# Patient Record
Sex: FEMALE | Race: WHITE | NOT HISPANIC OR LATINO | Employment: PART TIME | URBAN - METROPOLITAN AREA
[De-identification: names, ages, dates, MRNs, and addresses within clinical notes are randomized per-mention and may not be internally consistent; named-entity substitution may affect disease eponyms.]

---

## 2017-02-21 ENCOUNTER — OFFICE VISIT (OUTPATIENT)
Dept: FAMILY MEDICINE | Facility: CLINIC | Age: 57
End: 2017-02-21
Payer: COMMERCIAL

## 2017-02-21 VITALS
HEIGHT: 62 IN | SYSTOLIC BLOOD PRESSURE: 117 MMHG | TEMPERATURE: 98 F | WEIGHT: 159.75 LBS | DIASTOLIC BLOOD PRESSURE: 73 MMHG | BODY MASS INDEX: 29.4 KG/M2 | HEART RATE: 79 BPM

## 2017-02-21 DIAGNOSIS — R30.0 DYSURIA: Primary | ICD-10-CM

## 2017-02-21 DIAGNOSIS — R31.9 HEMATURIA: ICD-10-CM

## 2017-02-21 LAB
BILIRUB UR QL STRIP: NEGATIVE
CLARITY UR: CLEAR
COLOR UR: YELLOW
GLUCOSE UR QL STRIP: NEGATIVE
HGB UR QL STRIP: ABNORMAL
KETONES UR QL STRIP: NEGATIVE
LEUKOCYTE ESTERASE UR QL STRIP: NEGATIVE
NITRITE UR QL STRIP: NEGATIVE
PH UR STRIP: 6 [PH] (ref 5–8)
PROT UR QL STRIP: NEGATIVE
SP GR UR STRIP: 1.01 (ref 1–1.03)
URN SPEC COLLECT METH UR: ABNORMAL

## 2017-02-21 PROCEDURE — 99999 PR PBB SHADOW E&M-EST. PATIENT-LVL II: CPT | Mod: PBBFAC,,, | Performed by: FAMILY MEDICINE

## 2017-02-21 PROCEDURE — 81002 URINALYSIS NONAUTO W/O SCOPE: CPT | Mod: PO

## 2017-02-21 PROCEDURE — 99213 OFFICE O/P EST LOW 20 MIN: CPT | Mod: S$GLB,,, | Performed by: FAMILY MEDICINE

## 2017-02-21 NOTE — PROGRESS NOTES
The patient presents with a history of dysuria and frequency,denies flank pain,vomiting or significant fever. Had some hematuria x 1 d cleared grossly    Past Medical History:  Past Medical History   Diagnosis Date    Arthritis, lumbar spine     Blood dyscrasia      left breast lumpectomy and bilateral reduction    GERD (gastroesophageal reflux disease)     Hypothyroidism 10/8/2014    Obesity (BMI 30-39.9) 10/8/2014    Thyroid disease     Thyroid nodule 10/8/2014     Past Surgical History   Procedure Laterality Date    Breast reduction      C-sections x2      Colonoscopy  9/25/2012           Review of patient's allergies indicates:   Allergen Reactions    Duricef [cefadroxil]      Current Outpatient Prescriptions on File Prior to Visit   Medication Sig Dispense Refill    CALCIUM CARBONATE (CALTRATE 600 ORAL) Take by mouth.      ergocalciferol (VITAMIN D2) 50,000 unit Cap Take 1 capsule (50,000 Units total) by mouth every 7 days. 12 capsule 3    levothyroxine (SYNTHROID) 50 MCG tablet TAKE 1 TABLET (50 MCG TOTAL) BY MOUTH ONCE DAILY. 90 tablet 3    [DISCONTINUED] doxycycline (MONODOX) 100 MG capsule Take 1 capsule (100 mg total) by mouth 2 (two) times daily. 20 capsule 0    [DISCONTINUED] meloxicam (MOBIC) 7.5 MG tablet TAKE 1 TABLET (7.5 MG TOTAL) BY MOUTH ONCE DAILY. 30 tablet 1     No current facility-administered medications on file prior to visit.      Social History     Social History    Marital status:      Spouse name: N/A    Number of children: N/A    Years of education: N/A     Occupational History    Not on file.     Social History Main Topics    Smoking status: Never Smoker    Smokeless tobacco: Never Used    Alcohol use No    Drug use: No    Sexual activity: No     Other Topics Concern    Not on file     Social History Narrative     Family History   Problem Relation Age of Onset    Diabetes Father     Colon cancer Father     Breast cancer Maternal Aunt     Breast  cancer Paternal Aunt     Ovarian cancer Neg Hx        ROS: Denies weight loss  Respiratory/CV: No cough dyspnea or chest pain  GI:No nausea vomiting diarrhea  :Frequency dysuria  SKIN:No rashes or change in texture    PE Vital signs noted  Heent: Normocephalic,with no recent trauma,PERRLA,EOMI,conjunctiva clear with no exudate.Nasopharynx is not injected .Otic canal.TMs are not affected.Pharynx is normal.  Neck:Supple without adenopathy  Chest:Clear bilateral breath sounds normal  Heart:Regular rhthym without murmer  Abdomen:Soft, mild superpubic tenderness,no rebound,no masses, no hepatosplenomegaly,no flank tenderness  Extremeties and Neurologic:Grossly within normal limits     U/A is abnormal     Impression: Hematuria     Plan:   Flori [facility]

## 2017-03-12 RX ORDER — LEVOTHYROXINE SODIUM 50 UG/1
TABLET ORAL
Qty: 90 TABLET | Refills: 3 | Status: SHIPPED | OUTPATIENT
Start: 2017-03-12 | End: 2017-11-13 | Stop reason: SDUPTHER

## 2017-05-01 ENCOUNTER — OFFICE VISIT (OUTPATIENT)
Dept: FAMILY MEDICINE | Facility: CLINIC | Age: 57
End: 2017-05-01
Payer: COMMERCIAL

## 2017-05-01 VITALS
HEART RATE: 67 BPM | BODY MASS INDEX: 29.81 KG/M2 | WEIGHT: 162 LBS | DIASTOLIC BLOOD PRESSURE: 74 MMHG | TEMPERATURE: 99 F | HEIGHT: 62 IN | SYSTOLIC BLOOD PRESSURE: 115 MMHG

## 2017-05-01 DIAGNOSIS — R21 RASH: Primary | ICD-10-CM

## 2017-05-01 PROCEDURE — 99213 OFFICE O/P EST LOW 20 MIN: CPT | Mod: S$GLB,,, | Performed by: NURSE PRACTITIONER

## 2017-05-01 PROCEDURE — 1160F RVW MEDS BY RX/DR IN RCRD: CPT | Mod: S$GLB,,, | Performed by: NURSE PRACTITIONER

## 2017-05-01 PROCEDURE — 99999 PR PBB SHADOW E&M-EST. PATIENT-LVL III: CPT | Mod: PBBFAC,,, | Performed by: NURSE PRACTITIONER

## 2017-05-01 RX ORDER — TRIAMCINOLONE ACETONIDE 5 MG/G
CREAM TOPICAL 2 TIMES DAILY
Qty: 30 G | Refills: 0 | Status: SHIPPED | OUTPATIENT
Start: 2017-05-01 | End: 2017-06-30

## 2017-05-01 RX ORDER — KETOCONAZOLE 20 MG/G
CREAM TOPICAL DAILY
COMMUNITY
End: 2017-06-14

## 2017-05-01 RX ORDER — HYDROXYZINE HYDROCHLORIDE 25 MG/1
25 TABLET, FILM COATED ORAL 2 TIMES DAILY PRN
Qty: 14 TABLET | Refills: 0 | Status: SHIPPED | OUTPATIENT
Start: 2017-05-01 | End: 2017-05-26 | Stop reason: SDUPTHER

## 2017-05-01 NOTE — PROGRESS NOTES
Subjective:       Patient ID: Bessy Lamb is a 56 y.o. female.    Chief Complaint: Rash    Rash   This is a new problem. The current episode started 1 to 4 weeks ago. The problem is unchanged. The affected locations include the right hip. The rash is characterized by redness and itchiness. It is unknown if there was an exposure to a precipitant. Pertinent negatives include no anorexia, congestion, cough, diarrhea, eye pain, facial edema, fatigue, fever, joint pain, nail changes, rhinorrhea, shortness of breath, sore throat or vomiting. Treatments tried: nizoral. The treatment provided no relief. There is no history of allergies, asthma, eczema or varicella.     Past Medical History:   Diagnosis Date    Arthritis, lumbar spine     Blood dyscrasia     left breast lumpectomy and bilateral reduction    GERD (gastroesophageal reflux disease)     Hypothyroidism 10/8/2014    Obesity (BMI 30-39.9) 10/8/2014    Thyroid disease     Thyroid nodule 10/8/2014     Social History     Social History    Marital status:      Spouse name: N/A    Number of children: N/A    Years of education: N/A     Occupational History         Social History Main Topics    Smoking status: Never Smoker    Smokeless tobacco: Never Used    Alcohol use No    Drug use: No    Sexual activity: No     Social History Narrative     Past Surgical History:   Procedure Laterality Date    breast reduction      c-sections x2      COLONOSCOPY  9/25/2012            Review of Systems   Constitutional: Negative.  Negative for fatigue and fever.   HENT: Negative.  Negative for congestion, rhinorrhea and sore throat.    Eyes: Negative.  Negative for pain.   Respiratory: Negative.  Negative for cough and shortness of breath.    Cardiovascular: Negative.    Gastrointestinal: Negative.  Negative for anorexia, diarrhea and vomiting.   Endocrine: Negative.    Genitourinary: Negative.    Musculoskeletal: Negative.  Negative for joint pain.    Skin: Positive for rash. Negative for nail changes.   Allergic/Immunologic: Negative.    Neurological: Negative.    Psychiatric/Behavioral: Negative.        Objective:      Physical Exam   Constitutional: She appears well-developed and well-nourished.   HENT:   Head: Normocephalic.   Right Ear: External ear normal.   Left Ear: External ear normal.   Nose: Nose normal.   Mouth/Throat: Oropharynx is clear and moist.   Eyes: Conjunctivae are normal. Pupils are equal, round, and reactive to light.   Neck: Normal range of motion. Neck supple.   Cardiovascular: Normal rate, regular rhythm and normal heart sounds.    Pulmonary/Chest: Effort normal and breath sounds normal.   Abdominal: Soft. Bowel sounds are normal.   Musculoskeletal: Normal range of motion.   Skin: Skin is warm and dry. Rash noted. Rash is urticarial (Right hip).   Psychiatric: She has a normal mood and affect. Her behavior is normal. Judgment and thought content normal.   Nursing note and vitals reviewed.      Assessment:       1. Rash        Plan:           Bessy was seen today for rash.    Diagnoses and all orders for this visit:    Rash  -     triamcinolone acetonide 0.5% (KENALOG) 0.5 % Crea; Apply topically 2 (two) times daily.  -     hydrOXYzine HCl (ATARAX) 25 MG tablet; Take 1 tablet (25 mg total) by mouth 2 (two) times daily as needed.

## 2017-05-01 NOTE — MR AVS SNAPSHOT
[facility]  [address] [address]  Phone: [phone]  Fax: [phone]                  Bessy Lamb   2017 7:40 AM   Office Visit    Description:  Female : 1960   Provider:  Mara Calloway NP   Department:  [facility]           Reason for Visit     Rash           Diagnoses this Visit        Comments    Rash    -  Primary            To Do List           Goals (5 Years of Data)     None       These Medications        Disp Refills Start End    triamcinolone acetonide 0.5% (KENALOG) 0.5 % Crea 30 g 0 2017    Apply topically 2 (two) times daily. - Topical (Top)    Pharmacy: [facility] [id number] Ph #: [phone]       hydrOXYzine HCl (ATARAX) 25 MG tablet 14 tablet 0 2017    Take 1 tablet (25 mg total) by mouth 2 (two) times daily as needed. - Oral    Pharmacy: [facility] [address] Ph #: [phone]         [facility]     [facility] Care Line -  Assistance  Unless otherwise directed by your provider, please contact [facility], our nurse care line that is available for  assistance.     Registered nurses in the [facility] provide: appointment scheduling, clinical advisement, health education, and other advisory services.  Call: [phone] (toll free)               Medications           Message regarding Medications     Verify the changes and/or additions to your medication regime listed below are the same as discussed with your clinician today.  If any of these changes or additions are incorrect, please notify your healthcare provider.        START taking these NEW medications        Refills    triamcinolone acetonide 0.5% (KENALOG) 0.5 % Crea 0    Sig: Apply topically 2 (two) times daily.    Class: Normal    Route: Topical (Top)    hydrOXYzine HCl (ATARAX) 25 MG tablet 0    Sig: Take 1  "tablet (25 mg total) by mouth 2 (two) times daily as needed.    Class: Normal    Route: Oral           Verify that the below list of medications is an accurate representation of the medications you are currently taking.  If none reported, the list may be blank. If incorrect, please contact your healthcare provider. Carry this list with you in case of emergency.           Current Medications     CALCIUM CARBONATE (CALTRATE 600 ORAL) Take by mouth.    ergocalciferol (VITAMIN D2) 50,000 unit Cap Take 1 capsule (50,000 Units total) by mouth every 7 days.    ketoconazole (NIZORAL) 2 % cream Apply topically once daily.    levothyroxine (SYNTHROID) 50 MCG tablet TAKE ONE TABLET BY MOUTH ONE TIME DAILY    hydrOXYzine HCl (ATARAX) 25 MG tablet Take 1 tablet (25 mg total) by mouth 2 (two) times daily as needed.    triamcinolone acetonide 0.5% (KENALOG) 0.5 % Crea Apply topically 2 (two) times daily.           Clinical Reference Information           Your Vitals Were     BP Pulse Temp Height Weight BMI    115/74 67 98.5 °F (36.9 °C) 5' 2" (1.575 m) 73.5 kg (162 lb) 29.63 kg/m2      Blood Pressure          Most Recent Value    BP  115/74      Allergies as of 5/1/2017     Duricef [Cefadroxil]      Immunizations Administered on Date of Encounter - 5/1/2017     None      Language Assistance Services     ATTENTION: Language assistance services are available, free of charge. Please call [phone].      ATENCIÓN: Si habla español, tiene a johnston disposición servicios gratuitos de asistencia lingüística. Llame al [phone].     [facility] Ý: N?u b?n nói Ti?ng Vi?t, có các d?ch v? h? tr? ngôn ng? mi?n phí dành cho b?n. G?i s? [phone].         [facility] complies with applicable Federal civil rights laws and does not discriminate on the basis of race, color, national origin, age, disability, or sex.        "

## 2017-05-18 ENCOUNTER — OFFICE VISIT (OUTPATIENT)
Dept: FAMILY MEDICINE | Facility: CLINIC | Age: 57
End: 2017-05-18
Payer: COMMERCIAL

## 2017-05-18 ENCOUNTER — LAB VISIT (OUTPATIENT)
Dept: LAB | Facility: HOSPITAL | Age: 57
End: 2017-05-18
Payer: COMMERCIAL

## 2017-05-18 VITALS
BODY MASS INDEX: 30.09 KG/M2 | SYSTOLIC BLOOD PRESSURE: 122 MMHG | HEART RATE: 79 BPM | HEIGHT: 62 IN | WEIGHT: 163.5 LBS | DIASTOLIC BLOOD PRESSURE: 69 MMHG

## 2017-05-18 DIAGNOSIS — L20.9 ATOPIC DERMATITIS, UNSPECIFIED TYPE: ICD-10-CM

## 2017-05-18 DIAGNOSIS — T14.8XXA BRUISING: ICD-10-CM

## 2017-05-18 DIAGNOSIS — L20.9 ATOPIC DERMATITIS, UNSPECIFIED TYPE: Primary | ICD-10-CM

## 2017-05-18 LAB
ALBUMIN SERPL BCP-MCNC: 4.2 G/DL
ALP SERPL-CCNC: 67 U/L
ALT SERPL W/O P-5'-P-CCNC: 29 U/L
ANION GAP SERPL CALC-SCNC: 6 MMOL/L
AST SERPL-CCNC: 32 U/L
BASOPHILS # BLD AUTO: 0.02 K/UL
BASOPHILS NFR BLD: 0.3 %
BILIRUB SERPL-MCNC: 0.9 MG/DL
BUN SERPL-MCNC: 8 MG/DL
CALCIUM SERPL-MCNC: 9.6 MG/DL
CHLORIDE SERPL-SCNC: 102 MMOL/L
CO2 SERPL-SCNC: 31 MMOL/L
CREAT SERPL-MCNC: 0.9 MG/DL
DIFFERENTIAL METHOD: NORMAL
EOSINOPHIL # BLD AUTO: 0.1 K/UL
EOSINOPHIL NFR BLD: 1.8 %
ERYTHROCYTE [DISTWIDTH] IN BLOOD BY AUTOMATED COUNT: 12.6 %
EST. GFR  (AFRICAN AMERICAN): >60 ML/MIN/1.73 M^2
EST. GFR  (NON AFRICAN AMERICAN): >60 ML/MIN/1.73 M^2
GLUCOSE SERPL-MCNC: 91 MG/DL
HCT VFR BLD AUTO: 39.2 %
HGB BLD-MCNC: 13.4 G/DL
LYMPHOCYTES # BLD AUTO: 3.5 K/UL
LYMPHOCYTES NFR BLD: 47.2 %
MCH RBC QN AUTO: 29.1 PG
MCHC RBC AUTO-ENTMCNC: 34.2 %
MCV RBC AUTO: 85 FL
MONOCYTES # BLD AUTO: 0.4 K/UL
MONOCYTES NFR BLD: 5.1 %
NEUTROPHILS # BLD AUTO: 3.4 K/UL
NEUTROPHILS NFR BLD: 45.5 %
PLATELET # BLD AUTO: 242 K/UL
PMV BLD AUTO: 9.4 FL
POTASSIUM SERPL-SCNC: 3.7 MMOL/L
PROT SERPL-MCNC: 7.7 G/DL
RBC # BLD AUTO: 4.6 M/UL
SODIUM SERPL-SCNC: 139 MMOL/L
WBC # BLD AUTO: 7.39 K/UL

## 2017-05-18 PROCEDURE — 99999 PR PBB SHADOW E&M-EST. PATIENT-LVL II: CPT | Mod: PBBFAC,,, | Performed by: FAMILY MEDICINE

## 2017-05-18 PROCEDURE — 96372 THER/PROPH/DIAG INJ SC/IM: CPT | Mod: S$GLB,,, | Performed by: FAMILY MEDICINE

## 2017-05-18 PROCEDURE — 80053 COMPREHEN METABOLIC PANEL: CPT

## 2017-05-18 PROCEDURE — 36415 COLL VENOUS BLD VENIPUNCTURE: CPT | Mod: PO

## 2017-05-18 PROCEDURE — 85025 COMPLETE CBC W/AUTO DIFF WBC: CPT

## 2017-05-18 PROCEDURE — 99213 OFFICE O/P EST LOW 20 MIN: CPT | Mod: 25,S$GLB,, | Performed by: FAMILY MEDICINE

## 2017-05-18 PROCEDURE — 1160F RVW MEDS BY RX/DR IN RCRD: CPT | Mod: S$GLB,,, | Performed by: FAMILY MEDICINE

## 2017-05-18 RX ORDER — DEXAMETHASONE SODIUM PHOSPHATE 4 MG/ML
8 INJECTION, SOLUTION INTRA-ARTICULAR; INTRALESIONAL; INTRAMUSCULAR; INTRAVENOUS; SOFT TISSUE
Status: COMPLETED | OUTPATIENT
Start: 2017-05-18 | End: 2017-05-18

## 2017-05-18 RX ADMIN — DEXAMETHASONE SODIUM PHOSPHATE 8 MG: 4 INJECTION, SOLUTION INTRA-ARTICULAR; INTRALESIONAL; INTRAMUSCULAR; INTRAVENOUS; SOFT TISSUE at 04:05

## 2017-05-18 NOTE — PROGRESS NOTES
Bessy Lamb presents with moderate gen itching and papular rash. Now reports atraumatic bruising lt thigh    Denies nausea,vomiting,diarrhea or significant fever.    Past Medical History:   Diagnosis Date    Arthritis, lumbar spine     Blood dyscrasia     left breast lumpectomy and bilateral reduction    GERD (gastroesophageal reflux disease)     Hypothyroidism 10/8/2014    Obesity (BMI 30-39.9) 10/8/2014    Thyroid disease     Thyroid nodule 10/8/2014     Past Surgical History:   Procedure Laterality Date    breast reduction      c-sections x2      COLONOSCOPY  9/25/2012          Review of patient's allergies indicates:   Allergen Reactions    Duricef [cefadroxil]      Current Outpatient Prescriptions on File Prior to Visit   Medication Sig Dispense Refill    CALCIUM CARBONATE (CALTRATE 600 ORAL) Take by mouth.      ergocalciferol (VITAMIN D2) 50,000 unit Cap Take 1 capsule (50,000 Units total) by mouth every 7 days. 12 capsule 3    ketoconazole (NIZORAL) 2 % cream Apply topically once daily.      levothyroxine (SYNTHROID) 50 MCG tablet TAKE ONE TABLET BY MOUTH ONE TIME DAILY 90 tablet 3    triamcinolone acetonide 0.5% (KENALOG) 0.5 % Crea Apply topically 2 (two) times daily. 30 g 0     No current facility-administered medications on file prior to visit.      Social History     Social History    Marital status:      Spouse name: N/A    Number of children: N/A    Years of education: N/A     Occupational History    Not on file.     Social History Main Topics    Smoking status: Never Smoker    Smokeless tobacco: Never Used    Alcohol use No    Drug use: No    Sexual activity: No     Other Topics Concern    Not on file     Social History Narrative     Family History   Problem Relation Age of Onset    Diabetes Father     Colon cancer Father     Breast cancer Maternal Aunt     Breast cancer Paternal Aunt     Ovarian cancer Neg Hx          ROS:  SKIN: No rashes, itching or  changes in color or texture of skin.  EYES: Visual acuity fine. No photophobia, ocular pain or diplopia.EARS: Denies ear pain, discharge or vertigo.NOSE: No loss of smell, no epistaxis some postnasal drip.MOUTH & THROAT: No hoarseness or change in voice. No excessive gum bleeding.CHEST: Denies SMILEY, cyanosis, wheezing  CARDIOVASCULAR: Denies chest pain, PND, orthopnea or reduced exercise tolerance.  ABDOMEN:  No weight loss.No abdominal pain, no hematemesis or blood in stool.  URINARY: No flank pain, dysuria or hematuria.  PERIPHERAL VASCULAR: No claudication or cyanosis.  MUSCULOSKELETAL: Negative   NEUROLOGIC: No history of seizures, paralysis, alteration of gait or coordination.    PE: Vital signs as noted  Heent:Normocephalic with no recent cranial trauma,PERRLA,EOMI,conjunctiva clear,fundi reveal no hemmorhage exudate or papilledema.Otic canals clear, tympanic membranes slightly dull bilaterally.Nasal mucosa slightly red and edematous.Posterior pharynx slightly red but without exudate.  Neck:Supple with minimal anterior cervical adenopathy.  Chest:Clear bilateral breath sounds    Heart:Regular rhthym without murmer  Abdomen:Soft, non tender,no masses, no hepatosplenomegalyExtremeties and Neurologic:Grossly within normal limits  SKIN:Gen papular rash  Impression: Atopy  Bruising   Plan: H1 H2   Lab

## 2017-05-25 ENCOUNTER — TELEPHONE (OUTPATIENT)
Dept: FAMILY MEDICINE | Facility: CLINIC | Age: 57
End: 2017-05-25

## 2017-05-25 DIAGNOSIS — L50.9 URTICARIA: Primary | ICD-10-CM

## 2017-05-25 NOTE — TELEPHONE ENCOUNTER
----- Message from Radha Wheeler sent at 5/25/2017  3:55 PM CDT -----  Contact: Peter  Patient's  requesting refill on Rx Hyzine States received letter with results, but condition is still present, can not sleep, itching, and red spots appearing on legs. Asking for next step to help resolve this issue. Also asking to be called today as wants answers. Please call [phone]. Thanks!

## 2017-05-25 NOTE — TELEPHONE ENCOUNTER
Rec allergy immunology consult -ok to refill what he requests but I dont know what he means by khari

## 2017-05-26 RX ORDER — HYDROXYZINE HYDROCHLORIDE 25 MG/1
TABLET, FILM COATED ORAL
Qty: 14 TABLET | Refills: 0 | Status: SHIPPED | OUTPATIENT
Start: 2017-05-26 | End: 2017-06-30

## 2017-05-26 RX ORDER — METHYLPREDNISOLONE 4 MG/1
TABLET ORAL
Qty: 1 PACKAGE | Refills: 0 | Status: SHIPPED | OUTPATIENT
Start: 2017-05-26 | End: 2017-06-14

## 2017-05-26 NOTE — TELEPHONE ENCOUNTER
Will send dosepak and ok to take diflucan-ok to stop hydroxyzine but if not taking a regular antihistamine with the zantac-like claritin or allegra-Id rec getting that as well and Allergy con if not better next few days

## 2017-05-26 NOTE — TELEPHONE ENCOUNTER
Patient states the steroid helped for a little while, she took the zantac, hydroxyzine 25 mg BID, and still having rash around mid section. Nothing has changed.    She has one pill of diflucan that she would like to know if she can take instead of hydroxyzine. She states she doesn't take medication at work because it makes her too sleepy. Would a steroid pack be beneficial?

## 2017-06-02 ENCOUNTER — TELEPHONE (OUTPATIENT)
Dept: FAMILY MEDICINE | Facility: CLINIC | Age: 57
End: 2017-06-02

## 2017-06-02 ENCOUNTER — NURSE TRIAGE (OUTPATIENT)
Dept: ADMINISTRATIVE | Facility: CLINIC | Age: 57
End: 2017-06-02

## 2017-06-02 ENCOUNTER — PATIENT MESSAGE (OUTPATIENT)
Dept: FAMILY MEDICINE | Facility: CLINIC | Age: 57
End: 2017-06-02

## 2017-06-02 NOTE — TELEPHONE ENCOUNTER
Reason for Disposition   Caller has medication question, adult has minor symptoms, caller declines triage, and triager answers question    Protocols used: ST MEDICATION QUESTION CALL-A-AH    Bessy has been followed by Dr. Snyder for about a month regarding itching, rash. She initially thought it may have been mites but she is noticing the rash is located in skin folds, groin area. She is wondering if Dr. Snyder will prescribe a antifungal if he thinks that is appropriate. She states the rash is getting worse. She states she called earlier today but hasn't heard back. Advised if she doesn't hear back then I would recommend to go to [facility] tomorrow which is open. Discussed with her if it is yeast then trying otc lotrimin to under her breasts, armpits, under her abdominal folds, and thigh area would improve this. She is thinking she would need diflucan po. She is asking for a call back from Dr. Snyder's office if still available today. Please contact caller with any further care advice.

## 2017-06-02 NOTE — TELEPHONE ENCOUNTER
----- Message from Amelia Badillo sent at 6/2/2017 11:10 AM CDT -----  Contact: pt  Please call pt @ [phone], pt states she have more questions, pt states she was already talking to nurse.

## 2017-06-02 NOTE — TELEPHONE ENCOUNTER
DANYA   Spoke with patient and she states that she finished the hydroxyzine. She forgot about taking the claritin or allegra in addition to zantac and also the diflucan. Adv of last note with with medication approval. She verbalized understanding and going to  OTC meds

## 2017-06-03 RX ORDER — CLOTRIMAZOLE 1 %
CREAM (GRAM) TOPICAL 2 TIMES DAILY
Qty: 60 G | Refills: 1 | Status: SHIPPED | OUTPATIENT
Start: 2017-06-03 | End: 2017-06-30

## 2017-06-03 RX ORDER — FLUCONAZOLE 150 MG/1
150 TABLET ORAL DAILY
Qty: 3 TABLET | Refills: 0 | Status: SHIPPED | OUTPATIENT
Start: 2017-06-03 | End: 2017-06-04

## 2017-06-07 ENCOUNTER — PATIENT MESSAGE (OUTPATIENT)
Dept: FAMILY MEDICINE | Facility: CLINIC | Age: 57
End: 2017-06-07

## 2017-06-08 ENCOUNTER — OFFICE VISIT (OUTPATIENT)
Dept: OBSTETRICS AND GYNECOLOGY | Facility: CLINIC | Age: 57
End: 2017-06-08
Payer: COMMERCIAL

## 2017-06-08 VITALS
WEIGHT: 162.06 LBS | HEIGHT: 62 IN | BODY MASS INDEX: 29.82 KG/M2 | SYSTOLIC BLOOD PRESSURE: 128 MMHG | DIASTOLIC BLOOD PRESSURE: 78 MMHG

## 2017-06-08 DIAGNOSIS — Z12.31 VISIT FOR SCREENING MAMMOGRAM: ICD-10-CM

## 2017-06-08 DIAGNOSIS — N84.1 CERVICAL POLYP: ICD-10-CM

## 2017-06-08 DIAGNOSIS — Z01.419 ENCOUNTER FOR GYNECOLOGICAL EXAMINATION WITHOUT ABNORMAL FINDING: Primary | ICD-10-CM

## 2017-06-08 PROCEDURE — 99396 PREV VISIT EST AGE 40-64: CPT | Mod: S$GLB,,, | Performed by: SPECIALIST

## 2017-06-08 PROCEDURE — 88175 CYTOPATH C/V AUTO FLUID REDO: CPT | Performed by: PATHOLOGY

## 2017-06-08 PROCEDURE — 99999 PR PBB SHADOW E&M-EST. PATIENT-LVL IV: CPT | Mod: PBBFAC,,, | Performed by: SPECIALIST

## 2017-06-08 PROCEDURE — 88141 CYTOPATH C/V INTERPRET: CPT | Mod: ,,, | Performed by: PATHOLOGY

## 2017-06-08 NOTE — PROGRESS NOTES
57 yo WF presents for annual gyn evaluation. Under dermatology care fro generalized rash over trunk.  Past Medical History:   Diagnosis Date    Arthritis, lumbar spine     Blood dyscrasia     left breast lumpectomy and bilateral reduction    GERD (gastroesophageal reflux disease)     Hypothyroidism 10/8/2014    Obesity (BMI 30-39.9) 10/8/2014    Thyroid disease     Thyroid nodule 10/8/2014       Past Surgical History:   Procedure Laterality Date    breast reduction      c-sections x2       SECTION      COLONOSCOPY  2012            Family History   Problem Relation Age of Onset    Diabetes Father     Colon cancer Father     Breast cancer Maternal Aunt     Breast cancer Paternal Aunt     Ovarian cancer Neg Hx        Social History     Social History    Marital status:      Spouse name: N/A    Number of children: N/A    Years of education: N/A     Social History Main Topics    Smoking status: Never Smoker    Smokeless tobacco: Never Used    Alcohol use No    Drug use: No    Sexual activity: No     Other Topics Concern    None     Social History Narrative    None       Current Outpatient Prescriptions   Medication Sig Dispense Refill    CALCIUM CARBONATE (CALTRATE 600 ORAL) Take by mouth.      clotrimazole (LOTRIMIN) 1 % cream Apply topically 2 (two) times daily. 60 g 1    ergocalciferol (VITAMIN D2) 50,000 unit Cap Take 1 capsule (50,000 Units total) by mouth every 7 days. 12 capsule 3    hydrOXYzine HCl (ATARAX) 25 MG tablet TAKE 1 TABLET BY MOUTH TWO TIMES DAILY AS NEEDED. 14 tablet 0    ketoconazole (NIZORAL) 2 % cream Apply topically once daily.      levothyroxine (SYNTHROID) 50 MCG tablet TAKE ONE TABLET BY MOUTH ONE TIME DAILY 90 tablet 3    methylPREDNISolone (MEDROL DOSEPACK) 4 mg tablet As directed 1 Package 0    triamcinolone acetonide 0.5% (KENALOG) 0.5 % Crea Apply topically 2 (two) times daily. 30 g 0     No current facility-administered medications for  this visit.        Review of patient's allergies indicates:   Allergen Reactions    Duricef [cefadroxil]        Review of System:   General: no chills, fever, night sweats, weight gain or weight loss  POSITIVE TRUNCAL RASH   Psychological: no depression or suicidal ideation  Breasts: no new or changing breast lumps, nipple discharge or masses.  Respiratory: no cough, shortness of breath, or wheezing  Cardiovascular: no chest pain or dyspnea on exertion  Gastrointestinal: no abdominal pain, change in bowel habits, or black or bloody stools  Genito-Urinary: no incontinence, urinary frequency/urgency or vulvar/vaginal symptoms, pelvic pain or abnormal vaginal bleeding.  Musculoskeletal: no gait disturbance or muscular weakness    General Appearance:  Alert, cooperative, no distress, appears stated age   Head:  Normocephalic, without obvious abnormality, atraumatic   Eyes:  PERRL, conjunctiva/corneas clear, EOM's intact, fundi benign, both eyes   Ears:  Normal TM's and external ear canals, both ears   Nose: Nares normal, septum midline,mucosa normal, no drainage or sinus tenderness   Throat: Lips, mucosa, and tongue normal; teeth and gums normal   Neck: Supple, symmetrical, trachea midline, no adenopathy;  thyroid: not enlarged, symmetric, no tenderness/mass/nodules; no carotid bruit or JVD   Back:   Symmetric, no curvature, ROM normal, no CVA tenderness   Lungs:   Clear to auscultation bilaterally, respirations unlabored   Breasts:  No masses or tenderness   Heart:  Regular rate and rhythm, S1 and S2 normal, no murmur, rub, or gallop   Abdomen:   Soft, non-tender, bowel sounds active all four quadrants,  no masses, no organomegaly    Genitourinary:   External rectal exam shows no thrombosed external hemorrhoids.   Pelvic exam was performed with patient supine.  No labial fusion.  There is no rash, lesion or injury on the right labia.  There is no rash, lesion or injury on the left labia.  No bleeding and no signs of  injury around the vaginal introitus, urethra is without lesions and well supported. The cervix is visualized with no discharge. Large prolapsed polpoid growth projecting from cervical os.  No vaginal discharge found.  No significant Cystocele, Enterocele or rectocele, and uterus well supported.  Bimanual exam:  The urethra is normal to palpation and there are no palpable vaginal wall masses.  Uterus is not deviated, not enlarged, not fixed, normal shape and not tender.  Cervix exhibits no motion tenderness.   Right adnexum displays no mass and no tenderness.  Left adnexum displays no mass and no tenderness.   Extremities: Extremities normal, atraumatic, no cyanosis or edema   Pulses: 2+ and symmetric   Skin: Skin color, texture, turgor normal, no rashes or lesions   Lymph nodes: Cervical, supraclavicular, and axillary nodes normal   Neurologic: Normal       PAP submitted    I spent 20 min an ddiscussed probable benign cervical polyp howver possible neoplasia and need for hysteroscopy and excision of polyp.  Pt is going on trip 2 weeks and I will schedule hysteoscope and polyp removal with D and C immediately following her return

## 2017-06-13 ENCOUNTER — TELEPHONE (OUTPATIENT)
Dept: FAMILY MEDICINE | Facility: CLINIC | Age: 57
End: 2017-06-13

## 2017-06-13 ENCOUNTER — PATIENT MESSAGE (OUTPATIENT)
Dept: FAMILY MEDICINE | Facility: CLINIC | Age: 57
End: 2017-06-13

## 2017-06-13 NOTE — TELEPHONE ENCOUNTER
Pt is asking if you will agree to give her time off from work due to the stress there and now her GYN has found a polyp and will need to do a procedure. Pt feels like she is about to have a nervous breakdown and would also like something to help with her this.

## 2017-06-13 NOTE — TELEPHONE ENCOUNTER
----- Message from Padma Arambula sent at 6/13/2017  8:44 AM CDT -----  Contact: pt  The pt states she isn't feeling well and wants to be worked in tomorrow, the pt request a return call to [phone]///[id number]

## 2017-06-14 ENCOUNTER — PATIENT MESSAGE (OUTPATIENT)
Dept: OBSTETRICS AND GYNECOLOGY | Facility: CLINIC | Age: 57
End: 2017-06-14

## 2017-06-14 ENCOUNTER — OFFICE VISIT (OUTPATIENT)
Dept: FAMILY MEDICINE | Facility: CLINIC | Age: 57
End: 2017-06-14
Payer: COMMERCIAL

## 2017-06-14 VITALS
WEIGHT: 163.13 LBS | SYSTOLIC BLOOD PRESSURE: 139 MMHG | DIASTOLIC BLOOD PRESSURE: 84 MMHG | HEART RATE: 76 BPM | HEIGHT: 62 IN | BODY MASS INDEX: 30.02 KG/M2

## 2017-06-14 DIAGNOSIS — E03.9 HYPOTHYROIDISM, UNSPECIFIED TYPE: Primary | ICD-10-CM

## 2017-06-14 DIAGNOSIS — F41.9 ANXIETY: ICD-10-CM

## 2017-06-14 DIAGNOSIS — N84.0 UTERINE POLYP: ICD-10-CM

## 2017-06-14 PROCEDURE — 99999 PR PBB SHADOW E&M-EST. PATIENT-LVL II: CPT | Mod: PBBFAC,,, | Performed by: FAMILY MEDICINE

## 2017-06-14 PROCEDURE — 99214 OFFICE O/P EST MOD 30 MIN: CPT | Mod: S$GLB,,, | Performed by: FAMILY MEDICINE

## 2017-06-14 NOTE — PROGRESS NOTES
Bessy Lamb presents with recurrent polyps uterine Dr Rea planning removal and hysterascope and poss Hyst Afre Dern con dx Scabiesw moderate gen itching and papular rash Continues to report atraumatic bruising lt thigh. Co sog stress     Past Medical History:   Diagnosis Date    Arthritis, lumbar spine     Blood dyscrasia     left breast lumpectomy and bilateral reduction    GERD (gastroesophageal reflux disease)     Hypothyroidism 10/8/2014    Obesity (BMI 30-39.9) 10/8/2014    Thyroid disease     Thyroid nodule 10/8/2014     Past Surgical History:   Procedure Laterality Date    breast reduction      c-sections x2       SECTION      COLONOSCOPY  2012          Review of patient's allergies indicates:   Allergen Reactions    Duricef [cefadroxil]      Current Outpatient Prescriptions on File Prior to Visit   Medication Sig Dispense Refill    CALCIUM CARBONATE (CALTRATE 600 ORAL) Take by mouth.      clotrimazole (LOTRIMIN) 1 % cream Apply topically 2 (two) times daily. 60 g 1    ergocalciferol (VITAMIN D2) 50,000 unit Cap Take 1 capsule (50,000 Units total) by mouth every 7 days. 12 capsule 3    hydrOXYzine HCl (ATARAX) 25 MG tablet TAKE 1 TABLET BY MOUTH TWO TIMES DAILY AS NEEDED. 14 tablet 0    levothyroxine (SYNTHROID) 50 MCG tablet TAKE ONE TABLET BY MOUTH ONE TIME DAILY 90 tablet 3    triamcinolone acetonide 0.5% (KENALOG) 0.5 % Crea Apply topically 2 (two) times daily. 30 g 0    [DISCONTINUED] ketoconazole (NIZORAL) 2 % cream Apply topically once daily.      [DISCONTINUED] methylPREDNISolone (MEDROL DOSEPACK) 4 mg tablet As directed 1 Package 0     No current facility-administered medications on file prior to visit.      Social History     Social History    Marital status:      Spouse name: N/A    Number of children: N/A    Years of education: N/A     Occupational History    Not on file.     Social History Main Topics    Smoking status: Never Smoker     Smokeless tobacco: Never Used    Alcohol use No    Drug use: No    Sexual activity: No     Other Topics Concern    Not on file     Social History Narrative    No narrative on file     Family History   Problem Relation Age of Onset    Diabetes Father     Colon cancer Father     Breast cancer Maternal Aunt     Breast cancer Paternal Aunt     Ovarian cancer Neg Hx          ROS:  SKIN: No rashes, itching or changes in color or texture of skin.  EYES: Visual acuity fine. No photophobia, ocular pain or diplopia.EARS: Denies ear pain, discharge or vertigo.NOSE: No loss of smell, no epistaxis some postnasal drip.MOUTH & THROAT: No hoarseness or change in voice. No excessive gum bleeding.CHEST: Denies SMILEY, cyanosis, wheezing  CARDIOVASCULAR: Denies chest pain, PND, orthopnea or reduced exercise tolerance.  ABDOMEN:  No weight loss.No abdominal pain, no hematemesis or blood in stool.  URINARY: No flank pain, dysuria or hematuria.  PERIPHERAL VASCULAR: No claudication or cyanosis.  MUSCULOSKELETAL: Negative   NEUROLOGIC: No history of seizures, paralysis, alteration of gait or coordination.    PE: Vital signs as noted  Heent:Normocephalic with no recent cranial trauma,PERRLA,EOMI,conjunctiva clear,fundi reveal no hemmorhage exudate or papilledema.Otic canals clear, tympanic membranes slightly dull bilaterally.Nasal mucosa slightly red and edematous.Posterior pharynx slightly red but without exudate.  Neck:Supple with minimal anterior cervical adenopathy.  Chest:Clear bilateral breath sounds    Heart:Regular rhthym without murmer  Abdomen:Soft, non tender,no masses, no hepatosplenomegalyExtremeties and Neurologic:Grossly within normal limits     Impression: Vaginal polyp  Scabies  Atopy  Bruising   Plan: Rev consults  Rec medical leave

## 2017-06-16 ENCOUNTER — PATIENT MESSAGE (OUTPATIENT)
Dept: OBSTETRICS AND GYNECOLOGY | Facility: CLINIC | Age: 57
End: 2017-06-16

## 2017-06-16 ENCOUNTER — DOCUMENTATION ONLY (OUTPATIENT)
Dept: FAMILY MEDICINE | Facility: CLINIC | Age: 57
End: 2017-06-16

## 2017-06-16 RX ORDER — FLUOCINONIDE 0.5 MG/G
CREAM TOPICAL 2 TIMES DAILY
COMMUNITY
End: 2017-06-30

## 2017-06-16 RX ORDER — PERMETHRIN 50 MG/G
CREAM TOPICAL ONCE
COMMUNITY
End: 2017-06-30

## 2017-06-30 ENCOUNTER — PATIENT MESSAGE (OUTPATIENT)
Dept: FAMILY MEDICINE | Facility: CLINIC | Age: 57
End: 2017-06-30

## 2017-06-30 ENCOUNTER — OFFICE VISIT (OUTPATIENT)
Dept: OBSTETRICS AND GYNECOLOGY | Facility: CLINIC | Age: 57
End: 2017-06-30
Payer: COMMERCIAL

## 2017-06-30 ENCOUNTER — HOSPITAL ENCOUNTER (OUTPATIENT)
Dept: RADIOLOGY | Facility: HOSPITAL | Age: 57
Discharge: HOME | End: 2017-06-30
Attending: SPECIALIST
Payer: COMMERCIAL

## 2017-06-30 VITALS
HEIGHT: 61 IN | HEIGHT: 61 IN | BODY MASS INDEX: 30.96 KG/M2 | WEIGHT: 164 LBS | WEIGHT: 164.25 LBS | BODY MASS INDEX: 31.01 KG/M2

## 2017-06-30 DIAGNOSIS — N84.1 CERVICAL POLYP: ICD-10-CM

## 2017-06-30 DIAGNOSIS — Z12.31 VISIT FOR SCREENING MAMMOGRAM: ICD-10-CM

## 2017-06-30 DIAGNOSIS — Z12.31 OTHER SCREENING MAMMOGRAM: Primary | ICD-10-CM

## 2017-06-30 PROCEDURE — 77063 BREAST TOMOSYNTHESIS BI: CPT | Mod: 26,,, | Performed by: RADIOLOGY

## 2017-06-30 PROCEDURE — 99213 OFFICE O/P EST LOW 20 MIN: CPT | Mod: S$GLB,,, | Performed by: SPECIALIST

## 2017-06-30 PROCEDURE — 77067 SCR MAMMO BI INCL CAD: CPT | Mod: 26,,, | Performed by: RADIOLOGY

## 2017-06-30 PROCEDURE — 77067 SCR MAMMO BI INCL CAD: CPT | Mod: TC

## 2017-06-30 PROCEDURE — 99999 PR PBB SHADOW E&M-EST. PATIENT-LVL II: CPT | Mod: PBBFAC,,, | Performed by: SPECIALIST

## 2017-07-03 ENCOUNTER — PATIENT MESSAGE (OUTPATIENT)
Dept: FAMILY MEDICINE | Facility: CLINIC | Age: 57
End: 2017-07-03

## 2017-07-03 RX ORDER — CLONAZEPAM 0.25 MG/1
0.25 TABLET, ORALLY DISINTEGRATING ORAL 2 TIMES DAILY
Qty: 60 TABLET | Refills: 0 | Status: SHIPPED | OUTPATIENT
Start: 2017-07-03 | End: 2017-08-24 | Stop reason: SDUPTHER

## 2017-07-05 PROBLEM — N84.1 POLYP OF CERVIX: Status: ACTIVE | Noted: 2017-07-05

## 2017-07-13 ENCOUNTER — PATIENT MESSAGE (OUTPATIENT)
Dept: FAMILY MEDICINE | Facility: CLINIC | Age: 57
End: 2017-07-13

## 2017-07-15 ENCOUNTER — PATIENT MESSAGE (OUTPATIENT)
Dept: FAMILY MEDICINE | Facility: CLINIC | Age: 57
End: 2017-07-15

## 2017-07-17 ENCOUNTER — PATIENT MESSAGE (OUTPATIENT)
Dept: FAMILY MEDICINE | Facility: CLINIC | Age: 57
End: 2017-07-17

## 2017-07-17 ENCOUNTER — OFFICE VISIT (OUTPATIENT)
Dept: OBSTETRICS AND GYNECOLOGY | Facility: CLINIC | Age: 57
End: 2017-07-17
Payer: COMMERCIAL

## 2017-07-17 VITALS — HEIGHT: 62 IN | BODY MASS INDEX: 30.67 KG/M2 | WEIGHT: 166.69 LBS

## 2017-07-17 DIAGNOSIS — Z09 POSTOPERATIVE EXAMINATION: Primary | ICD-10-CM

## 2017-07-17 PROCEDURE — 99024 POSTOP FOLLOW-UP VISIT: CPT | Mod: S$GLB,,, | Performed by: SPECIALIST

## 2017-07-17 PROCEDURE — 99999 PR PBB SHADOW E&M-EST. PATIENT-LVL II: CPT | Mod: PBBFAC,,, | Performed by: SPECIALIST

## 2017-07-17 NOTE — PROGRESS NOTES
57 yo WF 2 weeks s/p hysteoscopic polypectomy.  Review of pathology reveals benign pylp.  Pt denies pain, f/c, vaginal bleeding  Past Medical History:   Diagnosis Date    Arthritis, lumbar spine     hands    General anesthetics causing adverse effect in therapeutic use     following breast rediuct, hard time awakening  also had anxiety rxn to lidocain in dental ofc    GERD (gastroesophageal reflux disease)     Hypothyroidism 10/8/2014    Maternal anesthesia complication     epidural for 1st child went up instead of down; required intubation    Obesity (BMI 30-39.9) 10/8/2014    Thyroid disease     Thyroid nodule 10/8/2014       Past Surgical History:   Procedure Laterality Date    c-sections x2      COLONOSCOPY  9/25/2012         hysteroscopy with polypectomy      INCISIONAL BREAST BIOPSY Left 1996    benign; done at same time as reduction    TOTAL REDUCTION MAMMOPLASTY         Family History   Problem Relation Age of Onset    Diabetes Father     Cirrhosis Father     Breast cancer Maternal Aunt     Breast cancer Paternal Aunt     Breast cancer Mother     Brain cancer Mother     Early death Mother 51     brain tumor    Cancer Mother      breast; brain    Heart disease Sister      chf; cad;    Hypertension Sister     Heart disease Brother      arrhyth    Hypertension Sister     Heart disease Brother      mi    Heart disease Brother      mi    Diabetes Brother     Macular degeneration Brother     Ovarian cancer Neg Hx        Social History     Social History    Marital status:      Spouse name: N/A    Number of children: N/A    Years of education: N/A     Social History Main Topics    Smoking status: Never Smoker    Smokeless tobacco: Never Used    Alcohol use Yes      Comment: 2/ month    Drug use: No    Sexual activity: No     Other Topics Concern    None     Social History Narrative    None       Current Outpatient Prescriptions   Medication Sig Dispense Refill     CALCIUM CARBONATE (CALTRATE 600 ORAL) Take 1 tablet by mouth once daily.       clonazePAM (KLONOPIN) 0.25 MG TbDL Take 1 tablet (0.25 mg total) by mouth 2 (two) times daily. 60 tablet 0    ergocalciferol (VITAMIN D2) 50,000 unit Cap Take 1 capsule (50,000 Units total) by mouth every 7 days. 12 capsule 3    levothyroxine (SYNTHROID) 50 MCG tablet TAKE ONE TABLET BY MOUTH ONE TIME DAILY 90 tablet 3     No current facility-administered medications for this visit.        Review of patient's allergies indicates:   Allergen Reactions    Duricef [cefadroxil] Hives       Review of System:   General: no chills, fever, night sweats, weight gain or weight loss  Psychological: no depression or suicidal ideation  Breasts: no new or changing breast lumps, nipple discharge or masses.  Respiratory: no cough, shortness of breath, or wheezing  Cardiovascular: no chest pain or dyspnea on exertion  Gastrointestinal: no abdominal pain, change in bowel habits, or black or bloody stools  Genito-Urinary: no incontinence, urinary frequency/urgency or vulvar/vaginal symptoms, pelvic pain or abnormal vaginal bleeding.  Musculoskeletal: no gait disturbance or muscular weakness    Plan Release restrictions          RTO 1 year/prn

## 2017-07-26 ENCOUNTER — PATIENT MESSAGE (OUTPATIENT)
Dept: FAMILY MEDICINE | Facility: CLINIC | Age: 57
End: 2017-07-26

## 2017-07-26 ENCOUNTER — LAB VISIT (OUTPATIENT)
Dept: LAB | Facility: HOSPITAL | Age: 57
End: 2017-07-26
Attending: FAMILY MEDICINE
Payer: COMMERCIAL

## 2017-07-26 ENCOUNTER — OFFICE VISIT (OUTPATIENT)
Dept: FAMILY MEDICINE | Facility: CLINIC | Age: 57
End: 2017-07-26
Payer: COMMERCIAL

## 2017-07-26 VITALS
HEIGHT: 61 IN | HEART RATE: 94 BPM | BODY MASS INDEX: 31.34 KG/M2 | WEIGHT: 166 LBS | DIASTOLIC BLOOD PRESSURE: 86 MMHG | SYSTOLIC BLOOD PRESSURE: 135 MMHG

## 2017-07-26 DIAGNOSIS — E55.9 VITAMIN D DEFICIENCY: ICD-10-CM

## 2017-07-26 DIAGNOSIS — M79.10 MYALGIA: ICD-10-CM

## 2017-07-26 DIAGNOSIS — E03.9 HYPOTHYROIDISM, UNSPECIFIED TYPE: ICD-10-CM

## 2017-07-26 DIAGNOSIS — J06.9 UPPER RESPIRATORY TRACT INFECTION, UNSPECIFIED TYPE: Primary | ICD-10-CM

## 2017-07-26 DIAGNOSIS — E04.1 THYROID NODULE: ICD-10-CM

## 2017-07-26 DIAGNOSIS — F41.9 ANXIETY: ICD-10-CM

## 2017-07-26 DIAGNOSIS — R53.83 FATIGUE, UNSPECIFIED TYPE: ICD-10-CM

## 2017-07-26 LAB
25(OH)D3+25(OH)D2 SERPL-MCNC: 44 NG/ML
CHOLEST/HDLC SERPL: 2.4 {RATIO}
HDL/CHOLESTEROL RATIO: 41.1 %
HDLC SERPL-MCNC: 202 MG/DL
HDLC SERPL-MCNC: 83 MG/DL
LDLC SERPL CALC-MCNC: 98.6 MG/DL
NONHDLC SERPL-MCNC: 119 MG/DL
TRIGL SERPL-MCNC: 102 MG/DL
TSH SERPL DL<=0.005 MIU/L-ACNC: 1.24 UIU/ML

## 2017-07-26 PROCEDURE — 36415 COLL VENOUS BLD VENIPUNCTURE: CPT | Mod: PO

## 2017-07-26 PROCEDURE — 84443 ASSAY THYROID STIM HORMONE: CPT

## 2017-07-26 PROCEDURE — 82306 VITAMIN D 25 HYDROXY: CPT

## 2017-07-26 PROCEDURE — 99214 OFFICE O/P EST MOD 30 MIN: CPT | Mod: S$GLB,,, | Performed by: FAMILY MEDICINE

## 2017-07-26 PROCEDURE — 99999 PR PBB SHADOW E&M-EST. PATIENT-LVL II: CPT | Mod: PBBFAC,,, | Performed by: FAMILY MEDICINE

## 2017-07-26 PROCEDURE — 80061 LIPID PANEL: CPT

## 2017-07-26 NOTE — PROGRESS NOTES
Bessy Jurgen Lamb presents with myalgia and fatigue Also co HA ur congestion Recent polypectomy and hysteriscope Dr Rea Continues to report mod severe anxiety and stress     Past Medical History:   Diagnosis Date    Arthritis, lumbar spine     hands    General anesthetics causing adverse effect in therapeutic use     following breast rediuct, hard time awakening  also had anxiety rxn to lidocain in dental ofc    GERD (gastroesophageal reflux disease)     Hypothyroidism 10/8/2014    Maternal anesthesia complication     epidural for 1st child went up instead of down; required intubation    Obesity (BMI 30-39.9) 10/8/2014    Thyroid disease     Thyroid nodule 10/8/2014     Past Surgical History:   Procedure Laterality Date    c-sections x2      COLONOSCOPY  9/25/2012         hysteroscopy with polypectomy      INCISIONAL BREAST BIOPSY Left 1996    benign; done at same time as reduction    TOTAL REDUCTION MAMMOPLASTY       Review of patient's allergies indicates:   Allergen Reactions    Duricef [cefadroxil]      Current Outpatient Prescriptions on File Prior to Visit   Medication Sig Dispense Refill    CALCIUM CARBONATE (CALTRATE 600 ORAL) Take 1 tablet by mouth once daily.       clonazePAM (KLONOPIN) 0.25 MG TbDL Take 1 tablet (0.25 mg total) by mouth 2 (two) times daily. 60 tablet 0    ergocalciferol (VITAMIN D2) 50,000 unit Cap Take 1 capsule (50,000 Units total) by mouth every 7 days. 12 capsule 3    levothyroxine (SYNTHROID) 50 MCG tablet TAKE ONE TABLET BY MOUTH ONE TIME DAILY 90 tablet 3     No current facility-administered medications on file prior to visit.      Social History     Social History    Marital status:      Spouse name: N/A    Number of children: N/A    Years of education: N/A     Occupational History    Not on file.     Social History Main Topics    Smoking status: Never Smoker    Smokeless tobacco: Never Used    Alcohol use Yes      Comment: 2/ month    Drug  use: No    Sexual activity: No     Other Topics Concern    Not on file     Social History Narrative    No narrative on file     Family History   Problem Relation Age of Onset    Diabetes Father     Cirrhosis Father     Breast cancer Maternal Aunt     Breast cancer Paternal Aunt     Breast cancer Mother     Brain cancer Mother     Early death Mother 51     brain tumor    Cancer Mother      breast; brain    Heart disease Sister      chf; cad;    Hypertension Sister     Heart disease Brother      arrhyth    Hypertension Sister     Heart disease Brother      mi    Heart disease Brother      mi    Diabetes Brother     Macular degeneration Brother     Ovarian cancer Neg Hx          ROS:  SKIN: No rashes, itching or changes in color or texture of skin.  EYES: Visual acuity fine. No photophobia, ocular pain or diplopia.EARS: Denies ear pain, discharge or vertigo.NOSE: No loss of smell, no epistaxis some postnasal drip.MOUTH & THROAT: No hoarseness or change in voice. No excessive gum bleeding.CHEST: Denies SMILEY, cyanosis, wheezing  CARDIOVASCULAR: Denies chest pain, PND, orthopnea or reduced exercise tolerance.  ABDOMEN:  No weight loss.No abdominal pain, no hematemesis or blood in stool.  URINARY: No flank pain, dysuria or hematuria.  PERIPHERAL VASCULAR: No claudication or cyanosis.  MUSCULOSKELETAL: Negative   NEUROLOGIC: No history of seizures, paralysis, alteration of gait or coordination.    PE: Vital signs as noted  Heent:Normocephalic with no recent cranial trauma,PERRLA,EOMI,conjunctiva clear,fundi reveal no hemmorhage exudate or papilledema.Otic canals clear, tympanic membranes slightly dull bilaterally.Nasal mucosa slightly red and edematous.Posterior pharynx slightly red but without exudate.  Neck:Supple with minimal anterior cervical adenopathy.  Chest:Clear bilateral breath sounds    Heart:Regular rhthym without murmer  Abdomen:Soft, non tender,no masses, no hepatosplenomegalyExtremeties and  Neurologic:Grossly within normal limits     Impression:URI  SP polypectomy/ Vaginal polyp  Anxiety  Atopy  Bruising   Plan: Rev consults  Rec cont 1 additional month medical leave

## 2017-07-31 ENCOUNTER — PATIENT MESSAGE (OUTPATIENT)
Dept: FAMILY MEDICINE | Facility: CLINIC | Age: 57
End: 2017-07-31

## 2017-08-01 ENCOUNTER — PATIENT MESSAGE (OUTPATIENT)
Dept: FAMILY MEDICINE | Facility: CLINIC | Age: 57
End: 2017-08-01

## 2017-08-24 ENCOUNTER — OFFICE VISIT (OUTPATIENT)
Dept: FAMILY MEDICINE | Facility: CLINIC | Age: 57
End: 2017-08-24
Payer: COMMERCIAL

## 2017-08-24 VITALS
SYSTOLIC BLOOD PRESSURE: 129 MMHG | DIASTOLIC BLOOD PRESSURE: 77 MMHG | BODY MASS INDEX: 31.91 KG/M2 | WEIGHT: 169 LBS | HEIGHT: 61 IN | HEART RATE: 72 BPM

## 2017-08-24 DIAGNOSIS — F41.9 ANXIETY: Primary | ICD-10-CM

## 2017-08-24 PROCEDURE — 99214 OFFICE O/P EST MOD 30 MIN: CPT | Mod: S$GLB,,, | Performed by: FAMILY MEDICINE

## 2017-08-24 PROCEDURE — 99999 PR PBB SHADOW E&M-EST. PATIENT-LVL II: CPT | Mod: PBBFAC,,, | Performed by: FAMILY MEDICINE

## 2017-08-24 PROCEDURE — 3008F BODY MASS INDEX DOCD: CPT | Mod: S$GLB,,, | Performed by: FAMILY MEDICINE

## 2017-08-24 RX ORDER — CLONAZEPAM 0.25 MG/1
0.25 TABLET, ORALLY DISINTEGRATING ORAL 2 TIMES DAILY
Qty: 60 TABLET | Refills: 2 | Status: SHIPPED | OUTPATIENT
Start: 2017-08-24 | End: 2017-11-13 | Stop reason: SDUPTHER

## 2017-08-24 NOTE — PROGRESS NOTES
Bessy Jurgen Lamb presentsto fu mod severe anxiety and stress-improving w clonazepam and FMLA     Past Medical History:   Diagnosis Date    Arthritis, lumbar spine     hands    General anesthetics causing adverse effect in therapeutic use     following breast rediuct, hard time awakening  also had anxiety rxn to lidocain in dental ofc    GERD (gastroesophageal reflux disease)     Hypothyroidism 10/8/2014    Maternal anesthesia complication     epidural for 1st child went up instead of down; required intubation    Obesity (BMI 30-39.9) 10/8/2014    Thyroid disease     Thyroid nodule 10/8/2014     Past Surgical History:   Procedure Laterality Date    c-sections x2      COLONOSCOPY  9/25/2012         hysteroscopy with polypectomy      INCISIONAL BREAST BIOPSY Left 1996    benign; done at same time as reduction    TOTAL REDUCTION MAMMOPLASTY       Review of patient's allergies indicates:   Allergen Reactions    Duricef [cefadroxil]      Current Outpatient Prescriptions on File Prior to Visit   Medication Sig Dispense Refill    CALCIUM CARBONATE (CALTRATE 600 ORAL) Take 1 tablet by mouth once daily.       clonazePAM (KLONOPIN) 0.25 MG TbDL Take 1 tablet (0.25 mg total) by mouth 2 (two) times daily. 60 tablet 0    ergocalciferol (VITAMIN D2) 50,000 unit Cap Take 1 capsule (50,000 Units total) by mouth every 7 days. 12 capsule 3    levothyroxine (SYNTHROID) 50 MCG tablet TAKE ONE TABLET BY MOUTH ONE TIME DAILY 90 tablet 3     No current facility-administered medications on file prior to visit.      Social History     Social History    Marital status:      Spouse name: N/A    Number of children: N/A    Years of education: N/A     Occupational History    Not on file.     Social History Main Topics    Smoking status: Never Smoker    Smokeless tobacco: Never Used    Alcohol use Yes      Comment: 2/ month    Drug use: No    Sexual activity: No     Other Topics Concern    Not on file      Social History Narrative    No narrative on file     Family History   Problem Relation Age of Onset    Diabetes Father     Cirrhosis Father     Breast cancer Maternal Aunt     Breast cancer Paternal Aunt     Breast cancer Mother     Brain cancer Mother     Early death Mother 51     brain tumor    Cancer Mother      breast; brain    Heart disease Sister      chf; cad;    Hypertension Sister     Heart disease Brother      arrhyth    Hypertension Sister     Heart disease Brother      mi    Heart disease Brother      mi    Diabetes Brother     Macular degeneration Brother     Ovarian cancer Neg Hx          ROS:  SKIN: No rashes, itching or changes in color or texture of skin.  EYES: Visual acuity fine. No photophobia, ocular pain or diplopia.EARS: Denies ear pain, discharge or vertigo.NOSE: No loss of smell, no epistaxis some postnasal drip.MOUTH & THROAT: No hoarseness or change in voice. No excessive gum bleeding.CHEST: Denies SMILEY, cyanosis, wheezing  CARDIOVASCULAR: Denies chest pain, PND, orthopnea or reduced exercise tolerance.  ABDOMEN:  No weight loss.No abdominal pain, no hematemesis or blood in stool.  URINARY: No flank pain, dysuria or hematuria.  PERIPHERAL VASCULAR: No claudication or cyanosis.  MUSCULOSKELETAL: Negative   NEUROLOGIC: No history of seizures, paralysis, alteration of gait or coordination.    PE: Vital signs as noted  Heent:Normocephalic with no recent cranial trauma,PERRLA,EOMI,conjunctiva clear,fundi reveal no hemmorhage exudate or papilledema.Otic canals clear, tympanic membranes slightly dull bilaterally.Nasal mucosa slightly red and edematous.Posterior pharynx slightly red but without exudate.  Neck:Supple with minimal anterior cervical adenopathy.  Chest:Clear bilateral breath sounds    Heart:Regular rhthym without murmer  Abdomen:Soft, non tender,no masses, no hepatosplenomegalyExtremeties and Neurologic:Grossly within normal limits     Impression:Anxiety  SP  polypectomy/ Vaginal polyp  Atopy  Bruising   Plan: Finish additional month medical leave and may try to return to work.

## 2017-08-30 ENCOUNTER — PATIENT MESSAGE (OUTPATIENT)
Dept: FAMILY MEDICINE | Facility: CLINIC | Age: 57
End: 2017-08-30

## 2017-09-07 ENCOUNTER — PATIENT MESSAGE (OUTPATIENT)
Dept: FAMILY MEDICINE | Facility: CLINIC | Age: 57
End: 2017-09-07

## 2017-09-11 ENCOUNTER — PATIENT MESSAGE (OUTPATIENT)
Dept: FAMILY MEDICINE | Facility: CLINIC | Age: 57
End: 2017-09-11

## 2017-09-15 ENCOUNTER — PATIENT MESSAGE (OUTPATIENT)
Dept: FAMILY MEDICINE | Facility: CLINIC | Age: 57
End: 2017-09-15

## 2017-09-18 ENCOUNTER — PATIENT MESSAGE (OUTPATIENT)
Dept: FAMILY MEDICINE | Facility: CLINIC | Age: 57
End: 2017-09-18

## 2017-09-19 ENCOUNTER — PATIENT MESSAGE (OUTPATIENT)
Dept: FAMILY MEDICINE | Facility: CLINIC | Age: 57
End: 2017-09-19

## 2017-09-22 ENCOUNTER — PATIENT MESSAGE (OUTPATIENT)
Dept: FAMILY MEDICINE | Facility: CLINIC | Age: 57
End: 2017-09-22

## 2017-09-25 ENCOUNTER — PATIENT MESSAGE (OUTPATIENT)
Dept: FAMILY MEDICINE | Facility: CLINIC | Age: 57
End: 2017-09-25

## 2017-10-05 RX ORDER — ERGOCALCIFEROL 1.25 MG/1
50000 CAPSULE ORAL
Qty: 12 CAPSULE | Refills: 3 | Status: SHIPPED | OUTPATIENT
Start: 2017-10-05 | End: 2017-11-14 | Stop reason: SDUPTHER

## 2017-10-05 RX ORDER — ERGOCALCIFEROL 1.25 MG/1
50000 CAPSULE ORAL
Qty: 12 CAPSULE | Refills: 3 | Status: SHIPPED | OUTPATIENT
Start: 2017-10-05 | End: 2017-11-13 | Stop reason: SDUPTHER

## 2017-10-09 ENCOUNTER — PATIENT MESSAGE (OUTPATIENT)
Dept: FAMILY MEDICINE | Facility: CLINIC | Age: 57
End: 2017-10-09

## 2017-10-10 ENCOUNTER — PATIENT MESSAGE (OUTPATIENT)
Dept: FAMILY MEDICINE | Facility: CLINIC | Age: 57
End: 2017-10-10

## 2017-10-19 ENCOUNTER — PATIENT MESSAGE (OUTPATIENT)
Dept: FAMILY MEDICINE | Facility: CLINIC | Age: 57
End: 2017-10-19

## 2017-10-19 ENCOUNTER — HOSPITAL ENCOUNTER (OUTPATIENT)
Dept: RADIOLOGY | Facility: HOSPITAL | Age: 57
Discharge: HOME | End: 2017-10-19
Attending: FAMILY MEDICINE
Payer: COMMERCIAL

## 2017-10-19 ENCOUNTER — OFFICE VISIT (OUTPATIENT)
Dept: FAMILY MEDICINE | Facility: CLINIC | Age: 57
End: 2017-10-19
Payer: COMMERCIAL

## 2017-10-19 VITALS
DIASTOLIC BLOOD PRESSURE: 71 MMHG | BODY MASS INDEX: 31.35 KG/M2 | TEMPERATURE: 98 F | SYSTOLIC BLOOD PRESSURE: 126 MMHG | HEIGHT: 62 IN | WEIGHT: 170.38 LBS | HEART RATE: 87 BPM

## 2017-10-19 DIAGNOSIS — S80.01XA CONTUSION OF RIGHT KNEE, INITIAL ENCOUNTER: ICD-10-CM

## 2017-10-19 DIAGNOSIS — S80.02XA CONTUSION OF LEFT KNEE, INITIAL ENCOUNTER: ICD-10-CM

## 2017-10-19 DIAGNOSIS — S73.102A SPRAIN OF LEFT HIP, INITIAL ENCOUNTER: ICD-10-CM

## 2017-10-19 DIAGNOSIS — S73.102A SPRAIN OF LEFT HIP, INITIAL ENCOUNTER: Primary | ICD-10-CM

## 2017-10-19 PROBLEM — M24.561 CONTRACTURE, RIGHT KNEE: Status: ACTIVE | Noted: 2017-10-19

## 2017-10-19 PROCEDURE — 73562 X-RAY EXAM OF KNEE 3: CPT | Mod: TC,50,PO

## 2017-10-19 PROCEDURE — 99999 PR PBB SHADOW E&M-EST. PATIENT-LVL III: CPT | Mod: PBBFAC,,, | Performed by: FAMILY MEDICINE

## 2017-10-19 PROCEDURE — 73562 X-RAY EXAM OF KNEE 3: CPT | Mod: 26,50,, | Performed by: RADIOLOGY

## 2017-10-19 PROCEDURE — 73502 X-RAY EXAM HIP UNI 2-3 VIEWS: CPT | Mod: 26,LT,, | Performed by: RADIOLOGY

## 2017-10-19 PROCEDURE — 73502 X-RAY EXAM HIP UNI 2-3 VIEWS: CPT | Mod: TC,PO,LT

## 2017-10-19 PROCEDURE — 99214 OFFICE O/P EST MOD 30 MIN: CPT | Mod: S$GLB,,, | Performed by: FAMILY MEDICINE

## 2017-10-19 RX ORDER — DICLOFENAC SODIUM 75 MG/1
75 TABLET, DELAYED RELEASE ORAL 2 TIMES DAILY
Qty: 40 TABLET | Refills: 1 | Status: SHIPPED | OUTPATIENT
Start: 2017-10-19 | End: 2018-04-10

## 2017-10-19 RX ORDER — CYCLOBENZAPRINE HCL 10 MG
10 TABLET ORAL 3 TIMES DAILY PRN
Qty: 30 TABLET | Refills: 1 | Status: SHIPPED | OUTPATIENT
Start: 2017-10-19 | End: 2018-04-10

## 2017-10-19 NOTE — PROGRESS NOTES
The patient presents with hx tripping and falling on ramp at work this am 10/19 onto bilateral hands and knees. Co pain stiffness knees and lt hip. Able to bear weight. Min hand pain.No head trauma or loc       Past Medical History:  Past Medical History:   Diagnosis Date    Arthritis, lumbar spine     hands    General anesthetics causing adverse effect in therapeutic use     following breast rediuct, hard time awakening  also had anxiety rxn to lidocain in dental ofc    GERD (gastroesophageal reflux disease)     Hypothyroidism 10/8/2014    Maternal anesthesia complication     epidural for 1st child went up instead of down; required intubation    Obesity (BMI 30-39.9) 10/8/2014    Thyroid disease     Thyroid nodule 10/8/2014     Past Surgical History:   Procedure Laterality Date    c-sections x2      COLONOSCOPY  9/25/2012         hysteroscopy with polypectomy      INCISIONAL BREAST BIOPSY Left 1996    benign; done at same time as reduction    TOTAL REDUCTION MAMMOPLASTY       Review of patient's allergies indicates:   Allergen Reactions    Duricef [cefadroxil] Hives     Current Outpatient Prescriptions on File Prior to Visit   Medication Sig Dispense Refill    CALCIUM CARBONATE (CALTRATE 600 ORAL) Take 1 tablet by mouth once daily.       clonazePAM (KLONOPIN) 0.25 MG TbDL Take 1 tablet (0.25 mg total) by mouth 2 (two) times daily. 60 tablet 2    ergocalciferol (VITAMIN D2) 50,000 unit Cap Take 1 capsule (50,000 Units total) by mouth every 7 days. 12 capsule 3    levothyroxine (SYNTHROID) 50 MCG tablet TAKE ONE TABLET BY MOUTH ONE TIME DAILY 90 tablet 3    VITAMIN D2 50,000 unit capsule TAKE 1 CAPSULE (50,000 UNITS TOTAL) BY MOUTH EVERY 7 DAYS. 12 capsule 3     No current facility-administered medications on file prior to visit.      Social History     Social History    Marital status:      Spouse name: N/A    Number of children: N/A    Years of education: N/A     Occupational History     Not on file.     Social History Main Topics    Smoking status: Never Smoker    Smokeless tobacco: Never Used    Alcohol use Yes      Comment: 2/ month    Drug use: No    Sexual activity: No     Other Topics Concern    Not on file     Social History Narrative    No narrative on file     Family History   Problem Relation Age of Onset    Diabetes Father     Cirrhosis Father     Breast cancer Maternal Aunt     Breast cancer Paternal Aunt     Breast cancer Mother     Brain cancer Mother     Early death Mother 51     brain tumor    Cancer Mother      breast; brain    Heart disease Sister      chf; cad;    Hypertension Sister     Heart disease Brother      arrhyth    Hypertension Sister     Heart disease Brother      mi    Heart disease Brother      mi    Diabetes Brother     Macular degeneration Brother     Ovarian cancer Neg Hx      ROS:   GENERAL: No fever, chills, fatigability or weight loss.  HEAD: No headaches or recent head trauma. EYES: Visual acuity fine. No photophobia, ocular pain or diplopia.   EARS: Denies ear pain, discharge or vertigo. NOSE: No loss of smell, no epistaxis or postnasal drip. MOUTH & THROAT: No hoarseness, sore throat, or change in voice. No   excessive gum bleeding. NODES: Denies swollen glands.   CHEST: Denies SMILEY, cyanosis, wheezing, cough and sputum production.   CARDIOVASCULAR: Denies chest pain, high blood pressure, PND,   orthopnea or reduced exercise tolerance. ABDOMEN: Appetite fine. No weight loss. Denies diarrhea, abdominal pain, hematemesis or blood in stool.   URINARY: No flank pain, dysuria or hematuria.   ENDOCRINE: Denies polydipsia, polyphagia and polyuria. No thyroid   enlargement. Denies heat or cold intolerance.   HEMATOLOGIC: Denies anemia, easy bruising or petechiae.   PERIPHERAL VASCULAR: No claudication or cyanosis.   MUSCULOSKELETAL:See above  NEUROLOGIC: No history of seizures, paralysis, alteration of gait or   coordination.   PSYCHIATRIC:  Denies mood swings, depression or suicidal thoughts.   OBJECTIVE:   APPEARANCE: Alert. In no acute distress   HEAD: Atraumatic. Normocephalic. Now no left maxillary sinus   tenderness. No orbital depression. No soft tissue swelling.   EYES: Conjunctivae clear. PERRL. EOMI. Fundi benign.   EARS: Otic canals and TM:clear NOSE: Min nasal congestion.   MOUTH & THROAT: MM moist,clear s exudate or erythema   NECK:No cervical adenopathy. No meningismus.   CHEST: Chest symmetrical. Lungs clear to auscultation and percussion   CARDIOVASCULAR: RRR. Normal S1, S2. No murmurs or gallops.   ABDOMEN: Bowel sounds normal. Not distended. Soft. No tenderness or   masses. PERIPHERAL VASCULAR: Femoral pulses present and symmetrical. No   edema.   MUSCULOSKELETAL: Min tender webster hands, from wrsits wo deformity. Bilat abrasions over knees , from rt w sub patellar tenderness,no laxity or deformity. Patellar tenderness and soft tissue swelling on left. Neg laxity,positive patellar apprehension. Sl tender lateral lt hip and from sl exac pain w int rotation    MENTAL STATUS: Patient alert, oriented x 3 & conversant.   NEUROLOGIC: Neurologically intact.   ASSESSMENT: Knee contusion R/L  Hip sprain  Hand contusion  PLAN: Ice today then Heat , range of motion exercises  XR Knee R/L and L hip  Diclo 75 bid w precautions  Cyclobenzaprine prn spasm   RTO 1 week prn

## 2017-11-10 ENCOUNTER — PATIENT MESSAGE (OUTPATIENT)
Dept: FAMILY MEDICINE | Facility: CLINIC | Age: 57
End: 2017-11-10

## 2017-11-13 ENCOUNTER — PATIENT MESSAGE (OUTPATIENT)
Dept: FAMILY MEDICINE | Facility: CLINIC | Age: 57
End: 2017-11-13

## 2017-11-13 RX ORDER — ERGOCALCIFEROL 1.25 MG/1
50000 CAPSULE ORAL
Qty: 12 CAPSULE | Refills: 4 | Status: SHIPPED | OUTPATIENT
Start: 2017-11-13 | End: 2018-11-15 | Stop reason: SDUPTHER

## 2017-11-13 RX ORDER — CLONAZEPAM 0.25 MG/1
0.25 TABLET, ORALLY DISINTEGRATING ORAL 2 TIMES DAILY
Qty: 60 TABLET | Refills: 4 | Status: SHIPPED | OUTPATIENT
Start: 2017-11-13 | End: 2017-11-14 | Stop reason: SDUPTHER

## 2017-11-13 RX ORDER — LEVOTHYROXINE SODIUM 50 UG/1
50 TABLET ORAL DAILY
Qty: 90 TABLET | Refills: 4 | Status: SHIPPED | OUTPATIENT
Start: 2017-11-13 | End: 2017-11-14 | Stop reason: SDUPTHER

## 2017-11-14 RX ORDER — CLONAZEPAM 0.25 MG/1
0.25 TABLET, ORALLY DISINTEGRATING ORAL 2 TIMES DAILY
Qty: 60 TABLET | Refills: 4 | Status: SHIPPED | OUTPATIENT
Start: 2017-11-14 | End: 2017-12-05 | Stop reason: SDUPTHER

## 2017-11-14 RX ORDER — ERGOCALCIFEROL 1.25 MG/1
50000 CAPSULE ORAL
Qty: 12 CAPSULE | Refills: 3 | Status: SHIPPED | OUTPATIENT
Start: 2017-11-14 | End: 2018-04-10

## 2017-11-14 RX ORDER — LEVOTHYROXINE SODIUM 50 UG/1
50 TABLET ORAL DAILY
Qty: 90 TABLET | Refills: 4 | Status: SHIPPED | OUTPATIENT
Start: 2017-11-14 | End: 2019-03-25 | Stop reason: SDUPTHER

## 2017-12-04 ENCOUNTER — TELEPHONE (OUTPATIENT)
Dept: FAMILY MEDICINE | Facility: CLINIC | Age: 57
End: 2017-12-04

## 2017-12-04 NOTE — TELEPHONE ENCOUNTER
----- Message from Emilia Arambula sent at 12/4/2017 11:43 AM CST -----  Contact: [facility]  States calling to change Clonazepam .25 is too expensive for patient. States the Clonazepam 0.5 is less expensive.   Pt use..    [facility]  [place] - [address]  [address]  [place] [address]  Phone: [phone] Fax: [phone]

## 2017-12-05 RX ORDER — CLONAZEPAM 0.5 MG/1
0.5 TABLET, ORALLY DISINTEGRATING ORAL 2 TIMES DAILY
Qty: 60 TABLET | Refills: 4 | Status: SHIPPED | OUTPATIENT
Start: 2017-12-05 | End: 2018-04-10

## 2017-12-12 ENCOUNTER — PATIENT MESSAGE (OUTPATIENT)
Dept: FAMILY MEDICINE | Facility: CLINIC | Age: 57
End: 2017-12-12

## 2017-12-12 ENCOUNTER — TELEPHONE (OUTPATIENT)
Dept: FAMILY MEDICINE | Facility: CLINIC | Age: 57
End: 2017-12-12

## 2017-12-12 RX ORDER — CLONAZEPAM 0.5 MG/1
0.5 TABLET ORAL 2 TIMES DAILY
Qty: 60 TABLET | Refills: 5 | Status: SHIPPED | OUTPATIENT
Start: 2017-12-12 | End: 2018-04-10

## 2017-12-12 NOTE — TELEPHONE ENCOUNTER
Pt would like to know if you could write a letter that she was unable to work between 8/26/17 - 9/11/17 due to Myalgia, fatigue, stress, anxiety Dermatitis and upcoming surgery. She is still having issues with her insurance covering this time she was out of work.

## 2017-12-12 NOTE — TELEPHONE ENCOUNTER
----- Message from Maddison Prestonite sent at 12/12/2017 11:49 AM CST -----  Contact: Pt   Pt called and stated she needed to speak to the nurse. She stated that she needs to discuss doctor's notes she needs for her insurance claim. She can be reached at [phone] ([phone]).    Thanks,  TF

## 2017-12-14 ENCOUNTER — PATIENT MESSAGE (OUTPATIENT)
Dept: FAMILY MEDICINE | Facility: CLINIC | Age: 57
End: 2017-12-14

## 2017-12-18 ENCOUNTER — PATIENT MESSAGE (OUTPATIENT)
Dept: FAMILY MEDICINE | Facility: CLINIC | Age: 57
End: 2017-12-18

## 2018-01-29 NOTE — TELEPHONE ENCOUNTER
----- Message from Rdaha Calderón sent at 1/29/2018  3:55 PM CST -----  Contact: [phone]  Pt is asking for a rx of Tamiflu to be sent in to ..    [facility] - [place], [place] - [address] [address] [address] [facility] [address] [address] [address] [address] [address]  [facility] [address]  Phone: [phone] Fax: [phone]    Pt daughter and granddaughter had the flu and they want to prevent it/please advise/thanks

## 2018-01-30 ENCOUNTER — PATIENT MESSAGE (OUTPATIENT)
Dept: FAMILY MEDICINE | Facility: CLINIC | Age: 58
End: 2018-01-30

## 2018-01-30 RX ORDER — OSELTAMIVIR PHOSPHATE 75 MG/1
75 CAPSULE ORAL DAILY
Qty: 10 CAPSULE | Refills: 0 | Status: SHIPPED | OUTPATIENT
Start: 2018-01-30 | End: 2018-02-04

## 2018-02-27 ENCOUNTER — PATIENT MESSAGE (OUTPATIENT)
Dept: FAMILY MEDICINE | Facility: CLINIC | Age: 58
End: 2018-02-27

## 2018-03-07 RX ORDER — LEVOTHYROXINE SODIUM 50 UG/1
TABLET ORAL
Qty: 90 TABLET | Refills: 3 | Status: SHIPPED | OUTPATIENT
Start: 2018-03-07 | End: 2018-04-10

## 2018-04-10 ENCOUNTER — OFFICE VISIT (OUTPATIENT)
Dept: FAMILY MEDICINE | Facility: CLINIC | Age: 58
End: 2018-04-10
Payer: MEDICAID

## 2018-04-10 VITALS
HEART RATE: 77 BPM | WEIGHT: 175.94 LBS | DIASTOLIC BLOOD PRESSURE: 79 MMHG | SYSTOLIC BLOOD PRESSURE: 140 MMHG | HEIGHT: 61 IN | TEMPERATURE: 98 F | BODY MASS INDEX: 33.22 KG/M2

## 2018-04-10 DIAGNOSIS — N39.0 URINARY TRACT INFECTION WITHOUT HEMATURIA, SITE UNSPECIFIED: ICD-10-CM

## 2018-04-10 DIAGNOSIS — R30.0 DYSURIA: ICD-10-CM

## 2018-04-10 LAB
BACTERIA #/AREA URNS HPF: ABNORMAL /HPF
BILIRUB UR QL STRIP: NEGATIVE
CLARITY UR: CLEAR
COLOR UR: YELLOW
GLUCOSE UR QL STRIP: NEGATIVE
HGB UR QL STRIP: ABNORMAL
KETONES UR QL STRIP: NEGATIVE
LEUKOCYTE ESTERASE UR QL STRIP: ABNORMAL
MICROSCOPIC COMMENT: ABNORMAL
NITRITE UR QL STRIP: NEGATIVE
PH UR STRIP: 6 [PH] (ref 5–8)
PROT UR QL STRIP: NEGATIVE
RBC #/AREA URNS HPF: 6 /HPF (ref 0–4)
SP GR UR STRIP: 1.02 (ref 1–1.03)
SQUAMOUS #/AREA URNS HPF: 10 /HPF
URN SPEC COLLECT METH UR: ABNORMAL
WBC #/AREA URNS HPF: 50 /HPF (ref 0–5)
WBC CLUMPS URNS QL MICRO: ABNORMAL

## 2018-04-10 PROCEDURE — 99213 OFFICE O/P EST LOW 20 MIN: CPT | Mod: PBBFAC,PO | Performed by: NURSE PRACTITIONER

## 2018-04-10 PROCEDURE — 99213 OFFICE O/P EST LOW 20 MIN: CPT | Mod: S$PBB,,, | Performed by: NURSE PRACTITIONER

## 2018-04-10 PROCEDURE — 99999 PR PBB SHADOW E&M-EST. PATIENT-LVL III: CPT | Mod: PBBFAC,,, | Performed by: NURSE PRACTITIONER

## 2018-04-10 PROCEDURE — 81000 URINALYSIS NONAUTO W/SCOPE: CPT | Mod: PO

## 2018-04-10 RX ORDER — CIPROFLOXACIN 500 MG/1
500 TABLET ORAL EVERY 12 HOURS
Qty: 10 TABLET | Refills: 0 | Status: SHIPPED | OUTPATIENT
Start: 2018-04-10 | End: 2018-04-18 | Stop reason: ALTCHOICE

## 2018-04-10 NOTE — PROGRESS NOTES
Subjective:       Patient ID: Bessy Lamb is a 57 y.o. female.    Chief Complaint: Dysuria    Urinary Tract Infection    This is a new problem. The current episode started in the past 7 days. The problem occurs every urination. The problem has been unchanged. The quality of the pain is described as aching. The pain is moderate. There has been no fever. Associated symptoms include hesitancy. Pertinent negatives include no vomiting or rash. Associated symptoms comments: Abdominal pain. She has tried nothing for the symptoms.       Review of Systems   Constitutional: Negative for fatigue, fever and unexpected weight change.   HENT: Negative for ear pain and sore throat.    Eyes: Negative for pain and visual disturbance.   Respiratory: Negative for cough and shortness of breath.    Cardiovascular: Negative for chest pain and palpitations.   Gastrointestinal: Positive for abdominal pain. Negative for diarrhea and vomiting.   Genitourinary: Positive for dysuria and hesitancy.   Musculoskeletal: Negative for arthralgias and myalgias.   Skin: Negative for color change and rash.   Neurological: Negative for dizziness and headaches.   Psychiatric/Behavioral: Negative for dysphoric mood and sleep disturbance. The patient is not nervous/anxious.        Vitals:    04/10/18 1404   BP: (!) 140/79   Pulse: 77   Temp: 98.2 °F (36.8 °C)       Objective:     Current Outpatient Prescriptions   Medication Sig Dispense Refill    ergocalciferol (VITAMIN D2) 50,000 unit Cap Take 1 capsule (50,000 Units total) by mouth every 7 days. 12 capsule 4    levothyroxine (SYNTHROID) 50 MCG tablet Take 1 tablet (50 mcg total) by mouth once daily. 90 tablet 4    ciprofloxacin HCl (CIPRO) 500 MG tablet Take 1 tablet (500 mg total) by mouth every 12 (twelve) hours. 10 tablet 0     No current facility-administered medications for this visit.        Physical Exam   Constitutional: She is oriented to person, place, and time. She appears  well-developed and well-nourished. No distress.   HENT:   Head: Normocephalic and atraumatic.   Eyes: EOM are normal. Pupils are equal, round, and reactive to light.   Neck: Normal range of motion. Neck supple.   Cardiovascular: Normal rate and regular rhythm.    Pulmonary/Chest: Effort normal and breath sounds normal.   Abdominal: Soft. Normal appearance. There is tenderness in the suprapubic area. There is no CVA tenderness.   Musculoskeletal: Normal range of motion.   Neurological: She is alert and oriented to person, place, and time.   Skin: Skin is warm and dry. No rash noted.   Psychiatric: She has a normal mood and affect. Judgment normal.   Nursing note and vitals reviewed.      Assessment:       1. Urinary tract infection without hematuria, site unspecified    2. Dysuria        Plan:   Urinary tract infection without hematuria, site unspecified    Dysuria  -     Urinalysis    Other orders  -     Urinalysis Microscopic  -     ciprofloxacin HCl (CIPRO) 500 MG tablet; Take 1 tablet (500 mg total) by mouth every 12 (twelve) hours.  Dispense: 10 tablet; Refill: 0        No Follow-up on file.

## 2018-04-18 ENCOUNTER — TELEPHONE (OUTPATIENT)
Dept: OBSTETRICS AND GYNECOLOGY | Facility: CLINIC | Age: 58
End: 2018-04-18

## 2018-04-18 ENCOUNTER — OFFICE VISIT (OUTPATIENT)
Dept: OBSTETRICS AND GYNECOLOGY | Facility: CLINIC | Age: 58
End: 2018-04-18
Payer: MEDICAID

## 2018-04-18 VITALS
DIASTOLIC BLOOD PRESSURE: 78 MMHG | BODY MASS INDEX: 33.22 KG/M2 | WEIGHT: 175.94 LBS | SYSTOLIC BLOOD PRESSURE: 130 MMHG | HEART RATE: 72 BPM | HEIGHT: 61 IN

## 2018-04-18 DIAGNOSIS — B96.89 BV (BACTERIAL VAGINOSIS): ICD-10-CM

## 2018-04-18 DIAGNOSIS — R22.32 AXILLARY MASS, LEFT: Primary | ICD-10-CM

## 2018-04-18 DIAGNOSIS — N76.0 BV (BACTERIAL VAGINOSIS): ICD-10-CM

## 2018-04-18 PROCEDURE — 99213 OFFICE O/P EST LOW 20 MIN: CPT | Mod: S$PBB,,, | Performed by: SPECIALIST

## 2018-04-18 PROCEDURE — 99999 PR PBB SHADOW E&M-EST. PATIENT-LVL III: CPT | Mod: PBBFAC,,, | Performed by: SPECIALIST

## 2018-04-18 PROCEDURE — 99213 OFFICE O/P EST LOW 20 MIN: CPT | Mod: PBBFAC,PN | Performed by: SPECIALIST

## 2018-04-18 RX ORDER — METRONIDAZOLE 250 MG/1
250 TABLET ORAL 3 TIMES DAILY
Qty: 21 TABLET | Refills: 0 | Status: SHIPPED | OUTPATIENT
Start: 2018-04-18 | End: 2018-04-25

## 2018-04-18 NOTE — TELEPHONE ENCOUNTER
----- Message from Lou Mckay sent at 4/18/2018  2:33 PM CDT -----  Contact: self  Patient states she thought the  Metronidazol should be inserted but was given the pill form. patient is at the pharmacy and needs to know if this is correct. Please call patient at [phone]. Thanks!

## 2018-04-18 NOTE — PROGRESS NOTES
58 yo WF presents for evaluation of subjective left axillary swelling noted over several weeks. Pt also complains vaginal odor. Recently completed ABX for UTI. Denies breast mass, breast discharge, skin changes. Denies external lesions, dysuria, hematuria, f/c.vaginal bleeding.  Past Medical History:   Diagnosis Date    Arthritis, lumbar spine     hands    General anesthetics causing adverse effect in therapeutic use     following breast rediuct, hard time awakening  also had anxiety rxn to lidocain in dental ofc    GERD (gastroesophageal reflux disease)     Hypothyroidism 10/8/2014    Maternal anesthesia complication     epidural for 1st child went up instead of down; required intubation    Obesity (BMI 30-39.9) 10/8/2014    Thyroid disease     Thyroid nodule 10/8/2014       Past Surgical History:   Procedure Laterality Date    c-sections x2      COLONOSCOPY  9/25/2012         hysteroscopy with polypectomy      INCISIONAL BREAST BIOPSY Left 1996    benign; done at same time as reduction    TOTAL REDUCTION MAMMOPLASTY         Family History   Problem Relation Age of Onset    Diabetes Father     Cirrhosis Father     Breast cancer Maternal Aunt     Breast cancer Paternal Aunt     Breast cancer Mother     Brain cancer Mother     Early death Mother 51     brain tumor    Cancer Mother      breast; brain    Heart disease Sister      chf; cad;    Hypertension Sister     Heart disease Brother      arrhyth    Hypertension Sister     Heart disease Brother      mi    Heart disease Brother      mi    Diabetes Brother     Macular degeneration Brother     Ovarian cancer Neg Hx        Social History     Social History    Marital status:      Spouse name: N/A    Number of children: N/A    Years of education: N/A     Social History Main Topics    Smoking status: Never Smoker    Smokeless tobacco: Never Used    Alcohol use Yes      Comment: 2/ month    Drug use: No    Sexual activity: No      Other Topics Concern    None     Social History Narrative    None       Current Outpatient Prescriptions   Medication Sig Dispense Refill    ergocalciferol (VITAMIN D2) 50,000 unit Cap Take 1 capsule (50,000 Units total) by mouth every 7 days. 12 capsule 4    levothyroxine (SYNTHROID) 50 MCG tablet Take 1 tablet (50 mcg total) by mouth once daily. 90 tablet 4     No current facility-administered medications for this visit.        Review of patient's allergies indicates:   Allergen Reactions    Duricef [cefadroxil] Hives       Review of System:   General: no chills, fever, night sweats, weight gain or weight loss  Psychological: no depression or suicidal ideation  Breasts: no new or changing breast lumps, nipple discharge or masses. AXILLARY MASS  Respiratory: no cough, shortness of breath, or wheezing  Cardiovascular: no chest pain or dyspnea on exertion  Gastrointestinal: no abdominal pain, change in bowel habits, or black or bloody stools  Genito-Urinary: no incontinence, urinary frequency/urgency or vulvar/vaginal symptoms, pelvic pain or abnormal vaginal bleeding. POSITIVE VAGINAL ODOR  Musculoskeletal: no gait disturbance or muscular weakness    General Appearance:  Alert, cooperative, no distress, appears stated age   Head:  Normocephalic, without obvious abnormality, atraumatic   Eyes:  PERRL, conjunctiva/corneas clear, EOM's intact, fundi benign, both eyes   Ears:  Normal TM's and external ear canals, both ears   Nose: Nares normal, septum midline,mucosa normal, no drainage or sinus tenderness   Throat: Lips, mucosa, and tongue normal; teeth and gums normal   Neck: Supple, symmetrical, trachea midline, no adenopathy;  thyroid: not enlarged, symmetric, no tenderness/mass/nodules; no carotid bruit or JVD   Back:   Symmetric, no curvature, ROM normal, no CVA tenderness   Lungs:   Clear to auscultation bilaterally, respirations unlabored   Breasts:  No masses or tenderness. PALPABLE NT FULLNESS IN  SUSPENSORY LIGAMNET LEFT BREAST   Heart:  Regular rate and rhythm, S1 and S2 normal, no murmur, rub, or gallop   Abdomen:   Soft, non-tender, bowel sounds active all four quadrants,  no masses, no organomegaly    Genitourinary:   External rectal exam shows no thrombosed external hemorrhoids.   Pelvic exam was performed with patient supine.  No labial fusion.  There is no rash, lesion or injury on the right labia.  There is no rash, lesion or injury on the left labia.  No bleeding and no signs of injury around the vaginal introitus, urethra is without lesions and well supported. The cervix is visualized with no discharge, lesions or friability.  Vagina- positive overt BV with positive whiff  No significant Cystocele, Enterocele or rectocele, and uterus well supported.  Bimanual exam:  The urethra is normal to palpation and there are no palpable vaginal wall masses.  Uterus is not deviated, not enlarged, not fixed, normal shape and not tender.  Cervix exhibits no motion tenderness.   Right adnexum displays no mass and no tenderness.  Left adnexum displays no mass and no tenderness.   Extremities: Extremities normal, atraumatic, no cyanosis or edema   Pulses: 2+ and symmetric   Skin: Skin color, texture, turgor normal, no rashes or lesions   Lymph nodes: Cervical, supraclavicular, and axillary nodes normal   Neurologic: Normal     I discussed possible edema vs lipoma and recommend MRI left axilla  In addition, will treat BV and follow response

## 2018-04-18 NOTE — TELEPHONE ENCOUNTER
Discussed w/ Dr Rea, who advised that pt's insurance is probably not going to cover the gel, so that is why he sent in the tablets; they will work the same exact way; contacted pt & advised her to pickup the tablets because gel is highly likely not covered under her insurance; pt agrees & verbalizes understanding.

## 2018-04-20 ENCOUNTER — PATIENT MESSAGE (OUTPATIENT)
Dept: OBSTETRICS AND GYNECOLOGY | Facility: CLINIC | Age: 58
End: 2018-04-20

## 2018-04-30 ENCOUNTER — PATIENT MESSAGE (OUTPATIENT)
Dept: OBSTETRICS AND GYNECOLOGY | Facility: CLINIC | Age: 58
End: 2018-04-30

## 2018-04-30 RX ORDER — DIAZEPAM 5 MG/1
5 TABLET ORAL EVERY 8 HOURS PRN
Qty: 5 TABLET | Refills: 0 | Status: SHIPPED | OUTPATIENT
Start: 2018-04-30 | End: 2018-06-21

## 2018-05-02 ENCOUNTER — HOSPITAL ENCOUNTER (OUTPATIENT)
Dept: RADIOLOGY | Facility: HOSPITAL | Age: 58
Discharge: HOME | End: 2018-05-02
Attending: SPECIALIST
Payer: MEDICAID

## 2018-05-02 DIAGNOSIS — R22.32 AXILLARY MASS, LEFT: ICD-10-CM

## 2018-05-02 PROCEDURE — 73221 MRI JOINT UPR EXTREM W/O DYE: CPT | Mod: TC,PO,LT

## 2018-05-02 PROCEDURE — 73221 MRI JOINT UPR EXTREM W/O DYE: CPT | Mod: 26,LT,, | Performed by: RADIOLOGY

## 2018-05-03 ENCOUNTER — PATIENT MESSAGE (OUTPATIENT)
Dept: OBSTETRICS AND GYNECOLOGY | Facility: CLINIC | Age: 58
End: 2018-05-03

## 2018-05-17 ENCOUNTER — TELEPHONE (OUTPATIENT)
Dept: FAMILY MEDICINE | Facility: CLINIC | Age: 58
End: 2018-05-17

## 2018-05-17 NOTE — TELEPHONE ENCOUNTER
----- Message from Day March sent at 5/17/2018  2:19 PM CDT -----  Contact: pt  States he would like to schedule her wellness visit after 6/9, nothing available until 8/7 and she would like a sooner appt. Please call pt at [phone]. Thank you

## 2018-06-07 ENCOUNTER — PATIENT OUTREACH (OUTPATIENT)
Dept: ADMINISTRATIVE | Facility: HOSPITAL | Age: 58
End: 2018-06-07

## 2018-06-11 ENCOUNTER — OFFICE VISIT (OUTPATIENT)
Dept: OBSTETRICS AND GYNECOLOGY | Facility: CLINIC | Age: 58
End: 2018-06-11
Payer: MEDICAID

## 2018-06-11 VITALS
WEIGHT: 171.94 LBS | DIASTOLIC BLOOD PRESSURE: 76 MMHG | HEIGHT: 61 IN | SYSTOLIC BLOOD PRESSURE: 118 MMHG | BODY MASS INDEX: 32.46 KG/M2

## 2018-06-11 DIAGNOSIS — Z12.31 VISIT FOR SCREENING MAMMOGRAM: ICD-10-CM

## 2018-06-11 DIAGNOSIS — Z01.419 ENCOUNTER FOR GYNECOLOGICAL EXAMINATION WITHOUT ABNORMAL FINDING: Primary | ICD-10-CM

## 2018-06-11 PROCEDURE — 99396 PREV VISIT EST AGE 40-64: CPT | Mod: S$PBB,,, | Performed by: SPECIALIST

## 2018-06-11 PROCEDURE — 87624 HPV HI-RISK TYP POOLED RSLT: CPT

## 2018-06-11 PROCEDURE — 99213 OFFICE O/P EST LOW 20 MIN: CPT | Mod: PBBFAC,PN | Performed by: SPECIALIST

## 2018-06-11 PROCEDURE — 99999 PR PBB SHADOW E&M-EST. PATIENT-LVL III: CPT | Mod: PBBFAC,,, | Performed by: SPECIALIST

## 2018-06-11 PROCEDURE — 88175 CYTOPATH C/V AUTO FLUID REDO: CPT

## 2018-06-11 NOTE — PROGRESS NOTES
58 yo WF presents for annual gyn evaluation. No overt gyn complaints    Past Medical History:   Diagnosis Date    Arthritis, lumbar spine     hands    General anesthetics causing adverse effect in therapeutic use     following breast rediuct, hard time awakening  also had anxiety rxn to lidocain in dental ofc    GERD (gastroesophageal reflux disease)     Hypothyroidism 10/8/2014    Maternal anesthesia complication     epidural for 1st child went up instead of down; required intubation    Obesity (BMI 30-39.9) 10/8/2014    Thyroid disease     Thyroid nodule 10/8/2014       Past Surgical History:   Procedure Laterality Date    c-sections x2      COLONOSCOPY  9/25/2012         hysteroscopy with polypectomy      INCISIONAL BREAST BIOPSY Left 1996    benign; done at same time as reduction    TOTAL REDUCTION MAMMOPLASTY         Family History   Problem Relation Age of Onset    Diabetes Father     Cirrhosis Father     Breast cancer Maternal Aunt     Breast cancer Paternal Aunt     Breast cancer Mother     Brain cancer Mother     Early death Mother 51        brain tumor    Cancer Mother         breast; brain    Heart disease Sister         chf; cad;    Hypertension Sister     Heart disease Brother         arrhyth    Hypertension Sister     Heart disease Brother         mi    Heart disease Brother         mi    Diabetes Brother     Macular degeneration Brother     Ovarian cancer Neg Hx        Social History     Social History    Marital status:      Spouse name: N/A    Number of children: N/A    Years of education: N/A     Social History Main Topics    Smoking status: Never Smoker    Smokeless tobacco: Never Used    Alcohol use Yes      Comment: 2/ month    Drug use: No    Sexual activity: No     Other Topics Concern    None     Social History Narrative    None       Current Outpatient Prescriptions   Medication Sig Dispense Refill    ergocalciferol (VITAMIN D2) 50,000 unit Cap  Take 1 capsule (50,000 Units total) by mouth every 7 days. 12 capsule 4    levothyroxine (SYNTHROID) 50 MCG tablet Take 1 tablet (50 mcg total) by mouth once daily. 90 tablet 4    diazePAM (VALIUM) 5 MG tablet Take 1 tablet (5 mg total) by mouth every 8 (eight) hours as needed for Anxiety or Insomnia. 5 tablet 0     No current facility-administered medications for this visit.        Review of patient's allergies indicates:   Allergen Reactions    Duricef [cefadroxil] Hives       Review of System:   General: no chills, fever, night sweats, weight gain or weight loss  Psychological: no depression or suicidal ideation  Breasts: no new or changing breast lumps, nipple discharge or masses.  Respiratory: no cough, shortness of breath, or wheezing  Cardiovascular: no chest pain or dyspnea on exertion  Gastrointestinal: no abdominal pain, change in bowel habits, or black or bloody stools  Genito-Urinary: no incontinence, urinary frequency/urgency or vulvar/vaginal symptoms, pelvic pain or abnormal vaginal bleeding.  Musculoskeletal: no gait disturbance or muscular weakness    General Appearance:  Alert, cooperative, no distress, appears stated age   Head:  Normocephalic, without obvious abnormality, atraumatic   Eyes:  PERRL, conjunctiva/corneas clear, EOM's intact, fundi benign, both eyes   Ears:  Normal TM's and external ear canals, both ears   Nose: Nares normal, septum midline,mucosa normal, no drainage or sinus tenderness   Throat: Lips, mucosa, and tongue normal; teeth and gums normal   Neck: Supple, symmetrical, trachea midline, no adenopathy;  thyroid: not enlarged, symmetric, no tenderness/mass/nodules; no carotid bruit or JVD   Back:   Symmetric, no curvature, ROM normal, no CVA tenderness   Lungs:   Clear to auscultation bilaterally, respirations unlabored   Breasts:  No masses or tenderness   Heart:  Regular rate and rhythm, S1 and S2 normal, no murmur, rub, or gallop   Abdomen:   Soft, non-tender, bowel  sounds active all four quadrants,  no masses, no organomegaly    Genitourinary:   External rectal exam shows no thrombosed external hemorrhoids.   Pelvic exam was performed with patient supine.  No labial fusion.  There is no rash, lesion or injury on the right labia.  There is no rash, lesion or injury on the left labia.  No bleeding and no signs of injury around the vaginal introitus, urethra is without lesions and well supported. The cervix is visualized with no discharge, lesions or friability.  No vaginal discharge found.  No significant Cystocele, Enterocele or rectocele, and uterus well supported.  Bimanual exam:  The urethra is normal to palpation and there are no palpable vaginal wall masses.  Uterus is not deviated, not enlarged, not fixed, normal shape and not tender.  Cervix exhibits no motion tenderness.   Right adnexum displays no mass and no tenderness.  Left adnexum displays no mass and no tenderness.   Extremities: Extremities normal, atraumatic, no cyanosis or edema   Pulses: 2+ and symmetric   Skin: Skin color, texture, turgor normal, no rashes or lesions   Lymph nodes: Cervical, supraclavicular, and axillary nodes normal   Neurologic: Normal       PAP submitted    Plan BSE monthly  Schedule screening annual mammo 6/30/18  RTO 1 year/prn

## 2018-06-14 LAB
HPV HR 12 DNA CVX QL NAA+PROBE: NEGATIVE
HPV16 AG SPEC QL: NEGATIVE
HPV18 DNA SPEC QL NAA+PROBE: NEGATIVE

## 2018-06-21 ENCOUNTER — HOSPITAL ENCOUNTER (OUTPATIENT)
Dept: RADIOLOGY | Facility: HOSPITAL | Age: 58
Discharge: HOME | End: 2018-06-21
Attending: FAMILY MEDICINE
Payer: MEDICAID

## 2018-06-21 ENCOUNTER — OFFICE VISIT (OUTPATIENT)
Dept: FAMILY MEDICINE | Facility: CLINIC | Age: 58
End: 2018-06-21
Payer: MEDICAID

## 2018-06-21 VITALS
DIASTOLIC BLOOD PRESSURE: 75 MMHG | HEIGHT: 61 IN | TEMPERATURE: 99 F | SYSTOLIC BLOOD PRESSURE: 112 MMHG | WEIGHT: 170 LBS | HEART RATE: 70 BPM | BODY MASS INDEX: 32.1 KG/M2

## 2018-06-21 DIAGNOSIS — D17.22 LIPOMA OF LEFT UPPER EXTREMITY: ICD-10-CM

## 2018-06-21 DIAGNOSIS — Z86.010 HISTORY OF COLON POLYPS: ICD-10-CM

## 2018-06-21 DIAGNOSIS — M81.0 OSTEOPOROSIS, UNSPECIFIED OSTEOPOROSIS TYPE, UNSPECIFIED PATHOLOGICAL FRACTURE PRESENCE: ICD-10-CM

## 2018-06-21 DIAGNOSIS — E66.9 OBESITY (BMI 30-39.9): ICD-10-CM

## 2018-06-21 DIAGNOSIS — M81.0 OSTEOPOROSIS, UNSPECIFIED OSTEOPOROSIS TYPE, UNSPECIFIED PATHOLOGICAL FRACTURE PRESENCE: Primary | ICD-10-CM

## 2018-06-21 DIAGNOSIS — E03.9 HYPOTHYROIDISM, UNSPECIFIED TYPE: ICD-10-CM

## 2018-06-21 DIAGNOSIS — E78.5 HYPERLIPIDEMIA, UNSPECIFIED HYPERLIPIDEMIA TYPE: ICD-10-CM

## 2018-06-21 DIAGNOSIS — M25.50 POLYARTHRALGIA: ICD-10-CM

## 2018-06-21 PROCEDURE — 99999 PR PBB SHADOW E&M-EST. PATIENT-LVL III: CPT | Mod: PBBFAC,,, | Performed by: FAMILY MEDICINE

## 2018-06-21 PROCEDURE — 77080 DXA BONE DENSITY AXIAL: CPT | Mod: 26,,, | Performed by: RADIOLOGY

## 2018-06-21 PROCEDURE — 99213 OFFICE O/P EST LOW 20 MIN: CPT | Mod: PBBFAC,PO | Performed by: FAMILY MEDICINE

## 2018-06-21 PROCEDURE — 99215 OFFICE O/P EST HI 40 MIN: CPT | Mod: S$PBB,,, | Performed by: FAMILY MEDICINE

## 2018-06-21 PROCEDURE — 77080 DXA BONE DENSITY AXIAL: CPT | Mod: TC,PO

## 2018-06-21 NOTE — PROGRESS NOTES
The patient presents today to fu osteoporosis on suppliments last BMD lum vert ok but femer t score -2.5 Also fu hypothyroid prev stable w supplimentAlso polyarthralgia and neck LBP.   And obesity BMI 32    Past Medical History:  Past Medical History:   Diagnosis Date    Arthritis, lumbar spine     hands    General anesthetics causing adverse effect in therapeutic use     following breast rediuct, hard time awakening  also had anxiety rxn to lidocain in dental ofc    GERD (gastroesophageal reflux disease)     Hypothyroidism 10/8/2014    Maternal anesthesia complication     epidural for 1st child went up instead of down; required intubation    Obesity (BMI 30-39.9) 10/8/2014    Thyroid disease     Thyroid nodule 10/8/2014     Past Surgical History:   Procedure Laterality Date    c-sections x2      COLONOSCOPY  9/25/2012         hysteroscopy with polypectomy      INCISIONAL BREAST BIOPSY Left 1996    benign; done at same time as reduction    TOTAL REDUCTION MAMMOPLASTY       Review of patient's allergies indicates:   Allergen Reactions    Duricef [cefadroxil] Hives     Current Outpatient Prescriptions on File Prior to Visit   Medication Sig Dispense Refill    ergocalciferol (VITAMIN D2) 50,000 unit Cap Take 1 capsule (50,000 Units total) by mouth every 7 days. 12 capsule 4    levothyroxine (SYNTHROID) 50 MCG tablet Take 1 tablet (50 mcg total) by mouth once daily. 90 tablet 4    [DISCONTINUED] diazePAM (VALIUM) 5 MG tablet Take 1 tablet (5 mg total) by mouth every 8 (eight) hours as needed for Anxiety or Insomnia. 5 tablet 0     No current facility-administered medications on file prior to visit.      Social History     Social History    Marital status:      Spouse name: N/A    Number of children: N/A    Years of education: N/A     Occupational History    Not on file.     Social History Main Topics    Smoking status: Never Smoker    Smokeless tobacco: Never Used    Alcohol use Yes       Comment: 2/ month    Drug use: No    Sexual activity: No     Other Topics Concern    Not on file     Social History Narrative    No narrative on file     Family History   Problem Relation Age of Onset    Diabetes Father     Cirrhosis Father     Breast cancer Maternal Aunt     Breast cancer Paternal Aunt     Breast cancer Mother     Brain cancer Mother     Early death Mother 51        brain tumor    Cancer Mother         breast; brain    Heart disease Sister         chf; cad;    Hypertension Sister     Heart disease Brother         arrhyth    Hypertension Sister     Heart disease Brother         mi    Heart disease Brother         mi    Diabetes Brother     Macular degeneration Brother     Ovarian cancer Neg Hx          ROS:GENERAL: No fever, chills, fatigability or weight loss.  SKIN: No rashes, itching or changes in color or texture of skin.  HEAD: No headaches or recent head trauma.EYES: Visual acuity fine. No photophobia, ocular pain or diplopia.EARS: Denies ear pain, discharge or vertigo.NOSE: No loss of smell, no epistaxis or postnasal drip.MOUTH & THROAT: No hoarseness or change in voice. No excessive gum bleeding.NODES: Denies swollen glands.  CHEST: Denies SMILEY, cyanosis, wheezing, cough and sputum production.  CARDIOVASCULAR: Denies chest pain, PND, orthopnea or reduced exercise tolerance.  ABDOMEN: Appetite fine. No weight loss. Denies diarrhea, abdominal pain, hematemesis or blood in stool.  URINARY: No flank pain, dysuria or hematuria.  PERIPHERAL VASCULAR: No claudication or cyanosis.  MUSCULOSKELETAL: See above.  NEUROLOGIC: No history of seizures, paralysis, alteration of gait or coordination.  PE:   HEAD: Normocephalic, atraumatic.EYES: PERRL. EOMI.   EARS: TM's intact. Light reflex normal. No retraction or perforation.   NOSE: Mucosa pink. Airway clear.MOUTH & THROAT: No tonsillar enlargement. No pharyngeal erythema or exudate. No stridor.  NODES: No cervical, axillary or  inguinal lymph node enlargement.  CHEST: Lungs clear to auscultation.  CARDIOVASCULAR: Normal S1, S2. No rubs, murmurs or gallops.  ABDOMEN: Bowel sounds normal. Not distended. Soft. No tenderness or masses.  MUSCULOSKELETAL: Mod gen degen changes NEUROLOGIC: Cranial Nerves: II-XII grossly intact.  Motor: 5/5 strength major flexors/extensors.  DTR's: Knees, Ankles 2+ and equal bilaterally; downgoing toes.  Sensory: Intact to light touch distally.  Gait & Posture: Normal gait and fine motion. No cerebellar signs.     Impression:Bessy was seen today for annual exam.    Diagnoses and all orders for this visit:    Osteoporosis, unspecified osteoporosis type, unspecified pathological fracture presence    Hypothyroidism, unspecified type    Obesity (BMI 30-39.9)    Polyarthralgia    Other orders  -     Case request GI: COLONOSCOPY      Plan:Lab eval  Rec diet and ex recs  Rev age appropriate screenings

## 2018-06-22 ENCOUNTER — TELEPHONE (OUTPATIENT)
Dept: FAMILY MEDICINE | Facility: CLINIC | Age: 58
End: 2018-06-22

## 2018-06-22 DIAGNOSIS — E55.9 VITAMIN D DEFICIENCY: Primary | ICD-10-CM

## 2018-06-27 ENCOUNTER — DOCUMENTATION ONLY (OUTPATIENT)
Dept: ENDOSCOPY | Facility: HOSPITAL | Age: 58
End: 2018-06-27

## 2018-07-02 ENCOUNTER — HOSPITAL ENCOUNTER (OUTPATIENT)
Dept: RADIOLOGY | Facility: HOSPITAL | Age: 58
Discharge: HOME | End: 2018-07-02
Attending: SPECIALIST
Payer: MEDICAID

## 2018-07-02 DIAGNOSIS — Z12.31 VISIT FOR SCREENING MAMMOGRAM: ICD-10-CM

## 2018-07-02 PROCEDURE — 77063 BREAST TOMOSYNTHESIS BI: CPT | Mod: TC,PN

## 2018-07-02 PROCEDURE — 77067 SCR MAMMO BI INCL CAD: CPT | Mod: 26,,, | Performed by: RADIOLOGY

## 2018-07-02 PROCEDURE — 77063 BREAST TOMOSYNTHESIS BI: CPT | Mod: 26,,, | Performed by: RADIOLOGY

## 2018-07-05 ENCOUNTER — TELEPHONE (OUTPATIENT)
Dept: FAMILY MEDICINE | Facility: CLINIC | Age: 58
End: 2018-07-05

## 2018-07-05 NOTE — TELEPHONE ENCOUNTER
----- Message from Colleen Treviño sent at 7/5/2018  4:23 PM CDT -----  Contact: pt   Calling about a colonoscopy information and please advise.[phone] ([place])

## 2018-07-06 ENCOUNTER — DOCUMENTATION ONLY (OUTPATIENT)
Dept: ENDOSCOPY | Facility: HOSPITAL | Age: 58
End: 2018-07-06

## 2018-07-06 RX ORDER — SODIUM, POTASSIUM,MAG SULFATES 17.5-3.13G
SOLUTION, RECONSTITUTED, ORAL ORAL
Qty: 354 ML | Refills: 0 | Status: SHIPPED | OUTPATIENT
Start: 2018-07-06 | End: 2018-08-11

## 2018-07-06 NOTE — PROGRESS NOTES
7/6/2018 Message received to call pt.  Pt called.  Message and call back number left on pt's voicemail.

## 2018-07-06 NOTE — PROGRESS NOTES
074/06/18 Per [employer], Person pressed touch tone key to speak with an endoscopy .  Colonoscopy has been scheduled for 08/30/18. Instructions given, mailed and sent via my chart. suprep ordered.

## 2018-08-11 ENCOUNTER — TELEPHONE (OUTPATIENT)
Dept: FAMILY MEDICINE | Facility: CLINIC | Age: 58
End: 2018-08-11

## 2018-08-11 RX ORDER — POLYETHYLENE GLYCOL 3350, SODIUM SULFATE ANHYDROUS, SODIUM BICARBONATE, SODIUM CHLORIDE, POTASSIUM CHLORIDE 236; 22.74; 6.74; 5.86; 2.97 G/4L; G/4L; G/4L; G/4L; G/4L
POWDER, FOR SOLUTION ORAL
Qty: 4000 ML | Refills: 0 | Status: ON HOLD | OUTPATIENT
Start: 2018-08-11 | End: 2018-08-30 | Stop reason: CLARIF

## 2018-08-11 NOTE — TELEPHONE ENCOUNTER
Her prep is not covered with the insurance for the colonoscopy.  Based on this, I will change it to golytely.      Medications Ordered This Encounter      polyethylene glycol (GOLYTELY,NULYTELY) 236-22.74-6.74 -5.86 gram suspension          Sig: Drink 1 glass every 15 minutes starting at 5PM the night before the procedure until the bottle is empty.          Dispense:  4000 mL          Refill:  0

## 2018-08-13 ENCOUNTER — TELEPHONE (OUTPATIENT)
Dept: OBSTETRICS AND GYNECOLOGY | Facility: CLINIC | Age: 58
End: 2018-08-13

## 2018-08-13 NOTE — TELEPHONE ENCOUNTER
----- Message from Lyla Ramon sent at 8/13/2018  3:36 PM CDT -----  Contact: self [phone]  Please call her, she has some questions.  Thank you!

## 2018-08-30 ENCOUNTER — ANESTHESIA EVENT (OUTPATIENT)
Dept: ENDOSCOPY | Facility: HOSPITAL | Age: 58
End: 2018-08-30
Payer: MEDICAID

## 2018-08-30 ENCOUNTER — HOSPITAL ENCOUNTER (OUTPATIENT)
Facility: HOSPITAL | Age: 58
Discharge: HOME | End: 2018-08-30
Attending: FAMILY MEDICINE | Admitting: FAMILY MEDICINE
Payer: MEDICAID

## 2018-08-30 ENCOUNTER — ANESTHESIA (OUTPATIENT)
Dept: ENDOSCOPY | Facility: HOSPITAL | Age: 58
End: 2018-08-30
Payer: MEDICAID

## 2018-08-30 VITALS
SYSTOLIC BLOOD PRESSURE: 118 MMHG | OXYGEN SATURATION: 100 % | TEMPERATURE: 98 F | DIASTOLIC BLOOD PRESSURE: 63 MMHG | HEIGHT: 62 IN | WEIGHT: 168 LBS | HEART RATE: 82 BPM | BODY MASS INDEX: 30.91 KG/M2 | RESPIRATION RATE: 18 BRPM

## 2018-08-30 DIAGNOSIS — Z12.11 SPECIAL SCREENING FOR MALIGNANT NEOPLASMS, COLON: ICD-10-CM

## 2018-08-30 DIAGNOSIS — Z86.010 HISTORY OF COLON POLYPS: ICD-10-CM

## 2018-08-30 DIAGNOSIS — Z12.11 COLON CANCER SCREENING: Primary | ICD-10-CM

## 2018-08-30 PROBLEM — Z86.0100 HISTORY OF COLON POLYPS: Status: ACTIVE | Noted: 2018-08-30

## 2018-08-30 PROCEDURE — 63600175 PHARM REV CODE 636 W HCPCS: Performed by: NURSE ANESTHETIST, CERTIFIED REGISTERED

## 2018-08-30 PROCEDURE — 00812 ANES LWR INTST SCR COLSC: CPT | Performed by: FAMILY MEDICINE

## 2018-08-30 PROCEDURE — 25000003 PHARM REV CODE 250: Performed by: NURSE ANESTHETIST, CERTIFIED REGISTERED

## 2018-08-30 PROCEDURE — 37000008 HC ANESTHESIA 1ST 15 MINUTES: Performed by: FAMILY MEDICINE

## 2018-08-30 PROCEDURE — 37000009 HC ANESTHESIA EA ADD 15 MINS: Performed by: FAMILY MEDICINE

## 2018-08-30 PROCEDURE — G0105 COLORECTAL SCRN; HI RISK IND: HCPCS | Performed by: FAMILY MEDICINE

## 2018-08-30 PROCEDURE — 45378 DIAGNOSTIC COLONOSCOPY: CPT | Mod: ,,, | Performed by: FAMILY MEDICINE

## 2018-08-30 PROCEDURE — 25000003 PHARM REV CODE 250: Performed by: FAMILY MEDICINE

## 2018-08-30 RX ORDER — LIDOCAINE HCL/PF 100 MG/5ML
SYRINGE (ML) INTRAVENOUS
Status: DISCONTINUED | OUTPATIENT
Start: 2018-08-30 | End: 2018-08-30

## 2018-08-30 RX ORDER — SODIUM CHLORIDE, SODIUM LACTATE, POTASSIUM CHLORIDE, CALCIUM CHLORIDE 600; 310; 30; 20 MG/100ML; MG/100ML; MG/100ML; MG/100ML
INJECTION, SOLUTION INTRAVENOUS CONTINUOUS PRN
Status: DISCONTINUED | OUTPATIENT
Start: 2018-08-30 | End: 2018-08-30

## 2018-08-30 RX ORDER — SODIUM CHLORIDE, SODIUM LACTATE, POTASSIUM CHLORIDE, CALCIUM CHLORIDE 600; 310; 30; 20 MG/100ML; MG/100ML; MG/100ML; MG/100ML
INJECTION, SOLUTION INTRAVENOUS CONTINUOUS
Status: DISCONTINUED | OUTPATIENT
Start: 2018-08-30 | End: 2018-08-30 | Stop reason: HOSPADM

## 2018-08-30 RX ORDER — PROPOFOL 10 MG/ML
INJECTION, EMULSION INTRAVENOUS
Status: DISCONTINUED | OUTPATIENT
Start: 2018-08-30 | End: 2018-08-30

## 2018-08-30 RX ORDER — SODIUM CHLORIDE 0.9 % (FLUSH) 0.9 %
3 SYRINGE (ML) INJECTION
Status: CANCELLED | OUTPATIENT
Start: 2018-08-30

## 2018-08-30 RX ADMIN — LIDOCAINE HYDROCHLORIDE 50 MG: 20 INJECTION, SOLUTION INTRAVENOUS at 10:08

## 2018-08-30 RX ADMIN — PROPOFOL 30 MG: 10 INJECTION, EMULSION INTRAVENOUS at 10:08

## 2018-08-30 RX ADMIN — SODIUM CHLORIDE, SODIUM LACTATE, POTASSIUM CHLORIDE, AND CALCIUM CHLORIDE: 600; 310; 30; 20 INJECTION, SOLUTION INTRAVENOUS at 10:08

## 2018-08-30 RX ADMIN — PROPOFOL 80 MG: 10 INJECTION, EMULSION INTRAVENOUS at 10:08

## 2018-08-30 RX ADMIN — SODIUM CHLORIDE, SODIUM LACTATE, POTASSIUM CHLORIDE, AND CALCIUM CHLORIDE: .6; .31; .03; .02 INJECTION, SOLUTION INTRAVENOUS at 10:08

## 2018-08-30 NOTE — ANESTHESIA RELEASE NOTE
"Anesthesia Release from PACU Note    Patient: Bessy Lamb    Procedure(s) Performed: Procedure(s) (LRB):  COLONOSCOPY (N/A)    Anesthesia type: MAC    Post pain: Adequate analgesia    Post assessment: no apparent anesthetic complications, tolerated procedure well and no evidence of recall    Last Vitals:   Visit Vitals  /70 (BP Location: Left arm, Patient Position: Lying)   Pulse 78   Temp 36.6 °C (97.9 °F) (Oral)   Resp 18   Ht 5' 2" (1.575 m)   Wt 76.2 kg (168 lb)   SpO2 96%   Breastfeeding? No   BMI 30.73 kg/m²       Post vital signs: stable    Level of consciousness: responds to stimulation    Nausea/Vomiting: no nausea/no vomiting    Complications: none    Airway Patency: patent    Respiratory: unassisted    Cardiovascular: stable and blood pressure at baseline    Hydration: euvolemic     "

## 2018-08-30 NOTE — DISCHARGE INSTRUCTIONS
Colonoscopy     A camera attached to a flexible tube with a viewing lens is used to take video pictures.     Colonoscopy is a test to view the inside of your lower digestive tract (colon and rectum). Sometimes it can show the last part of the small intestine (ileum). During the test, small pieces of tissue may be removed for testing. This is called a biopsy. Small growths, such as polyps, may also be removed.   Why is colonoscopy done?  The test is done to help look for colon cancer. And it can help find the source of abdominal pain, bleeding, and changes in bowel habits. It may be needed once a year, depending on factors such as your:  · Age  · Health history  · Family health history  · Symptoms  · Results from any prior colonoscopy  Risks and possible complications  These include:  · Bleeding               · A puncture or tear in the colon   · Risks of anesthesia  · A cancer lesion not being seen  Getting ready   To prepare for the test:  · Talk with your healthcare provider about the risks of the test (see below). Also ask your healthcare provider about alternatives to the test.  · Tell your healthcare provider about any medicines you take. Also tell him or her about any health conditions you may have.  · Make sure your rectum and colon are empty for the test. Follow the diet and bowel prep instructions exactly. If you dont, the test may need to be rescheduled.  · Plan for a friend or family member to drive you home after the test.     Colonoscopy provides an inside view of the entire colon.     You may discuss the results with your doctor right away or at a future visit.  During the test   The test is usually done in the hospital on an outpatient basis. This means you go home the same day. The procedure takes about 30 minutes. During that time:  · You are given relaxing (sedating) medicine through an IV line. You may be drowsy, or fully asleep.  · The healthcare provider will first give you a physical exam to  check for anal and rectal problems.  · Then the anus is lubricated and the scope inserted.  · If you are awake, you may have a feeling similar to needing to have a bowel movement. You may also feel pressure as air is pumped into the colon. Its OK to pass gas during the procedure.  · Biopsy, polyp removal, or other treatments may be done during the test.  After the test   You may have gas right after the test. It can help to try to pass it to help prevent later bloating. Your healthcare provider may discuss the results with you right away. Or you may need to schedule a follow-up visit to talk about the results. After the test, you can go back to your normal eating and other activities. You may be tired from the sedation and need to rest for a few hours.  Date Last Reviewed: 11/1/2016 © 2000-2017 [employer]. [address] [place] [address], [place] [address]. All rights reserved. This information is not intended as a substitute for professional medical care. Always follow your healthcare professional's instructions.

## 2018-08-30 NOTE — DISCHARGE SUMMARY
Endoscopy Discharge Summary      Admit Date: 8/30/2018    Discharge Date and Time:  8/30/2018 11:08 AM    Attending Physician: Francisco Mcclain MD     Discharge Physician: Francisco Mcclain MD     Principal Admitting Diagnoses: Colon cancer screening         Discharge Diagnosis: The primary encounter diagnosis was Colon cancer screening. Diagnoses of Special screening for malignant neoplasms, colon and History of colon polyps were also pertinent to this visit.     Discharged Condition: Good    Indication for Admission: Colon cancer screening     Hospital Course: Patient was admitted for an inpatient procedure and tolerated the procedure well with no complications.    Significant Diagnostic Studies: Colonoscopy    Pathology (if any):  Specimen (12h ago, onward)    None          Estimated Blood Loss: 0 ml.    Discussed with: patient and family.    Disposition: Home.    Follow Up/Patient Instructions:   Current Discharge Medication List      CONTINUE these medications which have NOT CHANGED    Details   ergocalciferol (VITAMIN D2) 50,000 unit Cap Take 1 capsule (50,000 Units total) by mouth every 7 days.  Qty: 12 capsule, Refills: 4    Comments: pt request refills  10/05/2017 9:32:41 AM     N O T I C E    Last quantity doesn't match original quantity      levothyroxine (SYNTHROID) 50 MCG tablet Take 1 tablet (50 mcg total) by mouth once daily.  Qty: 90 tablet, Refills: 4             Discharge Procedure Orders   Diet general     Call MD for:  temperature >100.4     Call MD for:  persistent nausea and vomiting     Call MD for:  severe uncontrolled pain     Call MD for:  difficulty breathing, headache or visual disturbances     Call MD for:  redness, tenderness, or signs of infection (pain, swelling, redness, odor or green/yellow discharge around incision site)     Call MD for:  hives     Call MD for:  persistent dizziness or light-headedness     No dressing needed       Follow-up Information     Go to Ramu Snyder MD.     Specialty:  Family Medicine  Why:  As needed  Contact information:  [address] [facility] [place] [address] [phone]                   @[email or web address]([id number])@

## 2018-08-30 NOTE — H&P
Short Stay Endoscopy History and Physical    PCP - Ramu Snyder MD    Procedure - Colonoscopy  ASA - 2  Mallampati - per anesthesia  History of Anesthesia problems - no  Family history Anesthesia problems -  no     HPI:  This is a 58 y.o. female here for evaluation of :   Active Hospital Problems    Diagnosis  POA    *Colon cancer screening [Z12.11]  Not Applicable    History of colon polyps [Z86.010]  Not Applicable      Resolved Hospital Problems   No resolved problems to display.         Health Maintenance       Date Due Completion Date    TETANUS VACCINE 08/20/1976 ---    Colonoscopy 09/25/2017 9/25/2012 (Done)    Override on 9/25/2012: Done    Influenza Vaccine 08/01/2018 1/7/2017    Zoster Vaccine 08/20/2018 ---    Lipid Panel 06/21/2019 6/21/2018    Mammogram 07/02/2019 7/2/2018    Pap Smear with HPV Cotest 06/11/2021 6/11/2018          Screening - yes  History of polyps - yes-last was in 2012.  Diarrhea - no  Anemia - no  Blood in stools - no  Abdominal pain - no  Other - no    ROS:  CONSTITUTIONAL: Denies weight change,  fatigue, fevers, chills, night sweats.  CARDIOVASCULAR: Denies chest pain, shortness of breath, orthopnea and edema.  RESPIRATORY: Denies cough, hemoptysis, dyspnea, and wheezing.  GI: See HPI.    Medical History:   Past Medical History:   Diagnosis Date    Arthritis, lumbar spine     hands    General anesthetics causing adverse effect in therapeutic use     following breast rediuct, hard time awakening  also had anxiety rxn to lidocain in dental ofc    GERD (gastroesophageal reflux disease)     Hypothyroidism 10/8/2014    Maternal anesthesia complication     epidural for 1st child went up instead of down; required intubation    Obesity (BMI 30-39.9) 10/8/2014    Thyroid disease     Thyroid nodule 10/8/2014       Surgical History:   Past Surgical History:   Procedure Laterality Date    BREAST BIOPSY Left     b9    c-sections x2      COLONOSCOPY  9/25/2012         hysteroscopy  with polypectomy      INCISIONAL BREAST BIOPSY Left 1996    benign; done at same time as reduction    TOTAL REDUCTION MAMMOPLASTY Bilateral 1996       Family History:   Family History   Problem Relation Age of Onset    Diabetes Father     Cirrhosis Father     Breast cancer Maternal Aunt     Breast cancer Paternal Aunt     Brain cancer Mother     Early death Mother 51        brain tumor    Cancer Mother         breast; brain    Heart disease Sister         chf; cad;    Hypertension Sister     Heart disease Brother         arrhyth    Hypertension Sister     Heart disease Brother         mi    Heart disease Brother         mi    Diabetes Brother     Macular degeneration Brother     Ovarian cancer Neg Hx        Social History:   Social History     Tobacco Use    Smoking status: Never Smoker    Smokeless tobacco: Never Used   Substance Use Topics    Alcohol use: Yes     Comment: 2/ month    Drug use: No       Allergies:   Review of patient's allergies indicates:   Allergen Reactions    Duricef [cefadroxil] Hives       Medications:   No current facility-administered medications on file prior to encounter.      Current Outpatient Medications on File Prior to Encounter   Medication Sig Dispense Refill    ergocalciferol (VITAMIN D2) 50,000 unit Cap Take 1 capsule (50,000 Units total) by mouth every 7 days. 12 capsule 4    levothyroxine (SYNTHROID) 50 MCG tablet Take 1 tablet (50 mcg total) by mouth once daily. 90 tablet 4       Physical Exam:  Vital Signs:   Vitals:    08/30/18 1033   BP: 134/80   Pulse:    Resp:    Temp:      General Appearance: Well appearing in no acute distress  ENT: OP clear  Chest: CTA B  CV: RRR, no m/r/g  Abd: s/nt/nd/nabs  Ext: no edema    Labs:Reviewed    IMP:  Active Hospital Problems    Diagnosis  POA    *Colon cancer screening [Z12.11]  Not Applicable    History of colon polyps [Z86.010]  Not Applicable      Resolved Hospital Problems   No resolved problems to display.          Plan:   I have explained the risks and benefits of colonoscopy to the patient including but not limited to bleeding, perforation, infection, and death. The patient wishes to proceed.

## 2018-08-30 NOTE — TRANSFER OF CARE
"Anesthesia Transfer of Care Note    Patient: Bessy Lamb    Procedure(s) Performed: Procedure(s) (LRB):  COLONOSCOPY (N/A)    Patient location: PACU    Anesthesia Type: MAC    Transport from OR: Transported from OR on room air with adequate spontaneous ventilation    Post pain: adequate analgesia    Post assessment: no apparent anesthetic complications    Post vital signs: stable    Level of consciousness: responds to stimulation    Nausea/Vomiting: no nausea/vomiting    Complications: none    Transfer of care protocol was followed      Last vitals:   Visit Vitals  /70 (BP Location: Left arm, Patient Position: Lying)   Pulse 78   Temp 36.6 °C (97.9 °F) (Oral)   Resp 18   Ht 5' 2" (1.575 m)   Wt 76.2 kg (168 lb)   SpO2 96%   Breastfeeding? No   BMI 30.73 kg/m²     "

## 2018-08-30 NOTE — [employer] PATIENT INSTRUCTIONS
Discharge Summary/Instructions after an Endoscopic Procedure  Patient Name: Bessy Lamb  Patient MRN: [id number]  Patient YOB: 1960 Thursday, August 30, 2018 Francisco Mcclain MD  RESTRICTIONS:  During your procedure today, you received medications for sedation.  These   medications may affect your judgment, balance and coordination.  Therefore,   for 24 hours, you have the following restrictions:   - DO NOT drive a car, operate machinery, make legal/financial decisions,   sign important papers or drink alcohol.    ACTIVITY:  Today: no heavy lifting, straining or running due to procedural   sedation/anesthesia.  The following day: return to full activity including work.  DIET:  Eat and drink normally unless instructed otherwise.     TREATMENT FOR COMMON SIDE EFFECTS:  - Mild abdominal pain, nausea, belching, bloating or excessive gas:  rest,   eat lightly and use a heating pad.  - Sore Throat: treat with throat lozenges and/or gargle with warm salt   water.  - Because air was used during the procedure, expelling large amounts of air   from your rectum or belching is normal.  - If a bowel prep was taken, you may not have a bowel movement for 1-3 days.    This is normal.  SYMPTOMS TO WATCH FOR AND REPORT TO YOUR PHYSICIAN:  1. Abdominal pain or bloating, other than gas cramps.  2. Chest pain.  3. Back pain.  4. Signs of infection such as: chills or fever occurring within 24 hours   after the procedure.  5. Rectal bleeding, which would show as bright red, maroon, or black stools.   (A tablespoon of blood from the rectum is not serious, especially if   hemorrhoids are present.)  6. Vomiting.  7. Weakness or dizziness.  GO DIRECTLY TO THE NEAREST EMERGENCY ROOM IF YOU HAVE ANY OF THE FOLLOWING:      Difficulty breathing              Chills and/or fever over 101 F   Persistent vomiting and/or vomiting blood   Severe abdominal pain   Severe chest pain   Black, tarry stools   Bleeding- more than one  tablespoon   Any other symptom or condition that you feel may need urgent attention  Your doctor recommends these additional instructions:  If any biopsies were taken, your doctors clinic will contact you in 1 to 2   weeks with any results.  - Patient has a contact number available for emergencies.  The signs and   symptoms of potential delayed complications were discussed with the   patient.  Return to normal activities tomorrow.  Written discharge   instructions were provided to the patient.   - Resume previous diet.   - Continue present medications.   - Repeat colonoscopy in 5 years for surveillance.   - Discharge patient to home (via wheelchair).  For questions, problems or results please call your physician Francisco Mcclain MD at Work:  [phone]  If you have any questions about the above instructions, call the GI   department at [phone] or call the endoscopy unit at [phone]   from 7am until 3 pm.  [facility], EMERGENCY ROOM PHONE NUMBER:   [phone]  IF A COMPLICATION OR EMERGENCY SITUATION ARISES AND YOU ARE UNABLE TO REACH   YOUR PHYSICIAN - GO DIRECTLY TO THE EMERGENCY ROOM.  I have read or have had read to me these discharge instructions for my   procedure and have received a written copy.  I understand these   instructions and will follow-up with my physician if I have any questions.     __________________________________       _____________________________________  Nurse Signature                                          Patient/Designated   Responsible Party Signature  Francisco Mcclain MD  8/30/2018 11:07:55 AM  This report has been verified and signed electronically.  [employer]

## 2018-08-30 NOTE — ANESTHESIA PREPROCEDURE EVALUATION
08/30/2018  Bessy Lamb is a 58 y.o., female.    Pre-op Assessment    I have reviewed the Patient Summary Reports.     I have reviewed the Nursing Notes.   I have reviewed the Medications.     Review of Systems  Anesthesia Hx:  Hx of Anesthetic complications  Denies Family Hx of Anesthesia complications.   Denies Personal Hx of Anesthesia complications.   Social:  Non-Smoker    Hematology/Oncology:  Hematology Normal   Oncology Normal     EENT/Dental:EENT/Dental Normal   Cardiovascular:   Exercise tolerance: good    Pulmonary:  Pulmonary Normal    Renal/:  Renal/ Normal     Hepatic/GI:   GERD, well controlled    Musculoskeletal:  Musculoskeletal Normal    Neurological:  Neurology Normal    Endocrine:   Hypothyroidism    Dermatological:  Skin Normal    Psych:  Psychiatric Normal           Physical Exam  General:  Well nourished    Airway/Jaw/Neck:  Airway Findings: Mouth Opening: Normal Tongue: Normal  General Airway Assessment: Adult, Good  Mallampati: II  TM Distance: Normal, at least 6 cm      Dental:  Dental Findings: In tact   Chest/Lungs:  Chest/Lungs Findings: Clear to auscultation, Normal Respiratory Rate     Heart/Vascular:  Heart Findings: Rate: Normal  Rhythm: Regular Rhythm  Sounds: Normal        Mental Status:  Mental Status Findings:  Cooperative, Alert and Oriented         Anesthesia Plan  Type of Anesthesia, risks & benefits discussed:  Anesthesia Type:  MAC  Patient's Preference:   Intra-op Monitoring Plan:   Intra-op Monitoring Plan Comments:   Post Op Pain Control Plan:   Post Op Pain Control Plan Comments:   Induction:   IV  Beta Blocker:  Patient is not currently on a Beta-Blocker (No further documentation required).       Informed Consent: Patient understands risks and agrees with Anesthesia plan.  Questions answered. Anesthesia consent signed with patient.  ASA Score: 2      Day of Surgery Review of History & Physical: I have interviewed and examined the patient. I have reviewed the patient's H&P dated:  There are no significant changes.

## 2018-08-30 NOTE — ANESTHESIA POSTPROCEDURE EVALUATION
"Anesthesia Post Evaluation    Patient: Bessy Lamb    Procedure(s) Performed: Procedure(s) (LRB):  COLONOSCOPY (N/A)    Final Anesthesia Type: MAC  Patient location during evaluation: PACU  Patient participation: Yes- Able to Participate  Level of consciousness: awake, awake and alert and oriented  Post-procedure vital signs: reviewed and stable  Pain management: adequate  Airway patency: patent  PONV status at discharge: No PONV  Anesthetic complications: no      Cardiovascular status: blood pressure returned to baseline  Respiratory status: spontaneous ventilation, room air and unassisted  Hydration status: euvolemic  Follow-up not needed.        Visit Vitals  /70 (BP Location: Left arm, Patient Position: Lying)   Pulse 78   Temp 36.6 °C (97.9 °F) (Oral)   Resp 18   Ht 5' 2" (1.575 m)   Wt 76.2 kg (168 lb)   SpO2 96%   Breastfeeding? No   BMI 30.73 kg/m²       Pain/Jenae Score: No Data Recorded      "

## 2018-11-15 RX ORDER — ERGOCALCIFEROL 1.25 MG/1
50000 CAPSULE ORAL
Qty: 12 CAPSULE | Refills: 4 | Status: SHIPPED | OUTPATIENT
Start: 2018-11-15 | End: 2019-01-09

## 2018-11-15 RX ORDER — ERGOCALCIFEROL 1.25 MG/1
CAPSULE ORAL
Qty: 4 CAPSULE | Refills: 11 | Status: SHIPPED | OUTPATIENT
Start: 2018-11-15 | End: 2018-11-15

## 2018-11-16 ENCOUNTER — OFFICE VISIT (OUTPATIENT)
Dept: FAMILY MEDICINE | Facility: CLINIC | Age: 58
End: 2018-11-16
Payer: MEDICAID

## 2018-11-16 ENCOUNTER — TELEPHONE (OUTPATIENT)
Dept: FAMILY MEDICINE | Facility: CLINIC | Age: 58
End: 2018-11-16

## 2018-11-16 VITALS
DIASTOLIC BLOOD PRESSURE: 80 MMHG | HEIGHT: 61 IN | TEMPERATURE: 98 F | BODY MASS INDEX: 31.98 KG/M2 | SYSTOLIC BLOOD PRESSURE: 126 MMHG | WEIGHT: 169.38 LBS | HEART RATE: 91 BPM

## 2018-11-16 DIAGNOSIS — H81.11 BPPV (BENIGN PAROXYSMAL POSITIONAL VERTIGO), RIGHT: Primary | ICD-10-CM

## 2018-11-16 PROCEDURE — 99213 OFFICE O/P EST LOW 20 MIN: CPT | Mod: S$PBB,,, | Performed by: NURSE PRACTITIONER

## 2018-11-16 PROCEDURE — 99999 PR PBB SHADOW E&M-EST. PATIENT-LVL III: CPT | Mod: PBBFAC,,, | Performed by: NURSE PRACTITIONER

## 2018-11-16 PROCEDURE — 99213 OFFICE O/P EST LOW 20 MIN: CPT | Mod: PBBFAC,PO | Performed by: NURSE PRACTITIONER

## 2018-11-16 RX ORDER — MECLIZINE HYDROCHLORIDE 25 MG/1
25 TABLET ORAL 3 TIMES DAILY PRN
Qty: 30 TABLET | Refills: 0 | Status: SHIPPED | OUTPATIENT
Start: 2018-11-16 | End: 2019-06-21

## 2018-11-16 NOTE — PATIENT INSTRUCTIONS
Benign Paroxysmal Positional Vertigo     Your health care provider may move your head in certain ways to treat your BPPV.     Benign paroxysmal positional vertigo (BPPV) is a problem with the inner ear. The inner ear contains the vestibular system. This system is what helps you keep your balance. BPPV causes a feeling of spinning. It is a common problem of the vestibular system.  Understanding the vestibular system  The vestibular system of the ear is made up of very tiny parts. They include the utricle, saccule, and semicircular canals. The utricle is a tiny organ that contains calcium crystals. In some people, the crystals can move into the semicircular canals. When this happens, the system no longer works as it should. This causes BPPV. Benign means it is not life-threatening. Paroxysmal means it happens suddenly. Positional means that it happens when you move your head. Vertigo is a feeling of spinning.  What causes BPPV?  Causes include injury to your head or neck. Other problems with the vestibular system may cause BPPV. In many people, the cause of BPPV is not known.  Symptoms of BPPV  You many have repeated feelings of spinning (vertigo). The vertigo usually lasts less than 1 minute. Some movements, suchas rolling over in bed, can bring on vertigo.  Diagnosing BPPV  Your primary health care provider may diagnose and treat your BPPV. Or you may see an ear, nose, and throat doctor (otolaryngologist). In some cases, you may see a nervous system doctor (neurologist).  The health care provider will ask about your symptoms and your medical history. He or she will examine you. You may have hearing and balance tests. As part of the exam, your health care provider may have you move your head and body in certain ways. If you have BPPV, the movements can bring on vertigo. Your provider will also look for abnormal movements of your eyes. You may have other tests to check your vestibular or nervous systems.  Treatment  for BPPV  Your health care provider may try to move the calcium crystals. This is done by having you move your head and neck in certain ways. This treatment is safe and often works well. You may also be told to do these movements at home. You may still have vertigo for a few weeks. Your health care provider will recheck your symptoms, usually in about a month. Special physical therapy may also be part of treatment. In rare cases surgery may be needed for BPPV that does not go away.     When to call the health care provider  Call your health care provider right away if you have any of these:  · Symptoms that do not go away with treatment  · Symptoms that get worse  · New symptoms   Date Last Reviewed: 3/19/2015  © 2193-3781 [employer]. [address] [place] [address], [place] [address]. All rights reserved. This information is not intended as a substitute for professional medical care. Always follow your healthcare professional's instructions.

## 2018-11-16 NOTE — PROGRESS NOTES
"Subjective:      Patient ID: Bessy Lamb is a 58 y.o. female.    Chief Complaint: Dizziness (dizziness x 2 weeks getting worse)    Dizziness:   Chronicity:  New  Onset:  1 to 4 weeks ago (2 weeks)  Progression since onset:  Gradually worsening  Frequency - weeks/days included: intermittently.  Dizziness characteristics:  Sensation of movementno ear congestion, no ear pain, no fever, no headaches, no tinnitus, no nausea, no vomiting, no diaphoresis, no weakness, no visual disturbances, no light-headedness, no syncope, no palpitations, no slurred speech, no numbness in extremities and no chest pain.  Aggravated by:  Bending and position changes  Treatments tried:  Nothing  Improvements on treatment:  No relief    Review of Systems   Constitutional: Negative for chills, diaphoresis, fatigue and fever.   HENT: Negative for ear pain and tinnitus.    Respiratory: Negative for cough, shortness of breath and wheezing.    Cardiovascular: Negative for chest pain, palpitations, leg swelling and syncope.   Gastrointestinal: Negative for nausea and vomiting.   Skin: Negative for rash and wound.   Neurological: Positive for dizziness. Negative for weakness, light-headedness, numbness and headaches.   Psychiatric/Behavioral: The patient is not nervous/anxious.        Objective:     /80   Pulse 91   Temp 98.4 °F (36.9 °C) (Oral)   Ht 5' 1" (1.549 m)   Wt 76.8 kg (169 lb 6.4 oz)   BMI 32.01 kg/m²     Physical Exam   Constitutional: She is oriented to person, place, and time. She appears well-developed and well-nourished.   HENT:   Head: Normocephalic.   Eyes: Pupils are equal, round, and reactive to light.   Neck: Normal range of motion. Neck supple.   Cardiovascular: Normal rate, regular rhythm and normal heart sounds.   Pulmonary/Chest: Effort normal and breath sounds normal. No respiratory distress. She has no decreased breath sounds. She has no wheezes. She has no rhonchi. She has no rales.   Neurological: She " is alert and oriented to person, place, and time. She has normal strength. No cranial nerve deficit or sensory deficit. She displays a negative Romberg sign.   Arjun Hallpike reproduced symptoms, right horizontal nystagmus    Skin: Skin is warm and dry. No rash noted.   Psychiatric: She has a normal mood and affect. Her behavior is normal. Judgment and thought content normal.   Vitals reviewed.    Assessment:     1. BPPV (benign paroxysmal positional vertigo), right        Plan:     Problem List Items Addressed This Visit     None      Visit Diagnoses     BPPV (benign paroxysmal positional vertigo), right    -  Primary    Relevant Medications    meclizine (ANTIVERT) 25 mg tablet      Symptomatic care discussed and educational handout provided  Report to ER if symptoms worsen    Follow-up if symptoms worsen or fail to improve.        Parts of this note was dictated using voice recognition software. Please excuse any grammatical or typographical errors.

## 2018-11-16 NOTE — TELEPHONE ENCOUNTER
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    Thanks  Td

## 2018-12-30 ENCOUNTER — PATIENT MESSAGE (OUTPATIENT)
Dept: FAMILY MEDICINE | Facility: CLINIC | Age: 58
End: 2018-12-30

## 2019-01-07 ENCOUNTER — LAB VISIT (OUTPATIENT)
Dept: LAB | Facility: HOSPITAL | Age: 59
End: 2019-01-07
Attending: FAMILY MEDICINE
Payer: MEDICAID

## 2019-01-07 DIAGNOSIS — E55.9 VITAMIN D DEFICIENCY: ICD-10-CM

## 2019-01-07 LAB — 25(OH)D3+25(OH)D2 SERPL-MCNC: 24 NG/ML

## 2019-01-07 PROCEDURE — 82306 VITAMIN D 25 HYDROXY: CPT

## 2019-01-07 PROCEDURE — 36415 COLL VENOUS BLD VENIPUNCTURE: CPT | Mod: PO

## 2019-01-08 ENCOUNTER — TELEPHONE (OUTPATIENT)
Dept: FAMILY MEDICINE | Facility: CLINIC | Age: 59
End: 2019-01-08

## 2019-01-08 DIAGNOSIS — E55.9 VITAMIN D DEFICIENCY: Primary | ICD-10-CM

## 2019-01-09 ENCOUNTER — PATIENT MESSAGE (OUTPATIENT)
Dept: FAMILY MEDICINE | Facility: CLINIC | Age: 59
End: 2019-01-09

## 2019-01-09 RX ORDER — ERGOCALCIFEROL 1.25 MG/1
50000 CAPSULE ORAL
Qty: 24 CAPSULE | Refills: 4 | Status: SHIPPED | OUTPATIENT
Start: 2019-01-10 | End: 2019-01-11 | Stop reason: SDUPTHER

## 2019-01-11 ENCOUNTER — TELEPHONE (OUTPATIENT)
Dept: FAMILY MEDICINE | Facility: CLINIC | Age: 59
End: 2019-01-11

## 2019-01-11 NOTE — TELEPHONE ENCOUNTER
----- Message from Mary Altamirano sent at 1/11/2019  4:22 PM CST -----  Contact: Pt   Pt is calling regarding requesting to have  nurse call pt. Pt is calling regarding checking the statutes of the script that Pt request to be up..  ergocalciferol (VITAMIN D2) 50,000 unit Cap.  Pt states that script is still not at the pharmacy. .[phone]         .  [facility] [place] - [address] [facility]  [address] [place] [address]  Phone: [phone] Fax: [phone]          .Thank You  Magaly Altamirano

## 2019-01-11 NOTE — TELEPHONE ENCOUNTER
----- Message from Mary Altamirano sent at 1/11/2019  4:22 PM CST -----  Contact: Pt   Pt is calling regarding requesting to have  nurse call pt. Pt is calling regarding checking the statutes of the script that Pt request to be up..  ergocalciferol (VITAMIN D2) 50,000 unit Cap.  Pt states that script is still not at the pharmacy. .[phone]         .  [facility] [place] - [address] [facility]  [address] [place] [address]  Phone: [phone] Fax: [phone]          .Thank You  Magaly Altamirano

## 2019-01-12 ENCOUNTER — PATIENT MESSAGE (OUTPATIENT)
Dept: FAMILY MEDICINE | Facility: CLINIC | Age: 59
End: 2019-01-12

## 2019-01-12 RX ORDER — ERGOCALCIFEROL 1.25 MG/1
50000 CAPSULE ORAL
Qty: 24 CAPSULE | Refills: 4 | Status: SHIPPED | OUTPATIENT
Start: 2019-01-14 | End: 2019-12-02 | Stop reason: SDUPTHER

## 2019-02-07 ENCOUNTER — OFFICE VISIT (OUTPATIENT)
Dept: FAMILY MEDICINE | Facility: CLINIC | Age: 59
End: 2019-02-07
Payer: MEDICAID

## 2019-02-07 VITALS
HEART RATE: 79 BPM | WEIGHT: 170 LBS | BODY MASS INDEX: 32.1 KG/M2 | HEIGHT: 61 IN | DIASTOLIC BLOOD PRESSURE: 74 MMHG | TEMPERATURE: 98 F | SYSTOLIC BLOOD PRESSURE: 113 MMHG

## 2019-02-07 DIAGNOSIS — J06.9 UPPER RESPIRATORY TRACT INFECTION, UNSPECIFIED TYPE: Primary | ICD-10-CM

## 2019-02-07 DIAGNOSIS — R10.9 FLANK PAIN: ICD-10-CM

## 2019-02-07 DIAGNOSIS — R79.89 LOW VITAMIN D LEVEL: ICD-10-CM

## 2019-02-07 LAB
BACTERIA #/AREA URNS HPF: ABNORMAL /HPF
BILIRUB UR QL STRIP: NEGATIVE
CLARITY UR: CLEAR
COLOR UR: YELLOW
GLUCOSE UR QL STRIP: NEGATIVE
HGB UR QL STRIP: ABNORMAL
KETONES UR QL STRIP: NEGATIVE
LEUKOCYTE ESTERASE UR QL STRIP: ABNORMAL
MICROSCOPIC COMMENT: ABNORMAL
NITRITE UR QL STRIP: NEGATIVE
PH UR STRIP: 6 [PH] (ref 5–8)
PROT UR QL STRIP: NEGATIVE
RBC #/AREA URNS HPF: 4 /HPF (ref 0–4)
SP GR UR STRIP: 1.01 (ref 1–1.03)
SQUAMOUS #/AREA URNS HPF: 5 /HPF
URN SPEC COLLECT METH UR: ABNORMAL
WBC #/AREA URNS HPF: 60 /HPF (ref 0–5)
WBC CLUMPS URNS QL MICRO: ABNORMAL

## 2019-02-07 PROCEDURE — 96372 THER/PROPH/DIAG INJ SC/IM: CPT | Mod: PBBFAC,PO

## 2019-02-07 PROCEDURE — 99213 PR OFFICE/OUTPT VISIT, EST, LEVL III, 20-29 MIN: ICD-10-PCS | Mod: S$PBB,,, | Performed by: FAMILY MEDICINE

## 2019-02-07 PROCEDURE — 99213 OFFICE O/P EST LOW 20 MIN: CPT | Mod: S$PBB,,, | Performed by: FAMILY MEDICINE

## 2019-02-07 PROCEDURE — 99999 PR PBB SHADOW E&M-EST. PATIENT-LVL III: CPT | Mod: PBBFAC,,, | Performed by: FAMILY MEDICINE

## 2019-02-07 PROCEDURE — 81000 URINALYSIS NONAUTO W/SCOPE: CPT | Mod: PO

## 2019-02-07 PROCEDURE — 99999 PR PBB SHADOW E&M-EST. PATIENT-LVL III: ICD-10-PCS | Mod: PBBFAC,,, | Performed by: FAMILY MEDICINE

## 2019-02-07 PROCEDURE — 99213 OFFICE O/P EST LOW 20 MIN: CPT | Mod: PBBFAC,PO,25 | Performed by: FAMILY MEDICINE

## 2019-02-07 RX ORDER — DEXAMETHASONE SODIUM PHOSPHATE 4 MG/ML
8 INJECTION, SOLUTION INTRA-ARTICULAR; INTRALESIONAL; INTRAMUSCULAR; INTRAVENOUS; SOFT TISSUE ONCE
Status: COMPLETED | OUTPATIENT
Start: 2019-02-07 | End: 2019-02-07

## 2019-02-07 RX ADMIN — DEXAMETHASONE SODIUM PHOSPHATE 8 MG: 4 INJECTION, SOLUTION INTRAMUSCULAR; INTRAVENOUS at 04:02

## 2019-02-07 NOTE — PROGRESS NOTES
The patient presents with tender painful ear for the past 3 days    but no fever but has sneezing and lt flank pain.   ROS:  General: No fever or sig wt change  HEENT:No other PND eye pain or dc  Respiratory: No cough wheezing  PE: vital signs noted  HEENT: Normocephalic,with no recent trauma,PERRLA,EOMI,conjunctiva injected with no exudate.Nasopharynx is injected and edematous.External otic canal edematous and injected TM dull  Neck:Supple without adenopathy  Chest:Clear bilateral breath sounds with mild scattered ronchi  Heart:Regular rhthym without murmer  Abdomen:Soft, non tender,no masses, no hepatosplenomegaly  Extremeties and Neurologic:Grossly within normal limits     Impression:URI  Back pain  Plan: Dexa 8im

## 2019-02-08 RX ORDER — SULFAMETHOXAZOLE AND TRIMETHOPRIM 800; 160 MG/1; MG/1
1 TABLET ORAL 2 TIMES DAILY
Qty: 14 TABLET | Refills: 0 | Status: SHIPPED | OUTPATIENT
Start: 2019-02-08 | End: 2019-02-15

## 2019-02-25 ENCOUNTER — PATIENT MESSAGE (OUTPATIENT)
Dept: FAMILY MEDICINE | Facility: CLINIC | Age: 59
End: 2019-02-25

## 2019-02-25 RX ORDER — DICLOFENAC SODIUM 50 MG/1
50 TABLET, DELAYED RELEASE ORAL 2 TIMES DAILY
Qty: 90 TABLET | Refills: 1 | Status: SHIPPED | OUTPATIENT
Start: 2019-02-25 | End: 2019-05-07 | Stop reason: SDUPTHER

## 2019-02-25 NOTE — TELEPHONE ENCOUNTER
Sounds like a little more than muscles but flexaril ok to try-I also will send rx for diclofenac at a smaller but more freq dose than she was on last year , heat and stretch as well -if ftp-PT con

## 2019-03-02 ENCOUNTER — PATIENT MESSAGE (OUTPATIENT)
Dept: FAMILY MEDICINE | Facility: CLINIC | Age: 59
End: 2019-03-02

## 2019-03-21 ENCOUNTER — LAB VISIT (OUTPATIENT)
Dept: LAB | Facility: HOSPITAL | Age: 59
End: 2019-03-21
Attending: FAMILY MEDICINE
Payer: MEDICAID

## 2019-03-21 DIAGNOSIS — R79.89 LOW VITAMIN D LEVEL: ICD-10-CM

## 2019-03-21 LAB — 25(OH)D3+25(OH)D2 SERPL-MCNC: 24 NG/ML

## 2019-03-21 PROCEDURE — 82306 VITAMIN D 25 HYDROXY: CPT

## 2019-03-21 PROCEDURE — 36415 COLL VENOUS BLD VENIPUNCTURE: CPT | Mod: PO

## 2019-03-22 ENCOUNTER — TELEPHONE (OUTPATIENT)
Dept: FAMILY MEDICINE | Facility: CLINIC | Age: 59
End: 2019-03-22

## 2019-03-22 DIAGNOSIS — R79.89 LOW SERUM VITAMIN D: Primary | ICD-10-CM

## 2019-03-22 NOTE — TELEPHONE ENCOUNTER
Spoke with patient, patient given instructions to cont Vit D 50,000 units twice weekly and 2000 units daily, recheck vit d 3 mos, patient verbalized understanding, patient already has vit d scheduled for 7/9/19 in lab

## 2019-03-22 NOTE — TELEPHONE ENCOUNTER
----- Message from Padma Arambula sent at 3/22/2019 11:20 AM CDT -----  Contact: pt  The pt request a call from Gely, no additional info given, can be reached at [phone]///[id number]

## 2019-03-25 RX ORDER — LEVOTHYROXINE SODIUM 50 UG/1
50 TABLET ORAL DAILY
Qty: 90 TABLET | Refills: 4 | Status: SHIPPED | OUTPATIENT
Start: 2019-03-25 | End: 2020-05-01

## 2019-03-26 RX ORDER — LEVOTHYROXINE SODIUM 50 UG/1
TABLET ORAL
Qty: 90 TABLET | Refills: 0 | OUTPATIENT
Start: 2019-03-26

## 2019-05-07 ENCOUNTER — TELEPHONE (OUTPATIENT)
Dept: FAMILY MEDICINE | Facility: CLINIC | Age: 59
End: 2019-05-07

## 2019-05-07 ENCOUNTER — OFFICE VISIT (OUTPATIENT)
Dept: FAMILY MEDICINE | Facility: CLINIC | Age: 59
End: 2019-05-07
Payer: MEDICAID

## 2019-05-07 ENCOUNTER — PATIENT MESSAGE (OUTPATIENT)
Dept: FAMILY MEDICINE | Facility: CLINIC | Age: 59
End: 2019-05-07

## 2019-05-07 VITALS
WEIGHT: 172.19 LBS | TEMPERATURE: 98 F | DIASTOLIC BLOOD PRESSURE: 65 MMHG | BODY MASS INDEX: 32.51 KG/M2 | HEART RATE: 67 BPM | HEIGHT: 61 IN | SYSTOLIC BLOOD PRESSURE: 119 MMHG

## 2019-05-07 DIAGNOSIS — L25.5 RHUS DERMATITIS: Primary | ICD-10-CM

## 2019-05-07 PROCEDURE — 99213 OFFICE O/P EST LOW 20 MIN: CPT | Mod: PBBFAC,PO | Performed by: FAMILY MEDICINE

## 2019-05-07 PROCEDURE — 99999 PR PBB SHADOW E&M-EST. PATIENT-LVL III: ICD-10-PCS | Mod: PBBFAC,,, | Performed by: FAMILY MEDICINE

## 2019-05-07 PROCEDURE — 99999 PR PBB SHADOW E&M-EST. PATIENT-LVL III: CPT | Mod: PBBFAC,,, | Performed by: FAMILY MEDICINE

## 2019-05-07 PROCEDURE — 99213 OFFICE O/P EST LOW 20 MIN: CPT | Mod: S$PBB,,, | Performed by: FAMILY MEDICINE

## 2019-05-07 PROCEDURE — 99213 PR OFFICE/OUTPT VISIT, EST, LEVL III, 20-29 MIN: ICD-10-PCS | Mod: S$PBB,,, | Performed by: FAMILY MEDICINE

## 2019-05-07 RX ORDER — DICLOFENAC SODIUM 50 MG/1
50 TABLET, DELAYED RELEASE ORAL 2 TIMES DAILY
Qty: 90 TABLET | Refills: 4 | Status: SHIPPED | OUTPATIENT
Start: 2019-05-07 | End: 2020-09-01

## 2019-05-07 RX ORDER — CALCIUM CARBONATE 600 MG
1200 TABLET ORAL 2 TIMES DAILY WITH MEALS
COMMUNITY

## 2019-05-07 RX ORDER — METHYLPREDNISOLONE 4 MG/1
TABLET ORAL
Qty: 1 PACKAGE | Refills: 0 | Status: SHIPPED | OUTPATIENT
Start: 2019-05-07 | End: 2019-06-21

## 2019-05-07 NOTE — TELEPHONE ENCOUNTER
----- Message from Nancy Cueto sent at 5/7/2019  8:18 AM CDT -----  .Type:  Same Day Appointment Request    Caller is requesting a same day appointment.  Caller declined first available appointment listed below.    Name of Caller:patient  When is the first available appointment?5/15  Symptoms:ear, nose, throat, eyes itching, poison ivy, issues she needs to discuss  Best Call Back Number:.[phone]  Additional Information: prefers

## 2019-05-07 NOTE — PROGRESS NOTES
Bessy Lamb presents with moderate itchy eyes sl swallowing problems nasal teresa following poison ivy exposure and neighbor had been burning trash.   Past Medical History:   Diagnosis Date    Arthritis, lumbar spine     hands    General anesthetics causing adverse effect in therapeutic use     following breast rediuct, hard time awakening  also had anxiety rxn to lidocain in dental ofc    GERD (gastroesophageal reflux disease)     Hypothyroidism 10/8/2014    Maternal anesthesia complication     epidural for 1st child went up instead of down; required intubation    Obesity (BMI 30-39.9) 10/8/2014    Thyroid disease     Thyroid nodule 10/8/2014     Past Surgical History:   Procedure Laterality Date    BREAST BIOPSY Left     b9    c-sections x2      COLONOSCOPY  9/25/2012         COLONOSCOPY N/A 8/30/2018    [facility]    HYSTEROSCOPY / POLYPECTOMY N/A 7/5/2017    [facility]    hysteroscopy with polypectomy      [id number]  7/5/2017    [facility]    INCISIONAL BREAST BIOPSY Left 1996    benign; done at same time as reduction    TOTAL REDUCTION MAMMOPLASTY Bilateral 1996     Review of patient's allergies indicates:   Allergen Reactions    Duricef [cefadroxil] Hives     Current Outpatient Medications on File Prior to Visit   Medication Sig Dispense Refill    diclofenac (VOLTAREN) 50 MG EC tablet Take 1 tablet (50 mg total) by mouth 2 (two) times daily. 90 tablet 1    ergocalciferol (VITAMIN D2) 50,000 unit Cap Take 1 capsule (50,000 Units total) by mouth twice a week. 24 capsule 4    levothyroxine (SYNTHROID) 50 MCG tablet Take 1 tablet (50 mcg total) by mouth once daily. 90 tablet 4    meclizine (ANTIVERT) 25 mg tablet Take 1 tablet (25 mg total) by mouth 3 (three) times daily as needed for Dizziness. 30 tablet 0     No current facility-administered medications on  file prior to visit.      Social History     Socioeconomic History    Marital status:      Spouse name: Not on file    Number of children: Not on file    Years of education: Not on file    Highest education level: Not on file   Occupational History    Not on file   Social Needs    Financial resource strain: Not on file    Food insecurity:     Worry: Not on file     Inability: Not on file    Transportation needs:     Medical: Not on file     Non-medical: Not on file   Tobacco Use    Smoking status: Never Smoker    Smokeless tobacco: Never Used   Substance and Sexual Activity    Alcohol use: Yes     Comment: 2/ month    Drug use: No    Sexual activity: Never   Lifestyle    Physical activity:     Days per week: Not on file     Minutes per session: Not on file    Stress: Not on file   Relationships    Social connections:     Talks on phone: Not on file     Gets together: Not on file     Attends Synagogue service: Not on file     Active member of club or organization: Not on file     Attends meetings of clubs or organizations: Not on file     Relationship status: Not on file   Other Topics Concern    Not on file   Social History Narrative    Not on file     Family History   Problem Relation Age of Onset    Diabetes Father     Cirrhosis Father     Breast cancer Maternal Aunt     Breast cancer Paternal Aunt     Brain cancer Mother     Early death Mother 51        brain tumor    Cancer Mother         breast; brain    Heart disease Sister         chf; cad;    Hypertension Sister     Heart disease Brother         arrhyth    Hypertension Sister     Heart disease Brother         mi    Heart disease Brother         mi    Diabetes Brother     Macular degeneration Brother     Ovarian cancer Neg Hx          ROS:  SKIN: No rashes, itching or changes in color or texture of skin.  EYES: Visual acuity fine. No photophobia, ocular pain or diplopia.EARS: Denies ear pain, discharge or vertigo.NOSE:  No loss of smell, no epistaxis some postnasal drip.MOUTH & THROAT: No hoarseness or change in voice. No excessive gum bleeding.CHEST: Denies SMILEY, cyanosis, wheezing  CARDIOVASCULAR: Denies chest pain, PND, orthopnea or reduced exercise tolerance.  ABDOMEN:  No weight loss.No abdominal pain, no hematemesis or blood in stool.  URINARY: No flank pain, dysuria or hematuria.  PERIPHERAL VASCULAR: No claudication or cyanosis.  MUSCULOSKELETAL: Negative   NEUROLOGIC: No history of seizures, paralysis, alteration of gait or coordination.    PE: Vital signs as noted  Heent:Normocephalic with no recent cranial trauma,PERRLA,EOMI,conjunctiva clear,fundi reveal no hemmorhage exudate or papilledema.Otic canals clear, tympanic membranes slightly dull bilaterally.Nasal mucosa slightly red and edematous.Posterior pharynx slightly red but without exudate.  Neck:Supple with no cervical adenopathy.  Chest:Clear bilateral breath sounds   Heart:Regular rhthym without murmer  Abdomen:Soft, non tender,no masses, no hepatosplenomegalyExtremeties and Neurologic:Grossly within normal limits  Impression:  Rhus dermititis   Plan: Medrol dspk

## 2019-06-12 ENCOUNTER — OFFICE VISIT (OUTPATIENT)
Dept: OBSTETRICS AND GYNECOLOGY | Facility: CLINIC | Age: 59
End: 2019-06-12
Payer: MEDICAID

## 2019-06-12 VITALS
WEIGHT: 171.31 LBS | DIASTOLIC BLOOD PRESSURE: 80 MMHG | HEIGHT: 61 IN | SYSTOLIC BLOOD PRESSURE: 138 MMHG | BODY MASS INDEX: 32.34 KG/M2

## 2019-06-12 DIAGNOSIS — Z12.4 ENCOUNTER FOR PAP SMEAR OF CERVIX WITH HPV DNA COTESTING: Primary | ICD-10-CM

## 2019-06-12 DIAGNOSIS — Z12.31 VISIT FOR SCREENING MAMMOGRAM: ICD-10-CM

## 2019-06-12 PROCEDURE — 88175 CYTOPATH C/V AUTO FLUID REDO: CPT

## 2019-06-12 PROCEDURE — 99999 PR PBB SHADOW E&M-EST. PATIENT-LVL III: CPT | Mod: PBBFAC,,, | Performed by: SPECIALIST

## 2019-06-12 PROCEDURE — 87624 HPV HI-RISK TYP POOLED RSLT: CPT

## 2019-06-12 PROCEDURE — 99396 PREV VISIT EST AGE 40-64: CPT | Mod: S$PBB,,, | Performed by: SPECIALIST

## 2019-06-12 PROCEDURE — G0101 CA SCREEN;PELVIC/BREAST EXAM: HCPCS | Mod: PBBFAC,PN | Performed by: SPECIALIST

## 2019-06-12 PROCEDURE — 99213 OFFICE O/P EST LOW 20 MIN: CPT | Mod: PBBFAC,PN | Performed by: SPECIALIST

## 2019-06-12 PROCEDURE — 99999 PR PBB SHADOW E&M-EST. PATIENT-LVL III: ICD-10-PCS | Mod: PBBFAC,,, | Performed by: SPECIALIST

## 2019-06-12 PROCEDURE — 99396 PR PREVENTIVE VISIT,EST,40-64: ICD-10-PCS | Mod: S$PBB,,, | Performed by: SPECIALIST

## 2019-06-12 NOTE — PROGRESS NOTES
59 yo WF presents for annual gyn evaluation. No overt gyn complaints.   DEXA scan due 2020. Mammo screening due July 2019.  Past Medical History:   Diagnosis Date    Arthritis, lumbar spine     hands    General anesthetics causing adverse effect in therapeutic use     following breast rediuct, hard time awakening  also had anxiety rxn to lidocain in dental ofc    GERD (gastroesophageal reflux disease)     Hypothyroidism 10/8/2014    Maternal anesthesia complication     epidural for 1st child went up instead of down; required intubation    Obesity (BMI 30-39.9) 10/8/2014    Thyroid disease     Thyroid nodule 10/8/2014       Past Surgical History:   Procedure Laterality Date    BREAST BIOPSY Left     b9    c-sections x2      COLONOSCOPY  9/25/2012         COLONOSCOPY N/A 8/30/2018    [facility]    HYSTEROSCOPY / POLYPECTOMY N/A 7/5/2017    [facility]    hysteroscopy with polypectomy      [id number]  7/5/2017    [facility]    INCISIONAL BREAST BIOPSY Left 1996    benign; done at same time as reduction    TOTAL REDUCTION MAMMOPLASTY Bilateral 1996       Family History   Problem Relation Age of Onset    Diabetes Father     Cirrhosis Father     Breast cancer Maternal Aunt     Breast cancer Paternal Aunt     Brain cancer Mother     Early death Mother 51        brain tumor    Cancer Mother         breast; brain    Heart disease Sister         chf; cad;    Hypertension Sister     Heart disease Brother         arrhyth    Hypertension Sister     Heart disease Brother         mi    Heart disease Brother         mi    Diabetes Brother     Macular degeneration Brother     Ovarian cancer Neg Hx        Social History     Socioeconomic History    Marital status:      Spouse name: Not on file    Number of children: Not on file    Years of education: Not on file     Highest education level: Not on file   Occupational History    Not on file   Social Needs    Financial resource strain: Not on file    Food insecurity:     Worry: Not on file     Inability: Not on file    Transportation needs:     Medical: Not on file     Non-medical: Not on file   Tobacco Use    Smoking status: Never Smoker    Smokeless tobacco: Never Used   Substance and Sexual Activity    Alcohol use: Yes     Comment: 2/ month    Drug use: No    Sexual activity: Yes     Birth control/protection: Post-menopausal   Lifestyle    Physical activity:     Days per week: Not on file     Minutes per session: Not on file    Stress: Not on file   Relationships    Social connections:     Talks on phone: Not on file     Gets together: Not on file     Attends Protestant service: Not on file     Active member of club or organization: Not on file     Attends meetings of clubs or organizations: Not on file     Relationship status: Not on file   Other Topics Concern    Not on file   Social History Narrative    Not on file       Current Outpatient Medications   Medication Sig Dispense Refill    calcium carbonate (OS-ISH) 600 mg calcium (1,500 mg) Tab Take 1,200 mg by mouth 2 (two) times daily with meals.      ergocalciferol (VITAMIN D2) 50,000 unit Cap Take 1 capsule (50,000 Units total) by mouth twice a week. 24 capsule 4    levothyroxine (SYNTHROID) 50 MCG tablet Take 1 tablet (50 mcg total) by mouth once daily. 90 tablet 4    diclofenac (VOLTAREN) 50 MG EC tablet Take 1 tablet (50 mg total) by mouth 2 (two) times daily. 90 tablet 4    meclizine (ANTIVERT) 25 mg tablet Take 1 tablet (25 mg total) by mouth 3 (three) times daily as needed for Dizziness. 30 tablet 0    methylPREDNISolone (MEDROL DOSEPACK) 4 mg tablet As directed 1 Package 0     No current facility-administered medications for this visit.        Review of patient's allergies indicates:   Allergen Reactions    Duricef [cefadroxil] Hives        Review of System:   General: no chills, fever, night sweats, weight gain or weight loss  Psychological: no depression or suicidal ideation  Breasts: no new or changing breast lumps, nipple discharge or masses.  Respiratory: no cough, shortness of breath, or wheezing  Cardiovascular: no chest pain or dyspnea on exertion  Gastrointestinal: no abdominal pain, change in bowel habits, or black or bloody stools  Genito-Urinary: no incontinence, urinary frequency/urgency or vulvar/vaginal symptoms, pelvic pain or abnormal vaginal bleeding.  Musculoskeletal: no gait disturbance or muscular weakness    PE:   APPEARANCE: Well nourished, well developed, in no acute distress.  NECK: Neck symmetric without masses or thyromegaly.  NODES: No inguinal lymph node enlargement.  ABDOMEN: Soft. No tenderness or masses. No hepatosplenomegaly. No hernias.  BREASTS: Symmetrical, no skin changes or visible lesions. No palpable masses, nipple discharge or adenopathy bilaterally.  PELVIC: Normal external female genitalia without lesions. Normal hair distribution. Adequate perineal body, normal urethral meatus. Vagina moist and well rugated without lesions or discharge. No significant cystocele or rectocele. Uterus and cervix surgically absent. Bimanual exam revealed no masses, tenderness or abnormality.      COUNSELING:  The patient was counseled today on osteoporosis prevention, calcium supplementation, and regular weight bearing exercise. The patient was also counseled today on ACS PAP guidelines, with recommendations for screening PAP smear age 21-65 every 3-5 yearsunless their uterus, cervix, and ovaries were removed for benign reasons. Screening with HPV testing ages 30-65 every 5 years as an alternative. The patient was also counseled regarding monthly breast self-examination, routine STD screening for at-risk populations, prophylactic immunizations for transmitted infections such as HPV, Pertussis, or Influenza as appropriate,  and yearly mammograms when indicated by ACS guidelines. She was advised to see her primary care physician for all other health maintenance.   FOLLOW-UP with me for next routine visit.

## 2019-06-21 ENCOUNTER — OFFICE VISIT (OUTPATIENT)
Dept: FAMILY MEDICINE | Facility: CLINIC | Age: 59
End: 2019-06-21
Payer: MEDICAID

## 2019-06-21 ENCOUNTER — PATIENT MESSAGE (OUTPATIENT)
Dept: FAMILY MEDICINE | Facility: CLINIC | Age: 59
End: 2019-06-21

## 2019-06-21 ENCOUNTER — HOSPITAL ENCOUNTER (OUTPATIENT)
Dept: RADIOLOGY | Facility: HOSPITAL | Age: 59
Discharge: HOME | End: 2019-06-21
Attending: NURSE PRACTITIONER
Payer: MEDICAID

## 2019-06-21 VITALS
DIASTOLIC BLOOD PRESSURE: 66 MMHG | TEMPERATURE: 98 F | SYSTOLIC BLOOD PRESSURE: 117 MMHG | HEIGHT: 61 IN | HEART RATE: 85 BPM | BODY MASS INDEX: 32.28 KG/M2 | WEIGHT: 171 LBS

## 2019-06-21 DIAGNOSIS — S99.921D INJURY OF RIGHT FOOT, SUBSEQUENT ENCOUNTER: ICD-10-CM

## 2019-06-21 DIAGNOSIS — S99.921D INJURY OF RIGHT FOOT, SUBSEQUENT ENCOUNTER: Primary | ICD-10-CM

## 2019-06-21 PROCEDURE — 73630 XR FOOT COMPLETE 3 VIEW RIGHT: ICD-10-PCS | Mod: 26,RT,, | Performed by: RADIOLOGY

## 2019-06-21 PROCEDURE — 99214 OFFICE O/P EST MOD 30 MIN: CPT | Mod: PBBFAC,PO,25 | Performed by: NURSE PRACTITIONER

## 2019-06-21 PROCEDURE — 99999 PR PBB SHADOW E&M-EST. PATIENT-LVL IV: CPT | Mod: PBBFAC,,, | Performed by: NURSE PRACTITIONER

## 2019-06-21 PROCEDURE — 73630 X-RAY EXAM OF FOOT: CPT | Mod: TC,PO,RT

## 2019-06-21 PROCEDURE — 99213 OFFICE O/P EST LOW 20 MIN: CPT | Mod: S$PBB,,, | Performed by: NURSE PRACTITIONER

## 2019-06-21 PROCEDURE — 99999 PR PBB SHADOW E&M-EST. PATIENT-LVL IV: ICD-10-PCS | Mod: PBBFAC,,, | Performed by: NURSE PRACTITIONER

## 2019-06-21 PROCEDURE — 73630 X-RAY EXAM OF FOOT: CPT | Mod: 26,RT,, | Performed by: RADIOLOGY

## 2019-06-21 PROCEDURE — 99213 PR OFFICE/OUTPT VISIT, EST, LEVL III, 20-29 MIN: ICD-10-PCS | Mod: S$PBB,,, | Performed by: NURSE PRACTITIONER

## 2019-06-21 NOTE — PROGRESS NOTES
"Subjective:       Patient ID: Bessy Lamb is a 58 y.o. female.    Chief Complaint: Foot Pain    Foot Pain   This is a new (States "twisted" foot on branch ) problem. The current episode started 1 to 4 weeks ago. The problem occurs daily. The problem has been unchanged. Pertinent negatives include no abdominal pain, anorexia, arthralgias, change in bowel habit, chest pain, chills, congestion, coughing, diaphoresis, fatigue, fever, headaches, joint swelling, myalgias, nausea, neck pain, numbness, rash, sore throat, swollen glands, urinary symptoms, vertigo, visual change, vomiting or weakness. The symptoms are aggravated by walking. She has tried acetaminophen (Declines medication for pain) for the symptoms. The treatment provided no relief.     Past Medical History:   Diagnosis Date    Arthritis, lumbar spine     hands    General anesthetics causing adverse effect in therapeutic use     following breast rediuct, hard time awakening  also had anxiety rxn to lidocain in dental ofc    GERD (gastroesophageal reflux disease)     Hypothyroidism 10/8/2014    Maternal anesthesia complication     epidural for 1st child went up instead of down; required intubation    Obesity (BMI 30-39.9) 10/8/2014    Thyroid disease     Thyroid nodule 10/8/2014     Social History     Socioeconomic History    Marital status:      Spouse name: Not on file    Number of children: Not on file    Years of education: Not on file    Highest education level: Not on file   Occupational History    Not on file   Social Needs    Financial resource strain: Not on file    Food insecurity:     Worry: Not on file     Inability: Not on file    Transportation needs:     Medical: Not on file     Non-medical: Not on file   Tobacco Use    Smoking status: Never Smoker    Smokeless tobacco: Never Used   Substance and Sexual Activity    Alcohol use: Yes     Comment: 2/ month    Drug use: No    Sexual activity: Yes     Birth " control/protection: Post-menopausal   Lifestyle    Physical activity:     Days per week: Not on file     Minutes per session: Not on file    Stress: Not on file   Relationships    Social connections:     Talks on phone: Not on file     Gets together: Not on file     Attends Orthodox service: Not on file     Active member of club or organization: Not on file     Attends meetings of clubs or organizations: Not on file     Relationship status: Not on file   Other Topics Concern    Not on file   Social History Narrative    Not on file     Past Surgical History:   Procedure Laterality Date    BREAST BIOPSY Left     b9    c-sections x2      COLONOSCOPY  9/25/2012         COLONOSCOPY N/A 8/30/2018    [facility]    HYSTEROSCOPY / POLYPECTOMY N/A 7/5/2017    [facility]    hysteroscopy with polypectomy      [id number]  7/5/2017    [facility]    INCISIONAL BREAST BIOPSY Left 1996    benign; done at same time as reduction    TOTAL REDUCTION MAMMOPLASTY Bilateral 1996       Review of Systems   Constitutional: Negative.  Negative for chills, diaphoresis, fatigue and fever.   HENT: Negative.  Negative for congestion and sore throat.    Eyes: Negative.    Respiratory: Negative.  Negative for cough.    Cardiovascular: Negative.  Negative for chest pain.   Gastrointestinal: Negative.  Negative for abdominal pain, anorexia, change in bowel habit, nausea and vomiting.   Endocrine: Negative.    Genitourinary: Negative.    Musculoskeletal: Negative for arthralgias, joint swelling, myalgias and neck pain.        Right foot injury   Skin: Negative.  Negative for rash.   Allergic/Immunologic: Negative.    Neurological: Negative.  Negative for vertigo, weakness, numbness and headaches.   Psychiatric/Behavioral: Negative.        Objective:      Physical Exam   Constitutional: She is oriented to person,  place, and time. She appears well-developed and well-nourished.   HENT:   Head: Normocephalic.   Right Ear: External ear normal.   Left Ear: External ear normal.   Nose: Nose normal.   Mouth/Throat: Oropharynx is clear and moist.   Eyes: Pupils are equal, round, and reactive to light. Conjunctivae are normal.   Neck: Normal range of motion. Neck supple.   Cardiovascular: Normal rate, regular rhythm and normal heart sounds.   Pulmonary/Chest: Effort normal and breath sounds normal.   Abdominal: Soft. Bowel sounds are normal.   Musculoskeletal: Normal range of motion.        Right foot: There is bony tenderness and swelling (mild). There is normal range of motion, no tenderness, normal capillary refill, no crepitus, no deformity and no laceration.   Neurological: She is alert and oriented to person, place, and time.   Skin: Skin is warm and dry. Capillary refill takes 2 to 3 seconds.   Psychiatric: She has a normal mood and affect. Her behavior is normal. Judgment and thought content normal.   Nursing note and vitals reviewed.      Assessment:       1. Injury of right foot, subsequent encounter        Plan:           Bessy was seen today for foot pain.    Diagnoses and all orders for this visit:    Injury of right foot, subsequent encounter  -     X-Ray Foot Complete Right; Future  RTC as needed  Brace or ACE to affected foot  Avoid weight bearing as much as possible  Report to ER immediately if symptoms worsen

## 2019-06-21 NOTE — PATIENT INSTRUCTIONS
RTC as needed  Brace or ACE to affected foot  Avoid weight bearing as much as possible  Report to ER immediately if symptoms worsen

## 2019-07-03 ENCOUNTER — HOSPITAL ENCOUNTER (OUTPATIENT)
Dept: RADIOLOGY | Facility: HOSPITAL | Age: 59
Discharge: HOME | End: 2019-07-03
Attending: SPECIALIST
Payer: MEDICAID

## 2019-07-03 VITALS — HEIGHT: 61 IN | WEIGHT: 171 LBS | BODY MASS INDEX: 32.28 KG/M2

## 2019-07-03 DIAGNOSIS — Z12.31 VISIT FOR SCREENING MAMMOGRAM: ICD-10-CM

## 2019-07-03 PROCEDURE — 77067 SCR MAMMO BI INCL CAD: CPT | Mod: TC,PN

## 2019-07-03 PROCEDURE — 77063 BREAST TOMOSYNTHESIS BI: CPT | Mod: 26,,, | Performed by: RADIOLOGY

## 2019-07-03 PROCEDURE — 77067 MAMMO DIGITAL SCREENING BILAT WITH TOMOSYNTHESIS_CAD: ICD-10-PCS | Mod: 26,,, | Performed by: RADIOLOGY

## 2019-07-03 PROCEDURE — 77067 SCR MAMMO BI INCL CAD: CPT | Mod: 26,,, | Performed by: RADIOLOGY

## 2019-07-03 PROCEDURE — 77063 MAMMO DIGITAL SCREENING BILAT WITH TOMOSYNTHESIS_CAD: ICD-10-PCS | Mod: 26,,, | Performed by: RADIOLOGY

## 2019-07-09 ENCOUNTER — LAB VISIT (OUTPATIENT)
Dept: LAB | Facility: HOSPITAL | Age: 59
End: 2019-07-09
Attending: FAMILY MEDICINE
Payer: MEDICAID

## 2019-07-09 DIAGNOSIS — E55.9 VITAMIN D DEFICIENCY: ICD-10-CM

## 2019-07-09 LAB — 25(OH)D3+25(OH)D2 SERPL-MCNC: 32 NG/ML (ref 30–96)

## 2019-07-09 PROCEDURE — 36415 COLL VENOUS BLD VENIPUNCTURE: CPT | Mod: PO

## 2019-07-09 PROCEDURE — 82306 VITAMIN D 25 HYDROXY: CPT

## 2019-10-09 ENCOUNTER — PATIENT MESSAGE (OUTPATIENT)
Dept: FAMILY MEDICINE | Facility: CLINIC | Age: 59
End: 2019-10-09

## 2019-10-09 ENCOUNTER — OFFICE VISIT (OUTPATIENT)
Dept: FAMILY MEDICINE | Facility: CLINIC | Age: 59
End: 2019-10-09
Payer: MEDICAID

## 2019-10-09 VITALS
SYSTOLIC BLOOD PRESSURE: 136 MMHG | TEMPERATURE: 98 F | HEART RATE: 73 BPM | DIASTOLIC BLOOD PRESSURE: 86 MMHG | WEIGHT: 170 LBS | HEIGHT: 61 IN | BODY MASS INDEX: 32.1 KG/M2

## 2019-10-09 DIAGNOSIS — J32.9 SINUSITIS, UNSPECIFIED CHRONICITY, UNSPECIFIED LOCATION: Primary | ICD-10-CM

## 2019-10-09 DIAGNOSIS — Z23 NEED FOR PROPHYLACTIC VACCINATION AND INOCULATION AGAINST INFLUENZA: ICD-10-CM

## 2019-10-09 PROCEDURE — 99214 OFFICE O/P EST MOD 30 MIN: CPT | Mod: PBBFAC,PO | Performed by: NURSE PRACTITIONER

## 2019-10-09 PROCEDURE — 90471 IMMUNIZATION ADMIN: CPT | Mod: PBBFAC,PO

## 2019-10-09 PROCEDURE — 99213 PR OFFICE/OUTPT VISIT, EST, LEVL III, 20-29 MIN: ICD-10-PCS | Mod: S$PBB,,, | Performed by: NURSE PRACTITIONER

## 2019-10-09 PROCEDURE — 99999 PR PBB SHADOW E&M-EST. PATIENT-LVL IV: CPT | Mod: PBBFAC,,, | Performed by: NURSE PRACTITIONER

## 2019-10-09 PROCEDURE — 99213 OFFICE O/P EST LOW 20 MIN: CPT | Mod: S$PBB,,, | Performed by: NURSE PRACTITIONER

## 2019-10-09 PROCEDURE — 99999 PR PBB SHADOW E&M-EST. PATIENT-LVL IV: ICD-10-PCS | Mod: PBBFAC,,, | Performed by: NURSE PRACTITIONER

## 2019-10-09 RX ORDER — FLUTICASONE PROPIONATE 50 MCG
1 SPRAY, SUSPENSION (ML) NASAL DAILY
Qty: 1 BOTTLE | Refills: 0 | Status: SHIPPED | OUTPATIENT
Start: 2019-10-09 | End: 2019-10-16

## 2019-10-09 RX ORDER — DEXAMETHASONE SODIUM PHOSPHATE 4 MG/ML
8 INJECTION, SOLUTION INTRA-ARTICULAR; INTRALESIONAL; INTRAMUSCULAR; INTRAVENOUS; SOFT TISSUE
Status: COMPLETED | OUTPATIENT
Start: 2019-10-09 | End: 2019-10-09

## 2019-10-09 RX ORDER — AMOXICILLIN 875 MG/1
875 TABLET, FILM COATED ORAL EVERY 12 HOURS
Qty: 20 TABLET | Refills: 0 | Status: SHIPPED | OUTPATIENT
Start: 2019-10-09 | End: 2019-10-19

## 2019-10-09 RX ADMIN — DEXAMETHASONE SODIUM PHOSPHATE 8 MG: 4 INJECTION, SOLUTION INTRAMUSCULAR; INTRAVENOUS at 03:10

## 2019-10-09 NOTE — PROGRESS NOTES
Subjective:       Patient ID: Bessy Lamb is a 59 y.o. female.    Chief Complaint: Sinus Problem and Oral Pain    Sinus Problem   This is a new problem. The current episode started 1 to 4 weeks ago. The problem is unchanged. There has been no fever. The pain is mild. Associated symptoms include congestion, headaches and sinus pressure. Pertinent negatives include no chills, coughing, diaphoresis, ear pain, hoarse voice, neck pain, shortness of breath, sneezing, sore throat or swollen glands. (Oral pain; states wears dental plate; has appt with dentist on tomorrow) Past treatments include nothing. The treatment provided no relief.     Past Medical History:   Diagnosis Date    Arthritis, lumbar spine     hands    General anesthetics causing adverse effect in therapeutic use     following breast rediuct, hard time awakening  also had anxiety rxn to lidocain in dental ofc    GERD (gastroesophageal reflux disease)     Hypothyroidism 10/8/2014    Maternal anesthesia complication     epidural for 1st child went up instead of down; required intubation    Obesity (BMI 30-39.9) 10/8/2014    Thyroid disease     Thyroid nodule 10/8/2014     Social History     Socioeconomic History    Marital status:      Spouse name: Not on file    Number of children: Not on file    Years of education: Not on file    Highest education level: Not on file   Occupational History    Not on file   Social Needs    Financial resource strain: Not on file    Food insecurity:     Worry: Not on file     Inability: Not on file    Transportation needs:     Medical: Not on file     Non-medical: Not on file   Tobacco Use    Smoking status: Never Smoker    Smokeless tobacco: Never Used   Substance and Sexual Activity    Alcohol use: Yes     Comment: 2/ month    Drug use: No    Sexual activity: Yes     Birth control/protection: Post-menopausal   Lifestyle    Physical activity:     Days per week: Not on file     Minutes per  session: Not on file    Stress: Not on file   Relationships    Social connections:     Talks on phone: Not on file     Gets together: Not on file     Attends Buddhist service: Not on file     Active member of club or organization: Not on file     Attends meetings of clubs or organizations: Not on file     Relationship status: Not on file   Other Topics Concern    Not on file   Social History Narrative    Not on file     Past Surgical History:   Procedure Laterality Date    BREAST BIOPSY Left     b9    c-sections x2      COLONOSCOPY  9/25/2012         COLONOSCOPY N/A 8/30/2018    Procedure: COLONOSCOPY;  Surgeon: Francisco Mcclain MD;  Location: [facility];  Service: Endoscopy;  Laterality: N/A;    hysteroscopy with polypectomy      INCISIONAL BREAST BIOPSY Left 1996    benign; done at same time as reduction    TOTAL REDUCTION MAMMOPLASTY Bilateral 1996       Review of Systems   Constitutional: Negative.  Negative for chills and diaphoresis.   HENT: Positive for congestion, postnasal drip, sinus pressure and sinus pain. Negative for ear pain, hoarse voice, sneezing and sore throat.    Eyes: Negative.    Respiratory: Negative.  Negative for cough and shortness of breath.    Cardiovascular: Negative.    Gastrointestinal: Negative.    Endocrine: Negative.    Genitourinary: Negative.    Musculoskeletal: Negative.  Negative for neck pain.   Skin: Negative.    Allergic/Immunologic: Negative.    Neurological: Positive for headaches.   Psychiatric/Behavioral: Negative.        Objective:      Physical Exam   Constitutional: She is oriented to person, place, and time. She appears well-developed and well-nourished.   HENT:   Head: Normocephalic.   Right Ear: Hearing, tympanic membrane, external ear and ear canal normal.   Left Ear: Hearing, tympanic membrane, external ear and ear canal normal.   Nose: Mucosal edema and rhinorrhea present. Right sinus exhibits maxillary sinus tenderness. Right sinus exhibits no frontal  sinus tenderness. Left sinus exhibits maxillary sinus tenderness. Left sinus exhibits no frontal sinus tenderness.   Mouth/Throat: Uvula is midline, oropharynx is clear and moist and mucous membranes are normal.   Eyes: Pupils are equal, round, and reactive to light. Conjunctivae are normal.   Neck: Normal range of motion. Neck supple.   Cardiovascular: Normal rate, regular rhythm and normal heart sounds.   Pulmonary/Chest: Effort normal and breath sounds normal.   Abdominal: Soft. Bowel sounds are normal.   Musculoskeletal: Normal range of motion.   Neurological: She is alert and oriented to person, place, and time.   Skin: Skin is warm and dry. Capillary refill takes 2 to 3 seconds.   Psychiatric: She has a normal mood and affect. Her behavior is normal. Judgment and thought content normal.   Nursing note and vitals reviewed.      Assessment:       1. Sinusitis, unspecified chronicity, unspecified location    2. Need for prophylactic vaccination and inoculation against influenza        Plan:           Bessy was seen today for sinus problem and oral pain.    Diagnoses and all orders for this visit:    Sinusitis, unspecified chronicity, unspecified location  -     amoxicillin (AMOXIL) 875 MG tablet; Take 1 tablet (875 mg total) by mouth every 12 (twelve) hours. for 10 days  -     fluticasone propionate (FLONASE) 50 mcg/actuation nasal spray; 1 spray (50 mcg total) by Each Nostril route once daily. for 7 days  -     dexamethasone injection 8 mg        Zyrtec OTC as directed    Need for prophylactic vaccination and inoculation against influenza  -     Flu Vaccine - Quadrivalent (PF) (6 months & older)    Report to ER immediately if symptoms worsen

## 2019-11-11 ENCOUNTER — PATIENT MESSAGE (OUTPATIENT)
Dept: FAMILY MEDICINE | Facility: CLINIC | Age: 59
End: 2019-11-11

## 2019-11-13 ENCOUNTER — TELEPHONE (OUTPATIENT)
Dept: FAMILY MEDICINE | Facility: CLINIC | Age: 59
End: 2019-11-13

## 2019-11-13 NOTE — TELEPHONE ENCOUNTER
----- Message from Haylie Zacarias sent at 11/13/2019 10:12 AM CST -----  Contact: Robyn  Type:  Sooner Apoointment Request    Caller is requesting a sooner appointment.  Caller declined first available appointment listed below.  Caller will not accept being placed on the waitlist and is requesting a message be sent to doctor.  Name of Caller:Robyn  When is the first available appointment?12/31/19  Symptoms:hosp fu chest pain  Would the patient rather a call back or a response via [facility]? Call back   Best Call Back Number:[phone]  Additional Information:

## 2019-11-14 ENCOUNTER — TELEPHONE (OUTPATIENT)
Dept: FAMILY MEDICINE | Facility: CLINIC | Age: 59
End: 2019-11-14

## 2019-11-14 ENCOUNTER — OFFICE VISIT (OUTPATIENT)
Dept: FAMILY MEDICINE | Facility: CLINIC | Age: 59
End: 2019-11-14
Payer: MEDICAID

## 2019-11-14 VITALS
DIASTOLIC BLOOD PRESSURE: 74 MMHG | WEIGHT: 169 LBS | HEART RATE: 78 BPM | BODY MASS INDEX: 31.93 KG/M2 | SYSTOLIC BLOOD PRESSURE: 137 MMHG

## 2019-11-14 DIAGNOSIS — K21.9 GASTROESOPHAGEAL REFLUX DISEASE, ESOPHAGITIS PRESENCE NOT SPECIFIED: ICD-10-CM

## 2019-11-14 DIAGNOSIS — E03.9 HYPOTHYROIDISM, UNSPECIFIED TYPE: ICD-10-CM

## 2019-11-14 DIAGNOSIS — R07.9 CHEST PAIN, UNSPECIFIED TYPE: Primary | ICD-10-CM

## 2019-11-14 DIAGNOSIS — E66.9 OBESITY (BMI 30-39.9): ICD-10-CM

## 2019-11-14 DIAGNOSIS — R73.03 PRE-DIABETES: ICD-10-CM

## 2019-11-14 PROCEDURE — 99999 PR PBB SHADOW E&M-EST. PATIENT-LVL III: ICD-10-PCS | Mod: PBBFAC,,, | Performed by: FAMILY MEDICINE

## 2019-11-14 PROCEDURE — 99214 PR OFFICE/OUTPT VISIT, EST, LEVL IV, 30-39 MIN: ICD-10-PCS | Mod: S$PBB,,, | Performed by: FAMILY MEDICINE

## 2019-11-14 PROCEDURE — 99496 TRANSJ CARE MGMT HIGH F2F 7D: CPT | Mod: PBBFAC,PO | Performed by: FAMILY MEDICINE

## 2019-11-14 PROCEDURE — 99213 OFFICE O/P EST LOW 20 MIN: CPT | Mod: PBBFAC,PO | Performed by: FAMILY MEDICINE

## 2019-11-14 PROCEDURE — 99214 OFFICE O/P EST MOD 30 MIN: CPT | Mod: S$PBB,,, | Performed by: FAMILY MEDICINE

## 2019-11-14 PROCEDURE — 99999 PR PBB SHADOW E&M-EST. PATIENT-LVL III: CPT | Mod: PBBFAC,,, | Performed by: FAMILY MEDICINE

## 2019-11-14 NOTE — PROGRESS NOTES
Transitional Care Note    Family and/or Caretaker present at visit?  Yes.  Diagnostic tests reviewed/disposition: I have reviewed all completed as well as pending diagnostic tests at the time of discharge.  Disease/illness education: CP  Home health/community services discussion/referrals: Patient does not have home health established from hospital visit.  They do not need home health.  If needed, we will set up home health for the patient.   Establishment or re-establishment of referral orders for community resources: No other necessary community resources.   Discussion with other health care providers: No discussion with other health care providers necessary.        Rev records Carotid US nl   Lab good except a1c 6.3  Abd US gallstone but otherwise nl Rec Gerd diet, cont PPI w precautions   Rec wt loss incr activity   Past Medical History:  Past Medical History:   Diagnosis Date    Arthritis, lumbar spine     hands    General anesthetics causing adverse effect in therapeutic use     following breast rediuct, hard time awakening  also had anxiety rxn to lidocain in dental ofc    GERD (gastroesophageal reflux disease)     Hypothyroidism 10/8/2014    Maternal anesthesia complication     epidural for 1st child went up instead of down; required intubation    Obesity (BMI 30-39.9) 10/8/2014    Thyroid disease     Thyroid nodule 10/8/2014     Past Surgical History:   Procedure Laterality Date    BREAST BIOPSY Left     b9    c-sections x2      COLONOSCOPY  9/25/2012         COLONOSCOPY N/A 8/30/2018    Procedure: COLONOSCOPY;  Surgeon: Francisco Mcclain MD;  Location: [facility];  Service: Endoscopy;  Laterality: N/A;    hysteroscopy with polypectomy      INCISIONAL BREAST BIOPSY Left 1996    benign; done at same time as reduction    TOTAL REDUCTION MAMMOPLASTY Bilateral 1996     Review of patient's allergies indicates:   Allergen Reactions    Duricef [cefadroxil] Hives     Current Outpatient Medications on  File Prior to Visit   Medication Sig Dispense Refill    calcium carbonate (OS-ISH) 600 mg calcium (1,500 mg) Tab Take 1,200 mg by mouth 2 (two) times daily with meals.      diclofenac (VOLTAREN) 50 MG EC tablet Take 1 tablet (50 mg total) by mouth 2 (two) times daily. 90 tablet 4    ergocalciferol (VITAMIN D2) 50,000 unit Cap Take 1 capsule (50,000 Units total) by mouth twice a week. 24 capsule 4    levothyroxine (SYNTHROID) 50 MCG tablet Take 1 tablet (50 mcg total) by mouth once daily. 90 tablet 4     No current facility-administered medications on file prior to visit.      Social History     Socioeconomic History    Marital status:      Spouse name: Not on file    Number of children: Not on file    Years of education: Not on file    Highest education level: Not on file   Occupational History    Not on file   Social Needs    Financial resource strain: Not on file    Food insecurity:     Worry: Not on file     Inability: Not on file    Transportation needs:     Medical: Not on file     Non-medical: Not on file   Tobacco Use    Smoking status: Never Smoker    Smokeless tobacco: Never Used   Substance and Sexual Activity    Alcohol use: Yes     Comment: 2/ month    Drug use: No    Sexual activity: Yes     Birth control/protection: Post-menopausal   Lifestyle    Physical activity:     Days per week: Not on file     Minutes per session: Not on file    Stress: Not on file   Relationships    Social connections:     Talks on phone: Not on file     Gets together: Not on file     Attends Oriental orthodox service: Not on file     Active member of club or organization: Not on file     Attends meetings of clubs or organizations: Not on file     Relationship status: Not on file   Other Topics Concern    Not on file   Social History Narrative    Not on file     Family History   Problem Relation Age of Onset    Diabetes Father     Cirrhosis Father     Breast cancer Maternal Aunt     Breast cancer  Paternal Aunt     Brain cancer Mother     Early death Mother 51        brain tumor    Cancer Mother         breast; brain    Heart disease Sister         chf; cad;    Hypertension Sister     Heart disease Brother         arrhyth    Hypertension Sister     Heart disease Brother         mi    Heart disease Brother         mi    Diabetes Brother     Macular degeneration Brother     Ovarian cancer Neg Hx          ROS:GENERAL: No fever, chills, fatigability or weight loss.  SKIN: No rashes, itching or changes in color or texture of skin.  HEAD: No headaches or recent head trauma.EYES: Visual acuity fine. No photophobia, ocular pain or diplopia.EARS: Denies ear pain, discharge or vertigo.NOSE: No loss of smell, no epistaxis or postnasal drip.MOUTH & THROAT: No hoarseness or change in voice. No excessive gum bleeding.NODES: Denies swollen glands.  CHEST: Denies SMILEY, cyanosis, wheezing, cough and sputum production.  CARDIOVASCULAR: Denies chest pain, PND, orthopnea or reduced exercise tolerance.  ABDOMEN: Appetite fine. No weight loss. Denies diarrhea, abdominal pain, hematemesis or blood in stool.  URINARY: No flank pain, dysuria or hematuria.  PERIPHERAL VASCULAR: No claudication or cyanosis.  MUSCULOSKELETAL: See above.  NEUROLOGIC: No history of seizures, paralysis, alteration of gait or coordination.  PE:    HEAD: Normocephalic, atraumatic.EYES: PERRL. EOMI.   EARS: TM's intact. Light reflex normal. No retraction or perforation.   NOSE: Mucosa pink. Airway clear.MOUTH & THROAT: No tonsillar enlargement. No pharyngeal erythema or exudate. No stridor.  NODES: No cervical, axillary or inguinal lymph node enlargement.  CHEST: Lungs clear to auscultation.  CARDIOVASCULAR: Normal S1, S2. No rubs, murmurs or gallops.  ABDOMEN: Bowel sounds normal. Not distended. Soft. No tenderness or masses.  MUSCULOSKELETAL: No palpable abnormality  NEUROLOGIC: Cranial Nerves: II-XII grossly intact.  Motor: 5/5 strength major  flexors/extensors.  DTR's: Knees, Ankles 2+ and equal bilaterally; downgoing toes.  Sensory: Intact to light touch distally.  Gait & Posture: Normal gait and fine motion. No cerebellar signs.     ASCVD 10 yr risk 3.2 %  I

## 2019-11-14 NOTE — TELEPHONE ENCOUNTER
----- Message from Radha Calderón sent at 11/14/2019 12:04 PM CST -----  Contact: [phone]  Pt is asking for the nurse to call her to set her up with Philly Mcclellan as a new patient/ The system would not let me schedule it/THsosbaldok

## 2019-11-30 ENCOUNTER — PATIENT MESSAGE (OUTPATIENT)
Dept: FAMILY MEDICINE | Facility: CLINIC | Age: 59
End: 2019-11-30

## 2019-12-02 RX ORDER — ERGOCALCIFEROL 1.25 MG/1
50000 CAPSULE ORAL
Qty: 24 CAPSULE | Refills: 4 | Status: SHIPPED | OUTPATIENT
Start: 2019-12-02 | End: 2021-03-21 | Stop reason: SDUPTHER

## 2019-12-02 RX ORDER — ERGOCALCIFEROL 1.25 MG/1
CAPSULE ORAL
Qty: 4 CAPSULE | Refills: 14 | OUTPATIENT
Start: 2019-12-02

## 2019-12-17 ENCOUNTER — TELEPHONE (OUTPATIENT)
Dept: FAMILY MEDICINE | Facility: CLINIC | Age: 59
End: 2019-12-17

## 2019-12-17 NOTE — TELEPHONE ENCOUNTER
----- Message from Maria T Lynn sent at 12/17/2019  9:49 AM CST -----  Contact: Pt  Pt is requesting call back in regards to questions about upcoming appt.        Pls call back at [phone]

## 2019-12-17 NOTE — TELEPHONE ENCOUNTER
----- Message from Lilibeth Palumbo sent at 12/17/2019  9:41 AM CST -----  Contact: pt  Pt is ready to r/s appt and can be reached at [phone]      Thanks,  Lilibeth Palumbo

## 2020-03-02 ENCOUNTER — OFFICE VISIT (OUTPATIENT)
Dept: FAMILY MEDICINE | Facility: CLINIC | Age: 60
End: 2020-03-02
Payer: MEDICAID

## 2020-03-02 ENCOUNTER — PATIENT MESSAGE (OUTPATIENT)
Dept: FAMILY MEDICINE | Facility: CLINIC | Age: 60
End: 2020-03-02

## 2020-03-02 VITALS
TEMPERATURE: 100 F | BODY MASS INDEX: 32.28 KG/M2 | DIASTOLIC BLOOD PRESSURE: 72 MMHG | SYSTOLIC BLOOD PRESSURE: 116 MMHG | WEIGHT: 171 LBS | HEART RATE: 92 BPM | HEIGHT: 61 IN

## 2020-03-02 DIAGNOSIS — J11.1 FLU: Primary | ICD-10-CM

## 2020-03-02 DIAGNOSIS — J06.9 UPPER RESPIRATORY TRACT INFECTION, UNSPECIFIED TYPE: ICD-10-CM

## 2020-03-02 LAB
INFLUENZA A, MOLECULAR: POSITIVE
INFLUENZA B, MOLECULAR: NEGATIVE
SPECIMEN SOURCE: ABNORMAL

## 2020-03-02 PROCEDURE — 99213 PR OFFICE/OUTPT VISIT, EST, LEVL III, 20-29 MIN: ICD-10-PCS | Mod: S$PBB,,, | Performed by: FAMILY MEDICINE

## 2020-03-02 PROCEDURE — 99999 PR PBB SHADOW E&M-EST. PATIENT-LVL III: ICD-10-PCS | Mod: PBBFAC,,, | Performed by: FAMILY MEDICINE

## 2020-03-02 PROCEDURE — 96372 THER/PROPH/DIAG INJ SC/IM: CPT | Mod: PBBFAC,PO

## 2020-03-02 PROCEDURE — 99213 OFFICE O/P EST LOW 20 MIN: CPT | Mod: PBBFAC,PO,25 | Performed by: FAMILY MEDICINE

## 2020-03-02 PROCEDURE — 99999 PR PBB SHADOW E&M-EST. PATIENT-LVL III: CPT | Mod: PBBFAC,,, | Performed by: FAMILY MEDICINE

## 2020-03-02 PROCEDURE — 99213 OFFICE O/P EST LOW 20 MIN: CPT | Mod: S$PBB,,, | Performed by: FAMILY MEDICINE

## 2020-03-02 PROCEDURE — 87502 INFLUENZA DNA AMP PROBE: CPT | Mod: PO

## 2020-03-02 RX ORDER — DEXAMETHASONE SODIUM PHOSPHATE 4 MG/ML
8 INJECTION, SOLUTION INTRA-ARTICULAR; INTRALESIONAL; INTRAMUSCULAR; INTRAVENOUS; SOFT TISSUE ONCE
Status: COMPLETED | OUTPATIENT
Start: 2020-03-02 | End: 2020-03-02

## 2020-03-02 RX ORDER — CODEINE PHOSPHATE AND GUAIFENESIN 10; 100 MG/5ML; MG/5ML
5 SOLUTION ORAL EVERY 6 HOURS PRN
Qty: 240 ML | Refills: 0 | Status: SHIPPED | OUTPATIENT
Start: 2020-03-02 | End: 2020-03-12

## 2020-03-02 RX ADMIN — DEXAMETHASONE SODIUM PHOSPHATE 8 MG: 4 INJECTION, SOLUTION INTRA-ARTICULAR; INTRALESIONAL; INTRAMUSCULAR; INTRAVENOUS; SOFT TISSUE at 11:03

## 2020-03-02 NOTE — PROGRESS NOTES
Bessy Lamb presents with moderate upper respiratory congestion,rhinnorhea,moderate cough past 2-3 days. OTC help slightly. Denies nausea,vomiting,diarrhea or significant fever.    Past Medical History:   Diagnosis Date    Arthritis, lumbar spine     hands    General anesthetics causing adverse effect in therapeutic use     following breast rediuct, hard time awakening  also had anxiety rxn to lidocain in dental ofc    GERD (gastroesophageal reflux disease)     Hypothyroidism 10/8/2014    Maternal anesthesia complication     epidural for 1st child went up instead of down; required intubation    Obesity (BMI 30-39.9) 10/8/2014    Thyroid disease     Thyroid nodule 10/8/2014     Past Surgical History:   Procedure Laterality Date    BREAST BIOPSY Left     b9    c-sections x2      COLONOSCOPY  9/25/2012         COLONOSCOPY N/A 8/30/2018    Procedure: COLONOSCOPY;  Surgeon: Francisco Mcclain MD;  Location: [facility];  Service: Endoscopy;  Laterality: N/A;    hysteroscopy with polypectomy      INCISIONAL BREAST BIOPSY Left 1996    benign; done at same time as reduction    TOTAL REDUCTION MAMMOPLASTY Bilateral 1996     Review of patient's allergies indicates:   Allergen Reactions    Duricef [cefadroxil] Hives     Current Outpatient Medications on File Prior to Visit   Medication Sig Dispense Refill    calcium carbonate (OS-ISH) 600 mg calcium (1,500 mg) Tab Take 1,200 mg by mouth 2 (two) times daily with meals.      diclofenac (VOLTAREN) 50 MG EC tablet Take 1 tablet (50 mg total) by mouth 2 (two) times daily. 90 tablet 4    ergocalciferol (VITAMIN D2) 50,000 unit Cap Take 1 capsule (50,000 Units total) by mouth twice a week. 24 capsule 4    levothyroxine (SYNTHROID) 50 MCG tablet Take 1 tablet (50 mcg total) by mouth once daily. 90 tablet 4     No current facility-administered medications on file prior to visit.      Social History     Socioeconomic History    Marital status:       Spouse name: Not on file    Number of children: Not on file    Years of education: Not on file    Highest education level: Not on file   Occupational History    Not on file   Social Needs    Financial resource strain: Not on file    Food insecurity:     Worry: Not on file     Inability: Not on file    Transportation needs:     Medical: Not on file     Non-medical: Not on file   Tobacco Use    Smoking status: Never Smoker    Smokeless tobacco: Never Used   Substance and Sexual Activity    Alcohol use: Yes     Comment: 2/ month    Drug use: No    Sexual activity: Yes     Birth control/protection: Post-menopausal   Lifestyle    Physical activity:     Days per week: Not on file     Minutes per session: Not on file    Stress: Not on file   Relationships    Social connections:     Talks on phone: Not on file     Gets together: Not on file     Attends Gnosticist service: Not on file     Active member of club or organization: Not on file     Attends meetings of clubs or organizations: Not on file     Relationship status: Not on file   Other Topics Concern    Not on file   Social History Narrative    Not on file     Family History   Problem Relation Age of Onset    Diabetes Father     Cirrhosis Father     Breast cancer Maternal Aunt     Breast cancer Paternal Aunt     Brain cancer Mother     Early death Mother 51        brain tumor    Cancer Mother         breast; brain    Heart disease Sister         chf; cad;    Hypertension Sister     Heart disease Brother         arrhyth    Hypertension Sister     Heart disease Brother         mi    Heart disease Brother         mi    Diabetes Brother     Macular degeneration Brother     Ovarian cancer Neg Hx          ROS:  SKIN: No rashes, itching or changes in color or texture of skin.  EYES: Visual acuity fine. No photophobia, ocular pain or diplopia.EARS: Denies ear pain, discharge or vertigo.NOSE: No loss of smell, no epistaxis some postnasal  drip.MOUTH & THROAT: No hoarseness or change in voice. No excessive gum bleeding.CHEST: Denies SMILEY, cyanosis, wheezing  CARDIOVASCULAR: Denies chest pain, PND, orthopnea or reduced exercise tolerance.  ABDOMEN:  No weight loss.No abdominal pain, no hematemesis or blood in stool.  URINARY: No flank pain, dysuria or hematuria.  PERIPHERAL VASCULAR: No claudication or cyanosis.  MUSCULOSKELETAL: Negative   NEUROLOGIC: No history of seizures, paralysis, alteration of gait or coordination.    PE: Vital signs as noted  Heent:Normocephalic with no recent cranial trauma,PERRLA,EOMI,conjunctiva clear,fundi reveal no hemmorhage exudate or papilledema.Otic canals clear, tympanic membranes slightly dull bilaterally.Nasal mucosa slightly red and edematous.Posterior pharynx slightly red but without exudate.  Neck:Supple with minimal anterior cervical adenopathy.  Chest:Clear bilateral breath sounds with mild scattered ronchi  Heart:Regular rhthym without murmer  Abdomen:Soft, non tender,no masses, no hepatosplenomegalyExtremeties and Neurologic:Grossly within normal limits  Impression: Upper Respiratory Infection. 465.9  Plan: Dexa 8im; TO w cod

## 2020-03-06 ENCOUNTER — PATIENT MESSAGE (OUTPATIENT)
Dept: FAMILY MEDICINE | Facility: CLINIC | Age: 60
End: 2020-03-06

## 2020-05-01 RX ORDER — LEVOTHYROXINE SODIUM 50 UG/1
TABLET ORAL
Qty: 30 TABLET | Refills: 0 | Status: SHIPPED | OUTPATIENT
Start: 2020-05-01 | End: 2020-05-29

## 2020-05-29 ENCOUNTER — TELEPHONE (OUTPATIENT)
Dept: FAMILY MEDICINE | Facility: CLINIC | Age: 60
End: 2020-05-29

## 2020-05-29 RX ORDER — LEVOTHYROXINE SODIUM 50 UG/1
TABLET ORAL
Qty: 30 TABLET | Refills: 1 | Status: SHIPPED | OUTPATIENT
Start: 2020-05-29 | End: 2020-06-30 | Stop reason: SDUPTHER

## 2020-05-29 RX ORDER — LEVOTHYROXINE SODIUM 50 UG/1
50 TABLET ORAL DAILY
Qty: 30 TABLET | Refills: 1 | Status: SHIPPED | OUTPATIENT
Start: 2020-05-29 | End: 2020-09-25

## 2020-05-29 NOTE — TELEPHONE ENCOUNTER
Returned call to patient's . Appointment rescheduled with Dr. Mcclellan to establish care as requested.

## 2020-05-29 NOTE — TELEPHONE ENCOUNTER
Called and left a message notifying patientt hat medication was sent to her pharmacy and an appointment was needed, an appointment was scheduled and an appointment reminder was mailed to patient's address on file.

## 2020-05-29 NOTE — TELEPHONE ENCOUNTER
----- Message from Meredith Yoder sent at 5/29/2020 10:30 AM CDT -----  Contact: Matthew-  Would like to consult with nurse regarding pt picking Philly Mcclellan to be new primary, but instead was schedule with Gabriella. Please give a call back at [phone] or send my chart.             Thanks,  Meredith MARTIN

## 2020-06-11 ENCOUNTER — PATIENT OUTREACH (OUTPATIENT)
Dept: ADMINISTRATIVE | Facility: HOSPITAL | Age: 60
End: 2020-06-11

## 2020-06-11 NOTE — PROGRESS NOTES
Health Maintenance Updated.   Immunizations: Abstracted.   Care Everywhere: Abstracted: No new results found.   [facility] Search: Patient found. No results.   Health Maintenance Due   Topic    TETANUS VACCINE     Lipid Panel     Mammogram

## 2020-06-25 ENCOUNTER — TELEPHONE (OUTPATIENT)
Dept: FAMILY MEDICINE | Facility: CLINIC | Age: 60
End: 2020-06-25

## 2020-06-25 NOTE — TELEPHONE ENCOUNTER
----- Message from Day March sent at 6/25/2020  9:39 AM CDT -----  States her appt was reschedule to 7/7, they will be in [place]. States she has knots in her throat on both side and she can't wait that long. States she has been waiting for a month for this appt. States she also had an allergic reaction to something she ate. Please call Gerri Laureano [phone]. Thank you

## 2020-06-25 NOTE — TELEPHONE ENCOUNTER
----- Message from Padma Arambula sent at 6/25/2020  9:35 AM CDT -----  Regarding: appt  Contact: pt  Type:  Sooner Apoointment Request    Caller is requesting a sooner appointment.  Caller declined first available appointment listed below.  Caller will not accept being placed on the waitlist and is requesting a message be sent to doctor.  Name of Caller: pt  When is the first available appointment? 07/07/2020  Symptoms: annual  Would the patient rather a call back or a response via [facility]? Call back  Best Call Back Number: [phone]  Additional Information: n/a

## 2020-06-30 ENCOUNTER — LAB VISIT (OUTPATIENT)
Dept: LAB | Facility: HOSPITAL | Age: 60
End: 2020-06-30
Attending: INTERNAL MEDICINE
Payer: MEDICAID

## 2020-06-30 ENCOUNTER — OFFICE VISIT (OUTPATIENT)
Dept: FAMILY MEDICINE | Facility: CLINIC | Age: 60
End: 2020-06-30
Payer: MEDICAID

## 2020-06-30 VITALS
BODY MASS INDEX: 33.99 KG/M2 | WEIGHT: 180 LBS | DIASTOLIC BLOOD PRESSURE: 77 MMHG | SYSTOLIC BLOOD PRESSURE: 128 MMHG | TEMPERATURE: 98 F | HEART RATE: 77 BPM | HEIGHT: 61 IN

## 2020-06-30 DIAGNOSIS — Z13.220 ENCOUNTER FOR LIPID SCREENING FOR CARDIOVASCULAR DISEASE: ICD-10-CM

## 2020-06-30 DIAGNOSIS — E66.9 OBESITY (BMI 30-39.9): ICD-10-CM

## 2020-06-30 DIAGNOSIS — E55.9 VITAMIN D INSUFFICIENCY: ICD-10-CM

## 2020-06-30 DIAGNOSIS — Z13.6 ENCOUNTER FOR LIPID SCREENING FOR CARDIOVASCULAR DISEASE: ICD-10-CM

## 2020-06-30 DIAGNOSIS — E04.1 THYROID NODULE: ICD-10-CM

## 2020-06-30 DIAGNOSIS — Z12.31 ENCOUNTER FOR SCREENING MAMMOGRAM FOR BREAST CANCER: ICD-10-CM

## 2020-06-30 DIAGNOSIS — I34.1 MITRAL VALVE PROLAPSE: ICD-10-CM

## 2020-06-30 DIAGNOSIS — Z00.00 ENCOUNTER FOR ANNUAL HEALTH EXAMINATION: ICD-10-CM

## 2020-06-30 DIAGNOSIS — E03.9 HYPOTHYROIDISM, UNSPECIFIED TYPE: ICD-10-CM

## 2020-06-30 DIAGNOSIS — Z00.00 ENCOUNTER FOR ANNUAL HEALTH EXAMINATION: Primary | ICD-10-CM

## 2020-06-30 DIAGNOSIS — M15.9 PRIMARY OSTEOARTHRITIS INVOLVING MULTIPLE JOINTS: ICD-10-CM

## 2020-06-30 PROBLEM — Z12.11 COLON CANCER SCREENING: Status: RESOLVED | Noted: 2018-08-30 | Resolved: 2020-06-30

## 2020-06-30 PROBLEM — Z12.11 SPECIAL SCREENING FOR MALIGNANT NEOPLASMS, COLON: Status: RESOLVED | Noted: 2018-08-30 | Resolved: 2020-06-30

## 2020-06-30 PROBLEM — M15.0 PRIMARY OSTEOARTHRITIS INVOLVING MULTIPLE JOINTS: Status: ACTIVE | Noted: 2020-06-30

## 2020-06-30 LAB
ALBUMIN SERPL BCP-MCNC: 4.1 G/DL (ref 3.5–5.2)
ALP SERPL-CCNC: 89 U/L (ref 55–135)
ALT SERPL W/O P-5'-P-CCNC: 27 U/L (ref 10–44)
ANION GAP SERPL CALC-SCNC: 11 MMOL/L (ref 8–16)
AST SERPL-CCNC: 27 U/L (ref 10–40)
BASOPHILS # BLD AUTO: 0.05 K/UL (ref 0–0.2)
BASOPHILS NFR BLD: 0.9 % (ref 0–1.9)
BILIRUB SERPL-MCNC: 0.7 MG/DL (ref 0.1–1)
BUN SERPL-MCNC: 11 MG/DL (ref 8–23)
CALCIUM SERPL-MCNC: 9.3 MG/DL (ref 8.7–10.5)
CHLORIDE SERPL-SCNC: 102 MMOL/L (ref 95–110)
CHOLEST SERPL-MCNC: 208 MG/DL (ref 120–199)
CHOLEST/HDLC SERPL: 2.8 {RATIO} (ref 2–5)
CO2 SERPL-SCNC: 25 MMOL/L (ref 23–29)
CREAT SERPL-MCNC: 1 MG/DL (ref 0.5–1.4)
DIFFERENTIAL METHOD: NORMAL
EOSINOPHIL # BLD AUTO: 0.1 K/UL (ref 0–0.5)
EOSINOPHIL NFR BLD: 2.3 % (ref 0–8)
ERYTHROCYTE [DISTWIDTH] IN BLOOD BY AUTOMATED COUNT: 12.6 % (ref 11.5–14.5)
EST. GFR  (AFRICAN AMERICAN): >60 ML/MIN/1.73 M^2
EST. GFR  (NON AFRICAN AMERICAN): >60 ML/MIN/1.73 M^2
GLUCOSE SERPL-MCNC: 156 MG/DL (ref 70–110)
HCT VFR BLD AUTO: 42.2 % (ref 37–48.5)
HDLC SERPL-MCNC: 75 MG/DL (ref 40–75)
HDLC SERPL: 36.1 % (ref 20–50)
HGB BLD-MCNC: 14 G/DL (ref 12–16)
IMM GRANULOCYTES # BLD AUTO: 0.01 K/UL (ref 0–0.04)
IMM GRANULOCYTES NFR BLD AUTO: 0.2 % (ref 0–0.5)
LDLC SERPL CALC-MCNC: 109.6 MG/DL (ref 63–159)
LYMPHOCYTES # BLD AUTO: 2.6 K/UL (ref 1–4.8)
LYMPHOCYTES NFR BLD: 45.8 % (ref 18–48)
MCH RBC QN AUTO: 28.7 PG (ref 27–31)
MCHC RBC AUTO-ENTMCNC: 33.2 G/DL (ref 32–36)
MCV RBC AUTO: 87 FL (ref 82–98)
MONOCYTES # BLD AUTO: 0.4 K/UL (ref 0.3–1)
MONOCYTES NFR BLD: 6.8 % (ref 4–15)
NEUTROPHILS # BLD AUTO: 2.5 K/UL (ref 1.8–7.7)
NEUTROPHILS NFR BLD: 44 % (ref 38–73)
NONHDLC SERPL-MCNC: 133 MG/DL
NRBC BLD-RTO: 0 /100 WBC
PLATELET # BLD AUTO: 260 K/UL (ref 150–350)
PMV BLD AUTO: 9.8 FL (ref 9.2–12.9)
POTASSIUM SERPL-SCNC: 4.2 MMOL/L (ref 3.5–5.1)
PROT SERPL-MCNC: 7.9 G/DL (ref 6–8.4)
RBC # BLD AUTO: 4.87 M/UL (ref 4–5.4)
SODIUM SERPL-SCNC: 138 MMOL/L (ref 136–145)
T3 SERPL-MCNC: 129 NG/DL (ref 60–180)
T4 FREE SERPL-MCNC: 1.02 NG/DL (ref 0.71–1.51)
TRIGL SERPL-MCNC: 117 MG/DL (ref 30–150)
TSH SERPL DL<=0.005 MIU/L-ACNC: 2.4 UIU/ML (ref 0.4–4)
WBC # BLD AUTO: 5.61 K/UL (ref 3.9–12.7)

## 2020-06-30 PROCEDURE — 84480 ASSAY TRIIODOTHYRONINE (T3): CPT

## 2020-06-30 PROCEDURE — 84443 ASSAY THYROID STIM HORMONE: CPT

## 2020-06-30 PROCEDURE — 99214 PR OFFICE/OUTPT VISIT, EST, LEVL IV, 30-39 MIN: ICD-10-PCS | Mod: S$PBB,,, | Performed by: INTERNAL MEDICINE

## 2020-06-30 PROCEDURE — 99999 PR PBB SHADOW E&M-EST. PATIENT-LVL III: CPT | Mod: PBBFAC,,, | Performed by: INTERNAL MEDICINE

## 2020-06-30 PROCEDURE — 84439 ASSAY OF FREE THYROXINE: CPT

## 2020-06-30 PROCEDURE — 36415 COLL VENOUS BLD VENIPUNCTURE: CPT | Mod: PO

## 2020-06-30 PROCEDURE — 99213 OFFICE O/P EST LOW 20 MIN: CPT | Mod: PBBFAC,PO | Performed by: INTERNAL MEDICINE

## 2020-06-30 PROCEDURE — 85025 COMPLETE CBC W/AUTO DIFF WBC: CPT

## 2020-06-30 PROCEDURE — 99214 OFFICE O/P EST MOD 30 MIN: CPT | Mod: S$PBB,,, | Performed by: INTERNAL MEDICINE

## 2020-06-30 PROCEDURE — 80053 COMPREHEN METABOLIC PANEL: CPT

## 2020-06-30 PROCEDURE — 80061 LIPID PANEL: CPT

## 2020-06-30 PROCEDURE — 99999 PR PBB SHADOW E&M-EST. PATIENT-LVL III: ICD-10-PCS | Mod: PBBFAC,,, | Performed by: INTERNAL MEDICINE

## 2020-06-30 RX ORDER — ACETAMINOPHEN 325 MG/1
325 TABLET ORAL EVERY 6 HOURS PRN
COMMUNITY

## 2020-06-30 RX ORDER — DICLOFENAC SODIUM 10 MG/G
2 GEL TOPICAL 4 TIMES DAILY
Qty: 100 G | Refills: 1 | Status: SHIPPED | OUTPATIENT
Start: 2020-06-30 | End: 2020-09-01

## 2020-07-01 ENCOUNTER — HOSPITAL ENCOUNTER (OUTPATIENT)
Dept: RADIOLOGY | Facility: HOSPITAL | Age: 60
Discharge: HOME | End: 2020-07-01
Attending: INTERNAL MEDICINE
Payer: MEDICAID

## 2020-07-01 DIAGNOSIS — E04.1 THYROID NODULE: ICD-10-CM

## 2020-07-01 DIAGNOSIS — E03.9 HYPOTHYROIDISM, UNSPECIFIED TYPE: ICD-10-CM

## 2020-07-01 PROCEDURE — 76536 US SOFT TISSUE HEAD NECK THYROID: ICD-10-PCS | Mod: 26,,, | Performed by: RADIOLOGY

## 2020-07-01 PROCEDURE — 76536 US EXAM OF HEAD AND NECK: CPT | Mod: TC,PO

## 2020-07-01 PROCEDURE — 76536 US EXAM OF HEAD AND NECK: CPT | Mod: 26,,, | Performed by: RADIOLOGY

## 2020-07-02 DIAGNOSIS — R73.9 HYPERGLYCEMIA: Primary | ICD-10-CM

## 2020-07-02 NOTE — PROGRESS NOTES
Results have been released via [employer]. Please verify that these have been viewed by patient. If not, please call patient with results.    Please schedule the following orders:  a1c    I have sent a message to them with the following interpretation (see below).    I have reviewed your recent blood work.     Your complete blood count is within normal limits.    Your metabolic panel which shows your electrolytes, glucose, kidney and liver function is within normal limits with exception of elevated glucose. We need to check an A1C to check for diabetes.    Your cholesterol is slightly elevated. Continue to work on diet and exercise. No medication needed at this time.    Thyroid studies are within normal limits.

## 2020-07-06 NOTE — PROGRESS NOTES
Assessment/Plan:    Hypothyroidism  Lab Results   Component Value Date    TSH 2.357 06/21/2018   -due for repeat TSH  -remains on levothyroxine 50 mcg    Thyroid nodule  -hx of thyroid nodules in 2014, did not meet FNA criteria  -never followed up with US  -patient noticing fullness in neck  -repeat thyroid us and thyroid studies ordered    Vitamin D insufficiency  -most recent vitamin D wnl  -currently on vit d3 50k twice weekly    Encounter for annual health examination  Complete history and physical was completed today.  Complete and thorough medication reconciliation was performed.  Discussed risks and benefits of medications.  Advised patient on orders and health maintenance.  We discussed old records and old labs if available.  Will request any records not available through epic.  Continue current medications listed on your summary sheet.    Mitral valve prolapse  -reports hx of MVP  -not followed by cardiology  -previously on beta blockers but taken off  -asymptomatic    Primary osteoarthritis involving multiple joints  -mostly involving hands  -has PRN voltaren PO  -will also provide topical voltaren to use alternatively    _____________________________________________________________________________________________________________________________________________________    Orders this visit:    Encounter for annual health examination  -     CBC auto differential; Future; Expected date: 06/30/2020  -     Comprehensive metabolic panel; Standing  -     Lipid Panel; Standing    Hypothyroidism, unspecified type  -     US Soft Tissue Head Neck Thyroid; Future; Expected date: 06/30/2020  -     T4, free; Future; Expected date: 06/30/2020  -     TSH; Standing  -     T3; Future; Expected date: 06/30/2020    Thyroid nodule  -     US Soft Tissue Head Neck Thyroid; Future; Expected date: 06/30/2020    Encounter for lipid screening for cardiovascular disease  -     Lipid Panel; Standing    Vitamin D  insufficiency    Encounter for screening mammogram for breast cancer  -     Cancel: Mammo Digital Screening Bilat; Future; Expected date: 06/30/2020    Primary osteoarthritis involving multiple joints  -     diclofenac sodium (VOLTAREN) 1 % Gel; Apply 2 g topically 4 (four) times daily.  Dispense: 100 g; Refill: 1    Obesity (BMI 30-39.9)    Mitral valve prolapse      Follow up in about 6 months (around 12/30/2020).    Philly Mcclellan MD  _____________________________________________________________________________________________________________________________________________________    HPI:    Patient is in clinic today as an established patient of clinic, new to me, here to establish care.  Patient is a 59-year-old  female with a past medical history of hypothyroidism, thyroid nodules, osteoarthritis, mitral valve prolapse and vitamin-D insufficiency.    Previous PCP:  Dr. Ramu Snyder    Hypothyroidism/Thyroid nodules:  Chronic history of hypothyroidism.  Remains on levothyroxine.  Has remain on the same dose for quite some time.  Also reports a history of thyroid nodules.  Have never required FNA biopsy in the past.  Due for repeat ultrasound.  Patient does report some fullness in the neck recently.  Denies dysphagia.    Osteoarthritis:  Involving mostly hands.  Denies swelling or joint deformity.  Denies overlying erythema.  Has p.o. Voltaren which she uses p.r.n..    Patient with no new complaints today.  Routine labs ordered.  Plans to have mammogram through gyn.    Past Medical History:  Past Medical History:   Diagnosis Date    Arthritis, lumbar spine     hands    General anesthetics causing adverse effect in therapeutic use     following breast rediuct, hard time awakening  also had anxiety rxn to lidocain in dental ofc    GERD (gastroesophageal reflux disease)     Hypothyroidism 10/8/2014    Maternal anesthesia complication     epidural for 1st child went up instead of down; required  intubation    Obesity (BMI 30-39.9) 10/8/2014    Thyroid disease     Thyroid nodule 10/8/2014     Past Surgical History:   Procedure Laterality Date    BREAST BIOPSY Left     b9    c-sections x2      COLONOSCOPY  9/25/2012         COLONOSCOPY N/A 8/30/2018    Procedure: COLONOSCOPY;  Surgeon: Francisco Mcclain MD;  Location: [facility];  Service: Endoscopy;  Laterality: N/A;    hysteroscopy with polypectomy      INCISIONAL BREAST BIOPSY Left 1996    benign; done at same time as reduction    TOTAL REDUCTION MAMMOPLASTY Bilateral 1996     Review of patient's allergies indicates:   Allergen Reactions    Duricef [cefadroxil] Hives     Social History     Tobacco Use    Smoking status: Never Smoker    Smokeless tobacco: Never Used   Substance Use Topics    Alcohol use: Yes     Comment: 2/ month    Drug use: No     Family History   Problem Relation Age of Onset    Diabetes Father     Cirrhosis Father     Breast cancer Maternal Aunt     Breast cancer Paternal Aunt     Brain cancer Mother     Early death Mother 51        brain tumor    Cancer Mother         breast; brain    Heart disease Sister         chf; cad;    Hypertension Sister     Heart disease Brother         arrhyth    Hypertension Sister     Heart disease Brother         mi    Heart disease Brother         mi    Diabetes Brother     Macular degeneration Brother     Ovarian cancer Neg Hx      Current Outpatient Medications on File Prior to Visit   Medication Sig Dispense Refill    acetaminophen (TYLENOL) 325 MG tablet Take 325 mg by mouth every 6 (six) hours as needed for Pain.      calcium carbonate (OS-ISH) 600 mg calcium (1,500 mg) Tab Take 1,200 mg by mouth 2 (two) times daily with meals.      diclofenac (VOLTAREN) 50 MG EC tablet Take 1 tablet (50 mg total) by mouth 2 (two) times daily. 90 tablet 4    ergocalciferol (VITAMIN D2) 50,000 unit Cap Take 1 capsule (50,000 Units total) by mouth twice a week. 24 capsule 4     "levothyroxine (EUTHYROX) 50 MCG tablet Take 1 tablet (50 mcg total) by mouth once daily. 30 tablet 1     No current facility-administered medications on file prior to visit.        Review of Systems   Constitutional: Negative for chills, diaphoresis, fatigue and fever.   HENT: Negative for congestion, ear pain, postnasal drip, sinus pain and sore throat.    Eyes: Negative for pain and redness.   Respiratory: Negative for cough, chest tightness and shortness of breath.    Cardiovascular: Negative for chest pain and leg swelling.   Gastrointestinal: Negative for abdominal pain, constipation, diarrhea, nausea and vomiting.   Genitourinary: Negative for dysuria and hematuria.   Musculoskeletal: Positive for arthralgias. Negative for joint swelling.   Skin: Negative for rash.   Neurological: Negative for dizziness, syncope and headaches.       Vitals:    06/30/20 0937   BP: 128/77   Pulse: 77   Temp: 98.3 °F (36.8 °C)   Weight: 81.6 kg (180 lb)   Height: 5' 1" (1.549 m)       Wt Readings from Last 3 Encounters:   06/30/20 81.6 kg (180 lb)   03/02/20 77.6 kg (171 lb)   11/14/19 76.7 kg (169 lb)       Physical Exam  Constitutional:       General: She is not in acute distress.     Appearance: Normal appearance. She is well-developed.   HENT:      Head: Normocephalic and atraumatic.   Eyes:      Conjunctiva/sclera: Conjunctivae normal.   Neck:      Musculoskeletal: Normal range of motion and neck supple.   Cardiovascular:      Rate and Rhythm: Normal rate and regular rhythm.      Pulses: Normal pulses.      Heart sounds: Normal heart sounds. No murmur.   Pulmonary:      Effort: Pulmonary effort is normal. No respiratory distress.      Breath sounds: Normal breath sounds.   Abdominal:      General: Bowel sounds are normal. There is no distension.      Palpations: Abdomen is soft.      Tenderness: There is no abdominal tenderness.   Musculoskeletal: Normal range of motion.   Skin:     General: Skin is warm and dry.      " Findings: No rash.   Neurological:      General: No focal deficit present.      Mental Status: She is alert and oriented to person, place, and time.

## 2020-07-12 ENCOUNTER — PATIENT OUTREACH (OUTPATIENT)
Dept: ADMINISTRATIVE | Facility: OTHER | Age: 60
End: 2020-07-12

## 2020-07-12 DIAGNOSIS — Z12.31 ENCOUNTER FOR SCREENING MAMMOGRAM FOR MALIGNANT NEOPLASM OF BREAST: Primary | ICD-10-CM

## 2020-07-13 ENCOUNTER — HOSPITAL ENCOUNTER (OUTPATIENT)
Dept: RADIOLOGY | Facility: HOSPITAL | Age: 60
Discharge: HOME | End: 2020-07-13
Attending: SPECIALIST
Payer: MEDICAID

## 2020-07-13 ENCOUNTER — OFFICE VISIT (OUTPATIENT)
Dept: OBSTETRICS AND GYNECOLOGY | Facility: CLINIC | Age: 60
End: 2020-07-13
Payer: MEDICAID

## 2020-07-13 VITALS
HEIGHT: 61 IN | SYSTOLIC BLOOD PRESSURE: 154 MMHG | DIASTOLIC BLOOD PRESSURE: 74 MMHG | WEIGHT: 180.13 LBS | BODY MASS INDEX: 34.01 KG/M2

## 2020-07-13 DIAGNOSIS — Z12.31 VISIT FOR SCREENING MAMMOGRAM: ICD-10-CM

## 2020-07-13 DIAGNOSIS — Z12.4 ENCOUNTER FOR PAP SMEAR OF CERVIX WITH HPV DNA COTESTING: ICD-10-CM

## 2020-07-13 DIAGNOSIS — Z12.31 VISIT FOR SCREENING MAMMOGRAM: Primary | ICD-10-CM

## 2020-07-13 PROCEDURE — 77067 SCR MAMMO BI INCL CAD: CPT | Mod: TC,PN

## 2020-07-13 PROCEDURE — 99213 PR OFFICE/OUTPT VISIT, EST, LEVL III, 20-29 MIN: ICD-10-PCS | Mod: S$PBB,,, | Performed by: SPECIALIST

## 2020-07-13 PROCEDURE — 77063 BREAST TOMOSYNTHESIS BI: CPT | Mod: 26,,, | Performed by: RADIOLOGY

## 2020-07-13 PROCEDURE — 77067 SCR MAMMO BI INCL CAD: CPT | Mod: 26,,, | Performed by: RADIOLOGY

## 2020-07-13 PROCEDURE — 88142 CYTOPATH C/V THIN LAYER: CPT

## 2020-07-13 PROCEDURE — 87624 HPV HI-RISK TYP POOLED RSLT: CPT

## 2020-07-13 PROCEDURE — 99999 PR PBB SHADOW E&M-EST. PATIENT-LVL III: ICD-10-PCS | Mod: PBBFAC,,, | Performed by: SPECIALIST

## 2020-07-13 PROCEDURE — 99213 OFFICE O/P EST LOW 20 MIN: CPT | Mod: S$PBB,,, | Performed by: SPECIALIST

## 2020-07-13 PROCEDURE — 99213 OFFICE O/P EST LOW 20 MIN: CPT | Mod: PBBFAC,PN | Performed by: SPECIALIST

## 2020-07-13 PROCEDURE — 99999 PR PBB SHADOW E&M-EST. PATIENT-LVL III: CPT | Mod: PBBFAC,,, | Performed by: SPECIALIST

## 2020-07-13 PROCEDURE — 77063 MAMMO DIGITAL SCREENING BILAT WITH TOMOSYNTHESIS_CAD: ICD-10-PCS | Mod: 26,,, | Performed by: RADIOLOGY

## 2020-07-13 PROCEDURE — 77067 MAMMO DIGITAL SCREENING BILAT WITH TOMOSYNTHESIS_CAD: ICD-10-PCS | Mod: 26,,, | Performed by: RADIOLOGY

## 2020-07-13 NOTE — PROGRESS NOTES
58 yo WF presents for annual gyn evaluation and mammo screening. No overt gyn complaints    Past Medical History:   Diagnosis Date    Arthritis, lumbar spine     hands    General anesthetics causing adverse effect in therapeutic use     following breast rediuct, hard time awakening  also had anxiety rxn to lidocain in dental ofc    GERD (gastroesophageal reflux disease)     Hypothyroidism 10/8/2014    Maternal anesthesia complication     epidural for 1st child went up instead of down; required intubation    Obesity (BMI 30-39.9) 10/8/2014    Thyroid disease     Thyroid nodule 10/8/2014       Past Surgical History:   Procedure Laterality Date    BREAST BIOPSY Left     b9    c-sections x2      COLONOSCOPY  9/25/2012         COLONOSCOPY N/A 8/30/2018    Procedure: COLONOSCOPY;  Surgeon: Francisco Mcclain MD;  Location: [facility];  Service: Endoscopy;  Laterality: N/A;    hysteroscopy with polypectomy      INCISIONAL BREAST BIOPSY Left 1996    benign; done at same time as reduction    TOTAL REDUCTION MAMMOPLASTY Bilateral 1996       Family History   Problem Relation Age of Onset    Diabetes Father     Cirrhosis Father     Breast cancer Maternal Aunt     Breast cancer Paternal Aunt     Brain cancer Mother     Early death Mother 51        brain tumor    Cancer Mother         breast; brain    Heart disease Sister         chf; cad;    Hypertension Sister     Heart disease Brother         arrhyth    Hypertension Sister     Heart disease Brother         mi    Heart disease Brother         mi    Diabetes Brother     Macular degeneration Brother     Ovarian cancer Neg Hx        Social History     Socioeconomic History    Marital status:      Spouse name: Not on file    Number of children: Not on file    Years of education: Not on file    Highest education level: Not on file   Occupational History    Not on file   Social Needs    Financial resource strain: Not on file    Food  insecurity     Worry: Not on file     Inability: Not on file    Transportation needs     Medical: Not on file     Non-medical: Not on file   Tobacco Use    Smoking status: Never Smoker    Smokeless tobacco: Never Used   Substance and Sexual Activity    Alcohol use: Yes     Comment: 2/ month    Drug use: No    Sexual activity: Yes     Birth control/protection: Post-menopausal   Lifestyle    Physical activity     Days per week: Not on file     Minutes per session: Not on file    Stress: Not on file   Relationships    Social connections     Talks on phone: Not on file     Gets together: Not on file     Attends Congregational service: Not on file     Active member of club or organization: Not on file     Attends meetings of clubs or organizations: Not on file     Relationship status: Not on file   Other Topics Concern    Not on file   Social History Narrative    Not on file       Current Outpatient Medications   Medication Sig Dispense Refill    acetaminophen (TYLENOL) 325 MG tablet Take 325 mg by mouth every 6 (six) hours as needed for Pain.      calcium carbonate (OS-ISH) 600 mg calcium (1,500 mg) Tab Take 1,200 mg by mouth 2 (two) times daily with meals.      diclofenac (VOLTAREN) 50 MG EC tablet Take 1 tablet (50 mg total) by mouth 2 (two) times daily. 90 tablet 4    diclofenac sodium (VOLTAREN) 1 % Gel Apply 2 g topically 4 (four) times daily. 100 g 1    ergocalciferol (VITAMIN D2) 50,000 unit Cap Take 1 capsule (50,000 Units total) by mouth twice a week. 24 capsule 4    levothyroxine (EUTHYROX) 50 MCG tablet Take 1 tablet (50 mcg total) by mouth once daily. 30 tablet 1     No current facility-administered medications for this visit.        Review of patient's allergies indicates:   Allergen Reactions    Duricef [cefadroxil] Hives       Review of System:   General: no chills, fever, night sweats, weight gain or weight loss  Psychological: no depression or suicidal ideation  Breasts: no new or changing  breast lumps, nipple discharge or masses.  Respiratory: no cough, shortness of breath, or wheezing  Cardiovascular: no chest pain or dyspnea on exertion  Gastrointestinal: no abdominal pain, change in bowel habits, or black or bloody stools  Genito-Urinary: no incontinence, urinary frequency/urgency or vulvar/vaginal symptoms, pelvic pain or abnormal vaginal bleeding.  Musculoskeletal: no gait disturbance or muscular weakness    Breasts symmetrical Nt No masses, no d/c, no adenopathy                                            General Appearance    A and O x 4, Cooperative, no distress   Breasts    Abdomen   Symmetrical, no masses, no discharge, skin changes , erythema or retraction. No adenopathy  Soft, non-tender, bowel sounds active all four quadrants,  no masses, no organomegaly    Genitourinary:   External rectal exam shows no thrombosed external hemorrhoids.   Pelvic exam was performed with patient supine.  No labial fusion.  There is no rash, lesion or injury on the right labia.  There is no rash, lesion or injury on the left labia.  No bleeding and no signs of injury around the vaginal introitus, urethra is without lesions and well supported. The cervix is visualized with no discharge, lesions or friability.  No vaginal discharge found.     Extremities: Extremities normal, atraumatic, no cyanosis or edema                         Plan Mammo screening today           BSE monthly           COUNSELING:  The patient was counseled today on osteoporosis prevention, calcium supplementation, and regular weight bearing exercise. The patient was also counseled today on ACS PAP guidelines, with recommendations for screening PAP smear age 21-65 every 3-5 yearsunless their uterus, cervix, and ovaries were removed for benign reasons. Screening with HPV testing ages 30-65 every 5 years as an alternative. The patient was also counseled regarding monthly breast self-examination, routine STD screening for at-risk populations,  prophylactic immunizations for transmitted infections such as HPV, Pertussis, or Influenza as appropriate, and yearly mammograms when indicated by ACS guidelines. She was advised to see her primary care physician for all other health maintenance.   FOLLOW-UP with me for next routine visit.

## 2020-07-17 ENCOUNTER — LAB VISIT (OUTPATIENT)
Dept: LAB | Facility: HOSPITAL | Age: 60
End: 2020-07-17
Attending: INTERNAL MEDICINE
Payer: MEDICAID

## 2020-07-17 DIAGNOSIS — R73.9 HYPERGLYCEMIA: ICD-10-CM

## 2020-07-17 PROCEDURE — 36415 COLL VENOUS BLD VENIPUNCTURE: CPT | Mod: PO

## 2020-07-17 PROCEDURE — 83036 HEMOGLOBIN GLYCOSYLATED A1C: CPT

## 2020-07-18 LAB
ESTIMATED AVG GLUCOSE: 160 MG/DL (ref 68–131)
HBA1C MFR BLD HPLC: 7.2 % (ref 4–5.6)
HPV HR 12 DNA SPEC QL NAA+PROBE: NEGATIVE
HPV16 AG SPEC QL: NEGATIVE
HPV18 DNA SPEC QL NAA+PROBE: NEGATIVE

## 2020-07-21 LAB
FINAL PATHOLOGIC DIAGNOSIS: NORMAL
Lab: NORMAL

## 2020-07-22 DIAGNOSIS — E11.9 TYPE 2 DIABETES MELLITUS WITHOUT COMPLICATION, WITHOUT LONG-TERM CURRENT USE OF INSULIN: Primary | ICD-10-CM

## 2020-07-22 RX ORDER — LANCETS
EACH MISCELLANEOUS
Qty: 100 EACH | Status: SHIPPED | OUTPATIENT
Start: 2020-07-22 | End: 2023-02-17 | Stop reason: SDUPTHER

## 2020-07-22 RX ORDER — DEXTROSE 4 G
TABLET,CHEWABLE ORAL
Qty: 1 EACH | Status: SHIPPED | OUTPATIENT
Start: 2020-07-22

## 2020-07-22 RX ORDER — METFORMIN HYDROCHLORIDE 500 MG/1
500 TABLET ORAL
Qty: 90 TABLET | Refills: 1 | Status: SHIPPED | OUTPATIENT
Start: 2020-07-22 | End: 2020-10-26 | Stop reason: ALTCHOICE

## 2020-07-22 NOTE — PROGRESS NOTES
Results have been released via [facility]. Please verify that these have been viewed by patient. If not, please call patient with results.    Please schedule the following orders:  A1C and microalbumin in 3 months    I have sent a message to them with the following interpretation (see below).    I have reviewed your recent lab work.    Recent A1c in diabetes range.  Diabetes considered to be an A1c greater than 6.5.  Your recent A1c was 7.2.  Once diagnosis with diabetes, goal A1c is less than 7.  Given this, I suggest that we start you on diabetes medications.  I will also place a referral to our Diabetes Education program so that you can learn more about this diagnosis and its treatment.  You will need to start a low carbohydrate based diet.  This again will be to further discuss in diabetes clinic, however pure interested I can send you some reading material on a suggested diet.    We will plan to start you on a medication called metformin.  This will be called in to your pharmacy.    We will need to repeat your A1c in 3 months to make sure that it is improving.    Please do not hesitate to call or message with any additional questions or concerns.

## 2020-07-23 ENCOUNTER — PATIENT OUTREACH (OUTPATIENT)
Dept: ADMINISTRATIVE | Facility: OTHER | Age: 60
End: 2020-07-23

## 2020-07-23 NOTE — PROGRESS NOTES
Requested updates within Care Everywhere.  Patient's chart was reviewed for overdue [facility] topics.  Immunizations reconciled.

## 2020-07-27 ENCOUNTER — CLINICAL SUPPORT (OUTPATIENT)
Dept: DIABETES | Facility: CLINIC | Age: 60
End: 2020-07-27
Payer: MEDICAID

## 2020-07-27 VITALS — WEIGHT: 180.31 LBS | BODY MASS INDEX: 34.04 KG/M2 | HEIGHT: 61 IN

## 2020-07-27 DIAGNOSIS — E11.9 TYPE 2 DIABETES MELLITUS WITHOUT COMPLICATION, WITHOUT LONG-TERM CURRENT USE OF INSULIN: ICD-10-CM

## 2020-07-27 PROCEDURE — G0108 DIAB MANAGE TRN  PER INDIV: HCPCS | Mod: PBBFAC,PO | Performed by: DIETITIAN, REGISTERED

## 2020-07-27 PROCEDURE — 99999 PR PBB SHADOW E&M-EST. PATIENT-LVL II: ICD-10-PCS | Mod: PBBFAC,,, | Performed by: DIETITIAN, REGISTERED

## 2020-07-27 PROCEDURE — 99999 PR PBB SHADOW E&M-EST. PATIENT-LVL II: CPT | Mod: PBBFAC,,, | Performed by: DIETITIAN, REGISTERED

## 2020-07-27 PROCEDURE — 99212 OFFICE O/P EST SF 10 MIN: CPT | Mod: PBBFAC,PO | Performed by: DIETITIAN, REGISTERED

## 2020-07-27 NOTE — PROGRESS NOTES
Diabetes Education  Author: Neeru Butts RD, CDE  Date: 7/27/2020    Diabetes Care Management Summary  Diabetes Education Record Assessment/Progress: Initial  Current Diabetes Risk Level: Low       Diabetes Type  Diabetes Type : Type II    Diabetes History  Diabetes Diagnosis: 0-1 year(Diagnosed based on 7/17/2020 Hgb A1c of 7.2%)  Current Treatment: Oral Medication(Metformin 500 mg once daily prescribed--patient has not started taking, wanted to wait until seen in [facility].)  Reviewed Problem List with Patient: Yes    Health Maintenance was reviewed today with patient. Discussed with patient importance of routine eye exams, foot exams/foot care, blood work (i.e.: A1c, microalbumin, and lipid), dental visits, yearly flu vaccine, and pneumonia vaccine as indicated by PCP. Patient verbalized understanding.     Health Maintenance Topics with due status: Not Due       Topic Last Completion Date    Colorectal Cancer Screening 08/30/2018    Influenza Vaccine 10/09/2019    Lipid Panel 06/30/2020    Cervical Cancer Screening 07/13/2020    Mammogram 07/13/2020    Hemoglobin A1c 07/17/2020     Health Maintenance Due   Topic Date Due    Foot Exam  08/20/1968    Eye Exam  08/20/1968    Urine Microalbumin  08/20/1968    HIV Screening  08/20/1973    TETANUS VACCINE  08/20/1976    Pneumococcal Vaccine (Medium Risk) (1 of 1 - PPSV23) 08/20/1977    Low Dose Statin  08/20/1979    Shingles Vaccine (1 of 2) 08/20/2008     Nutrition  Meal Planning: skipping meals, diet drinks, eats out seldom(Stopped drinking regular sodas--has reduced animal protein in diet as she feels this helps with glucose and lipid control.  Eats when hungry which often means skipping meals, somewhat erratic eating schedule.)  What type of sweetener do you use?: honey  What type of beverages do you drink?: diet soda/tea  Meal Plan 24 Hour Recall - Breakfast: usually skips.  Occasionally will eat English muffin with egg, cheese, ham.  Coffee  Meal Plan 24 Hour Recall - Lunch: ham/turkey sandwich (negrete, lettuce/tomato), water  Meal Plan 24 Hour Recall - Dinner: Tossed salad (lettuce, tomato, olive, onions, salt/pepper with negrete dressing), baked potato, Coke Zero OR Takeout from Chinese restaurant: fried rice, egg roll, sweet and sour pork  Meal Plan 24 Hour Recall - Snack: M&M's    Monitoring   Monitoring: Other(True Metrix--just picked up from pharmacy last week.  Did not bring to appt. States she beleives she knows how to use glucometer.)  Self Monitoring : Has not yet started self glucose monitoring.  Blood Glucose Logs: No  Do you use a personal continuous glucose monitor?: No  In the last month, how often have you had a low blood sugar reaction?: never  Can you tell when your blood sugar is too high?: no    Exercise   Exercise Type: none(Previously was bike riding with teenage grandchild for 30 minutes a few days a week but has not done so recently.)    Current Diabetes Treatment   Current Treatment: Oral Medication(Metformin 500 mg once daily prescribed--patient has not started taking, wanted to wait until seen in daibetic education.)    Social History  Preferred Learning Method: Face to Face  Primary Support: Self, Spouse(Spouse here with patient today.)  Smoking Status: Never a Smoker  Alcohol Use: Weekly    Barriers to Change  Barriers to Change: None  Learning Challenges : None    Readiness to Learn   Readiness to Learn : Acceptance    Cultural Influences  Cultural Influences: None    Diabetes Education Assessment/Progress  Diabetes Disease Process (diabetes disease process and treatment options): Discussion, Comprehends Key Points, Written Materials Provided.  Discussed recent Hgb A1c of 7.2%, diagnosis of Type 2 diabetes and goal Hgb A1c (< 7%).  Patient already has next Hgb A1c scheduled in Oct 2020.    Nutrition (Incorporating nutritional management into one's lifestyle): Discussion, Demonstrates Understanding/Competency  "(verbalizes/demonstrates), Written Materials Provided. Reviewed basic food groups with patient (carbohydrate, protein, and fat).   Carbohydrate sources reviewed in detail.  Typical food intake obtained from patient.  Practiced reading food labels with patient focusing on serving size and total carbohydrate intake (not sugar intake). Discussed telephone apps ([employer], [employer]) that patient can download to smart phone to better determine carbohydrate content of foods when dining out.  Practiced meal planning with foods typically eaten to promote balanced eating.  Discussed portion sizes.  Patient encouraged to measure food portions or can limit carbohydrate portions at meals to a "fistful".  Discussed having lean protein at all meals and to also add non starchy vegetables at lunch and dinner.  Written education information provided to patient for use at home.      Physical Activity (incorporating physical activity into one's lifestyle): Discussion, Instructed, Comprehends Key Points.  Instructed to restart aerobic exercise (biking) to goal of 30 minutes most days of the week.    Medications (states correct name, dose, onset, peak, duration, side effects & timing of meds): Discussion, Comprehends Key Points.  Patient did not start metformin--asking to work on diet and lifestyle to see if she can improve her glucose without medication.  Discussed that will need home glucose monitoring information to better determine need for metformin.    Monitoring (monitoring blood glucose/other parameters & using results): Discussion, Instructed, Comprehends Key Points, Written Materials Provided.  Begin checking glucose 1 time daily--alternating before meals and at bedtime.  Send in glucose log in 1-2 weeks for review.  If glucose levels all within normal range, can decrease frequency of checking to 3 times a week.    Acute Complications (preventing, detecting, and treating acute complications): Not " Covered/Deferred    Chronic Complications (preventing, detecting, and treating chronic complications): Discussion, Demonstrates Understanding/Competency (verbalizes/demonstrates).  Reviewed long term complications of uncontrolled diabetes.    Clinical (diabetes, other pertinent medical history, and relevant comorbidities reviewed during visit): Discussion, Comprehends Key Points.  History of obesity, hypothyroidism    Diabetes Distress and Support Systems: Discussion, Comprehends Key Points.  Both patient and spouse newly diagnosed as diabetic so are both interested in making diet and lifestyle changes.    Behavioral (readiness for change, lifestyle practices, self-care behaviors): Discussion, Comprehends Key Points.  Agreeable to make changes to eating habits and food choices.  Will work to choose more balanced meals and snacks.  Will start reading food labels for total CHO content (budget for meals and snacks provided).    Goals  Patient has selected/evaluated goals during today's session: Yes, selected  Physical Activity: Set(Aerobic exercise for 30 minutes most days of the week.)  Start Date: 07/27/20  Target Date: 10/26/20  Monitoring: Set(Check glucose once daily alternating testing times (before meals, bedtime).  If glucose levels within target range, OK to decrease frequency of monitoring to 3X/week.)  Start Date: 07/27/20  Target Date: 10/26/20    Diabetes Care Plan/Intervention  Education Plan/Intervention:   1.  Will start home glucose monitoring--chek glucose 1 time daily at alternating times (before meals, bedtime).  [facility] message sent to patient to send in glucose log for review in 1-2 weeks.  2.  Did not start metformin as prescribed by PCP on 7/17/2020--would like to make diet/lifestyle changes first.    3.  Aerobic exercise for 30 minutes at least 3 days a week.   4.  Gradual weight loss--weight loss of 5-10 lbs will improve insulin resistance.   5.  Balanced meals--patient provided with a total  CHO budget for meals and snacks. Encouraged to have lean protein and non starchy vegetables at meals.  Has recently stopped drinking regular soft drinks.     Diabetes Meal Plan  Restrictions: Restricted Carbohydrate  Carbohydrate Per Meal: 30-45g  Carbohydrate Per Snack : 7-15g    Today's Self-Management Care Plan was developed with the patient's input and is based on barriers identified during today's assessment.    The long and short-term goals in the care plan were written with the patient/caregiver's input. The patient has agreed to work toward these goals to improve her overall diabetes control.      The patient received a copy of today's self-management plan and verbalized understanding of the care plan, goals, and all of today's instructions.      The patient was encouraged to communicate with her physician and care team regarding her condition(s) and treatment.  I provided the patient with my contact information today and encouraged her to contact me via phone or patient portal as needed.     Education Units of Time   Time Spent: 60 min

## 2020-08-12 ENCOUNTER — PATIENT OUTREACH (OUTPATIENT)
Dept: ADMINISTRATIVE | Facility: HOSPITAL | Age: 60
End: 2020-08-12

## 2020-08-12 NOTE — PROGRESS NOTES
I have contacted patient to schedule over due diabetic eye exam.  Eye exam scheduled 9-28-20 with       Dr. Odilon Lomeli

## 2020-09-01 ENCOUNTER — OFFICE VISIT (OUTPATIENT)
Dept: FAMILY MEDICINE | Facility: CLINIC | Age: 60
End: 2020-09-01
Payer: MEDICAID

## 2020-09-01 ENCOUNTER — TELEPHONE (OUTPATIENT)
Dept: FAMILY MEDICINE | Facility: CLINIC | Age: 60
End: 2020-09-01

## 2020-09-01 VITALS
HEART RATE: 86 BPM | SYSTOLIC BLOOD PRESSURE: 134 MMHG | TEMPERATURE: 99 F | DIASTOLIC BLOOD PRESSURE: 78 MMHG | BODY MASS INDEX: 32.85 KG/M2 | WEIGHT: 174 LBS | HEIGHT: 61 IN

## 2020-09-01 DIAGNOSIS — E11.9 TYPE 2 DIABETES MELLITUS WITHOUT COMPLICATION, WITHOUT LONG-TERM CURRENT USE OF INSULIN: ICD-10-CM

## 2020-09-01 DIAGNOSIS — T78.3XXD ANGIOEDEMA, SUBSEQUENT ENCOUNTER: ICD-10-CM

## 2020-09-01 DIAGNOSIS — J01.00 ACUTE NON-RECURRENT MAXILLARY SINUSITIS: Primary | ICD-10-CM

## 2020-09-01 PROBLEM — T78.3XXA ANGIOEDEMA: Status: ACTIVE | Noted: 2020-09-01

## 2020-09-01 PROCEDURE — 99214 PR OFFICE/OUTPT VISIT, EST, LEVL IV, 30-39 MIN: ICD-10-PCS | Mod: S$PBB,,, | Performed by: INTERNAL MEDICINE

## 2020-09-01 PROCEDURE — 99999 PR PBB SHADOW E&M-EST. PATIENT-LVL III: CPT | Mod: PBBFAC,,, | Performed by: INTERNAL MEDICINE

## 2020-09-01 PROCEDURE — 99213 OFFICE O/P EST LOW 20 MIN: CPT | Mod: PBBFAC,PO | Performed by: INTERNAL MEDICINE

## 2020-09-01 PROCEDURE — 99999 PR PBB SHADOW E&M-EST. PATIENT-LVL III: ICD-10-PCS | Mod: PBBFAC,,, | Performed by: INTERNAL MEDICINE

## 2020-09-01 PROCEDURE — 99214 OFFICE O/P EST MOD 30 MIN: CPT | Mod: S$PBB,,, | Performed by: INTERNAL MEDICINE

## 2020-09-01 RX ORDER — AMOXICILLIN AND CLAVULANATE POTASSIUM 875; 125 MG/1; MG/1
1 TABLET, FILM COATED ORAL 2 TIMES DAILY
Qty: 20 TABLET | Refills: 0 | Status: SHIPPED | OUTPATIENT
Start: 2020-09-01 | End: 2020-09-11

## 2020-09-01 RX ORDER — CETIRIZINE HYDROCHLORIDE 10 MG/1
10 TABLET ORAL DAILY
COMMUNITY
End: 2023-12-05 | Stop reason: SDUPTHER

## 2020-09-01 RX ORDER — LANCETS 33 GAUGE
EACH MISCELLANEOUS
COMMUNITY
Start: 2020-07-22 | End: 2022-02-08

## 2020-09-01 NOTE — TELEPHONE ENCOUNTER
----- Message from Edith Arreola sent at 2020  8:58 AM CDT -----  Regardin  Patient called in requesting to have someone call her to schedule an appointment. She can reached at the contact number listed above. Please advise.

## 2020-09-01 NOTE — PROGRESS NOTES
Assessment/Plan:    Hypothyroidism  Lab Results   Component Value Date    TSH 2.403 06/30/2020   -currently on levothyroxine 50 mcg    Angioedema  -intermittent lip swelling with no identifiable trigger  -no associated urticaria  -start daily oral antihistamine  -avoid NSAID  -if symptoms persist, consider work up for hereditary angioedema    Type 2 diabetes mellitus without complication, without long-term current use of insulin  Last A1C   Lab Results   Component Value Date    HGBA1C 7.2 (H) 07/17/2020     Current meds: None  Foot exam completed- Yes  Eye exam completed- No-advised to make appt  Microalbumin completed- No-ordered  On statin therapy- no  On ACEI/ARB- no  Metformin prescribed and patient plans to start. Glucose ranging 140-180. Repeat A1C in 3 months      Acute non-recurrent maxillary sinusitis  -symptoms of congestion and ear pain >1 week  -start antibiotic for sinusitis  -also start oral antihistamine  -increase fluid intake  -follow up if symptoms persist  _____________________________________________________________________________________________________________________________________________________    Orders this visit:    Acute non-recurrent maxillary sinusitis  -     amoxicillin-clavulanate 875-125mg (AUGMENTIN) 875-125 mg per tablet; Take 1 tablet by mouth 2 (two) times daily. for 10 days  Dispense: 20 tablet; Refill: 0    Type 2 diabetes mellitus without complication, without long-term current use of insulin  -     blood sugar diagnostic Strp; Test glucose 1 x daily  Dispense: 100 strip; Refill: 11    Angioedema, subsequent encounter      Follow up in about 3 months (around 12/1/2020).    Philly Mcclellan MD  _____________________________________________________________________________________________________________________________________________________    HPI:    Patient is in clinic today as an established patient with a new complaint of upper respiratory symptoms    Symptoms include  right ear pain, congestion, coryza and sore throat. Onset of symptoms was 1 week ago, gradually improving since that time. Denies any fever or SOB. Denies myalgias or fatigue. Treatment to date: none.    Since last visit, patient diagnosed with type 2 diabetes.  She has been checking her blood sugar at home with ranges from 140 to 180.  She has been working on her diabetic diet.  Has metformin at home but has not started.  She plans to start that this week.  She will let me know if she has any issues with adverse effects of medications.  Ft exam performed today.  Eye exam needed.  Microalbumin ordered.    Patient also continues to have intermittent episodes of angioedema.  Reports lip swelling.  Unknown trigger.  Initially thought this was related to pasta that she had eaten, however it has happened several times since then.  Treated with steroids in the ER.  Denies any associated urticaria or rashes.  She has taken prednisone with improvement of symptoms, however is not on a daily antihistamine.  Denies any known family history of similar symptoms.    No other complaints today.      Past Medical History:  Past Medical History:   Diagnosis Date    Arthritis, lumbar spine     hands    General anesthetics causing adverse effect in therapeutic use     following breast rediuct, hard time awakening  also had anxiety rxn to lidocain in dental ofc    GERD (gastroesophageal reflux disease)     Hypothyroidism 10/8/2014    Maternal anesthesia complication     epidural for 1st child went up instead of down; required intubation    Obesity (BMI 30-39.9) 10/8/2014    Thyroid disease     Thyroid nodule 10/8/2014     Past Surgical History:   Procedure Laterality Date    BREAST BIOPSY Left     b9    c-sections x2      COLONOSCOPY  9/25/2012         COLONOSCOPY N/A 8/30/2018    Procedure: COLONOSCOPY;  Surgeon: Francisco Mcclain MD;  Location: [facility];  Service: Endoscopy;  Laterality: N/A;    hysteroscopy with  polypectomy      INCISIONAL BREAST BIOPSY Left 1996    benign; done at same time as reduction    TOTAL REDUCTION MAMMOPLASTY Bilateral 1996     Review of patient's allergies indicates:   Allergen Reactions    Duricef [cefadroxil] Hives     Social History     Tobacco Use    Smoking status: Never Smoker    Smokeless tobacco: Never Used   Substance Use Topics    Alcohol use: Yes     Comment: 2/ month    Drug use: No     Family History   Problem Relation Age of Onset    Diabetes Father     Cirrhosis Father     Breast cancer Maternal Aunt     Breast cancer Paternal Aunt     Brain cancer Mother     Early death Mother 51        brain tumor    Cancer Mother         breast; brain    Heart disease Sister         chf; cad;    Hypertension Sister     Heart disease Brother         arrhyth    Hypertension Sister     Heart disease Brother         mi    Heart disease Brother         mi    Diabetes Brother     Macular degeneration Brother     Ovarian cancer Neg Hx      Current Outpatient Medications on File Prior to Visit   Medication Sig Dispense Refill    acetaminophen (TYLENOL) 325 MG tablet Take 325 mg by mouth every 6 (six) hours as needed for Pain.      blood-glucose meter Misc Use as directed 1 each prn    calcium carbonate (OS-ISH) 600 mg calcium (1,500 mg) Tab Take 1,200 mg by mouth 2 (two) times daily with meals.      cetirizine (ZYRTEC) 10 MG tablet Take 10 mg by mouth once daily.      ergocalciferol (VITAMIN D2) 50,000 unit Cap Take 1 capsule (50,000 Units total) by mouth twice a week. 24 capsule 4    lancets Misc As directed 100 each prn    levothyroxine (EUTHYROX) 50 MCG tablet Take 1 tablet (50 mcg total) by mouth once daily. 30 tablet 1    metFORMIN (GLUCOPHAGE) 500 MG tablet Take 1 tablet (500 mg total) by mouth daily with breakfast. 90 tablet 1    TRUEPLUS LANCETS 33 gauge Misc use as directed      [DISCONTINUED] blood sugar diagnostic Strp Test glucose 1 x daily 50 strip prn     "[DISCONTINUED] diclofenac (VOLTAREN) 50 MG EC tablet Take 1 tablet (50 mg total) by mouth 2 (two) times daily. 90 tablet 4    [DISCONTINUED] diclofenac sodium (VOLTAREN) 1 % Gel Apply 2 g topically 4 (four) times daily. (Patient not taking: Reported on 9/1/2020) 100 g 1     No current facility-administered medications on file prior to visit.        Review of Systems   Constitutional: Negative for chills, diaphoresis, fatigue and fever.   HENT: Positive for ear pain, sinus pain and sore throat. Negative for congestion and postnasal drip.    Eyes: Negative for pain and redness.   Respiratory: Negative for cough, chest tightness and shortness of breath.    Cardiovascular: Negative for chest pain and leg swelling.   Gastrointestinal: Negative for abdominal pain, constipation, diarrhea, nausea and vomiting.   Genitourinary: Negative for dysuria and hematuria.   Musculoskeletal: Negative for arthralgias and joint swelling.   Skin: Negative for rash.   Neurological: Negative for dizziness, syncope and headaches.       Vitals:    09/01/20 1532   BP: 134/78   Pulse: 86   Temp: 99.3 °F (37.4 °C)   Weight: 78.9 kg (174 lb)   Height: 5' 1" (1.549 m)       Wt Readings from Last 3 Encounters:   09/01/20 78.9 kg (174 lb)   07/27/20 81.8 kg (180 lb 5.4 oz)   07/13/20 81.7 kg (180 lb 1.9 oz)       Physical Exam  Constitutional:       General: She is not in acute distress.     Appearance: Normal appearance. She is well-developed.   HENT:      Head: Normocephalic and atraumatic.      Right Ear: Ear canal normal.      Left Ear: Ear canal normal.      Ears:      Comments: Serous effusion b/l     Nose: Congestion present.      Mouth/Throat:      Pharynx: Posterior oropharyngeal erythema present. No oropharyngeal exudate.   Eyes:      Conjunctiva/sclera: Conjunctivae normal.   Neck:      Musculoskeletal: Normal range of motion and neck supple.   Cardiovascular:      Rate and Rhythm: Normal rate and regular rhythm.      Pulses: Normal " pulses.      Heart sounds: Normal heart sounds. No murmur.   Pulmonary:      Effort: Pulmonary effort is normal. No respiratory distress.      Breath sounds: Normal breath sounds.   Abdominal:      General: Bowel sounds are normal. There is no distension.      Palpations: Abdomen is soft.      Tenderness: There is no abdominal tenderness.   Musculoskeletal: Normal range of motion.   Skin:     General: Skin is warm and dry.      Findings: No rash.   Neurological:      General: No focal deficit present.      Mental Status: She is alert and oriented to person, place, and time.         Protective Sensation (w/ 10 gram monofilament):  Right: Intact  Left: Intact    Visual Inspection:  Normal -  Bilateral    Pedal Pulses:   Right: Present  Left: Present    Posterior tibialis:   Right:Present  Left: Present

## 2020-09-02 ENCOUNTER — TELEPHONE (OUTPATIENT)
Dept: FAMILY MEDICINE | Facility: CLINIC | Age: 60
End: 2020-09-02

## 2020-09-02 NOTE — ASSESSMENT & PLAN NOTE
Last A1C   Lab Results   Component Value Date    HGBA1C 7.2 (H) 07/17/2020     Current meds: None  Foot exam completed- Yes  Eye exam completed- No-advised to make appt  Microalbumin completed- No-ordered  On statin therapy- no  On ACEI/ARB- no  Metformin prescribed and patient plans to start. Glucose ranging 140-180. Repeat A1C in 3 months

## 2020-09-02 NOTE — TELEPHONE ENCOUNTER
----- Message from Dane Treviño sent at 9/2/2020 12:13 PM CDT -----  Regarding: Questions about her prescriptions.  Pt has questions about the quantity of her diabetic strips prescription that was sent to the pharmacy. Please call at [phone].

## 2020-09-02 NOTE — ASSESSMENT & PLAN NOTE
-intermittent lip swelling with no identifiable trigger  -no associated urticaria  -start daily oral antihistamine  -avoid NSAID  -if symptoms persist, consider work up for hereditary angioedema

## 2020-09-08 ENCOUNTER — TELEPHONE (OUTPATIENT)
Dept: DIABETES | Facility: CLINIC | Age: 60
End: 2020-09-08

## 2020-09-08 NOTE — TELEPHONE ENCOUNTER
Patient seen for DM education on 7/27/2020.  Metformin 500 mg once daily (taking in AM) initiated that day.  Patient sends in glucose logs for review.  Most readings within acceptable range--patient asking to move metformin with dinner since her fasting AM readings tend to run higher.  See glucose log below.      Has DM education follow up on 10/1/2020. Patient to call if glucose control worsens.  Last Hgb A1c 7.2% (7/17/20)--next Hgb A1c already scheduled for mid October 2020.

## 2020-09-11 ENCOUNTER — TELEPHONE (OUTPATIENT)
Dept: FAMILY MEDICINE | Facility: CLINIC | Age: 60
End: 2020-09-11

## 2020-09-11 NOTE — TELEPHONE ENCOUNTER
Call returned to patient. She states that PCP advised her to schedule appointment if she had another allergic reaction. Patient was seen/treated in ER last night. Appointment scheduled with PCP for 09/14.     She was advised to follow instructions/treatment plan from ER and if swelling was to worsen or if she had difficulty breathing to report to ER immediately. Patient verbalized understanding.

## 2020-09-11 NOTE — TELEPHONE ENCOUNTER
----- Message from Edith Arreola sent at 2020 10:35 AM CDT -----  Regardin  Patient called in stating that she is having a bad allergic reaction. She states that she was in the ER last night, and she is still experiencing a swollen face. She would like for someone to call her on the contact listed above. Please advise.

## 2020-09-14 ENCOUNTER — TELEPHONE (OUTPATIENT)
Dept: FAMILY MEDICINE | Facility: CLINIC | Age: 60
End: 2020-09-14

## 2020-09-14 ENCOUNTER — OFFICE VISIT (OUTPATIENT)
Dept: FAMILY MEDICINE | Facility: CLINIC | Age: 60
End: 2020-09-14
Payer: MEDICAID

## 2020-09-14 VITALS
SYSTOLIC BLOOD PRESSURE: 139 MMHG | HEIGHT: 61 IN | TEMPERATURE: 99 F | WEIGHT: 172 LBS | DIASTOLIC BLOOD PRESSURE: 81 MMHG | BODY MASS INDEX: 32.47 KG/M2 | HEART RATE: 76 BPM

## 2020-09-14 DIAGNOSIS — T78.3XXD ANGIOEDEMA, SUBSEQUENT ENCOUNTER: Primary | ICD-10-CM

## 2020-09-14 PROCEDURE — 99999 PR PBB SHADOW E&M-EST. PATIENT-LVL IV: CPT | Mod: PBBFAC,,, | Performed by: INTERNAL MEDICINE

## 2020-09-14 PROCEDURE — 99214 OFFICE O/P EST MOD 30 MIN: CPT | Mod: S$PBB,,, | Performed by: INTERNAL MEDICINE

## 2020-09-14 PROCEDURE — 99214 OFFICE O/P EST MOD 30 MIN: CPT | Mod: PBBFAC,PO | Performed by: INTERNAL MEDICINE

## 2020-09-14 PROCEDURE — 99214 PR OFFICE/OUTPT VISIT, EST, LEVL IV, 30-39 MIN: ICD-10-PCS | Mod: S$PBB,,, | Performed by: INTERNAL MEDICINE

## 2020-09-14 PROCEDURE — 99999 PR PBB SHADOW E&M-EST. PATIENT-LVL IV: ICD-10-PCS | Mod: PBBFAC,,, | Performed by: INTERNAL MEDICINE

## 2020-09-14 RX ORDER — MONTELUKAST SODIUM 10 MG/1
10 TABLET ORAL NIGHTLY
Qty: 30 TABLET | Refills: 11 | Status: SHIPPED | OUTPATIENT
Start: 2020-09-14 | End: 2021-07-28

## 2020-09-14 RX ORDER — PREDNISONE 20 MG/1
20 TABLET ORAL 2 TIMES DAILY
COMMUNITY
Start: 2020-09-11 | End: 2021-03-10

## 2020-09-14 NOTE — TELEPHONE ENCOUNTER
----- Message from Dane Treviño sent at 9/14/2020 12:36 PM CDT -----  Regarding: Move appt up for today.  Contact: Bessy Nunez wants to move her appointment up for today ASAP due to her rash. Please call at [phone].

## 2020-09-14 NOTE — ASSESSMENT & PLAN NOTE
-recurrent, intermittent lip swelling   -no associated urticaria in past but present during recent episode  -angioedema improved but urticaria present  -abx use prior to most recent episode. Allergy?  -C1 esterase inh pending from OSH  -taking steroid taper. Taking benadryl daily. Change to zyrtec 20 mg BID for the next several days, then decrease to 10 mg BID  -add singulair  -referral placed to allergy/immunology

## 2020-09-14 NOTE — PROGRESS NOTES
Assessment/Plan:    Angioedema  -recurrent, intermittent lip swelling   -no associated urticaria in past but present during recent episode  -angioedema improved but urticaria present  -abx use prior to most recent episode. Allergy?  -C1 esterase inh pending from OSH  -taking steroid taper. Taking benadryl daily. Change to zyrtec 20 mg BID for the next several days, then decrease to 10 mg BID  -add singulair  -referral placed to allergy/immunology    _____________________________________________________________________________________________________________________________________________________    Orders this visit:    Angioedema, subsequent encounter  -     montelukast (SINGULAIR) 10 mg tablet; Take 1 tablet (10 mg total) by mouth every evening.  Dispense: 30 tablet; Refill: 11  -     Ambulatory referral/consult to Allergy; Future; Expected date: 09/21/2020      Follow up if symptoms worsen or fail to improve.    Philly Mcclellan MD  _____________________________________________________________________________________________________________________________________________________    HPI:    Patient is in clinic today as an established patient with a new complaint of recurrent angioedema.    Patient has had multiple episodes of angioedema, involving swelling of her lips.  Has never had rash in the past up until recently.  Has been evaluated in the ER several times, always given steroid injection and Benadryl.  She has never required intubation or overnight stay.  Monitored in the ER and discharged.  Most recent episode was several days ago.  Patient was evaluated by myself earlier this month and was started on Augmentin for treatment of sinusitis.  She was also started on daily Zyrtec to prevent future angioedema episodes.  She reports that she has been compliant with daily Zyrtec.  She was on her last day of medication and developed swelling of her lips, along with an urticarial rash involving her bilateral lower  extremities and torso.  She went to [facility] where she was given a steroid injection and monitored closely.  She was discharged to follow-up with PCP on a steroid taper and Benadryl.  Since that time, swelling of her lip has resolved, however continues to have pruritic, erythematous rash, although this has improved some.  She is still on steroids and Benadryl.    Of note, during recent ED visit, C1 esterase inhibitor was sent to lab, results are still pending.  Previous work up at ED 8/15/20: Normal CBC, elevated sed rate but possibly normal for age, elevated CRP, normal C4, normal CMP.    Patient denies family history of angioedema.  Denies history of allergies in the past.    No other complaints today.    Past Medical History:  Past Medical History:   Diagnosis Date    Arthritis, lumbar spine     hands    General anesthetics causing adverse effect in therapeutic use     following breast rediuct, hard time awakening  also had anxiety rxn to lidocain in dental ofc    GERD (gastroesophageal reflux disease)     Hypothyroidism 10/8/2014    Maternal anesthesia complication     epidural for 1st child went up instead of down; required intubation    Obesity (BMI 30-39.9) 10/8/2014    Thyroid disease     Thyroid nodule 10/8/2014     Past Surgical History:   Procedure Laterality Date    BREAST BIOPSY Left     b9    c-sections x2      COLONOSCOPY  9/25/2012         COLONOSCOPY N/A 8/30/2018    Procedure: COLONOSCOPY;  Surgeon: Francisco Mcclain MD;  Location: [facility];  Service: Endoscopy;  Laterality: N/A;    hysteroscopy with polypectomy      INCISIONAL BREAST BIOPSY Left 1996    benign; done at same time as reduction    TOTAL REDUCTION MAMMOPLASTY Bilateral 1996     Review of patient's allergies indicates:   Allergen Reactions    Augmentin [amoxicillin-pot clavulanate] Swelling    Duricef [cefadroxil] Hives     Social History     Tobacco Use    Smoking status: Never Smoker    Smokeless tobacco:  Never Used   Substance Use Topics    Alcohol use: Yes     Comment: 2/ month    Drug use: No     Family History   Problem Relation Age of Onset    Diabetes Father     Cirrhosis Father     Breast cancer Maternal Aunt     Breast cancer Paternal Aunt     Brain cancer Mother     Early death Mother 51        brain tumor    Cancer Mother         breast; brain    Heart disease Sister         chf; cad;    Hypertension Sister     Heart disease Brother         arrhyth    Hypertension Sister     Heart disease Brother         mi    Heart disease Brother         mi    Diabetes Brother     Macular degeneration Brother     Ovarian cancer Neg Hx      Current Outpatient Medications on File Prior to Visit   Medication Sig Dispense Refill    acetaminophen (TYLENOL) 325 MG tablet Take 325 mg by mouth every 6 (six) hours as needed for Pain.      blood sugar diagnostic Strp Test glucose 1 x daily 100 strip 11    blood-glucose meter Misc Use as directed 1 each prn    calcium carbonate (OS-ISH) 600 mg calcium (1,500 mg) Tab Take 1,200 mg by mouth 2 (two) times daily with meals.      ergocalciferol (VITAMIN D2) 50,000 unit Cap Take 1 capsule (50,000 Units total) by mouth twice a week. 24 capsule 4    lancets Misc As directed 100 each prn    levothyroxine (EUTHYROX) 50 MCG tablet Take 1 tablet (50 mcg total) by mouth once daily. 30 tablet 1    metFORMIN (GLUCOPHAGE) 500 MG tablet Take 1 tablet (500 mg total) by mouth daily with breakfast. 90 tablet 1    predniSONE (DELTASONE) 20 MG tablet Take 20 mg by mouth 2 (two) times daily.      TRUEPLUS LANCETS 33 gauge Misc use as directed      cetirizine (ZYRTEC) 10 MG tablet Take 10 mg by mouth once daily.       No current facility-administered medications on file prior to visit.        Review of Systems   Constitutional: Negative for chills, diaphoresis, fatigue and fever.   HENT: Negative for congestion, ear pain, postnasal drip, sinus pain, sore throat, trouble  "swallowing and voice change.    Eyes: Negative for pain and redness.   Respiratory: Negative for cough, chest tightness and shortness of breath.    Cardiovascular: Negative for chest pain and leg swelling.   Gastrointestinal: Negative for abdominal pain, constipation, diarrhea, nausea and vomiting.   Genitourinary: Negative for dysuria and hematuria.   Musculoskeletal: Negative for arthralgias and joint swelling.   Skin: Positive for rash.   Neurological: Negative for dizziness, syncope and headaches.       Vitals:    09/14/20 1346   BP: 139/81   Pulse: 76   Temp: 99.3 °F (37.4 °C)   Weight: 78 kg (172 lb)   Height: 5' 1" (1.549 m)       Wt Readings from Last 3 Encounters:   09/14/20 78 kg (172 lb)   09/01/20 78.9 kg (174 lb)   07/27/20 81.8 kg (180 lb 5.4 oz)       Physical Exam  Constitutional:       General: She is not in acute distress.     Appearance: Normal appearance. She is well-developed.   HENT:      Head: Normocephalic and atraumatic.      Comments: No swelling noted of lips/tongue     Mouth/Throat:      Mouth: Mucous membranes are moist.      Pharynx: Oropharynx is clear. No oropharyngeal exudate or posterior oropharyngeal erythema.   Eyes:      Conjunctiva/sclera: Conjunctivae normal.   Neck:      Musculoskeletal: Normal range of motion and neck supple.   Cardiovascular:      Rate and Rhythm: Normal rate and regular rhythm.      Pulses: Normal pulses.      Heart sounds: Normal heart sounds. No murmur.   Pulmonary:      Effort: Pulmonary effort is normal. No respiratory distress.      Breath sounds: Normal breath sounds.   Abdominal:      General: Bowel sounds are normal. There is no distension.      Palpations: Abdomen is soft.      Tenderness: There is no abdominal tenderness.   Musculoskeletal: Normal range of motion.   Skin:     General: Skin is warm and dry.      Findings: Rash present.      Comments: Urticarial rash noted on proximal LE b/l and torso   Neurological:      General: No focal deficit " present.      Mental Status: She is alert and oriented to person, place, and time.         Health Maintenance   Topic Date Due    Eye Exam  08/20/1968    Urine Microalbumin  08/20/1968    TETANUS VACCINE  08/20/1976    Pneumococcal Vaccine (Medium Risk) (1 of 1 - PPSV23) 08/20/1977    Low Dose Statin  08/20/1979    Hemoglobin A1c  01/17/2021    Lipid Panel  06/30/2021    Mammogram  07/13/2021    Foot Exam  09/01/2021    Hepatitis C Screening  Completed

## 2020-09-14 NOTE — PATIENT INSTRUCTIONS
-finish prednisone as directed on bottle  -start taking zyrtec 20 mg twice daily for the next several days until rash resolves, then decrease to 10 mg twice   -start singulair (montekulast) 10 mg once at night

## 2020-09-16 ENCOUNTER — PATIENT MESSAGE (OUTPATIENT)
Dept: FAMILY MEDICINE | Facility: CLINIC | Age: 60
End: 2020-09-16

## 2020-09-17 ENCOUNTER — TELEPHONE (OUTPATIENT)
Dept: FAMILY MEDICINE | Facility: CLINIC | Age: 60
End: 2020-09-17

## 2020-09-17 NOTE — TELEPHONE ENCOUNTER
Message sent to patient by PCP via [facility] regarding results. Patient has viewed this message.

## 2020-09-17 NOTE — TELEPHONE ENCOUNTER
----- Message from Dane Treviño sent at 9/17/2020 12:44 PM CDT -----  Regarding: Test Results  Pt wants her test results from [facility]. Please call [phone].

## 2020-09-25 RX ORDER — LEVOTHYROXINE SODIUM 50 UG/1
TABLET ORAL
Qty: 30 TABLET | Refills: 11 | Status: SHIPPED | OUTPATIENT
Start: 2020-09-25 | End: 2021-09-22

## 2020-09-28 ENCOUNTER — PATIENT OUTREACH (OUTPATIENT)
Dept: ADMINISTRATIVE | Facility: HOSPITAL | Age: 60
End: 2020-09-28

## 2020-09-28 NOTE — PROGRESS NOTES
Working eye exam report; per 08- note pt had eye exam with Dr. Odilon Lomeli M.D. I faxed eye exam request to Odilon Lomeli M.D.

## 2020-09-30 ENCOUNTER — PATIENT OUTREACH (OUTPATIENT)
Dept: ADMINISTRATIVE | Facility: OTHER | Age: 60
End: 2020-09-30

## 2020-09-30 NOTE — PROGRESS NOTES
Health Maintenance Due   Topic Date Due    Eye Exam  08/20/1968    HIV Screening  08/20/1973    TETANUS VACCINE  08/20/1976    Pneumococcal Vaccine (Medium Risk) (1 of 1 - PPSV23) 08/20/1977    Shingles Vaccine (1 of 2) 08/20/2008    Influenza Vaccine (1) 08/01/2020     Updates were requested from care everywhere.  Chart was reviewed for overdue Proactive [facility] Encounters ([facility]) topics (CRS, Breast Cancer Screening, Eye exam)  Health Maintenance has been updated.  LINKS immunization registry triggered.  Immunizations were reconciled.

## 2020-10-01 ENCOUNTER — CLINICAL SUPPORT (OUTPATIENT)
Dept: DIABETES | Facility: CLINIC | Age: 60
End: 2020-10-01
Payer: MEDICAID

## 2020-10-01 VITALS — BODY MASS INDEX: 32.37 KG/M2 | WEIGHT: 171.31 LBS

## 2020-10-01 DIAGNOSIS — E11.9 TYPE 2 DIABETES MELLITUS WITHOUT COMPLICATION, WITHOUT LONG-TERM CURRENT USE OF INSULIN: Primary | ICD-10-CM

## 2020-10-01 PROCEDURE — 99999 PR PBB SHADOW E&M-EST. PATIENT-LVL II: ICD-10-PCS | Mod: PBBFAC,,, | Performed by: DIETITIAN, REGISTERED

## 2020-10-01 PROCEDURE — G0108 DIAB MANAGE TRN  PER INDIV: HCPCS | Mod: PBBFAC,PO | Performed by: DIETITIAN, REGISTERED

## 2020-10-01 PROCEDURE — 99999 PR PBB SHADOW E&M-EST. PATIENT-LVL II: CPT | Mod: PBBFAC,,, | Performed by: DIETITIAN, REGISTERED

## 2020-10-01 PROCEDURE — 99212 OFFICE O/P EST SF 10 MIN: CPT | Mod: PBBFAC,PO | Performed by: DIETITIAN, REGISTERED

## 2020-10-01 NOTE — PROGRESS NOTES
Diabetes Education  Author: Neeru Butts RD, CDE  Date: 10/1/2020    Diabetes Care Management Summary  Diabetes Education Record Assessment/Progress: Post Program/Follow-up  Current Diabetes Risk Level: Low     Diabetes Type  Diabetes Type : Type II    Diabetes History  Current Treatment: Oral Medication(Metformin 500 mg once daily (takes at dinner))    Health Maintenance was reviewed today with patient. Discussed with patient importance of routine eye exams, foot exams/foot care, blood work (i.e.: A1c, microalbumin, and lipid), dental visits, yearly flu vaccine, and pneumonia vaccine as indicated by PCP. Patient verbalized understanding.     Health Maintenance Topics with due status: Not Due       Topic Last Completion Date    Colorectal Cancer Screening 08/30/2018    Lipid Panel 06/30/2020    Cervical Cancer Screening 07/13/2020    Mammogram 07/13/2020    Hemoglobin A1c 07/17/2020    Foot Exam 09/01/2020     Health Maintenance Due   Topic Date Due    Eye Exam  08/20/1968    Urine Microalbumin  08/20/1968    HIV Screening  08/20/1973    TETANUS VACCINE  08/20/1976    Pneumococcal Vaccine (Medium Risk) (1 of 1 - PPSV23) 08/20/1977    Low Dose Statin  08/20/1979    Shingles Vaccine (1 of 2) 08/20/2008    Influenza Vaccine (1) 08/01/2020     Nutrition  Meal Planning: (Has made substantial changes to her eating over past 2-3 months.  Intentional weight loss of 8-10 lbs noted.  Patient would like to continue to lose weight--long term weight goal 140 lbs.)  What type of sweetener do you use?: Equal, Sweet N Low  What type of beverages do you drink?: milk, diet soda/tea    Monitoring   Monitoring: Other(True Metrix--obtained in July 2020 and has adequate glucose testing supplies.)  Self Monitoring : Checks glucose once daily--alternates testing times to get better understanding of glucose patterns.  Has increased frequency of testing when having allergic reactions and on steroids/prednisone  Blood Glucose  Logs: Yes(Overall glucose levels within target range.  Patient with multiple allergic reactions over the past 2-3 months--will establish with allergy 10/19/20--has been given steroid injections and on oral steroids.)            Exercise   Exercise Type: none(Has a bicycle and plans to use but with recent increased allergy issues--has not initiated regular exercise)    Current Diabetes Treatment   Current Treatment: Oral Medication(Metformin 500 mg once daily (takes at dinner))    Social History  Preferred Learning Method: Face to Face  Primary Support: Self, Spouse  Alcohol Use: Rarely    Barriers to Change  Barriers to Change: None  Learning Challenges : None    Readiness to Learn   Readiness to Learn : Eager    Diabetes Education Assessment/Progress  Diabetes Disease Process (diabetes disease process and treatment options): Discussion, Demonstrates Understanding/Competency(verbalizes/demonstrates).  Next Hgb A1c scheduled for 10/19/20--cautioned patient that results may be skewed due to recent use of steroids for allergic reactions.    Nutrition (Incorporating nutritional management into one's lifestyle): Discussion, Demonstrates Understanding/Competency (verbalizes/demonstrates).  At times still over indulging on CHO when snacking in the evening.  Discussed to limit CHO snacks to only 1 and if additional snack desired in evening to choose low/no CHO snack. Overall choosing balanced meals and has decreased portions at meals.      Physical Activity (incorporating physical activity into one's lifestyle): Discussion, Instructed, Comprehends Key Points.  Strongly encouraged to initiate aerobic exercise as tolerated for both efforts in weight loss and to improve glucose control.    Medications (states correct name, dose, onset, peak, duration, side effects & timing of meds): Discussion, Comprehends Key Points.  Tolerating metformin at dinner--questioning today whether metformin should be taken with dinner or at  bedtime.  Discussed that metformin is recommended with meals to prevent GI issues.  Patient will continue taking metformin at dinner.    Monitoring (monitoring blood glucose/other parameters & using results): Discussion, Instructed, Comprehends Key Points.  Patient to continue monitoring glucose once daily alternating testing times (before meals or bedtime).  Patient has glucsoe logs and encouraged to bring completed logs to PCP and diabetes education appts.    Goals  Patient has selected/evaluated goals during today's session: Yes, evaluated  Physical Activity: % Met(Did not start exercising due to allergic reactions and feeling poorly--hopes to initiate soon)  Met Percentage : 0%  Problem Solving: % Met  Met Percentage : 100%    Diabetes Self-Management Support Plan  Diabetes Learning: DM websites  Exercise/Nutrition: websites, other  Other exercise/nutrition: Has a bicycle--once allergies improved, will initiate riding bicycle.  Medication: pharmacy  Review Status: Patient has selected and agrees to support plan.    Diabetes Care Plan/Intervention  Education Plan/Intervention:   1.  Hgb A1c to be rechecked 10/19/20--has PCP follow up in Oct 2020 as well. Patient with frequent allergic reactions (unknown origins) and has had steroid injection and has taken oral steroids.  Cautioned patient that this next Hgb A1c will be reflective of time using steroids and may not be true indicator of diabetes control.  Reassurance provided.  2.  Glucose log reviewed--overall glucose levels look good considering patient with recent increased allergy reactions and still trying to determine what she is allergic to.  Continue to monitor glucose once daily--alternate testing times (before meals or at bedtime) to get a better understanding of glucose patterns.    3.  Initiate aerobic exercise as tolerated (has not started over past 2 months due to issues with allergies).  Has a bicycle and encouraged to get 30 minutes of exercise most  days of the week.   4.  Patient has lost 10 lbs intentionally over past 2-3 months, continue gradual weight loss.  Patient has a personal weight goal of 140 lbs.       Today's Self-Management Care Plan was developed with the patient's input and is based on barriers identified during today's assessment.    The long and short-term goals in the care plan were written with the patient/caregiver's input. The patient has agreed to work toward these goals to improve her overall diabetes control.      The patient received a copy of today's self-management plan and verbalized understanding of the care plan, goals, and all of today's instructions.      The patient was encouraged to communicate with her physician and care team regarding her condition(s) and treatment.  I provided the patient with my contact information today and encouraged her to contact me via phone or patient portal as needed.     Education Units of Time   Time Spent: 30 min

## 2020-10-05 PROBLEM — Z00.00 ENCOUNTER FOR ANNUAL HEALTH EXAMINATION: Status: RESOLVED | Noted: 2018-08-30 | Resolved: 2020-10-05

## 2020-10-06 ENCOUNTER — PATIENT OUTREACH (OUTPATIENT)
Dept: ADMINISTRATIVE | Facility: HOSPITAL | Age: 60
End: 2020-10-06

## 2020-10-16 ENCOUNTER — PATIENT MESSAGE (OUTPATIENT)
Dept: FAMILY MEDICINE | Facility: CLINIC | Age: 60
End: 2020-10-16

## 2020-10-19 ENCOUNTER — OFFICE VISIT (OUTPATIENT)
Dept: ALLERGY | Facility: CLINIC | Age: 60
End: 2020-10-19
Payer: MEDICAID

## 2020-10-19 ENCOUNTER — PATIENT MESSAGE (OUTPATIENT)
Dept: FAMILY MEDICINE | Facility: CLINIC | Age: 60
End: 2020-10-19

## 2020-10-19 VITALS — HEIGHT: 61 IN | BODY MASS INDEX: 32.26 KG/M2 | HEART RATE: 82 BPM | WEIGHT: 170.88 LBS | OXYGEN SATURATION: 98 %

## 2020-10-19 DIAGNOSIS — L50.8 ACUTE URTICARIA: Primary | ICD-10-CM

## 2020-10-19 DIAGNOSIS — T78.3XXA ANGIOEDEMA, INITIAL ENCOUNTER: ICD-10-CM

## 2020-10-19 DIAGNOSIS — J31.0 CHRONIC RHINITIS: ICD-10-CM

## 2020-10-19 PROCEDURE — 99213 OFFICE O/P EST LOW 20 MIN: CPT | Mod: PBBFAC,PO,25 | Performed by: ALLERGY & IMMUNOLOGY

## 2020-10-19 PROCEDURE — 99999 PR PBB SHADOW E&M-EST. PATIENT-LVL III: ICD-10-PCS | Mod: PBBFAC,,, | Performed by: ALLERGY & IMMUNOLOGY

## 2020-10-19 PROCEDURE — 99204 OFFICE O/P NEW MOD 45 MIN: CPT | Mod: S$PBB,,, | Performed by: ALLERGY & IMMUNOLOGY

## 2020-10-19 PROCEDURE — 99204 PR OFFICE/OUTPT VISIT, NEW, LEVL IV, 45-59 MIN: ICD-10-PCS | Mod: S$PBB,,, | Performed by: ALLERGY & IMMUNOLOGY

## 2020-10-19 PROCEDURE — 99999 PR PBB SHADOW E&M-EST. PATIENT-LVL III: CPT | Mod: PBBFAC,,, | Performed by: ALLERGY & IMMUNOLOGY

## 2020-10-19 NOTE — LETTER
October 19, 2020      Philly Mcclellan MD  [address]  [place] [address]           [facility]  [address] [place] [address]  Phone: [phone]          Patient: Bessy Lamb   MR Number: [id number]   YOB: 1960   Date of Visit: 10/19/2020       Dear Dr. Philly Mcclellan:    Thank you for referring Bessy Lamb to me for evaluation. Attached you will find relevant portions of my assessment and plan of care.    If you have questions, please do not hesitate to call me. I look forward to following Bessy Lamb along with you.    Sincerely,    Julia Rojas MD    Enclosure  CC:  No Recipients    If you would like to receive this communication electronically, please contact [email or web address] or [phone] to request more information on [facility] access.    For providers and/or their staff who would like to refer a patient to [facility], please contact us through our one-stop-shop provider referral line, Ramesh Ji, at [phone].    If you feel you have received this communication in error or would no longer like to receive these types of communications, please e-mail [email or web address]

## 2020-10-19 NOTE — PROGRESS NOTES
I called Odilon Lomeli M.D. office 1x to request eye exam. I was informed to re-fax to [facility]. I have re-faxed request.

## 2020-10-19 NOTE — PROGRESS NOTES
Subjective:       Patient ID: Bessy Lamb is a 60 y.o. female.    Chief Complaint:  Other (lip/facial swelling on one side, went to er. )      59 yo woman presents for new patient evaluation of swelling and hives. She states first episode was 8/14. She had just eaten and felt burning in her bottom lip then swelled on left side. She took 2 benadryl and went down but then came back up worse so went to ER. Had no hives, no other swelling, no throat tightness, no SOB> ER gave steroids and benadryl dn improved. She had 3 more episodes, twice went to ER. Was usually bottom lip and same course. Last one was upper lip and had hives on trunk and legs. Again treated in ER. Prior to last episode 9/19 she had started daily zyrtec os after that PCP added montelukast and increased zyrtec to 20 BID. She did well so has since weaned off zyrtec and just on montelukast. No triggers she can tell. She as no known food, insect or latex allergy. She was on Augmenting one of times so was told that was cause but she stopped and still had issues. She  occ has nasal congestion and ear feels full but no runny nose or sneeze. No daily allergy meds for rhinitis. No asthma or eczema.       Environmental History: see history section for home environment  Review of Systems   Constitutional: Negative for appetite change, chills, fatigue and fever.   HENT: Positive for congestion and facial swelling. Negative for ear discharge, ear pain, nosebleeds, postnasal drip, rhinorrhea, sinus pressure, sneezing, sore throat, trouble swallowing and voice change.    Eyes: Negative for discharge, redness, itching and visual disturbance.   Respiratory: Negative for cough, choking, chest tightness, shortness of breath and wheezing.    Cardiovascular: Negative for chest pain, palpitations and leg swelling.   Gastrointestinal: Negative for abdominal distention, abdominal pain, constipation, diarrhea, nausea and vomiting.   Genitourinary: Negative for  difficulty urinating.   Musculoskeletal: Negative for arthralgias, gait problem, joint swelling and myalgias.   Skin: Positive for color change and rash.   Neurological: Negative for dizziness, syncope, weakness, light-headedness and headaches.   Hematological: Negative for adenopathy. Does not bruise/bleed easily.   Psychiatric/Behavioral: Negative for agitation, behavioral problems, confusion and sleep disturbance. The patient is not nervous/anxious.         Objective:      Physical Exam  Vitals signs and nursing note reviewed.   Constitutional:       General: She is not in acute distress.     Appearance: She is well-developed.   HENT:      Head: Normocephalic and atraumatic.      Right Ear: Hearing, tympanic membrane, ear canal and external ear normal.      Left Ear: Hearing, tympanic membrane, ear canal and external ear normal.      Nose: No septal deviation, mucosal edema or rhinorrhea.      Right Sinus: No maxillary sinus tenderness or frontal sinus tenderness.      Left Sinus: No maxillary sinus tenderness or frontal sinus tenderness.      Mouth/Throat:      Pharynx: Uvula midline. No uvula swelling.   Eyes:      General:         Right eye: No discharge.         Left eye: No discharge.      Conjunctiva/sclera: Conjunctivae normal.   Neck:      Musculoskeletal: Normal range of motion.      Thyroid: No thyromegaly.   Cardiovascular:      Rate and Rhythm: Normal rate and regular rhythm.      Heart sounds: Normal heart sounds. No murmur.   Pulmonary:      Effort: Pulmonary effort is normal. No respiratory distress.      Breath sounds: Normal breath sounds. No wheezing.   Abdominal:      General: There is no distension.      Palpations: Abdomen is soft.      Tenderness: There is no abdominal tenderness.   Musculoskeletal: Normal range of motion.         General: No tenderness.   Lymphadenopathy:      Cervical: No cervical adenopathy.   Skin:     General: Skin is warm and dry.      Findings: No erythema or rash.    Neurological:      Mental Status: She is alert and oriented to person, place, and time.   Psychiatric:         Behavior: Behavior normal.         Thought Content: Thought content normal.         Judgment: Judgment normal.         Laboratory:   none performed   Assessment:       1. Acute urticaria    2. Angioedema, initial encounter    3. Chronic rhinitis         Plan:       1. advised pt given she has hives this is unlikely HAE (also had normal test per pt). advised given random nature and now resolved likely was viral but couple be allergic vs other systemic so will send labs to evaluate  2. continue montelukast mobley but if labs normal then will wean off  3. Phone review

## 2020-10-26 ENCOUNTER — OFFICE VISIT (OUTPATIENT)
Dept: FAMILY MEDICINE | Facility: CLINIC | Age: 60
End: 2020-10-26
Payer: MEDICAID

## 2020-10-26 VITALS
WEIGHT: 171 LBS | BODY MASS INDEX: 32.28 KG/M2 | HEART RATE: 77 BPM | DIASTOLIC BLOOD PRESSURE: 73 MMHG | TEMPERATURE: 99 F | SYSTOLIC BLOOD PRESSURE: 124 MMHG | HEIGHT: 61 IN

## 2020-10-26 DIAGNOSIS — E11.9 TYPE 2 DIABETES MELLITUS WITHOUT COMPLICATION, WITHOUT LONG-TERM CURRENT USE OF INSULIN: ICD-10-CM

## 2020-10-26 DIAGNOSIS — T78.3XXA ANGIOEDEMA, INITIAL ENCOUNTER: ICD-10-CM

## 2020-10-26 DIAGNOSIS — E04.1 THYROID NODULE: ICD-10-CM

## 2020-10-26 DIAGNOSIS — M79.671 RIGHT FOOT PAIN: Primary | ICD-10-CM

## 2020-10-26 DIAGNOSIS — E03.9 HYPOTHYROIDISM, UNSPECIFIED TYPE: ICD-10-CM

## 2020-10-26 DIAGNOSIS — L82.1 SEBORRHEIC KERATOSES: ICD-10-CM

## 2020-10-26 PROCEDURE — 99213 PR OFFICE/OUTPT VISIT, EST, LEVL III, 20-29 MIN: ICD-10-PCS | Mod: S$PBB,,, | Performed by: INTERNAL MEDICINE

## 2020-10-26 PROCEDURE — 99215 OFFICE O/P EST HI 40 MIN: CPT | Mod: PBBFAC,PO | Performed by: INTERNAL MEDICINE

## 2020-10-26 PROCEDURE — 99999 PR PBB SHADOW E&M-EST. PATIENT-LVL V: CPT | Mod: PBBFAC,,, | Performed by: INTERNAL MEDICINE

## 2020-10-26 PROCEDURE — 99213 OFFICE O/P EST LOW 20 MIN: CPT | Mod: S$PBB,,, | Performed by: INTERNAL MEDICINE

## 2020-10-26 PROCEDURE — 99999 PR PBB SHADOW E&M-EST. PATIENT-LVL V: ICD-10-PCS | Mod: PBBFAC,,, | Performed by: INTERNAL MEDICINE

## 2020-10-26 RX ORDER — METFORMIN HYDROCHLORIDE 500 MG/1
500 TABLET, EXTENDED RELEASE ORAL
Qty: 90 TABLET | Refills: 3 | Status: SHIPPED | OUTPATIENT
Start: 2020-10-26 | End: 2021-10-22

## 2020-10-26 NOTE — PATIENT INSTRUCTIONS
-Try over the counter diclofenac (voltaren) gel for your feet. Follow up with podiatry  -Recommend  shingles (Shingrx) vaccines at the pharmacy.

## 2020-10-26 NOTE — PROGRESS NOTES
Assessment/Plan:    Type 2 diabetes mellitus without complication, without long-term current use of insulin  -condition is currently controlled  -current meds: Metformin 500 mg QD  -plan to change to ER Metformin due to glucose fluctuations. Repeat A1C in 3 months  -see diabetic health maintenance listed below  -counseling provided on importance of diabetic diet and medication compliance in order to treat diabetes  -discussed diabetes disease course and potential complications    Hypothyroidism  Lab Results   Component Value Date    TSH 2.403 06/30/2020   -currently on levothyroxine 50 mcg    Thyroid nodule  -hx of thyroid nodules in 2014, did not meet FNA criteria  -repeat June 2020: nodules stable, still not meeting biopsy criteria    Angioedema  -recurrent, intermittent lip swelling   -no episodes since last visit  -has established with allergy. Recent allergy testing with several noted allergies; will follow up Allergy recommendations  -remains on zyrtec and singulair      Right foot pain  -chronic pain of R foot with mild swelling/tenderness on exam  -degenerative changes and bone spur of heel noted on XR last year  -start topical NSAID  -podiatry referral placed  _____________________________________________________________________________________________________________________________________________________    Orders this visit:    Right foot pain  -     Ambulatory referral/consult to Podiatry; Future; Expected date: 11/02/2020    Type 2 diabetes mellitus without complication, without long-term current use of insulin  -     metFORMIN (GLUCOPHAGE-XR) 500 MG ER 24hr tablet; Take 1 tablet (500 mg total) by mouth daily with breakfast.  Dispense: 90 tablet; Refill: 3    Seborrheic keratoses  -     Ambulatory referral/consult to Dermatology; Future; Expected date: 11/02/2020    Hypothyroidism, unspecified type    Thyroid nodule    Angioedema, initial encounter      Follow up in about 6 months (around 4/26/2021),  or if symptoms worsen or fail to improve.    Philly Mcclellan MD  _____________________________________________________________________________________________________________________________________________________    HPI:    Patient is in clinic today as an established patient here for follow up of chronic medical conditions and complaint of chronic foot pain.    Foot pain: Complaint of R foot pain. Located at dorsal surface. Has been present for >1 year. Reports associated swelling and sometimes overlying erythema. Worse after extended standing, climbing, walking. Also worse at night. Taking tylenol with some relief. XR last year revealing degenerative changes and bone spur of heel. Recommended to see podiatry at that time but never requested referral.    DM2: Patient presents for follow up of diabetes. Condition is chronic and stable. Patient denies symptoms, including foot ulcerations, hyperglycemia, hypoglycemia , nausea, paresthesia of the feet, polydipsia, polyuria and visual disturbances.  Evaluation to date has been included: fasting blood sugar, fasting lipid panel, hemoglobin A1C and microalbuminuria.  Home fasting sugars: 100-150. Treatment to date:  Metformin. Denies adverse effects of medications.     Diabetes Management Status    Statin: Not taking  ACE/ARB: Not taking    Screening or Prevention Patient's value Goal Complete/Controlled?   HgA1C Testing and Control   Lab Results   Component Value Date    HGBA1C 6.5 (H) 10/19/2020      Annually/Less than 8% Yes   Lipid profile : 06/30/2020 Annually Yes   LDL control Lab Results   Component Value Date    LDLCALC 109.6 06/30/2020    Annually/Less than 100 mg/dl  No   Nephropathy screening Lab Results   Component Value Date    LABMICR 9.0 10/19/2020     Lab Results   Component Value Date    PROTEINUA Negative 02/07/2019    Annually Yes   Blood pressure BP Readings from Last 1 Encounters:   10/26/20 124/73    Less than 140/90 Yes   Dilated retinal exam :  09/28/2020 Annually Yes   Foot exam   : 09/01/2020 Annually Yes       No other complaints today.  Patient has followed up with allergy since our last visit.  She has not had any other further episodes of lip swelling or rashes.  When for allergy testing.  She is still waiting follow-up with allergy for results and further recommendations.  Requesting dermatology referral for skin check.    Past Medical History:  Past Medical History:   Diagnosis Date    Arthritis, lumbar spine     hands    General anesthetics causing adverse effect in therapeutic use     following breast rediuct, hard time awakening  also had anxiety rxn to lidocain in dental ofc    GERD (gastroesophageal reflux disease)     Hypothyroidism 10/8/2014    Maternal anesthesia complication     epidural for 1st child went up instead of down; required intubation    Obesity (BMI 30-39.9) 10/8/2014    Thyroid disease     Thyroid nodule 10/8/2014     Past Surgical History:   Procedure Laterality Date    BREAST BIOPSY Left     b9    c-sections x2      COLONOSCOPY  9/25/2012         COLONOSCOPY N/A 8/30/2018    Procedure: COLONOSCOPY;  Surgeon: Francisco Mcclain MD;  [facility];  Service: Endoscopy;  Laterality: N/A;    hysteroscopy with polypectomy      INCISIONAL BREAST BIOPSY Left 1996    benign; done at same time as reduction    TOTAL REDUCTION MAMMOPLASTY Bilateral 1996     Review of patient's allergies indicates:   Allergen Reactions    Duricef [cefadroxil] Hives     Social History     Tobacco Use    Smoking status: Never Smoker    Smokeless tobacco: Never Used   Substance Use Topics    Alcohol use: Yes     Comment: 2/ month    Drug use: No     Family History   Problem Relation Age of Onset    Diabetes Father     Cirrhosis Father     Breast cancer Maternal Aunt     Breast cancer Paternal Aunt     Brain cancer Mother     Early death Mother 51        brain tumor    Cancer Mother         breast; brain    Heart disease  Sister         chf; cad;    Hypertension Sister     Heart disease Brother         arrhyth    Hypertension Sister     Heart disease Brother         mi    Heart disease Brother         mi    Diabetes Brother     Macular degeneration Brother     Ovarian cancer Neg Hx      Current Outpatient Medications on File Prior to Visit   Medication Sig Dispense Refill    acetaminophen (TYLENOL) 325 MG tablet Take 325 mg by mouth every 6 (six) hours as needed for Pain.      blood sugar diagnostic Strp Test glucose 1 x daily 100 strip 11    blood-glucose meter Misc Use as directed 1 each prn    calcium carbonate (OS-ISH) 600 mg calcium (1,500 mg) Tab Take 1,200 mg by mouth 2 (two) times daily with meals.      cetirizine (ZYRTEC) 10 MG tablet Take 10 mg by mouth once daily.      ergocalciferol (VITAMIN D2) 50,000 unit Cap Take 1 capsule (50,000 Units total) by mouth twice a week. 24 capsule 4    EUTHYROX 50 mcg tablet Take 1 tablet by mouth once daily 30 tablet 11    lancets Misc As directed 100 each prn    montelukast (SINGULAIR) 10 mg tablet Take 1 tablet (10 mg total) by mouth every evening. 30 tablet 11    TRUEPLUS LANCETS 33 gauge Misc use as directed      predniSONE (DELTASONE) 20 MG tablet Take 20 mg by mouth 2 (two) times daily.       No current facility-administered medications on file prior to visit.        Review of Systems   Constitutional: Negative for chills, diaphoresis, fatigue and fever.   HENT: Negative for congestion, ear pain, postnasal drip, sinus pain and sore throat.    Eyes: Negative for pain and redness.   Respiratory: Negative for cough, chest tightness and shortness of breath.    Cardiovascular: Negative for chest pain and leg swelling.   Gastrointestinal: Negative for abdominal pain, constipation, diarrhea, nausea and vomiting.   Genitourinary: Negative for dysuria and hematuria.   Musculoskeletal: Positive for arthralgias. Negative for joint swelling.   Skin: Negative for rash.  "  Neurological: Negative for dizziness, syncope and headaches.       Vitals:    10/26/20 0958   BP: 124/73   Pulse: 77   Temp: 98.6 °F (37 °C)   Weight: 77.6 kg (171 lb)   Height: 5' 1" (1.549 m)       Wt Readings from Last 3 Encounters:   10/26/20 77.6 kg (171 lb)   10/19/20 77.5 kg (170 lb 13.7 oz)   10/01/20 77.7 kg (171 lb 4.8 oz)       Physical Exam  Constitutional:       General: She is not in acute distress.     Appearance: Normal appearance. She is well-developed.   HENT:      Head: Normocephalic and atraumatic.   Eyes:      Conjunctiva/sclera: Conjunctivae normal.   Neck:      Musculoskeletal: Normal range of motion and neck supple.   Cardiovascular:      Rate and Rhythm: Normal rate and regular rhythm.      Pulses: Normal pulses.      Heart sounds: Normal heart sounds. No murmur.   Pulmonary:      Effort: Pulmonary effort is normal. No respiratory distress.      Breath sounds: Normal breath sounds.   Abdominal:      General: Bowel sounds are normal. There is no distension.      Palpations: Abdomen is soft.      Tenderness: There is no abdominal tenderness.   Musculoskeletal: Normal range of motion.      Comments: Tenderness of dorsal surface of R foot. No overlying skin changes. No swelling   Skin:     General: Skin is warm and dry.      Findings: No rash.   Neurological:      General: No focal deficit present.      Mental Status: She is alert and oriented to person, place, and time.         Health Maintenance   Topic Date Due    TETANUS VACCINE  08/20/1976    Pneumococcal Vaccine (Medium Risk) (1 of 1 - PPSV23) 08/20/1977    Low Dose Statin  08/20/1979    Hemoglobin A1c  04/19/2021    Lipid Panel  06/30/2021    Mammogram  07/13/2021    Foot Exam  09/01/2021    Eye Exam  09/28/2021    Urine Microalbumin  10/19/2021    Hepatitis C Screening  Completed       "

## 2020-10-26 NOTE — ASSESSMENT & PLAN NOTE
-condition is currently controlled  -current meds: Metformin 500 mg QD  -plan to change to ER Metformin due to glucose fluctuations. Repeat A1C in 3 months  -see diabetic health maintenance listed below  -counseling provided on importance of diabetic diet and medication compliance in order to treat diabetes  -discussed diabetes disease course and potential complications

## 2020-10-27 ENCOUNTER — IMMUNIZATION (OUTPATIENT)
Dept: FAMILY MEDICINE | Facility: CLINIC | Age: 60
End: 2020-10-27
Payer: MEDICAID

## 2020-10-27 ENCOUNTER — PATIENT MESSAGE (OUTPATIENT)
Dept: ALLERGY | Facility: CLINIC | Age: 60
End: 2020-10-27

## 2020-10-27 PROCEDURE — 90686 IIV4 VACC NO PRSV 0.5 ML IM: CPT | Mod: PBBFAC,PO

## 2020-10-27 NOTE — ASSESSMENT & PLAN NOTE
-recurrent, intermittent lip swelling   -no episodes since last visit  -has established with allergy. Recent allergy testing with several noted allergies; will follow up Allergy recommendations  -remains on zyrtec and singulair

## 2020-10-27 NOTE — ASSESSMENT & PLAN NOTE
-hx of thyroid nodules in 2014, did not meet FNA criteria  -repeat June 2020: nodules stable, still not meeting biopsy criteria

## 2020-10-29 ENCOUNTER — TELEPHONE (OUTPATIENT)
Dept: PODIATRY | Facility: CLINIC | Age: 60
End: 2020-10-29

## 2020-10-29 NOTE — TELEPHONE ENCOUNTER
Spoke with pt she was calling back to make a appt I offered the pt several location she stated she wanted to go to [facility] the next available was Monday at 9:40 am with Dr. Watson. Pt stated that was a good time for her.          Elaine Mclean MA  Podiatry Surgical Department  [facility]                      ----- Message from Cielo Dallas sent at 10/29/2020  1:21 PM CDT -----  Contact: pt  Type:  Patient Returning Call    Who Called:Bessy  Who Left Message for Patient:  Does the patient know what this is regarding?:  Would the patient rather a call back or a response via [facility]? call  Best Call Back Number:[phone]  Additional Information:

## 2020-11-02 ENCOUNTER — OFFICE VISIT (OUTPATIENT)
Dept: PODIATRY | Facility: CLINIC | Age: 60
End: 2020-11-02
Payer: MEDICAID

## 2020-11-02 ENCOUNTER — HOSPITAL ENCOUNTER (OUTPATIENT)
Dept: RADIOLOGY | Facility: HOSPITAL | Age: 60
Discharge: HOME | End: 2020-11-02
Attending: PODIATRIST
Payer: MEDICAID

## 2020-11-02 VITALS
BODY MASS INDEX: 32.3 KG/M2 | WEIGHT: 171.06 LBS | HEIGHT: 61 IN | HEART RATE: 70 BPM | DIASTOLIC BLOOD PRESSURE: 79 MMHG | SYSTOLIC BLOOD PRESSURE: 138 MMHG

## 2020-11-02 DIAGNOSIS — M21.70 LOWER LIMB LENGTH DIFFERENCE: ICD-10-CM

## 2020-11-02 DIAGNOSIS — M79.671 RIGHT FOOT PAIN: ICD-10-CM

## 2020-11-02 DIAGNOSIS — M79.671 RIGHT FOOT PAIN: Primary | ICD-10-CM

## 2020-11-02 DIAGNOSIS — M19.072 OSTEOARTHRITIS OF LEFT FOOT, UNSPECIFIED OSTEOARTHRITIS TYPE: ICD-10-CM

## 2020-11-02 PROCEDURE — 99203 OFFICE O/P NEW LOW 30 MIN: CPT | Mod: S$PBB,,, | Performed by: PODIATRIST

## 2020-11-02 PROCEDURE — 99999 PR PBB SHADOW E&M-EST. PATIENT-LVL IV: CPT | Mod: PBBFAC,,, | Performed by: PODIATRIST

## 2020-11-02 PROCEDURE — 73630 XR FOOT COMPLETE 3 VIEW RIGHT: ICD-10-PCS | Mod: 26,RT,, | Performed by: RADIOLOGY

## 2020-11-02 PROCEDURE — 73630 X-RAY EXAM OF FOOT: CPT | Mod: 26,RT,, | Performed by: RADIOLOGY

## 2020-11-02 PROCEDURE — 99203 PR OFFICE/OUTPT VISIT, NEW, LEVL III, 30-44 MIN: ICD-10-PCS | Mod: S$PBB,,, | Performed by: PODIATRIST

## 2020-11-02 PROCEDURE — 73630 X-RAY EXAM OF FOOT: CPT | Mod: TC,RT

## 2020-11-02 PROCEDURE — 99214 OFFICE O/P EST MOD 30 MIN: CPT | Mod: PBBFAC,25 | Performed by: PODIATRIST

## 2020-11-02 PROCEDURE — 99999 PR PBB SHADOW E&M-EST. PATIENT-LVL IV: ICD-10-PCS | Mod: PBBFAC,,, | Performed by: PODIATRIST

## 2020-11-02 NOTE — PROGRESS NOTES
Subjective:       Patient ID: Bessy Lamb is a 60 y.o. female.    Chief Complaint: Foot Pain (Pt c/o bilateral foot pain. Pt reports pain at the top of feet. She states pain after standing for long periods of time. Denies pain at present. Wears casual shoes w/o socks. Diabetic pt. Last seen by PCP Dr Mcclellan on 10/26/2020.)    HPI: Bessy Lamb presents to the office today with complaints of pain to the dorsal aspect the bilateral foot.  States that this has been ongoing for over a year.  Reports that 1st few steps out of bed do cause pain and discomfort.  Reports that after long periods of standing, she also has some increased pain and discomfort.  States that tight in confined shoe gear does exacerbate her symptoms.  Reports an increase in swelling at times.  Denies any recent trauma or injury.  Was previously prescribed Voltaren gel, however has not initiated therapy.     Review of patient's allergies indicates:   Allergen Reactions    Duricef [cefadroxil] Hives       Past Medical History:   Diagnosis Date    Arthritis, lumbar spine     hands    General anesthetics causing adverse effect in therapeutic use     following breast rediuct, hard time awakening  also had anxiety rxn to lidocain in dental ofc    GERD (gastroesophageal reflux disease)     Hypothyroidism 10/8/2014    Maternal anesthesia complication     epidural for 1st child went up instead of down; required intubation    Obesity (BMI 30-39.9) 10/8/2014    Thyroid disease     Thyroid nodule 10/8/2014       Family History   Problem Relation Age of Onset    Diabetes Father     Cirrhosis Father     Breast cancer Maternal Aunt     Breast cancer Paternal Aunt     Brain cancer Mother     Early death Mother 51        brain tumor    Cancer Mother         breast; brain    Heart disease Sister         chf; cad;    Hypertension Sister     Heart disease Brother         arrhyth    Hypertension Sister     Heart disease Brother          mi    Heart disease Brother         mi    Diabetes Brother     Macular degeneration Brother     Ovarian cancer Neg Hx        Social History     Socioeconomic History    Marital status:      Spouse name: Not on file    Number of children: Not on file    Years of education: Not on file    Highest education level: Not on file   Occupational History    Not on file   Social Needs    Financial resource strain: Not on file    Food insecurity     Worry: Not on file     Inability: Not on file    Transportation needs     Medical: Not on file     Non-medical: Not on file   Tobacco Use    Smoking status: Never Smoker    Smokeless tobacco: Never Used   Substance and Sexual Activity    Alcohol use: Yes     Comment: 2/ month    Drug use: No    Sexual activity: Yes     Birth control/protection: Post-menopausal   Lifestyle    Physical activity     Days per week: Not on file     Minutes per session: Not on file    Stress: Not on file   Relationships    Social connections     Talks on phone: Not on file     Gets together: Not on file     Attends Faith service: Not on file     Active member of club or organization: Not on file     Attends meetings of clubs or organizations: Not on file     Relationship status: Not on file   Other Topics Concern    Not on file   Social History Narrative    Not on file       Past Surgical History:   Procedure Laterality Date    BREAST BIOPSY Left     b9    c-sections x2      COLONOSCOPY  9/25/2012         COLONOSCOPY N/A 8/30/2018    Procedure: COLONOSCOPY;  Surgeon: Francisco Mcclain MD;  Location[facility];  Service: Endoscopy;  Laterality: N/A;    hysteroscopy with polypectomy      INCISIONAL BREAST BIOPSY Left 1996    benign; done at same time as reduction    TOTAL REDUCTION MAMMOPLASTY Bilateral 1996       Review of Systems   Constitutional: Negative for activity change, appetite change, chills and fever.   HENT: Negative for sinus pain, sore throat and  "voice change.    Eyes: Negative for pain, redness and visual disturbance.   Respiratory: Negative for cough and shortness of breath.    Cardiovascular: Negative for chest pain and palpitations.   Gastrointestinal: Negative for diarrhea, nausea and vomiting.   Musculoskeletal: Negative for back pain and joint swelling.   Skin: Negative for color change and wound.   Neurological: Negative for dizziness, weakness and numbness.   Psychiatric/Behavioral: The patient is not nervous/anxious.           Objective:   /79   Pulse 70   Ht 5' 1" (1.549 m)   Wt 77.6 kg (171 lb 1.2 oz)   BMI 32.32 kg/m²     X-Ray Foot Complete Right  Narrative: EXAMINATION:  XR FOOT COMPLETE 3 VIEW RIGHT    CLINICAL HISTORY:  . Pain in right foot    TECHNIQUE:  AP, lateral, and oblique views of the right foot were performed.    COMPARISON:  06/21/2019    FINDINGS:  There is no radiographic evidence of acute osseous, articular, or soft tissue abnormality.  Mild degenerative findings are again noted at the great toe MTP joint.  No erosive changes demonstrated.  Impression: As above. No acute findings.    Electronically signed by: Artemio Ambriz MD  Date:    11/02/2020  Time:    10:31       Physical Exam   LOWER EXTREMITY PHYSICAL EXAMINATION    Vascular: Capillary refill time is prolonged. Spider veins are noted to the bilateral lower extremity. Edema is absent. The left dorsalis pedis pulse is 2/4 and on the right is 2/4. The left posterior tibial pulse is 2/4 on the left and is 2/4 on the right. Hair growth is diminished to absent on the bilateral dorsal foot and at the digits.      Neurological: Sensation to light touch is intact. Proprioception is intact, bilateral. Sensation to pin prick is reduced to absent. Vibratory sensation is diminished to the left and right lower extremity. Examination with 5.07 Parkman Teresa monofilament reveals that protective sensation is intact.    Musculoskeletal: Manual Muscle Testing is 5/5 with " dorsiflexion, plantar flexion, abduction, and adduction.   There is normal range of motion in the forefoot, hindfoot, and Ankle joint.   There is slight pain on palpation to the plantar aspect of the right foot at the insertion of plantar fascia.  Pain is not palpated along the intermediate or lateral band.  There is also pain on palpation to the dorsal lateral aspect of the forefoot near the 4th metatarsal base.  There is no bony projection or exostosis palpated.  No pain with muscle testing with or without resistance. Gait pattern is non-antalgic.  Limb length discrepancy is noted with the left lower extremity being shorter than the right lower extremity.    Dermatological:  Skin is thin, shiny, and atrophic.  There are no ulcerations, calluses, or lesions noted to the dorsal or plantar aspect of the bilateral feet.  Hair growth is present.    Imaging:       Results for orders placed during the hospital encounter of 06/21/19   X-Ray Foot Complete Right    Narrative EXAMINATION:  XR FOOT COMPLETE 3 VIEW RIGHT    CLINICAL HISTORY:  . Unspecified injury of right foot, subsequent encounter    TECHNIQUE:  AP, lateral, and oblique views of the right foot were performed.    COMPARISON:  None    FINDINGS:  No acute fracture.  1st MTP joint degenerative findings noted.  Tiny plantar calcaneal enthesophyte present.  No significant soft tissue edema.      Impression As above      Electronically signed by: Regulo Atkinson MD  Date:    06/21/2019  Time:    15:58           Assessment:     1. Right foot pain    2. Osteoarthritis of left foot, unspecified osteoarthritis type    3. Lower limb length difference        Plan:     Right foot pain  -     Ambulatory referral/consult to Podiatry  -     X-Ray Foot Complete Right; Future; Expected date: 11/02/2020    Osteoarthritis of left foot, unspecified osteoarthritis type    Lower limb length difference      Thorough discussion is had with the patient this afternoon, concerning the  diagnosis, its etiology, and the treatment algorithm at present.  Based on patient's clinical symptoms, there is no bony projection palpated or visualized from x-rays taken per 2019.  With regards to the plantar foot pain, I would recommend patient initiate stretching exercises 4-6 times per day to lengthen the posterior muscle group.  This should help decrease the tension being applied to the plantar fascia as well.    Recommend to initiate use of Voltaren gel to the dorsal and plantar aspect of the right foot if these areas do become painful.  Patient may also consider compression to help reduce swelling to the bilateral lower extremities as well.    Patient may also benefit from small wedge in the left shoe to make up for the difference as the right leg is slightly longer than the left.    Will take x-rays to compared to images taken from 2019.  No fractures or acute dislocations were noted.  There was a small exostosis present to the plantar aspect of the calcaneus at the insertion of the plantar fascia as well as the dorsal aspect 1st metatarsal head associated with early stage hallux limitus.      Future Appointments   Date Time Provider Department Center   11/2/2020 10:45 AM [facility] [place]   1/20/2021  8:10 AM [facility], [facility] [place]

## 2020-12-09 ENCOUNTER — PATIENT OUTREACH (OUTPATIENT)
Dept: ADMINISTRATIVE | Facility: OTHER | Age: 60
End: 2020-12-09

## 2020-12-10 ENCOUNTER — OFFICE VISIT (OUTPATIENT)
Dept: DERMATOLOGY | Facility: CLINIC | Age: 60
End: 2020-12-10
Payer: MEDICAID

## 2020-12-10 DIAGNOSIS — D48.9 NEOPLASM OF UNCERTAIN BEHAVIOR: ICD-10-CM

## 2020-12-10 DIAGNOSIS — L82.1 SEBORRHEIC KERATOSES: Primary | ICD-10-CM

## 2020-12-10 DIAGNOSIS — D22.9 NEVUS: ICD-10-CM

## 2020-12-10 PROCEDURE — 88305 TISSUE EXAM BY PATHOLOGIST: ICD-10-PCS | Mod: 26,,, | Performed by: PATHOLOGY

## 2020-12-10 PROCEDURE — 88305 TISSUE EXAM BY PATHOLOGIST: CPT | Performed by: PATHOLOGY

## 2020-12-10 PROCEDURE — 99203 PR OFFICE/OUTPT VISIT, NEW, LEVL III, 30-44 MIN: ICD-10-PCS | Mod: 25,S$PBB,, | Performed by: PHYSICIAN ASSISTANT

## 2020-12-10 PROCEDURE — 11102 PR TANGENTIAL BIOPSY, SKIN, SINGLE LESION: ICD-10-PCS | Mod: S$PBB,,, | Performed by: PHYSICIAN ASSISTANT

## 2020-12-10 PROCEDURE — 99999 PR PBB SHADOW E&M-EST. PATIENT-LVL III: ICD-10-PCS | Mod: PBBFAC,,, | Performed by: PHYSICIAN ASSISTANT

## 2020-12-10 PROCEDURE — 99203 OFFICE O/P NEW LOW 30 MIN: CPT | Mod: 25,S$PBB,, | Performed by: PHYSICIAN ASSISTANT

## 2020-12-10 PROCEDURE — 11102 TANGNTL BX SKIN SINGLE LES: CPT | Mod: S$PBB,,, | Performed by: PHYSICIAN ASSISTANT

## 2020-12-10 PROCEDURE — 99999 PR PBB SHADOW E&M-EST. PATIENT-LVL III: CPT | Mod: PBBFAC,,, | Performed by: PHYSICIAN ASSISTANT

## 2020-12-10 PROCEDURE — 11102 TANGNTL BX SKIN SINGLE LES: CPT | Mod: PBBFAC | Performed by: PHYSICIAN ASSISTANT

## 2020-12-10 PROCEDURE — 88305 TISSUE EXAM BY PATHOLOGIST: CPT | Mod: 26,,, | Performed by: PATHOLOGY

## 2020-12-10 PROCEDURE — 99213 OFFICE O/P EST LOW 20 MIN: CPT | Mod: PBBFAC,25 | Performed by: PHYSICIAN ASSISTANT

## 2020-12-10 NOTE — LETTER
December 10, 2020      Philly Mcclellan MD  [address]  [place] [address]           [facility]  [address] [place] [address]  Phone: [phone]  Fax: [phone]          Patient: Bessy Lamb   MR Number: [id number]   YOB: 1960   Date of Visit: 12/10/2020       Dear Dr. Philly Mcclellan:    Thank you for referring Bessy Lamb to me for evaluation. Attached you will find relevant portions of my assessment and plan of care.    If you have questions, please do not hesitate to call me. I look forward to following Bessy Lamb along with you.    Sincerely,    Radha Schmitt PA-C    Enclosure  CC:  No Recipients    If you would like to receive this communication electronically, please contact [email or web address] or [phone] to request more information on [employer] Link access.    For providers and/or their staff who would like to refer a patient to [facility], please contact us through our one-stop-shop provider referral line, [facility] , at [phone].    If you feel you have received this communication in error or would no longer like to receive these types of communications, please e-mail [email or web address]

## 2020-12-10 NOTE — PROGRESS NOTES
Subjective:       Patient ID:  Bessy Lamb is a 60 y.o. female who presents for No chief complaint on file.    HPI    Review of Systems     Objective:    Physical Exam       Diagram Legend     Erythematous scaling macule/papule c/w actinic keratosis       Vascular papule c/w angioma      Pigmented verrucoid papule/plaque c/w seborrheic keratosis      Yellow umbilicated papule c/w sebaceous hyperplasia      Irregularly shaped tan macule c/w lentigo     1-2 mm smooth white papules consistent with Milia      Movable subcutaneous cyst with punctum c/w epidermal inclusion cyst      Subcutaneous movable cyst c/w pilar cyst      Firm pink to brown papule c/w dermatofibroma      Pedunculated fleshy papule(s) c/w skin tag(s)      Evenly pigmented macule c/w junctional nevus     Mildly variegated pigmented, slightly irregular-bordered macule c/w mildly atypical nevus      Flesh colored to evenly pigmented papule c/w intradermal nevus       Pink pearly papule/plaque c/w basal cell carcinoma      Erythematous hyperkeratotic cursted plaque c/w SCC      Surgical scar with no sign of skin cancer recurrence      Open and closed comedones      Inflammatory papules and pustules      Verrucoid papule consistent consistent with wart     Erythematous eczematous patches and plaques     Dystrophic onycholytic nail with subungual debris c/w onychomycosis     Umbilicated papule    Erythematous-base heme-crusted tan verrucoid plaque consistent with inflamed seborrheic keratosis     Erythematous Silvery Scaling Plaque c/w Psoriasis     See annotation      Assessment / Plan:        There are no diagnoses linked to this encounter.         No follow-ups on file.

## 2020-12-10 NOTE — PROGRESS NOTES
Subjective:       Patient ID:  Bessy Lamb is a 60 y.o. female who presents for   Chief Complaint   Patient presents with    Skin Check     spot on L breast and R hip     History of Present Illness: The patient presents with chief complaint of skin lesion. Pt referred by Dr. Rea for consideration of removal of lesion of breast. States she had the lesion of right breast removed several years ago by Venu Clements in [facility]. She denies known atypia, but does not have path report today.  The lesion is now recurring in the past 8 months. + Growing, pink color, rough. Denies itching, tenderness, or bleeding.   Location: R breast   Duration: several months  Signs/Symptoms: growing    Prior treatments: lesion on right breast has been removed before    C/o brown spot under left breast    C/o pink colored spots of trunk. +Raised.     PMHX: denies skin CA; h/o biopsy of benign breast lesion of right breast  FHX: denies skin CA       Review of Systems   Constitutional: Negative for fever and chills.   Gastrointestinal: Negative for nausea and vomiting.   Skin: Positive for activity-related sunscreen use. Negative for itching, rash, dry skin, sun sensitivity, daily sunscreen use and recent sunburn.   Hematologic/Lymphatic: Does not bruise/bleed easily.        Objective:    Physical Exam   Constitutional: She appears well-developed and well-nourished. No distress.   Neurological: She is alert and oriented to person, place, and time. She is not disoriented.   Psychiatric: She has a normal mood and affect.   Skin:   Areas Examined (abnormalities noted in diagram):   Scalp / Hair Palpated and Inspected  Head / Face Inspection Performed  Neck Inspection Performed  Chest / Axilla Inspection Performed  Abdomen Inspection Performed  Genitals / Buttocks / Groin Inspection Performed  Back Inspection Performed  RUE Inspected  LUE Inspection Performed  RLE Inspected  LLE Inspection Performed  Nails and Digits Inspection  Performed              Diagram Legend     Erythematous scaling macule/papule c/w actinic keratosis       Vascular papule c/w angioma      Pigmented verrucoid papule/plaque c/w seborrheic keratosis      Yellow umbilicated papule c/w sebaceous hyperplasia      Irregularly shaped tan macule c/w lentigo     1-2 mm smooth white papules consistent with Milia      Movable subcutaneous cyst with punctum c/w epidermal inclusion cyst      Subcutaneous movable cyst c/w pilar cyst      Firm pink to brown papule c/w dermatofibroma      Pedunculated fleshy papule(s) c/w skin tag(s)      Evenly pigmented macule c/w junctional nevus     Mildly variegated pigmented, slightly irregular-bordered macule c/w mildly atypical nevus      Flesh colored to evenly pigmented papule c/w intradermal nevus       Pink pearly papule/plaque c/w basal cell carcinoma      Erythematous hyperkeratotic cursted plaque c/w SCC      Surgical scar with no sign of skin cancer recurrence      Open and closed comedones      Inflammatory papules and pustules      Verrucoid papule consistent consistent with wart     Erythematous eczematous patches and plaques     Dystrophic onycholytic nail with subungual debris c/w onychomycosis     Umbilicated papule    Erythematous-base heme-crusted tan verrucoid plaque consistent with inflamed seborrheic keratosis     Erythematous Silvery Scaling Plaque c/w Psoriasis     See annotation              Assessment / Plan:      Pathology Orders:     Normal Orders This Visit    Specimen to Pathology, Dermatology     Comments:    Number of Specimens:->1  ------------------------->-------------------------  Spec 1 Procedure:->Biopsy  Spec 1 Clinical Impression:->r/o ISK vs. NMSC  Spec 1 Source:->right breast    Questions:    Procedure Type: Dermatology and skin neoplasms    Number of Specimens: 1    ------------------------: -------------------------    Spec 1 Procedure: Biopsy    Spec 1 Clinical Impression: r/o ISK vs. NMSC    Spec 1  Source: right breast    Clinical Information: stuck on, circumscribed, pink papule approximately 0.6 mm        Seborrheic keratoses  Reassurance given.  Lesions are benign.  AAD brochure provided.    Nevus  +homogenous and circumscription and less than 5 mm. Reassurance. Regular skin exams at least annually, or sooner for red flags signs.    Martinez Angiomas  Reassurance given.  Lesions are benign.    Neoplasm of uncertain behavior  -     Specimen to Pathology, Dermatology  PROCEDURE NOTE - SHAVE BIOPSY   Location: right breast    After risk, benefits, and alternatives were discussed with the patient, the patient agrees to the procedure by verbal informed consent.  The area(s) were cleansed with alcohol. 2 cc of lidocaine 1% with epinephrine was injected for local anesthesia into each lesion(s).  A sharp dermablade was used to remove part or all of the lesion(s).  The specimen(s) will be sent for tissue pathology.  Hemostasis was obtained with aluminum chloride and/or hyfrecation.  The area(s) were dressed with vaseline ointment and bandaged.  The patient tolerated the procedure well without adverse events.  Wound care instructions were given to the patient on the AVS.  The patient will be notified of pathology results once available. Results will also be available in Epic.         Follow up in about 1 year (around 12/10/2021) for call for results.

## 2020-12-10 NOTE — PATIENT INSTRUCTIONS
Shave Biopsy Wound Care    Your PA has performed a shave biopsy today.  A band aid and vaseline ointment has been placed over the site.  This should remain in place for 24 hours.  It is recommended that you keep the area dry for the first 24 hours.  After 24 hours, you may remove the band aid and wash the area with warm soap and water and apply Vaseline jelly.  Many patients prefer to use Neosporin or Bacitracin ointment.  This is acceptable; however, know that you can develop an allergy to this medication even if you have used it safely for years.  It is important to keep the area moist.  Letting it dry out and get air slows healing time, and will worsen the scar.  Band aid is optional after first 24 hours.      If you notice increasing redness, tenderness, pain, or yellow drainage at the biopsy site, please notify your doctor.  These are signs of an infection.    If your biopsy site is bleeding, apply firm pressure for 15 minutes straight.  Repeat for another 15 minutes, if it is still bleeding.   If the surgical site continues to bleed, then please contact your doctor.      [facility] - [facility]   DERMATOLOGY  [address] [facility] [address]   [place] [address]   Dept: [phone]   Dept Fax: [phone]

## 2020-12-15 LAB
FINAL PATHOLOGIC DIAGNOSIS: NORMAL
GROSS: NORMAL
MICROSCOPIC EXAM: NORMAL

## 2021-01-20 ENCOUNTER — LAB VISIT (OUTPATIENT)
Dept: LAB | Facility: HOSPITAL | Age: 61
End: 2021-01-20
Attending: INTERNAL MEDICINE
Payer: MEDICAID

## 2021-01-20 ENCOUNTER — PATIENT MESSAGE (OUTPATIENT)
Dept: FAMILY MEDICINE | Facility: CLINIC | Age: 61
End: 2021-01-20

## 2021-01-20 DIAGNOSIS — E11.9 TYPE 2 DIABETES MELLITUS WITHOUT COMPLICATION, WITHOUT LONG-TERM CURRENT USE OF INSULIN: ICD-10-CM

## 2021-01-20 PROCEDURE — 36415 COLL VENOUS BLD VENIPUNCTURE: CPT | Mod: PO

## 2021-01-20 PROCEDURE — 83036 HEMOGLOBIN GLYCOSYLATED A1C: CPT

## 2021-01-21 ENCOUNTER — OFFICE VISIT (OUTPATIENT)
Dept: FAMILY MEDICINE | Facility: CLINIC | Age: 61
End: 2021-01-21
Payer: MEDICAID

## 2021-01-21 VITALS
BODY MASS INDEX: 31.12 KG/M2 | TEMPERATURE: 98 F | DIASTOLIC BLOOD PRESSURE: 73 MMHG | HEIGHT: 61 IN | WEIGHT: 164.81 LBS | SYSTOLIC BLOOD PRESSURE: 127 MMHG | HEART RATE: 77 BPM

## 2021-01-21 DIAGNOSIS — H66.90 OTITIS MEDIA, UNSPECIFIED LATERALITY, UNSPECIFIED OTITIS MEDIA TYPE: Primary | ICD-10-CM

## 2021-01-21 LAB
ESTIMATED AVG GLUCOSE: 126 MG/DL (ref 68–131)
HBA1C MFR BLD HPLC: 6 % (ref 4–5.6)

## 2021-01-21 PROCEDURE — 99213 PR OFFICE/OUTPT VISIT, EST, LEVL III, 20-29 MIN: ICD-10-PCS | Mod: S$PBB,,, | Performed by: FAMILY MEDICINE

## 2021-01-21 PROCEDURE — 99213 OFFICE O/P EST LOW 20 MIN: CPT | Mod: PBBFAC,PO | Performed by: FAMILY MEDICINE

## 2021-01-21 PROCEDURE — 99213 OFFICE O/P EST LOW 20 MIN: CPT | Mod: S$PBB,,, | Performed by: FAMILY MEDICINE

## 2021-01-21 PROCEDURE — 99999 PR PBB SHADOW E&M-EST. PATIENT-LVL III: ICD-10-PCS | Mod: PBBFAC,,, | Performed by: FAMILY MEDICINE

## 2021-01-21 PROCEDURE — 99999 PR PBB SHADOW E&M-EST. PATIENT-LVL III: CPT | Mod: PBBFAC,,, | Performed by: FAMILY MEDICINE

## 2021-01-21 RX ORDER — AZITHROMYCIN 250 MG/1
TABLET, FILM COATED ORAL
Qty: 6 TABLET | Refills: 0 | Status: SHIPPED | OUTPATIENT
Start: 2021-01-21 | End: 2021-03-10

## 2021-01-21 RX ORDER — FAMOTIDINE 40 MG/1
40 TABLET, FILM COATED ORAL DAILY
Qty: 30 TABLET | Refills: 1 | Status: SHIPPED | OUTPATIENT
Start: 2021-01-21 | End: 2021-03-21 | Stop reason: SDUPTHER

## 2021-02-19 DIAGNOSIS — E11.9 TYPE 2 DIABETES MELLITUS WITHOUT COMPLICATION, WITHOUT LONG-TERM CURRENT USE OF INSULIN: ICD-10-CM

## 2021-03-09 ENCOUNTER — TELEPHONE (OUTPATIENT)
Dept: FAMILY MEDICINE | Facility: CLINIC | Age: 61
End: 2021-03-09

## 2021-03-10 ENCOUNTER — OFFICE VISIT (OUTPATIENT)
Dept: FAMILY MEDICINE | Facility: CLINIC | Age: 61
End: 2021-03-10
Payer: MEDICAID

## 2021-03-10 ENCOUNTER — HOSPITAL ENCOUNTER (OUTPATIENT)
Dept: RADIOLOGY | Facility: HOSPITAL | Age: 61
Discharge: HOME | End: 2021-03-10
Attending: NURSE PRACTITIONER
Payer: MEDICAID

## 2021-03-10 VITALS
BODY MASS INDEX: 31.12 KG/M2 | HEIGHT: 61 IN | WEIGHT: 164.81 LBS | HEART RATE: 71 BPM | SYSTOLIC BLOOD PRESSURE: 117 MMHG | DIASTOLIC BLOOD PRESSURE: 62 MMHG | TEMPERATURE: 98 F

## 2021-03-10 DIAGNOSIS — H92.01 OTALGIA, RIGHT: Primary | ICD-10-CM

## 2021-03-10 DIAGNOSIS — R60.9 SWELLING OF BODY REGION: ICD-10-CM

## 2021-03-10 DIAGNOSIS — M79.10 MUSCLE PAIN: ICD-10-CM

## 2021-03-10 DIAGNOSIS — M25.511 ACUTE PAIN OF RIGHT SHOULDER: ICD-10-CM

## 2021-03-10 DIAGNOSIS — M54.2 NECK PAIN ON RIGHT SIDE: ICD-10-CM

## 2021-03-10 PROCEDURE — 73030 X-RAY EXAM OF SHOULDER: CPT | Mod: TC,PO,RT

## 2021-03-10 PROCEDURE — 99999 PR PBB SHADOW E&M-EST. PATIENT-LVL V: CPT | Mod: PBBFAC,,, | Performed by: NURSE PRACTITIONER

## 2021-03-10 PROCEDURE — 99215 OFFICE O/P EST HI 40 MIN: CPT | Mod: PBBFAC,25,PO | Performed by: NURSE PRACTITIONER

## 2021-03-10 PROCEDURE — 99999 PR PBB SHADOW E&M-EST. PATIENT-LVL V: ICD-10-PCS | Mod: PBBFAC,,, | Performed by: NURSE PRACTITIONER

## 2021-03-10 PROCEDURE — 73030 XR SHOULDER COMPLETE 2 OR MORE VIEWS RIGHT: ICD-10-PCS | Mod: 26,RT,, | Performed by: RADIOLOGY

## 2021-03-10 PROCEDURE — 73030 X-RAY EXAM OF SHOULDER: CPT | Mod: 26,RT,, | Performed by: RADIOLOGY

## 2021-03-10 PROCEDURE — 99214 OFFICE O/P EST MOD 30 MIN: CPT | Mod: S$PBB,,, | Performed by: NURSE PRACTITIONER

## 2021-03-10 PROCEDURE — 99214 PR OFFICE/OUTPT VISIT, EST, LEVL IV, 30-39 MIN: ICD-10-PCS | Mod: S$PBB,,, | Performed by: NURSE PRACTITIONER

## 2021-03-10 RX ORDER — DICLOFENAC SODIUM 10 MG/G
2 GEL TOPICAL 3 TIMES DAILY PRN
Qty: 100 G | Refills: 0 | Status: SHIPPED | OUTPATIENT
Start: 2021-03-10 | End: 2022-08-04

## 2021-03-22 ENCOUNTER — HOSPITAL ENCOUNTER (OUTPATIENT)
Dept: RADIOLOGY | Facility: HOSPITAL | Age: 61
Discharge: HOME | End: 2021-03-22
Attending: NURSE PRACTITIONER
Payer: MEDICAID

## 2021-03-22 DIAGNOSIS — R60.9 SWELLING OF BODY REGION: ICD-10-CM

## 2021-03-22 DIAGNOSIS — M54.2 NECK PAIN ON RIGHT SIDE: ICD-10-CM

## 2021-03-22 DIAGNOSIS — M79.10 MUSCLE PAIN: ICD-10-CM

## 2021-03-22 PROCEDURE — 76604 US EXAM CHEST: CPT | Mod: 26,,, | Performed by: RADIOLOGY

## 2021-03-22 PROCEDURE — 93880 US CAROTID BILATERAL: ICD-10-PCS | Mod: 26,,, | Performed by: RADIOLOGY

## 2021-03-22 PROCEDURE — 93880 EXTRACRANIAL BILAT STUDY: CPT | Mod: TC,PO

## 2021-03-22 PROCEDURE — 76604 US SOFT TISSUE CHEST_UPPER BACK: ICD-10-PCS | Mod: 26,,, | Performed by: RADIOLOGY

## 2021-03-22 PROCEDURE — 76604 US EXAM CHEST: CPT | Mod: TC,PO

## 2021-03-22 PROCEDURE — 93880 EXTRACRANIAL BILAT STUDY: CPT | Mod: 26,,, | Performed by: RADIOLOGY

## 2021-03-22 RX ORDER — FAMOTIDINE 40 MG/1
40 TABLET, FILM COATED ORAL DAILY
Qty: 30 TABLET | Refills: 1 | Status: SHIPPED | OUTPATIENT
Start: 2021-03-22 | End: 2021-05-22

## 2021-03-22 RX ORDER — ERGOCALCIFEROL 1.25 MG/1
50000 CAPSULE ORAL
Qty: 24 CAPSULE | Refills: 4 | Status: SHIPPED | OUTPATIENT
Start: 2021-03-22 | End: 2022-08-30

## 2021-03-25 ENCOUNTER — PATIENT OUTREACH (OUTPATIENT)
Dept: ADMINISTRATIVE | Facility: OTHER | Age: 61
End: 2021-03-25

## 2021-03-29 ENCOUNTER — OFFICE VISIT (OUTPATIENT)
Dept: OTOLARYNGOLOGY | Facility: CLINIC | Age: 61
End: 2021-03-29
Payer: MEDICAID

## 2021-03-29 VITALS — HEIGHT: 61 IN | BODY MASS INDEX: 30.93 KG/M2 | WEIGHT: 163.81 LBS

## 2021-03-29 DIAGNOSIS — R51.9 TEMPORAL PAIN: ICD-10-CM

## 2021-03-29 DIAGNOSIS — J30.9 ALLERGIC RHINITIS, UNSPECIFIED SEASONALITY, UNSPECIFIED TRIGGER: ICD-10-CM

## 2021-03-29 DIAGNOSIS — H92.01 OTALGIA, RIGHT: ICD-10-CM

## 2021-03-29 DIAGNOSIS — H53.131 SUDDEN VISUAL LOSS OF RIGHT EYE: ICD-10-CM

## 2021-03-29 DIAGNOSIS — J34.2 NASAL SEPTAL DEVIATION: ICD-10-CM

## 2021-03-29 DIAGNOSIS — S16.1XXA NECK MUSCLE STRAIN, INITIAL ENCOUNTER: Primary | ICD-10-CM

## 2021-03-29 PROCEDURE — 99204 OFFICE O/P NEW MOD 45 MIN: CPT | Mod: S$PBB,,, | Performed by: OTOLARYNGOLOGY

## 2021-03-29 PROCEDURE — 99214 OFFICE O/P EST MOD 30 MIN: CPT | Mod: PBBFAC,PO | Performed by: OTOLARYNGOLOGY

## 2021-03-29 PROCEDURE — 99999 PR PBB SHADOW E&M-EST. PATIENT-LVL IV: CPT | Mod: PBBFAC,,, | Performed by: OTOLARYNGOLOGY

## 2021-03-29 PROCEDURE — 99204 PR OFFICE/OUTPT VISIT, NEW, LEVL IV, 45-59 MIN: ICD-10-PCS | Mod: S$PBB,,, | Performed by: OTOLARYNGOLOGY

## 2021-03-29 PROCEDURE — 99999 PR PBB SHADOW E&M-EST. PATIENT-LVL IV: ICD-10-PCS | Mod: PBBFAC,,, | Performed by: OTOLARYNGOLOGY

## 2021-03-29 RX ORDER — AZELASTINE 1 MG/ML
2 SPRAY, METERED NASAL 2 TIMES DAILY PRN
Qty: 30 ML | Refills: 6 | Status: SHIPPED | OUTPATIENT
Start: 2021-03-29 | End: 2022-08-04

## 2021-03-29 RX ORDER — FLUTICASONE PROPIONATE 50 MCG
2 SPRAY, SUSPENSION (ML) NASAL DAILY
Qty: 16 G | Refills: 11 | Status: SHIPPED | OUTPATIENT
Start: 2021-03-29 | End: 2024-02-20

## 2021-04-29 ENCOUNTER — PATIENT MESSAGE (OUTPATIENT)
Dept: RESEARCH | Facility: HOSPITAL | Age: 61
End: 2021-04-29

## 2021-07-13 ENCOUNTER — PATIENT OUTREACH (OUTPATIENT)
Dept: ADMINISTRATIVE | Facility: OTHER | Age: 61
End: 2021-07-13

## 2021-07-13 DIAGNOSIS — Z12.31 VISIT FOR SCREENING MAMMOGRAM: Primary | ICD-10-CM

## 2021-07-14 ENCOUNTER — HOSPITAL ENCOUNTER (OUTPATIENT)
Dept: RADIOLOGY | Facility: HOSPITAL | Age: 61
Discharge: HOME | End: 2021-07-14
Attending: SPECIALIST
Payer: MEDICAID

## 2021-07-14 ENCOUNTER — OFFICE VISIT (OUTPATIENT)
Dept: OBSTETRICS AND GYNECOLOGY | Facility: CLINIC | Age: 61
End: 2021-07-14
Payer: MEDICAID

## 2021-07-14 VITALS
RESPIRATION RATE: 14 BRPM | BODY MASS INDEX: 30.88 KG/M2 | DIASTOLIC BLOOD PRESSURE: 68 MMHG | HEIGHT: 61 IN | WEIGHT: 163.56 LBS | SYSTOLIC BLOOD PRESSURE: 114 MMHG

## 2021-07-14 DIAGNOSIS — Z12.31 VISIT FOR SCREENING MAMMOGRAM: ICD-10-CM

## 2021-07-14 DIAGNOSIS — Z01.419 ENCOUNTER FOR ROUTINE GYNECOLOGICAL EXAMINATION WITH PAPANICOLAOU SMEAR OF CERVIX: Primary | ICD-10-CM

## 2021-07-14 PROCEDURE — 77067 SCR MAMMO BI INCL CAD: CPT | Mod: TC,PN

## 2021-07-14 PROCEDURE — 99999 PR PBB SHADOW E&M-EST. PATIENT-LVL IV: ICD-10-PCS | Mod: PBBFAC,,, | Performed by: SPECIALIST

## 2021-07-14 PROCEDURE — 77067 SCR MAMMO BI INCL CAD: CPT | Mod: 26,,, | Performed by: RADIOLOGY

## 2021-07-14 PROCEDURE — 99396 PREV VISIT EST AGE 40-64: CPT | Mod: S$PBB,,, | Performed by: SPECIALIST

## 2021-07-14 PROCEDURE — 87624 HPV HI-RISK TYP POOLED RSLT: CPT | Performed by: SPECIALIST

## 2021-07-14 PROCEDURE — 88175 CYTOPATH C/V AUTO FLUID REDO: CPT | Performed by: SPECIALIST

## 2021-07-14 PROCEDURE — 99396 PR PREVENTIVE VISIT,EST,40-64: ICD-10-PCS | Mod: S$PBB,,, | Performed by: SPECIALIST

## 2021-07-14 PROCEDURE — 99214 OFFICE O/P EST MOD 30 MIN: CPT | Mod: PBBFAC,PN | Performed by: SPECIALIST

## 2021-07-14 PROCEDURE — 77067 MAMMO DIGITAL SCREENING BILAT WITH TOMO: ICD-10-PCS | Mod: 26,,, | Performed by: RADIOLOGY

## 2021-07-14 PROCEDURE — 77063 MAMMO DIGITAL SCREENING BILAT WITH TOMO: ICD-10-PCS | Mod: 26,,, | Performed by: RADIOLOGY

## 2021-07-14 PROCEDURE — 77063 BREAST TOMOSYNTHESIS BI: CPT | Mod: 26,,, | Performed by: RADIOLOGY

## 2021-07-14 PROCEDURE — 99999 PR PBB SHADOW E&M-EST. PATIENT-LVL IV: CPT | Mod: PBBFAC,,, | Performed by: SPECIALIST

## 2021-07-19 LAB
FINAL PATHOLOGIC DIAGNOSIS: NORMAL
Lab: NORMAL

## 2021-07-21 ENCOUNTER — LAB VISIT (OUTPATIENT)
Dept: LAB | Facility: HOSPITAL | Age: 61
End: 2021-07-21
Attending: INTERNAL MEDICINE
Payer: MEDICAID

## 2021-07-21 DIAGNOSIS — Z13.220 ENCOUNTER FOR LIPID SCREENING FOR CARDIOVASCULAR DISEASE: ICD-10-CM

## 2021-07-21 DIAGNOSIS — E03.9 HYPOTHYROIDISM, UNSPECIFIED TYPE: ICD-10-CM

## 2021-07-21 DIAGNOSIS — E11.9 TYPE 2 DIABETES MELLITUS WITHOUT COMPLICATION, WITHOUT LONG-TERM CURRENT USE OF INSULIN: ICD-10-CM

## 2021-07-21 DIAGNOSIS — Z00.00 ENCOUNTER FOR ANNUAL HEALTH EXAMINATION: ICD-10-CM

## 2021-07-21 DIAGNOSIS — Z13.6 ENCOUNTER FOR LIPID SCREENING FOR CARDIOVASCULAR DISEASE: ICD-10-CM

## 2021-07-21 LAB
ALBUMIN SERPL BCP-MCNC: 4 G/DL (ref 3.5–5.2)
ALP SERPL-CCNC: 64 U/L (ref 55–135)
ALT SERPL W/O P-5'-P-CCNC: 23 U/L (ref 10–44)
ANION GAP SERPL CALC-SCNC: 11 MMOL/L (ref 8–16)
AST SERPL-CCNC: 26 U/L (ref 10–40)
BILIRUB SERPL-MCNC: 1 MG/DL (ref 0.1–1)
BUN SERPL-MCNC: 17 MG/DL (ref 8–23)
CALCIUM SERPL-MCNC: 9.8 MG/DL (ref 8.7–10.5)
CHLORIDE SERPL-SCNC: 101 MMOL/L (ref 95–110)
CHOLEST SERPL-MCNC: 171 MG/DL (ref 120–199)
CHOLEST/HDLC SERPL: 2.2 {RATIO} (ref 2–5)
CO2 SERPL-SCNC: 26 MMOL/L (ref 23–29)
CREAT SERPL-MCNC: 1 MG/DL (ref 0.5–1.4)
EST. GFR  (AFRICAN AMERICAN): >60 ML/MIN/1.73 M^2
EST. GFR  (NON AFRICAN AMERICAN): >60 ML/MIN/1.73 M^2
ESTIMATED AVG GLUCOSE: 123 MG/DL (ref 68–131)
GLUCOSE SERPL-MCNC: 128 MG/DL (ref 70–110)
HBA1C MFR BLD: 5.9 % (ref 4–5.6)
HDLC SERPL-MCNC: 78 MG/DL (ref 40–75)
HDLC SERPL: 45.6 % (ref 20–50)
LDLC SERPL CALC-MCNC: 79.4 MG/DL (ref 63–159)
NONHDLC SERPL-MCNC: 93 MG/DL
POTASSIUM SERPL-SCNC: 4.4 MMOL/L (ref 3.5–5.1)
PROT SERPL-MCNC: 7.4 G/DL (ref 6–8.4)
SODIUM SERPL-SCNC: 138 MMOL/L (ref 136–145)
TRIGL SERPL-MCNC: 68 MG/DL (ref 30–150)
TSH SERPL DL<=0.005 MIU/L-ACNC: 3.29 UIU/ML (ref 0.4–4)

## 2021-07-21 PROCEDURE — 84443 ASSAY THYROID STIM HORMONE: CPT | Performed by: INTERNAL MEDICINE

## 2021-07-21 PROCEDURE — 36415 COLL VENOUS BLD VENIPUNCTURE: CPT | Mod: PO | Performed by: INTERNAL MEDICINE

## 2021-07-21 PROCEDURE — 80061 LIPID PANEL: CPT | Performed by: INTERNAL MEDICINE

## 2021-07-21 PROCEDURE — 80053 COMPREHEN METABOLIC PANEL: CPT | Performed by: INTERNAL MEDICINE

## 2021-07-21 PROCEDURE — 83036 HEMOGLOBIN GLYCOSYLATED A1C: CPT | Performed by: INTERNAL MEDICINE

## 2021-07-22 LAB
HPV HR 12 DNA SPEC QL NAA+PROBE: NEGATIVE
HPV16 AG SPEC QL: NEGATIVE
HPV18 DNA SPEC QL NAA+PROBE: NEGATIVE

## 2021-07-26 RX ORDER — FAMOTIDINE 40 MG/1
40 TABLET, FILM COATED ORAL DAILY
Qty: 30 TABLET | Refills: 11 | Status: SHIPPED | OUTPATIENT
Start: 2021-07-26 | End: 2023-10-06

## 2021-07-28 ENCOUNTER — HOSPITAL ENCOUNTER (OUTPATIENT)
Dept: RADIOLOGY | Facility: HOSPITAL | Age: 61
Discharge: HOME | End: 2021-07-28
Attending: INTERNAL MEDICINE
Payer: MEDICAID

## 2021-07-28 ENCOUNTER — TELEPHONE (OUTPATIENT)
Dept: FAMILY MEDICINE | Facility: CLINIC | Age: 61
End: 2021-07-28

## 2021-07-28 ENCOUNTER — OFFICE VISIT (OUTPATIENT)
Dept: FAMILY MEDICINE | Facility: CLINIC | Age: 61
End: 2021-07-28
Payer: MEDICAID

## 2021-07-28 VITALS
HEIGHT: 61 IN | BODY MASS INDEX: 30.4 KG/M2 | DIASTOLIC BLOOD PRESSURE: 73 MMHG | WEIGHT: 161 LBS | SYSTOLIC BLOOD PRESSURE: 139 MMHG | TEMPERATURE: 98 F | HEART RATE: 79 BPM

## 2021-07-28 DIAGNOSIS — E03.9 HYPOTHYROIDISM, UNSPECIFIED TYPE: ICD-10-CM

## 2021-07-28 DIAGNOSIS — R45.4 IRRITABILITY: ICD-10-CM

## 2021-07-28 DIAGNOSIS — M79.10 MYALGIA: ICD-10-CM

## 2021-07-28 DIAGNOSIS — Z78.0 ASYMPTOMATIC AGE-RELATED POSTMENOPAUSAL STATE: ICD-10-CM

## 2021-07-28 DIAGNOSIS — E55.9 VITAMIN D INSUFFICIENCY: ICD-10-CM

## 2021-07-28 DIAGNOSIS — E11.9 TYPE 2 DIABETES MELLITUS WITHOUT COMPLICATION, WITHOUT LONG-TERM CURRENT USE OF INSULIN: Primary | ICD-10-CM

## 2021-07-28 PROCEDURE — 99214 OFFICE O/P EST MOD 30 MIN: CPT | Mod: PBBFAC,25,PO | Performed by: INTERNAL MEDICINE

## 2021-07-28 PROCEDURE — 77080 DEXA BONE DENSITY SPINE HIP: ICD-10-PCS | Mod: 26,,, | Performed by: RADIOLOGY

## 2021-07-28 PROCEDURE — 77080 DXA BONE DENSITY AXIAL: CPT | Mod: 26,,, | Performed by: RADIOLOGY

## 2021-07-28 PROCEDURE — 99214 OFFICE O/P EST MOD 30 MIN: CPT | Mod: S$PBB,,, | Performed by: INTERNAL MEDICINE

## 2021-07-28 PROCEDURE — 77080 DXA BONE DENSITY AXIAL: CPT | Mod: TC,PO

## 2021-07-28 PROCEDURE — 99214 PR OFFICE/OUTPT VISIT, EST, LEVL IV, 30-39 MIN: ICD-10-PCS | Mod: S$PBB,,, | Performed by: INTERNAL MEDICINE

## 2021-07-28 PROCEDURE — 99999 PR PBB SHADOW E&M-EST. PATIENT-LVL IV: CPT | Mod: PBBFAC,,, | Performed by: INTERNAL MEDICINE

## 2021-07-28 PROCEDURE — 99999 PR PBB SHADOW E&M-EST. PATIENT-LVL IV: ICD-10-PCS | Mod: PBBFAC,,, | Performed by: INTERNAL MEDICINE

## 2021-07-29 ENCOUNTER — PATIENT MESSAGE (OUTPATIENT)
Dept: FAMILY MEDICINE | Facility: CLINIC | Age: 61
End: 2021-07-29

## 2021-08-02 ENCOUNTER — TELEPHONE (OUTPATIENT)
Dept: OBSTETRICS AND GYNECOLOGY | Facility: CLINIC | Age: 61
End: 2021-08-02

## 2021-08-02 ENCOUNTER — PATIENT MESSAGE (OUTPATIENT)
Dept: OBSTETRICS AND GYNECOLOGY | Facility: CLINIC | Age: 61
End: 2021-08-02

## 2021-09-22 RX ORDER — LEVOTHYROXINE SODIUM 50 UG/1
TABLET ORAL
Qty: 30 TABLET | Refills: 11 | Status: SHIPPED | OUTPATIENT
Start: 2021-09-22 | End: 2022-08-30

## 2021-11-22 ENCOUNTER — TELEPHONE (OUTPATIENT)
Dept: FAMILY MEDICINE | Facility: CLINIC | Age: 61
End: 2021-11-22
Payer: MEDICAID

## 2021-11-23 ENCOUNTER — OFFICE VISIT (OUTPATIENT)
Dept: FAMILY MEDICINE | Facility: CLINIC | Age: 61
End: 2021-11-23
Payer: MEDICAID

## 2021-11-23 VITALS
WEIGHT: 152 LBS | BODY MASS INDEX: 28.7 KG/M2 | DIASTOLIC BLOOD PRESSURE: 79 MMHG | SYSTOLIC BLOOD PRESSURE: 138 MMHG | HEART RATE: 68 BPM | RESPIRATION RATE: 18 BRPM | HEIGHT: 61 IN | TEMPERATURE: 98 F

## 2021-11-23 DIAGNOSIS — J30.9 ALLERGIC SINUSITIS: Primary | ICD-10-CM

## 2021-11-23 PROCEDURE — 99999 PR PBB SHADOW E&M-EST. PATIENT-LVL IV: ICD-10-PCS | Mod: PBBFAC,,, | Performed by: NURSE PRACTITIONER

## 2021-11-23 PROCEDURE — 99213 PR OFFICE/OUTPT VISIT, EST, LEVL III, 20-29 MIN: ICD-10-PCS | Mod: S$PBB,,, | Performed by: NURSE PRACTITIONER

## 2021-11-23 PROCEDURE — 99999 PR PBB SHADOW E&M-EST. PATIENT-LVL IV: CPT | Mod: PBBFAC,,, | Performed by: NURSE PRACTITIONER

## 2021-11-23 PROCEDURE — 99213 OFFICE O/P EST LOW 20 MIN: CPT | Mod: S$PBB,,, | Performed by: NURSE PRACTITIONER

## 2021-11-23 PROCEDURE — 99214 OFFICE O/P EST MOD 30 MIN: CPT | Mod: PBBFAC,PO | Performed by: NURSE PRACTITIONER

## 2021-11-23 RX ORDER — PREDNISONE 20 MG/1
40 TABLET ORAL DAILY
Qty: 10 TABLET | Refills: 0 | Status: SHIPPED | OUTPATIENT
Start: 2021-11-23 | End: 2021-11-28

## 2022-01-06 ENCOUNTER — PATIENT MESSAGE (OUTPATIENT)
Dept: FAMILY MEDICINE | Facility: CLINIC | Age: 62
End: 2022-01-06
Payer: MEDICAID

## 2022-01-22 NOTE — TELEPHONE ENCOUNTER
Care Due:                  Date            Visit Type   Department     Provider  --------------------------------------------------------------------------------                                             [facility] FAM[facility]  Last Visit: 07-      None         LORETTA Mcclellan  Next Visit: None Scheduled  None         None Found                                                            Last  Test          Frequency    Reason                     Performed    Due Date  --------------------------------------------------------------------------------    HBA1C.......  6 months...  metFORMIN................  07- 01-    Powered by [employer]. Reference number: [id number].   1/22/2022 1:21:58 PM CST

## 2022-02-08 LAB
LEFT EYE DM RETINOPATHY: NEGATIVE
RIGHT EYE DM RETINOPATHY: NEGATIVE

## 2022-02-08 RX ORDER — LANCETS 33 GAUGE
EACH MISCELLANEOUS
Qty: 100 EACH | Refills: 3 | Status: SHIPPED | OUTPATIENT
Start: 2022-02-08 | End: 2023-02-17

## 2022-02-08 NOTE — TELEPHONE ENCOUNTER
Refill Authorization Note   Bessy Lamb  is requesting a refill authorization.  Brief Assessment and Rationale for Refill:  Approve     Medication Therapy Plan:  approve     Medication Reconciliation Completed: No   Comments:   --->Care Gap information included below if applicable.       Requested Prescriptions   Pending Prescriptions Disp Refills    TRUEPLUS LANCETS 33 gauge Misc [Pharmacy Med Name: ESAU SANCHEZ MIS 33G] 100 each 3     Sig: USE AS DIRECTED TO TEST BLOOD SUGAR ONCE DAILY FOR UP  USES       Endocrinology: Diabetes - Supplies Passed - 2/8/2022 11:39 AM        Passed - Patient is at least 18 years old        Passed - Valid encounter within last 15 months     Recent Visits  Date Type Provider Dept   07/28/21 Office Visit Philly Mcclellan MD [facility]   10/26/20 Office Visit Philly Mcclellan MD [facility]   09/14/20 Office Visit Philly Mcclellan MD [facility]   09/01/20 Office Visit Philly Mcclellan MD [facility]   06/30/20 Office Visit Philly Mcclellan MD [facility]   03/02/20 Office Visit Ramu Snyder MD [facility]   Showing recent visits within past 720 days and meeting all other requirements  Future Appointments  No visits were found meeting these conditions.  Showing future appointments within next 150 days and meeting all other requirements                Passed - HBA1C within 1080 days     Lab Results   Component Value Date    HGBA1C 5.9 (H) 07/21/2021    HGBA1C 6.0 (H) 01/20/2021    HGBA1C 6.5 (H) 10/19/2020                  Appointments  past 12m or future 3m with PCP    Date Provider   Last Visit   7/28/2021 Philly Mcclellan MD   Next Visit   Visit date not found Philly Mcclellan MD   ED visits in past 90 days: 0     Note composed:11:39 AM 02/08/2022

## 2022-02-15 ENCOUNTER — PATIENT OUTREACH (OUTPATIENT)
Dept: ADMINISTRATIVE | Facility: HOSPITAL | Age: 62
End: 2022-02-15
Payer: MEDICAID

## 2022-04-04 DIAGNOSIS — E11.9 TYPE 2 DIABETES MELLITUS WITHOUT COMPLICATION, WITHOUT LONG-TERM CURRENT USE OF INSULIN: ICD-10-CM

## 2022-04-04 NOTE — TELEPHONE ENCOUNTER
Care Due:                  Date            Visit Type   Department     Provider  --------------------------------------------------------------------------------                                EP -                              PRIMARY      [facility] FAMILY  Last Visit: 07-      CARE ([facility])   MEDICINE       Philly Mcclellan  Next Visit: None Scheduled  None         None Found                                                            Last  Test          Frequency    Reason                     Performed    Due Date  --------------------------------------------------------------------------------    HBA1C.......  6 months...  metFORMIN................  07- 01-    Powered by [employer]. Reference number: [id number].   4/04/2022 10:21:51 AM CDT

## 2022-04-05 RX ORDER — CALCIUM CITRATE/VITAMIN D3 200MG-6.25
TABLET ORAL
Qty: 100 EACH | Refills: 3 | Status: SHIPPED | OUTPATIENT
Start: 2022-04-05

## 2022-04-06 NOTE — TELEPHONE ENCOUNTER
Refill Authorization Note   Bessy Lamb  is requesting a refill authorization.  Brief Assessment and Rationale for Refill:  Approve     Medication Therapy Plan:       Medication Reconciliation Completed: No   Comments:     Provider Staff:     Action is required for this patient.   Please see care gap opportunities below in Care Due Message.     Thanks!  [facility]     Appointments      Date Provider   Last Visit   7/28/2021 Philly Mcclellan MD   Next Visit   Visit date not found Philly Mcclellan MD     Note composed:8:54 PM 04/05/2022           Note composed:8:54 PM 04/05/2022

## 2022-04-09 NOTE — PROGRESS NOTES
57 yo WF presents for annual gyn evaluation. Noted to have large pedunculated endocervical polyp. Pt is scheduled to undergo hysteroscopy and polypectomy, possible Myosure to remove and determine endomtrial pathology present or not.  I discussed indications, risks of perforation, bleeding, infection, prolonged recovery as well as possible need for additional procedures.  Consents and orders obtained for [facility] 17 7am       Past Medical History:   Diagnosis Date    Arthritis, lumbar spine      Blood dyscrasia       left breast lumpectomy and bilateral reduction    GERD (gastroesophageal reflux disease)      Hypothyroidism 10/8/2014    Obesity (BMI 30-39.9) 10/8/2014    Thyroid disease      Thyroid nodule 10/8/2014               Past Surgical History:   Procedure Laterality Date    breast reduction        c-sections x2         SECTION        COLONOSCOPY   2012                     Family History   Problem Relation Age of Onset    Diabetes Father      Colon cancer Father      Breast cancer Maternal Aunt      Breast cancer Paternal Aunt      Ovarian cancer Neg Hx           Social History            Social History    Marital status:        Spouse name: N/A    Number of children: N/A    Years of education: N/A           Social History Main Topics    Smoking status: Never Smoker    Smokeless tobacco: Never Used    Alcohol use No    Drug use: No    Sexual activity: No           Other Topics Concern    None          Social History Narrative    None         Current Medications          Current Outpatient Prescriptions   Medication Sig Dispense Refill    CALCIUM CARBONATE (CALTRATE 600 ORAL) Take by mouth.        clotrimazole (LOTRIMIN) 1 % cream Apply topically 2 (two) times daily. 60 g 1    ergocalciferol (VITAMIN D2) 50,000 unit Cap Take 1 capsule (50,000 Units total) by mouth every 7 days. 12 capsule 3    hydrOXYzine HCl (ATARAX) 25 MG tablet TAKE 1 TABLET BY MOUTH TWO  TIMES DAILY AS NEEDED. 14 tablet 0    ketoconazole (NIZORAL) 2 % cream Apply topically once daily.        levothyroxine (SYNTHROID) 50 MCG tablet TAKE ONE TABLET BY MOUTH ONE TIME DAILY 90 tablet 3    methylPREDNISolone (MEDROL DOSEPACK) 4 mg tablet As directed 1 Package 0    triamcinolone acetonide 0.5% (KENALOG) 0.5 % Crea Apply topically 2 (two) times daily. 30 g 0      No current facility-administered medications for this visit.                  Review of patient's allergies indicates:   Allergen Reactions    Duricef [cefadroxil]           Review of System:   General: no chills, fever, night sweats, weight gain or weight loss  POSITIVE TRUNCAL RASH             Psychological: no depression or suicidal ideation  Breasts: no new or changing breast lumps, nipple discharge or masses.  Respiratory: no cough, shortness of breath, or wheezing  Cardiovascular: no chest pain or dyspnea on exertion  Gastrointestinal: no abdominal pain, change in bowel habits, or black or bloody stools  Genito-Urinary: no incontinence, urinary frequency/urgency or vulvar/vaginal symptoms, pelvic pain or abnormal vaginal bleeding.  Musculoskeletal: no gait disturbance or muscular weakness     General Appearance:  Alert, cooperative, no distress, appears stated age   Head:  Normocephalic, without obvious abnormality, atraumatic   Eyes:  PERRL, conjunctiva/corneas clear, EOM's intact, fundi benign, both eyes   Ears:  Normal TM's and external ear canals, both ears   Nose: Nares normal, septum midline,mucosa normal, no drainage or sinus tenderness   Throat: Lips, mucosa, and tongue normal; teeth and gums normal   Neck: Supple, symmetrical, trachea midline, no adenopathy;  thyroid: not enlarged, symmetric, no tenderness/mass/nodules; no carotid bruit or JVD   Back:   Symmetric, no curvature, ROM normal, no CVA tenderness   Lungs:   Clear to auscultation bilaterally, respirations unlabored   Breasts:  No masses or tenderness   Heart:  Regular  rate and rhythm, S1 and S2 normal, no murmur, rub, or gallop   Abdomen:   Soft, non-tender, bowel sounds active all four quadrants,  no masses, no organomegaly     Genitourinary:   External rectal exam shows no thrombosed external hemorrhoids.   Pelvic exam was performed with patient supine.  No labial fusion.  There is no rash, lesion or injury on the right labia.  There is no rash, lesion or injury on the left labia.  No bleeding and no signs of injury around the vaginal introitus, urethra is without lesions and well supported. The cervix is visualized with no discharge. Large prolapsed polpoid growth projecting from cervical os.  No vaginal discharge found.  No significant Cystocele, Enterocele or rectocele, and uterus well supported.  Bimanual exam:  The urethra is normal to palpation and there are no palpable vaginal wall masses.  Uterus is not deviated, not enlarged, not fixed, normal shape and not tender.  Cervix exhibits no motion tenderness.   Right adnexum displays no mass and no tenderness.  Left adnexum displays no mass and no tenderness.   Extremities: Extremities normal, atraumatic, no cyanosis or edema   Pulses: 2+ and symmetric   Skin: Skin color, texture, turgor normal, no rashes or lesions   Lymph nodes: Cervical, supraclavicular, and axillary nodes normal   Neurologic: Normal         Plan  Will proceed [facility] 7/5/17 7am            All questions answered     normal (ped)...

## 2022-04-21 ENCOUNTER — PATIENT MESSAGE (OUTPATIENT)
Dept: ADMINISTRATIVE | Facility: HOSPITAL | Age: 62
End: 2022-04-21
Payer: MEDICAID

## 2022-05-05 DIAGNOSIS — E11.9 TYPE 2 DIABETES MELLITUS WITHOUT COMPLICATION, WITHOUT LONG-TERM CURRENT USE OF INSULIN: ICD-10-CM

## 2022-05-05 NOTE — TELEPHONE ENCOUNTER
Refill Routing Note   Medication(s) are not appropriate for processing by [facility] for the following reason(s):      - Required laboratory values are outdated    Medication-related problems identified:   Requires labs  Requires appointment          Medication reconciliation completed: No     Appointments  past 12m or future 3m with PCP    Date Provider   Last Visit   7/28/2021 Philly Mcclellan MD   Next Visit   Visit date not found Philly Mcclellan MD

## 2022-05-05 NOTE — TELEPHONE ENCOUNTER
Care Due:                  Date            Visit Type   Department     Provider  --------------------------------------------------------------------------------                                EP -                              PRIMARY      [facility] FAMILY  Last Visit: 07-      CARE ([facility])   MEDICINE       Philly Mcclellan  Next Visit: None Scheduled  None         None Found                                                            Last  Test          Frequency    Reason                     Performed    Due Date  --------------------------------------------------------------------------------    Office Visit  12 months..  famotidine, metFORMIN....  07- 07-    Cr..........  12 months..  famotidine, metFORMIN....  07- 07-    [facility] Embedded Care Gaps. Reference number: [id number]. 5/05/2022   11:46:47 AM CDT

## 2022-05-06 RX ORDER — METFORMIN HYDROCHLORIDE 500 MG/1
TABLET, EXTENDED RELEASE ORAL
Qty: 90 TABLET | Refills: 0 | Status: SHIPPED | OUTPATIENT
Start: 2022-05-06 | End: 2022-08-08

## 2022-05-18 ENCOUNTER — TELEPHONE (OUTPATIENT)
Dept: FAMILY MEDICINE | Facility: CLINIC | Age: 62
End: 2022-05-18
Payer: MEDICAID

## 2022-05-18 ENCOUNTER — OFFICE VISIT (OUTPATIENT)
Dept: FAMILY MEDICINE | Facility: CLINIC | Age: 62
End: 2022-05-18
Payer: MEDICAID

## 2022-05-18 VITALS
DIASTOLIC BLOOD PRESSURE: 79 MMHG | SYSTOLIC BLOOD PRESSURE: 137 MMHG | WEIGHT: 169.63 LBS | BODY MASS INDEX: 32.02 KG/M2 | HEART RATE: 77 BPM | HEIGHT: 61 IN

## 2022-05-18 DIAGNOSIS — R42 DIZZINESS: Primary | ICD-10-CM

## 2022-05-18 PROCEDURE — 99999 PR PBB SHADOW E&M-EST. PATIENT-LVL IV: CPT | Mod: PBBFAC,,, | Performed by: PHYSICIAN ASSISTANT

## 2022-05-18 PROCEDURE — 99214 PR OFFICE/OUTPT VISIT, EST, LEVL IV, 30-39 MIN: ICD-10-PCS | Mod: S$PBB,,, | Performed by: PHYSICIAN ASSISTANT

## 2022-05-18 PROCEDURE — 3075F SYST BP GE 130 - 139MM HG: CPT | Mod: CPTII,,, | Performed by: PHYSICIAN ASSISTANT

## 2022-05-18 PROCEDURE — 99999 PR PBB SHADOW E&M-EST. PATIENT-LVL IV: ICD-10-PCS | Mod: PBBFAC,,, | Performed by: PHYSICIAN ASSISTANT

## 2022-05-18 PROCEDURE — 1160F PR REVIEW ALL MEDS BY PRESCRIBER/CLIN PHARMACIST DOCUMENTED: ICD-10-PCS | Mod: CPTII,,, | Performed by: PHYSICIAN ASSISTANT

## 2022-05-18 PROCEDURE — 3078F PR MOST RECENT DIASTOLIC BLOOD PRESSURE < 80 MM HG: ICD-10-PCS | Mod: CPTII,,, | Performed by: PHYSICIAN ASSISTANT

## 2022-05-18 PROCEDURE — 99214 OFFICE O/P EST MOD 30 MIN: CPT | Mod: S$PBB,,, | Performed by: PHYSICIAN ASSISTANT

## 2022-05-18 PROCEDURE — 3008F PR BODY MASS INDEX (BMI) DOCUMENTED: ICD-10-PCS | Mod: CPTII,,, | Performed by: PHYSICIAN ASSISTANT

## 2022-05-18 PROCEDURE — 93010 ELECTROCARDIOGRAM REPORT: CPT | Mod: S$PBB,,, | Performed by: INTERNAL MEDICINE

## 2022-05-18 PROCEDURE — 3008F BODY MASS INDEX DOCD: CPT | Mod: CPTII,,, | Performed by: PHYSICIAN ASSISTANT

## 2022-05-18 PROCEDURE — 1159F MED LIST DOCD IN RCRD: CPT | Mod: CPTII,,, | Performed by: PHYSICIAN ASSISTANT

## 2022-05-18 PROCEDURE — 3078F DIAST BP <80 MM HG: CPT | Mod: CPTII,,, | Performed by: PHYSICIAN ASSISTANT

## 2022-05-18 PROCEDURE — 93005 ELECTROCARDIOGRAM TRACING: CPT | Mod: PBBFAC,PO | Performed by: INTERNAL MEDICINE

## 2022-05-18 PROCEDURE — 93010 EKG 12-LEAD: ICD-10-PCS | Mod: S$PBB,,, | Performed by: INTERNAL MEDICINE

## 2022-05-18 PROCEDURE — 99214 OFFICE O/P EST MOD 30 MIN: CPT | Mod: PBBFAC,PO | Performed by: PHYSICIAN ASSISTANT

## 2022-05-18 PROCEDURE — 1160F RVW MEDS BY RX/DR IN RCRD: CPT | Mod: CPTII,,, | Performed by: PHYSICIAN ASSISTANT

## 2022-05-18 PROCEDURE — 3075F PR MOST RECENT SYSTOLIC BLOOD PRESS GE 130-139MM HG: ICD-10-PCS | Mod: CPTII,,, | Performed by: PHYSICIAN ASSISTANT

## 2022-05-18 PROCEDURE — 1159F PR MEDICATION LIST DOCUMENTED IN MEDICAL RECORD: ICD-10-PCS | Mod: CPTII,,, | Performed by: PHYSICIAN ASSISTANT

## 2022-05-18 RX ORDER — MECLIZINE HYDROCHLORIDE 25 MG/1
25 TABLET ORAL 3 TIMES DAILY PRN
Qty: 30 TABLET | Refills: 0 | Status: SHIPPED | OUTPATIENT
Start: 2022-05-18 | End: 2022-06-22 | Stop reason: SDUPTHER

## 2022-05-18 NOTE — TELEPHONE ENCOUNTER
----- Message from Elvira Saldivar sent at 5/18/2022  2:57 PM CDT -----  Pt is experiencing dizziness and would like the nurse to call her back. Call back number is [phone]. Thx. EL

## 2022-05-18 NOTE — TELEPHONE ENCOUNTER
----- Message from Iliana Piper sent at 5/18/2022  2:44 PM CDT -----  Contact: self  Pt is needing to schedule an appt, pt states it's vertigo. Please call back at [phone]
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
## 2022-05-19 ENCOUNTER — LAB VISIT (OUTPATIENT)
Dept: LAB | Facility: HOSPITAL | Age: 62
End: 2022-05-19
Attending: PHYSICIAN ASSISTANT
Payer: MEDICAID

## 2022-05-19 ENCOUNTER — TELEPHONE (OUTPATIENT)
Dept: FAMILY MEDICINE | Facility: CLINIC | Age: 62
End: 2022-05-19
Payer: MEDICAID

## 2022-05-19 DIAGNOSIS — R42 DIZZINESS: ICD-10-CM

## 2022-05-19 LAB
25(OH)D3+25(OH)D2 SERPL-MCNC: 25 NG/ML (ref 30–96)
ALBUMIN SERPL BCP-MCNC: 4.1 G/DL (ref 3.5–5.2)
ALP SERPL-CCNC: 60 U/L (ref 55–135)
ALT SERPL W/O P-5'-P-CCNC: 20 U/L (ref 10–44)
ANION GAP SERPL CALC-SCNC: 10 MMOL/L (ref 8–16)
AST SERPL-CCNC: 23 U/L (ref 10–40)
BASOPHILS # BLD AUTO: 0.04 K/UL (ref 0–0.2)
BASOPHILS NFR BLD: 0.6 % (ref 0–1.9)
BILIRUB SERPL-MCNC: 0.7 MG/DL (ref 0.1–1)
BUN SERPL-MCNC: 16 MG/DL (ref 8–23)
CALCIUM SERPL-MCNC: 9 MG/DL (ref 8.7–10.5)
CHLORIDE SERPL-SCNC: 104 MMOL/L (ref 95–110)
CHOLEST SERPL-MCNC: 176 MG/DL (ref 120–199)
CHOLEST/HDLC SERPL: 2.2 {RATIO} (ref 2–5)
CO2 SERPL-SCNC: 26 MMOL/L (ref 23–29)
CREAT SERPL-MCNC: 0.8 MG/DL (ref 0.5–1.4)
DIFFERENTIAL METHOD: NORMAL
EOSINOPHIL # BLD AUTO: 0.1 K/UL (ref 0–0.5)
EOSINOPHIL NFR BLD: 2.3 % (ref 0–8)
ERYTHROCYTE [DISTWIDTH] IN BLOOD BY AUTOMATED COUNT: 12.9 % (ref 11.5–14.5)
EST. GFR  (AFRICAN AMERICAN): >60 ML/MIN/1.73 M^2
EST. GFR  (NON AFRICAN AMERICAN): >60 ML/MIN/1.73 M^2
ESTIMATED AVG GLUCOSE: 126 MG/DL (ref 68–131)
GLUCOSE SERPL-MCNC: 131 MG/DL (ref 70–110)
HBA1C MFR BLD: 6 % (ref 4–5.6)
HCT VFR BLD AUTO: 39.2 % (ref 37–48.5)
HDLC SERPL-MCNC: 81 MG/DL (ref 40–75)
HDLC SERPL: 46 % (ref 20–50)
HGB BLD-MCNC: 13.2 G/DL (ref 12–16)
IMM GRANULOCYTES # BLD AUTO: 0.01 K/UL (ref 0–0.04)
IMM GRANULOCYTES NFR BLD AUTO: 0.2 % (ref 0–0.5)
LDLC SERPL CALC-MCNC: 82.2 MG/DL (ref 63–159)
LYMPHOCYTES # BLD AUTO: 2.3 K/UL (ref 1–4.8)
LYMPHOCYTES NFR BLD: 37 % (ref 18–48)
MCH RBC QN AUTO: 28.4 PG (ref 27–31)
MCHC RBC AUTO-ENTMCNC: 33.7 G/DL (ref 32–36)
MCV RBC AUTO: 85 FL (ref 82–98)
MONOCYTES # BLD AUTO: 0.4 K/UL (ref 0.3–1)
MONOCYTES NFR BLD: 6.4 % (ref 4–15)
NEUTROPHILS # BLD AUTO: 3.3 K/UL (ref 1.8–7.7)
NEUTROPHILS NFR BLD: 53.5 % (ref 38–73)
NONHDLC SERPL-MCNC: 95 MG/DL
NRBC BLD-RTO: 0 /100 WBC
PLATELET # BLD AUTO: 252 K/UL (ref 150–450)
PMV BLD AUTO: 10 FL (ref 9.2–12.9)
POTASSIUM SERPL-SCNC: 4.7 MMOL/L (ref 3.5–5.1)
PROT SERPL-MCNC: 6.7 G/DL (ref 6–8.4)
RBC # BLD AUTO: 4.64 M/UL (ref 4–5.4)
SODIUM SERPL-SCNC: 140 MMOL/L (ref 136–145)
TRIGL SERPL-MCNC: 64 MG/DL (ref 30–150)
TSH SERPL DL<=0.005 MIU/L-ACNC: 2.75 UIU/ML (ref 0.4–4)
WBC # BLD AUTO: 6.21 K/UL (ref 3.9–12.7)

## 2022-05-19 PROCEDURE — 80061 LIPID PANEL: CPT | Performed by: PHYSICIAN ASSISTANT

## 2022-05-19 PROCEDURE — 82306 VITAMIN D 25 HYDROXY: CPT | Performed by: PHYSICIAN ASSISTANT

## 2022-05-19 PROCEDURE — 83036 HEMOGLOBIN GLYCOSYLATED A1C: CPT | Performed by: PHYSICIAN ASSISTANT

## 2022-05-19 PROCEDURE — 85025 COMPLETE CBC W/AUTO DIFF WBC: CPT | Performed by: PHYSICIAN ASSISTANT

## 2022-05-19 PROCEDURE — 80053 COMPREHEN METABOLIC PANEL: CPT | Performed by: PHYSICIAN ASSISTANT

## 2022-05-19 PROCEDURE — 84443 ASSAY THYROID STIM HORMONE: CPT | Performed by: PHYSICIAN ASSISTANT

## 2022-05-19 PROCEDURE — 36415 COLL VENOUS BLD VENIPUNCTURE: CPT | Mod: PO | Performed by: PHYSICIAN ASSISTANT

## 2022-05-19 NOTE — TELEPHONE ENCOUNTER
----- Message from Lulu Locke sent at 5/19/2022  8:12 AM CDT -----  Contact: [phone]  Patient would like to consult with a nurse in regards to her appt she has on yesterday 5/18. Please call back at [phone]. Thanks r/s

## 2022-05-20 NOTE — PROGRESS NOTES
Results have been released via [employer]. Please verify that these have been viewed by patient. If not, please call patient with results.     I have sent a message to them with the following interpretation (see below).    I have reviewed your recent blood work.     Your complete blood count is normal, no anemia.   Your metabolic panel which shows your electrolytes, glucose, kidney and liver function is normal.  Your cholesterol is within normal limits.  Thyroid studies are normal.   Vitamin D level is low. Please make sure that you are taking Vitamin D 50,000 units weekly.  Your hemoglobin A1c is at goal.     Please do not hesitate to call or message with any additional questions or concerns.    Paulina Warner PA-C

## 2022-05-27 ENCOUNTER — PATIENT MESSAGE (OUTPATIENT)
Dept: FAMILY MEDICINE | Facility: CLINIC | Age: 62
End: 2022-05-27
Payer: MEDICAID

## 2022-06-07 ENCOUNTER — TELEPHONE (OUTPATIENT)
Dept: OBSTETRICS AND GYNECOLOGY | Facility: CLINIC | Age: 62
End: 2022-06-07
Payer: MEDICAID

## 2022-06-07 ENCOUNTER — TELEPHONE (OUTPATIENT)
Dept: FAMILY MEDICINE | Facility: CLINIC | Age: 62
End: 2022-06-07
Payer: MEDICAID

## 2022-06-07 DIAGNOSIS — Z12.31 VISIT FOR SCREENING MAMMOGRAM: Primary | ICD-10-CM

## 2022-06-07 NOTE — TELEPHONE ENCOUNTER
Attempted to contact pt: ash message that we will schedule her annual after 7/28/22 as requested and mail it to her or she can view it on .comt

## 2022-06-07 NOTE — TELEPHONE ENCOUNTER
----- Message from Tatiana Garcia sent at 6/7/2022  2:34 PM CDT -----  Regarding: advice  Contact: Patient/[phone] (home)  Type: Needs Medical Advice  Who Called:  Patient/[phone] (home)       Additional Information: Patient has her WWE on  7/15/22 and needs to get a mammogram scheduled. Please call to advise.

## 2022-06-07 NOTE — TELEPHONE ENCOUNTER
----- Message from Chelita Jackson sent at 6/7/2022  2:26 PM CDT -----  Contact: Bessy  .Type:  Sooner Apoointment Request    Caller is requesting a sooner appointment.  Caller declined first available appointment listed below.  Caller will not accept being placed on the waitlist and is requesting a message be sent to doctor.  Name of Caller:Bessy   When is the first available appointment? 07/31/2023  Symptoms:annual  Would the patient rather a call back or a response via [facility]? call  Best Call Back Number:[phone]    Additional Information: pt is requesting a callback in regards to scheduling an annual visit after 07/28/2022 please

## 2022-06-22 DIAGNOSIS — R42 DIZZINESS: ICD-10-CM

## 2022-06-22 RX ORDER — MECLIZINE HYDROCHLORIDE 25 MG/1
25 TABLET ORAL 3 TIMES DAILY PRN
Qty: 30 TABLET | Refills: 0 | Status: SHIPPED | OUTPATIENT
Start: 2022-06-22 | End: 2023-03-22 | Stop reason: SDUPTHER

## 2022-06-22 NOTE — TELEPHONE ENCOUNTER
----- Message from Nao Del Cid sent at 6/22/2022  4:44 PM CDT -----  Contact: Jeana([facility])  Pharmacy called to consult with nurse or staff regarding the patients prescription for meclizine (ANTIVERT) 25 mg tablet. Jeana states the patient is going out of town soon and would like to have the prescription filled before she leaves. Pharmacy states they have sent request but has not heard a response. They would like a call back and can be reached at [phone]. Thanks/MR

## 2022-06-22 NOTE — TELEPHONE ENCOUNTER
----- Message from Yoel Lino sent at 6/22/2022  4:46 PM CDT -----  Contact: [phone]  Type:  RX Refill Request    Who Called:   Refill or New Rx:new   RX Name and Strength:meclizine (ANTIVERT) 25 mg tablet  How is the patient currently taking it? (ex. 1XDay):3x a day   Is this a 30 day or 90 day RX:30  Preferred Pharmacy with phone number:  [facility] [address] [facility]  [address] [address] [address]  Phone: [phone] Fax: [phone]  Local or Mail Order:local   Ordering Provider:Dr Mcclellan   Would the patient rather a call back or a response via [facility]? Call back   Best Call Back Number:[phone]   Additional Information:pt is completely out and is leaving out of town tomorrow , would like a call back refill today

## 2022-07-07 ENCOUNTER — PATIENT MESSAGE (OUTPATIENT)
Dept: FAMILY MEDICINE | Facility: CLINIC | Age: 62
End: 2022-07-07
Payer: MEDICAID

## 2022-07-15 ENCOUNTER — OFFICE VISIT (OUTPATIENT)
Dept: OBSTETRICS AND GYNECOLOGY | Facility: CLINIC | Age: 62
End: 2022-07-15
Payer: MEDICAID

## 2022-07-15 ENCOUNTER — HOSPITAL ENCOUNTER (OUTPATIENT)
Dept: RADIOLOGY | Facility: HOSPITAL | Age: 62
Discharge: HOME | End: 2022-07-15
Attending: SPECIALIST
Payer: MEDICAID

## 2022-07-15 ENCOUNTER — PATIENT MESSAGE (OUTPATIENT)
Dept: OBSTETRICS AND GYNECOLOGY | Facility: CLINIC | Age: 62
End: 2022-07-15

## 2022-07-15 VITALS
SYSTOLIC BLOOD PRESSURE: 148 MMHG | DIASTOLIC BLOOD PRESSURE: 78 MMHG | BODY MASS INDEX: 30.01 KG/M2 | HEIGHT: 61 IN | WEIGHT: 158.94 LBS

## 2022-07-15 DIAGNOSIS — Z12.31 VISIT FOR SCREENING MAMMOGRAM: ICD-10-CM

## 2022-07-15 DIAGNOSIS — Z01.419 ENCOUNTER FOR GYNECOLOGICAL EXAMINATION: Primary | ICD-10-CM

## 2022-07-15 PROCEDURE — 1159F PR MEDICATION LIST DOCUMENTED IN MEDICAL RECORD: ICD-10-PCS | Mod: CPTII,,, | Performed by: SPECIALIST

## 2022-07-15 PROCEDURE — 3008F PR BODY MASS INDEX (BMI) DOCUMENTED: ICD-10-PCS | Mod: CPTII,,, | Performed by: SPECIALIST

## 2022-07-15 PROCEDURE — 77067 MAMMO DIGITAL SCREENING BILAT WITH TOMO: ICD-10-PCS | Mod: 26,,, | Performed by: RADIOLOGY

## 2022-07-15 PROCEDURE — 3044F PR MOST RECENT HEMOGLOBIN A1C LEVEL <7.0%: ICD-10-PCS | Mod: CPTII,,, | Performed by: SPECIALIST

## 2022-07-15 PROCEDURE — 1159F MED LIST DOCD IN RCRD: CPT | Mod: CPTII,,, | Performed by: SPECIALIST

## 2022-07-15 PROCEDURE — 99396 PREV VISIT EST AGE 40-64: CPT | Mod: S$PBB,,, | Performed by: SPECIALIST

## 2022-07-15 PROCEDURE — 99999 PR PBB SHADOW E&M-EST. PATIENT-LVL III: ICD-10-PCS | Mod: PBBFAC,,, | Performed by: SPECIALIST

## 2022-07-15 PROCEDURE — 3078F PR MOST RECENT DIASTOLIC BLOOD PRESSURE < 80 MM HG: ICD-10-PCS | Mod: CPTII,,, | Performed by: SPECIALIST

## 2022-07-15 PROCEDURE — 99213 OFFICE O/P EST LOW 20 MIN: CPT | Mod: PBBFAC,PN | Performed by: SPECIALIST

## 2022-07-15 PROCEDURE — 77063 MAMMO DIGITAL SCREENING BILAT WITH TOMO: ICD-10-PCS | Mod: 26,,, | Performed by: RADIOLOGY

## 2022-07-15 PROCEDURE — 77067 SCR MAMMO BI INCL CAD: CPT | Mod: 26,,, | Performed by: RADIOLOGY

## 2022-07-15 PROCEDURE — 3078F DIAST BP <80 MM HG: CPT | Mod: CPTII,,, | Performed by: SPECIALIST

## 2022-07-15 PROCEDURE — 77067 SCR MAMMO BI INCL CAD: CPT | Mod: TC,PN

## 2022-07-15 PROCEDURE — 77063 BREAST TOMOSYNTHESIS BI: CPT | Mod: TC,PN

## 2022-07-15 PROCEDURE — 3077F PR MOST RECENT SYSTOLIC BLOOD PRESSURE >= 140 MM HG: ICD-10-PCS | Mod: CPTII,,, | Performed by: SPECIALIST

## 2022-07-15 PROCEDURE — 3077F SYST BP >= 140 MM HG: CPT | Mod: CPTII,,, | Performed by: SPECIALIST

## 2022-07-15 PROCEDURE — 99999 PR PBB SHADOW E&M-EST. PATIENT-LVL III: CPT | Mod: PBBFAC,,, | Performed by: SPECIALIST

## 2022-07-15 PROCEDURE — 77063 BREAST TOMOSYNTHESIS BI: CPT | Mod: 26,,, | Performed by: RADIOLOGY

## 2022-07-15 PROCEDURE — 3044F HG A1C LEVEL LT 7.0%: CPT | Mod: CPTII,,, | Performed by: SPECIALIST

## 2022-07-15 PROCEDURE — 88175 CYTOPATH C/V AUTO FLUID REDO: CPT | Performed by: SPECIALIST

## 2022-07-15 PROCEDURE — 99396 PR PREVENTIVE VISIT,EST,40-64: ICD-10-PCS | Mod: S$PBB,,, | Performed by: SPECIALIST

## 2022-07-15 PROCEDURE — 3008F BODY MASS INDEX DOCD: CPT | Mod: CPTII,,, | Performed by: SPECIALIST
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
## 2022-07-20 LAB
CLINICAL INFO: NORMAL
CYTO CVX: NORMAL
CYTOLOGIST CVX/VAG CYTO: NORMAL
CYTOLOGIST CVX/VAG CYTO: NORMAL
CYTOLOGY CMNT CVX/VAG CYTO-IMP: NORMAL
CYTOLOGY PAP THIN PREP EXPLANATION: NORMAL
DATE OF PREVIOUS PAP: NO
DATE PREVIOUS BX: NO
GEN CATEG CVX/VAG CYTO-IMP: NORMAL
LMP START DATE: NORMAL
MICROORGANISM CVX/VAG CYTO: NORMAL
PATHOLOGIST CVX/VAG CYTO: NORMAL
SERVICE CMNT-IMP: NORMAL
SPECIMEN SOURCE CVX/VAG CYTO: NORMAL
STAT OF ADQ CVX/VAG CYTO-IMP: NORMAL

## 2022-07-26 NOTE — ASSESSMENT & PLAN NOTE
-hx of thyroid nodules in 2014, did not meet FNA criteria  -never followed up with US  -patient noticing fullness in neck  -repeat thyroid us and thyroid studies ordered  
-most recent vitamin D wnl  -currently on vit d3 50k twice weekly  
-mostly involving hands  -has PRN voltaren PO  -will also provide topical voltaren to use alternatively  
-reports hx of MVP  -not followed by cardiology  -previously on beta blockers but taken off  -asymptomatic  
Complete history and physical was completed today.  Complete and thorough medication reconciliation was performed.  Discussed risks and benefits of medications.  Advised patient on orders and health maintenance.  We discussed old records and old labs if available.  Will request any records not available through epic.  Continue current medications listed on your summary sheet.  
Lab Results   Component Value Date    TSH 2.357 06/21/2018   -due for repeat TSH  -remains on levothyroxine 50 mcg  
Negative

## 2022-07-29 ENCOUNTER — TELEPHONE (OUTPATIENT)
Dept: FAMILY MEDICINE | Facility: CLINIC | Age: 62
End: 2022-07-29
Payer: MEDICAID

## 2022-07-29 ENCOUNTER — LAB VISIT (OUTPATIENT)
Dept: LAB | Facility: HOSPITAL | Age: 62
End: 2022-07-29
Attending: INTERNAL MEDICINE
Payer: MEDICAID

## 2022-07-29 DIAGNOSIS — E11.9 TYPE 2 DIABETES MELLITUS WITHOUT COMPLICATION, WITHOUT LONG-TERM CURRENT USE OF INSULIN: ICD-10-CM

## 2022-07-29 DIAGNOSIS — R30.0 DYSURIA: ICD-10-CM

## 2022-07-29 DIAGNOSIS — N30.00 ACUTE CYSTITIS WITHOUT HEMATURIA: Primary | ICD-10-CM

## 2022-07-29 DIAGNOSIS — R30.0 DYSURIA: Primary | ICD-10-CM

## 2022-07-29 LAB
ALBUMIN/CREAT UR: NORMAL UG/MG (ref 0–30)
BACTERIA #/AREA URNS HPF: ABNORMAL /HPF
BILIRUB UR QL STRIP: NEGATIVE
CLARITY UR: ABNORMAL
COLOR UR: ABNORMAL
CREAT UR-MCNC: 15 MG/DL (ref 15–325)
GLUCOSE UR QL STRIP: NEGATIVE
HGB UR QL STRIP: ABNORMAL
KETONES UR QL STRIP: NEGATIVE
LEUKOCYTE ESTERASE UR QL STRIP: ABNORMAL
MICROALBUMIN UR DL<=1MG/L-MCNC: <5 UG/ML
MICROSCOPIC COMMENT: ABNORMAL
NITRITE UR QL STRIP: NEGATIVE
NON-SQ EPI CELLS #/AREA URNS HPF: <1 /HPF
PH UR STRIP: 5.5 [PH] (ref 5–8)
PROT UR QL STRIP: NEGATIVE
RBC #/AREA URNS HPF: 2 /HPF (ref 0–4)
SP GR UR STRIP: 1 (ref 1–1.03)
SQUAMOUS #/AREA URNS HPF: 1 /HPF
URN SPEC COLLECT METH UR: ABNORMAL
WBC #/AREA URNS HPF: 17 /HPF (ref 0–5)

## 2022-07-29 PROCEDURE — 87186 SC STD MICRODIL/AGAR DIL: CPT | Performed by: INTERNAL MEDICINE

## 2022-07-29 PROCEDURE — 87086 URINE CULTURE/COLONY COUNT: CPT | Performed by: INTERNAL MEDICINE

## 2022-07-29 PROCEDURE — 87077 CULTURE AEROBIC IDENTIFY: CPT | Performed by: INTERNAL MEDICINE

## 2022-07-29 PROCEDURE — 82043 UR ALBUMIN QUANTITATIVE: CPT | Performed by: INTERNAL MEDICINE

## 2022-07-29 PROCEDURE — 87088 URINE BACTERIA CULTURE: CPT | Performed by: INTERNAL MEDICINE

## 2022-07-29 PROCEDURE — 81000 URINALYSIS NONAUTO W/SCOPE: CPT | Mod: PO | Performed by: INTERNAL MEDICINE

## 2022-07-29 PROCEDURE — 82570 ASSAY OF URINE CREATININE: CPT | Performed by: INTERNAL MEDICINE

## 2022-07-29 RX ORDER — NITROFURANTOIN 25; 75 MG/1; MG/1
100 CAPSULE ORAL 2 TIMES DAILY
Qty: 10 CAPSULE | Refills: 0 | Status: SHIPPED | OUTPATIENT
Start: 2022-07-29 | End: 2022-08-04

## 2022-07-29 NOTE — TELEPHONE ENCOUNTER
----- Message from Munir Olivares sent at 7/29/2022 12:43 PM CDT -----  Contact: self  Type:  Patient Returning Call    Who Called:Bessy Lamb   Who Left Message for Patient: nurse   Does the patient know what this is regarding?: responding to her message   Would the patient rather a call back or a response via [facility]?  Call back   Best Call Back Number: [phone]   Additional Information:

## 2022-07-29 NOTE — TELEPHONE ENCOUNTER
----- Message from Lulu Locke sent at 7/29/2022 12:25 PM CDT -----  Contact: [phone]  Patient would like to consult with a nurse in regards to a possible uti. Please call back at [phone]. Thanks r/s

## 2022-07-29 NOTE — TELEPHONE ENCOUNTER
Recent urine test is concerning for infection. Antibiotic sent to pharmacy. I will plan to follow up the urine culture results.    I have signed for the following orders AND/OR meds.  Please call the patient and ask the patient to schedule the testing AND/OR inform about any medications that were sent. Medications have been sent to pharmacy listed below      No orders of the defined types were placed in this encounter.      Medications Ordered This Encounter   Medications    nitrofurantoin, macrocrystal-monohydrate, (MACROBID) 100 MG capsule     Sig: Take 1 capsule (100 mg total) by mouth 2 (two) times daily. for 5 days     Dispense:  10 capsule     Refill:  0         [facility], [place], [place] - [address] [facility]  [address] [address] [address]  Phone: [phone] Fax: [phone]

## 2022-07-30 ENCOUNTER — PATIENT MESSAGE (OUTPATIENT)
Dept: FAMILY MEDICINE | Facility: CLINIC | Age: 62
End: 2022-07-30
Payer: MEDICAID

## 2022-07-30 NOTE — PROGRESS NOTES
Results have been released via [employer]. Please verify that these have been viewed by patient. If not, please call patient with results.    I have reviewed your recent urine study.    The urine test was normal. There is no significant amount of protein in your urine that would indicate diabetic kidney disease. We will plan to repeat this test in one year    Please let me know if you have any additional questions or concerns.

## 2022-07-31 LAB — BACTERIA UR CULT: ABNORMAL

## 2022-08-01 NOTE — PROGRESS NOTES
Results have been released via [employer]. Please verify that these have been viewed by patient. If not, please call patient with results.    I have sent a message to them with the following interpretation (see below).    I have reviewed your recent urine culture.    Bacteria is sensitive to the antibiotic prescribed. Please complete the medication and follow up if symptoms persist.    Please do not hesitate to call or message with any additional questions or concerns.    Philly Mcclellan MD

## 2022-08-03 ENCOUNTER — TELEPHONE (OUTPATIENT)
Dept: ADMINISTRATIVE | Facility: HOSPITAL | Age: 62
End: 2022-08-03
Payer: MEDICAID

## 2022-08-04 ENCOUNTER — LAB VISIT (OUTPATIENT)
Dept: LAB | Facility: HOSPITAL | Age: 62
End: 2022-08-04
Attending: PHYSICIAN ASSISTANT
Payer: MEDICAID

## 2022-08-04 ENCOUNTER — OFFICE VISIT (OUTPATIENT)
Dept: FAMILY MEDICINE | Facility: CLINIC | Age: 62
End: 2022-08-04
Payer: MEDICAID

## 2022-08-04 VITALS
HEART RATE: 68 BPM | TEMPERATURE: 98 F | BODY MASS INDEX: 30.21 KG/M2 | HEIGHT: 61 IN | DIASTOLIC BLOOD PRESSURE: 65 MMHG | WEIGHT: 160 LBS | SYSTOLIC BLOOD PRESSURE: 132 MMHG

## 2022-08-04 DIAGNOSIS — N30.00 ACUTE CYSTITIS WITHOUT HEMATURIA: ICD-10-CM

## 2022-08-04 DIAGNOSIS — E03.9 HYPOTHYROIDISM, UNSPECIFIED TYPE: ICD-10-CM

## 2022-08-04 DIAGNOSIS — E55.9 VITAMIN D INSUFFICIENCY: ICD-10-CM

## 2022-08-04 DIAGNOSIS — Z00.00 ENCOUNTER FOR ANNUAL HEALTH EXAMINATION: Primary | ICD-10-CM

## 2022-08-04 DIAGNOSIS — M15.9 PRIMARY OSTEOARTHRITIS INVOLVING MULTIPLE JOINTS: ICD-10-CM

## 2022-08-04 DIAGNOSIS — E11.9 TYPE 2 DIABETES MELLITUS WITHOUT COMPLICATION, WITHOUT LONG-TERM CURRENT USE OF INSULIN: ICD-10-CM

## 2022-08-04 LAB — 25(OH)D3+25(OH)D2 SERPL-MCNC: 38 NG/ML (ref 30–96)

## 2022-08-04 PROCEDURE — 3075F SYST BP GE 130 - 139MM HG: CPT | Mod: CPTII,,, | Performed by: PHYSICIAN ASSISTANT

## 2022-08-04 PROCEDURE — 99999 PR PBB SHADOW E&M-EST. PATIENT-LVL IV: CPT | Mod: PBBFAC,,, | Performed by: PHYSICIAN ASSISTANT

## 2022-08-04 PROCEDURE — 82306 VITAMIN D 25 HYDROXY: CPT | Performed by: PHYSICIAN ASSISTANT

## 2022-08-04 PROCEDURE — 3078F DIAST BP <80 MM HG: CPT | Mod: CPTII,,, | Performed by: PHYSICIAN ASSISTANT

## 2022-08-04 PROCEDURE — 3008F BODY MASS INDEX DOCD: CPT | Mod: CPTII,,, | Performed by: PHYSICIAN ASSISTANT

## 2022-08-04 PROCEDURE — 36415 COLL VENOUS BLD VENIPUNCTURE: CPT | Mod: PO | Performed by: PHYSICIAN ASSISTANT

## 2022-08-04 PROCEDURE — 1160F RVW MEDS BY RX/DR IN RCRD: CPT | Mod: CPTII,,, | Performed by: PHYSICIAN ASSISTANT

## 2022-08-04 PROCEDURE — 1159F MED LIST DOCD IN RCRD: CPT | Mod: CPTII,,, | Performed by: PHYSICIAN ASSISTANT

## 2022-08-04 PROCEDURE — 3061F PR NEG MICROALBUMINURIA RESULT DOCUMENTED/REVIEW: ICD-10-PCS | Mod: CPTII,,, | Performed by: PHYSICIAN ASSISTANT

## 2022-08-04 PROCEDURE — 3066F PR DOCUMENTATION OF TREATMENT FOR NEPHROPATHY: ICD-10-PCS | Mod: CPTII,,, | Performed by: PHYSICIAN ASSISTANT

## 2022-08-04 PROCEDURE — 99396 PR PREVENTIVE VISIT,EST,40-64: ICD-10-PCS | Mod: S$PBB,,, | Performed by: PHYSICIAN ASSISTANT

## 2022-08-04 PROCEDURE — 1159F PR MEDICATION LIST DOCUMENTED IN MEDICAL RECORD: ICD-10-PCS | Mod: CPTII,,, | Performed by: PHYSICIAN ASSISTANT

## 2022-08-04 PROCEDURE — 99999 PR PBB SHADOW E&M-EST. PATIENT-LVL IV: ICD-10-PCS | Mod: PBBFAC,,, | Performed by: PHYSICIAN ASSISTANT

## 2022-08-04 PROCEDURE — 99396 PREV VISIT EST AGE 40-64: CPT | Mod: S$PBB,,, | Performed by: PHYSICIAN ASSISTANT

## 2022-08-04 PROCEDURE — 3061F NEG MICROALBUMINURIA REV: CPT | Mod: CPTII,,, | Performed by: PHYSICIAN ASSISTANT

## 2022-08-04 PROCEDURE — 3044F PR MOST RECENT HEMOGLOBIN A1C LEVEL <7.0%: ICD-10-PCS | Mod: CPTII,,, | Performed by: PHYSICIAN ASSISTANT

## 2022-08-04 PROCEDURE — 3066F NEPHROPATHY DOC TX: CPT | Mod: CPTII,,, | Performed by: PHYSICIAN ASSISTANT

## 2022-08-04 PROCEDURE — 3075F PR MOST RECENT SYSTOLIC BLOOD PRESS GE 130-139MM HG: ICD-10-PCS | Mod: CPTII,,, | Performed by: PHYSICIAN ASSISTANT

## 2022-08-04 PROCEDURE — 3078F PR MOST RECENT DIASTOLIC BLOOD PRESSURE < 80 MM HG: ICD-10-PCS | Mod: CPTII,,, | Performed by: PHYSICIAN ASSISTANT

## 2022-08-04 PROCEDURE — 1160F PR REVIEW ALL MEDS BY PRESCRIBER/CLIN PHARMACIST DOCUMENTED: ICD-10-PCS | Mod: CPTII,,, | Performed by: PHYSICIAN ASSISTANT

## 2022-08-04 PROCEDURE — 3044F HG A1C LEVEL LT 7.0%: CPT | Mod: CPTII,,, | Performed by: PHYSICIAN ASSISTANT

## 2022-08-04 PROCEDURE — 3008F PR BODY MASS INDEX (BMI) DOCUMENTED: ICD-10-PCS | Mod: CPTII,,, | Performed by: PHYSICIAN ASSISTANT

## 2022-08-04 PROCEDURE — 99214 OFFICE O/P EST MOD 30 MIN: CPT | Mod: PBBFAC,PO | Performed by: PHYSICIAN ASSISTANT

## 2022-08-04 NOTE — PROGRESS NOTES
Results have been released via [employer]. Please verify that these have been viewed by patient. If not, please call patient with results.     I have sent a message to them with the following interpretation (see below).    I have reviewed your recent blood work.     Vitamin D level is normal.     Please do not hesitate to call or message with any additional questions or concerns.    Paulina Warner PA-C

## 2022-08-04 NOTE — PROGRESS NOTES
Assessment/Plan:    Problem List Items Addressed This Visit        Endocrine    Hypothyroidism    Overview     Lab Results   Component Value Date    TSH 2.748 05/19/2022   -currently on levothyroxine 50 mcg             Vitamin D insufficiency    Overview     -recently increased vit d3 to 50k twice weekly  -will recheck vit d level today           Relevant Orders    Vitamin D    Type 2 diabetes mellitus without complication, without long-term current use of insulin    Overview     -condition is currently controlled  -current meds: Metformin 500 mg QD  -see diabetic health maintenance listed below  -counseling provided on importance of diabetic diet and medication compliance in order to treat diabetes  -discussed diabetes disease course and potential complications              Orthopedic    Primary osteoarthritis involving multiple joints    Overview     -mostly involving hands  -has PRN voltaren PO and topical voltaren gel PRN             Other Visit Diagnoses     Encounter for annual health examination    -  Primary    Acute cystitis without hematuria        Relevant Orders    Urinalysis, Reflex to Urine Culture Urine, Clean Catch          Follow up in about 1 year (around 8/4/2023).    Paulina Warner PA-C  _____________________________________________________________________________________________________________________________________________________    CC: follow up for chronic conditions    HPI: Patient is in clinic today as an established patient here for follow up for chronic conditions.    DM2: Patient presents for follow up of diabetes. Condition is chronic and stable. Patient denies symptoms, including foot ulcerations, hyperglycemia, hypoglycemia, nausea, paresthesia of the feet, polydipsia, polyuria and visual disturbances. Evaluation to date has been included: fasting blood sugar, fasting lipid panel, hemoglobin A1C and microalbuminuria. Home fasting sugars: 130s. Treatment to date: Metformin 500 mg  QD. Denies adverse effects of medications.     Diabetes Management Status    Statin: Not taking  ACE/ARB: Not taking    Screening or Prevention Patient's value Goal Complete/Controlled?   HgA1C Testing and Control   Lab Results   Component Value Date    HGBA1C 6.0 (H) 2022      Annually/Less than 8% Yes   Lipid profile : 2022 Annually Yes   LDL control Lab Results   Component Value Date    LDLCALC 82.2 2022    Annually/Less than 100 mg/dl  Yes   Nephropathy screening Lab Results   Component Value Date    LABMICR <5.0 2022     Lab Results   Component Value Date    PROTEINUA Negative 2022    Annually Yes   Blood pressure BP Readings from Last 1 Encounters:   22 132/65    Less than 140/90 Yes   Dilated retinal exam : 2022 Annually Yes   Foot exam   : 2022 Annually No     Patient recently had labs done. Health maintenance reviewed. No other complaints today.    Past Medical History:   Diagnosis Date    Arthritis, lumbar spine     hands    General anesthetics causing adverse effect in therapeutic use     following breast rediuct, hard time awakening  also had anxiety rxn to lidocain in dental ofc    GERD (gastroesophageal reflux disease)     Hypothyroidism 10/8/2014    Maternal anesthesia complication     epidural for 1st child went up instead of down; required intubation    Obesity (BMI 30-39.9) 10/8/2014    Thyroid disease     Thyroid nodule 10/8/2014     Past Surgical History:   Procedure Laterality Date    BREAST BIOPSY Left     b9    BREAST SURGERY      Reduction cause of a mass in left breast    c-sections x2       SECTION  3/26/81 & 85    Birth of two girls    COLONOSCOPY  2012         COLONOSCOPY N/A 2018    Procedure: COLONOSCOPY;  Surgeon: Francisco Mcclain MD;  Location: [facility];  Service: Endoscopy;  Laterality: N/A;    hysteroscopy with polypectomy      INCISIONAL BREAST BIOPSY Left     benign; done at same time  as reduction    TOTAL REDUCTION MAMMOPLASTY Bilateral 1996     Review of patient's allergies indicates:   Allergen Reactions    Duricef [cefadroxil] Hives    Eggs [egg derived] Anaphylaxis and Hives    Cat/feline products Itching    Dairycare [lactobacillus acidoph-lactase] Itching    Dog dander Itching    Wheat containing prod Hives     Social History     Tobacco Use    Smoking status: Never Smoker    Smokeless tobacco: Never Used   Substance Use Topics    Alcohol use: Yes     Comment: 2/ month    Drug use: No     Family History   Problem Relation Age of Onset    Brain cancer Mother     Early death Mother 51        Passed at 51    Cancer Mother         Brain tumor    Arthritis Mother     Diabetes Father         Type 2    Cirrhosis Father     Cancer Father         Bone    COPD Father         Smoker    Early death Father         Passed at 64    Heart disease Sister         Congestive heart failure (passed 2/11/18    Hypertension Sister     Kidney disease Sister         Doc removed when she was a child    Hypertension Sister     Depression Sister         Due to loss of her 27 year old son    Early death Sister         Pulmonary hypertension, cirrhosis of the liver(passed 7/14/2007   Age 57    Breast cancer Maternal Aunt 30    Breast cancer Paternal Aunt 40    Breast cancer Paternal Aunt 40    Heart disease Brother         Heart attack    Hypertension Brother     Heart disease Brother         Heart  blockage    Heart disease Brother         Heart attack (passed)    Diabetes Brother     Macular degeneration Brother     Ovarian cancer Neg Hx      Current Outpatient Medications on File Prior to Visit   Medication Sig Dispense Refill    acetaminophen (TYLENOL) 325 MG tablet Take 325 mg by mouth every 6 (six) hours as needed for Pain.      blood-glucose meter Misc Use as directed 1 each prn    calcium carbonate (OS-ISH) 600 mg calcium (1,500 mg) Tab Take 1,200 mg by mouth 2 (two) times  daily with meals.      cetirizine (ZYRTEC) 10 MG tablet Take 10 mg by mouth once daily.      ergocalciferol (VITAMIN D2) 50,000 unit Cap Take 1 capsule (50,000 Units total) by mouth twice a week. 24 capsule 4    famotidine (PEPCID) 40 MG tablet TAKE 1 TABLET (40 MG TOTAL) BY MOUTH ONCE DAILY. 30 tablet 11    fluticasone propionate (FLONASE) 50 mcg/actuation nasal spray 2 sprays (100 mcg total) by Each Nostril route once daily. 16 g 11    lancets Misc As directed 100 each prn    levothyroxine (SYNTHROID) 50 MCG tablet TAKE 1 TABLET BY MOUTH EVERY DAY 30 tablet 11    meclizine (ANTIVERT) 25 mg tablet Take 1 tablet (25 mg total) by mouth 3 (three) times daily as needed for Dizziness. 30 tablet 0    metFORMIN (GLUCOPHAGE-XR) 500 MG ER 24hr tablet TAKE 1 TABLET BY MOUTH EVERY DAY 90 tablet 0    TRUE METRIX GLUCOSE TEST STRIP Strp USE 1 STRIP ONCE DAILY TO TEST BLOOD GLUCOSE (50 DAY SUPPLY) 100 each 3    TRUEPLUS LANCETS 33 gauge Misc USE AS DIRECTED TO TEST BLOOD SUGAR ONCE DAILY FOR UP  USES 100 each 3    [DISCONTINUED] azelastine (ASTELIN) 137 mcg (0.1 %) nasal spray 2 sprays (274 mcg total) by Nasal route 2 (two) times daily as needed for Rhinitis. (Patient not taking: Reported on 5/18/2022) 30 mL 6    [DISCONTINUED] diclofenac sodium (VOLTAREN) 1 % Gel Apply 2 g topically 3 (three) times daily as needed. (Patient not taking: Reported on 5/18/2022) 100 g 0    [DISCONTINUED] nitrofurantoin, macrocrystal-monohydrate, (MACROBID) 100 MG capsule Take 1 capsule (100 mg total) by mouth 2 (two) times daily. for 5 days (Patient not taking: Reported on 8/4/2022) 10 capsule 0     No current facility-administered medications on file prior to visit.       Review of Systems   Constitutional: Negative for chills, diaphoresis, fatigue and fever.   HENT: Negative for congestion, ear pain, postnasal drip, sinus pain and sore throat.    Eyes: Negative for pain and redness.   Respiratory: Negative for cough, chest  "tightness and shortness of breath.    Cardiovascular: Negative for chest pain and leg swelling.   Gastrointestinal: Negative for abdominal pain, constipation, diarrhea, nausea and vomiting.   Genitourinary: Negative for dysuria and hematuria.   Musculoskeletal: Negative for arthralgias and joint swelling.   Skin: Negative for rash.   Neurological: Negative for dizziness, syncope and headaches.       Vitals:    08/04/22 0809   BP: 132/65   Pulse: 68   Temp: 98.1 °F (36.7 °C)   Weight: 72.6 kg (160 lb)   Height: 5' 1" (1.549 m)       Wt Readings from Last 3 Encounters:   08/04/22 72.6 kg (160 lb)   07/15/22 72.1 kg (158 lb 15.2 oz)   05/18/22 76.9 kg (169 lb 9.6 oz)       Physical Exam  Constitutional:       General: She is not in acute distress.     Appearance: Normal appearance. She is well-developed.   HENT:      Head: Normocephalic and atraumatic.   Eyes:      Conjunctiva/sclera: Conjunctivae normal.   Cardiovascular:      Rate and Rhythm: Normal rate and regular rhythm.      Pulses: Normal pulses.      Heart sounds: Normal heart sounds. No murmur heard.  Pulmonary:      Effort: Pulmonary effort is normal. No respiratory distress.      Breath sounds: Normal breath sounds.   Abdominal:      General: Bowel sounds are normal. There is no distension.      Palpations: Abdomen is soft.      Tenderness: There is no abdominal tenderness.   Musculoskeletal:         General: Normal range of motion.      Cervical back: Normal range of motion and neck supple.   Feet:      Comments: Protective Sensation (w/ 10 gram monofilament):  Right: Intact  Left: Intact    Visual Inspection:  Normal -  Bilateral    Pedal Pulses:   Right: Present  Left: Present    Posterior tibialis:   Right:Present  Left: Present    Skin:     General: Skin is warm and dry.      Findings: No rash.   Neurological:      General: No focal deficit present.      Mental Status: She is alert and oriented to person, place, and time.         Health Maintenance "   Topic Date Due    TETANUS VACCINE  Never done    Low Dose Statin  Never done    Hemoglobin A1c  11/19/2022    Eye Exam  02/08/2023    Lipid Panel  05/19/2023    Mammogram  07/15/2023    Foot Exam  08/04/2023    Hepatitis C Screening  Completed

## 2022-08-08 DIAGNOSIS — E11.9 TYPE 2 DIABETES MELLITUS WITHOUT COMPLICATION, WITHOUT LONG-TERM CURRENT USE OF INSULIN: ICD-10-CM

## 2022-08-08 RX ORDER — METFORMIN HYDROCHLORIDE 500 MG/1
500 TABLET, EXTENDED RELEASE ORAL DAILY
Qty: 90 TABLET | Refills: 0 | Status: SHIPPED | OUTPATIENT
Start: 2022-08-08 | End: 2022-11-07

## 2022-08-08 NOTE — TELEPHONE ENCOUNTER
Refill Decision Note   Bessy Lamb  is requesting a refill authorization.  Brief Assessment and Rationale for Refill:  Approve    -Medication-Related Problems Identified: Requires appointment  Medication Therapy Plan:  . Due for annual w/ PCP      Medication Reconciliation Completed: No   Comments:     Provider Staff:     Action is required for this patient.   Please see care gap opportunities below in Care Due Message.     Thanks!  [facility]     Appointments      Date Provider   Last Visit   7/28/2021 Philly Mcclellan MD   Next Visit   Visit date not found Philly Mcclellan MD     Note composed:5:15 PM 08/08/2022           Note composed:5:15 PM 08/08/2022

## 2022-08-08 NOTE — TELEPHONE ENCOUNTER
Care Due:                  Date            Visit Type   Department     Provider  --------------------------------------------------------------------------------                                EP -                              PRIMARY      [facility] FAMILY  Last Visit: 07-      CARE ([facility])   MEDICINE       Philly Mcclellan  Next Visit: None Scheduled  None         None Found                                                            Last  Test          Frequency    Reason                     Performed    Due Date  --------------------------------------------------------------------------------    Office Visit  12 months..  metFORMIN................  07- 07-    Health [facility] Embedded Care Gaps. Reference number: [id number]. 8/08/2022   8:36:23 AM CDT

## 2022-12-26 ENCOUNTER — PATIENT MESSAGE (OUTPATIENT)
Dept: FAMILY MEDICINE | Facility: CLINIC | Age: 62
End: 2022-12-26
Payer: MEDICAID

## 2022-12-27 ENCOUNTER — LAB VISIT (OUTPATIENT)
Dept: LAB | Facility: HOSPITAL | Age: 62
End: 2022-12-27
Attending: INTERNAL MEDICINE
Payer: MEDICAID

## 2022-12-27 ENCOUNTER — OFFICE VISIT (OUTPATIENT)
Dept: PRIMARY CARE CLINIC | Facility: CLINIC | Age: 62
End: 2022-12-27
Payer: MEDICAID

## 2022-12-27 DIAGNOSIS — R30.0 DYSURIA: Primary | ICD-10-CM

## 2022-12-27 DIAGNOSIS — E11.9 TYPE 2 DIABETES MELLITUS WITHOUT COMPLICATION, WITHOUT LONG-TERM CURRENT USE OF INSULIN: ICD-10-CM

## 2022-12-27 LAB
ALBUMIN/CREAT UR: 100 UG/MG (ref 0–30)
CREAT UR-MCNC: 10 MG/DL (ref 15–325)
MICROALBUMIN UR DL<=1MG/L-MCNC: 10 UG/ML

## 2022-12-27 PROCEDURE — 99213 OFFICE O/P EST LOW 20 MIN: CPT | Mod: 95,,, | Performed by: NURSE PRACTITIONER

## 2022-12-27 PROCEDURE — 3044F HG A1C LEVEL LT 7.0%: CPT | Mod: CPTII,95,, | Performed by: NURSE PRACTITIONER

## 2022-12-27 PROCEDURE — 3066F PR DOCUMENTATION OF TREATMENT FOR NEPHROPATHY: ICD-10-PCS | Mod: CPTII,95,, | Performed by: NURSE PRACTITIONER

## 2022-12-27 PROCEDURE — 3066F NEPHROPATHY DOC TX: CPT | Mod: CPTII,95,, | Performed by: NURSE PRACTITIONER

## 2022-12-27 PROCEDURE — 82043 UR ALBUMIN QUANTITATIVE: CPT | Performed by: INTERNAL MEDICINE

## 2022-12-27 PROCEDURE — 82570 ASSAY OF URINE CREATININE: CPT | Performed by: INTERNAL MEDICINE

## 2022-12-27 PROCEDURE — 3044F PR MOST RECENT HEMOGLOBIN A1C LEVEL <7.0%: ICD-10-PCS | Mod: CPTII,95,, | Performed by: NURSE PRACTITIONER

## 2022-12-27 PROCEDURE — 3061F PR NEG MICROALBUMINURIA RESULT DOCUMENTED/REVIEW: ICD-10-PCS | Mod: CPTII,95,, | Performed by: NURSE PRACTITIONER

## 2022-12-27 PROCEDURE — 99213 PR OFFICE/OUTPT VISIT, EST, LEVL III, 20-29 MIN: ICD-10-PCS | Mod: 95,,, | Performed by: NURSE PRACTITIONER

## 2022-12-27 PROCEDURE — 3061F NEG MICROALBUMINURIA REV: CPT | Mod: CPTII,95,, | Performed by: NURSE PRACTITIONER

## 2022-12-27 RX ORDER — NITROFURANTOIN 25; 75 MG/1; MG/1
100 CAPSULE ORAL 2 TIMES DAILY
Qty: 10 CAPSULE | Refills: 0 | Status: SHIPPED | OUTPATIENT
Start: 2022-12-27 | End: 2023-01-01

## 2022-12-27 NOTE — PROGRESS NOTES
Primary Care Telemedicine Note  The patient location is:  Patient Home   The chief complaint leading to consultation is: UTI   Total time spent with patient: 10 minutes     Visit type: Virtual visit with synchronous audio only and video  Each patient to whom he or she provides medical services by telemedicine is:  (1) informed of the relationship between the physician and patient and the respective role of any other health care provider with respect to management of the patient; and (2) notified that he or she may decline to receive medical services by telemedicine and may withdraw from such care at any time.      Assessment/Plan:    Problem List Items Addressed This Visit    None  Visit Diagnoses       Dysuria    -  Primary    Relevant Orders    Urinalysis, Reflex to Urine Culture Urine, Clean Catch   -UA ordered will treat symptoms   -start antibiotics as prescribed  -increase hydration with water  -follow up if no improvement or worsening of symptoms       Follow up if symptoms worsen or fail to improve.    Dayan Jimenez NP     _____________________________________________________________________________________________________________________________________________________    CC: urinary frequency, urgency, and burning     HPI:    Patient is an established patient who presents today via virtual visit. Urinary Tract Infection: Patient complains of abnormal smelling urine, burning with urination, dysuria, frequency, and urgency She has had symptoms for 3 days. Patient denies back pain and vaginal discharge. Patient does have a history of recurrent UTI.  Patient does not have a history of pyelonephritis.      Past Medical History:  Past Medical History:   Diagnosis Date    Arthritis, lumbar spine     hands    General anesthetics causing adverse effect in therapeutic use     following breast rediuct, hard time awakening  also had anxiety rxn to lidocain in dental ofc    GERD (gastroesophageal reflux disease)      Hypothyroidism 10/8/2014    Maternal anesthesia complication     epidural for 1st child went up instead of down; required intubation    Obesity (BMI 30-39.9) 10/8/2014    Thyroid disease     Thyroid nodule 10/8/2014     Past Surgical History:   Procedure Laterality Date    BREAST BIOPSY Left     b9    BREAST SURGERY      Reduction cause of a mass in left breast    c-sections x2       SECTION  3/26/81 & 85    Birth of two girls    COLONOSCOPY  2012         COLONOSCOPY N/A 2018    Procedure: COLONOSCOPY;  Surgeon: Francisco Mcclain MD;  [facility]: [facility];  Service: Endoscopy;  Laterality: N/A;    hysteroscopy with polypectomy      INCISIONAL BREAST BIOPSY Left     benign; done at same time as reduction    TOTAL REDUCTION MAMMOPLASTY Bilateral      Review of patient's allergies indicates:   Allergen Reactions    Duricef [cefadroxil] Hives    Eggs [egg derived] Anaphylaxis and Hives    Cat/feline products Itching    Dairycare [lactobacillus acidoph-lactase] Itching    Dog dander Itching    Wheat containing prod Hives     Social History     Tobacco Use    Smoking status: Never    Smokeless tobacco: Never   Substance Use Topics    Alcohol use: Yes     Comment: 2/ month    Drug use: No     Family History   Problem Relation Age of Onset    Brain cancer Mother     Early death Mother 51        Passed at 51    Cancer Mother         Brain tumor    Arthritis Mother     Diabetes Father         Type 2    Cirrhosis Father     Cancer Father         Bone    COPD Father         Smoker    Early death Father         Passed at 64    Heart disease Sister         Congestive heart failure (passed 18    Hypertension Sister     Kidney disease Sister         Doc removed when she was a child    Hypertension Sister     Depression Sister         Due to loss of her 27 year old son    Early death Sister         Pulmonary hypertension, cirrhosis of the liver(passed 2007   Age 57    Breast cancer Maternal  Aunt 30    Breast cancer Paternal Aunt 40    Breast cancer Paternal Aunt 40    Heart disease Brother         Heart attack    Hypertension Brother     Heart disease Brother         Heart  blockage    Heart disease Brother         Heart attack (passed)    Diabetes Brother     Macular degeneration Brother     Ovarian cancer Neg Hx      Current Outpatient Medications on File Prior to Visit   Medication Sig Dispense Refill    acetaminophen (TYLENOL) 325 MG tablet Take 325 mg by mouth every 6 (six) hours as needed for Pain.      blood-glucose meter Misc Use as directed 1 each prn    calcium carbonate (OS-ISH) 600 mg calcium (1,500 mg) Tab Take 1,200 mg by mouth 2 (two) times daily with meals.      cetirizine (ZYRTEC) 10 MG tablet Take 10 mg by mouth once daily.      famotidine (PEPCID) 40 MG tablet TAKE 1 TABLET (40 MG TOTAL) BY MOUTH ONCE DAILY. 30 tablet 11    fluticasone propionate (FLONASE) 50 mcg/actuation nasal spray 2 sprays (100 mcg total) by Each Nostril route once daily. 16 g 11    lancets Misc As directed 100 each prn    levothyroxine (SYNTHROID) 50 MCG tablet Take 1 tablet (50 mcg total) by mouth once daily. 90 tablet 3    meclizine (ANTIVERT) 25 mg tablet Take 1 tablet (25 mg total) by mouth 3 (three) times daily as needed for Dizziness. 30 tablet 0    metFORMIN (GLUCOPHAGE-XR) 500 MG ER 24hr tablet TAKE 1 TABLET (500 MG TOTAL) BY MOUTH ONCE DAILY 90 tablet 3    TRUE METRIX GLUCOSE TEST STRIP Strp USE 1 STRIP ONCE DAILY TO TEST BLOOD GLUCOSE (50 DAY SUPPLY) 100 each 3    TRUEPLUS LANCETS 33 gauge Misc USE AS DIRECTED TO TEST BLOOD SUGAR ONCE DAILY FOR UP  USES 100 each 3    VITAMIN D2 1,250 mcg (50,000 unit) capsule TAKE 1 CAPSULE (50,000 UNITS TOTAL) BY MOUTH TWICE A WEEK. 24 capsule 3     No current facility-administered medications on file prior to visit.       Review of Systems   Constitutional:  Negative for chills and weight loss.   Gastrointestinal:  Negative for constipation, nausea and  vomiting.   Genitourinary:  Positive for dysuria, frequency and urgency. Negative for flank pain and hematuria.   Skin:  Negative for rash.       Physical Exam   @thptenteredbppulse@  @thptenteredglucose@    Physical Exam  Vitals and nursing note reviewed.   Constitutional:       Appearance: She is well-developed.   HENT:      Head: Normocephalic and atraumatic.   Eyes:      Pupils: Pupils are equal, round, and reactive to light.   Cardiovascular:      Heart sounds: Normal heart sounds.   Pulmonary:      Effort: Pulmonary effort is normal.      Breath sounds: Normal breath sounds.   Musculoskeletal:      Cervical back: Normal range of motion and neck supple.   Neurological:      Mental Status: She is alert and oriented to person, place, and time.      Deep Tendon Reflexes: Reflexes are normal and symmetric.   Psychiatric:         Behavior: Behavior normal.         Thought Content: Thought content normal.         Judgment: Judgment normal.       The patient's Health Maintenance was reviewed and the following appears to be due at this time:  Health Maintenance Due   Topic Date Due    HIV Screening  Never done    TETANUS VACCINE  Never done    Low Dose Statin  Never done    Shingles Vaccine (1 of 2) Never done    COVID-19 Vaccine (2 - Moderna series) 08/25/2021    Influenza Vaccine (1) 09/01/2022    Hemoglobin A1c  11/19/2022

## 2023-02-03 ENCOUNTER — PATIENT MESSAGE (OUTPATIENT)
Dept: FAMILY MEDICINE | Facility: CLINIC | Age: 63
End: 2023-02-03
Payer: MEDICAID

## 2023-02-03 DIAGNOSIS — E11.29 MICROALBUMINURIA DUE TO TYPE 2 DIABETES MELLITUS: ICD-10-CM

## 2023-02-03 DIAGNOSIS — R80.9 MICROALBUMINURIA DUE TO TYPE 2 DIABETES MELLITUS: ICD-10-CM

## 2023-02-03 RX ORDER — LOSARTAN POTASSIUM 25 MG/1
25 TABLET ORAL DAILY
Qty: 90 TABLET | Refills: 3 | Status: SHIPPED | OUTPATIENT
Start: 2023-02-03 | End: 2023-10-18

## 2023-02-03 NOTE — PROGRESS NOTES
Results have been released via [employer]. Please verify that these have been viewed by patient. If not, please call patient with results.    Please schedule the following orders:  A1c, bmp in 2 weeks    I have reviewed your recent results.    URINE MICROALBUMIN-ABNORMAL-The urine MICROALBUMIN test shows that your kidneys are not filtering properly and is spilling protein in the urine.  I would recommend that we start you on a blood pressure medication that can help protect the kidneys and improve this. The medication is called losartan. We will need to do some lab work after you start the medications.    Please let me know if you have any additional questions or concerns about above.

## 2023-02-06 ENCOUNTER — OFFICE VISIT (OUTPATIENT)
Dept: PRIMARY CARE CLINIC | Facility: CLINIC | Age: 63
End: 2023-02-06
Payer: MEDICAID

## 2023-02-06 VITALS
HEIGHT: 61 IN | DIASTOLIC BLOOD PRESSURE: 78 MMHG | BODY MASS INDEX: 29.47 KG/M2 | HEART RATE: 70 BPM | TEMPERATURE: 98 F | SYSTOLIC BLOOD PRESSURE: 132 MMHG | WEIGHT: 156.06 LBS | OXYGEN SATURATION: 99 %

## 2023-02-06 DIAGNOSIS — J01.10 ACUTE FRONTAL SINUSITIS, RECURRENCE NOT SPECIFIED: Primary | ICD-10-CM

## 2023-02-06 PROCEDURE — 99999 PR PBB SHADOW E&M-EST. PATIENT-LVL V: CPT | Mod: PBBFAC,,, | Performed by: NURSE PRACTITIONER

## 2023-02-06 PROCEDURE — 3078F DIAST BP <80 MM HG: CPT | Mod: CPTII,,, | Performed by: NURSE PRACTITIONER

## 2023-02-06 PROCEDURE — 3078F PR MOST RECENT DIASTOLIC BLOOD PRESSURE < 80 MM HG: ICD-10-PCS | Mod: CPTII,,, | Performed by: NURSE PRACTITIONER

## 2023-02-06 PROCEDURE — 3075F SYST BP GE 130 - 139MM HG: CPT | Mod: CPTII,,, | Performed by: NURSE PRACTITIONER

## 2023-02-06 PROCEDURE — 99213 OFFICE O/P EST LOW 20 MIN: CPT | Mod: S$PBB,,, | Performed by: NURSE PRACTITIONER

## 2023-02-06 PROCEDURE — 99213 PR OFFICE/OUTPT VISIT, EST, LEVL III, 20-29 MIN: ICD-10-PCS | Mod: S$PBB,,, | Performed by: NURSE PRACTITIONER

## 2023-02-06 PROCEDURE — 3008F PR BODY MASS INDEX (BMI) DOCUMENTED: ICD-10-PCS | Mod: CPTII,,, | Performed by: NURSE PRACTITIONER

## 2023-02-06 PROCEDURE — 4010F ACE/ARB THERAPY RXD/TAKEN: CPT | Mod: CPTII,,, | Performed by: NURSE PRACTITIONER

## 2023-02-06 PROCEDURE — 4010F PR ACE/ARB THEARPY RXD/TAKEN: ICD-10-PCS | Mod: CPTII,,, | Performed by: NURSE PRACTITIONER

## 2023-02-06 PROCEDURE — 96372 THER/PROPH/DIAG INJ SC/IM: CPT | Mod: PBBFAC,PN

## 2023-02-06 PROCEDURE — 3075F PR MOST RECENT SYSTOLIC BLOOD PRESS GE 130-139MM HG: ICD-10-PCS | Mod: CPTII,,, | Performed by: NURSE PRACTITIONER

## 2023-02-06 PROCEDURE — 3008F BODY MASS INDEX DOCD: CPT | Mod: CPTII,,, | Performed by: NURSE PRACTITIONER

## 2023-02-06 PROCEDURE — 1159F MED LIST DOCD IN RCRD: CPT | Mod: CPTII,,, | Performed by: NURSE PRACTITIONER

## 2023-02-06 PROCEDURE — 99215 OFFICE O/P EST HI 40 MIN: CPT | Mod: 25,PBBFAC,PN | Performed by: NURSE PRACTITIONER

## 2023-02-06 PROCEDURE — 99999 PR PBB SHADOW E&M-EST. PATIENT-LVL V: ICD-10-PCS | Mod: PBBFAC,,, | Performed by: NURSE PRACTITIONER

## 2023-02-06 PROCEDURE — 1159F PR MEDICATION LIST DOCUMENTED IN MEDICAL RECORD: ICD-10-PCS | Mod: CPTII,,, | Performed by: NURSE PRACTITIONER

## 2023-02-06 RX ORDER — DEXAMETHASONE SODIUM PHOSPHATE 4 MG/ML
8 INJECTION, SOLUTION INTRA-ARTICULAR; INTRALESIONAL; INTRAMUSCULAR; INTRAVENOUS; SOFT TISSUE ONCE
Status: COMPLETED | OUTPATIENT
Start: 2023-02-06 | End: 2023-02-06

## 2023-02-06 RX ORDER — AZITHROMYCIN 250 MG/1
TABLET, FILM COATED ORAL
Qty: 6 TABLET | Refills: 0 | Status: SHIPPED | OUTPATIENT
Start: 2023-02-06 | End: 2023-02-14

## 2023-02-06 RX ORDER — DEXAMETHASONE SODIUM PHOSPHATE 4 MG/ML
8 INJECTION, SOLUTION INTRA-ARTICULAR; INTRALESIONAL; INTRAMUSCULAR; INTRAVENOUS; SOFT TISSUE ONCE
Status: DISCONTINUED | OUTPATIENT
Start: 2023-02-06 | End: 2023-02-06

## 2023-02-06 RX ADMIN — DEXAMETHASONE SODIUM PHOSPHATE 8 MG: 4 INJECTION INTRA-ARTICULAR; INTRALESIONAL; INTRAMUSCULAR; INTRAVENOUS; SOFT TISSUE at 01:02

## 2023-02-06 NOTE — PROGRESS NOTES
Assessment/Plan:    Problem List Items Addressed This Visit    None  Visit Diagnoses       Acute frontal sinusitis, recurrence not specified    -  Primary    Relevant Medications    azithromycin (Z-NAYELI) 250 MG tablet    dexAMETHasone injection 8 mg     1. Decadron IM today  2. Start ABX as prescribed  3. Continue Flonase   4. Supportive care- rest, increase hydration with water, OTC Tylenol/Ibuprofen for pain/fever.  5. Follow up with PCP if no improvement in symptoms   6. ER precautions for severe or worsening of symptoms        Follow up if symptoms worsen or fail to improve.    Dayan Jimenez NP  _____________________________________________________________________________________________________________________________________________________    CC: sinus congestion, sore throat     HPI:    Patient is in clinic today as an established patient. Sinus Problem   This is a new problem. The current episode started in the past month. The problem is unchanged. There has been no fever. Associated symptoms include congestion, sinus pressure, headache, rhinorrhea, sneezing, swollen lymph nodes. Pertinent negatives include no chills, coughing, diaphoresis, ear pain, hoarse voice, neck pain, or shortness of breath. Past treatments include OTC cold/flu medication. The treatment provided mild relief.      No other new complaints today.  Remaining chronic conditions have been reviewed and remain stable. Further detail as stated above.      reviewed.     No recent changes to medical/surgical history.    Current Outpatient Medications on File Prior to Visit   Medication Sig Dispense Refill    acetaminophen (TYLENOL) 325 MG tablet Take 325 mg by mouth every 6 (six) hours as needed for Pain.      blood-glucose meter Misc Use as directed 1 each prn    calcium carbonate (OS-ISH) 600 mg calcium (1,500 mg) Tab Take 1,200 mg by mouth 2 (two) times daily with meals.      cetirizine (ZYRTEC) 10 MG tablet Take 10 mg by mouth once  "daily.      fluticasone propionate (FLONASE) 50 mcg/actuation nasal spray 2 sprays (100 mcg total) by Each Nostril route once daily. 16 g 11    lancets Misc As directed 100 each prn    levothyroxine (SYNTHROID) 50 MCG tablet Take 1 tablet (50 mcg total) by mouth once daily. 90 tablet 3    losartan (COZAAR) 25 MG tablet Take 1 tablet (25 mg total) by mouth once daily. 90 tablet 3    meclizine (ANTIVERT) 25 mg tablet Take 1 tablet (25 mg total) by mouth 3 (three) times daily as needed for Dizziness. 30 tablet 0    metFORMIN (GLUCOPHAGE-XR) 500 MG ER 24hr tablet TAKE 1 TABLET (500 MG TOTAL) BY MOUTH ONCE DAILY 90 tablet 3    TRUE METRIX GLUCOSE TEST STRIP Strp USE 1 STRIP ONCE DAILY TO TEST BLOOD GLUCOSE (50 DAY SUPPLY) 100 each 3    TRUEPLUS LANCETS 33 gauge Misc USE AS DIRECTED TO TEST BLOOD SUGAR ONCE DAILY FOR UP  USES 100 each 3    VITAMIN D2 1,250 mcg (50,000 unit) capsule TAKE 1 CAPSULE (50,000 UNITS TOTAL) BY MOUTH TWICE A WEEK. 24 capsule 3    famotidine (PEPCID) 40 MG tablet TAKE 1 TABLET (40 MG TOTAL) BY MOUTH ONCE DAILY. 30 tablet 11     No current facility-administered medications on file prior to visit.       Review of Systems   Constitutional: Negative.    HENT:  Positive for congestion, rhinorrhea, sinus pressure, sneezing and sore throat.    Eyes: Negative.    Respiratory: Negative.     Cardiovascular: Negative.    Gastrointestinal: Negative.    Endocrine: Negative.    Genitourinary: Negative.    Musculoskeletal: Negative.    Skin: Negative.    Allergic/Immunologic: Negative.    Neurological:  Positive for headaches.   Hematological: Negative.    Psychiatric/Behavioral: Negative.       Vitals:    02/06/23 1306   BP: 132/78   Pulse: 70   Temp: 98 °F (36.7 °C)   SpO2: 99%   Weight: 70.8 kg (156 lb 1.4 oz)   Height: 5' 1" (1.549 m)       Wt Readings from Last 3 Encounters:   02/06/23 70.8 kg (156 lb 1.4 oz)   08/04/22 72.6 kg (160 lb)   07/15/22 72.1 kg (158 lb 15.2 oz)       Physical Exam  Vitals " and nursing note reviewed.   Constitutional:       Appearance: She is well-developed.   HENT:      Head: Normocephalic and atraumatic.   Eyes:      Conjunctiva/sclera: Conjunctivae normal.      Pupils: Pupils are equal, round, and reactive to light.   Cardiovascular:      Rate and Rhythm: Normal rate and regular rhythm.      Heart sounds: Normal heart sounds.   Pulmonary:      Effort: Pulmonary effort is normal.      Breath sounds: Normal breath sounds.   Musculoskeletal:         General: Normal range of motion.      Cervical back: Normal range of motion and neck supple.   Skin:     General: Skin is warm and dry.   Neurological:      Mental Status: She is alert and oriented to person, place, and time.      Deep Tendon Reflexes: Reflexes are normal and symmetric.   Psychiatric:         Behavior: Behavior normal.         Thought Content: Thought content normal.         Judgment: Judgment normal.       Health Maintenance   Topic Date Due    TETANUS VACCINE  Never done    Low Dose Statin  Never done    Hemoglobin A1c  11/19/2022    Eye Exam  02/08/2023    Lipid Panel  05/19/2023    Mammogram  07/15/2023    Foot Exam  08/04/2023    Hepatitis C Screening  Completed

## 2023-02-13 LAB
LEFT EYE DM RETINOPATHY: NEGATIVE
RIGHT EYE DM RETINOPATHY: NEGATIVE

## 2023-02-14 ENCOUNTER — PATIENT MESSAGE (OUTPATIENT)
Dept: PRIMARY CARE CLINIC | Facility: CLINIC | Age: 63
End: 2023-02-14

## 2023-02-14 ENCOUNTER — OFFICE VISIT (OUTPATIENT)
Dept: PRIMARY CARE CLINIC | Facility: CLINIC | Age: 63
End: 2023-02-14
Payer: MEDICAID

## 2023-02-14 VITALS
HEART RATE: 115 BPM | HEIGHT: 61 IN | OXYGEN SATURATION: 99 % | DIASTOLIC BLOOD PRESSURE: 84 MMHG | SYSTOLIC BLOOD PRESSURE: 136 MMHG | WEIGHT: 155.88 LBS | BODY MASS INDEX: 29.43 KG/M2 | TEMPERATURE: 99 F

## 2023-02-14 DIAGNOSIS — R05.9 COUGH, UNSPECIFIED TYPE: Primary | ICD-10-CM

## 2023-02-14 DIAGNOSIS — U07.1 COVID-19 VIRUS INFECTION: ICD-10-CM

## 2023-02-14 DIAGNOSIS — U07.1 COVID-19 VIRUS DETECTED: ICD-10-CM

## 2023-02-14 LAB
CTP QC/QA: YES
CTP QC/QA: YES
POC MOLECULAR INFLUENZA A AGN: NEGATIVE
POC MOLECULAR INFLUENZA B AGN: NEGATIVE
SARS-COV-2 RDRP RESP QL NAA+PROBE: POSITIVE

## 2023-02-14 PROCEDURE — 1159F PR MEDICATION LIST DOCUMENTED IN MEDICAL RECORD: ICD-10-PCS | Mod: CPTII,,, | Performed by: NURSE PRACTITIONER

## 2023-02-14 PROCEDURE — 3008F PR BODY MASS INDEX (BMI) DOCUMENTED: ICD-10-PCS | Mod: CPTII,,, | Performed by: NURSE PRACTITIONER

## 2023-02-14 PROCEDURE — 87635 SARS-COV-2 COVID-19 AMP PRB: CPT | Mod: PBBFAC,PN | Performed by: NURSE PRACTITIONER

## 2023-02-14 PROCEDURE — 3079F PR MOST RECENT DIASTOLIC BLOOD PRESSURE 80-89 MM HG: ICD-10-PCS | Mod: CPTII,,, | Performed by: NURSE PRACTITIONER

## 2023-02-14 PROCEDURE — 99999 PR PBB SHADOW E&M-EST. PATIENT-LVL IV: CPT | Mod: PBBFAC,,, | Performed by: NURSE PRACTITIONER

## 2023-02-14 PROCEDURE — 3075F PR MOST RECENT SYSTOLIC BLOOD PRESS GE 130-139MM HG: ICD-10-PCS | Mod: CPTII,,, | Performed by: NURSE PRACTITIONER

## 2023-02-14 PROCEDURE — 3079F DIAST BP 80-89 MM HG: CPT | Mod: CPTII,,, | Performed by: NURSE PRACTITIONER

## 2023-02-14 PROCEDURE — 99213 PR OFFICE/OUTPT VISIT, EST, LEVL III, 20-29 MIN: ICD-10-PCS | Mod: S$PBB,,, | Performed by: NURSE PRACTITIONER

## 2023-02-14 PROCEDURE — 4010F ACE/ARB THERAPY RXD/TAKEN: CPT | Mod: CPTII,,, | Performed by: NURSE PRACTITIONER

## 2023-02-14 PROCEDURE — 4010F PR ACE/ARB THEARPY RXD/TAKEN: ICD-10-PCS | Mod: CPTII,,, | Performed by: NURSE PRACTITIONER

## 2023-02-14 PROCEDURE — 99214 OFFICE O/P EST MOD 30 MIN: CPT | Mod: PBBFAC,PN | Performed by: NURSE PRACTITIONER

## 2023-02-14 PROCEDURE — 3075F SYST BP GE 130 - 139MM HG: CPT | Mod: CPTII,,, | Performed by: NURSE PRACTITIONER

## 2023-02-14 PROCEDURE — 99999 PR PBB SHADOW E&M-EST. PATIENT-LVL IV: ICD-10-PCS | Mod: PBBFAC,,, | Performed by: NURSE PRACTITIONER

## 2023-02-14 PROCEDURE — 87502 INFLUENZA DNA AMP PROBE: CPT | Mod: PBBFAC,PN | Performed by: NURSE PRACTITIONER

## 2023-02-14 PROCEDURE — 1159F MED LIST DOCD IN RCRD: CPT | Mod: CPTII,,, | Performed by: NURSE PRACTITIONER

## 2023-02-14 PROCEDURE — 3008F BODY MASS INDEX DOCD: CPT | Mod: CPTII,,, | Performed by: NURSE PRACTITIONER

## 2023-02-14 PROCEDURE — 99213 OFFICE O/P EST LOW 20 MIN: CPT | Mod: S$PBB,,, | Performed by: NURSE PRACTITIONER

## 2023-02-14 RX ORDER — MELOXICAM 7.5 MG/1
7.5 TABLET ORAL DAILY
Qty: 7 TABLET | Refills: 0 | Status: SHIPPED | OUTPATIENT
Start: 2023-02-14 | End: 2023-02-21

## 2023-02-14 RX ORDER — GUAIFENESIN 600 MG/1
600 TABLET, EXTENDED RELEASE ORAL 2 TIMES DAILY
Qty: 20 TABLET | Refills: 0 | Status: CANCELLED
Start: 2023-02-14 | End: 2023-02-24

## 2023-02-14 RX ORDER — GUAIFENESIN 600 MG/1
600 TABLET, EXTENDED RELEASE ORAL 2 TIMES DAILY
Qty: 20 TABLET | Refills: 0
Start: 2023-02-14 | End: 2023-02-24

## 2023-02-14 RX ORDER — AMOXICILLIN AND CLAVULANATE POTASSIUM 875; 125 MG/1; MG/1
1 TABLET, FILM COATED ORAL 2 TIMES DAILY
Qty: 20 TABLET | Refills: 0 | Status: CANCELLED | OUTPATIENT
Start: 2023-02-14 | End: 2023-02-21

## 2023-02-14 RX ORDER — PROMETHAZINE HYDROCHLORIDE AND DEXTROMETHORPHAN HYDROBROMIDE 6.25; 15 MG/5ML; MG/5ML
5 SYRUP ORAL 4 TIMES DAILY
Qty: 200 ML | Refills: 0 | Status: SHIPPED | OUTPATIENT
Start: 2023-02-14 | End: 2023-02-24

## 2023-02-15 NOTE — PROGRESS NOTES
Assessment/Plan:    Problem List Items Addressed This Visit    None  Visit Diagnoses       Cough, unspecified type    -  Primary    Relevant Orders    POCT COVID-19 Rapid Screening (Completed)    POCT Influenza A/B Molecular (Completed)    COVID-19 virus infection        Relevant Medications    meloxicam (MOBIC) 7.5 MG tablet    guaiFENesin (MUCINEX) 600 mg 12 hr tablet    promethazine-dextromethorphan (PROMETHAZINE-DM) 6.25-15 mg/5 mL Syrp     -Promethazine-DM for cough  -Alternate Tylenol/Ibuprofen as needed for fever and pain  -Increase hydration and resume physical activity as tolerated   -ER precautions for shortness of breath, decrease oxygenation, and/or severe symptoms.      -COVID risk score 3, paxlovid was offered and patient declined.     Follow up if symptoms worsen or fail to improve.    Dayan Jimenez NP  _____________________________________________________________________________________________________________________________________________________    CC: cough, bodyaches, fever     HPI:    Patient is in clinic today as an established patient here for cough. This is a new problem. The current episode started in the past 4 days. The problem is gradually worsening. There has been fever. Associated symptoms include bodyaches, headache, fever, and congestion. Pertinent negatives include no diaphoresis, ear pain, hoarse voice, neck pain, shortness of breath, sneezing, sore throat or swollen glands. Patient recently was treated by me for a sinus infection and completed a zpak. A few days after completing abx  the symptoms returned.          No other new complaints today.  Remaining chronic conditions have been reviewed and remain stable. Further detail as stated above.     HM reviewed.     No recent changes to medical/surgical history.    Current Outpatient Medications on File Prior to Visit   Medication Sig Dispense Refill    acetaminophen (TYLENOL) 325 MG tablet Take 325 mg by mouth every 6 (six)  hours as needed for Pain.      blood-glucose meter Misc Use as directed 1 each prn    calcium carbonate (OS-ISH) 600 mg calcium (1,500 mg) Tab Take 1,200 mg by mouth 2 (two) times daily with meals.      cetirizine (ZYRTEC) 10 MG tablet Take 10 mg by mouth once daily.      fluticasone propionate (FLONASE) 50 mcg/actuation nasal spray 2 sprays (100 mcg total) by Each Nostril route once daily. 16 g 11    lancets Misc As directed 100 each prn    levothyroxine (SYNTHROID) 50 MCG tablet Take 1 tablet (50 mcg total) by mouth once daily. 90 tablet 3    losartan (COZAAR) 25 MG tablet Take 1 tablet (25 mg total) by mouth once daily. 90 tablet 3    meclizine (ANTIVERT) 25 mg tablet Take 1 tablet (25 mg total) by mouth 3 (three) times daily as needed for Dizziness. 30 tablet 0    metFORMIN (GLUCOPHAGE-XR) 500 MG ER 24hr tablet TAKE 1 TABLET (500 MG TOTAL) BY MOUTH ONCE DAILY 90 tablet 3    TRUE METRIX GLUCOSE TEST STRIP Strp USE 1 STRIP ONCE DAILY TO TEST BLOOD GLUCOSE (50 DAY SUPPLY) 100 each 3    TRUEPLUS LANCETS 33 gauge Misc USE AS DIRECTED TO TEST BLOOD SUGAR ONCE DAILY FOR UP  USES 100 each 3    VITAMIN D2 1,250 mcg (50,000 unit) capsule TAKE 1 CAPSULE (50,000 UNITS TOTAL) BY MOUTH TWICE A WEEK. 24 capsule 3    famotidine (PEPCID) 40 MG tablet TAKE 1 TABLET (40 MG TOTAL) BY MOUTH ONCE DAILY. 30 tablet 11     No current facility-administered medications on file prior to visit.       Review of Systems   Constitutional:  Positive for fever.   HENT:  Positive for congestion.    Eyes: Negative.    Respiratory:  Positive for cough.    Cardiovascular: Negative.    Gastrointestinal: Negative.    Endocrine: Negative.    Genitourinary: Negative.    Musculoskeletal:  Positive for myalgias.   Skin: Negative.    Allergic/Immunologic: Negative.    Neurological:  Positive for headaches.   Hematological: Negative.    Psychiatric/Behavioral: Negative.       Vitals:    02/14/23 1344   BP: 136/84   Pulse: (!) 115   Temp: 99.2 °F (37.3  "°C)   SpO2: 99%   Weight: 70.7 kg (155 lb 13.8 oz)   Height: 5' 1" (1.549 m)       Wt Readings from Last 3 Encounters:   02/14/23 70.7 kg (155 lb 13.8 oz)   02/06/23 70.8 kg (156 lb 1.4 oz)   08/04/22 72.6 kg (160 lb)       Physical Exam  Vitals and nursing note reviewed.   Constitutional:       Appearance: She is well-developed.   HENT:      Head: Normocephalic and atraumatic.   Eyes:      Conjunctiva/sclera: Conjunctivae normal.      Pupils: Pupils are equal, round, and reactive to light.   Cardiovascular:      Rate and Rhythm: Normal rate and regular rhythm.      Heart sounds: Normal heart sounds.   Pulmonary:      Effort: Pulmonary effort is normal.      Breath sounds: Normal breath sounds.   Musculoskeletal:         General: Normal range of motion.      Cervical back: Normal range of motion and neck supple.   Skin:     General: Skin is warm and dry.   Neurological:      Mental Status: She is alert and oriented to person, place, and time.      Deep Tendon Reflexes: Reflexes are normal and symmetric.   Psychiatric:         Behavior: Behavior normal.         Thought Content: Thought content normal.         Judgment: Judgment normal.       Health Maintenance   Topic Date Due    TETANUS VACCINE  Never done    Low Dose Statin  Never done    Hemoglobin A1c  11/19/2022    Eye Exam  02/08/2023    Lipid Panel  05/19/2023    Mammogram  07/15/2023    Foot Exam  08/04/2023    Hepatitis C Screening  Completed       "

## 2023-02-20 ENCOUNTER — OFFICE VISIT (OUTPATIENT)
Dept: PRIMARY CARE CLINIC | Facility: CLINIC | Age: 63
End: 2023-02-20
Payer: MEDICAID

## 2023-02-20 DIAGNOSIS — Z09 FOLLOW-UP EXAM: Primary | ICD-10-CM

## 2023-02-20 PROCEDURE — 4010F ACE/ARB THERAPY RXD/TAKEN: CPT | Mod: CPTII,95,, | Performed by: NURSE PRACTITIONER

## 2023-02-20 PROCEDURE — 99213 OFFICE O/P EST LOW 20 MIN: CPT | Mod: 95,,, | Performed by: NURSE PRACTITIONER

## 2023-02-20 PROCEDURE — 4010F PR ACE/ARB THEARPY RXD/TAKEN: ICD-10-PCS | Mod: CPTII,95,, | Performed by: NURSE PRACTITIONER

## 2023-02-20 PROCEDURE — 99213 PR OFFICE/OUTPT VISIT, EST, LEVL III, 20-29 MIN: ICD-10-PCS | Mod: 95,,, | Performed by: NURSE PRACTITIONER

## 2023-02-20 NOTE — PROGRESS NOTES
Primary Care Telemedicine Note  The patient location is:  Patient Home   The chief complaint leading to consultation is: follow-up for COVID   Total time spent with patient: 10 minute     Visit type: Virtual visit with synchronous audio and video  Each patient to whom he or she provides medical services by telemedicine is:  (1) informed of the relationship between the physician and patient and the respective role of any other health care provider with respect to management of the patient; and (2) notified that he or she may decline to receive medical services by telemedicine and may withdraw from such care at any time.      Assessment/Plan:    Problem List Items Addressed This Visit    None  Visit Diagnoses       Follow-up exam    -  Primary     -Symptoms improving   -Continue current treatment plan     Follow up if symptoms worsen or fail to improve.    Dayan Jimenez NP    _____________________________________________________________________________________________________________________________________________________    CC: follow-up     HPI:    Patient is an established patient who presents today via virtual visit. Patient reports symptoms improving. No new issues. Will continue current treatment plan.     Past Medical History:  Past Medical History:   Diagnosis Date    Arthritis, lumbar spine     hands    General anesthetics causing adverse effect in therapeutic use     following breast rediuct, hard time awakening  also had anxiety rxn to lidocain in dental ofc    GERD (gastroesophageal reflux disease)     Hypothyroidism 10/8/2014    Maternal anesthesia complication     epidural for 1st child went up instead of down; required intubation    Obesity (BMI 30-39.9) 10/8/2014    Thyroid disease     Thyroid nodule 10/8/2014     Past Surgical History:   Procedure Laterality Date    BREAST BIOPSY Left     b9    BREAST SURGERY      Reduction cause of a mass in left breast    c-sections x2       SECTION   3/26/81 & 9/12/85    Birth of two girls    COLONOSCOPY  9/25/2012         COLONOSCOPY N/A 8/30/2018    Procedure: COLONOSCOPY;  Surgeon: Francisco Mcclain MD;  Location: [facility];  Service: Endoscopy;  Laterality: N/A;    hysteroscopy with polypectomy      INCISIONAL BREAST BIOPSY Left 1996    benign; done at same time as reduction    TOTAL REDUCTION MAMMOPLASTY Bilateral 1996     Review of patient's allergies indicates:   Allergen Reactions    Duricef [cefadroxil] Hives    Eggs [egg derived] Anaphylaxis and Hives    Cat/feline products Itching    Dairycare [lactobacillus acidoph-lactase] Itching    Dog dander Itching    Wheat containing prod Hives     Social History     Tobacco Use    Smoking status: Never    Smokeless tobacco: Never   Substance Use Topics    Alcohol use: Yes     Comment: 2/ month    Drug use: No     Family History   Problem Relation Age of Onset    Brain cancer Mother     Early death Mother 51        Passed at 51    Cancer Mother         Brain tumor    Arthritis Mother     Diabetes Father         Type 2    Cirrhosis Father     Cancer Father         Bone    COPD Father         Smoker    Early death Father         Passed at 64    Heart disease Sister         Congestive heart failure (passed 2/11/18    Hypertension Sister     Kidney disease Sister         Doc removed when she was a child    Hypertension Sister     Depression Sister         Due to loss of her 27 year old son    Early death Sister         Pulmonary hypertension, cirrhosis of the liver(passed 7/14/2007   Age 57    Breast cancer Maternal Aunt 30    Breast cancer Paternal Aunt 40    Breast cancer Paternal Aunt 40    Heart disease Brother         Heart attack    Hypertension Brother     Heart disease Brother         Heart  blockage    Heart disease Brother         Heart attack (passed)    Diabetes Brother     Macular degeneration Brother     Ovarian cancer Neg Hx      Current Outpatient Medications on File Prior to Visit   Medication Sig  Dispense Refill    acetaminophen (TYLENOL) 325 MG tablet Take 325 mg by mouth every 6 (six) hours as needed for Pain.      blood-glucose meter Misc Use as directed 1 each prn    calcium carbonate (OS-ISH) 600 mg calcium (1,500 mg) Tab Take 1,200 mg by mouth 2 (two) times daily with meals.      cetirizine (ZYRTEC) 10 MG tablet Take 10 mg by mouth once daily.      famotidine (PEPCID) 40 MG tablet TAKE 1 TABLET (40 MG TOTAL) BY MOUTH ONCE DAILY. 30 tablet 11    fluticasone propionate (FLONASE) 50 mcg/actuation nasal spray 2 sprays (100 mcg total) by Each Nostril route once daily. 16 g 11    guaiFENesin (MUCINEX) 600 mg 12 hr tablet Take 1 tablet (600 mg total) by mouth 2 (two) times daily. for 10 days 20 tablet 0    levothyroxine (SYNTHROID) 50 MCG tablet Take 1 tablet (50 mcg total) by mouth once daily. 90 tablet 3    losartan (COZAAR) 25 MG tablet Take 1 tablet (25 mg total) by mouth once daily. 90 tablet 3    meclizine (ANTIVERT) 25 mg tablet Take 1 tablet (25 mg total) by mouth 3 (three) times daily as needed for Dizziness. 30 tablet 0    meloxicam (MOBIC) 7.5 MG tablet Take 1 tablet (7.5 mg total) by mouth once daily. for 7 days 7 tablet 0    metFORMIN (GLUCOPHAGE-XR) 500 MG ER 24hr tablet TAKE 1 TABLET (500 MG TOTAL) BY MOUTH ONCE DAILY 90 tablet 3    promethazine-dextromethorphan (PROMETHAZINE-DM) 6.25-15 mg/5 mL Syrp Take 5 mLs by mouth 4 (four) times daily. for 10 days 200 mL 0    TRUE METRIX GLUCOSE TEST STRIP Strp USE 1 STRIP ONCE DAILY TO TEST BLOOD GLUCOSE (50 DAY SUPPLY) 100 each 3    TRUEPLUS LANCETS 33 gauge Misc USE AS DIRECTED TO TEST BLOOD SUGAR ONCE DAILY FOR UP  USES 100 each 2    VITAMIN D2 1,250 mcg (50,000 unit) capsule TAKE 1 CAPSULE (50,000 UNITS TOTAL) BY MOUTH TWICE A WEEK. 24 capsule 3     No current facility-administered medications on file prior to visit.       Review of Systems   Constitutional:  Positive for malaise/fatigue.   HENT:  Negative for hearing loss.    Eyes:  Negative  for discharge.   Respiratory:  Positive for cough (dry, mostly in morning after awaking). Negative for shortness of breath and wheezing.    Cardiovascular:  Negative for chest pain and palpitations.   Gastrointestinal:  Negative for blood in stool, constipation, diarrhea and vomiting.   Genitourinary:  Negative for dysuria and hematuria.   Musculoskeletal:  Negative for neck pain.   Neurological:  Negative for weakness and headaches.   Endo/Heme/Allergies:  Negative for polydipsia.       Physical Exam   @[email or web address]@  @thptenteredglucose@    Physical Exam  Vitals and nursing note reviewed.   Constitutional:       Appearance: She is well-developed.   HENT:      Head: Normocephalic and atraumatic.   Pulmonary:      Effort: Pulmonary effort is normal.   Musculoskeletal:         General: Normal range of motion.      Cervical back: Normal range of motion and neck supple.   Neurological:      Mental Status: She is alert and oriented to person, place, and time.      Deep Tendon Reflexes: Reflexes are normal and symmetric.   Psychiatric:         Behavior: Behavior normal.         Thought Content: Thought content normal.         Judgment: Judgment normal.       The patient's Health Maintenance was reviewed and the following appears to be due at this time:

## 2023-02-27 ENCOUNTER — PATIENT OUTREACH (OUTPATIENT)
Dept: ADMINISTRATIVE | Facility: HOSPITAL | Age: 63
End: 2023-02-27
Payer: MEDICAID

## 2023-03-10 ENCOUNTER — PATIENT MESSAGE (OUTPATIENT)
Dept: PRIMARY CARE CLINIC | Facility: CLINIC | Age: 63
End: 2023-03-10
Payer: MEDICAID

## 2023-03-10 ENCOUNTER — LAB VISIT (OUTPATIENT)
Dept: LAB | Facility: HOSPITAL | Age: 63
End: 2023-03-10
Attending: INTERNAL MEDICINE
Payer: MEDICAID

## 2023-03-10 ENCOUNTER — TELEPHONE (OUTPATIENT)
Dept: FAMILY MEDICINE | Facility: CLINIC | Age: 63
End: 2023-03-10
Payer: MEDICAID

## 2023-03-10 DIAGNOSIS — E11.9 TYPE 2 DIABETES MELLITUS WITHOUT COMPLICATION, WITHOUT LONG-TERM CURRENT USE OF INSULIN: ICD-10-CM

## 2023-03-10 DIAGNOSIS — E03.9 HYPOTHYROIDISM, UNSPECIFIED TYPE: ICD-10-CM

## 2023-03-10 DIAGNOSIS — Z13.220 ENCOUNTER FOR LIPID SCREENING FOR CARDIOVASCULAR DISEASE: ICD-10-CM

## 2023-03-10 DIAGNOSIS — Z00.00 ENCOUNTER FOR ANNUAL HEALTH EXAMINATION: ICD-10-CM

## 2023-03-10 DIAGNOSIS — Z13.6 ENCOUNTER FOR LIPID SCREENING FOR CARDIOVASCULAR DISEASE: ICD-10-CM

## 2023-03-10 LAB
ESTIMATED AVG GLUCOSE: 131 MG/DL (ref 68–131)
HBA1C MFR BLD: 6.2 % (ref 4–5.6)

## 2023-03-10 PROCEDURE — 84443 ASSAY THYROID STIM HORMONE: CPT | Performed by: INTERNAL MEDICINE

## 2023-03-10 PROCEDURE — 80061 LIPID PANEL: CPT | Performed by: INTERNAL MEDICINE

## 2023-03-10 PROCEDURE — 36415 COLL VENOUS BLD VENIPUNCTURE: CPT | Mod: PO | Performed by: INTERNAL MEDICINE

## 2023-03-10 PROCEDURE — 83036 HEMOGLOBIN GLYCOSYLATED A1C: CPT | Performed by: INTERNAL MEDICINE

## 2023-03-10 PROCEDURE — 80053 COMPREHEN METABOLIC PANEL: CPT | Performed by: INTERNAL MEDICINE

## 2023-03-10 NOTE — TELEPHONE ENCOUNTER
----- Message from Chelita Jackson sent at 3/10/2023 12:39 PM CST -----  Regarding: Lab Orders  Contact: Bessy  Is requesting a  callback from the nurse in regards to lab orders that need to be placed in Epic for scheduling. The patient says she is still fasting and was waiting on a response.    Bessy can be reached at [phone] ([email or web address])      Thanks

## 2023-03-11 LAB
ALBUMIN SERPL BCP-MCNC: 4.5 G/DL (ref 3.5–5.2)
ALP SERPL-CCNC: 74 U/L (ref 55–135)
ALT SERPL W/O P-5'-P-CCNC: 21 U/L (ref 10–44)
ANION GAP SERPL CALC-SCNC: 9 MMOL/L (ref 8–16)
AST SERPL-CCNC: 24 U/L (ref 10–40)
BILIRUB SERPL-MCNC: 1 MG/DL (ref 0.1–1)
BUN SERPL-MCNC: 17 MG/DL (ref 8–23)
CALCIUM SERPL-MCNC: 9.8 MG/DL (ref 8.7–10.5)
CHLORIDE SERPL-SCNC: 101 MMOL/L (ref 95–110)
CHOLEST SERPL-MCNC: 202 MG/DL (ref 120–199)
CHOLEST/HDLC SERPL: 2.5 {RATIO} (ref 2–5)
CO2 SERPL-SCNC: 26 MMOL/L (ref 23–29)
CREAT SERPL-MCNC: 1 MG/DL (ref 0.5–1.4)
EST. GFR  (NO RACE VARIABLE): >60 ML/MIN/1.73 M^2
GLUCOSE SERPL-MCNC: 105 MG/DL (ref 70–110)
HDLC SERPL-MCNC: 82 MG/DL (ref 40–75)
HDLC SERPL: 40.6 % (ref 20–50)
LDLC SERPL CALC-MCNC: 105.4 MG/DL (ref 63–159)
NONHDLC SERPL-MCNC: 120 MG/DL
POTASSIUM SERPL-SCNC: 4.6 MMOL/L (ref 3.5–5.1)
PROT SERPL-MCNC: 7.3 G/DL (ref 6–8.4)
SODIUM SERPL-SCNC: 136 MMOL/L (ref 136–145)
TRIGL SERPL-MCNC: 73 MG/DL (ref 30–150)
TSH SERPL DL<=0.005 MIU/L-ACNC: 2.58 UIU/ML (ref 0.4–4)

## 2023-03-21 ENCOUNTER — TELEPHONE (OUTPATIENT)
Dept: FAMILY MEDICINE | Facility: CLINIC | Age: 63
End: 2023-03-21
Payer: MEDICAID

## 2023-03-21 NOTE — TELEPHONE ENCOUNTER
----- Message from Adryan Alejandro sent at 3/21/2023 12:21 PM CDT -----  Contact: florentin  Patient is calling to discuss her lab results. She also needs to know if she d/c losartan or keep taking it. Please call her back at  [phone].          Thanks  DD     Double Island Pedicle Flap Text: The defect edges were debeveled with a #15 scalpel blade.  Given the location of the defect, shape of the defect and the proximity to free margins a double island pedicle advancement flap was deemed most appropriate.  Using a sterile surgical marker, an appropriate advancement flap was drawn incorporating the defect, outlining the appropriate donor tissue and placing the expected incisions within the relaxed skin tension lines where possible.    The area thus outlined was incised deep to adipose tissue with a #15 scalpel blade.  The skin margins were undermined to an appropriate distance in all directions around the primary defect and laterally outward around the island pedicle utilizing iris scissors.  There was minimal undermining beneath the pedicle flap.

## 2023-03-22 NOTE — PROGRESS NOTES
Results have been released via [employer]. Please verify that these have been viewed by patient. If not, please call patient with results.    Please schedule the following orders:  A1c in 6 mo    I have reviewed your recent results.    A1C NORMAL-The A1c increased slightly to 6.2 but remains within goal.  Repeat an a1c in 6 months.       CMP/BMP NORMAL-The electrolytes all appear stable at this time.  This includes kidney functions along with routine electrolytes like sugar, potassium and sodium.  The liver enzymes were noted to be stable also.    LIPID-ABNORMAL-Your ASCVD score is 9%, which is considered to be intermediate. This a number that is calculated using your cholesterol levels plus other risk factors and is used to estimate your risk of having a cardiovascular event (heart attack or stroke) within the next 10 years. Due to your elevated risk, you could consider starting a daily cholesterol medication called a statin. I also recommend continued healthy lifestyle choices, such as diet and exercise. Diet recommendations include the DASH or mediterranean diets.                                                                                                                   If you would like to go ahead and start a medication, I will call this into your pharmacy. We will then need to recheck your cholesterol levels in 8 weeks after starting medication to make sure you've had an appropriate response to the medication. If you would rather hold on the medication, we will continue to keep a close watch on your cholesterol to make sure it does not continue to increase.    TSH NORMAL-ON MED-The TSH is normal. Continue current dose of thyroid medication.    Please let me know if you have any additional questions or concerns about above.

## 2023-04-14 ENCOUNTER — OFFICE VISIT (OUTPATIENT)
Dept: PRIMARY CARE CLINIC | Facility: CLINIC | Age: 63
End: 2023-04-14
Payer: MEDICAID

## 2023-04-14 DIAGNOSIS — J01.10 ACUTE FRONTAL SINUSITIS, RECURRENCE NOT SPECIFIED: Primary | ICD-10-CM

## 2023-04-14 PROCEDURE — 99212 OFFICE O/P EST SF 10 MIN: CPT | Mod: 95,,, | Performed by: NURSE PRACTITIONER

## 2023-04-14 PROCEDURE — 3044F PR MOST RECENT HEMOGLOBIN A1C LEVEL <7.0%: ICD-10-PCS | Mod: CPTII,95,, | Performed by: NURSE PRACTITIONER

## 2023-04-14 PROCEDURE — 4010F PR ACE/ARB THEARPY RXD/TAKEN: ICD-10-PCS | Mod: CPTII,95,, | Performed by: NURSE PRACTITIONER

## 2023-04-14 PROCEDURE — 4010F ACE/ARB THERAPY RXD/TAKEN: CPT | Mod: CPTII,95,, | Performed by: NURSE PRACTITIONER

## 2023-04-14 PROCEDURE — 99212 PR OFFICE/OUTPT VISIT, EST, LEVL II, 10-19 MIN: ICD-10-PCS | Mod: 95,,, | Performed by: NURSE PRACTITIONER

## 2023-04-14 PROCEDURE — 3044F HG A1C LEVEL LT 7.0%: CPT | Mod: CPTII,95,, | Performed by: NURSE PRACTITIONER

## 2023-04-14 RX ORDER — AZITHROMYCIN 250 MG/1
TABLET, FILM COATED ORAL
Qty: 6 TABLET | Refills: 0 | Status: SHIPPED | OUTPATIENT
Start: 2023-04-14 | End: 2023-07-18

## 2023-04-14 RX ORDER — PROMETHAZINE HYDROCHLORIDE AND DEXTROMETHORPHAN HYDROBROMIDE 6.25; 15 MG/5ML; MG/5ML
5 SYRUP ORAL 4 TIMES DAILY
Qty: 200 ML | Refills: 0 | Status: SHIPPED | OUTPATIENT
Start: 2023-04-14 | End: 2023-04-24

## 2023-04-14 RX ORDER — PREDNISONE 20 MG/1
40 TABLET ORAL DAILY
Qty: 10 TABLET | Refills: 0 | Status: SHIPPED | OUTPATIENT
Start: 2023-04-14 | End: 2023-04-19

## 2023-04-14 NOTE — PROGRESS NOTES
Assessment/Plan:    Problem List Items Addressed This Visit    None  Visit Diagnoses       Acute frontal sinusitis, recurrence not specified    -  Primary    Relevant Medications    azithromycin (Z-NAYELI) 250 MG tablet    promethazine-dextromethorphan (PROMETHAZINE-DM) 6.25-15 mg/5 mL Syrp    predniSONE (DELTASONE) 20 MG tablet     -Start ABX and oral steroids as prescribed  -Continue Flonase and Mucinex  -Supportive care- rest, increase hydration with water, OTC Tylenol/Ibuprofen for pain/fever.  -Follow up with PCP if no improvement in symptoms   -ER precautions for severe or worsening of symptoms      Follow up if symptoms worsen or fail to improve.    Dayan Jimenez, ADI  _____________________________________________________________________________________________________________________________________________________    CC: postnasal drip, sore throat, cough     HPI:    Patient is in clinic today as an established patient. Sinus Problem   This is a new problem. The current episode started in the past 7 days. The problem is gradually worsening. There has been no fever. She is experiencing no pain. Associated symptoms include postnasal drip, cough, hoarseness, and sore throat. Pertinent negatives include no chills, diaphoresis, ear pain, headaches, neck pain, shortness of breath, sneezing, or swollen glands. Past treatments include nothing. The treatment provided no relief.      No other new complaints today.  Remaining chronic conditions have been reviewed and remain stable. Further detail as stated above.      reviewed.     No recent changes to medical/surgical history.    Current Outpatient Medications on File Prior to Visit   Medication Sig Dispense Refill    acetaminophen (TYLENOL) 325 MG tablet Take 325 mg by mouth every 6 (six) hours as needed for Pain.      blood-glucose meter Misc Use as directed 1 each prn    calcium carbonate (OS-ISH) 600 mg calcium (1,500 mg) Tab Take 1,200 mg by mouth 2 (two) times  daily with meals.      cetirizine (ZYRTEC) 10 MG tablet Take 10 mg by mouth once daily.      famotidine (PEPCID) 40 MG tablet TAKE 1 TABLET (40 MG TOTAL) BY MOUTH ONCE DAILY. 30 tablet 11    fluticasone propionate (FLONASE) 50 mcg/actuation nasal spray 2 sprays (100 mcg total) by Each Nostril route once daily. 16 g 11    levothyroxine (SYNTHROID) 50 MCG tablet Take 1 tablet (50 mcg total) by mouth once daily. 90 tablet 3    losartan (COZAAR) 25 MG tablet Take 1 tablet (25 mg total) by mouth once daily. 90 tablet 3    meclizine (ANTIVERT) 25 mg tablet Take 1 tablet (25 mg total) by mouth 3 (three) times daily as needed for Dizziness. 30 tablet 0    metFORMIN (GLUCOPHAGE-XR) 500 MG ER 24hr tablet TAKE 1 TABLET (500 MG TOTAL) BY MOUTH ONCE DAILY 90 tablet 3    TRUE METRIX GLUCOSE TEST STRIP Strp USE 1 STRIP ONCE DAILY TO TEST BLOOD GLUCOSE (50 DAY SUPPLY) 100 each 3    TRUEPLUS LANCETS 33 gauge Misc USE AS DIRECTED TO TEST BLOOD SUGAR ONCE DAILY FOR UP  USES 100 each 2    VITAMIN D2 1,250 mcg (50,000 unit) capsule TAKE 1 CAPSULE (50,000 UNITS TOTAL) BY MOUTH TWICE A WEEK. 24 capsule 3     No current facility-administered medications on file prior to visit.       Review of Systems   Constitutional:  Negative for chills and fever.   HENT:  Positive for postnasal drip and sore throat. Negative for ear pain and rhinorrhea.    Respiratory:  Positive for cough. Negative for shortness of breath and wheezing.    Cardiovascular:  Negative for chest pain.   Musculoskeletal:  Negative for myalgias.   Skin:  Negative for rash.   Allergic/Immunologic: Negative for environmental allergies.   Neurological:  Negative for headaches.     There were no vitals filed for this visit.    Wt Readings from Last 3 Encounters:   02/14/23 70.7 kg (155 lb 13.8 oz)   02/06/23 70.8 kg (156 lb 1.4 oz)   08/04/22 72.6 kg (160 lb)       Physical Exam  Vitals and nursing note reviewed.   Constitutional:       Appearance: She is well-developed.    HENT:      Head: Normocephalic and atraumatic.   Pulmonary:      Effort: Pulmonary effort is normal.   Musculoskeletal:         General: Normal range of motion.      Cervical back: Normal range of motion and neck supple.   Neurological:      Mental Status: She is alert and oriented to person, place, and time.      Deep Tendon Reflexes: Reflexes are normal and symmetric.   Psychiatric:         Behavior: Behavior normal.         Thought Content: Thought content normal.         Judgment: Judgment normal.       Health Maintenance   Topic Date Due    TETANUS VACCINE  Never done    Low Dose Statin  Never done    Mammogram  07/15/2023    Foot Exam  08/04/2023    Hemoglobin A1c  09/10/2023    Eye Exam  02/13/2024    Lipid Panel  03/10/2024    Hepatitis C Screening  Completed

## 2023-05-18 ENCOUNTER — PATIENT MESSAGE (OUTPATIENT)
Dept: ADMINISTRATIVE | Facility: OTHER | Age: 63
End: 2023-05-18
Payer: MEDICAID

## 2023-06-02 ENCOUNTER — PATIENT MESSAGE (OUTPATIENT)
Dept: ADMINISTRATIVE | Facility: OTHER | Age: 63
End: 2023-06-02
Payer: MEDICAID

## 2023-07-12 ENCOUNTER — TELEPHONE (OUTPATIENT)
Dept: FAMILY MEDICINE | Facility: CLINIC | Age: 63
End: 2023-07-12
Payer: MEDICAID

## 2023-07-12 NOTE — TELEPHONE ENCOUNTER
Returned call to pt, no answer, message on recorder stating she has not received a tetanus vaccine within our system.

## 2023-07-12 NOTE — TELEPHONE ENCOUNTER
----- Message from Erik Humphries sent at 7/12/2023  3:07 PM CDT -----  Contact: TY  Patient is calling to speak with the nurse, reports needing to know if she received tetanus shot within the last 10 years. Please call [phone], reports going to urgent care now and needs a call back at soonest convenience

## 2023-07-17 ENCOUNTER — OFFICE VISIT (OUTPATIENT)
Dept: OBSTETRICS AND GYNECOLOGY | Facility: CLINIC | Age: 63
End: 2023-07-17
Payer: MEDICAID

## 2023-07-17 ENCOUNTER — HOSPITAL ENCOUNTER (OUTPATIENT)
Dept: RADIOLOGY | Facility: HOSPITAL | Age: 63
Discharge: HOME | End: 2023-07-17
Attending: SPECIALIST
Payer: MEDICAID

## 2023-07-17 VITALS
RESPIRATION RATE: 18 BRPM | DIASTOLIC BLOOD PRESSURE: 74 MMHG | HEIGHT: 61 IN | BODY MASS INDEX: 29.13 KG/M2 | SYSTOLIC BLOOD PRESSURE: 118 MMHG | WEIGHT: 154.31 LBS

## 2023-07-17 DIAGNOSIS — Z12.31 VISIT FOR SCREENING MAMMOGRAM: ICD-10-CM

## 2023-07-17 DIAGNOSIS — Z01.419 ENCOUNTER FOR ROUTINE GYNECOLOGICAL EXAMINATION WITH PAPANICOLAOU SMEAR OF CERVIX: ICD-10-CM

## 2023-07-17 DIAGNOSIS — Z12.31 VISIT FOR SCREENING MAMMOGRAM: Primary | ICD-10-CM

## 2023-07-17 PROCEDURE — 3074F SYST BP LT 130 MM HG: CPT | Mod: CPTII,,, | Performed by: SPECIALIST

## 2023-07-17 PROCEDURE — 99999 PR PBB SHADOW E&M-EST. PATIENT-LVL IV: ICD-10-PCS | Mod: PBBFAC,,, | Performed by: SPECIALIST

## 2023-07-17 PROCEDURE — 88175 CYTOPATH C/V AUTO FLUID REDO: CPT | Performed by: SPECIALIST

## 2023-07-17 PROCEDURE — 99999 PR PBB SHADOW E&M-EST. PATIENT-LVL IV: CPT | Mod: PBBFAC,,, | Performed by: SPECIALIST

## 2023-07-17 PROCEDURE — 99396 PR PREVENTIVE VISIT,EST,40-64: ICD-10-PCS | Mod: S$PBB,,, | Performed by: SPECIALIST

## 2023-07-17 PROCEDURE — 3044F HG A1C LEVEL LT 7.0%: CPT | Mod: CPTII,,, | Performed by: SPECIALIST

## 2023-07-17 PROCEDURE — 4010F ACE/ARB THERAPY RXD/TAKEN: CPT | Mod: CPTII,,, | Performed by: SPECIALIST

## 2023-07-17 PROCEDURE — 3008F BODY MASS INDEX DOCD: CPT | Mod: CPTII,,, | Performed by: SPECIALIST

## 2023-07-17 PROCEDURE — 77063 BREAST TOMOSYNTHESIS BI: CPT | Mod: 26,,, | Performed by: RADIOLOGY

## 2023-07-17 PROCEDURE — 1159F PR MEDICATION LIST DOCUMENTED IN MEDICAL RECORD: ICD-10-PCS | Mod: CPTII,,, | Performed by: SPECIALIST

## 2023-07-17 PROCEDURE — 3074F PR MOST RECENT SYSTOLIC BLOOD PRESSURE < 130 MM HG: ICD-10-PCS | Mod: CPTII,,, | Performed by: SPECIALIST

## 2023-07-17 PROCEDURE — 99396 PREV VISIT EST AGE 40-64: CPT | Mod: S$PBB,,, | Performed by: SPECIALIST

## 2023-07-17 PROCEDURE — 3008F PR BODY MASS INDEX (BMI) DOCUMENTED: ICD-10-PCS | Mod: CPTII,,, | Performed by: SPECIALIST

## 2023-07-17 PROCEDURE — 3078F PR MOST RECENT DIASTOLIC BLOOD PRESSURE < 80 MM HG: ICD-10-PCS | Mod: CPTII,,, | Performed by: SPECIALIST

## 2023-07-17 PROCEDURE — 77063 MAMMO DIGITAL SCREENING BILAT WITH TOMO: ICD-10-PCS | Mod: 26,,, | Performed by: RADIOLOGY

## 2023-07-17 PROCEDURE — 77067 SCR MAMMO BI INCL CAD: CPT | Mod: 26,,, | Performed by: RADIOLOGY

## 2023-07-17 PROCEDURE — 99214 OFFICE O/P EST MOD 30 MIN: CPT | Mod: PBBFAC,PN | Performed by: SPECIALIST

## 2023-07-17 PROCEDURE — 3044F PR MOST RECENT HEMOGLOBIN A1C LEVEL <7.0%: ICD-10-PCS | Mod: CPTII,,, | Performed by: SPECIALIST

## 2023-07-17 PROCEDURE — 77067 SCR MAMMO BI INCL CAD: CPT | Mod: TC,PN

## 2023-07-17 PROCEDURE — 4010F PR ACE/ARB THEARPY RXD/TAKEN: ICD-10-PCS | Mod: CPTII,,, | Performed by: SPECIALIST

## 2023-07-17 PROCEDURE — 77067 MAMMO DIGITAL SCREENING BILAT WITH TOMO: ICD-10-PCS | Mod: 26,,, | Performed by: RADIOLOGY

## 2023-07-17 PROCEDURE — 3078F DIAST BP <80 MM HG: CPT | Mod: CPTII,,, | Performed by: SPECIALIST

## 2023-07-17 PROCEDURE — 1159F MED LIST DOCD IN RCRD: CPT | Mod: CPTII,,, | Performed by: SPECIALIST

## 2023-07-18 ENCOUNTER — OFFICE VISIT (OUTPATIENT)
Dept: PRIMARY CARE CLINIC | Facility: CLINIC | Age: 63
End: 2023-07-18
Payer: MEDICAID

## 2023-07-18 VITALS
DIASTOLIC BLOOD PRESSURE: 76 MMHG | SYSTOLIC BLOOD PRESSURE: 116 MMHG | BODY MASS INDEX: 29.19 KG/M2 | OXYGEN SATURATION: 98 % | HEIGHT: 61 IN | WEIGHT: 154.63 LBS | HEART RATE: 66 BPM | TEMPERATURE: 98 F

## 2023-07-18 DIAGNOSIS — Z78.0 ASYMPTOMATIC AGE-RELATED POSTMENOPAUSAL STATE: ICD-10-CM

## 2023-07-18 DIAGNOSIS — E11.9 TYPE 2 DIABETES MELLITUS WITHOUT COMPLICATION, WITHOUT LONG-TERM CURRENT USE OF INSULIN: ICD-10-CM

## 2023-07-18 DIAGNOSIS — E03.9 HYPOTHYROIDISM, UNSPECIFIED TYPE: Primary | ICD-10-CM

## 2023-07-18 DIAGNOSIS — Z00.00 ENCOUNTER FOR ANNUAL HEALTH EXAMINATION: ICD-10-CM

## 2023-07-18 DIAGNOSIS — E55.9 VITAMIN D INSUFFICIENCY: ICD-10-CM

## 2023-07-18 DIAGNOSIS — I10 PRIMARY HYPERTENSION: ICD-10-CM

## 2023-07-18 DIAGNOSIS — E04.1 THYROID NODULE: ICD-10-CM

## 2023-07-18 PROCEDURE — 99214 OFFICE O/P EST MOD 30 MIN: CPT | Mod: PBBFAC,PN | Performed by: INTERNAL MEDICINE

## 2023-07-18 PROCEDURE — 3008F BODY MASS INDEX DOCD: CPT | Mod: CPTII,,, | Performed by: INTERNAL MEDICINE

## 2023-07-18 PROCEDURE — 1159F PR MEDICATION LIST DOCUMENTED IN MEDICAL RECORD: ICD-10-PCS | Mod: CPTII,,, | Performed by: INTERNAL MEDICINE

## 2023-07-18 PROCEDURE — 3044F HG A1C LEVEL LT 7.0%: CPT | Mod: CPTII,,, | Performed by: INTERNAL MEDICINE

## 2023-07-18 PROCEDURE — 3074F SYST BP LT 130 MM HG: CPT | Mod: CPTII,,, | Performed by: INTERNAL MEDICINE

## 2023-07-18 PROCEDURE — 3008F PR BODY MASS INDEX (BMI) DOCUMENTED: ICD-10-PCS | Mod: CPTII,,, | Performed by: INTERNAL MEDICINE

## 2023-07-18 PROCEDURE — 3078F PR MOST RECENT DIASTOLIC BLOOD PRESSURE < 80 MM HG: ICD-10-PCS | Mod: CPTII,,, | Performed by: INTERNAL MEDICINE

## 2023-07-18 PROCEDURE — 99396 PR PREVENTIVE VISIT,EST,40-64: ICD-10-PCS | Mod: S$PBB,,, | Performed by: INTERNAL MEDICINE

## 2023-07-18 PROCEDURE — 3074F PR MOST RECENT SYSTOLIC BLOOD PRESSURE < 130 MM HG: ICD-10-PCS | Mod: CPTII,,, | Performed by: INTERNAL MEDICINE

## 2023-07-18 PROCEDURE — 99999 PR PBB SHADOW E&M-EST. PATIENT-LVL IV: CPT | Mod: PBBFAC,,, | Performed by: INTERNAL MEDICINE

## 2023-07-18 PROCEDURE — 1160F PR REVIEW ALL MEDS BY PRESCRIBER/CLIN PHARMACIST DOCUMENTED: ICD-10-PCS | Mod: CPTII,,, | Performed by: INTERNAL MEDICINE

## 2023-07-18 PROCEDURE — 3044F PR MOST RECENT HEMOGLOBIN A1C LEVEL <7.0%: ICD-10-PCS | Mod: CPTII,,, | Performed by: INTERNAL MEDICINE

## 2023-07-18 PROCEDURE — 1160F RVW MEDS BY RX/DR IN RCRD: CPT | Mod: CPTII,,, | Performed by: INTERNAL MEDICINE

## 2023-07-18 PROCEDURE — 1159F MED LIST DOCD IN RCRD: CPT | Mod: CPTII,,, | Performed by: INTERNAL MEDICINE

## 2023-07-18 PROCEDURE — 3078F DIAST BP <80 MM HG: CPT | Mod: CPTII,,, | Performed by: INTERNAL MEDICINE

## 2023-07-18 PROCEDURE — 99396 PREV VISIT EST AGE 40-64: CPT | Mod: S$PBB,,, | Performed by: INTERNAL MEDICINE

## 2023-07-18 PROCEDURE — 99999 PR PBB SHADOW E&M-EST. PATIENT-LVL IV: ICD-10-PCS | Mod: PBBFAC,,, | Performed by: INTERNAL MEDICINE

## 2023-07-18 PROCEDURE — 4010F ACE/ARB THERAPY RXD/TAKEN: CPT | Mod: CPTII,,, | Performed by: INTERNAL MEDICINE

## 2023-07-18 PROCEDURE — 4010F PR ACE/ARB THEARPY RXD/TAKEN: ICD-10-PCS | Mod: CPTII,,, | Performed by: INTERNAL MEDICINE

## 2023-07-18 RX ORDER — HYDROXYZINE HYDROCHLORIDE 25 MG/1
25 TABLET, FILM COATED ORAL 3 TIMES DAILY
COMMUNITY

## 2023-07-18 RX ORDER — AMOXICILLIN 500 MG/1
500 CAPSULE ORAL
COMMUNITY
End: 2023-07-24 | Stop reason: ALTCHOICE

## 2023-07-18 NOTE — PROGRESS NOTES
This note is specifically for wellness visit performed today.       Assessment / Plan:      Patient here for annual wellness exam. Health maintenance was reviewed and ordered.    Complete history and physical was completed today.  Complete and thorough medication reconciliation was performed.  Discussed risks and benefits of medications.  Advised patient on orders and health maintenance. Continue current medications listed on your summary sheet.    All questions were answered. Patient had no further concerns. Advised of diagnoses and plan.     Problem List Items Addressed This Visit          Cardiac/Vascular    Primary hypertension    Overview     Hypertension Medications               losartan (COZAAR) 25 MG tablet Take 1 tablet (25 mg total) by mouth once daily.   -at goal today  -continue lifestyle modification with low sodium diet and exercise   -discussed hypertension disease course and importance of treating high blood pressure  -patient understood and advised of risk of untreated blood pressure.  ER precautions were given   for symptoms of hypertensive urgency and emergency.            Endocrine    Hypothyroidism - Primary    Overview     Lab Results   Component Value Date    TSH 2.575 03/10/2023   -currently on levothyroxine 50 mcg         Thyroid nodule    Overview     -hx of thyroid nodules in 2014, did not meet FNA criteria  -repeat June 2020: nodules stable, still not meeting biopsy criteria  -recent thyroid labs within normal limits  -plan to repeat thyroid US for surviellance         Relevant Orders    US Thyroid    Type 2 diabetes mellitus without complication, without long-term current use of insulin    Overview     -condition is currently controlled  -current meds: Metformin 500 mg QD  -see diabetic health maintenance listed below  -counseling provided on importance of diabetic diet and medication compliance in order to treat diabetes  -discussed diabetes disease course and potential complications          Vitamin D insufficiency    Overview     -currently on vit d3 50k weekly  -will recheck vit d level with next labs         Relevant Orders    Vitamin D     Other Visit Diagnoses       Encounter for annual health examination        Asymptomatic age-related postmenopausal state        Relevant Orders    DXA Bone Density Axial Skeleton 1 or more sites            No follow-ups on file.    Philly Mcclellan MD       WELLNESS EXAM      Patient ID: Bessy Lamb is a 62 y.o. female.  has a past medical history of Arthritis, lumbar spine, General anesthetics causing adverse effect in therapeutic use, GERD (gastroesophageal reflux disease), Hypothyroidism (10/8/2014), Maternal anesthesia complication, Obesity (BMI 30-39.9) (10/8/2014), Thyroid disease, and Thyroid nodule (10/8/2014).     Chief Complaint:  Encounter for wellness exam    Well Adult Physical: Patient here for a comprehensive physical exam.     Health Maintenance Topics with due status: Not Due       Topic Last Completion Date    Cervical Cancer Screening 07/15/2022    Influenza Vaccine 11/15/2022    Diabetes Urine Screening 12/27/2022    Eye Exam 02/13/2023    Lipid Panel 03/10/2023    Hemoglobin A1c 03/10/2023    TETANUS VACCINE 07/13/2023    Mammogram 07/17/2023        ==============================================    Health Maintenance Due   Topic Date Due    HIV Screening  Never done    Low Dose Statin  Never done    Shingles Vaccine (1 of 2) Never done    COVID-19 Vaccine (4 - Mixed Product series) 04/05/2022    Foot Exam  08/04/2023    Colorectal Cancer Screening  08/30/2023       Past Medical History:  Past Medical History:   Diagnosis Date    Arthritis, lumbar spine     hands    General anesthetics causing adverse effect in therapeutic use     following breast rediuct, hard time awakening  also had anxiety rxn to lidocain in dental ofc    GERD (gastroesophageal reflux disease)     Hypothyroidism 10/8/2014    Maternal anesthesia complication      epidural for 1st child went up instead of down; required intubation    Obesity (BMI 30-39.9) 10/8/2014    Thyroid disease     Thyroid nodule 10/8/2014     Past Surgical History:   Procedure Laterality Date    BREAST BIOPSY Left     b9    BREAST SURGERY      Reduction cause of a mass in left breast    c-sections x2       SECTION  3/26/81 & 85    Birth of two girls    COLONOSCOPY  2012         COLONOSCOPY N/A 2018    Procedure: COLONOSCOPY;  Surgeon: Francisco Mcclain MD;  Location: [facility];  Service: Endoscopy;  Laterality: N/A;    hysteroscopy with polypectomy      INCISIONAL BREAST BIOPSY Left     benign; done at same time as reduction    TOTAL REDUCTION MAMMOPLASTY Bilateral      Review of patient's allergies indicates:   Allergen Reactions    Duricef [cefadroxil] Hives    Eggs [egg derived] Anaphylaxis and Hives    Cat/feline products Itching    Dairycare [lactobacillus acidoph-lactase] Itching    Dog dander Itching    Wheat containing prod Hives     Current Outpatient Medications on File Prior to Visit   Medication Sig Dispense Refill    acetaminophen (TYLENOL) 325 MG tablet Take 325 mg by mouth every 6 (six) hours as needed for Pain.      amoxicillin (AMOXIL) 500 MG capsule Take 500 mg by mouth every 8 (eight) hours.      blood-glucose meter Misc Use as directed 1 each prn    calcium carbonate (OS-ISH) 600 mg calcium (1,500 mg) Tab Take 1,200 mg by mouth 2 (two) times daily with meals.      cetirizine (ZYRTEC) 10 MG tablet Take 10 mg by mouth once daily.      famotidine (PEPCID) 40 MG tablet TAKE 1 TABLET (40 MG TOTAL) BY MOUTH ONCE DAILY. 30 tablet 11    fluticasone propionate (FLONASE) 50 mcg/actuation nasal spray 2 sprays (100 mcg total) by Each Nostril route once daily. 16 g 11    hydrOXYzine HCL (ATARAX) 25 MG tablet Take 25 mg by mouth 3 (three) times daily.      levothyroxine (SYNTHROID) 50 MCG tablet Take 1 tablet (50 mcg total) by mouth once daily. 90 tablet 3     losartan (COZAAR) 25 MG tablet Take 1 tablet (25 mg total) by mouth once daily. 90 tablet 3    meclizine (ANTIVERT) 25 mg tablet Take 1 tablet (25 mg total) by mouth 3 (three) times daily as needed for Dizziness. 30 tablet 0    metFORMIN (GLUCOPHAGE-XR) 500 MG ER 24hr tablet TAKE 1 TABLET (500 MG TOTAL) BY MOUTH ONCE DAILY 90 tablet 3    TRUE METRIX GLUCOSE TEST STRIP Strp USE 1 STRIP ONCE DAILY TO TEST BLOOD GLUCOSE (50 DAY SUPPLY) 100 each 3    TRUEPLUS LANCETS 33 gauge Misc USE AS DIRECTED TO TEST BLOOD SUGAR ONCE DAILY FOR UP  USES 100 each 2    VITAMIN D2 1,250 mcg (50,000 unit) capsule TAKE 1 CAPSULE (50,000 UNITS TOTAL) BY MOUTH TWICE A WEEK. (Patient taking differently: every 7 days.) 24 capsule 3    [DISCONTINUED] azithromycin (Z-NAYELI) 250 MG tablet Take as directed. 6 tablet 0     No current facility-administered medications on file prior to visit.     Social History     Socioeconomic History    Marital status:    Tobacco Use    Smoking status: Never    Smokeless tobacco: Never   Substance and Sexual Activity    Alcohol use: Yes     Comment: 2/ month    Drug use: No    Sexual activity: Not Currently     Birth control/protection: Post-menopausal     Comment:  had prostate cancer had it remove     Family History   Problem Relation Age of Onset    Brain cancer Mother     Early death Mother 51        Passed at 51    Cancer Mother         Brain tumor    Arthritis Mother     Diabetes Father         Type 2    Cirrhosis Father     Cancer Father         Bone    COPD Father         Smoker    Early death Father         Passed at 64    Heart disease Sister         Congestive heart failure (passed 2/11/18    Hypertension Sister     Kidney disease Sister         Doc removed when she was a child    Hypertension Sister     Depression Sister         Due to loss of her 27 year old son    Early death Sister         Pulmonary hypertension, cirrhosis of the liver(passed 7/14/2007   Age 57    Breast cancer  Maternal Aunt 30    Breast cancer Paternal Aunt 40    Breast cancer Paternal Aunt 40    Heart disease Brother         Heart attack    Hypertension Brother     Heart disease Brother         Heart  blockage    Heart disease Brother         Heart attack (passed)    Diabetes Brother     Macular degeneration Brother     Ovarian cancer Neg Hx        Review of Systems   Constitutional:  Negative for chills, fever and malaise/fatigue.   HENT:  Negative for congestion and sore throat.    Eyes:  Negative for blurred vision and double vision.   Respiratory:  Negative for cough and shortness of breath.    Cardiovascular:  Negative for chest pain, palpitations and leg swelling.   Gastrointestinal:  Negative for abdominal pain, constipation and diarrhea.   Genitourinary:  Negative for dysuria and frequency.   Musculoskeletal:  Negative for back pain and joint pain.   Neurological:  Negative for headaches.   Psychiatric/Behavioral:  Negative for depression. The patient is not nervous/anxious.          Objective:      Vitals:    07/18/23 0849   BP: 116/76   Pulse: 66   Temp: 97.8 °F (36.6 °C)     Body mass index is 29.22 kg/m².     Physical Exam  Constitutional:       General: She is not in acute distress.     Appearance: Normal appearance. She is well-developed.   HENT:      Head: Normocephalic and atraumatic.   Eyes:      Conjunctiva/sclera: Conjunctivae normal.   Cardiovascular:      Rate and Rhythm: Normal rate and regular rhythm.      Pulses: Normal pulses.      Heart sounds: Normal heart sounds. No murmur heard.  Pulmonary:      Effort: Pulmonary effort is normal. No respiratory distress.      Breath sounds: Normal breath sounds.   Abdominal:      General: Bowel sounds are normal. There is no distension.      Palpations: Abdomen is soft.      Tenderness: There is no abdominal tenderness.   Musculoskeletal:         General: Normal range of motion.      Cervical back: Normal range of motion and neck supple.   Skin:     General:  Skin is warm and dry.      Findings: No rash.   Neurological:      General: No focal deficit present.      Mental Status: She is alert and oriented to person, place, and time.   Psychiatric:         Mood and Affect: Mood normal.         Behavior: Behavior normal.           All labs, imaging and procedures performed since last visit have been personally reviewed.

## 2023-07-24 ENCOUNTER — OFFICE VISIT (OUTPATIENT)
Dept: PRIMARY CARE CLINIC | Facility: CLINIC | Age: 63
End: 2023-07-24
Payer: MEDICAID

## 2023-07-24 ENCOUNTER — TELEPHONE (OUTPATIENT)
Dept: FAMILY MEDICINE | Facility: CLINIC | Age: 63
End: 2023-07-24
Payer: MEDICAID

## 2023-07-24 VITALS
DIASTOLIC BLOOD PRESSURE: 76 MMHG | TEMPERATURE: 99 F | BODY MASS INDEX: 29.07 KG/M2 | SYSTOLIC BLOOD PRESSURE: 132 MMHG | HEIGHT: 61 IN | WEIGHT: 154 LBS | HEART RATE: 78 BPM | OXYGEN SATURATION: 96 %
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
## 2023-07-24 LAB
FINAL PATHOLOGIC DIAGNOSIS: NORMAL
Lab: NORMAL

## 2023-07-24 PROCEDURE — 1160F PR REVIEW ALL MEDS BY PRESCRIBER/CLIN PHARMACIST DOCUMENTED: ICD-10-PCS | Mod: CPTII,,, | Performed by: NURSE PRACTITIONER

## 2023-07-24 PROCEDURE — 99214 OFFICE O/P EST MOD 30 MIN: CPT | Mod: PBBFAC,PN | Performed by: NURSE PRACTITIONER

## 2023-07-24 PROCEDURE — 3044F HG A1C LEVEL LT 7.0%: CPT | Mod: CPTII,,, | Performed by: NURSE PRACTITIONER

## 2023-07-24 PROCEDURE — 99999 PR PBB SHADOW E&M-EST. PATIENT-LVL IV: ICD-10-PCS | Mod: PBBFAC,,, | Performed by: NURSE PRACTITIONER

## 2023-07-24 PROCEDURE — 4010F PR ACE/ARB THEARPY RXD/TAKEN: ICD-10-PCS | Mod: CPTII,,, | Performed by: NURSE PRACTITIONER

## 2023-07-24 PROCEDURE — 3008F PR BODY MASS INDEX (BMI) DOCUMENTED: ICD-10-PCS | Mod: CPTII,,, | Performed by: NURSE PRACTITIONER

## 2023-07-24 PROCEDURE — 99999 PR PBB SHADOW E&M-EST. PATIENT-LVL IV: CPT | Mod: PBBFAC,,, | Performed by: NURSE PRACTITIONER

## 2023-07-24 PROCEDURE — 3078F PR MOST RECENT DIASTOLIC BLOOD PRESSURE < 80 MM HG: ICD-10-PCS | Mod: CPTII,,, | Performed by: NURSE PRACTITIONER

## 2023-07-24 PROCEDURE — 1159F MED LIST DOCD IN RCRD: CPT | Mod: CPTII,,, | Performed by: NURSE PRACTITIONER

## 2023-07-24 PROCEDURE — 3008F BODY MASS INDEX DOCD: CPT | Mod: CPTII,,, | Performed by: NURSE PRACTITIONER

## 2023-07-24 PROCEDURE — 4010F ACE/ARB THERAPY RXD/TAKEN: CPT | Mod: CPTII,,, | Performed by: NURSE PRACTITIONER

## 2023-07-24 PROCEDURE — 1160F RVW MEDS BY RX/DR IN RCRD: CPT | Mod: CPTII,,, | Performed by: NURSE PRACTITIONER

## 2023-07-24 PROCEDURE — 3044F PR MOST RECENT HEMOGLOBIN A1C LEVEL <7.0%: ICD-10-PCS | Mod: CPTII,,, | Performed by: NURSE PRACTITIONER

## 2023-07-24 PROCEDURE — 1159F PR MEDICATION LIST DOCUMENTED IN MEDICAL RECORD: ICD-10-PCS | Mod: CPTII,,, | Performed by: NURSE PRACTITIONER

## 2023-07-24 PROCEDURE — 3075F PR MOST RECENT SYSTOLIC BLOOD PRESS GE 130-139MM HG: ICD-10-PCS | Mod: CPTII,,, | Performed by: NURSE PRACTITIONER

## 2023-07-24 PROCEDURE — 99213 OFFICE O/P EST LOW 20 MIN: CPT | Mod: S$PBB,,, | Performed by: NURSE PRACTITIONER

## 2023-07-24 PROCEDURE — 3078F DIAST BP <80 MM HG: CPT | Mod: CPTII,,, | Performed by: NURSE PRACTITIONER

## 2023-07-24 PROCEDURE — 99213 PR OFFICE/OUTPT VISIT, EST, LEVL III, 20-29 MIN: ICD-10-PCS | Mod: S$PBB,,, | Performed by: NURSE PRACTITIONER

## 2023-07-24 PROCEDURE — 3075F SYST BP GE 130 - 139MM HG: CPT | Mod: CPTII,,, | Performed by: NURSE PRACTITIONER

## 2023-07-24 RX ORDER — AZITHROMYCIN 500 MG/1
500 TABLET, FILM COATED ORAL DAILY
Qty: 5 TABLET | Refills: 0 | Status: CANCELLED | OUTPATIENT
Start: 2023-07-24 | End: 2023-07-29

## 2023-07-24 RX ORDER — AMOXICILLIN AND CLAVULANATE POTASSIUM 500; 125 MG/1; MG/1
1 TABLET, FILM COATED ORAL 2 TIMES DAILY
COMMUNITY
Start: 2023-07-22 | End: 2023-08-25

## 2023-07-24 NOTE — TELEPHONE ENCOUNTER
----- Message from Yoel Lino sent at 7/24/2023  1:03 PM CDT -----  Contact: :[phone]  Type:  Same Day Appointment Request    Caller is requesting a same day appointment.  Caller declined first available appointment listed below.    Name of Caller:   When is the first available appointment?09/23/2023  Symptoms:hospital f/u(cat bite/scratches)  Best Call Back Number:[phone]   Additional Information:states bites arent getting better

## 2023-07-25 NOTE — PROGRESS NOTES
Assessment/Plan:    Problem List Items Addressed This Visit    None  Visit Diagnoses       Cat bite, subsequent encounter    -  Primary   -Continue current antibiotic regimen as prescribed   -Keep area clean and dry  -Apply bactroban ointment as directed   -ER precautions for severe or worsening of symptoms         Follow up if symptoms worsen or fail to improve.    Dayan Jimenez NP  _____________________________________________________________________________________________________________________________________________________    CC: hospital f/u of cat bite and scratches     HPI:    Patient is in clinic today as an established patient here for hospital follow-up. Patient reports her cat attacked her on Friday. She was seen at [place] emergency department and was given a prescription for Augmentin. Patient c/o mild swelling. Symptoms are improving. No signs or symptoms of infection at this time.      No other new complaints today.  Remaining chronic conditions have been reviewed and remain stable. Further detail as stated above.     HM reviewed.     No recent changes to medical/surgical history.    Current Outpatient Medications on File Prior to Visit   Medication Sig Dispense Refill    acetaminophen (TYLENOL) 325 MG tablet Take 325 mg by mouth every 6 (six) hours as needed for Pain.      amoxicillin-clavulanate 500-125mg (AUGMENTIN) 500-125 mg Tab Take 1 tablet by mouth 2 (two) times daily.      blood-glucose meter Misc Use as directed 1 each prn    calcium carbonate (OS-ISH) 600 mg calcium (1,500 mg) Tab Take 1,200 mg by mouth 2 (two) times daily with meals.      cetirizine (ZYRTEC) 10 MG tablet Take 10 mg by mouth once daily.      fluticasone propionate (FLONASE) 50 mcg/actuation nasal spray 2 sprays (100 mcg total) by Each Nostril route once daily. 16 g 11    hydrOXYzine HCL (ATARAX) 25 MG tablet Take 25 mg by mouth 3 (three) times daily.      levothyroxine (SYNTHROID) 50 MCG tablet Take 1 tablet (50 mcg  "total) by mouth once daily. 90 tablet 3    losartan (COZAAR) 25 MG tablet Take 1 tablet (25 mg total) by mouth once daily. 90 tablet 3    meclizine (ANTIVERT) 25 mg tablet Take 1 tablet (25 mg total) by mouth 3 (three) times daily as needed for Dizziness. 30 tablet 0    metFORMIN (GLUCOPHAGE-XR) 500 MG ER 24hr tablet TAKE 1 TABLET (500 MG TOTAL) BY MOUTH ONCE DAILY 90 tablet 3    TRUE METRIX GLUCOSE TEST STRIP Strp USE 1 STRIP ONCE DAILY TO TEST BLOOD GLUCOSE (50 DAY SUPPLY) 100 each 3    TRUEPLUS LANCETS 33 gauge Misc USE AS DIRECTED TO TEST BLOOD SUGAR ONCE DAILY FOR UP  USES 100 each 2    VITAMIN D2 1,250 mcg (50,000 unit) capsule TAKE 1 CAPSULE (50,000 UNITS TOTAL) BY MOUTH TWICE A WEEK. (Patient taking differently: every 7 days.) 24 capsule 3    famotidine (PEPCID) 40 MG tablet TAKE 1 TABLET (40 MG TOTAL) BY MOUTH ONCE DAILY. 30 tablet 11     No current facility-administered medications on file prior to visit.       Review of Systems   Constitutional: Negative.    HENT: Negative.     Eyes: Negative.    Respiratory: Negative.     Cardiovascular: Negative.    Gastrointestinal: Negative.    Endocrine: Negative.    Genitourinary: Negative.    Musculoskeletal: Negative.    Skin:  Positive for color change and wound.   Allergic/Immunologic: Negative.    Neurological: Negative.    Hematological: Negative.    Psychiatric/Behavioral: Negative.       Vitals:    07/24/23 1403   BP: 132/76   Pulse: 78   Temp: 98.5 °F (36.9 °C)   TempSrc: Temporal   SpO2: 96%   Weight: 69.8 kg (153 lb 15.9 oz)   Height: 5' 1" (1.549 m)       Wt Readings from Last 3 Encounters:   07/24/23 69.8 kg (153 lb 15.9 oz)   07/18/23 70.1 kg (154 lb 10.4 oz)   07/17/23 70 kg (154 lb 5.2 oz)       Physical Exam  Vitals and nursing note reviewed.   Constitutional:       Appearance: She is well-developed.   HENT:      Head: Normocephalic and atraumatic.   Eyes:      Conjunctiva/sclera: Conjunctivae normal.   Cardiovascular:      Rate and Rhythm: " Normal rate and regular rhythm.      Heart sounds: Normal heart sounds.   Pulmonary:      Effort: Pulmonary effort is normal.      Breath sounds: Normal breath sounds.   Musculoskeletal:         General: Normal range of motion.      Cervical back: Normal range of motion and neck supple.   Skin:     General: Skin is warm and dry.      Comments: Right hand with multiple scratches and bite marks, + mild swelling and bruising.  RUE: Neurovascularly intact    Neurological:      Mental Status: She is alert and oriented to person, place, and time.   Psychiatric:         Behavior: Behavior normal.         Thought Content: Thought content normal.         Judgment: Judgment normal.       Health Maintenance   Topic Date Due    Low Dose Statin  Never done    Foot Exam  08/04/2023    Hemoglobin A1c  09/10/2023    Eye Exam  02/13/2024    Lipid Panel  03/10/2024    Mammogram  07/17/2024    TETANUS VACCINE  07/13/2033    Hepatitis C Screening  Completed

## 2023-07-31 ENCOUNTER — OFFICE VISIT (OUTPATIENT)
Dept: PRIMARY CARE CLINIC | Facility: CLINIC | Age: 63
End: 2023-07-31
Payer: MEDICAID

## 2023-07-31 ENCOUNTER — LAB VISIT (OUTPATIENT)
Dept: LAB | Facility: HOSPITAL | Age: 63
End: 2023-07-31
Attending: INTERNAL MEDICINE
Payer: MEDICAID

## 2023-07-31 ENCOUNTER — TELEPHONE (OUTPATIENT)
Dept: FAMILY MEDICINE | Facility: CLINIC | Age: 63
End: 2023-07-31
Payer: MEDICAID

## 2023-07-31 DIAGNOSIS — E11.9 TYPE 2 DIABETES MELLITUS WITHOUT COMPLICATION, WITHOUT LONG-TERM CURRENT USE OF INSULIN: ICD-10-CM

## 2023-07-31 DIAGNOSIS — M79.641 PAIN OF RIGHT HAND: Primary | ICD-10-CM

## 2023-07-31 DIAGNOSIS — E78.5 HYPERLIPIDEMIA, UNSPECIFIED HYPERLIPIDEMIA TYPE: ICD-10-CM

## 2023-07-31 LAB
ESTIMATED AVG GLUCOSE: 140 MG/DL (ref 68–131)
HBA1C MFR BLD: 6.5 % (ref 4–5.6)

## 2023-07-31 PROCEDURE — 99213 OFFICE O/P EST LOW 20 MIN: CPT | Mod: 95,,, | Performed by: NURSE PRACTITIONER

## 2023-07-31 PROCEDURE — 3044F PR MOST RECENT HEMOGLOBIN A1C LEVEL <7.0%: ICD-10-PCS | Mod: CPTII,95,, | Performed by: NURSE PRACTITIONER

## 2023-07-31 PROCEDURE — 4010F PR ACE/ARB THEARPY RXD/TAKEN: ICD-10-PCS | Mod: CPTII,95,, | Performed by: NURSE PRACTITIONER

## 2023-07-31 PROCEDURE — 3044F HG A1C LEVEL LT 7.0%: CPT | Mod: CPTII,95,, | Performed by: NURSE PRACTITIONER

## 2023-07-31 PROCEDURE — 36415 COLL VENOUS BLD VENIPUNCTURE: CPT | Mod: PO | Performed by: INTERNAL MEDICINE

## 2023-07-31 PROCEDURE — 83036 HEMOGLOBIN GLYCOSYLATED A1C: CPT | Performed by: INTERNAL MEDICINE

## 2023-07-31 PROCEDURE — 99213 PR OFFICE/OUTPT VISIT, EST, LEVL III, 20-29 MIN: ICD-10-PCS | Mod: 95,,, | Performed by: NURSE PRACTITIONER

## 2023-07-31 PROCEDURE — 4010F ACE/ARB THERAPY RXD/TAKEN: CPT | Mod: CPTII,95,, | Performed by: NURSE PRACTITIONER

## 2023-07-31 PROCEDURE — 82570 ASSAY OF URINE CREATININE: CPT | Performed by: INTERNAL MEDICINE

## 2023-07-31 RX ORDER — SIMVASTATIN 10 MG/1
10 TABLET, FILM COATED ORAL NIGHTLY
Qty: 90 TABLET | Refills: 3 | Status: SHIPPED | OUTPATIENT
Start: 2023-07-31 | End: 2024-07-30

## 2023-07-31 NOTE — TELEPHONE ENCOUNTER
From pt:  Good evening Dr. Dayan Jimenez, sending you an up date on my cat bite/scratch finished anabiotic yesterday 7/27 and swelling went down a bunch hand looks goodbut if I try to close my hand I cant close it all the way is the normal? Im trying to figure out how to send you a picture like you asked me to do. Maybe we can do a virtual visit.. Thank you

## 2023-08-01 ENCOUNTER — HOSPITAL ENCOUNTER (OUTPATIENT)
Dept: RADIOLOGY | Facility: HOSPITAL | Age: 63
Discharge: HOME | End: 2023-08-01
Attending: NURSE PRACTITIONER
Payer: MEDICARE

## 2023-08-01 DIAGNOSIS — M79.641 PAIN OF RIGHT HAND: ICD-10-CM

## 2023-08-01 LAB
ALBUMIN/CREAT UR: NORMAL UG/MG (ref 0–30)
CREAT UR-MCNC: 45 MG/DL (ref 15–325)
MICROALBUMIN UR DL<=1MG/L-MCNC: <5 UG/ML

## 2023-08-01 PROCEDURE — 73130 X-RAY EXAM OF HAND: CPT | Mod: TC,PO,RT

## 2023-08-01 PROCEDURE — 73130 X-RAY EXAM OF HAND: CPT | Mod: 26,RT,, | Performed by: RADIOLOGY

## 2023-08-01 PROCEDURE — 73130 XR HAND COMPLETE 3 VIEW RIGHT: ICD-10-PCS | Mod: 26,RT,, | Performed by: RADIOLOGY

## 2023-08-01 NOTE — PROGRESS NOTES
Primary Care Telemedicine Note  The patient location is:  Patient Home   The chief complaint leading to consultation is: wound follow-up   Total time spent with patient: 12 minutes     Visit type: Virtual visit with synchronous audio and video  Each patient to whom he or she provides medical services by telemedicine is:  (1) informed of the relationship between the physician and patient and the respective role of any other health care provider with respect to management of the patient; and (2) notified that he or she may decline to receive medical services by telemedicine and may withdraw from such care at any time.      Assessment/Plan:    Problem List Items Addressed This Visit    None  Visit Diagnoses       Pain of right hand    -  Primary    Relevant Orders    X-Ray Hand 3 view Right    Hyperlipidemia, unspecified hyperlipidemia type        Relevant Medications    simvastatin (ZOCOR) 10 MG tablet            Follow up if symptoms worsen or fail to improve.    Dayan Jimenez NP    _____________________________________________________________________________________________________________________________________________________    CC: wound follow-up     HPI:    Patient is an established patient who presents today via virtual visit for wound follow-up. Patient reports improvement in symptoms. She reports swelling and bruising is nearly resolved. However she is still experiencing pain to the top of her hand and some difficulty closing her pinky.  No signs or symptoms of infection at this time.  We also discuss the importance of  a statin. Patient is in agreement to starting a statin medication.        Past Medical History:  Past Medical History:   Diagnosis Date    Arthritis, lumbar spine     hands    General anesthetics causing adverse effect in therapeutic use     following breast rediuct, hard time awakening  also had anxiety rxn to lidocain in dental ofc    GERD (gastroesophageal reflux disease)      Hypothyroidism 10/8/2014    Maternal anesthesia complication     epidural for 1st child went up instead of down; required intubation    Obesity (BMI 30-39.9) 10/8/2014    Thyroid disease     Thyroid nodule 10/8/2014     Past Surgical History:   Procedure Laterality Date    BREAST BIOPSY Left     b9    BREAST SURGERY      Reduction cause of a mass in left breast    c-sections x2       SECTION  3/26/81 & 85    Birth of two girls    COLONOSCOPY  2012         COLONOSCOPY N/A 2018    Procedure: COLONOSCOPY;  Surgeon: Francisco Mcclain MD;  Location: [facility];  Service: Endoscopy;  Laterality: N/A;    hysteroscopy with polypectomy      INCISIONAL BREAST BIOPSY Left     benign; done at same time as reduction    TOTAL REDUCTION MAMMOPLASTY Bilateral      Review of patient's allergies indicates:   Allergen Reactions    Duricef [cefadroxil] Hives    Eggs [egg derived] Anaphylaxis and Hives    Cat/feline products Itching    Dairycare [lactobacillus acidoph-lactase] Itching    Dog dander Itching    Wheat containing prod Hives     Social History     Tobacco Use    Smoking status: Never    Smokeless tobacco: Never   Substance Use Topics    Alcohol use: Yes     Comment: 2/ month    Drug use: No     Family History   Problem Relation Age of Onset    Brain cancer Mother     Early death Mother 51        Passed at 51    Cancer Mother         Brain tumor    Arthritis Mother     Diabetes Father         Type 2    Cirrhosis Father     Cancer Father         Bone    COPD Father         Smoker    Early death Father         Passed at 64    Heart disease Sister         Congestive heart failure (passed 18    Hypertension Sister     Kidney disease Sister         Doc removed when she was a child    Hypertension Sister     Depression Sister         Due to loss of her 27 year old son    Early death Sister         Pulmonary hypertension, cirrhosis of the liver(passed 2007   Age 57    Breast cancer Maternal  Aunt 30    Breast cancer Paternal Aunt 40    Breast cancer Paternal Aunt 40    Heart disease Brother         Heart attack    Hypertension Brother     Heart disease Brother         Heart  blockage    Heart disease Brother         Heart attack (passed)    Diabetes Brother     Macular degeneration Brother     Ovarian cancer Neg Hx      Current Outpatient Medications on File Prior to Visit   Medication Sig Dispense Refill    acetaminophen (TYLENOL) 325 MG tablet Take 325 mg by mouth every 6 (six) hours as needed for Pain.      amoxicillin-clavulanate 500-125mg (AUGMENTIN) 500-125 mg Tab Take 1 tablet by mouth 2 (two) times daily.      blood-glucose meter Misc Use as directed 1 each prn    calcium carbonate (OS-ISH) 600 mg calcium (1,500 mg) Tab Take 1,200 mg by mouth 2 (two) times daily with meals.      cetirizine (ZYRTEC) 10 MG tablet Take 10 mg by mouth once daily.      famotidine (PEPCID) 40 MG tablet TAKE 1 TABLET (40 MG TOTAL) BY MOUTH ONCE DAILY. 30 tablet 11    fluticasone propionate (FLONASE) 50 mcg/actuation nasal spray 2 sprays (100 mcg total) by Each Nostril route once daily. 16 g 11    hydrOXYzine HCL (ATARAX) 25 MG tablet Take 25 mg by mouth 3 (three) times daily.      levothyroxine (SYNTHROID) 50 MCG tablet Take 1 tablet (50 mcg total) by mouth once daily. 90 tablet 3    losartan (COZAAR) 25 MG tablet Take 1 tablet (25 mg total) by mouth once daily. 90 tablet 3    meclizine (ANTIVERT) 25 mg tablet Take 1 tablet (25 mg total) by mouth 3 (three) times daily as needed for Dizziness. 30 tablet 0    metFORMIN (GLUCOPHAGE-XR) 500 MG ER 24hr tablet TAKE 1 TABLET (500 MG TOTAL) BY MOUTH ONCE DAILY 90 tablet 3    TRUE METRIX GLUCOSE TEST STRIP Strp USE 1 STRIP ONCE DAILY TO TEST BLOOD GLUCOSE (50 DAY SUPPLY) 100 each 3    TRUEPLUS LANCETS 33 gauge Misc USE AS DIRECTED TO TEST BLOOD SUGAR ONCE DAILY FOR UP  USES 100 each 2    VITAMIN D2 1,250 mcg (50,000 unit) capsule TAKE 1 CAPSULE (50,000 UNITS TOTAL) BY  MOUTH TWICE A WEEK. (Patient taking differently: every 7 days.) 24 capsule 3     No current facility-administered medications on file prior to visit.       Review of Systems   HENT:  Negative for hearing loss.    Eyes:  Negative for discharge.   Respiratory:  Negative for wheezing.    Cardiovascular:  Negative for chest pain and palpitations.   Gastrointestinal:  Negative for blood in stool, constipation, diarrhea and vomiting.   Genitourinary:  Negative for dysuria and hematuria.   Musculoskeletal:  Negative for neck pain.   Skin:         Wound follow-up    Neurological:  Negative for weakness and headaches.   Endo/Heme/Allergies:  Negative for polydipsia.         Physical Exam   @thptenteredbppulse@  @thptenteredglucose@    Physical Exam  Vitals and nursing note reviewed.   Constitutional:       Appearance: She is well-developed.   HENT:      Head: Normocephalic and atraumatic.   Eyes:      Conjunctiva/sclera: Conjunctivae normal.   Cardiovascular:      Rate and Rhythm: Normal rate and regular rhythm.      Heart sounds: Normal heart sounds.   Pulmonary:      Effort: Pulmonary effort is normal.      Breath sounds: Normal breath sounds.   Musculoskeletal:         General: Normal range of motion.      Cervical back: Normal range of motion and neck supple.   Skin:     General: Skin is warm and dry.      Comments: Right hand with wounds healing and yellowish bruising noted    Neurological:      Mental Status: She is alert and oriented to person, place, and time.   Psychiatric:         Behavior: Behavior normal.         Thought Content: Thought content normal.         Judgment: Judgment normal.         The patient's Health Maintenance was reviewed and the following appears to be due at this time:  Health Maintenance Due   Topic Date Due    HIV Screening  Never done    Shingles Vaccine (1 of 2) Never done    COVID-19 Vaccine (4 - Mixed Product series) 04/05/2022    Foot Exam  08/04/2023    Colorectal Cancer Screening   08/30/2023

## 2023-08-02 DIAGNOSIS — E11.9 TYPE 2 DIABETES MELLITUS WITHOUT COMPLICATION, WITHOUT LONG-TERM CURRENT USE OF INSULIN: Primary | ICD-10-CM

## 2023-08-17 ENCOUNTER — OFFICE VISIT (OUTPATIENT)
Dept: DIABETES | Facility: CLINIC | Age: 63
End: 2023-08-17
Payer: MEDICAID

## 2023-08-17 DIAGNOSIS — E11.9 TYPE 2 DIABETES MELLITUS WITHOUT COMPLICATION, WITHOUT LONG-TERM CURRENT USE OF INSULIN: ICD-10-CM

## 2023-08-17 PROCEDURE — 3061F PR NEG MICROALBUMINURIA RESULT DOCUMENTED/REVIEW: ICD-10-PCS | Mod: CPTII,95,, | Performed by: NURSE PRACTITIONER

## 2023-08-17 PROCEDURE — 3066F NEPHROPATHY DOC TX: CPT | Mod: CPTII,95,, | Performed by: NURSE PRACTITIONER

## 2023-08-17 PROCEDURE — 3066F PR DOCUMENTATION OF TREATMENT FOR NEPHROPATHY: ICD-10-PCS | Mod: CPTII,95,, | Performed by: NURSE PRACTITIONER

## 2023-08-17 PROCEDURE — 3061F NEG MICROALBUMINURIA REV: CPT | Mod: CPTII,95,, | Performed by: NURSE PRACTITIONER

## 2023-08-17 PROCEDURE — 99214 OFFICE O/P EST MOD 30 MIN: CPT | Mod: 95,,, | Performed by: NURSE PRACTITIONER

## 2023-08-17 PROCEDURE — 3044F HG A1C LEVEL LT 7.0%: CPT | Mod: CPTII,95,, | Performed by: NURSE PRACTITIONER

## 2023-08-17 PROCEDURE — 4010F PR ACE/ARB THEARPY RXD/TAKEN: ICD-10-PCS | Mod: CPTII,95,, | Performed by: NURSE PRACTITIONER

## 2023-08-17 PROCEDURE — 4010F ACE/ARB THERAPY RXD/TAKEN: CPT | Mod: CPTII,95,, | Performed by: NURSE PRACTITIONER

## 2023-08-17 PROCEDURE — 3044F PR MOST RECENT HEMOGLOBIN A1C LEVEL <7.0%: ICD-10-PCS | Mod: CPTII,95,, | Performed by: NURSE PRACTITIONER

## 2023-08-17 PROCEDURE — 99214 PR OFFICE/OUTPT VISIT, EST, LEVL IV, 30-39 MIN: ICD-10-PCS | Mod: 95,,, | Performed by: NURSE PRACTITIONER

## 2023-08-17 RX ORDER — METFORMIN HYDROCHLORIDE 500 MG/1
1000 TABLET, EXTENDED RELEASE ORAL DAILY
Qty: 60 TABLET | Refills: 11 | Status: SHIPPED | OUTPATIENT
Start: 2023-08-17 | End: 2023-10-23 | Stop reason: ALTCHOICE

## 2023-08-17 NOTE — PATIENT INSTRUCTIONS
PATIENT INSTRUCTIONS    Lifestyle modification with diabetic diet and at least 30 minutes of physical activity daily recommended.   Diabetic Foot Exam deferred today due to virtual visit. Will plan to be done at next in-person visit.     Increase Glucophage XR to 1000 mg by mouth daily with breakfast.     Check blood sugar twice daily. Every morning when you wake up and 1-2 hours after main meal of the day. Keep log and upload to [employer] prior to each visit.   Blood Sugar Goals:       Fastin-130.       1-2 hours after a meal: Less than 180.

## 2023-08-17 NOTE — PROGRESS NOTES
Telemedicine Visit    The patient location is home  The chief complaint leading to consultation is: Diabetes    Visit type: audiovisual    Face to Face time with patient: 40  60 minutes of total time spent on the encounter, which includes face to face time and non-face to face time preparing to see the patient (eg, review of tests), Obtaining and/or reviewing separately obtained history, Documenting clinical information in the electronic or other health record, Independently interpreting results (not separately reported) and communicating results to the patient/family/caregiver, or Care coordination (not separately reported).  Each patient to whom he or she provides medical services by telemedicine is:  (1) informed of the relationship between the physician and patient and the respective role of any other health care provider with respect to management of the patient; and (2) notified that he or she may decline to receive medical services by telemedicine and may withdraw from such care at any time.  Notes:         Bessy Lamb is a 62 y.o. female who  has a past medical history of Arthritis, lumbar spine, General anesthetics causing adverse effect in therapeutic use, GERD (gastroesophageal reflux disease), Hypothyroidism (10/8/2014), Maternal anesthesia complication, Obesity (BMI 30-39.9) (10/8/2014), Thyroid disease, and Thyroid nodule (10/8/2014)., who present for an initial evaluation of Type 2 diabetes mellitus.     CHIEF COMPLAINT: Diabetes Consultation    PCP: Dayan Jimenez NP     The patient was initially diagnosed with diabetes in 2018.     Previous failed treatments include:  None     Social Documentation:  Patient lives in [place] with .   Occupation: retired.   Exercise:   Very high stress at home.  with cancer.     Current monitoring regimen: capillary blood glucose monitoring with finger sticks.   Not checking daily.       Current Diabetes related symptoms/problems  "include hyperglycemia and have been improving.     Diabetes related complications:   none.   denies Pancreatitis  denies Gastroparesis  denies DKA  denies Hx/family Hx of MEN2/MTC  denies Frequent UTIs/yeast infections    Cardiovascular Risk Factors: none.    Any episodes of hypoglycemia?  No.   Hypoglycemia Unawareness? No  Severe hypoglycemia requiring 3rd party? No    Seen by Diabetes Education in last year? no    Diabetes Medications               metFORMIN (GLUCOPHAGE-XR) 500 MG ER 24hr tablet TAKE 1 TABLET (500 MG TOTAL) BY MOUTH ONCE DAILY     DIABETES DISEASE MANAGEMENT STATUS  Statin: Taking  ACE/ARB: Taking  Screening or Prevention Patient's value Goal Complete/Controlled?   HgA1C Testing and Control   Lab Results   Component Value Date    HGBA1C 6.5 (H) 07/31/2023      Annually/Less than 8% Yes   Lipid profile : 03/10/2023 Annually Yes   LDL control Lab Results   Component Value Date    LDLCALC 105.4 03/10/2023    Annually/Less than 100 mg/dl  No   Nephropathy screening Lab Results   Component Value Date    LABMICR <5.0 07/31/2023     Lab Results   Component Value Date    PROTEINUA Negative 07/29/2022     No results found for: "UTPCR"   Annually Yes   Blood pressure BP Readings from Last 1 Encounters:   07/24/23 132/76    Less than 140/90 Yes   Dilated retinal exam : 02/13/2023 Annually Yes   Foot exam   : 08/04/2022 Annually No   Patient's medications, allergies, past medical, surgical, social and family histories were reviewed and updated as appropriate.     Review of Systems   Constitutional:  Negative for weight loss.   Eyes:  Negative for blurred vision and double vision.   Cardiovascular:  Negative for chest pain.   Gastrointestinal:  Negative for nausea and vomiting.   Genitourinary:  Negative for frequency.   Musculoskeletal:  Negative for falls.   Neurological:  Negative for dizziness and weakness.   Endo/Heme/Allergies:  Negative for polydipsia.   Psychiatric/Behavioral:  Negative for " "depression.    All other systems reviewed and are negative.    Physical Exam  Constitutional:       Appearance: Normal appearance.   HENT:      Head: Normocephalic and atraumatic.   Pulmonary:      Effort: No respiratory distress.   Musculoskeletal:      Cervical back: Normal range of motion.   Neurological:      Mental Status: She is alert and oriented to person, place, and time.   Psychiatric:         Mood and Affect: Mood normal.         Behavior: Behavior normal.        There were no vitals taken for this visit.  Wt Readings from Last 3 Encounters:   07/24/23 69.8 kg (153 lb 15.9 oz)   07/18/23 70.1 kg (154 lb 10.4 oz)   07/17/23 70 kg (154 lb 5.2 oz)     LAB REVIEW  Lab Results   Component Value Date     03/10/2023    K 4.6 03/10/2023     03/10/2023    CO2 26 03/10/2023    BUN 17 03/10/2023    CREATININE 1.0 03/10/2023    CALCIUM 9.8 03/10/2023    ANIONGAP 9 03/10/2023    EGFRNORACEVR >60.0 03/10/2023     No results found for: "CPEPTIDE", "GLUTAMICACID", "INSLNABS"  Hemoglobin A1C   Date Value Ref Range Status   07/31/2023 6.5 (H) 4.0 - 5.6 % Final     Comment:     ADA Screening Guidelines:  5.7-6.4%  Consistent with prediabetes  >or=6.5%  Consistent with diabetes    High levels of fetal hemoglobin interfere with the HbA1C  assay. Heterozygous hemoglobin variants (HbS, HgC, etc)do  not significantly interfere with this assay.   However, presence of multiple variants may affect accuracy.     03/10/2023 6.2 (H) 4.0 - 5.6 % Final     Comment:     ADA Screening Guidelines:  5.7-6.4%  Consistent with prediabetes  >or=6.5%  Consistent with diabetes    High levels of fetal hemoglobin interfere with the HbA1C  assay. Heterozygous hemoglobin variants (HbS, HgC, etc)do  not significantly interfere with this assay.   However, presence of multiple variants may affect accuracy.     05/19/2022 6.0 (H) 4.0 - 5.6 % Final     Comment:     ADA Screening Guidelines:  5.7-6.4%  Consistent with prediabetes  >or=6.5%  " Consistent with diabetes    High levels of fetal hemoglobin interfere with the HbA1C  assay. Heterozygous hemoglobin variants (HbS, HgC, etc)do  not significantly interfere with this assay.   However, presence of multiple variants may affect accuracy.         ASSESSMENT    ICD-10-CM ICD-9-CM   1. Type 2 diabetes mellitus without complication, without long-term current use of insulin  E11.9 250.00        PLAN  Diagnoses and all orders for this visit:    Type 2 diabetes mellitus without complication, without long-term current use of insulin  -     Ambulatory referral/consult to Diabetic Advanced Practice Providers (Medical Management)  -     metFORMIN (GLUCOPHAGE-XR) 500 MG ER 24hr tablet; Take 2 tablets (1,000 mg total) by mouth once daily.        Reviewed pathophysiology of diabetes, complications related to the disease, importance of annual dilated eye exam and daily foot examination. Explained MOA, SE, dosage of medications. Written instructions given and reviewed with patient and patient verbalizes understanding.       PATIENT INSTRUCTIONS    Lifestyle modification with diabetic diet and at least 30 minutes of physical activity daily recommended.   Diabetic Foot Exam deferred today due to virtual visit. Will plan to be done at next in-person visit.     Increase Glucophage XR to 1000 mg by mouth daily with breakfast.     Check blood sugar twice daily. Every morning when you wake up and 1-2 hours after main meal of the day. Keep log and upload to [employer] prior to each visit.   Blood Sugar Goals:       Fastin-130.       1-2 hours after a meal: Less than 180.      Follow up in about 2 months (around 10/17/2023) for VIRTUAL.    Portions of this note were prepared with [employer] voice recognition transcription software. Grammatical errors, including garbled syntax, mangle pronouns, and other bizarre constructions may be attributed to that software system.

## 2023-08-21 ENCOUNTER — HOSPITAL ENCOUNTER (OUTPATIENT)
Dept: RADIOLOGY | Facility: HOSPITAL | Age: 63
Discharge: HOME | End: 2023-08-21
Attending: INTERNAL MEDICINE
Payer: MEDICARE

## 2023-08-21 DIAGNOSIS — Z78.0 ASYMPTOMATIC AGE-RELATED POSTMENOPAUSAL STATE: ICD-10-CM

## 2023-08-21 DIAGNOSIS — E04.1 THYROID NODULE: ICD-10-CM

## 2023-08-21 PROCEDURE — 77080 DXA BONE DENSITY AXIAL: CPT | Mod: 26,,, | Performed by: RADIOLOGY

## 2023-08-21 PROCEDURE — 77080 DXA BONE DENSITY AXIAL SKELETON 1 OR MORE SITES: ICD-10-PCS | Mod: 26,,, | Performed by: RADIOLOGY

## 2023-08-21 PROCEDURE — 77080 DXA BONE DENSITY AXIAL: CPT | Mod: TC,PO

## 2023-08-21 PROCEDURE — 76536 US EXAM OF HEAD AND NECK: CPT | Mod: 26,,, | Performed by: RADIOLOGY

## 2023-08-21 PROCEDURE — 76536 US THYROID: ICD-10-PCS | Mod: 26,,, | Performed by: RADIOLOGY

## 2023-08-21 PROCEDURE — 76536 US EXAM OF HEAD AND NECK: CPT | Mod: TC,PO

## 2023-08-22 NOTE — PROGRESS NOTES
Results have been released via my chart. Please verify that these have been reviewed by the pt. If not, please call pt with results.     Please schedule orders:  We will plan to recheck your DEXA scan in 2 years.   Needs visit to establish care 
negative

## 2023-08-25 ENCOUNTER — OFFICE VISIT (OUTPATIENT)
Dept: PRIMARY CARE CLINIC | Facility: CLINIC | Age: 63
End: 2023-08-25
Payer: MEDICARE

## 2023-08-25 VITALS
SYSTOLIC BLOOD PRESSURE: 118 MMHG | DIASTOLIC BLOOD PRESSURE: 76 MMHG | BODY MASS INDEX: 28.8 KG/M2 | HEART RATE: 71 BPM | WEIGHT: 152.56 LBS | OXYGEN SATURATION: 98 % | HEIGHT: 61 IN | TEMPERATURE: 98 F

## 2023-08-25 DIAGNOSIS — E11.9 TYPE 2 DIABETES MELLITUS WITHOUT COMPLICATION, WITHOUT LONG-TERM CURRENT USE OF INSULIN: ICD-10-CM

## 2023-08-25 DIAGNOSIS — R52 BODY ACHES: ICD-10-CM

## 2023-08-25 DIAGNOSIS — J06.9 ACUTE UPPER RESPIRATORY INFECTION: Primary | ICD-10-CM

## 2023-08-25 LAB
CTP QC/QA: YES
SARS-COV-2 RDRP RESP QL NAA+PROBE: NEGATIVE

## 2023-08-25 PROCEDURE — 1101F PT FALLS ASSESS-DOCD LE1/YR: CPT | Mod: CPTII,S$GLB,, | Performed by: NURSE PRACTITIONER

## 2023-08-25 PROCEDURE — 1101F PR PT FALLS ASSESS DOC 0-1 FALLS W/OUT INJ PAST YR: ICD-10-PCS | Mod: CPTII,S$GLB,, | Performed by: NURSE PRACTITIONER

## 2023-08-25 PROCEDURE — 1159F PR MEDICATION LIST DOCUMENTED IN MEDICAL RECORD: ICD-10-PCS | Mod: CPTII,S$GLB,, | Performed by: NURSE PRACTITIONER

## 2023-08-25 PROCEDURE — 2023F PR DILATED RETINAL EXAM W/O EVID OF RETINOPATHY: ICD-10-PCS | Mod: CPTII,S$GLB,, | Performed by: NURSE PRACTITIONER

## 2023-08-25 PROCEDURE — 99213 OFFICE O/P EST LOW 20 MIN: CPT | Mod: S$GLB,,, | Performed by: NURSE PRACTITIONER

## 2023-08-25 PROCEDURE — 3078F PR MOST RECENT DIASTOLIC BLOOD PRESSURE < 80 MM HG: ICD-10-PCS | Mod: CPTII,S$GLB,, | Performed by: NURSE PRACTITIONER

## 2023-08-25 PROCEDURE — 3074F SYST BP LT 130 MM HG: CPT | Mod: CPTII,S$GLB,, | Performed by: NURSE PRACTITIONER

## 2023-08-25 PROCEDURE — 3008F PR BODY MASS INDEX (BMI) DOCUMENTED: ICD-10-PCS | Mod: CPTII,S$GLB,, | Performed by: NURSE PRACTITIONER

## 2023-08-25 PROCEDURE — 3078F DIAST BP <80 MM HG: CPT | Mod: CPTII,S$GLB,, | Performed by: NURSE PRACTITIONER

## 2023-08-25 PROCEDURE — 4010F PR ACE/ARB THEARPY RXD/TAKEN: ICD-10-PCS | Mod: CPTII,S$GLB,, | Performed by: NURSE PRACTITIONER

## 2023-08-25 PROCEDURE — 99999 PR PBB SHADOW E&M-EST. PATIENT-LVL IV: ICD-10-PCS | Mod: PBBFAC,,, | Performed by: NURSE PRACTITIONER

## 2023-08-25 PROCEDURE — 3061F NEG MICROALBUMINURIA REV: CPT | Mod: CPTII,S$GLB,, | Performed by: NURSE PRACTITIONER

## 2023-08-25 PROCEDURE — 87635: ICD-10-PCS | Mod: QW,S$GLB,, | Performed by: NURSE PRACTITIONER

## 2023-08-25 PROCEDURE — 3066F NEPHROPATHY DOC TX: CPT | Mod: CPTII,S$GLB,, | Performed by: NURSE PRACTITIONER

## 2023-08-25 PROCEDURE — 3066F PR DOCUMENTATION OF TREATMENT FOR NEPHROPATHY: ICD-10-PCS | Mod: CPTII,S$GLB,, | Performed by: NURSE PRACTITIONER

## 2023-08-25 PROCEDURE — 2023F DILAT RTA XM W/O RTNOPTHY: CPT | Mod: CPTII,S$GLB,, | Performed by: NURSE PRACTITIONER

## 2023-08-25 PROCEDURE — 4010F ACE/ARB THERAPY RXD/TAKEN: CPT | Mod: CPTII,S$GLB,, | Performed by: NURSE PRACTITIONER

## 2023-08-25 PROCEDURE — 99213 PR OFFICE/OUTPT VISIT, EST, LEVL III, 20-29 MIN: ICD-10-PCS | Mod: S$GLB,,, | Performed by: NURSE PRACTITIONER

## 2023-08-25 PROCEDURE — 3288F FALL RISK ASSESSMENT DOCD: CPT | Mod: CPTII,S$GLB,, | Performed by: NURSE PRACTITIONER

## 2023-08-25 PROCEDURE — 3044F HG A1C LEVEL LT 7.0%: CPT | Mod: CPTII,S$GLB,, | Performed by: NURSE PRACTITIONER

## 2023-08-25 PROCEDURE — 99999 PR PBB SHADOW E&M-EST. PATIENT-LVL IV: CPT | Mod: PBBFAC,,, | Performed by: NURSE PRACTITIONER

## 2023-08-25 PROCEDURE — 3008F BODY MASS INDEX DOCD: CPT | Mod: CPTII,S$GLB,, | Performed by: NURSE PRACTITIONER

## 2023-08-25 PROCEDURE — 3288F PR FALLS RISK ASSESSMENT DOCUMENTED: ICD-10-PCS | Mod: CPTII,S$GLB,, | Performed by: NURSE PRACTITIONER

## 2023-08-25 PROCEDURE — 87635 SARS-COV-2 COVID-19 AMP PRB: CPT | Mod: QW,S$GLB,, | Performed by: NURSE PRACTITIONER

## 2023-08-25 PROCEDURE — 3044F PR MOST RECENT HEMOGLOBIN A1C LEVEL <7.0%: ICD-10-PCS | Mod: CPTII,S$GLB,, | Performed by: NURSE PRACTITIONER

## 2023-08-25 PROCEDURE — 3074F PR MOST RECENT SYSTOLIC BLOOD PRESSURE < 130 MM HG: ICD-10-PCS | Mod: CPTII,S$GLB,, | Performed by: NURSE PRACTITIONER

## 2023-08-25 PROCEDURE — 3061F PR NEG MICROALBUMINURIA RESULT DOCUMENTED/REVIEW: ICD-10-PCS | Mod: CPTII,S$GLB,, | Performed by: NURSE PRACTITIONER

## 2023-08-25 PROCEDURE — 1159F MED LIST DOCD IN RCRD: CPT | Mod: CPTII,S$GLB,, | Performed by: NURSE PRACTITIONER

## 2023-08-25 RX ORDER — PREDNISONE 20 MG/1
40 TABLET ORAL DAILY
Qty: 10 TABLET | Refills: 0 | Status: SHIPPED | OUTPATIENT
Start: 2023-08-25 | End: 2023-08-30

## 2023-08-25 RX ORDER — MELOXICAM 15 MG/1
15 TABLET ORAL DAILY
Qty: 10 TABLET | Refills: 0 | Status: SHIPPED | OUTPATIENT
Start: 2023-08-25 | End: 2023-09-04

## 2023-08-26 NOTE — PROGRESS NOTES
Assessment/Plan:    Presentation consistent with URI. Continue symptom management with  oral steroids . Increase fluid intake. Follow up in several days if symptoms not improved.    Orders this visit:    Acute upper respiratory infection  -     POCT COVID-19 Rapid Screening  -     predniSONE (DELTASONE) 20 MG tablet; Take 2 tablets (40 mg total) by mouth once daily. for 5 days  Dispense: 10 tablet; Refill: 0  -     meloxicam (MOBIC) 15 MG tablet; Take 1 tablet (15 mg total) by mouth once daily. for 10 days  Dispense: 10 tablet; Refill: 0    Type 2 diabetes mellitus without complication, without long-term current use of insulin  -     HM DIABETES FOOT EXAM    Body aches  -     meloxicam (MOBIC) 15 MG tablet; Take 1 tablet (15 mg total) by mouth once daily. for 10 days  Dispense: 10 tablet; Refill: 0      Follow up in about 3 months (around 11/25/2023).    Dayan Jimenez NP     _____________________________________________________________________________________________________________________________________________________    HPI:    Patient is in clinic today as an established patient with a new complaint of cold-like symptoms. Symptoms been present for 3 day(s).  The patient reports that there has been congestion, sore throat, myalgias, cough, and headache but there has not been any fever and chills.  The patient has treated this with OTC cough/cold product of patient's choice PRN and fluids and rest without relief.    Past Medical History:  Past Medical History:   Diagnosis Date    Arthritis, lumbar spine     hands    General anesthetics causing adverse effect in therapeutic use     following breast rediuct, hard time awakening  also had anxiety rxn to lidocain in dental ofc    GERD (gastroesophageal reflux disease)     Hypothyroidism 10/8/2014    Maternal anesthesia complication     epidural for 1st child went up instead of down; required intubation    Obesity (BMI 30-39.9) 10/8/2014    Thyroid disease      Thyroid nodule 10/8/2014     Past Surgical History:   Procedure Laterality Date    BREAST BIOPSY Left     b9    BREAST SURGERY      Reduction cause of a mass in left breast    c-sections x2       SECTION  3/26/81 & 85    Birth of two girls    COLONOSCOPY  2012         COLONOSCOPY N/A 2018    Procedure: COLONOSCOPY;  Surgeon: Francisco Mcclain MD;  Location: [facility];  Service: Endoscopy;  Laterality: N/A;    hysteroscopy with polypectomy      INCISIONAL BREAST BIOPSY Left     benign; done at same time as reduction    TOTAL REDUCTION MAMMOPLASTY Bilateral      Review of patient's allergies indicates:   Allergen Reactions    Duricef [cefadroxil] Hives    Eggs [egg derived] Anaphylaxis and Hives    Cat/feline products Itching    Dairycare [lactobacillus acidoph-lactase] Itching    Dog dander Itching    Wheat containing prod Hives     Social History     Tobacco Use    Smoking status: Never    Smokeless tobacco: Never   Substance Use Topics    Alcohol use: Yes     Comment: 2/ month    Drug use: No     Family History   Problem Relation Age of Onset    Brain cancer Mother     Early death Mother 51        Passed at 51    Cancer Mother         Brain tumor    Arthritis Mother     Diabetes Father         Type 2    Cirrhosis Father     Cancer Father         Bone    COPD Father         Smoker    Early death Father         Passed at 64    Heart disease Sister         Congestive heart failure (passed 18    Hypertension Sister     Kidney disease Sister         Doc removed when she was a child    Hypertension Sister     Depression Sister         Due to loss of her 27 year old son    Early death Sister         Pulmonary hypertension, cirrhosis of the liver(passed 2007   Age 57    Breast cancer Maternal Aunt 30    Breast cancer Paternal Aunt 40    Breast cancer Paternal Aunt 40    Heart disease Brother         Heart attack    Hypertension Brother     Heart disease Brother         Heart   blockage    Heart disease Brother         Heart attack (passed)    Diabetes Brother     Macular degeneration Brother     Ovarian cancer Neg Hx      Current Outpatient Medications on File Prior to Visit   Medication Sig Dispense Refill    acetaminophen (TYLENOL) 325 MG tablet Take 325 mg by mouth every 6 (six) hours as needed for Pain.      blood-glucose meter Misc Use as directed 1 each prn    calcium carbonate (OS-ISH) 600 mg calcium (1,500 mg) Tab Take 1,200 mg by mouth 2 (two) times daily with meals.      cetirizine (ZYRTEC) 10 MG tablet Take 10 mg by mouth once daily.      fluticasone propionate (FLONASE) 50 mcg/actuation nasal spray 2 sprays (100 mcg total) by Each Nostril route once daily. 16 g 11    hydrOXYzine HCL (ATARAX) 25 MG tablet Take 25 mg by mouth 3 (three) times daily.      levothyroxine (SYNTHROID) 50 MCG tablet Take 1 tablet (50 mcg total) by mouth once daily. 90 tablet 3    losartan (COZAAR) 25 MG tablet Take 1 tablet (25 mg total) by mouth once daily. 90 tablet 3    meclizine (ANTIVERT) 25 mg tablet Take 1 tablet (25 mg total) by mouth 3 (three) times daily as needed for Dizziness. 30 tablet 0    metFORMIN (GLUCOPHAGE-XR) 500 MG ER 24hr tablet Take 2 tablets (1,000 mg total) by mouth once daily. 60 tablet 11    simvastatin (ZOCOR) 10 MG tablet Take 1 tablet (10 mg total) by mouth every evening. 90 tablet 3    TRUE METRIX GLUCOSE TEST STRIP Strp USE 1 STRIP ONCE DAILY TO TEST BLOOD GLUCOSE (50 DAY SUPPLY) 100 each 3    TRUEPLUS LANCETS 33 gauge Misc USE AS DIRECTED TO TEST BLOOD SUGAR ONCE DAILY FOR UP  USES 100 each 2    VITAMIN D2 1,250 mcg (50,000 unit) capsule TAKE 1 CAPSULE (50,000 UNITS TOTAL) BY MOUTH TWICE A WEEK. (Patient taking differently: every 7 days.) 24 capsule 3    famotidine (PEPCID) 40 MG tablet TAKE 1 TABLET (40 MG TOTAL) BY MOUTH ONCE DAILY. 30 tablet 11     No current facility-administered medications on file prior to visit.       Review of Systems   HENT:  Positive for  "congestion and sore throat. Negative for drooling, ear discharge, ear pain and trouble swallowing.    Respiratory:  Positive for cough. Negative for shortness of breath and stridor.    Gastrointestinal:  Negative for abdominal pain, diarrhea and vomiting.   Musculoskeletal:  Negative for neck pain.   Neurological:  Positive for headaches.       Vitals:    08/25/23 1307   BP: 118/76   Pulse: 71   Temp: 98.3 °F (36.8 °C)   TempSrc: Temporal   SpO2: 98%   Weight: 69.2 kg (152 lb 8.9 oz)   Height: 5' 1" (1.549 m)       Wt Readings from Last 3 Encounters:   08/25/23 69.2 kg (152 lb 8.9 oz)   07/24/23 69.8 kg (153 lb 15.9 oz)   07/18/23 70.1 kg (154 lb 10.4 oz)       Physical Exam  Vitals and nursing note reviewed.   Constitutional:       Appearance: She is well-developed.   HENT:      Head: Normocephalic and atraumatic.   Eyes:      Conjunctiva/sclera: Conjunctivae normal.      Pupils: Pupils are equal, round, and reactive to light.   Cardiovascular:      Rate and Rhythm: Normal rate and regular rhythm.      Heart sounds: Normal heart sounds.   Pulmonary:      Effort: Pulmonary effort is normal.      Breath sounds: Normal breath sounds.   Musculoskeletal:         General: Normal range of motion.      Cervical back: Normal range of motion and neck supple.   Skin:     General: Skin is warm and dry.   Neurological:      Mental Status: She is alert and oriented to person, place, and time.   Psychiatric:         Behavior: Behavior normal.         Thought Content: Thought content normal.         Judgment: Judgment normal.             "

## 2023-09-22 ENCOUNTER — LAB VISIT (OUTPATIENT)
Dept: LAB | Facility: HOSPITAL | Age: 63
End: 2023-09-22
Attending: INTERNAL MEDICINE
Payer: MEDICAID

## 2023-09-22 DIAGNOSIS — E11.9 TYPE 2 DIABETES MELLITUS WITHOUT COMPLICATION, WITHOUT LONG-TERM CURRENT USE OF INSULIN: ICD-10-CM

## 2023-09-22 LAB
ESTIMATED AVG GLUCOSE: 143 MG/DL (ref 68–131)
HBA1C MFR BLD: 6.6 % (ref 4–5.6)

## 2023-09-22 PROCEDURE — 36415 COLL VENOUS BLD VENIPUNCTURE: CPT | Mod: PO | Performed by: INTERNAL MEDICINE

## 2023-09-22 PROCEDURE — 83036 HEMOGLOBIN GLYCOSYLATED A1C: CPT | Performed by: INTERNAL MEDICINE

## 2023-09-25 ENCOUNTER — OFFICE VISIT (OUTPATIENT)
Dept: PODIATRY | Facility: CLINIC | Age: 63
End: 2023-09-25
Payer: MEDICARE

## 2023-09-25 ENCOUNTER — PATIENT MESSAGE (OUTPATIENT)
Dept: PRIMARY CARE CLINIC | Facility: CLINIC | Age: 63
End: 2023-09-25
Payer: MEDICARE

## 2023-09-25 DIAGNOSIS — E11.9 TYPE 2 DIABETES MELLITUS WITHOUT COMPLICATION, WITHOUT LONG-TERM CURRENT USE OF INSULIN: Primary | ICD-10-CM

## 2023-09-25 DIAGNOSIS — M21.619 ASYMPTOMATIC BUNION: ICD-10-CM

## 2023-09-25 PROCEDURE — 3066F NEPHROPATHY DOC TX: CPT | Mod: CPTII,S$GLB,, | Performed by: PODIATRIST

## 2023-09-25 PROCEDURE — 1160F RVW MEDS BY RX/DR IN RCRD: CPT | Mod: CPTII,S$GLB,, | Performed by: PODIATRIST

## 2023-09-25 PROCEDURE — 1160F PR REVIEW ALL MEDS BY PRESCRIBER/CLIN PHARMACIST DOCUMENTED: ICD-10-PCS | Mod: CPTII,S$GLB,, | Performed by: PODIATRIST

## 2023-09-25 PROCEDURE — 4010F PR ACE/ARB THEARPY RXD/TAKEN: ICD-10-PCS | Mod: CPTII,S$GLB,, | Performed by: PODIATRIST

## 2023-09-25 PROCEDURE — 3044F HG A1C LEVEL LT 7.0%: CPT | Mod: CPTII,S$GLB,, | Performed by: PODIATRIST

## 2023-09-25 PROCEDURE — 1101F PT FALLS ASSESS-DOCD LE1/YR: CPT | Mod: CPTII,S$GLB,, | Performed by: PODIATRIST

## 2023-09-25 PROCEDURE — 1159F PR MEDICATION LIST DOCUMENTED IN MEDICAL RECORD: ICD-10-PCS | Mod: CPTII,S$GLB,, | Performed by: PODIATRIST

## 2023-09-25 PROCEDURE — 3288F PR FALLS RISK ASSESSMENT DOCUMENTED: ICD-10-PCS | Mod: CPTII,S$GLB,, | Performed by: PODIATRIST

## 2023-09-25 PROCEDURE — 99213 PR OFFICE/OUTPT VISIT, EST, LEVL III, 20-29 MIN: ICD-10-PCS | Mod: S$GLB,,, | Performed by: PODIATRIST

## 2023-09-25 PROCEDURE — 99213 OFFICE O/P EST LOW 20 MIN: CPT | Mod: S$GLB,,, | Performed by: PODIATRIST

## 2023-09-25 PROCEDURE — 1159F MED LIST DOCD IN RCRD: CPT | Mod: CPTII,S$GLB,, | Performed by: PODIATRIST

## 2023-09-25 PROCEDURE — 3044F PR MOST RECENT HEMOGLOBIN A1C LEVEL <7.0%: ICD-10-PCS | Mod: CPTII,S$GLB,, | Performed by: PODIATRIST

## 2023-09-25 PROCEDURE — 4010F ACE/ARB THERAPY RXD/TAKEN: CPT | Mod: CPTII,S$GLB,, | Performed by: PODIATRIST

## 2023-09-25 PROCEDURE — 99999 PR PBB SHADOW E&M-EST. PATIENT-LVL III: ICD-10-PCS | Mod: PBBFAC,,, | Performed by: PODIATRIST

## 2023-09-25 PROCEDURE — 3288F FALL RISK ASSESSMENT DOCD: CPT | Mod: CPTII,S$GLB,, | Performed by: PODIATRIST

## 2023-09-25 PROCEDURE — 3061F PR NEG MICROALBUMINURIA RESULT DOCUMENTED/REVIEW: ICD-10-PCS | Mod: CPTII,S$GLB,, | Performed by: PODIATRIST

## 2023-09-25 PROCEDURE — 99999 PR PBB SHADOW E&M-EST. PATIENT-LVL III: CPT | Mod: PBBFAC,,, | Performed by: PODIATRIST

## 2023-09-25 PROCEDURE — 1101F PR PT FALLS ASSESS DOC 0-1 FALLS W/OUT INJ PAST YR: ICD-10-PCS | Mod: CPTII,S$GLB,, | Performed by: PODIATRIST

## 2023-09-25 PROCEDURE — 3066F PR DOCUMENTATION OF TREATMENT FOR NEPHROPATHY: ICD-10-PCS | Mod: CPTII,S$GLB,, | Performed by: PODIATRIST

## 2023-09-25 PROCEDURE — 3061F NEG MICROALBUMINURIA REV: CPT | Mod: CPTII,S$GLB,, | Performed by: PODIATRIST

## 2023-09-25 NOTE — PROGRESS NOTES
Subjective:       Patient ID: Bessy Lamb is a 63 y.o. female.    Chief Complaint: Diabetic Foot Exam (7.31.23 last seen NP Dayan Jimenez. She denies pain at present and is a diabetic. )      HPI: Bessy Lamb presents to the office today, under referral by , Dayan Jimenez NP, for her annual diabetic foot assessment and risk evaluation.  Patient is a DMII.  Reports no significant pain with ambulation.  Presents to clinic today with her  present. This patient last saw his/her internal/family medicine physician on 07/31/2023 with Dayan Jimenez NP.     Hemoglobin A1C   Date Value Ref Range Status   09/22/2023 6.6 (H) 4.0 - 5.6 % Final     Comment:     ADA Screening Guidelines:  5.7-6.4%  Consistent with prediabetes  >or=6.5%  Consistent with diabetes    High levels of fetal hemoglobin interfere with the HbA1C  assay. Heterozygous hemoglobin variants (HbS, HgC, etc)do  not significantly interfere with this assay.   However, presence of multiple variants may affect accuracy.     08/21/2023 6.4 (H) 4.0 - 5.6 % Final     Comment:     ADA Screening Guidelines:  5.7-6.4%  Consistent with prediabetes  >or=6.5%  Consistent with diabetes    High levels of fetal hemoglobin interfere with the HbA1C  assay. Heterozygous hemoglobin variants (HbS, HgC, etc)do  not significantly interfere with this assay.   However, presence of multiple variants may affect accuracy.     07/31/2023 6.5 (H) 4.0 - 5.6 % Final     Comment:     ADA Screening Guidelines:  5.7-6.4%  Consistent with prediabetes  >or=6.5%  Consistent with diabetes    High levels of fetal hemoglobin interfere with the HbA1C  assay. Heterozygous hemoglobin variants (HbS, HgC, etc)do  not significantly interfere with this assay.   However, presence of multiple variants may affect accuracy.     .    Review of patient's allergies indicates:   Allergen Reactions    Duricef [cefadroxil] Hives    Eggs [egg derived] Anaphylaxis and Hives     Cat/feline products Itching    Dairycare [lactobacillus acidoph-lactase] Itching    Dog dander Itching    Wheat containing prod Hives       Past Medical History:   Diagnosis Date    Arthritis, lumbar spine     hands    General anesthetics causing adverse effect in therapeutic use     following breast rediuct, hard time awakening  also had anxiety rxn to lidocain in dental ofc    GERD (gastroesophageal reflux disease)     Hypothyroidism 10/8/2014    Maternal anesthesia complication     epidural for 1st child went up instead of down; required intubation    Obesity (BMI 30-39.9) 10/8/2014    Thyroid disease     Thyroid nodule 10/8/2014       Family History   Problem Relation Age of Onset    Brain cancer Mother     Early death Mother 51        Passed at 51    Cancer Mother         Brain tumor    Arthritis Mother     Diabetes Father         Type 2    Cirrhosis Father     Cancer Father         Bone    COPD Father         Smoker    Early death Father         Passed at 64    Heart disease Sister         Congestive heart failure (passed 2/11/18    Hypertension Sister     Kidney disease Sister         Doc removed when she was a child    Hypertension Sister     Depression Sister         Due to loss of her 27 year old son    Early death Sister         Pulmonary hypertension, cirrhosis of the liver(passed 7/14/2007   Age 57    Breast cancer Maternal Aunt 30    Breast cancer Paternal Aunt 40    Breast cancer Paternal Aunt 40    Heart disease Brother         Heart attack    Hypertension Brother     Heart disease Brother         Heart  blockage    Heart disease Brother         Heart attack (passed)    Diabetes Brother     Macular degeneration Brother     Ovarian cancer Neg Hx        Social History     Socioeconomic History    Marital status:    Tobacco Use    Smoking status: Never    Smokeless tobacco: Never   Substance and Sexual Activity    Alcohol use: Yes     Comment: 2/ month    Drug use: No    Sexual activity: Not  Currently     Birth control/protection: Post-menopausal     Comment:  had prostate cancer had it remove       Past Surgical History:   Procedure Laterality Date    BREAST BIOPSY Left     b9    BREAST SURGERY      Reduction cause of a mass in left breast    c-sections x2       SECTION  3/26/81 & 85    Birth of two girls    COLONOSCOPY  2012         COLONOSCOPY N/A 2018    Procedure: COLONOSCOPY;  Surgeon: Francisco Mcclain MD;  Location: [facility];  Service: Endoscopy;  Laterality: N/A;    hysteroscopy with polypectomy      INCISIONAL BREAST BIOPSY Left     benign; done at same time as reduction    TOTAL REDUCTION MAMMOPLASTY Bilateral        Review of Systems        Objective:   There were no vitals taken for this visit.    Physical Exam  LOWER EXTREMITY PHYSICAL EXAMINATION    ORTHOPEDIC:  Asymptomatic bunion deformity present bilaterally.  Hallux valgus bilateral    VASCULAR:  The right dorsalis pedis pulse 2/4 and the right posterior tibial pulse 2/4.  The left dorsalis pedis pulse 2/4 and posterior tibial pulse on the left is 2/4.  Capillary refill is intact.  Pedal hair growth intact    NEUROLOGY:  Sharp/dull, light touch, proprioception all intact and equal bilaterally.  Vibratory sensation is intact.  Protective sensation intact    DERMATOLOGY:  Skin is supple, moist, intact.  There is no signs of callusing, ulcerations, other lesions identified to the dorsal or plantar aspect of the right or left foot.  Nails are of normal color, thickness, and texture.  There is no signs of ingrowing into the medial or lateral borders.  There is no evidence of wounds or skin breakdown.  No edema or erythema.  No obvious lacerations or fissuring.  Interdigital spaces are clean, dry, intact.  No rashes or scars appreciated.    Assessment:     1. Type 2 diabetes mellitus without complication, without long-term current use of insulin    2. Asymptomatic bunion        Plan:     Type 2  diabetes mellitus without complication, without long-term current use of insulin    Asymptomatic bunion      Thorough discussion is had with the patient today, concerning the diagnosis, its etiology, and the treatment algorithm at present.    I counseled the patient on his/her Diabetic Mellitus regarding today's clinical examination and objection findings. We did also discuss recent medication changes, pertinent labs and imaging evaluations and other medical consultation notes and progress notes. Greater than 50% of this visit was spent on counseling and coordination of care. Greater than 20 minutes of this appt. was spent on education about the diabetic foot, in relation to PVD and/or neuropathy, and the prevention of limb loss.     Shoe gear is inspected and wear and proper fit/type. Patient is reminded of the importance of good nutrition and blood sugar control to help prevent podiatric complications of diabetes. Patient instructed on proper foot hygeine. We discussed wearing proper shoe gear, daily foot inspections, never walking without protective shoe gear, never putting sharp instruments to feet.  Patient  will continue to monitor the areas daily, inspect feet, wear protective shoe gear when ambulatory, moisturizer to maintain skin integrity.     Patient's DMI/DMII is managed by Internal/Family Medicine Physician and/or Endocrinology Advanced Practice Provider.    Recommend to continue with the appropriate modifications to shoe gear for history of bilateral bunion which are asymptomatic.

## 2023-10-06 ENCOUNTER — OFFICE VISIT (OUTPATIENT)
Dept: FAMILY MEDICINE | Facility: CLINIC | Age: 63
End: 2023-10-06
Payer: MEDICARE

## 2023-10-06 VITALS
SYSTOLIC BLOOD PRESSURE: 120 MMHG | RESPIRATION RATE: 12 BRPM | BODY MASS INDEX: 28.47 KG/M2 | TEMPERATURE: 97 F | DIASTOLIC BLOOD PRESSURE: 72 MMHG | OXYGEN SATURATION: 99 % | HEART RATE: 73 BPM | WEIGHT: 150.81 LBS | HEIGHT: 61 IN

## 2023-10-06 DIAGNOSIS — M15.9 PRIMARY OSTEOARTHRITIS INVOLVING MULTIPLE JOINTS: ICD-10-CM

## 2023-10-06 DIAGNOSIS — Z00.00 ENCOUNTER FOR MEDICARE ANNUAL WELLNESS EXAM: Primary | ICD-10-CM

## 2023-10-06 DIAGNOSIS — Z12.11 COLON CANCER SCREENING: ICD-10-CM

## 2023-10-06 DIAGNOSIS — R42 DIZZINESS: ICD-10-CM

## 2023-10-06 DIAGNOSIS — I10 PRIMARY HYPERTENSION: ICD-10-CM

## 2023-10-06 DIAGNOSIS — Z63.79 STRESS DUE TO ILLNESS OF FAMILY MEMBER: ICD-10-CM

## 2023-10-06 DIAGNOSIS — E11.9 TYPE 2 DIABETES MELLITUS WITHOUT COMPLICATION, WITHOUT LONG-TERM CURRENT USE OF INSULIN: ICD-10-CM

## 2023-10-06 DIAGNOSIS — R80.9 MICROALBUMINURIA DUE TO TYPE 2 DIABETES MELLITUS: ICD-10-CM

## 2023-10-06 DIAGNOSIS — T78.3XXA ANGIOEDEMA, INITIAL ENCOUNTER: ICD-10-CM

## 2023-10-06 DIAGNOSIS — E66.9 OBESITY (BMI 30-39.9): ICD-10-CM

## 2023-10-06 DIAGNOSIS — N84.1 POLYP OF CERVIX: ICD-10-CM

## 2023-10-06 DIAGNOSIS — M24.561 CONTRACTURE, RIGHT KNEE: ICD-10-CM

## 2023-10-06 DIAGNOSIS — E04.1 THYROID NODULE: ICD-10-CM

## 2023-10-06 DIAGNOSIS — E11.29 MICROALBUMINURIA DUE TO TYPE 2 DIABETES MELLITUS: ICD-10-CM

## 2023-10-06 DIAGNOSIS — I34.1 MITRAL VALVE PROLAPSE: ICD-10-CM

## 2023-10-06 DIAGNOSIS — E55.9 VITAMIN D INSUFFICIENCY: ICD-10-CM

## 2023-10-06 DIAGNOSIS — E03.9 HYPOTHYROIDISM, UNSPECIFIED TYPE: ICD-10-CM

## 2023-10-06 DIAGNOSIS — Z86.010 HISTORY OF COLON POLYPS: ICD-10-CM

## 2023-10-06 PROCEDURE — 3078F DIAST BP <80 MM HG: CPT | Mod: CPTII,S$GLB,, | Performed by: NURSE PRACTITIONER

## 2023-10-06 PROCEDURE — 1160F RVW MEDS BY RX/DR IN RCRD: CPT | Mod: CPTII,S$GLB,, | Performed by: NURSE PRACTITIONER

## 2023-10-06 PROCEDURE — 3288F PR FALLS RISK ASSESSMENT DOCUMENTED: ICD-10-PCS | Mod: CPTII,S$GLB,, | Performed by: NURSE PRACTITIONER

## 2023-10-06 PROCEDURE — 3288F FALL RISK ASSESSMENT DOCD: CPT | Mod: CPTII,S$GLB,, | Performed by: NURSE PRACTITIONER

## 2023-10-06 PROCEDURE — 3074F PR MOST RECENT SYSTOLIC BLOOD PRESSURE < 130 MM HG: ICD-10-PCS | Mod: CPTII,S$GLB,, | Performed by: NURSE PRACTITIONER

## 2023-10-06 PROCEDURE — 3078F PR MOST RECENT DIASTOLIC BLOOD PRESSURE < 80 MM HG: ICD-10-PCS | Mod: CPTII,S$GLB,, | Performed by: NURSE PRACTITIONER

## 2023-10-06 PROCEDURE — 1101F PT FALLS ASSESS-DOCD LE1/YR: CPT | Mod: CPTII,S$GLB,, | Performed by: NURSE PRACTITIONER

## 2023-10-06 PROCEDURE — 99999 PR PBB SHADOW E&M-EST. PATIENT-LVL V: CPT | Mod: PBBFAC,,, | Performed by: NURSE PRACTITIONER

## 2023-10-06 PROCEDURE — 3044F HG A1C LEVEL LT 7.0%: CPT | Mod: CPTII,S$GLB,, | Performed by: NURSE PRACTITIONER

## 2023-10-06 PROCEDURE — 4010F ACE/ARB THERAPY RXD/TAKEN: CPT | Mod: CPTII,S$GLB,, | Performed by: NURSE PRACTITIONER

## 2023-10-06 PROCEDURE — 3066F PR DOCUMENTATION OF TREATMENT FOR NEPHROPATHY: ICD-10-PCS | Mod: CPTII,S$GLB,, | Performed by: NURSE PRACTITIONER

## 2023-10-06 PROCEDURE — 2023F DILAT RTA XM W/O RTNOPTHY: CPT | Mod: CPTII,S$GLB,, | Performed by: NURSE PRACTITIONER

## 2023-10-06 PROCEDURE — 3044F PR MOST RECENT HEMOGLOBIN A1C LEVEL <7.0%: ICD-10-PCS | Mod: CPTII,S$GLB,, | Performed by: NURSE PRACTITIONER

## 2023-10-06 PROCEDURE — 1159F MED LIST DOCD IN RCRD: CPT | Mod: CPTII,S$GLB,, | Performed by: NURSE PRACTITIONER

## 2023-10-06 PROCEDURE — 1160F PR REVIEW ALL MEDS BY PRESCRIBER/CLIN PHARMACIST DOCUMENTED: ICD-10-PCS | Mod: CPTII,S$GLB,, | Performed by: NURSE PRACTITIONER

## 2023-10-06 PROCEDURE — G0402 INITIAL PREVENTIVE EXAM: HCPCS | Mod: S$GLB,,, | Performed by: NURSE PRACTITIONER

## 2023-10-06 PROCEDURE — 3061F NEG MICROALBUMINURIA REV: CPT | Mod: CPTII,S$GLB,, | Performed by: NURSE PRACTITIONER

## 2023-10-06 PROCEDURE — 3066F NEPHROPATHY DOC TX: CPT | Mod: CPTII,S$GLB,, | Performed by: NURSE PRACTITIONER

## 2023-10-06 PROCEDURE — 3074F SYST BP LT 130 MM HG: CPT | Mod: CPTII,S$GLB,, | Performed by: NURSE PRACTITIONER

## 2023-10-06 PROCEDURE — 1159F PR MEDICATION LIST DOCUMENTED IN MEDICAL RECORD: ICD-10-PCS | Mod: CPTII,S$GLB,, | Performed by: NURSE PRACTITIONER

## 2023-10-06 PROCEDURE — 99999 PR PBB SHADOW E&M-EST. PATIENT-LVL V: ICD-10-PCS | Mod: PBBFAC,,, | Performed by: NURSE PRACTITIONER

## 2023-10-06 PROCEDURE — 1101F PR PT FALLS ASSESS DOC 0-1 FALLS W/OUT INJ PAST YR: ICD-10-PCS | Mod: CPTII,S$GLB,, | Performed by: NURSE PRACTITIONER

## 2023-10-06 PROCEDURE — G0402 PR WELCOME MEDICARE PREVENTIVE VISIT NEW ENROLLEE: ICD-10-PCS | Mod: S$GLB,,, | Performed by: NURSE PRACTITIONER

## 2023-10-06 PROCEDURE — 3061F PR NEG MICROALBUMINURIA RESULT DOCUMENTED/REVIEW: ICD-10-PCS | Mod: CPTII,S$GLB,, | Performed by: NURSE PRACTITIONER

## 2023-10-06 PROCEDURE — 4010F PR ACE/ARB THEARPY RXD/TAKEN: ICD-10-PCS | Mod: CPTII,S$GLB,, | Performed by: NURSE PRACTITIONER

## 2023-10-06 PROCEDURE — 2023F PR DILATED RETINAL EXAM W/O EVID OF RETINOPATHY: ICD-10-PCS | Mod: CPTII,S$GLB,, | Performed by: NURSE PRACTITIONER

## 2023-10-06 NOTE — PROGRESS NOTES
"  Bessy Lamb presented for a  Medicare AWV and comprehensive Health Risk Assessment today. The following components were reviewed and updated:    Medical history  Family History  Social history  Allergies and Current Medications  Health Risk Assessment  Health Maintenance  Care Team     ** See Completed Assessments for Annual Wellness Visit within the encounter summary.**     The following assessments were completed:  Living Situation  CAGE  Depression Screening  Timed Get Up and Go  Whisper Test  Cognitive Function Screening  Nutrition Screening  ADL Screening  PAQ Screening    Vitals:    10/06/23 1112   BP: 120/72   Pulse: 73   Resp: 12   Temp: 97 °F (36.1 °C)   TempSrc: Tympanic   SpO2: 99%   Weight: 68.4 kg (150 lb 12.8 oz)   Height: 5' 1" (1.549 m)     Body mass index is 28.49 kg/m².  Physical Exam  Vitals reviewed.   Constitutional:       General: She is not in acute distress.     Appearance: Normal appearance. She is not ill-appearing.   HENT:      Head: Normocephalic and atraumatic.      Right Ear: External ear normal.      Left Ear: External ear normal.      Nose: Nose normal.   Eyes:      Extraocular Movements: Extraocular movements intact.      Conjunctiva/sclera: Conjunctivae normal.   Cardiovascular:      Rate and Rhythm: Normal rate.      Heart sounds: Normal heart sounds.   Pulmonary:      Effort: Pulmonary effort is normal. No respiratory distress.      Breath sounds: Normal breath sounds.   Abdominal:      General: Abdomen is flat. There is no distension.   Musculoskeletal:         General: Normal range of motion.      Cervical back: Normal range of motion.   Skin:     General: Skin is warm and dry.      Capillary Refill: Capillary refill takes less than 2 seconds.      Coloration: Skin is not pale.      Findings: No rash.   Neurological:      General: No focal deficit present.      Mental Status: She is alert and oriented to person, place, and time. Mental status is at baseline.   Psychiatric:    "      Mood and Affect: Mood normal. Affect is tearful.         Speech: Speech normal. Speech is not rapid and pressured, delayed or slurred.         Behavior: Behavior normal. Behavior is not agitated, slowed, aggressive, withdrawn, hyperactive or combative. Behavior is cooperative.         Thought Content: Thought content normal.         Judgment: Judgment normal.           Diagnoses and health risks identified today and associated recommendations/orders:    1. Encounter for Medicare annual wellness exam  Complete history and physical was completed today.  Complete and thorough medication reconciliation was performed.  Discussed risks and benefits of medications.  Advised patient on orders and health maintenance.  We discussed old records and old labs if available.  Will request any records not available through epic.  Continue current medications listed on your summary sheet.  Referral to endoscopy placed. Low HIV risk. Advised of due vaccines, deferred today     2. History of colon polyps  Chronic. Due for colonoscopy. Referral placed     3. Hypothyroidism, unspecified type  Chronic. Stable.  Continue levothyroxine  Monitor TSH  Continue current medications and plan of care per PCP and specialists   Lab Results   Component Value Date    TSH 2.575 03/10/2023     4. Microalbuminuria due to type 2 diabetes mellitus  Chronic. Stable  Good control of DM and HTN  Continue current medications and plan of care per PCP and specialists     5. Mitral valve prolapse  Chronic. Stable.  Continue current medications and plan of care per PCP and specialists     6. Obesity (BMI 30-39.9)  Chronic. Encourage increased physical activity, healthy diet choices, and weight loss for prevention of progression of comorbid conditions.   Continue current medications and plan of care per PCP and specialists   Wt Readings from Last 3 Encounters:   10/06/23 1112 68.4 kg (150 lb 12.8 oz)   08/25/23 1307 69.2 kg (152 lb 8.9 oz)   07/24/23 1403  69.8 kg (153 lb 15.9 oz)     7. Primary hypertension  Chronic. Stable.  BP at goal  Continue current medications and plan of care per PCP and specialists     8. Primary osteoarthritis involving multiple joints  Chronic. Stable.  Continue current medications and plan of care per PCP and specialists     9. Thyroid nodule  Chronic. Stable  She has had recent U/S  Continue current medications and plan of care per PCP and specialists     10. Type 2 diabetes mellitus without complication, without long-term current use of insulin  Chronic. Stable.  Monitor A1C   Continue current medications and plan of care per PCP and specialists   Lab Results   Component Value Date    HGBA1C 6.6 (H) 09/22/2023     11. Vitamin D insufficiency  Chronic. Stable. Continue vitamin D supplement. Monitor labs.   Continue current medications and plan of care per PCP and specialists     Lab Results   Component Value Date    KMHQIMBU09PP 41 08/21/2023    [id number] 38 08/04/2022    [id number] 25 (L) 05/19/2022     12. Polyp of cervix  Chronic. Stable.  Follows with GYN  Continue current medications and plan of care per PCP and specialists     13. Dizziness  Chronic. Stable.  Continue current medications and plan of care per PCP and specialists     14. Contracture, right knee  Chronic. Stable.  Continue current medications and plan of care per PCP and specialists     15. Angioedema, initial encounter  Chronic. Stable.  Continue current medications and plan of care per PCP and specialists     16. Colon cancer screening  - Ambulatory referral/consult to Endo Procedure ; Future    17. Stress due to illness of family member  New problem. Patient tearful during exam discussing her 's diagnosis of cancer  She defers medication as well as referral to psychiatry for therapy/counseling  She will follow up if worsening or no improvement in symptoms  ER precautions for severe or worsening symptoms.     I offered to discuss end of life  issues, including information on how to make advance directives that the patient could use to name someone who would make medical decisions on their behalf if they became too ill to make themselves.    ___Patient declined - already done.    _x__Patient is interested, I provided paperwork and offered to discuss.    Provided Bessy with a 5-10 year written screening schedule and personal prevention plan. Recommendations were developed using the USPSTF age appropriate recommendations. Education, counseling, and referrals were provided as needed. After Visit Summary printed and given to patient which includes a list of additional screenings\tests needed.    Follow up for visit in clinic as needed.    Cat Mancuso NP

## 2023-10-10 ENCOUNTER — HOSPITAL ENCOUNTER (OUTPATIENT)
Dept: PREADMISSION TESTING | Facility: HOSPITAL | Age: 63
Discharge: HOME | End: 2023-10-10
Attending: SURGERY
Payer: MEDICARE

## 2023-10-10 DIAGNOSIS — Z12.11 COLON CANCER SCREENING: ICD-10-CM

## 2023-10-10 RX ORDER — SODIUM, POTASSIUM,MAG SULFATES 17.5-3.13G
1 SOLUTION, RECONSTITUTED, ORAL ORAL DAILY
Qty: 1 KIT | Refills: 0 | Status: SHIPPED | OUTPATIENT
Start: 2023-10-10 | End: 2023-10-12

## 2023-10-18 DIAGNOSIS — R80.9 MICROALBUMINURIA DUE TO TYPE 2 DIABETES MELLITUS: ICD-10-CM

## 2023-10-18 DIAGNOSIS — E11.29 MICROALBUMINURIA DUE TO TYPE 2 DIABETES MELLITUS: ICD-10-CM

## 2023-10-18 RX ORDER — LOSARTAN POTASSIUM 25 MG/1
25 TABLET ORAL DAILY
Qty: 90 TABLET | Refills: 3 | Status: SHIPPED | OUTPATIENT
Start: 2023-10-18 | End: 2024-10-17

## 2023-10-18 RX ORDER — LEVOTHYROXINE SODIUM 50 UG/1
50 TABLET ORAL DAILY
Qty: 90 TABLET | Refills: 1 | Status: SHIPPED | OUTPATIENT
Start: 2023-10-18

## 2023-10-18 NOTE — TELEPHONE ENCOUNTER
Refill Routing Note   Medication(s) are not appropriate for processing by [facility] for the following reason(s):      Responsible provider unclear    [facility] action(s):  Defer Care Due:  None identified            Appointments  past 12m or future 3m with PCP    Date Provider   Last Visit   7/18/2023 Philly Mcclellan MD   Next Visit   Visit date not found Philly Mcclellan MD   ED visits in past 90 days: 0        Note composed:8:18 AM 10/18/2023

## 2023-10-19 ENCOUNTER — PATIENT MESSAGE (OUTPATIENT)
Dept: DIABETES | Facility: CLINIC | Age: 63
End: 2023-10-19

## 2023-10-23 ENCOUNTER — OFFICE VISIT (OUTPATIENT)
Dept: DIABETES | Facility: CLINIC | Age: 63
End: 2023-10-23
Payer: MEDICARE

## 2023-10-23 DIAGNOSIS — E11.9 TYPE 2 DIABETES MELLITUS WITHOUT COMPLICATION, WITHOUT LONG-TERM CURRENT USE OF INSULIN: Primary | ICD-10-CM

## 2023-10-23 DIAGNOSIS — I10 PRIMARY HYPERTENSION: ICD-10-CM

## 2023-10-23 DIAGNOSIS — E66.9 OBESITY (BMI 30-39.9): ICD-10-CM

## 2023-10-23 PROCEDURE — 4010F PR ACE/ARB THEARPY RXD/TAKEN: ICD-10-PCS | Mod: CPTII,95,, | Performed by: NURSE PRACTITIONER

## 2023-10-23 PROCEDURE — 3061F NEG MICROALBUMINURIA REV: CPT | Mod: CPTII,95,, | Performed by: NURSE PRACTITIONER

## 2023-10-23 PROCEDURE — 4010F ACE/ARB THERAPY RXD/TAKEN: CPT | Mod: CPTII,95,, | Performed by: NURSE PRACTITIONER

## 2023-10-23 PROCEDURE — 3061F PR NEG MICROALBUMINURIA RESULT DOCUMENTED/REVIEW: ICD-10-PCS | Mod: CPTII,95,, | Performed by: NURSE PRACTITIONER

## 2023-10-23 PROCEDURE — 99214 PR OFFICE/OUTPT VISIT, EST, LEVL IV, 30-39 MIN: ICD-10-PCS | Mod: 95,,, | Performed by: NURSE PRACTITIONER

## 2023-10-23 PROCEDURE — 3044F PR MOST RECENT HEMOGLOBIN A1C LEVEL <7.0%: ICD-10-PCS | Mod: CPTII,95,, | Performed by: NURSE PRACTITIONER

## 2023-10-23 PROCEDURE — 99214 OFFICE O/P EST MOD 30 MIN: CPT | Mod: 95,,, | Performed by: NURSE PRACTITIONER

## 2023-10-23 PROCEDURE — 3066F PR DOCUMENTATION OF TREATMENT FOR NEPHROPATHY: ICD-10-PCS | Mod: CPTII,95,, | Performed by: NURSE PRACTITIONER

## 2023-10-23 PROCEDURE — 3066F NEPHROPATHY DOC TX: CPT | Mod: CPTII,95,, | Performed by: NURSE PRACTITIONER

## 2023-10-23 PROCEDURE — 3044F HG A1C LEVEL LT 7.0%: CPT | Mod: CPTII,95,, | Performed by: NURSE PRACTITIONER

## 2023-10-23 RX ORDER — EMPAGLIFLOZIN, METFORMIN HYDROCHLORIDE 10; 1000 MG/1; MG/1
1 TABLET, EXTENDED RELEASE ORAL DAILY
Qty: 30 TABLET | Refills: 5 | Status: SHIPPED | OUTPATIENT
Start: 2023-10-23 | End: 2024-04-20

## 2023-10-23 NOTE — PATIENT INSTRUCTIONS
PATIENT INSTRUCTIONS     Lifestyle modification with well balanced diet and at least 30 minutes of physical activity daily recommended.      Discontinue Glucophage XR.   Start Synjardy XR  mg by mouth daily.   This medication helps lower blood glucose by excreting sugar through your urine. It is important to drink plenty of water daily. The most common side effect for women is a vaginal yeast infection or urinary tract infection. I will send a prescription for one dose of Diflucan 150 mg by mouth to your pharmacy if needed. You may take this if you develop any vaginal itching or irritation. If symptoms to not resolve or if you develop burning/pain with urination please contact our office.       Check blood sugar twice daily. Every morning when you wake up and 1-2 hours after main meal of the day. Keep log and upload to [facility] prior to each visit.   Blood Sugar Goals:       Fastin-130.       1-2 hours after a meal: Less than 180.

## 2023-10-23 NOTE — PROGRESS NOTES
The patient location is: Home  The chief complaint leading to consultation is: Diabetes    Visit type: audiovisual    Face to Face time with patient: 15   30 minutes of total time spent on the encounter, which includes face to face time and non-face to face time preparing to see the patient (eg, review of tests), Obtaining and/or reviewing separately obtained history, Documenting clinical information in the electronic or other health record, Independently interpreting results (not separately reported) and communicating results to the patient/family/caregiver, or Care coordination (not separately reported).  Each patient to whom he or she provides medical services by telemedicine is:  (1) informed of the relationship between the physician and patient and the respective role of any other health care provider with respect to management of the patient; and (2) notified that he or she may decline to receive medical services by telemedicine and may withdraw from such care at any time.    Notes:    Bessy Lamb is a 63 y.o. female who presents for a follow up evaluation of Type 2 diabetes mellitus.     CHIEF COMPLAINT: Diabetes Consultation    PCP: Dayan Jimenez NP      Initial visit with me - 8/17/2023    The patient was initially diagnosed with diabetes in 2018.      Previous failed treatments include:  None      Social Documentation:  Patient lives in [place] with .   Occupation: retired.   Exercise:   Very high stress at home.  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     Diabetes related complications:   none.   denies Pancreatitis  denies Gastroparesis  denies DKA  denies Hx/family Hx of MEN2/MTC  denies Frequent UTIs/yeast infections     Cardiovascular Risk Factors: none.    Diabetes Medications               metFORMIN (GLUCOPHAGE-XR) 500 MG ER 24hr tablet Take 2 tablets (1,000 mg total) by mouth once daily.     Current monitoring regimen: capillary blood glucose  "monitoring with finger sticks.  Fastin-140s.     Patient currently taking insulin or sulfonylurea?  NO  Recent hypoglycemic episodes: No.     Patient compliant with glucose checks and medication administration? Yes    DIABETES MANAGEMENT STATUS  Statin: Taking  ACE/ARB: Taking  Screening or Prevention Patient's value Goal Complete/Controlled?   HgA1C Testing and Control   Lab Results   Component Value Date    HGBA1C 6.6 (H) 2023      Annually/Less than 8% Yes   Lipid profile : 2023 Annually Yes   LDL control Lab Results   Component Value Date    LDLCALC 47.6 (L) 2023    Annually/Less than 100 mg/dl  Yes   Nephropathy screening Lab Results   Component Value Date    LABMICR <5.0 2023     Lab Results   Component Value Date    PROTEINUA Negative 2022     No results found for: "UTPCR"   Annually Yes   Blood pressure BP Readings from Last 1 Encounters:   10/06/23 120/72    Less than 140/90 Yes   Dilated retinal exam : 2023 Annually Yes   Foot exam   : 2023 Annually Yes   Patient's medications, allergies, surgical, social and family histories were reviewed and updated as appropriate.     ROS     Physical Exam   There were no vitals taken for this visit.  Wt Readings from Last 3 Encounters:   10/06/23 68.4 kg (150 lb 12.8 oz)   23 69.2 kg (152 lb 8.9 oz)   23 69.8 kg (153 lb 15.9 oz)       LAB REVIEW  Lab Results   Component Value Date     2023    K 3.9 2023     2023    CO2 27 2023    BUN 14 2023    CREATININE 0.8 2023    CALCIUM 9.5 2023    ANIONGAP 9 2023    EGFRNORACEVR >60.0 2023     No results found for: "CPEPTIDE", "GLUTAMICACID", "INSLNABS"  Hemoglobin A1C   Date Value Ref Range Status   2023 6.6 (H) 4.0 - 5.6 % Final     Comment:     ADA Screening Guidelines:  5.7-6.4%  Consistent with prediabetes  >or=6.5%  Consistent with diabetes    High levels of fetal hemoglobin interfere with the " HbA1C  assay. Heterozygous hemoglobin variants (HbS, HgC, etc)do  not significantly interfere with this assay.   However, presence of multiple variants may affect accuracy.     08/21/2023 6.4 (H) 4.0 - 5.6 % Final     Comment:     ADA Screening Guidelines:  5.7-6.4%  Consistent with prediabetes  >or=6.5%  Consistent with diabetes    High levels of fetal hemoglobin interfere with the HbA1C  assay. Heterozygous hemoglobin variants (HbS, HgC, etc)do  not significantly interfere with this assay.   However, presence of multiple variants may affect accuracy.     07/31/2023 6.5 (H) 4.0 - 5.6 % Final     Comment:     ADA Screening Guidelines:  5.7-6.4%  Consistent with prediabetes  >or=6.5%  Consistent with diabetes    High levels of fetal hemoglobin interfere with the HbA1C  assay. Heterozygous hemoglobin variants (HbS, HgC, etc)do  not significantly interfere with this assay.   However, presence of multiple variants may affect accuracy.          ASSESSMENT    ICD-10-CM ICD-9-CM   1. Type 2 diabetes mellitus without complication, without long-term current use of insulin  E11.9 250.00   2. Obesity (BMI 30-39.9)  E66.9 278.00   3. Primary hypertension  I10 401.9       PLAN  Diagnoses and all orders for this visit:    Type 2 diabetes mellitus without complication, without long-term current use of insulin  -     empagliflozin-metformin (SYNJARDY XR) 10-1,000 mg TBph; Take 1 tablet by mouth once daily.    Obesity (BMI 30-39.9)    Primary hypertension      Reviewed pathophysiology of diabetes, complications related to the disease, importance of annual dilated eye exam and daily foot examination. Explained MOA, SE, dosage of medications. Written instructions given and reviewed with patient and patient verbalizes understanding.     PATIENT INSTRUCTIONS     Lifestyle modification with well balanced diet and at least 30 minutes of physical activity daily recommended.      Discontinue Glucophage XR.   Start Synjardy XR  mg by  mouth daily.   This medication helps lower blood glucose by excreting sugar through your urine. It is important to drink plenty of water daily. The most common side effect for women is a vaginal yeast infection or urinary tract infection. I will send a prescription for one dose of Diflucan 150 mg by mouth to your pharmacy if needed. You may take this if you develop any vaginal itching or irritation. If symptoms to not resolve or if you develop burning/pain with urination please contact our office.       Check blood sugar twice daily. Every morning when you wake up and 1-2 hours after main meal of the day. Keep log and upload to [employer] prior to each visit.   Blood Sugar Goals:       Fastin-130.       1-2 hours after a meal: Less than 180.      Follow up in about 3 months (around 2024) for In-Person.    Portions of this note were prepared with [employer] voice recognition transcription software. Grammatical errors, including garbled syntax, mangle pronouns, and other bizarre constructions may be attributed to that software system.

## 2023-10-24 ENCOUNTER — HOSPITAL ENCOUNTER (OUTPATIENT)
Facility: HOSPITAL | Age: 63
Discharge: HOME | End: 2023-10-24
Attending: FAMILY MEDICINE | Admitting: FAMILY MEDICINE
Payer: MEDICARE

## 2023-10-24 ENCOUNTER — ANESTHESIA EVENT (OUTPATIENT)
Dept: ENDOSCOPY | Facility: HOSPITAL | Age: 63
End: 2023-10-24
Payer: MEDICARE

## 2023-10-24 ENCOUNTER — ANESTHESIA (OUTPATIENT)
Dept: ENDOSCOPY | Facility: HOSPITAL | Age: 63
End: 2023-10-24
Payer: MEDICARE

## 2023-10-24 DIAGNOSIS — Z86.010 HISTORY OF COLON POLYPS: ICD-10-CM

## 2023-10-24 DIAGNOSIS — Z12.11 COLON CANCER SCREENING: Primary | ICD-10-CM

## 2023-10-24 LAB
GLUCOSE SERPL-MCNC: 107 MG/DL (ref 70–110)
POCT GLUCOSE: 107 MG/DL (ref 70–110)

## 2023-10-24 PROCEDURE — 25000003 PHARM REV CODE 250: Performed by: NURSE ANESTHETIST, CERTIFIED REGISTERED

## 2023-10-24 PROCEDURE — 37000009 HC ANESTHESIA EA ADD 15 MINS: Performed by: FAMILY MEDICINE

## 2023-10-24 PROCEDURE — G0105 COLORECTAL SCRN; HI RISK IND: HCPCS | Performed by: FAMILY MEDICINE

## 2023-10-24 PROCEDURE — 63600175 PHARM REV CODE 636 W HCPCS: Performed by: NURSE ANESTHETIST, CERTIFIED REGISTERED

## 2023-10-24 PROCEDURE — G0105 COLORECTAL SCRN; HI RISK IND: ICD-10-PCS | Mod: ,,, | Performed by: FAMILY MEDICINE

## 2023-10-24 PROCEDURE — G0105 COLORECTAL SCRN; HI RISK IND: HCPCS | Mod: ,,, | Performed by: FAMILY MEDICINE

## 2023-10-24 PROCEDURE — 37000008 HC ANESTHESIA 1ST 15 MINUTES: Performed by: FAMILY MEDICINE

## 2023-10-24 RX ORDER — LIDOCAINE HYDROCHLORIDE 10 MG/ML
INJECTION, SOLUTION EPIDURAL; INFILTRATION; INTRACAUDAL; PERINEURAL
Status: DISCONTINUED | OUTPATIENT
Start: 2023-10-24 | End: 2023-10-24

## 2023-10-24 RX ORDER — METFORMIN HYDROCHLORIDE 1000 MG/1
1000 TABLET ORAL 2 TIMES DAILY WITH MEALS
COMMUNITY
End: 2024-01-09 | Stop reason: ALTCHOICE

## 2023-10-24 RX ORDER — PROPOFOL 10 MG/ML
VIAL (ML) INTRAVENOUS
Status: DISCONTINUED | OUTPATIENT
Start: 2023-10-24 | End: 2023-10-24

## 2023-10-24 RX ORDER — SODIUM CHLORIDE, SODIUM LACTATE, POTASSIUM CHLORIDE, CALCIUM CHLORIDE 600; 310; 30; 20 MG/100ML; MG/100ML; MG/100ML; MG/100ML
INJECTION, SOLUTION INTRAVENOUS CONTINUOUS PRN
Status: DISCONTINUED | OUTPATIENT
Start: 2023-10-24 | End: 2023-10-24

## 2023-10-24 RX ADMIN — PROPOFOL 20 MG: 10 INJECTION, EMULSION INTRAVENOUS at 02:10

## 2023-10-24 RX ADMIN — SODIUM CHLORIDE, SODIUM LACTATE, POTASSIUM CHLORIDE, AND CALCIUM CHLORIDE: 600; 310; 30; 20 INJECTION, SOLUTION INTRAVENOUS at 02:10

## 2023-10-24 RX ADMIN — LIDOCAINE HYDROCHLORIDE 50 MG: 10 SOLUTION INTRAVENOUS at 02:10

## 2023-10-24 RX ADMIN — PROPOFOL 100 MG: 10 INJECTION, EMULSION INTRAVENOUS at 02:10

## 2023-10-24 NOTE — PROVATION PATIENT INSTRUCTIONS
Discharge Summary/Instructions after an Endoscopic Procedure  Patient Name: Bessy Lamb  Patient MRN: [id number]  Patient YOB: 1960 Tuesday, October 24, 2023 Francisco Mcclain MD  Dear patient,  As a result of recent federal legislation (The Federal Cures Act), you may   receive lab or pathology results from your procedure in your [facility]   account before your physician is able to contact you. Your physician or   their representative will relay the results to you with their   recommendations at their soonest availability.  Thank you,  RESTRICTIONS:  During your procedure today, you received medications for sedation.  These   medications may affect your judgment, balance and coordination.  Therefore,   for 24 hours, you have the following restrictions:   - DO NOT drive a car, operate machinery, make legal/financial decisions,   sign important papers or drink alcohol.    ACTIVITY:  Today: no heavy lifting, straining or running due to procedural   sedation/anesthesia.  The following day: return to full activity including work.  DIET:  Eat and drink normally unless instructed otherwise.     TREATMENT FOR COMMON SIDE EFFECTS:  - Mild abdominal pain, nausea, belching, bloating or excessive gas:  rest,   eat lightly and use a heating pad.  - Sore Throat: treat with throat lozenges and/or gargle with warm salt   water.  - Because air was used during the procedure, expelling large amounts of air   from your rectum or belching is normal.  - If a bowel prep was taken, you may not have a bowel movement for 1-3 days.    This is normal.  SYMPTOMS TO WATCH FOR AND REPORT TO YOUR PHYSICIAN:  1. Abdominal pain or bloating, other than gas cramps.  2. Chest pain.  3. Back pain.  4. Signs of infection such as: chills or fever occurring within 24 hours   after the procedure.  5. Rectal bleeding, which would show as bright red, maroon, or black stools.   (A tablespoon of blood from the rectum is not serious, especially if    hemorrhoids are present.)  6. Vomiting.  7. Weakness or dizziness.  GO DIRECTLY TO THE NEAREST EMERGENCY ROOM IF YOU HAVE ANY OF THE FOLLOWING:      Difficulty breathing              Chills and/or fever over 101 F   Persistent vomiting and/or vomiting blood   Severe abdominal pain   Severe chest pain   Black, tarry stools   Bleeding- more than one tablespoon   Any other symptom or condition that you feel may need urgent attention  Your doctor recommends these additional instructions:  If any biopsies were taken, your doctors clinic will contact you in 1 to 2   weeks with any results.  - Patient has a contact number available for emergencies.  The signs and   symptoms of potential delayed complications were discussed with the   patient.  Return to normal activities tomorrow.  Written discharge   instructions were provided to the patient.   - Resume previous diet.   - Continue present medications.   - Repeat colonoscopy in 10 years for screening purposes.   - Discharge patient to home (via wheelchair).  For questions, problems or results please call your physician Francisco Mcclain MD at Work:  [phone]  If you have any questions about the above instructions, call the GI   department at [phone] or call the endoscopy unit at [phone]   from 7am until 3 pm.  [facility] [place], EMERGENCY ROOM PHONE NUMBER:   [phone]  IF A COMPLICATION OR EMERGENCY SITUATION ARISES AND YOU ARE UNABLE TO REACH   YOUR PHYSICIAN - GO DIRECTLY TO THE EMERGENCY ROOM.  I have read or have had read to me these discharge instructions for my   procedure and have received a written copy.  I understand these   instructions and will follow-up with my physician if I have any questions.     __________________________________       _____________________________________  Nurse Signature                                          Patient/Designated   Responsible Party Signature  Francisco Mcclain MD  10/24/2023  2:31:27 PM  This report has been verified and signed electronically.  Dear patient,  As a result of recent federal legislation (The Federal Cures Act), you may   receive lab or pathology results from your procedure in your [facility]   account before your physician is able to contact you. Your physician or   their representative will relay the results to you with their   recommendations at their soonest availability.  Thank you,  PROVATION

## 2023-10-24 NOTE — H&P
Short Stay Endoscopy History and Physical    PCP - Dayan Jimenez NP    Procedure - Colonoscopy  ASA - 2  Mallampati - per anesthesia  History of Anesthesia problems - no  Family history Anesthesia problems -  no     HPI:  This is a 63 y.o. female here for evaluation of :   Active Hospital Problems    Diagnosis  POA    *Colon cancer screening [Z12.11]  Not Applicable    History of colon polyps [Z86.010]  Not Applicable      Resolved Hospital Problems   No resolved problems to display.         Health Maintenance         Date Due Completion Date    Pneumococcal Vaccines (Age 65+) (1 - PCV) Never done ---    HIV Screening Never done ---    Shingles Vaccine (1 of 2) Never done ---    Colorectal Cancer Screening 08/30/2023 8/30/2018    Eye Exam 02/13/2024 2/13/2023    Hemoglobin A1c 03/22/2024 9/22/2023    Mammogram 07/17/2024 7/17/2023    Diabetes Urine Screening 07/31/2024 7/31/2023    Lipid Panel 08/21/2024 8/21/2023    Foot Exam 09/25/2024 9/25/2023 (Done)    Override on 9/25/2023: Done    Override on 11/2/2020: Done    Low Dose Statin 10/06/2024 10/6/2023    DEXA Scan 08/21/2026 8/21/2023    TETANUS VACCINE 07/13/2033 7/13/2023            Screening - Yes  History of polyps - Yes     Diarrhea - no  Anemia - no  Blood in stools - no  Abdominal pain - no  Other - no    ROS:  CONSTITUTIONAL: Denies weight change,  fatigue, fevers, chills, night sweats.  CARDIOVASCULAR: Denies chest pain, shortness of breath, orthopnea and edema.  RESPIRATORY: Denies cough, hemoptysis, dyspnea, and wheezing.  GI: See HPI.    Medical History:   Past Medical History:   Diagnosis Date    Arthritis, lumbar spine     hands    Diabetes mellitus     General anesthetics causing adverse effect in therapeutic use     following breast rediuct, hard time awakening  also had anxiety rxn to lidocain in dental ofc    GERD (gastroesophageal reflux disease)     Hypothyroidism 10/08/2014    Maternal anesthesia complication     epidural for 1st child  went up instead of down; required intubation    Obesity (BMI 30-39.9) 10/08/2014    Thyroid disease     Thyroid nodule 10/08/2014       Surgical History:   Past Surgical History:   Procedure Laterality Date    BREAST BIOPSY Left     b9    BREAST SURGERY      Reduction cause of a mass in left breast    c-sections x2       SECTION  3/26/81 & 85    Birth of two girls    COLONOSCOPY  2012         COLONOSCOPY N/A 2018    Procedure: COLONOSCOPY;  Surgeon: Francisco Mcclain MD;  Location: [facility];  Service: Endoscopy;  Laterality: N/A;    hysteroscopy with polypectomy      INCISIONAL BREAST BIOPSY Left     benign; done at same time as reduction    TOTAL REDUCTION MAMMOPLASTY Bilateral        Family History:   Family History   Problem Relation Age of Onset    Brain cancer Mother     Early death Mother 51        Passed at 51    Cancer Mother         Brain tumor    Arthritis Mother     Diabetes Father         Type 2    Cirrhosis Father     Cancer Father         Bone    COPD Father         Smoker    Early death Father         Passed at 64    Heart disease Sister         Congestive heart failure (passed 18    Hypertension Sister     Kidney disease Sister         Doc removed when she was a child    Hypertension Sister     Depression Sister         Due to loss of her 27 year old son    Early death Sister         Pulmonary hypertension, cirrhosis of the liver(passed 2007   Age 57    Breast cancer Maternal Aunt 30    Breast cancer Paternal Aunt 40    Breast cancer Paternal Aunt 40    Heart disease Brother         Heart attack    Hypertension Brother     Heart disease Brother         Heart  blockage    Heart disease Brother         Heart attack (passed)    Diabetes Brother     Macular degeneration Brother     Ovarian cancer Neg Hx        Social History:   Social History     Tobacco Use    Smoking status: Never    Smokeless tobacco: Never   Substance Use Topics    Alcohol use: Yes      Comment: 2/ month    Drug use: No       Allergies:   Review of patient's allergies indicates:   Allergen Reactions    Duricef [cefadroxil] Hives    Eggs [egg derived] Anaphylaxis and Hives    Cat/feline products Itching    Dairycare [lactobacillus acidoph-lactase] Itching    Dog dander Itching    Wheat containing prod Hives       Medications:   No current facility-administered medications on file prior to encounter.     Current Outpatient Medications on File Prior to Encounter   Medication Sig Dispense Refill    metFORMIN (GLUCOPHAGE) 1000 MG tablet Take 1,000 mg by mouth 2 (two) times daily with meals.      simvastatin (ZOCOR) 10 MG tablet Take 1 tablet (10 mg total) by mouth every evening. 90 tablet 3    VITAMIN D2 1,250 mcg (50,000 unit) capsule TAKE 1 CAPSULE (50,000 UNITS TOTAL) BY MOUTH TWICE A WEEK. (Patient taking differently: every 7 days.) 24 capsule 3    acetaminophen (TYLENOL) 325 MG tablet Take 325 mg by mouth every 6 (six) hours as needed for Pain.      blood-glucose meter Misc Use as directed 1 each prn    calcium carbonate (OS-ISH) 600 mg calcium (1,500 mg) Tab Take 1,200 mg by mouth 2 (two) times daily with meals.      cetirizine (ZYRTEC) 10 MG tablet Take 10 mg by mouth once daily.      famotidine (PEPCID) 40 MG tablet TAKE 1 TABLET (40 MG TOTAL) BY MOUTH ONCE DAILY. 30 tablet 11    fluticasone propionate (FLONASE) 50 mcg/actuation nasal spray 2 sprays (100 mcg total) by Each Nostril route once daily. 16 g 11    hydrOXYzine HCL (ATARAX) 25 MG tablet Take 25 mg by mouth 3 (three) times daily.      meclizine (ANTIVERT) 25 mg tablet Take 1 tablet (25 mg total) by mouth 3 (three) times daily as needed for Dizziness. 30 tablet 0    TRUE METRIX GLUCOSE TEST STRIP Strp USE 1 STRIP ONCE DAILY TO TEST BLOOD GLUCOSE (50 DAY SUPPLY) 100 each 3    TRUEPLUS LANCETS 33 gauge Misc USE AS DIRECTED TO TEST BLOOD SUGAR ONCE DAILY FOR UP  USES 100 each 2       Physical Exam:  Vital Signs:   Vitals:    10/24/23  1315   BP: (!) 168/74   Pulse: 85   Resp: 17   Temp: 99 °F (37.2 °C)     General Appearance: Well appearing in no acute distress  ENT: OP clear  Chest: CTA B  CV: RRR, no m/r/g  Abd: s/nt/nd/nabs  Ext: no edema    Labs:Reviewed    IMP:  Active Hospital Problems    Diagnosis  POA    *Colon cancer screening [Z12.11]  Not Applicable    History of colon polyps [Z86.010]  Not Applicable      Resolved Hospital Problems   No resolved problems to display.         Plan:   I have explained the risks and benefits of colonoscopy to the patient including but not limited to bleeding, perforation, infection, and death. The patient wishes to proceed.

## 2023-10-24 NOTE — ANESTHESIA POSTPROCEDURE EVALUATION
Anesthesia Post Evaluation    Patient: Bessy Lamb    Procedure(s) Performed: Procedure(s) (LRB):  COLONOSCOPY (N/A)    Final Anesthesia Type: MAC      Patient location during evaluation: GI PACU  Patient participation: Yes- Able to Participate  Level of consciousness: awake and alert  Post-procedure vital signs: reviewed and stable  Pain management: adequate  Airway patency: patent    PONV status at discharge: No PONV  Anesthetic complications: no      Cardiovascular status: blood pressure returned to baseline  Respiratory status: unassisted  Hydration status: euvolemic  Follow-up not needed.          Vitals Value Taken Time   /64 10/24/23 1446   Temp 36.6 °C (97.9 °F) 10/24/23 1446   Pulse 75 10/24/23 1446   Resp 17 10/24/23 1446   SpO2 98 % 10/24/23 1446         Event Time   Out of Recovery 15:05:15         Pain/Jenae Score: Jenae Score: 9 (10/24/2023  2:44 PM)

## 2023-10-24 NOTE — PLAN OF CARE
Dr. Mcclain at bedside discussing findings. VSS. Patient alert. No N/V. No bleeding, no pain. Patient released from unit.

## 2023-10-24 NOTE — ANESTHESIA PREPROCEDURE EVALUATION
10/24/2023  Bessy Lamb is a 63 y.o., female.      Pre-op Assessment    I have reviewed the Patient Summary Reports.     I have reviewed the Nursing Notes. I have reviewed the NPO Status.   I have reviewed the Medications.     Review of Systems  Anesthesia Hx:  No problems with previous Anesthesia  Denies Family Hx of Anesthesia complications.   Denies Personal Hx of Anesthesia complications.   Social:  Social Alcohol Use, Non-Smoker    Hematology/Oncology:  Hematology Normal   Oncology Normal     EENT/Dental:   Dizziness   Cardiovascular:   Hypertension Valvular problems/Murmurs, MVP Denies MI.   Denies CABG/stent.   Denies CHF. ECG has been reviewed. ekg 5/2022:  Normal sinus rhythm 74  Cannot rule out Anterior infarct ,age undetermined   Abnormal ECG   When compared with ECG of 15-SEP-2008 11:49,   Previous ECG has undetermined rhythm, needs review   Nonspecific T wave abnormality now evident in Anterior leads   Pulmonary:   Denies COPD.  Denies Asthma.  Denies Sleep Apnea.    Renal/:  Renal/ Normal     Hepatic/GI:   Bowel Prep. GERD Denies Liver Disease. Denies Hepatitis.    Musculoskeletal:   Arthritis     Neurological:   Denies CVA. Denies Seizures.    Endocrine:   Diabetes Hypothyroidism        Physical Exam  General: Well nourished    Airway:  Mallampati: II   Mouth Opening: Normal    Dental:  Partial Dentures    Chest/Lungs:  Clear to auscultation, Normal Respiratory Rate    Heart:  Rate: Normal  Rhythm: Regular Rhythm        Anesthesia Plan  Type of Anesthesia, risks & benefits discussed:    Anesthesia Type: MAC  Intra-op Monitoring Plan: Standard ASA Monitors  Induction:  IV  Informed Consent: Informed consent signed with the Patient and all parties understand the risks and agree with anesthesia plan.  All questions answered.   ASA Score: 2  Day of Surgery Review of History & Physical:  H&P Update referred to the surgeon/provider.    Ready For Surgery From Anesthesia Perspective.     .

## 2023-10-24 NOTE — TRANSFER OF CARE
"Anesthesia Transfer of Care Note    Patient: Bessy Lamb    Procedure(s) Performed: Procedure(s) (LRB):  COLONOSCOPY (N/A)    Patient location: PACU    Anesthesia Type: MAC    Transport from OR: Transported from OR on room air with adequate spontaneous ventilation    Post pain: adequate analgesia    Post assessment: no apparent anesthetic complications and tolerated procedure well    Post vital signs: stable    Level of consciousness: sedated    Nausea/Vomiting: no nausea/vomiting    Complications: none    Transfer of care protocol was followed      Last vitals:   Visit Vitals  BP (!) 168/74 (BP Location: Left arm, Patient Position: Lying)   Pulse 85   Temp 37.2 °C (99 °F) (Temporal)   Resp 17   Ht 5' 1" (1.549 m)   Wt 66.2 kg (146 lb)   SpO2 100%   Breastfeeding No   BMI 27.59 kg/m²     "

## 2023-10-25 VITALS
BODY MASS INDEX: 27.56 KG/M2 | SYSTOLIC BLOOD PRESSURE: 108 MMHG | DIASTOLIC BLOOD PRESSURE: 64 MMHG | HEART RATE: 75 BPM | HEIGHT: 61 IN | RESPIRATION RATE: 17 BRPM | OXYGEN SATURATION: 98 % | WEIGHT: 146 LBS | TEMPERATURE: 98 F

## 2023-10-31 ENCOUNTER — OFFICE VISIT (OUTPATIENT)
Dept: PRIMARY CARE CLINIC | Facility: CLINIC | Age: 63
End: 2023-10-31
Payer: MEDICARE

## 2023-10-31 VITALS
BODY MASS INDEX: 27.85 KG/M2 | HEART RATE: 77 BPM | TEMPERATURE: 99 F | SYSTOLIC BLOOD PRESSURE: 132 MMHG | DIASTOLIC BLOOD PRESSURE: 84 MMHG | OXYGEN SATURATION: 98 % | WEIGHT: 147.5 LBS | HEIGHT: 61 IN

## 2023-10-31 DIAGNOSIS — J06.9 ACUTE UPPER RESPIRATORY INFECTION: Primary | ICD-10-CM

## 2023-10-31 LAB
CTP QC/QA: YES
SARS-COV-2 RDRP RESP QL NAA+PROBE: NEGATIVE

## 2023-10-31 PROCEDURE — 99999 PR PBB SHADOW E&M-EST. PATIENT-LVL IV: CPT | Mod: PBBFAC,,, | Performed by: NURSE PRACTITIONER

## 2023-10-31 PROCEDURE — 3061F NEG MICROALBUMINURIA REV: CPT | Mod: CPTII,S$GLB,, | Performed by: NURSE PRACTITIONER

## 2023-10-31 PROCEDURE — 3044F PR MOST RECENT HEMOGLOBIN A1C LEVEL <7.0%: ICD-10-PCS | Mod: CPTII,S$GLB,, | Performed by: NURSE PRACTITIONER

## 2023-10-31 PROCEDURE — 3079F PR MOST RECENT DIASTOLIC BLOOD PRESSURE 80-89 MM HG: ICD-10-PCS | Mod: CPTII,S$GLB,, | Performed by: NURSE PRACTITIONER

## 2023-10-31 PROCEDURE — 3008F BODY MASS INDEX DOCD: CPT | Mod: CPTII,S$GLB,, | Performed by: NURSE PRACTITIONER

## 2023-10-31 PROCEDURE — 3079F DIAST BP 80-89 MM HG: CPT | Mod: CPTII,S$GLB,, | Performed by: NURSE PRACTITIONER

## 2023-10-31 PROCEDURE — 87635 SARS-COV-2 COVID-19 AMP PRB: CPT | Mod: QW,S$GLB,, | Performed by: NURSE PRACTITIONER

## 2023-10-31 PROCEDURE — 4010F ACE/ARB THERAPY RXD/TAKEN: CPT | Mod: CPTII,S$GLB,, | Performed by: NURSE PRACTITIONER

## 2023-10-31 PROCEDURE — 3288F PR FALLS RISK ASSESSMENT DOCUMENTED: ICD-10-PCS | Mod: CPTII,S$GLB,, | Performed by: NURSE PRACTITIONER

## 2023-10-31 PROCEDURE — 3061F PR NEG MICROALBUMINURIA RESULT DOCUMENTED/REVIEW: ICD-10-PCS | Mod: CPTII,S$GLB,, | Performed by: NURSE PRACTITIONER

## 2023-10-31 PROCEDURE — 4010F PR ACE/ARB THEARPY RXD/TAKEN: ICD-10-PCS | Mod: CPTII,S$GLB,, | Performed by: NURSE PRACTITIONER

## 2023-10-31 PROCEDURE — 1101F PR PT FALLS ASSESS DOC 0-1 FALLS W/OUT INJ PAST YR: ICD-10-PCS | Mod: CPTII,S$GLB,, | Performed by: NURSE PRACTITIONER

## 2023-10-31 PROCEDURE — 99213 OFFICE O/P EST LOW 20 MIN: CPT | Mod: S$GLB,,, | Performed by: NURSE PRACTITIONER

## 2023-10-31 PROCEDURE — 1159F PR MEDICATION LIST DOCUMENTED IN MEDICAL RECORD: ICD-10-PCS | Mod: CPTII,S$GLB,, | Performed by: NURSE PRACTITIONER

## 2023-10-31 PROCEDURE — 99999 PR PBB SHADOW E&M-EST. PATIENT-LVL IV: ICD-10-PCS | Mod: PBBFAC,,, | Performed by: NURSE PRACTITIONER

## 2023-10-31 PROCEDURE — 1159F MED LIST DOCD IN RCRD: CPT | Mod: CPTII,S$GLB,, | Performed by: NURSE PRACTITIONER

## 2023-10-31 PROCEDURE — 99213 PR OFFICE/OUTPT VISIT, EST, LEVL III, 20-29 MIN: ICD-10-PCS | Mod: S$GLB,,, | Performed by: NURSE PRACTITIONER

## 2023-10-31 PROCEDURE — 1101F PT FALLS ASSESS-DOCD LE1/YR: CPT | Mod: CPTII,S$GLB,, | Performed by: NURSE PRACTITIONER

## 2023-10-31 PROCEDURE — 3075F SYST BP GE 130 - 139MM HG: CPT | Mod: CPTII,S$GLB,, | Performed by: NURSE PRACTITIONER

## 2023-10-31 PROCEDURE — 3288F FALL RISK ASSESSMENT DOCD: CPT | Mod: CPTII,S$GLB,, | Performed by: NURSE PRACTITIONER

## 2023-10-31 PROCEDURE — 3066F NEPHROPATHY DOC TX: CPT | Mod: CPTII,S$GLB,, | Performed by: NURSE PRACTITIONER

## 2023-10-31 PROCEDURE — 87635: ICD-10-PCS | Mod: QW,S$GLB,, | Performed by: NURSE PRACTITIONER

## 2023-10-31 PROCEDURE — 3044F HG A1C LEVEL LT 7.0%: CPT | Mod: CPTII,S$GLB,, | Performed by: NURSE PRACTITIONER

## 2023-10-31 PROCEDURE — 2023F PR DILATED RETINAL EXAM W/O EVID OF RETINOPATHY: ICD-10-PCS | Mod: CPTII,S$GLB,, | Performed by: NURSE PRACTITIONER

## 2023-10-31 PROCEDURE — 3008F PR BODY MASS INDEX (BMI) DOCUMENTED: ICD-10-PCS | Mod: CPTII,S$GLB,, | Performed by: NURSE PRACTITIONER

## 2023-10-31 PROCEDURE — 2023F DILAT RTA XM W/O RTNOPTHY: CPT | Mod: CPTII,S$GLB,, | Performed by: NURSE PRACTITIONER

## 2023-10-31 PROCEDURE — 3075F PR MOST RECENT SYSTOLIC BLOOD PRESS GE 130-139MM HG: ICD-10-PCS | Mod: CPTII,S$GLB,, | Performed by: NURSE PRACTITIONER

## 2023-10-31 PROCEDURE — 3066F PR DOCUMENTATION OF TREATMENT FOR NEPHROPATHY: ICD-10-PCS | Mod: CPTII,S$GLB,, | Performed by: NURSE PRACTITIONER

## 2023-10-31 RX ORDER — INFLUENZA A VIRUS A/VICTORIA/4897/2022 IVR-238 (H1N1) ANTIGEN (FORMALDEHYDE INACTIVATED), INFLUENZA A VIRUS A/DARWIN/6/2021 IVR-227 (H3N2) ANTIGEN (FORMALDEHYDE INACTIVATED), INFLUENZA B VIRUS B/AUSTRIA/1359417/2021 BVR-26 ANTIGEN (FORMALDEHYDE INACTIVATED), INFLUENZA B VIRUS B/PHUKET/3073/2013 BVR-1B ANTIGEN (FORMALDEHYDE INACTIVATED) 15; 15; 15; 15 UG/.5ML; UG/.5ML; UG/.5ML; UG/.5ML
INJECTION, SUSPENSION INTRAMUSCULAR
COMMUNITY
Start: 2023-09-23 | End: 2024-02-20

## 2023-10-31 RX ORDER — MELOXICAM 15 MG/1
15 TABLET ORAL DAILY
Qty: 7 TABLET | Refills: 0 | Status: SHIPPED | OUTPATIENT
Start: 2023-10-31 | End: 2023-11-07

## 2023-10-31 RX ORDER — FLUTICASONE PROPIONATE 50 MCG
1 SPRAY, SUSPENSION (ML) NASAL DAILY
Qty: 18.2 ML | Refills: 0 | Status: SHIPPED | OUTPATIENT
Start: 2023-10-31

## 2023-10-31 RX ORDER — GUAIFENESIN 600 MG/1
600 TABLET, EXTENDED RELEASE ORAL 2 TIMES DAILY
Qty: 20 TABLET | Refills: 0
Start: 2023-10-31 | End: 2023-11-10

## 2023-10-31 NOTE — PROGRESS NOTES
Assessment/Plan:    Problem List Items Addressed This Visit    None  Visit Diagnoses       Acute upper respiratory infection    -  Primary    Relevant Medications    guaiFENesin (MUCINEX) 600 mg 12 hr tablet    fluticasone propionate (FLONASE) 50 mcg/actuation nasal spray    meloxicam (MOBIC) 15 MG tablet    Other Relevant Orders    POCT COVID-19 Rapid Screening (Completed)        -recommend Flonase and Mucinex  -supportive care- rest, increase hydration with water, OTC Tylenol/Ibuprofen for pain/fever  -follow up if no improvement in symptoms   -ER precautions for severe or worsening of symptoms       Follow up in about 3 months (around 1/31/2024).    Dayan Jimenez, ADI  _____________________________________________________________________________________________________________________________________________________    CC: URI symptoms     HPI:    Patient is in clinic today as an established patient. Upper Respiratory Infection: Patient complains of symptoms of a URI. Symptoms include congestion and fever. Onset of symptoms was 1 day ago, gradually worsening since that time. She also c/o sore throat and body aches  for the past 1 day .  She is drinking moderate amounts of fluids. Evaluation to date: none. Treatment to date: none.       No other new complaints today.  Remaining chronic conditions have been reviewed and remain stable. Further detail as stated above.      reviewed.     No recent changes to medical/surgical history.    Current Outpatient Medications on File Prior to Visit   Medication Sig Dispense Refill    acetaminophen (TYLENOL) 325 MG tablet Take 325 mg by mouth every 6 (six) hours as needed for Pain.      blood-glucose meter Misc Use as directed 1 each prn    calcium carbonate (OS-ISH) 600 mg calcium (1,500 mg) Tab Take 1,200 mg by mouth 2 (two) times daily with meals.      empagliflozin-metformin (SYNJARDY XR) 10-1,000 mg TBph Take 1 tablet by mouth once daily. 30 tablet 5    hydrOXYzine  HCL (ATARAX) 25 MG tablet Take 25 mg by mouth 3 (three) times daily.      levothyroxine (SYNTHROID) 50 MCG tablet TAKE 1 TABLET (50 MCG TOTAL) BY MOUTH ONCE DAILY 90 tablet 1    losartan (COZAAR) 25 MG tablet TAKE 1 TABLET (25 MG TOTAL) BY MOUTH ONCE DAILY 90 tablet 3    meclizine (ANTIVERT) 25 mg tablet Take 1 tablet (25 mg total) by mouth 3 (three) times daily as needed for Dizziness. 30 tablet 0    metFORMIN (GLUCOPHAGE) 1000 MG tablet Take 1,000 mg by mouth 2 (two) times daily with meals.      simvastatin (ZOCOR) 10 MG tablet Take 1 tablet (10 mg total) by mouth every evening. 90 tablet 3    TRUE METRIX GLUCOSE TEST STRIP Strp USE 1 STRIP ONCE DAILY TO TEST BLOOD GLUCOSE (50 DAY SUPPLY) 100 each 3    TRUEPLUS LANCETS 33 gauge Misc USE AS DIRECTED TO TEST BLOOD SUGAR ONCE DAILY FOR UP  USES 100 each 2    VITAMIN D2 1,250 mcg (50,000 unit) capsule TAKE 1 CAPSULE (50,000 UNITS TOTAL) BY MOUTH TWICE A WEEK. (Patient taking differently: every 7 days.) 24 capsule 3    cetirizine (ZYRTEC) 10 MG tablet Take 10 mg by mouth once daily.      famotidine (PEPCID) 40 MG tablet TAKE 1 TABLET (40 MG TOTAL) BY MOUTH ONCE DAILY. 30 tablet 11    FLUAD QUAD 2023-24,65Y UP,,PF, 60 mcg (15 mcg x 4)/0.5 mL Syrg       fluticasone propionate (FLONASE) 50 mcg/actuation nasal spray 2 sprays (100 mcg total) by Each Nostril route once daily. (Patient not taking: Reported on 10/31/2023) 16 g 11     No current facility-administered medications on file prior to visit.       Review of Systems   Constitutional:  Positive for fever.   HENT:  Positive for congestion and sore throat.    Eyes: Negative.    Respiratory: Negative.     Cardiovascular: Negative.    Gastrointestinal: Negative.    Endocrine: Negative.    Genitourinary: Negative.    Musculoskeletal:  Positive for myalgias.   Skin: Negative.    Allergic/Immunologic: Negative.    Neurological: Negative.    Hematological: Negative.    Psychiatric/Behavioral: Negative.    "    Vitals:    10/31/23 1126   BP: 132/84   Pulse: 77   Temp: 99 °F (37.2 °C)   SpO2: 98%   Weight: 66.9 kg (147 lb 7.8 oz)   Height: 5' 1" (1.549 m)       Wt Readings from Last 3 Encounters:   10/31/23 66.9 kg (147 lb 7.8 oz)   10/24/23 66.2 kg (146 lb)   10/06/23 68.4 kg (150 lb 12.8 oz)       Physical Exam  Vitals and nursing note reviewed.   Constitutional:       Appearance: She is well-developed.   HENT:      Head: Normocephalic and atraumatic.   Eyes:      Conjunctiva/sclera: Conjunctivae normal.      Pupils: Pupils are equal, round, and reactive to light.   Cardiovascular:      Rate and Rhythm: Normal rate and regular rhythm.      Heart sounds: Normal heart sounds.   Pulmonary:      Effort: Pulmonary effort is normal.      Breath sounds: Normal breath sounds.   Musculoskeletal:         General: Normal range of motion.      Cervical back: Normal range of motion and neck supple.   Skin:     General: Skin is warm and dry.   Neurological:      Mental Status: She is alert and oriented to person, place, and time.   Psychiatric:         Behavior: Behavior normal.         Thought Content: Thought content normal.         Judgment: Judgment normal.     Health Maintenance   Topic Date Due    Shingles Vaccine (1 of 2) Never done    Eye Exam  02/13/2024    Hemoglobin A1c  03/22/2024    Mammogram  07/17/2024    Lipid Panel  08/21/2024    Foot Exam  09/25/2024    Low Dose Statin  10/31/2024    DEXA Scan  08/21/2026    TETANUS VACCINE  07/13/2033    Colorectal Cancer Screening  10/24/2033    Hepatitis C Screening  Completed       "

## 2023-11-02 ENCOUNTER — PATIENT MESSAGE (OUTPATIENT)
Dept: PRIMARY CARE CLINIC | Facility: CLINIC | Age: 63
End: 2023-11-02
Payer: MEDICARE

## 2023-11-02 DIAGNOSIS — B96.89 BACTERIAL UPPER RESPIRATORY INFECTION: Primary | ICD-10-CM

## 2023-11-02 DIAGNOSIS — J06.9 BACTERIAL UPPER RESPIRATORY INFECTION: Primary | ICD-10-CM

## 2023-11-03 ENCOUNTER — PATIENT MESSAGE (OUTPATIENT)
Dept: PRIMARY CARE CLINIC | Facility: CLINIC | Age: 63
End: 2023-11-03
Payer: MEDICARE

## 2023-11-03 RX ORDER — AZITHROMYCIN 250 MG/1
TABLET, FILM COATED ORAL
Qty: 6 TABLET | Refills: 0 | Status: SHIPPED | OUTPATIENT
Start: 2023-11-03 | End: 2023-11-15

## 2023-11-03 NOTE — TELEPHONE ENCOUNTER
I have signed for the following orders AND/OR meds.  Please call the patient and ask the patient to schedule the testing AND/OR inform about any medications that were sent. Medications have been sent to pharmacy listed below      No orders of the defined types were placed in this encounter.      Medications Ordered This Encounter   Medications    azithromycin (Z-NAYELI) 250 MG tablet     Sig: Take as directed.     Dispense:  6 tablet     Refill:  0         [facility] [address] [address]  Phone: [phone] Fax: [phone]

## 2023-11-13 ENCOUNTER — OFFICE VISIT (OUTPATIENT)
Dept: PRIMARY CARE CLINIC | Facility: CLINIC | Age: 63
End: 2023-11-13
Payer: MEDICARE

## 2023-11-13 ENCOUNTER — PATIENT MESSAGE (OUTPATIENT)
Dept: PRIMARY CARE CLINIC | Facility: CLINIC | Age: 63
End: 2023-11-13
Payer: MEDICARE

## 2023-11-13 VITALS
BODY MASS INDEX: 26.49 KG/M2 | HEART RATE: 72 BPM | SYSTOLIC BLOOD PRESSURE: 128 MMHG | DIASTOLIC BLOOD PRESSURE: 68 MMHG | HEIGHT: 61 IN | WEIGHT: 140.31 LBS | OXYGEN SATURATION: 98 %

## 2023-11-13 DIAGNOSIS — R05.9 COUGH, UNSPECIFIED TYPE: ICD-10-CM

## 2023-11-13 DIAGNOSIS — J06.9 BACTERIAL UPPER RESPIRATORY INFECTION: Primary | ICD-10-CM

## 2023-11-13 DIAGNOSIS — B96.89 BACTERIAL UPPER RESPIRATORY INFECTION: Primary | ICD-10-CM

## 2023-11-13 LAB
CTP QC/QA: YES
CTP QC/QA: YES
POC MOLECULAR INFLUENZA A AGN: NEGATIVE
POC MOLECULAR INFLUENZA B AGN: NEGATIVE
SARS-COV-2 RDRP RESP QL NAA+PROBE: NEGATIVE

## 2023-11-13 PROCEDURE — 87502 POCT INFLUENZA A/B MOLECULAR: ICD-10-PCS | Mod: QW,S$GLB,, | Performed by: NURSE PRACTITIONER

## 2023-11-13 PROCEDURE — 87635 SARS-COV-2 COVID-19 AMP PRB: CPT | Mod: QW,S$GLB,, | Performed by: NURSE PRACTITIONER

## 2023-11-13 PROCEDURE — 3078F DIAST BP <80 MM HG: CPT | Mod: CPTII,S$GLB,, | Performed by: NURSE PRACTITIONER

## 2023-11-13 PROCEDURE — 3008F PR BODY MASS INDEX (BMI) DOCUMENTED: ICD-10-PCS | Mod: CPTII,S$GLB,, | Performed by: NURSE PRACTITIONER

## 2023-11-13 PROCEDURE — 3288F PR FALLS RISK ASSESSMENT DOCUMENTED: ICD-10-PCS | Mod: CPTII,S$GLB,, | Performed by: NURSE PRACTITIONER

## 2023-11-13 PROCEDURE — 2023F PR DILATED RETINAL EXAM W/O EVID OF RETINOPATHY: ICD-10-PCS | Mod: CPTII,S$GLB,, | Performed by: NURSE PRACTITIONER

## 2023-11-13 PROCEDURE — 2023F DILAT RTA XM W/O RTNOPTHY: CPT | Mod: CPTII,S$GLB,, | Performed by: NURSE PRACTITIONER

## 2023-11-13 PROCEDURE — 3061F PR NEG MICROALBUMINURIA RESULT DOCUMENTED/REVIEW: ICD-10-PCS | Mod: CPTII,S$GLB,, | Performed by: NURSE PRACTITIONER

## 2023-11-13 PROCEDURE — 4010F ACE/ARB THERAPY RXD/TAKEN: CPT | Mod: CPTII,S$GLB,, | Performed by: NURSE PRACTITIONER

## 2023-11-13 PROCEDURE — 4010F PR ACE/ARB THEARPY RXD/TAKEN: ICD-10-PCS | Mod: CPTII,S$GLB,, | Performed by: NURSE PRACTITIONER

## 2023-11-13 PROCEDURE — 99213 OFFICE O/P EST LOW 20 MIN: CPT | Mod: S$GLB,,, | Performed by: NURSE PRACTITIONER

## 2023-11-13 PROCEDURE — 1159F MED LIST DOCD IN RCRD: CPT | Mod: CPTII,S$GLB,, | Performed by: NURSE PRACTITIONER

## 2023-11-13 PROCEDURE — 3074F SYST BP LT 130 MM HG: CPT | Mod: CPTII,S$GLB,, | Performed by: NURSE PRACTITIONER

## 2023-11-13 PROCEDURE — 87502 INFLUENZA DNA AMP PROBE: CPT | Mod: QW,S$GLB,, | Performed by: NURSE PRACTITIONER

## 2023-11-13 PROCEDURE — 87635: ICD-10-PCS | Mod: QW,S$GLB,, | Performed by: NURSE PRACTITIONER

## 2023-11-13 PROCEDURE — 1101F PR PT FALLS ASSESS DOC 0-1 FALLS W/OUT INJ PAST YR: ICD-10-PCS | Mod: CPTII,S$GLB,, | Performed by: NURSE PRACTITIONER

## 2023-11-13 PROCEDURE — 3066F NEPHROPATHY DOC TX: CPT | Mod: CPTII,S$GLB,, | Performed by: NURSE PRACTITIONER

## 2023-11-13 PROCEDURE — 3288F FALL RISK ASSESSMENT DOCD: CPT | Mod: CPTII,S$GLB,, | Performed by: NURSE PRACTITIONER

## 2023-11-13 PROCEDURE — 3044F PR MOST RECENT HEMOGLOBIN A1C LEVEL <7.0%: ICD-10-PCS | Mod: CPTII,S$GLB,, | Performed by: NURSE PRACTITIONER

## 2023-11-13 PROCEDURE — 3008F BODY MASS INDEX DOCD: CPT | Mod: CPTII,S$GLB,, | Performed by: NURSE PRACTITIONER

## 2023-11-13 PROCEDURE — 3044F HG A1C LEVEL LT 7.0%: CPT | Mod: CPTII,S$GLB,, | Performed by: NURSE PRACTITIONER

## 2023-11-13 PROCEDURE — 3074F PR MOST RECENT SYSTOLIC BLOOD PRESSURE < 130 MM HG: ICD-10-PCS | Mod: CPTII,S$GLB,, | Performed by: NURSE PRACTITIONER

## 2023-11-13 PROCEDURE — 3061F NEG MICROALBUMINURIA REV: CPT | Mod: CPTII,S$GLB,, | Performed by: NURSE PRACTITIONER

## 2023-11-13 PROCEDURE — 1101F PT FALLS ASSESS-DOCD LE1/YR: CPT | Mod: CPTII,S$GLB,, | Performed by: NURSE PRACTITIONER

## 2023-11-13 PROCEDURE — 99999 PR PBB SHADOW E&M-EST. PATIENT-LVL IV: CPT | Mod: PBBFAC,,, | Performed by: NURSE PRACTITIONER

## 2023-11-13 PROCEDURE — 3078F PR MOST RECENT DIASTOLIC BLOOD PRESSURE < 80 MM HG: ICD-10-PCS | Mod: CPTII,S$GLB,, | Performed by: NURSE PRACTITIONER

## 2023-11-13 PROCEDURE — 1159F PR MEDICATION LIST DOCUMENTED IN MEDICAL RECORD: ICD-10-PCS | Mod: CPTII,S$GLB,, | Performed by: NURSE PRACTITIONER

## 2023-11-13 PROCEDURE — 3066F PR DOCUMENTATION OF TREATMENT FOR NEPHROPATHY: ICD-10-PCS | Mod: CPTII,S$GLB,, | Performed by: NURSE PRACTITIONER

## 2023-11-13 PROCEDURE — 99999 PR PBB SHADOW E&M-EST. PATIENT-LVL IV: ICD-10-PCS | Mod: PBBFAC,,, | Performed by: NURSE PRACTITIONER

## 2023-11-13 PROCEDURE — 99213 PR OFFICE/OUTPT VISIT, EST, LEVL III, 20-29 MIN: ICD-10-PCS | Mod: S$GLB,,, | Performed by: NURSE PRACTITIONER

## 2023-11-13 RX ORDER — AMOXICILLIN AND CLAVULANATE POTASSIUM 875; 125 MG/1; MG/1
1 TABLET, FILM COATED ORAL 2 TIMES DAILY
Qty: 14 TABLET | Refills: 0 | Status: SHIPPED | OUTPATIENT
Start: 2023-11-13 | End: 2023-11-20

## 2023-11-13 NOTE — PATIENT INSTRUCTIONS
Chloraseptic throat spray and cough drops  Allergy medication (for example: xyzal, benadryl, zyrtec, allegra, Claritin)    Warm soup and tea  Lots of rest and fluids

## 2023-11-14 ENCOUNTER — HOSPITAL ENCOUNTER (OUTPATIENT)
Dept: RADIOLOGY | Facility: HOSPITAL | Age: 63
Discharge: HOME | End: 2023-11-14
Attending: NURSE PRACTITIONER
Payer: MEDICARE

## 2023-11-14 DIAGNOSIS — B96.89 BACTERIAL UPPER RESPIRATORY INFECTION: ICD-10-CM

## 2023-11-14 DIAGNOSIS — J06.9 BACTERIAL UPPER RESPIRATORY INFECTION: ICD-10-CM

## 2023-11-14 PROCEDURE — 71046 X-RAY EXAM CHEST 2 VIEWS: CPT | Mod: TC,PO

## 2023-11-14 PROCEDURE — 71046 XR CHEST PA AND LATERAL: ICD-10-PCS | Mod: 26,,, | Performed by: RADIOLOGY

## 2023-11-14 PROCEDURE — 71046 X-RAY EXAM CHEST 2 VIEWS: CPT | Mod: 26,,, | Performed by: RADIOLOGY

## 2023-11-14 NOTE — PROGRESS NOTES
Assessment/Plan:    Problem List Items Addressed This Visit    None  Visit Diagnoses       Bacterial upper respiratory infection    -  Primary    Relevant Medications    amoxicillin-clavulanate 875-125mg (AUGMENTIN) 875-125 mg per tablet    Other Relevant Orders    X-Ray Chest PA And Lateral    Cough, unspecified type        Relevant Orders    POCT COVID-19 Rapid Screening (Completed)    POCT Influenza A/B Molecular (Completed)        -COVID and Flu screening negative in clinic today.     Follow up if symptoms worsen or fail to improve.    Dayan Jimenez NP  _____________________________________________________________________________________________________________________________________________________    CC: cold symptoms      HPI:    Patient is in clinic today as an established patient. Upper Respiratory Infection: Patient complains of symptoms of a URI. Symptoms include congestion and cough. Onset of symptoms was 2 weeks ago, gradually worsening since that time. She also c/o sneezing and hoarseness  for the past 2 weeks .  She is drinking moderate amounts of fluids. Evaluation to date: none  seen previously and thought to have a viral URI. Treatment to date: antihistamines, decongestants, and nasal steroids.    No other new complaints today.  Remaining chronic conditions have been reviewed and remain stable. Further detail as stated above.      reviewed.     No recent changes to medical/surgical history.    Current Outpatient Medications on File Prior to Visit   Medication Sig Dispense Refill    acetaminophen (TYLENOL) 325 MG tablet Take 325 mg by mouth every 6 (six) hours as needed for Pain.      blood-glucose meter Misc Use as directed 1 each prn    calcium carbonate (OS-ISH) 600 mg calcium (1,500 mg) Tab Take 1,200 mg by mouth 2 (two) times daily with meals.      cetirizine (ZYRTEC) 10 MG tablet Take 10 mg by mouth once daily.      empagliflozin-metformin (SYNJARDY XR) 10-1,000 mg TBph Take 1 tablet by  mouth once daily. 30 tablet 5    fluticasone propionate (FLONASE) 50 mcg/actuation nasal spray 1 spray (50 mcg total) by Each Nostril route once daily. 18.2 mL 0    hydrOXYzine HCL (ATARAX) 25 MG tablet Take 25 mg by mouth 3 (three) times daily.      levothyroxine (SYNTHROID) 50 MCG tablet TAKE 1 TABLET (50 MCG TOTAL) BY MOUTH ONCE DAILY 90 tablet 1    losartan (COZAAR) 25 MG tablet TAKE 1 TABLET (25 MG TOTAL) BY MOUTH ONCE DAILY 90 tablet 3    meclizine (ANTIVERT) 25 mg tablet Take 1 tablet (25 mg total) by mouth 3 (three) times daily as needed for Dizziness. 30 tablet 0    metFORMIN (GLUCOPHAGE) 1000 MG tablet Take 1,000 mg by mouth 2 (two) times daily with meals.      simvastatin (ZOCOR) 10 MG tablet Take 1 tablet (10 mg total) by mouth every evening. 90 tablet 3    TRUE METRIX GLUCOSE TEST STRIP Strp USE 1 STRIP ONCE DAILY TO TEST BLOOD GLUCOSE (50 DAY SUPPLY) 100 each 3    TRUEPLUS LANCETS 33 gauge Misc USE AS DIRECTED TO TEST BLOOD SUGAR ONCE DAILY FOR UP  USES 100 each 2    VITAMIN D2 1,250 mcg (50,000 unit) capsule TAKE 1 CAPSULE (50,000 UNITS TOTAL) BY MOUTH TWICE A WEEK. (Patient taking differently: every 7 days.) 24 capsule 3    azithromycin (Z-NAYELI) 250 MG tablet Take as directed. (Patient not taking: Reported on 11/13/2023) 6 tablet 0    famotidine (PEPCID) 40 MG tablet TAKE 1 TABLET (40 MG TOTAL) BY MOUTH ONCE DAILY. 30 tablet 11    FLUAD QUAD 2023-24,65Y UP,,PF, 60 mcg (15 mcg x 4)/0.5 mL Syrg       fluticasone propionate (FLONASE) 50 mcg/actuation nasal spray 2 sprays (100 mcg total) by Each Nostril route once daily. (Patient not taking: Reported on 10/31/2023) 16 g 11     No current facility-administered medications on file prior to visit.       Review of Systems   Constitutional: Negative.    HENT:  Positive for congestion, sneezing and voice change.    Eyes: Negative.    Respiratory:  Positive for cough.    Cardiovascular: Negative.    Gastrointestinal: Negative.    Endocrine: Negative.   "  Genitourinary: Negative.    Musculoskeletal: Negative.    Skin: Negative.    Allergic/Immunologic: Negative.    Neurological: Negative.    Hematological: Negative.    Psychiatric/Behavioral: Negative.         Vitals:    11/13/23 1501   BP: 128/68   Pulse: 72   SpO2: 98%   Weight: 63.6 kg (140 lb 5.2 oz)   Height: 5' 1" (1.549 m)       Wt Readings from Last 3 Encounters:   11/13/23 63.6 kg (140 lb 5.2 oz)   10/31/23 66.9 kg (147 lb 7.8 oz)   10/24/23 66.2 kg (146 lb)       Physical Exam  Vitals and nursing note reviewed.   Constitutional:       Appearance: She is well-developed.   HENT:      Head: Normocephalic and atraumatic.   Eyes:      Conjunctiva/sclera: Conjunctivae normal.      Pupils: Pupils are equal, round, and reactive to light.   Cardiovascular:      Rate and Rhythm: Normal rate and regular rhythm.      Heart sounds: Normal heart sounds.   Pulmonary:      Effort: Pulmonary effort is normal.      Breath sounds: Normal breath sounds.   Musculoskeletal:         General: Normal range of motion.      Cervical back: Normal range of motion and neck supple.   Skin:     General: Skin is warm and dry.   Neurological:      Mental Status: She is alert and oriented to person, place, and time.   Psychiatric:         Behavior: Behavior normal.         Thought Content: Thought content normal.         Judgment: Judgment normal.         Health Maintenance   Topic Date Due    Shingles Vaccine (1 of 2) Never done    Eye Exam  02/13/2024    Hemoglobin A1c  03/22/2024    Mammogram  07/17/2024    Lipid Panel  08/21/2024    Foot Exam  09/25/2024    Low Dose Statin  11/13/2024    DEXA Scan  08/21/2026    TETANUS VACCINE  07/13/2033    Colorectal Cancer Screening  10/24/2033    Hepatitis C Screening  Completed       "

## 2023-11-15 ENCOUNTER — PATIENT MESSAGE (OUTPATIENT)
Dept: PRIMARY CARE CLINIC | Facility: CLINIC | Age: 63
End: 2023-11-15
Payer: MEDICARE

## 2023-11-15 DIAGNOSIS — R93.89 ABNORMAL CHEST X-RAY: Primary | ICD-10-CM

## 2023-11-15 NOTE — PROGRESS NOTES
Results have been released via my chart. Please verify that these have been reviewed by the pt. If not, please call pt with results.     Please schedule orders:  Schedule CT chest with contrast

## 2023-11-19 ENCOUNTER — PATIENT MESSAGE (OUTPATIENT)
Dept: PRIMARY CARE CLINIC | Facility: CLINIC | Age: 63
End: 2023-11-19
Payer: MEDICARE

## 2023-11-20 RX ORDER — FLUCONAZOLE 150 MG/1
150 TABLET ORAL DAILY
Qty: 1 TABLET | Refills: 0 | Status: SHIPPED | OUTPATIENT
Start: 2023-11-20 | End: 2023-11-21

## 2023-11-28 RX ORDER — ERGOCALCIFEROL 1.25 MG/1
CAPSULE ORAL
Qty: 24 CAPSULE | Refills: 3 | Status: SHIPPED | OUTPATIENT
Start: 2023-11-28

## 2023-12-01 ENCOUNTER — HOSPITAL ENCOUNTER (OUTPATIENT)
Dept: RADIOLOGY | Facility: HOSPITAL | Age: 63
Discharge: HOME | End: 2023-12-01
Attending: NURSE PRACTITIONER
Payer: MEDICARE

## 2023-12-01 DIAGNOSIS — R93.89 ABNORMAL CHEST X-RAY: ICD-10-CM

## 2023-12-01 PROCEDURE — 71250 CT THORAX DX C-: CPT | Mod: TC,PO

## 2023-12-01 PROCEDURE — 71250 CT CHEST WITHOUT CONTRAST: ICD-10-PCS | Mod: 26,,, | Performed by: RADIOLOGY

## 2023-12-01 PROCEDURE — 71250 CT THORAX DX C-: CPT | Mod: 26,,, | Performed by: RADIOLOGY

## 2023-12-04 ENCOUNTER — TELEPHONE (OUTPATIENT)
Dept: PRIMARY CARE CLINIC | Facility: CLINIC | Age: 63
End: 2023-12-04
Payer: MEDICARE

## 2023-12-04 DIAGNOSIS — R91.1 INCIDENTAL LUNG NODULE: Primary | ICD-10-CM

## 2023-12-04 NOTE — TELEPHONE ENCOUNTER
----- Message from RT Anders sent at 12/4/2023  8:37 AM CST -----  Regarding: Incidental Finding  Good morning. The patient had a CT Chest without contrast and the radiologist is recommending another CT Chest without contrast within 3-6 months due to the incidental finding of a suspicious nodule. Could you please follow up with the patient? Thank you.

## 2023-12-05 ENCOUNTER — OFFICE VISIT (OUTPATIENT)
Dept: PRIMARY CARE CLINIC | Facility: CLINIC | Age: 63
End: 2023-12-05
Payer: MEDICARE

## 2023-12-05 DIAGNOSIS — R91.8 ABNORMAL CT SCAN OF LUNG: Primary | ICD-10-CM

## 2023-12-05 PROCEDURE — 3044F PR MOST RECENT HEMOGLOBIN A1C LEVEL <7.0%: ICD-10-PCS | Mod: CPTII,95,, | Performed by: NURSE PRACTITIONER

## 2023-12-05 PROCEDURE — 3066F PR DOCUMENTATION OF TREATMENT FOR NEPHROPATHY: ICD-10-PCS | Mod: CPTII,95,, | Performed by: NURSE PRACTITIONER

## 2023-12-05 PROCEDURE — 2023F PR DILATED RETINAL EXAM W/O EVID OF RETINOPATHY: ICD-10-PCS | Mod: CPTII,95,, | Performed by: NURSE PRACTITIONER

## 2023-12-05 PROCEDURE — 1159F MED LIST DOCD IN RCRD: CPT | Mod: CPTII,95,, | Performed by: NURSE PRACTITIONER

## 2023-12-05 PROCEDURE — 99213 OFFICE O/P EST LOW 20 MIN: CPT | Mod: 95,,, | Performed by: NURSE PRACTITIONER

## 2023-12-05 PROCEDURE — 99213 PR OFFICE/OUTPT VISIT, EST, LEVL III, 20-29 MIN: ICD-10-PCS | Mod: 95,,, | Performed by: NURSE PRACTITIONER

## 2023-12-05 PROCEDURE — 1160F RVW MEDS BY RX/DR IN RCRD: CPT | Mod: CPTII,95,, | Performed by: NURSE PRACTITIONER

## 2023-12-05 PROCEDURE — 4010F ACE/ARB THERAPY RXD/TAKEN: CPT | Mod: CPTII,95,, | Performed by: NURSE PRACTITIONER

## 2023-12-05 PROCEDURE — 1160F PR REVIEW ALL MEDS BY PRESCRIBER/CLIN PHARMACIST DOCUMENTED: ICD-10-PCS | Mod: CPTII,95,, | Performed by: NURSE PRACTITIONER

## 2023-12-05 PROCEDURE — 3061F NEG MICROALBUMINURIA REV: CPT | Mod: CPTII,95,, | Performed by: NURSE PRACTITIONER

## 2023-12-05 PROCEDURE — 3066F NEPHROPATHY DOC TX: CPT | Mod: CPTII,95,, | Performed by: NURSE PRACTITIONER

## 2023-12-05 PROCEDURE — 4010F PR ACE/ARB THEARPY RXD/TAKEN: ICD-10-PCS | Mod: CPTII,95,, | Performed by: NURSE PRACTITIONER

## 2023-12-05 PROCEDURE — 2023F DILAT RTA XM W/O RTNOPTHY: CPT | Mod: CPTII,95,, | Performed by: NURSE PRACTITIONER

## 2023-12-05 PROCEDURE — 3061F PR NEG MICROALBUMINURIA RESULT DOCUMENTED/REVIEW: ICD-10-PCS | Mod: CPTII,95,, | Performed by: NURSE PRACTITIONER

## 2023-12-05 PROCEDURE — 1159F PR MEDICATION LIST DOCUMENTED IN MEDICAL RECORD: ICD-10-PCS | Mod: CPTII,95,, | Performed by: NURSE PRACTITIONER

## 2023-12-05 PROCEDURE — 3044F HG A1C LEVEL LT 7.0%: CPT | Mod: CPTII,95,, | Performed by: NURSE PRACTITIONER

## 2023-12-05 RX ORDER — CETIRIZINE HYDROCHLORIDE 10 MG/1
10 TABLET ORAL DAILY
Qty: 30 TABLET | Refills: 1 | Status: SHIPPED | OUTPATIENT
Start: 2023-12-05 | End: 2024-01-31 | Stop reason: SDUPTHER

## 2023-12-06 PROBLEM — R91.8 ABNORMAL CT SCAN OF LUNG: Status: ACTIVE | Noted: 2023-12-06

## 2023-12-06 NOTE — PROGRESS NOTES
Primary Care Telemedicine Note  The patient location is:  Patient Home   The chief complaint leading to consultation is: discuss CT scan findings   Total time spent with patient: 15 minutes     Visit type: Virtual visit with synchronous audio and video  Each patient to whom he or she provides medical services by telemedicine is:  (1) informed of the relationship between the physician and patient and the respective role of any other health care provider with respect to management of the patient; and (2) notified that he or she may decline to receive medical services by telemedicine and may withdraw from such care at any time.      Assessment/Plan:    Problem List Items Addressed This Visit          Pulmonary    Abnormal CT scan of lung - Primary    Overview     CT chest showed   3 mm pulmonary nodule in the right upper lobe.   Recommend repeat CT Chest without contrast at 3-6 months             Follow up in about 3 months (around 3/5/2024).    Dayan Jimenez NP    _____________________________________________________________________________________________________________________________________________________    CC: discuss CT scan findings     HPI:    Patient is an established patient who presents today via virtual visit to discuss CT lung scan findings.    Past Medical History:  Past Medical History:   Diagnosis Date    Arthritis, lumbar spine     hands    Diabetes mellitus     General anesthetics causing adverse effect in therapeutic use     following breast rediuct, hard time awakening  also had anxiety rxn to lidocain in dental ofc    GERD (gastroesophageal reflux disease)     Hypothyroidism 10/08/2014    Maternal anesthesia complication     epidural for 1st child went up instead of down; required intubation    Obesity (BMI 30-39.9) 10/08/2014    Thyroid disease     Thyroid nodule 10/08/2014     Past Surgical History:   Procedure Laterality Date    BREAST BIOPSY Left     b9    BREAST SURGERY  1995     Reduction cause of a mass in left breast    c-sections x2       SECTION  3/26/81 & 85    Birth of two girls    COLONOSCOPY  2012         COLONOSCOPY N/A 2018    Procedure: COLONOSCOPY;  Surgeon: Francisco Mcclain MD;  Location: [facility] ENDO;  Service: Endoscopy;  Laterality: N/A;    COLONOSCOPY N/A 10/24/2023    Procedure: COLONOSCOPY;  Surgeon: Francisco Mcclain MD;  Location: [facility] ENDO;  Service: Endoscopy;  Laterality: N/A;    hysteroscopy with polypectomy      INCISIONAL BREAST BIOPSY Left     benign; done at same time as reduction    TOTAL REDUCTION MAMMOPLASTY Bilateral      Review of patient's allergies indicates:   Allergen Reactions    Duricef [cefadroxil] Hives    Eggs [egg derived] Anaphylaxis and Hives    Cat/feline products Itching    Dairycare [lactobacillus acidoph-lactase] Itching    Dog dander Itching    Wheat containing prod Hives     Social History     Tobacco Use    Smoking status: Never    Smokeless tobacco: Never   Substance Use Topics    Alcohol use: Yes     Comment: 2/ month    Drug use: No     Family History   Problem Relation Age of Onset    Brain cancer Mother     Early death Mother 51        Passed at 51    Cancer Mother         Brain tumor    Arthritis Mother     Diabetes Father         Type 2    Cirrhosis Father     Cancer Father         Bone    COPD Father         Smoker    Early death Father         Passed at 64    Heart disease Sister         Congestive heart failure (passed 18    Hypertension Sister     Kidney disease Sister         Doc removed when she was a child    Hypertension Sister     Depression Sister         Due to loss of her 27 year old son    Early death Sister         Pulmonary hypertension, cirrhosis of the liver(passed 2007   Age 57    Breast cancer Maternal Aunt 30    Breast cancer Paternal Aunt 40    Breast cancer Paternal Aunt 40    Heart disease Brother         Heart attack    Hypertension Brother     Heart disease Brother          Heart  blockage    Heart disease Brother         Heart attack (passed)    Diabetes Brother     Macular degeneration Brother     Ovarian cancer Neg Hx      Current Outpatient Medications on File Prior to Visit   Medication Sig Dispense Refill    acetaminophen (TYLENOL) 325 MG tablet Take 325 mg by mouth every 6 (six) hours as needed for Pain.      blood-glucose meter Misc Use as directed 1 each prn    calcium carbonate (OS-ISH) 600 mg calcium (1,500 mg) Tab Take 1,200 mg by mouth 2 (two) times daily with meals.      empagliflozin-metformin (SYNJARDY XR) 10-1,000 mg TBph Take 1 tablet by mouth once daily. 30 tablet 5    famotidine (PEPCID) 40 MG tablet TAKE 1 TABLET (40 MG TOTAL) BY MOUTH ONCE DAILY. 30 tablet 11    FLUAD QUAD 2023-24,65Y UP,,PF, 60 mcg (15 mcg x 4)/0.5 mL Syrg       fluticasone propionate (FLONASE) 50 mcg/actuation nasal spray 2 sprays (100 mcg total) by Each Nostril route once daily. (Patient not taking: Reported on 10/31/2023) 16 g 11    fluticasone propionate (FLONASE) 50 mcg/actuation nasal spray 1 spray (50 mcg total) by Each Nostril route once daily. 18.2 mL 0    hydrOXYzine HCL (ATARAX) 25 MG tablet Take 25 mg by mouth 3 (three) times daily.      levothyroxine (SYNTHROID) 50 MCG tablet TAKE 1 TABLET (50 MCG TOTAL) BY MOUTH ONCE DAILY 90 tablet 1    losartan (COZAAR) 25 MG tablet TAKE 1 TABLET (25 MG TOTAL) BY MOUTH ONCE DAILY 90 tablet 3    meclizine (ANTIVERT) 25 mg tablet Take 1 tablet (25 mg total) by mouth 3 (three) times daily as needed for Dizziness. 30 tablet 0    metFORMIN (GLUCOPHAGE) 1000 MG tablet Take 1,000 mg by mouth 2 (two) times daily with meals.      simvastatin (ZOCOR) 10 MG tablet Take 1 tablet (10 mg total) by mouth every evening. 90 tablet 3    TRUE METRIX GLUCOSE TEST STRIP Strp USE 1 STRIP ONCE DAILY TO TEST BLOOD GLUCOSE (50 DAY SUPPLY) 100 each 3    TRUEPLUS LANCETS 33 gauge Misc USE AS DIRECTED TO TEST BLOOD SUGAR ONCE DAILY FOR UP  USES 100 each 2    VITAMIN  D2 1,250 mcg (50,000 unit) capsule TAKE 1 CAPSULE (50,000 UNITS TOTAL) BY MOUTH TWICE A WEEK. 24 capsule 3     No current facility-administered medications on file prior to visit.       Review of Systems   HENT:  Negative for hearing loss.         +hoarseness   Eyes:  Negative for discharge.   Respiratory:  Negative for wheezing.    Cardiovascular:  Negative for chest pain and palpitations.   Gastrointestinal:  Negative for blood in stool, constipation, diarrhea and vomiting.   Genitourinary:  Negative for dysuria and hematuria.   Musculoskeletal:  Negative for neck pain.   Neurological:  Negative for weakness and headaches.   Endo/Heme/Allergies:  Negative for polydipsia.         Physical Exam   @thptenteredbppulse@  @thptenteredglucose@    Physical Exam  Constitutional:       Appearance: She is well-developed.   HENT:      Head: Normocephalic and atraumatic.   Pulmonary:      Effort: Pulmonary effort is normal.   Musculoskeletal:         General: Normal range of motion.      Cervical back: Normal range of motion.   Neurological:      Mental Status: She is alert and oriented to person, place, and time.   Psychiatric:         Behavior: Behavior normal.         Thought Content: Thought content normal.         Judgment: Judgment normal.         The patient's Health Maintenance was reviewed and the following appears to be due at this time:  Health Maintenance Due   Topic Date Due    Pneumococcal Vaccines (Age 65+) (1 - PCV) Never done    HIV Screening  Never done    Shingles Vaccine (1 of 2) Never done    RSV Vaccine (Age 60+ and Pregnant patients) (1 - 1-dose 60+ series) Never done    Eye Exam  02/13/2024

## 2023-12-15 ENCOUNTER — PATIENT MESSAGE (OUTPATIENT)
Dept: PRIMARY CARE CLINIC | Facility: CLINIC | Age: 63
End: 2023-12-15
Payer: MEDICARE

## 2023-12-18 RX ORDER — HYDROCORTISONE 25 MG/G
CREAM TOPICAL 3 TIMES DAILY
Qty: 20 G | Refills: 0 | Status: SHIPPED | OUTPATIENT
Start: 2023-12-18 | End: 2024-02-20

## 2024-01-09 ENCOUNTER — OFFICE VISIT (OUTPATIENT)
Dept: DIABETES | Facility: CLINIC | Age: 64
End: 2024-01-09
Payer: MEDICARE

## 2024-01-09 VITALS
HEART RATE: 66 BPM | SYSTOLIC BLOOD PRESSURE: 106 MMHG | BODY MASS INDEX: 26.22 KG/M2 | DIASTOLIC BLOOD PRESSURE: 66 MMHG | HEIGHT: 61 IN | WEIGHT: 138.88 LBS

## 2024-01-09 DIAGNOSIS — E11.9 TYPE 2 DIABETES MELLITUS WITHOUT COMPLICATION, WITHOUT LONG-TERM CURRENT USE OF INSULIN: Primary | ICD-10-CM

## 2024-01-09 LAB — GLUCOSE SERPL-MCNC: 121 MG/DL (ref 70–110)

## 2024-01-09 PROCEDURE — 99214 OFFICE O/P EST MOD 30 MIN: CPT | Mod: S$GLB,,, | Performed by: NURSE PRACTITIONER

## 2024-01-09 PROCEDURE — 99999 PR PBB SHADOW E&M-EST. PATIENT-LVL IV: CPT | Mod: PBBFAC,,, | Performed by: NURSE PRACTITIONER

## 2024-01-09 PROCEDURE — 82962 GLUCOSE BLOOD TEST: CPT | Mod: S$GLB,,, | Performed by: NURSE PRACTITIONER

## 2024-01-09 NOTE — PATIENT INSTRUCTIONS
PATIENT INSTRUCTIONS     Lifestyle modification with well balanced diet and at least 30 minutes of physical activity daily recommended.   Labs ordered and will be scheduled by [facility] staff.       Continue  Synjardy XR  mg by mouth daily.      Check blood sugar twice daily. Every morning when you wake up and 1-2 hours after main meal of the day. Keep log and upload to [email or web address] prior to each visit.   Blood Sugar Goals:       Fastin-130.       1-2 hours after a meal: Less than 180.
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
## 2024-01-31 ENCOUNTER — PATIENT MESSAGE (OUTPATIENT)
Dept: PRIMARY CARE CLINIC | Facility: CLINIC | Age: 64
End: 2024-01-31
Payer: MEDICARE

## 2024-02-01 ENCOUNTER — PATIENT MESSAGE (OUTPATIENT)
Dept: PRIMARY CARE CLINIC | Facility: CLINIC | Age: 64
End: 2024-02-01
Payer: MEDICARE

## 2024-02-01 RX ORDER — CETIRIZINE HYDROCHLORIDE 10 MG/1
10 TABLET ORAL DAILY
Qty: 30 TABLET | Refills: 1 | Status: SHIPPED | OUTPATIENT
Start: 2024-02-01

## 2024-02-01 NOTE — TELEPHONE ENCOUNTER
Refill Routing Note   Medication(s) are not appropriate for processing by [facility] for the following reason(s):        Non-participating provider    [facility] action(s):  Route               Appointments  past 12m or future 3m with PCP    Date Provider   Last Visit   12/5/2023 Dayan Jimenez NP   Next Visit   Visit date not found Dayan Jimenez NP   ED visits in past 90 days: 0        Note composed:9:23 PM 01/31/2024

## 2024-02-19 ENCOUNTER — PATIENT MESSAGE (OUTPATIENT)
Dept: PRIMARY CARE CLINIC | Facility: CLINIC | Age: 64
End: 2024-02-19
Payer: MEDICARE

## 2024-02-20 ENCOUNTER — OFFICE VISIT (OUTPATIENT)
Dept: PRIMARY CARE CLINIC | Facility: CLINIC | Age: 64
End: 2024-02-20
Payer: MEDICARE

## 2024-02-20 VITALS
HEIGHT: 61 IN | HEART RATE: 72 BPM | BODY MASS INDEX: 25.94 KG/M2 | WEIGHT: 137.38 LBS | OXYGEN SATURATION: 98 % | SYSTOLIC BLOOD PRESSURE: 128 MMHG | DIASTOLIC BLOOD PRESSURE: 78 MMHG

## 2024-02-20 DIAGNOSIS — Z11.4 SCREENING FOR HIV (HUMAN IMMUNODEFICIENCY VIRUS): ICD-10-CM

## 2024-02-20 DIAGNOSIS — R21 RASH: Primary | ICD-10-CM

## 2024-02-20 PROCEDURE — 99999 PR PBB SHADOW E&M-EST. PATIENT-LVL III: CPT | Mod: PBBFAC,,, | Performed by: NURSE PRACTITIONER

## 2024-02-20 PROCEDURE — 99213 OFFICE O/P EST LOW 20 MIN: CPT | Mod: S$GLB,,, | Performed by: NURSE PRACTITIONER

## 2024-02-20 RX ORDER — HYDROCORTISONE 25 MG/G
CREAM TOPICAL 2 TIMES DAILY
Qty: 20 G | Refills: 0 | Status: SHIPPED | OUTPATIENT
Start: 2024-02-20 | End: 2024-05-21

## 2024-02-20 NOTE — PROGRESS NOTES
Assessment/Plan:    Problem List Items Addressed This Visit    None  Visit Diagnoses       Rash    -  Primary    Relevant Medications    hydrocortisone 2.5 % cream    Screening for HIV (human immunodeficiency virus)        Relevant Orders    HIV 1/2 Ag/Ab (4th Gen)            Follow up if symptoms worsen or fail to improve.    Dayan Jimenez NP  _____________________________________________________________________________________________________________________________________________________    CC: rash     HPI:    Patient is in clinic today as an established patient. Rash: Patient complains of rash involving the abdomen, back, and breast . Rash started 3 weeks ago. Discomfort associated with rash: is pruritic.  Associated symptoms: none. Denies: none. Patient has not had previous evaluation of rash. Patient has not had previous treatment.  Patient has not had contacts with similar rash. Patient has not identified precipitant. Patient has not had new exposures (soaps, lotions, laundry detergents, foods, medications, plants, insects or animals.) Patient reports she has been staying at [facility] in [place] for the past 10 days and sleeping on a chair. She did sleep over at her nephews house while in [place] as well.     No other new complaints today.  Remaining chronic conditions have been reviewed and remain stable. Further detail as stated above.     HM reviewed.     No recent changes to medical/surgical history.    Current Outpatient Medications on File Prior to Visit   Medication Sig Dispense Refill    acetaminophen (TYLENOL) 325 MG tablet Take 325 mg by mouth every 6 (six) hours as needed for Pain.      blood-glucose meter Misc Use as directed 1 each prn    calcium carbonate (OS-ISH) 600 mg calcium (1,500 mg) Tab Take 1,200 mg by mouth 2 (two) times daily with meals.      cetirizine (ZYRTEC) 10 MG tablet Take 1 tablet (10 mg total) by mouth once daily. 30 tablet 1    empagliflozin-metformin (SYNJARDY XR)  10-1,000 mg TBph Take 1 tablet by mouth once daily. 30 tablet 5    fluticasone propionate (FLONASE) 50 mcg/actuation nasal spray 1 spray (50 mcg total) by Each Nostril route once daily. 18.2 mL 0    hydrOXYzine HCL (ATARAX) 25 MG tablet Take 25 mg by mouth 3 (three) times daily.      levothyroxine (SYNTHROID) 50 MCG tablet TAKE 1 TABLET (50 MCG TOTAL) BY MOUTH ONCE DAILY 90 tablet 1    losartan (COZAAR) 25 MG tablet TAKE 1 TABLET (25 MG TOTAL) BY MOUTH ONCE DAILY 90 tablet 3    meclizine (ANTIVERT) 25 mg tablet Take 1 tablet (25 mg total) by mouth 3 (three) times daily as needed for Dizziness. 30 tablet 0    simvastatin (ZOCOR) 10 MG tablet Take 1 tablet (10 mg total) by mouth every evening. 90 tablet 3    TRUE METRIX GLUCOSE TEST STRIP Strp USE 1 STRIP ONCE DAILY TO TEST BLOOD GLUCOSE (50 DAY SUPPLY) 100 each 3    TRUEPLUS LANCETS 33 gauge Misc USE AS DIRECTED TO TEST BLOOD SUGAR ONCE DAILY FOR UP  USES 100 each 2    VITAMIN D2 1,250 mcg (50,000 unit) capsule TAKE 1 CAPSULE (50,000 UNITS TOTAL) BY MOUTH TWICE A WEEK. 24 capsule 3    [DISCONTINUED] hydrocortisone 2.5 % cream Apply topically 3 (three) times daily. 20 g 0    famotidine (PEPCID) 40 MG tablet TAKE 1 TABLET (40 MG TOTAL) BY MOUTH ONCE DAILY. 30 tablet 11    [DISCONTINUED] FLUAD QUAD 2023-24,65Y UP,,PF, 60 mcg (15 mcg x 4)/0.5 mL Syrg       [DISCONTINUED] fluticasone propionate (FLONASE) 50 mcg/actuation nasal spray 2 sprays (100 mcg total) by Each Nostril route once daily. (Patient not taking: Reported on 10/31/2023) 16 g 11     No current facility-administered medications on file prior to visit.       Review of Systems   Constitutional: Negative.    HENT: Negative.     Eyes: Negative.    Respiratory: Negative.     Cardiovascular: Negative.    Gastrointestinal: Negative.    Endocrine: Negative.    Genitourinary: Negative.    Musculoskeletal: Negative.    Skin:  Positive for rash.   Allergic/Immunologic: Negative.    Neurological: Negative.   "  Hematological: Negative.    Psychiatric/Behavioral: Negative.         Vitals:    02/20/24 0805   BP: 128/78   Pulse: 72   SpO2: 98%   Weight: 62.3 kg (137 lb 5.6 oz)   Height: 5' 1" (1.549 m)       Wt Readings from Last 3 Encounters:   02/20/24 62.3 kg (137 lb 5.6 oz)   01/09/24 63 kg (138 lb 14.2 oz)   11/13/23 63.6 kg (140 lb 5.2 oz)       Physical Exam  Vitals and nursing note reviewed.   Constitutional:       Appearance: She is well-developed.   HENT:      Head: Normocephalic and atraumatic.   Eyes:      Conjunctiva/sclera: Conjunctivae normal.   Cardiovascular:      Rate and Rhythm: Normal rate and regular rhythm.      Heart sounds: Normal heart sounds.   Pulmonary:      Effort: Pulmonary effort is normal.      Breath sounds: Normal breath sounds.   Musculoskeletal:         General: Normal range of motion.      Cervical back: Normal range of motion and neck supple.   Skin:     General: Skin is warm and dry.      Findings: Rash present.      Comments: Small red bumps noted to abdomen, breast, and back.    Neurological:      Mental Status: She is alert and oriented to person, place, and time.   Psychiatric:         Behavior: Behavior normal.         Thought Content: Thought content normal.         Judgment: Judgment normal.         Health Maintenance   Topic Date Due    Shingles Vaccine (1 of 2) Never done    Eye Exam  02/13/2024    Hemoglobin A1c  03/22/2024    Mammogram  07/17/2024    Lipid Panel  08/21/2024    Foot Exam  09/25/2024    Low Dose Statin  02/20/2025    DEXA Scan  08/21/2026    TETANUS VACCINE  07/13/2033    Colorectal Cancer Screening  10/24/2033    Hepatitis C Screening  Completed       "

## 2024-02-23 ENCOUNTER — PATIENT MESSAGE (OUTPATIENT)
Dept: PRIMARY CARE CLINIC | Facility: CLINIC | Age: 64
End: 2024-02-23
Payer: MEDICARE

## 2024-02-23 RX ORDER — PERMETHRIN 50 MG/G
CREAM TOPICAL ONCE
Qty: 60 G | Refills: 0 | Status: SHIPPED | OUTPATIENT
Start: 2024-02-23 | End: 2024-02-23

## 2024-02-23 NOTE — TELEPHONE ENCOUNTER
I have signed for the following orders AND/OR meds.  Please call the patient and ask the patient to schedule the testing AND/OR inform about any medications that were sent. Medications have been sent to pharmacy listed below      No orders of the defined types were placed in this encounter.      Medications Ordered This Encounter   Medications    permethrin (ELIMITE) 5 % cream     Sig: Apply topically once. Apply from chin to toes and wash off after 8 hrs for 1 dose     Dispense:  60 g     Refill:  0         [facility] - [address] [address]  Phone: [phone] Fax: [phone]

## 2024-03-05 LAB
LEFT EYE DM RETINOPATHY: NEGATIVE
RIGHT EYE DM RETINOPATHY: NEGATIVE

## 2024-03-06 ENCOUNTER — PATIENT MESSAGE (OUTPATIENT)
Dept: PRIMARY CARE CLINIC | Facility: CLINIC | Age: 64
End: 2024-03-06
Payer: MEDICARE

## 2024-03-06 ENCOUNTER — PATIENT MESSAGE (OUTPATIENT)
Dept: PRIMARY CARE CLINIC | Facility: CLINIC | Age: 64
End: 2024-03-06

## 2024-03-06 ENCOUNTER — OFFICE VISIT (OUTPATIENT)
Dept: PRIMARY CARE CLINIC | Facility: CLINIC | Age: 64
End: 2024-03-06
Payer: MEDICARE

## 2024-03-06 VITALS
OXYGEN SATURATION: 97 % | WEIGHT: 135.25 LBS | HEIGHT: 61 IN | BODY MASS INDEX: 25.54 KG/M2 | SYSTOLIC BLOOD PRESSURE: 134 MMHG | HEART RATE: 84 BPM | DIASTOLIC BLOOD PRESSURE: 80 MMHG
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
## 2024-03-06 PROCEDURE — 99214 OFFICE O/P EST MOD 30 MIN: CPT | Mod: S$GLB,,, | Performed by: NURSE PRACTITIONER

## 2024-03-06 PROCEDURE — 99999 PR PBB SHADOW E&M-EST. PATIENT-LVL V: CPT | Mod: PBBFAC,,, | Performed by: NURSE PRACTITIONER

## 2024-03-06 RX ORDER — AMOXICILLIN AND CLAVULANATE POTASSIUM 875; 125 MG/1; MG/1
1 TABLET, FILM COATED ORAL EVERY 12 HOURS
COMMUNITY
Start: 2024-03-01 | End: 2024-05-21

## 2024-03-06 RX ORDER — MUPIROCIN 20 MG/G
OINTMENT TOPICAL 3 TIMES DAILY
COMMUNITY
Start: 2024-03-01

## 2024-03-06 RX ORDER — HYDROCODONE BITARTRATE AND ACETAMINOPHEN 5; 325 MG/1; MG/1
1 TABLET ORAL EVERY 6 HOURS PRN
COMMUNITY
Start: 2024-03-01 | End: 2024-05-21

## 2024-03-06 NOTE — PROGRESS NOTES
Assessment/Plan:    Problem List Items Addressed This Visit    None  Visit Diagnoses       Cat bite, sequela    -  Primary    Relevant Orders    Ambulatory referral/consult to Home Health        -Continue current antibiotic regimen as prescribed   -Keep area clean and dry  -Apply bactroban ointment as directed   -ER precautions for severe or worsening of symptoms      Follow up if symptoms worsen or fail to improve.    Dayan Jimenez NP  _____________________________________________________________________________________________________________________________________________________    CC:  ER follow-up for Cat bite    HPI:    Patient is in clinic today as an established patient.    Patient is in clinic today as an established patient here for hospital follow-up. Patient reports her cat attacked her on Friday. She was seen at [place] emergency department and was given a prescription for Augmentin.  Patient was bitten on left shin and right lower extremity above ankle.  Associated symptoms include swelling, redness, and drainage.  Symptoms are not improving.      No other new complaints today.  Remaining chronic conditions have been reviewed and remain stable. Further detail as stated above.     HM reviewed.     No recent changes to medical/surgical history.    Current Outpatient Medications on File Prior to Visit   Medication Sig Dispense Refill    acetaminophen (TYLENOL) 325 MG tablet Take 325 mg by mouth every 6 (six) hours as needed for Pain.      amoxicillin-clavulanate 875-125mg (AUGMENTIN) 875-125 mg per tablet Take 1 tablet by mouth every 12 (twelve) hours.      blood-glucose meter Misc Use as directed 1 each prn    calcium carbonate (OS-ISH) 600 mg calcium (1,500 mg) Tab Take 1,200 mg by mouth 2 (two) times daily with meals.      cetirizine (ZYRTEC) 10 MG tablet Take 1 tablet (10 mg total) by mouth once daily. 30 tablet 1    empagliflozin-metformin (SYNJARDY XR) 10-1,000 mg TBph Take 1 tablet by mouth  once daily. 30 tablet 5    fluticasone propionate (FLONASE) 50 mcg/actuation nasal spray 1 spray (50 mcg total) by Each Nostril route once daily. 18.2 mL 0    HYDROcodone-acetaminophen (NORCO) 5-325 mg per tablet Take 1 tablet by mouth every 6 (six) hours as needed.      hydrocortisone 2.5 % cream Apply topically 2 (two) times daily. 20 g 0    hydrOXYzine HCL (ATARAX) 25 MG tablet Take 25 mg by mouth 3 (three) times daily.      levothyroxine (SYNTHROID) 50 MCG tablet TAKE 1 TABLET (50 MCG TOTAL) BY MOUTH ONCE DAILY 90 tablet 1    losartan (COZAAR) 25 MG tablet TAKE 1 TABLET (25 MG TOTAL) BY MOUTH ONCE DAILY 90 tablet 3    meclizine (ANTIVERT) 25 mg tablet Take 1 tablet (25 mg total) by mouth 3 (three) times daily as needed for Dizziness. 30 tablet 0    mupirocin (BACTROBAN) 2 % ointment Apply topically 3 (three) times daily.      simvastatin (ZOCOR) 10 MG tablet Take 1 tablet (10 mg total) by mouth every evening. 90 tablet 3    TRUE METRIX GLUCOSE TEST STRIP Strp USE 1 STRIP ONCE DAILY TO TEST BLOOD GLUCOSE (50 DAY SUPPLY) 100 each 3    TRUEPLUS LANCETS 33 gauge Misc USE AS DIRECTED TO TEST BLOOD SUGAR ONCE DAILY FOR UP  USES 100 each 2    VITAMIN D2 1,250 mcg (50,000 unit) capsule TAKE 1 CAPSULE (50,000 UNITS TOTAL) BY MOUTH TWICE A WEEK. 24 capsule 3    famotidine (PEPCID) 40 MG tablet TAKE 1 TABLET (40 MG TOTAL) BY MOUTH ONCE DAILY. 30 tablet 11     No current facility-administered medications on file prior to visit.       Review of Systems   Constitutional: Negative.    HENT: Negative.     Eyes: Negative.    Respiratory: Negative.     Cardiovascular: Negative.    Gastrointestinal: Negative.    Endocrine: Negative.    Genitourinary: Negative.    Musculoskeletal: Negative.    Skin:  Positive for wound.   Allergic/Immunologic: Negative.    Neurological: Negative.    Hematological: Negative.    Psychiatric/Behavioral: Negative.         Vitals:    03/06/24 1418   BP: 134/80   Pulse: 84   SpO2: 97%   Weight:  "61.4 kg (135 lb 4 oz)   Height: 5' 1" (1.549 m)       Wt Readings from Last 3 Encounters:   03/06/24 61.4 kg (135 lb 4 oz)   02/20/24 62.3 kg (137 lb 5.6 oz)   01/09/24 63 kg (138 lb 14.2 oz)       Physical Exam  Vitals and nursing note reviewed.   Constitutional:       Appearance: She is well-developed.   HENT:      Head: Normocephalic and atraumatic.   Eyes:      Conjunctiva/sclera: Conjunctivae normal.      Pupils: Pupils are equal, round, and reactive to light.   Cardiovascular:      Rate and Rhythm: Normal rate and regular rhythm.      Heart sounds: Normal heart sounds.   Pulmonary:      Effort: Pulmonary effort is normal.      Breath sounds: Normal breath sounds.   Musculoskeletal:         General: Normal range of motion.      Cervical back: Normal range of motion and neck supple.   Skin:     General: Skin is warm and dry.      Findings: Wound present.      Comments: See pics.   Neurological:      Mental Status: She is alert and oriented to person, place, and time.   Psychiatric:         Behavior: Behavior normal.         Thought Content: Thought content normal.         Judgment: Judgment normal.               Health Maintenance   Topic Date Due    Shingles Vaccine (1 of 2) Never done    Eye Exam  02/13/2024    Hemoglobin A1c  03/22/2024    Mammogram  07/17/2024    Lipid Panel  08/21/2024    Foot Exam  09/25/2024    Low Dose Statin  02/20/2025    DEXA Scan  08/21/2026    Colorectal Cancer Screening  10/24/2033    TETANUS VACCINE  02/28/2034    Hepatitis C Screening  Completed       "

## 2024-03-07 ENCOUNTER — PATIENT OUTREACH (OUTPATIENT)
Dept: ADMINISTRATIVE | Facility: HOSPITAL | Age: 64
End: 2024-03-07
Payer: MEDICARE

## 2024-03-11 ENCOUNTER — PATIENT MESSAGE (OUTPATIENT)
Dept: PRIMARY CARE CLINIC | Facility: CLINIC | Age: 64
End: 2024-03-11
Payer: MEDICARE
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
## 2024-03-11 RX ORDER — AMOXICILLIN AND CLAVULANATE POTASSIUM 875; 125 MG/1; MG/1
1 TABLET, FILM COATED ORAL 2 TIMES DAILY
Qty: 8 TABLET | Refills: 0 | Status: SHIPPED | OUTPATIENT
Start: 2024-03-11 | End: 2024-03-15

## 2024-03-20 ENCOUNTER — PATIENT MESSAGE (OUTPATIENT)
Dept: PRIMARY CARE CLINIC | Facility: CLINIC | Age: 64
End: 2024-03-20
Payer: MEDICARE

## 2024-03-20 RX ORDER — FLUCONAZOLE 150 MG/1
150 TABLET ORAL DAILY
Qty: 1 TABLET | Refills: 1 | Status: SHIPPED | OUTPATIENT
Start: 2024-03-20 | End: 2024-03-22

## 2024-03-21 ENCOUNTER — LAB VISIT (OUTPATIENT)
Dept: LAB | Facility: HOSPITAL | Age: 64
End: 2024-03-21
Attending: NURSE PRACTITIONER
Payer: MEDICARE

## 2024-03-21 DIAGNOSIS — L50.8 AQUAGENIC URTICARIA: ICD-10-CM

## 2024-03-21 LAB
ALBUMIN SERPL BCP-MCNC: 4.2 G/DL (ref 3.5–5.2)
ALP SERPL-CCNC: 65 U/L (ref 55–135)
ALT SERPL W/O P-5'-P-CCNC: 14 U/L (ref 10–44)
ANION GAP SERPL CALC-SCNC: 9 MMOL/L (ref 8–16)
AST SERPL-CCNC: 22 U/L (ref 10–40)
BASOPHILS # BLD AUTO: 0.04 K/UL (ref 0–0.2)
BASOPHILS NFR BLD: 0.7 % (ref 0–1.9)
BILIRUB SERPL-MCNC: 1 MG/DL (ref 0.1–1)
BUN SERPL-MCNC: 16 MG/DL (ref 8–23)
CALCIUM SERPL-MCNC: 9.9 MG/DL (ref 8.7–10.5)
CHLORIDE SERPL-SCNC: 103 MMOL/L (ref 95–110)
CO2 SERPL-SCNC: 27 MMOL/L (ref 23–29)
CREAT SERPL-MCNC: 1 MG/DL (ref 0.5–1.4)
DIFFERENTIAL METHOD BLD: NORMAL
EOSINOPHIL # BLD AUTO: 0.2 K/UL (ref 0–0.5)
EOSINOPHIL NFR BLD: 3.1 % (ref 0–8)
ERYTHROCYTE [DISTWIDTH] IN BLOOD BY AUTOMATED COUNT: 13 % (ref 11.5–14.5)
EST. GFR  (NO RACE VARIABLE): >60 ML/MIN/1.73 M^2
GLUCOSE SERPL-MCNC: 122 MG/DL (ref 70–110)
HCT VFR BLD AUTO: 44 % (ref 37–48.5)
HGB BLD-MCNC: 14.5 G/DL (ref 12–16)
IMM GRANULOCYTES # BLD AUTO: 0.01 K/UL (ref 0–0.04)
IMM GRANULOCYTES NFR BLD AUTO: 0.2 % (ref 0–0.5)
LYMPHOCYTES # BLD AUTO: 2.5 K/UL (ref 1–4.8)
LYMPHOCYTES NFR BLD: 45.2 % (ref 18–48)
MCH RBC QN AUTO: 28.5 PG (ref 27–31)
MCHC RBC AUTO-ENTMCNC: 33 G/DL (ref 32–36)
MCV RBC AUTO: 86 FL (ref 82–98)
MONOCYTES # BLD AUTO: 0.4 K/UL (ref 0.3–1)
MONOCYTES NFR BLD: 6.7 % (ref 4–15)
NEUTROPHILS # BLD AUTO: 2.4 K/UL (ref 1.8–7.7)
NEUTROPHILS NFR BLD: 44.1 % (ref 38–73)
NRBC BLD-RTO: 0 /100 WBC
PLATELET # BLD AUTO: 242 K/UL (ref 150–450)
PMV BLD AUTO: 10 FL (ref 9.2–12.9)
POTASSIUM SERPL-SCNC: 4.3 MMOL/L (ref 3.5–5.1)
PROT SERPL-MCNC: 7 G/DL (ref 6–8.4)
RBC # BLD AUTO: 5.09 M/UL (ref 4–5.4)
SODIUM SERPL-SCNC: 139 MMOL/L (ref 136–145)
T4 FREE SERPL-MCNC: 1.08 NG/DL (ref 0.71–1.51)
TSH SERPL DL<=0.005 MIU/L-ACNC: 2.3 UIU/ML (ref 0.4–4)
WBC # BLD AUTO: 5.53 K/UL (ref 3.9–12.7)

## 2024-03-21 PROCEDURE — 86038 ANTINUCLEAR ANTIBODIES: CPT | Performed by: NURSE PRACTITIONER

## 2024-03-21 PROCEDURE — 85025 COMPLETE CBC W/AUTO DIFF WBC: CPT | Performed by: NURSE PRACTITIONER

## 2024-03-21 PROCEDURE — 80053 COMPREHEN METABOLIC PANEL: CPT | Performed by: NURSE PRACTITIONER

## 2024-03-21 PROCEDURE — 86039 ANTINUCLEAR ANTIBODIES (ANA): CPT | Performed by: NURSE PRACTITIONER

## 2024-03-21 PROCEDURE — 36415 COLL VENOUS BLD VENIPUNCTURE: CPT | Mod: PO | Performed by: NURSE PRACTITIONER

## 2024-03-21 PROCEDURE — 84443 ASSAY THYROID STIM HORMONE: CPT | Performed by: NURSE PRACTITIONER

## 2024-03-21 PROCEDURE — 84439 ASSAY OF FREE THYROXINE: CPT | Performed by: NURSE PRACTITIONER

## 2024-03-21 PROCEDURE — 86235 NUCLEAR ANTIGEN ANTIBODY: CPT | Mod: 59 | Performed by: NURSE PRACTITIONER

## 2024-03-22 LAB
ANA PATTERN 1: NORMAL
ANA SER QL IF: POSITIVE
ANA TITR SER IF: NORMAL {TITER}

## 2024-03-26 ENCOUNTER — PATIENT MESSAGE (OUTPATIENT)
Dept: PRIMARY CARE CLINIC | Facility: CLINIC | Age: 64
End: 2024-03-26
Payer: MEDICARE

## 2024-03-26 LAB
ANTI SM ANTIBODY: 0.11 RATIO (ref 0–0.99)
ANTI SM/RNP ANTIBODY: 0.16 RATIO (ref 0–0.99)
ANTI-SM INTERPRETATION: NEGATIVE
ANTI-SM/RNP INTERPRETATION: NEGATIVE
ANTI-SSA ANTIBODY: 0.07 RATIO (ref 0–0.99)
ANTI-SSA INTERPRETATION: NEGATIVE
ANTI-SSB ANTIBODY: 0.23 RATIO (ref 0–0.99)
ANTI-SSB INTERPRETATION: NEGATIVE
DSDNA AB SER-ACNC: NORMAL [IU]/ML

## 2024-03-27 ENCOUNTER — OFFICE VISIT (OUTPATIENT)
Dept: PRIMARY CARE CLINIC | Facility: CLINIC | Age: 64
End: 2024-03-27
Payer: MEDICARE

## 2024-03-27 DIAGNOSIS — R89.9 ABNORMAL LABORATORY TEST: Primary | ICD-10-CM

## 2024-03-27 PROCEDURE — 99213 OFFICE O/P EST LOW 20 MIN: CPT | Mod: 95,,, | Performed by: NURSE PRACTITIONER

## 2024-03-27 NOTE — PROGRESS NOTES
Primary Care Telemedicine Note  The patient location is:  Patient Home   The chief complaint leading to consultation is:  Discuss referral  Total time spent with patient: 10 minutes    Visit type: Virtual visit with synchronous audio and video  Each patient to whom he or she provides medical services by telemedicine is:  (1) informed of the relationship between the physician and patient and the respective role of any other health care provider with respect to management of the patient; and (2) notified that he or she may decline to receive medical services by telemedicine and may withdraw from such care at any time.      Assessment/Plan:    Problem List Items Addressed This Visit    None  Visit Diagnoses       Abnormal laboratory test    -  Primary    Relevant Orders    Ambulatory referral/consult to Rheumatology            Follow up in about 3 months (around 6/27/2024).    Dayan Jimenez NP    _____________________________________________________________________________________________________________________________________________________    CC:  Discuss referral    HPI:    Patient is an established patient who presents today via virtual visit to discuss referral.    Patient requesting rheumatology referral.  Patient reports dermatologist ordered EDEN lab which came back positive.  Patient denies joint pain or swelling.    Past Medical History:  Past Medical History:   Diagnosis Date    Arthritis, lumbar spine     hands    Diabetes mellitus     General anesthetics causing adverse effect in therapeutic use     following breast rediuct, hard time awakening  also had anxiety rxn to lidocain in dental ofc    GERD (gastroesophageal reflux disease)     Hypothyroidism 10/08/2014    Maternal anesthesia complication     epidural for 1st child went up instead of down; required intubation    Obesity (BMI 30-39.9) 10/08/2014    Thyroid disease     Thyroid nodule 10/08/2014     Past Surgical History:   Procedure Laterality  Date    BREAST BIOPSY Left     b9    BREAST SURGERY      Reduction cause of a mass in left breast    c-sections x2       SECTION  3/26/81 & 85    Birth of two girls    COLONOSCOPY  2012         COLONOSCOPY N/A 2018    Procedure: COLONOSCOPY;  Surgeon: Francisco Mcclain MD;  Location: [facility] ENDO;  Service: Endoscopy;  Laterality: N/A;    COLONOSCOPY N/A 10/24/2023    Procedure: COLONOSCOPY;  Surgeon: Francisco Mcclain MD;  Location: [facility] ENDO;  Service: Endoscopy;  Laterality: N/A;    hysteroscopy with polypectomy      INCISIONAL BREAST BIOPSY Left     benign; done at same time as reduction    TOTAL REDUCTION MAMMOPLASTY Bilateral      Review of patient's allergies indicates:   Allergen Reactions    Duricef [cefadroxil] Hives    Eggs [egg derived] Anaphylaxis and Hives    Cat/feline products Itching    Dairycare [lactobacillus acidoph-lactase] Itching    Dog dander Itching    Wheat containing prod Hives     Social History     Tobacco Use    Smoking status: Never    Smokeless tobacco: Never   Substance Use Topics    Alcohol use: Yes     Comment: 2/ month    Drug use: No     Family History   Problem Relation Age of Onset    Brain cancer Mother     Early death Mother 51        Passed at 51    Cancer Mother         Brain tumor    Arthritis Mother     Diabetes Father         Type 2    Cirrhosis Father     Cancer Father         Bone    COPD Father         Smoker    Early death Father         Passed at 64    Heart disease Sister         Congestive heart failure (passed 18    Hypertension Sister     Kidney disease Sister         Doc removed when she was a child    Hypertension Sister     Depression Sister         Due to loss of her 27 year old son    Early death Sister         Pulmonary hypertension, cirrhosis of the liver(passed 2007   Age 57    Breast cancer Maternal Aunt 30    Breast cancer Paternal Aunt 40    Breast cancer Paternal Aunt 40    Heart disease Brother         Heart  attack    Hypertension Brother     Heart disease Brother         Heart  blockage    Heart disease Brother         Heart attack (passed)    Diabetes Brother     Macular degeneration Brother     Ovarian cancer Neg Hx      Current Outpatient Medications on File Prior to Visit   Medication Sig Dispense Refill    acetaminophen (TYLENOL) 325 MG tablet Take 325 mg by mouth every 6 (six) hours as needed for Pain.      amoxicillin-clavulanate 875-125mg (AUGMENTIN) 875-125 mg per tablet Take 1 tablet by mouth every 12 (twelve) hours.      blood-glucose meter Misc Use as directed 1 each prn    calcium carbonate (OS-ISH) 600 mg calcium (1,500 mg) Tab Take 1,200 mg by mouth 2 (two) times daily with meals.      cetirizine (ZYRTEC) 10 MG tablet Take 1 tablet (10 mg total) by mouth once daily. 30 tablet 1    empagliflozin-metformin (SYNJARDY XR) 10-1,000 mg TBph Take 1 tablet by mouth once daily. 30 tablet 5    famotidine (PEPCID) 40 MG tablet TAKE 1 TABLET (40 MG TOTAL) BY MOUTH ONCE DAILY. 30 tablet 11    fluticasone propionate (FLONASE) 50 mcg/actuation nasal spray 1 spray (50 mcg total) by Each Nostril route once daily. 18.2 mL 0    HYDROcodone-acetaminophen (NORCO) 5-325 mg per tablet Take 1 tablet by mouth every 6 (six) hours as needed.      hydrocortisone 2.5 % cream Apply topically 2 (two) times daily. 20 g 0    hydrOXYzine HCL (ATARAX) 25 MG tablet Take 25 mg by mouth 3 (three) times daily.      levothyroxine (SYNTHROID) 50 MCG tablet TAKE 1 TABLET (50 MCG TOTAL) BY MOUTH ONCE DAILY 90 tablet 1    losartan (COZAAR) 25 MG tablet TAKE 1 TABLET (25 MG TOTAL) BY MOUTH ONCE DAILY 90 tablet 3    meclizine (ANTIVERT) 25 mg tablet Take 1 tablet (25 mg total) by mouth 3 (three) times daily as needed for Dizziness. 30 tablet 0    mupirocin (BACTROBAN) 2 % ointment Apply topically 3 (three) times daily.      simvastatin (ZOCOR) 10 MG tablet Take 1 tablet (10 mg total) by mouth every evening. 90 tablet 3    TRUE METRIX GLUCOSE TEST  STRIP Strp USE 1 STRIP ONCE DAILY TO TEST BLOOD GLUCOSE (50 DAY SUPPLY) 100 each 3    TRUEPLUS LANCETS 33 gauge Misc USE AS DIRECTED TO TEST BLOOD SUGAR ONCE DAILY FOR UP  USES 100 each 2    VITAMIN D2 1,250 mcg (50,000 unit) capsule TAKE 1 CAPSULE (50,000 UNITS TOTAL) BY MOUTH TWICE A WEEK. 24 capsule 3     No current facility-administered medications on file prior to visit.       Review of Systems   HENT:  Negative for hearing loss.    Eyes:  Negative for discharge.   Respiratory:  Negative for wheezing.    Cardiovascular:  Negative for chest pain and palpitations.   Gastrointestinal:  Negative for blood in stool, constipation, diarrhea and vomiting.   Genitourinary:  Negative for dysuria and hematuria.   Musculoskeletal:  Negative for neck pain.   Neurological:  Negative for weakness and headaches.   Endo/Heme/Allergies:  Negative for polydipsia.         Physical Exam   @[email or web address]@  @thptenteredglucose@    Physical Exam  Vitals and nursing note reviewed.   Constitutional:       Appearance: She is well-developed.   Pulmonary:      Effort: Pulmonary effort is normal.   Neurological:      Mental Status: She is alert and oriented to person, place, and time.   Psychiatric:         Behavior: Behavior normal.         Thought Content: Thought content normal.         Judgment: Judgment normal.         The patient's Health Maintenance was reviewed and the following appears to be due at this time:  Health Maintenance Due   Topic Date Due    Pneumococcal Vaccines (Age 65+) (1 of 2 - PCV) Never done    HIV Screening  Never done    Shingles Vaccine (1 of 2) Never done    RSV Vaccine (Age 60+ and Pregnant patients) (1 - 1-dose 60+ series) Never done    Hemoglobin A1c  03/22/2024

## 2024-04-01 ENCOUNTER — LAB VISIT (OUTPATIENT)
Dept: LAB | Facility: HOSPITAL | Age: 64
End: 2024-04-01
Attending: NURSE PRACTITIONER
Payer: MEDICARE

## 2024-04-01 DIAGNOSIS — E11.9 TYPE 2 DIABETES MELLITUS WITHOUT COMPLICATION, WITHOUT LONG-TERM CURRENT USE OF INSULIN: ICD-10-CM

## 2024-04-01 LAB
ANION GAP SERPL CALC-SCNC: 8 MMOL/L (ref 8–16)
BUN SERPL-MCNC: 9 MG/DL (ref 8–23)
CALCIUM SERPL-MCNC: 9 MG/DL (ref 8.7–10.5)
CHLORIDE SERPL-SCNC: 103 MMOL/L (ref 95–110)
CO2 SERPL-SCNC: 29 MMOL/L (ref 23–29)
CREAT SERPL-MCNC: 0.8 MG/DL (ref 0.5–1.4)
EST. GFR  (NO RACE VARIABLE): >60 ML/MIN/1.73 M^2
ESTIMATED AVG GLUCOSE: 131 MG/DL (ref 68–131)
GLUCOSE SERPL-MCNC: 119 MG/DL (ref 70–110)
HBA1C MFR BLD: 6.2 % (ref 4–5.6)
POTASSIUM SERPL-SCNC: 3.9 MMOL/L (ref 3.5–5.1)
SODIUM SERPL-SCNC: 140 MMOL/L (ref 136–145)

## 2024-04-01 PROCEDURE — 80048 BASIC METABOLIC PNL TOTAL CA: CPT | Performed by: NURSE PRACTITIONER

## 2024-04-01 PROCEDURE — 36415 COLL VENOUS BLD VENIPUNCTURE: CPT | Mod: PO | Performed by: NURSE PRACTITIONER

## 2024-04-01 PROCEDURE — 83036 HEMOGLOBIN GLYCOSYLATED A1C: CPT | Performed by: NURSE PRACTITIONER

## 2024-04-09 ENCOUNTER — OFFICE VISIT (OUTPATIENT)
Dept: DIABETES | Facility: CLINIC | Age: 64
End: 2024-04-09
Payer: MEDICARE

## 2024-04-09 VITALS
HEART RATE: 64 BPM | WEIGHT: 133.63 LBS | BODY MASS INDEX: 25.23 KG/M2 | HEIGHT: 61 IN | DIASTOLIC BLOOD PRESSURE: 67 MMHG | SYSTOLIC BLOOD PRESSURE: 106 MMHG

## 2024-04-09 DIAGNOSIS — E11.9 TYPE 2 DIABETES MELLITUS WITHOUT COMPLICATION, WITHOUT LONG-TERM CURRENT USE OF INSULIN: Primary | ICD-10-CM

## 2024-04-09 LAB — GLUCOSE SERPL-MCNC: 136 MG/DL (ref 70–110)

## 2024-04-09 PROCEDURE — 1126F AMNT PAIN NOTED NONE PRSNT: CPT | Mod: CPTII,S$GLB,, | Performed by: NURSE PRACTITIONER

## 2024-04-09 PROCEDURE — 1159F MED LIST DOCD IN RCRD: CPT | Mod: CPTII,S$GLB,, | Performed by: NURSE PRACTITIONER

## 2024-04-09 PROCEDURE — 3008F BODY MASS INDEX DOCD: CPT | Mod: CPTII,S$GLB,, | Performed by: NURSE PRACTITIONER

## 2024-04-09 PROCEDURE — 99999 PR PBB SHADOW E&M-EST. PATIENT-LVL IV: CPT | Mod: PBBFAC,,, | Performed by: NURSE PRACTITIONER

## 2024-04-09 PROCEDURE — 3288F FALL RISK ASSESSMENT DOCD: CPT | Mod: CPTII,S$GLB,, | Performed by: NURSE PRACTITIONER

## 2024-04-09 PROCEDURE — 1101F PT FALLS ASSESS-DOCD LE1/YR: CPT | Mod: CPTII,S$GLB,, | Performed by: NURSE PRACTITIONER

## 2024-04-09 PROCEDURE — 3044F HG A1C LEVEL LT 7.0%: CPT | Mod: CPTII,S$GLB,, | Performed by: NURSE PRACTITIONER

## 2024-04-09 PROCEDURE — 4010F ACE/ARB THERAPY RXD/TAKEN: CPT | Mod: CPTII,S$GLB,, | Performed by: NURSE PRACTITIONER

## 2024-04-09 PROCEDURE — 3078F DIAST BP <80 MM HG: CPT | Mod: CPTII,S$GLB,, | Performed by: NURSE PRACTITIONER

## 2024-04-09 PROCEDURE — 3074F SYST BP LT 130 MM HG: CPT | Mod: CPTII,S$GLB,, | Performed by: NURSE PRACTITIONER

## 2024-04-09 PROCEDURE — 99214 OFFICE O/P EST MOD 30 MIN: CPT | Mod: S$GLB,,, | Performed by: NURSE PRACTITIONER

## 2024-04-09 PROCEDURE — 82962 GLUCOSE BLOOD TEST: CPT | Mod: S$GLB,,, | Performed by: NURSE PRACTITIONER
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
## 2024-04-09 NOTE — PATIENT INSTRUCTIONS
PATIENT INSTRUCTIONS      Continue  Synjardy XR  mg by mouth daily.      Check blood sugar twice daily. Every morning when you wake up and 1-2 hours after main meal of the day. Keep log and upload to [employer] prior to each visit.   Blood Sugar Goals:       Fastin-130.       1-2 hours after a meal: Less than 180.

## 2024-04-16 DIAGNOSIS — E11.9 TYPE 2 DIABETES MELLITUS WITHOUT COMPLICATION, WITHOUT LONG-TERM CURRENT USE OF INSULIN: ICD-10-CM

## 2024-04-16 RX ORDER — EMPAGLIFLOZIN, METFORMIN HYDROCHLORIDE 10; 1000 MG/1; MG/1
1 TABLET, EXTENDED RELEASE ORAL
Qty: 30 TABLET | Refills: 5 | Status: SHIPPED | OUTPATIENT
Start: 2024-04-16

## 2024-05-21 ENCOUNTER — OFFICE VISIT (OUTPATIENT)
Dept: RHEUMATOLOGY | Facility: CLINIC | Age: 64
End: 2024-05-21
Payer: MEDICARE

## 2024-05-21 ENCOUNTER — HOSPITAL ENCOUNTER (OUTPATIENT)
Dept: RADIOLOGY | Facility: HOSPITAL | Age: 64
Discharge: HOME | End: 2024-05-21
Attending: STUDENT IN AN ORGANIZED HEALTH CARE EDUCATION/TRAINING PROGRAM
Payer: MEDICARE

## 2024-05-21 VITALS
DIASTOLIC BLOOD PRESSURE: 65 MMHG | WEIGHT: 131.06 LBS | HEART RATE: 66 BPM | SYSTOLIC BLOOD PRESSURE: 129 MMHG | BODY MASS INDEX: 24.75 KG/M2 | HEIGHT: 61 IN

## 2024-05-21 DIAGNOSIS — R76.8 POSITIVE ANA (ANTINUCLEAR ANTIBODY): ICD-10-CM

## 2024-05-21 DIAGNOSIS — R91.8 ABNORMAL CT SCAN OF LUNG: ICD-10-CM

## 2024-05-21 DIAGNOSIS — R21 SKIN RASH: ICD-10-CM

## 2024-05-21 DIAGNOSIS — R89.9 ABNORMAL LABORATORY TEST: ICD-10-CM

## 2024-05-21 DIAGNOSIS — R76.8 POSITIVE ANA (ANTINUCLEAR ANTIBODY): Primary | ICD-10-CM

## 2024-05-21 PROCEDURE — 3078F DIAST BP <80 MM HG: CPT | Mod: CPTII,S$GLB,, | Performed by: STUDENT IN AN ORGANIZED HEALTH CARE EDUCATION/TRAINING PROGRAM

## 2024-05-21 PROCEDURE — 3074F SYST BP LT 130 MM HG: CPT | Mod: CPTII,S$GLB,, | Performed by: STUDENT IN AN ORGANIZED HEALTH CARE EDUCATION/TRAINING PROGRAM

## 2024-05-21 PROCEDURE — 1101F PT FALLS ASSESS-DOCD LE1/YR: CPT | Mod: CPTII,S$GLB,, | Performed by: STUDENT IN AN ORGANIZED HEALTH CARE EDUCATION/TRAINING PROGRAM

## 2024-05-21 PROCEDURE — 99205 OFFICE O/P NEW HI 60 MIN: CPT | Mod: S$GLB,,, | Performed by: STUDENT IN AN ORGANIZED HEALTH CARE EDUCATION/TRAINING PROGRAM

## 2024-05-21 PROCEDURE — 71250 CT THORAX DX C-: CPT | Mod: TC

## 2024-05-21 PROCEDURE — 3288F FALL RISK ASSESSMENT DOCD: CPT | Mod: CPTII,S$GLB,, | Performed by: STUDENT IN AN ORGANIZED HEALTH CARE EDUCATION/TRAINING PROGRAM

## 2024-05-21 PROCEDURE — 1160F RVW MEDS BY RX/DR IN RCRD: CPT | Mod: CPTII,S$GLB,, | Performed by: STUDENT IN AN ORGANIZED HEALTH CARE EDUCATION/TRAINING PROGRAM

## 2024-05-21 PROCEDURE — 4010F ACE/ARB THERAPY RXD/TAKEN: CPT | Mod: CPTII,S$GLB,, | Performed by: STUDENT IN AN ORGANIZED HEALTH CARE EDUCATION/TRAINING PROGRAM

## 2024-05-21 PROCEDURE — 3044F HG A1C LEVEL LT 7.0%: CPT | Mod: CPTII,S$GLB,, | Performed by: STUDENT IN AN ORGANIZED HEALTH CARE EDUCATION/TRAINING PROGRAM

## 2024-05-21 PROCEDURE — 3008F BODY MASS INDEX DOCD: CPT | Mod: CPTII,S$GLB,, | Performed by: STUDENT IN AN ORGANIZED HEALTH CARE EDUCATION/TRAINING PROGRAM

## 2024-05-21 PROCEDURE — 71250 CT THORAX DX C-: CPT | Mod: 26,,, | Performed by: RADIOLOGY

## 2024-05-21 PROCEDURE — 1159F MED LIST DOCD IN RCRD: CPT | Mod: CPTII,S$GLB,, | Performed by: STUDENT IN AN ORGANIZED HEALTH CARE EDUCATION/TRAINING PROGRAM

## 2024-05-21 PROCEDURE — 99999 PR PBB SHADOW E&M-EST. PATIENT-LVL IV: CPT | Mod: PBBFAC,,, | Performed by: STUDENT IN AN ORGANIZED HEALTH CARE EDUCATION/TRAINING PROGRAM
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
## 2024-05-29 RX ORDER — LEVOTHYROXINE SODIUM 50 UG/1
50 TABLET ORAL DAILY
Qty: 90 TABLET | Refills: 1 | Status: SHIPPED | OUTPATIENT
Start: 2024-05-29

## 2024-06-18 ENCOUNTER — PATIENT MESSAGE (OUTPATIENT)
Dept: PRIMARY CARE CLINIC | Facility: CLINIC | Age: 64
End: 2024-06-18
Payer: MEDICARE

## 2024-06-18 DIAGNOSIS — K86.89 PANCREATIC MASS: Primary | ICD-10-CM

## 2024-06-19 ENCOUNTER — OFFICE VISIT (OUTPATIENT)
Dept: PRIMARY CARE CLINIC | Facility: CLINIC | Age: 64
End: 2024-06-19
Payer: MEDICARE

## 2024-06-19 ENCOUNTER — TELEPHONE (OUTPATIENT)
Dept: PRIMARY CARE CLINIC | Facility: CLINIC | Age: 64
End: 2024-06-19
Payer: MEDICARE

## 2024-06-19 VITALS — HEIGHT: 61 IN | WEIGHT: 130 LBS | BODY MASS INDEX: 24.55 KG/M2

## 2024-06-19 DIAGNOSIS — K86.89 PANCREATIC MASS: Primary | ICD-10-CM

## 2024-06-19 DIAGNOSIS — R91.8 LUNG NODULES: ICD-10-CM

## 2024-06-19 PROCEDURE — 1101F PT FALLS ASSESS-DOCD LE1/YR: CPT | Mod: CPTII,95,, | Performed by: NURSE PRACTITIONER

## 2024-06-19 PROCEDURE — 3044F HG A1C LEVEL LT 7.0%: CPT | Mod: CPTII,95,, | Performed by: NURSE PRACTITIONER

## 2024-06-19 PROCEDURE — 2023F DILAT RTA XM W/O RTNOPTHY: CPT | Mod: CPTII,95,, | Performed by: NURSE PRACTITIONER

## 2024-06-19 PROCEDURE — G2211 COMPLEX E/M VISIT ADD ON: HCPCS | Mod: 95,,, | Performed by: NURSE PRACTITIONER

## 2024-06-19 PROCEDURE — 4010F ACE/ARB THERAPY RXD/TAKEN: CPT | Mod: CPTII,95,, | Performed by: NURSE PRACTITIONER

## 2024-06-19 PROCEDURE — 3008F BODY MASS INDEX DOCD: CPT | Mod: CPTII,95,, | Performed by: NURSE PRACTITIONER

## 2024-06-19 PROCEDURE — 99214 OFFICE O/P EST MOD 30 MIN: CPT | Mod: 95,,, | Performed by: NURSE PRACTITIONER

## 2024-06-19 PROCEDURE — 3288F FALL RISK ASSESSMENT DOCD: CPT | Mod: CPTII,95,, | Performed by: NURSE PRACTITIONER

## 2024-06-19 NOTE — TELEPHONE ENCOUNTER
Spoke with Pts insurance, out of network gap exception started for an appt at [facility]. Reference # [id number]. They will contact the office if more information is needed

## 2024-06-20 ENCOUNTER — PATIENT MESSAGE (OUTPATIENT)
Dept: PRIMARY CARE CLINIC | Facility: CLINIC | Age: 64
End: 2024-06-20
Payer: MEDICARE

## 2024-06-20 ENCOUNTER — OFFICE VISIT (OUTPATIENT)
Dept: RHEUMATOLOGY | Facility: CLINIC | Age: 64
End: 2024-06-20
Payer: MEDICARE

## 2024-06-20 DIAGNOSIS — K86.89 PANCREATIC MASS: ICD-10-CM

## 2024-06-20 DIAGNOSIS — R76.8 POSITIVE ANA (ANTINUCLEAR ANTIBODY): Primary | ICD-10-CM

## 2024-06-20 PROCEDURE — 1159F MED LIST DOCD IN RCRD: CPT | Mod: CPTII,95,, | Performed by: STUDENT IN AN ORGANIZED HEALTH CARE EDUCATION/TRAINING PROGRAM

## 2024-06-20 PROCEDURE — 4010F ACE/ARB THERAPY RXD/TAKEN: CPT | Mod: CPTII,95,, | Performed by: STUDENT IN AN ORGANIZED HEALTH CARE EDUCATION/TRAINING PROGRAM

## 2024-06-20 PROCEDURE — 3044F HG A1C LEVEL LT 7.0%: CPT | Mod: CPTII,95,, | Performed by: STUDENT IN AN ORGANIZED HEALTH CARE EDUCATION/TRAINING PROGRAM

## 2024-06-20 PROCEDURE — 99214 OFFICE O/P EST MOD 30 MIN: CPT | Mod: 95,,, | Performed by: STUDENT IN AN ORGANIZED HEALTH CARE EDUCATION/TRAINING PROGRAM

## 2024-06-20 PROCEDURE — 1160F RVW MEDS BY RX/DR IN RCRD: CPT | Mod: CPTII,95,, | Performed by: STUDENT IN AN ORGANIZED HEALTH CARE EDUCATION/TRAINING PROGRAM
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
## 2024-06-20 NOTE — PROGRESS NOTES
Primary Care Telemedicine Note  The patient location is:  Patient Home   The chief complaint leading to consultation is:  Pancreatic mass  Total time spent with patient:  20 minutes    Visit type: Virtual visit with synchronous audio and video  Each patient to whom he or she provides medical services by telemedicine is:  (1) informed of the relationship between the physician and patient and the respective role of any other health care provider with respect to management of the patient; and (2) notified that he or she may decline to receive medical services by telemedicine and may withdraw from such care at any time.      Assessment/Plan:    Problem List Items Addressed This Visit          Pulmonary    Lung nodules    Overview     CT chest showed   3 mm pulmonary nodule in the right upper lobe. No changes. No new nodules.   Recommend repeat CT Chest without contrast 3-6 months             GI    Pancreatic mass - Primary    Overview     HRCT chest incidentally showed a pancreatic mass concerning for malignancy.   She is establishing with [facility] where her  is being treated. She is pending insurance approval for an out of network referral to see Dr. Gilberto García  an oncologist at [facility].  Patient's recent blood work from [facility] reviewed and showed an elevated CA 19-9 marker.                Follow up in about 1 month (around 7/19/2024), or if symptoms worsen or fail to improve.    Dayan Jimenez, ADI    _____________________________________________________________________________________________________________________________________________________    CC:  Discuss pancreatic mass    HPI:    Patient is an established patient who presents today via virtual visit. HRCT chest incidentally showed a pancreatic mass concerning for malignancy. She is establishing with [facility] where her  is being treated. She is pending insurance approval for an out of network referral to see Dr. Gilberto CHARLES  Raquel .  Patient declined CT or MRI abdomen with IV contrast she would rather follow-up with MD Steinberg.      Past Medical History:  Past Medical History:   Diagnosis Date    Arthritis, lumbar spine     hands    Diabetes mellitus     General anesthetics causing adverse effect in therapeutic use     following breast rediuct, hard time awakening  also had anxiety rxn to lidocain in dental ofc    GERD (gastroesophageal reflux disease)     Hypothyroidism 10/08/2014    Maternal anesthesia complication     epidural for 1st child went up instead of down; required intubation    Obesity (BMI 30-39.9) 10/08/2014    Thyroid disease     Thyroid nodule 10/08/2014     Past Surgical History:   Procedure Laterality Date    BREAST BIOPSY Left     b9    BREAST SURGERY      Reduction cause of a mass in left breast    c-sections x2       SECTION  3/26/81 & 85    Birth of two girls    COLONOSCOPY  2012         COLONOSCOPY N/A 2018    Procedure: COLONOSCOPY;  Surgeon: Francisco Mcclain MD;  Location: [facility];  Service: Endoscopy;  Laterality: N/A;    COLONOSCOPY N/A 10/24/2023    Procedure: COLONOSCOPY;  Surgeon: Francisco Mcclain MD;  Location: [facility];  Service: Endoscopy;  Laterality: N/A;    hysteroscopy with polypectomy      INCISIONAL BREAST BIOPSY Left     benign; done at same time as reduction    TOTAL REDUCTION MAMMOPLASTY Bilateral      Review of patient's allergies indicates:   Allergen Reactions    Duricef [cefadroxil] Hives    Eggs [egg derived] Anaphylaxis and Hives    Cat/feline products Itching    Dairycare [lactobacillus acidoph-lactase] Itching    Dog dander Itching    Wheat containing prod Hives     Social History     Tobacco Use    Smoking status: Never    Smokeless tobacco: Never   Substance Use Topics    Alcohol use: Yes     Comment: 2/ month    Drug use: No     Family History   Problem Relation Name Age of Onset    Brain cancer Mother Ravi Choudhury Jurgen Malloy  death Mother Ravi Seaman 51        Passed at 51    Cancer Mother Ravi Seaman         Brain tumor    Arthritis Mother Ravi Seaman     Diabetes Father Ck seaman         Type 2    Cirrhosis Father Ck saeman     Cancer Father Ck seaman         Bone    COPD Father Ck seaman         Smoker    Early death Father Ck seaman         Passed at 64    Heart disease Sister Mamta (dianna)wescovich         Congestive heart failure (passed 2/11/18    Hypertension Sister Mamta (dianna)wescovich     Kidney disease Sister Mamta (dianna)wescoradha         Doc removed when she was a child    Hypertension Sister      Depression Sister Ravi (johnna) nicolette Mauricio ( sister)         Due to loss of her 27 year old son    Early death Sister Ravi (johnna) nicolette Mauricio ( sister)         Pulmonary hypertension, cirrhosis of the liver(passed 7/14/2007   Age 57    Breast cancer Maternal Aunt  30    Breast cancer Paternal Aunt  40    Breast cancer Paternal Aunt  40    Heart disease Brother John Choudhury ( passed )         Heart attack    Hypertension Brother John Choudhury ( passed )     Heart disease Brother Juan Francisco Choudhury         Heart  blockage    Heart disease Brother Bhanu Choudhury         Heart attack (passed)    Diabetes Brother Bhanu Choudhury     Macular degeneration Brother Bhanu Choudhury     Ovarian cancer Neg Hx       Current Outpatient Medications on File Prior to Visit   Medication Sig Dispense Refill    acetaminophen (TYLENOL) 325 MG tablet Take 325 mg by mouth every 6 (six) hours as needed for Pain.      blood-glucose meter Misc Use as directed 1 each prn    calcium carbonate (OS-ISH) 600 mg calcium (1,500 mg) Tab Take 1,200 mg by mouth 2 (two) times daily with meals.      cetirizine (ZYRTEC) 10 MG tablet Take 1 tablet (10 mg total) by mouth once daily. 30 tablet 1    famotidine (PEPCID) 40 MG tablet TAKE 1  TABLET (40 MG TOTAL) BY MOUTH ONCE DAILY. 30 tablet 11    fluticasone propionate (FLONASE) 50 mcg/actuation nasal spray 1 spray (50 mcg total) by Each Nostril route once daily. 18.2 mL 0    levothyroxine (SYNTHROID) 50 MCG tablet Take 1 tablet (50 mcg total) by mouth once daily. 90 tablet 1    losartan (COZAAR) 25 MG tablet TAKE 1 TABLET (25 MG TOTAL) BY MOUTH ONCE DAILY 90 tablet 3    meclizine (ANTIVERT) 25 mg tablet Take 1 tablet (25 mg total) by mouth 3 (three) times daily as needed for Dizziness. 30 tablet 0    simvastatin (ZOCOR) 10 MG tablet Take 1 tablet (10 mg total) by mouth every evening. 90 tablet 3    SYNJARDY XR 10-1,000 mg TBph TAKE ONE TABLET BY MOUTH ONCE DAILY 30 tablet 5    TRUE METRIX GLUCOSE TEST STRIP Strp USE 1 STRIP ONCE DAILY TO TEST BLOOD GLUCOSE (50 DAY SUPPLY) 100 each 3    TRUEPLUS LANCETS 33 gauge Misc USE AS DIRECTED TO TEST BLOOD SUGAR ONCE DAILY FOR UP  USES 100 each 2    VITAMIN D2 1,250 mcg (50,000 unit) capsule TAKE 1 CAPSULE (50,000 UNITS TOTAL) BY MOUTH TWICE A WEEK. 24 capsule 3    mupirocin (BACTROBAN) 2 % ointment Apply topically 3 (three) times daily. (Patient not taking: Reported on 6/19/2024)       No current facility-administered medications on file prior to visit.       Review of Systems   Constitutional:  Positive for weight loss.   HENT:  Negative for hearing loss.    Eyes:  Negative for discharge.   Respiratory:  Negative for wheezing.    Cardiovascular:  Negative for chest pain and palpitations.   Gastrointestinal:  Negative for blood in stool, constipation, diarrhea and vomiting.   Genitourinary:  Negative for dysuria and hematuria.   Musculoskeletal:  Negative for neck pain.   Neurological:  Negative for weakness and headaches.   Endo/Heme/Allergies:  Negative for polydipsia.         Physical Exam   @thptenteredbppulse@  @thptenteredglucose@    Physical Exam  Vitals and nursing note reviewed.   Constitutional:       Appearance: She is well-developed.    Pulmonary:      Effort: Pulmonary effort is normal.   Neurological:      Mental Status: She is alert and oriented to person, place, and time.   Psychiatric:         Mood and Affect: Mood is anxious.         Behavior: Behavior normal.         Thought Content: Thought content normal.         Judgment: Judgment normal.         The patient's Health Maintenance was reviewed and the following appears to be due at this time:  Health Maintenance Due   Topic Date Due    Pneumococcal Vaccines (Age 65+) (1 of 2 - PCV) Never done    Shingles Vaccine (1 of 2) Never done    RSV Vaccine (Age 60+ and Pregnant patients) (1 - 1-dose 60+ series) Never done    Mammogram  07/17/2024

## 2024-06-21 ENCOUNTER — TELEPHONE (OUTPATIENT)
Dept: PRIMARY CARE CLINIC | Facility: CLINIC | Age: 64
End: 2024-06-21
Payer: MEDICARE

## 2024-06-21 ENCOUNTER — PATIENT MESSAGE (OUTPATIENT)
Dept: PRIMARY CARE CLINIC | Facility: CLINIC | Age: 64
End: 2024-06-21
Payer: MEDICARE

## 2024-06-21 NOTE — TELEPHONE ENCOUNTER
----- Message from Jose Ramon Gr sent at 6/21/2024  2:52 PM CDT -----  Contact: Bessy  Type:  Patient Returning Call    Who Called:Bessy  Who Left Message for Patient:Bharati  Does the patient know what this is regarding?:missed call  Would the patient rather a call back or a response via [facility]? call  Best Call Back Number:[phone]   Additional Information:

## 2024-06-21 NOTE — TELEPHONE ENCOUNTER
----- Message from Jackie Yoder sent at 6/21/2024  9:44 AM CDT -----  Kesha with [employer] [facility] is calling in regards to getting order and clinic notes for pt to be approved for [facility]. Please fax to [phone] and Kesha can be reached at [phone].              Thanks  MAISHA

## 2024-06-24 ENCOUNTER — TELEPHONE (OUTPATIENT)
Dept: HEMATOLOGY/ONCOLOGY | Facility: CLINIC | Age: 64
End: 2024-06-24
Payer: MEDICARE

## 2024-06-24 ENCOUNTER — TELEPHONE (OUTPATIENT)
Dept: PRIMARY CARE CLINIC | Facility: CLINIC | Age: 64
End: 2024-06-24
Payer: MEDICARE

## 2024-06-24 ENCOUNTER — PATIENT MESSAGE (OUTPATIENT)
Dept: PRIMARY CARE CLINIC | Facility: CLINIC | Age: 64
End: 2024-06-24
Payer: MEDICARE

## 2024-06-24 DIAGNOSIS — K86.89 PANCREATIC MASS: Primary | ICD-10-CM

## 2024-06-24 RX ORDER — ALPRAZOLAM 0.5 MG/1
0.5 TABLET ORAL
Qty: 2 TABLET | Refills: 0 | Status: SHIPPED | OUTPATIENT
Start: 2024-06-24

## 2024-06-24 NOTE — PROGRESS NOTES
Had lengthy discussion with patient over telephone encounter.  Patient is aware of insurance denial for out of network coverage for MD Steinberg.  Patient is in agreement to proceed with MRI imaging and oncology consult with in network provider.  Patient is leaving out of town on Thursday and would like testing when she returns.      Dayan Jimenez NP

## 2024-06-24 NOTE — TELEPHONE ENCOUNTER
Order placed for MRI and internal Oncology. Unable to contact by phone [employer] message sent for pt to return call regarding the scheduling of the MRI. I also attempted to schedule the oncology referral, when I called they stated that a message was sent to the navigator to schedule and it takes 24-48 hours but that they would contact her to schedule.

## 2024-06-24 NOTE — TELEPHONE ENCOUNTER
----- Message from Della Cristina sent at 6/24/2024 10:29 AM CDT -----  Type: Needs Medical Advice  Who Called: Bharati with [facility]  Symptoms (please be specific):    How long has patient had these symptoms:    Pharmacy name and phone #:    Best Call Back Number: [phone]  Additional Information: Bharati is calling to inform patient is needing a call back to schedule an appt. with referral.

## 2024-06-24 NOTE — TELEPHONE ENCOUNTER
Attempted to contact pt for Dayan to speak to her in regards to the appeal letter. Lm to return call

## 2024-06-24 NOTE — NURSING
Pt returned call, let her know that I sent her referral to Dr. Gordon's office to schedule EUS.  Pt verbalized agreement.  All questions and concerns addressed. Patient was provided with call back information.

## 2024-06-24 NOTE — NURSING
KELM with call back information. Called to let the patient know I sent the schedule request to Dr. Gordon's office for scheduling and they should be reaching out to her soon to schedule a EUS.

## 2024-06-24 NOTE — TELEPHONE ENCOUNTER
MRI is scheduled for 6/30, pt is requesting a rx to be called in to help with the having the test done. Pt is aware that she will need someone to drive her to the appointment and states she does have someone.

## 2024-06-25 ENCOUNTER — TELEPHONE (OUTPATIENT)
Dept: HEMATOLOGY/ONCOLOGY | Facility: CLINIC | Age: 64
End: 2024-06-25
Payer: MEDICARE

## 2024-06-25 NOTE — NURSING
Spoke with patient, she spoke with Dr. Gordon's office to schedule EUS.  Amaris will call on Tuesday if able to get her in sooner.  Awaiting EUS and path results to schedule further. Pt verbalized agreement.

## 2024-06-25 NOTE — TELEPHONE ENCOUNTER
----- Message from Emilia Fitzgerald sent at 6/25/2024  8:41 AM CDT -----  Regarding: Appt  Contact: Pt  [phone]                Caller:  Bessy      Returning call to:   Anita     Caller can be reached at:   [phone]    Nature of the call: Returning missed call to schedule appt at [facility]

## 2024-07-05 PROBLEM — K31.7 GASTRIC POLYP: Status: ACTIVE | Noted: 2024-07-05

## 2024-07-08 ENCOUNTER — TELEPHONE (OUTPATIENT)
Dept: HEMATOLOGY/ONCOLOGY | Facility: CLINIC | Age: 64
End: 2024-07-08
Payer: MEDICARE

## 2024-07-08 DIAGNOSIS — K86.89 PANCREATIC MASS: Primary | ICD-10-CM

## 2024-07-08 NOTE — TELEPHONE ENCOUNTER
----- Message from Greta Garnica RN sent at 7/8/2024  1:37 PM CDT -----  Regarding: RE: Appointment scheduling  I have left her a VM. .  She will not be seen until 7/24 on the [place], by Dr Valentin.   I will offer her whatever makes her comfortable. It will be August before he's back on the [facility].     Please let me know if you have any questions or concerns     Thank you,  Greta Garnica RN  UGI Navigator   [phone]  ----- Message -----  From: Kailee Julio RN  Sent: 7/8/2024   8:55 AM CDT  To: [facility] Navigation  Subject: Appointment scheduling                           Good morning,    Dr. Gordon would like the attached patient to be seen in [facility] by Dr. Valentin for Pancreatic ca, his next available in [place].  Path is pending but Dr. Gordon is saying it is pancreatic cancer.  Can you please add to the next available and let me know.  I appreciate your help.     Thank you,    Kailee Julio, RN, BSN  RN Oncology Navigator  [facility]  [phone] (P)  [phone] (F)

## 2024-07-08 NOTE — TELEPHONE ENCOUNTER
----- Message from Greta Garnica RN sent at 7/8/2024  1:37 PM CDT -----  Regarding: RE: Appointment scheduling  I have left her a VM. .  She will not be seen until 7/24 on the [place], by Dr Valentin.   I will offer her whatever makes her comfortable. It will be August before he's back on the [facility].     Please let me know if you have any questions or concerns     Thank you,  Greta Garnica RN  UGI Navigator   [phone]  ----- Message -----  From: Kailee Julio RN  Sent: 7/8/2024   8:55 AM CDT  To: [facility]gi Navigation  Subject: Appointment scheduling                           Good morning,    Dr. Gordon would like the attached patient to be seen in [facility] by Dr. Valentin for Pancreatic ca, his next available in [place].  Path is pending but Dr. Gordon is saying it is pancreatic cancer.  Can you please add to the next available and let me know.  I appreciate your help.     Thank you,    Kailee Julio, RN, BSN  RN Oncology Navigator  [facility]  [phone] (P)  [phone] (F)

## 2024-07-08 NOTE — NURSING
Spoke with patient, scheduled with Dr. Hunt waiting for time/date for Dr. Valentin per [facility],  Pt veralized agreement to time/date/location. Will continue to follow.

## 2024-07-08 NOTE — NURSING
LVM with call back information to schedule patient with Dr. Hunt per referral post EUS from Dr. Gordon.  Scheduled patient 7/17 with Dr. Hunt, will continue to follow and make patient aware of appointment.

## 2024-07-09 ENCOUNTER — PATIENT MESSAGE (OUTPATIENT)
Dept: HEMATOLOGY/ONCOLOGY | Facility: CLINIC | Age: 64
End: 2024-07-09
Payer: MEDICARE

## 2024-07-09 ENCOUNTER — HOSPITAL ENCOUNTER (OUTPATIENT)
Dept: RADIOLOGY | Facility: HOSPITAL | Age: 64
Discharge: HOME | End: 2024-07-09
Attending: SURGERY
Payer: MEDICARE

## 2024-07-09 ENCOUNTER — TELEPHONE (OUTPATIENT)
Dept: HEMATOLOGY/ONCOLOGY | Facility: CLINIC | Age: 64
End: 2024-07-09
Payer: MEDICARE

## 2024-07-09 DIAGNOSIS — K86.89 PANCREATIC MASS: Primary | ICD-10-CM

## 2024-07-09 DIAGNOSIS — K86.89 PANCREATIC MASS: ICD-10-CM

## 2024-07-09 PROCEDURE — 74177 CT ABD & PELVIS W/CONTRAST: CPT | Mod: 26,,, | Performed by: RADIOLOGY

## 2024-07-09 PROCEDURE — 71260 CT THORAX DX C+: CPT | Mod: 26,,, | Performed by: RADIOLOGY

## 2024-07-09 PROCEDURE — 74177 CT ABD & PELVIS W/CONTRAST: CPT | Mod: TC,PO

## 2024-07-09 PROCEDURE — 71260 CT THORAX DX C+: CPT | Mod: TC,PO

## 2024-07-09 PROCEDURE — 25500020 PHARM REV CODE 255: Mod: PO | Performed by: SURGERY

## 2024-07-09 RX ADMIN — IOHEXOL 75 ML: 350 INJECTION, SOLUTION INTRAVENOUS at 02:07

## 2024-07-10 ENCOUNTER — TELEPHONE (OUTPATIENT)
Dept: HEMATOLOGY/ONCOLOGY | Facility: CLINIC | Age: 64
End: 2024-07-10
Payer: MEDICARE

## 2024-07-12 ENCOUNTER — TELEPHONE (OUTPATIENT)
Dept: HEMATOLOGY/ONCOLOGY | Facility: CLINIC | Age: 64
End: 2024-07-12
Payer: MEDICARE

## 2024-07-12 ENCOUNTER — DOCUMENTATION ONLY (OUTPATIENT)
Dept: INFUSION THERAPY | Facility: HOSPITAL | Age: 64
End: 2024-07-12
Payer: MEDICARE

## 2024-07-12 ENCOUNTER — PATIENT MESSAGE (OUTPATIENT)
Dept: ADMINISTRATIVE | Facility: OTHER | Age: 64
End: 2024-07-12
Payer: MEDICARE

## 2024-07-12 ENCOUNTER — LAB VISIT (OUTPATIENT)
Dept: LAB | Facility: HOSPITAL | Age: 64
End: 2024-07-12
Attending: NURSE PRACTITIONER
Payer: MEDICARE

## 2024-07-12 ENCOUNTER — OFFICE VISIT (OUTPATIENT)
Dept: HEMATOLOGY/ONCOLOGY | Facility: CLINIC | Age: 64
End: 2024-07-12
Payer: MEDICARE

## 2024-07-12 VITALS
WEIGHT: 130.75 LBS | RESPIRATION RATE: 12 BRPM | HEART RATE: 72 BPM | DIASTOLIC BLOOD PRESSURE: 70 MMHG | HEIGHT: 61 IN | OXYGEN SATURATION: 99 % | SYSTOLIC BLOOD PRESSURE: 122 MMHG | BODY MASS INDEX: 24.69 KG/M2 | TEMPERATURE: 98 F

## 2024-07-12 DIAGNOSIS — C25.9 MALIGNANT NEOPLASM OF PANCREAS, UNSPECIFIED LOCATION OF MALIGNANCY: Primary | ICD-10-CM

## 2024-07-12 DIAGNOSIS — E11.9 TYPE 2 DIABETES MELLITUS WITHOUT COMPLICATION, WITHOUT LONG-TERM CURRENT USE OF INSULIN: ICD-10-CM

## 2024-07-12 DIAGNOSIS — K86.89 PANCREATIC INSUFFICIENCY: ICD-10-CM

## 2024-07-12 DIAGNOSIS — C25.9 MALIGNANT NEOPLASM OF PANCREAS, UNSPECIFIED LOCATION OF MALIGNANCY: ICD-10-CM

## 2024-07-12 DIAGNOSIS — E03.9 HYPOTHYROIDISM: ICD-10-CM

## 2024-07-12 PROCEDURE — 3044F HG A1C LEVEL LT 7.0%: CPT | Mod: CPTII,S$GLB,, | Performed by: INTERNAL MEDICINE

## 2024-07-12 PROCEDURE — 1126F AMNT PAIN NOTED NONE PRSNT: CPT | Mod: CPTII,S$GLB,, | Performed by: INTERNAL MEDICINE

## 2024-07-12 PROCEDURE — 4010F ACE/ARB THERAPY RXD/TAKEN: CPT | Mod: CPTII,S$GLB,, | Performed by: INTERNAL MEDICINE

## 2024-07-12 PROCEDURE — G2211 COMPLEX E/M VISIT ADD ON: HCPCS | Mod: S$GLB,,, | Performed by: INTERNAL MEDICINE

## 2024-07-12 PROCEDURE — 1160F RVW MEDS BY RX/DR IN RCRD: CPT | Mod: CPTII,S$GLB,, | Performed by: INTERNAL MEDICINE

## 2024-07-12 PROCEDURE — 99205 OFFICE O/P NEW HI 60 MIN: CPT | Mod: S$GLB,,, | Performed by: INTERNAL MEDICINE

## 2024-07-12 PROCEDURE — 36415 COLL VENOUS BLD VENIPUNCTURE: CPT | Mod: PN | Performed by: INTERNAL MEDICINE

## 2024-07-12 PROCEDURE — 3008F BODY MASS INDEX DOCD: CPT | Mod: CPTII,S$GLB,, | Performed by: INTERNAL MEDICINE

## 2024-07-12 PROCEDURE — 3288F FALL RISK ASSESSMENT DOCD: CPT | Mod: CPTII,S$GLB,, | Performed by: INTERNAL MEDICINE

## 2024-07-12 PROCEDURE — 3074F SYST BP LT 130 MM HG: CPT | Mod: CPTII,S$GLB,, | Performed by: INTERNAL MEDICINE

## 2024-07-12 PROCEDURE — 99999 PR PBB SHADOW E&M-EST. PATIENT-LVL V: CPT | Mod: PBBFAC,,, | Performed by: INTERNAL MEDICINE

## 2024-07-12 PROCEDURE — 3078F DIAST BP <80 MM HG: CPT | Mod: CPTII,S$GLB,, | Performed by: INTERNAL MEDICINE

## 2024-07-12 PROCEDURE — 1101F PT FALLS ASSESS-DOCD LE1/YR: CPT | Mod: CPTII,S$GLB,, | Performed by: INTERNAL MEDICINE

## 2024-07-12 PROCEDURE — 1159F MED LIST DOCD IN RCRD: CPT | Mod: CPTII,S$GLB,, | Performed by: INTERNAL MEDICINE

## 2024-07-12 RX ORDER — LORATADINE 10 MG/1
10 TABLET ORAL EVERY MORNING
COMMUNITY
Start: 2024-06-15

## 2024-07-12 RX ORDER — PANCRELIPASE 24000; 76000; 120000 [USP'U]/1; [USP'U]/1; [USP'U]/1
2 CAPSULE, DELAYED RELEASE PELLETS ORAL
Qty: 180 CAPSULE | Refills: 11 | Status: SHIPPED | OUTPATIENT
Start: 2024-07-12 | End: 2025-07-12

## 2024-07-12 NOTE — TELEPHONE ENCOUNTER
----- Message from Jazlyn King sent at 7/12/2024  2:14 PM CDT -----  Type:  Patient Returning Call    Who Called:  pt  Who Left Message for Patient:  Shoshana  Does the patient know what this is regarding?:  no  Best Call Back Number:  [phone]  Additional Information:  thank you

## 2024-07-12 NOTE — PROGRESS NOTES
PATIENT: Bessy Lamb  MRN: [id number]  DATE: 7/13/2024      Diagnosis:   1. Malignant neoplasm of pancreas, unspecified location of malignancy    2. Pancreatic insufficiency    3. Hypothyroidism    4. Type 2 diabetes mellitus without complication, without long-term current use of insulin        Chief Complaint: Establish Care (New pt; establishing care for pancreatic cancer)      Oncologic History:      Oncologic History     Oncologic Treatment     Pathology           Subjective:    Initial History: Ms. Lamb is a 63 y.o. female with Arthritis, DMII, GERD, Hypotyroidism, Obesity, Thyroid disease who presents for pancreatic cancer.  The patient initially underwent a CXR on 11/13/23 fur a URI which showed possible subtle pulmonary nodules.  CT chest 12/01/2023 showed a cluster of ill-defined centrilobular ground-glass opacity he inferior right and left upper lobes as well as a 1.2 cm ground-glass opacity in the superior segment of the left lower lobe; solid 3 mm pulmonary nodule in the right upper lobe; subcentimeter right hepatic lobe hypodensity likely representing a cyst.  The patient underwent surveillance CT chest on 05/21/2024 showing a 3.7 x 2.7 pancreatic body mass in the pancreatic tail atrophy suspicious of pancreatic malignancy as well as and unchanged benign 3 mm nodule in the right upper lobe.  MRI abdomen on 06/30/2024 showed hypoenhancing mass centered in the proximal pancreatic body measuring 2.9 x 2 cm with abutment and possible encasement of the distal splenic vein.  EUS was performed on 07/05/2024 mass in the pancreatic body stage T4 N0 M0 by endo sonographic criteria.  Pathology from biopsy of the stomach showed mild focally moderate chronic gastritis with no evidence of the H pylori.  Stomach polyp which was removed code hyperplastic polyps with chronic inflammation.  Pancreatic biopsy showed adenocarcinoma.  CT of the chest, abdomen, and pelvis on 07/09/2024 showed a 9 mm lesion in the  right hepatic lobe previously characterized as cysts; mass in the pancreatic body measuring 4.4 x 3.7 cm with diffuse pancreatic ductal dilatation measuring 8 mm:  Weaning vein at the portal confluence occluded with reconstitution via collaterals.  The mass abuts the portal vein by less than 180° but does not case the proximal splenic artery remains patent.  Also noted was a pancreatic lymph node measuring 0.7 cm.    The patient states she feels well currently with the exception of weight loss.  The patient denies CP, cough, SOB, abdominal pain, nausea, vomiting, constipation, diarrhea.  The patient denies fever, chills, night sweats, new lumps or bumps, easy bruising or bleeding.    Past Medical History:   Past Medical History:   Diagnosis Date    Arthritis, lumbar spine     hands    Diabetes mellitus     General anesthetics causing adverse effect in therapeutic use     following breast rediuct, hard time awakening  also had anxiety rxn to lidocain in dental ofc    GERD (gastroesophageal reflux disease)     Hypothyroidism 10/08/2014    Maternal anesthesia complication     epidural for 1st child went up instead of down; required intubation    Obesity (BMI 30-39.9) 10/08/2014    Thyroid disease     Thyroid nodule 10/08/2014       Past Surgical HIstory:   Past Surgical History:   Procedure Laterality Date    BREAST BIOPSY Left     b9    BREAST SURGERY      Reduction cause of a mass in left breast    c-sections x2       SECTION  3/26/81 & 85    Birth of two girls    COLONOSCOPY  2012         COLONOSCOPY N/A 2018    Procedure: COLONOSCOPY;  Surgeon: Francisco Mcclain MD;  Location: [facility];  Service: Endoscopy;  Laterality: N/A;    COLONOSCOPY N/A 10/24/2023    Procedure: COLONOSCOPY;  Surgeon: Francisco Mcclain MD;  Location: [facility];  Service: Endoscopy;  Laterality: N/A;    ENDOSCOPIC ULTRASOUND OF UPPER GASTROINTESTINAL TRACT Left 2024    Procedure: ULTRASOUND,  UPPER GI TRACT, ENDOSCOPIC;  Surgeon: Andrew Gordon MD;  Location: [facility] ENDO;  Service: Endoscopy;  Laterality: Left;    ESOPHAGOGASTRODUODENOSCOPY N/A 7/5/2024    Procedure: EGD (ESOPHAGOGASTRODUODENOSCOPY);  Surgeon: Andrew Gordon MD;  Location: [facility] ENDO;  Service: Endoscopy;  Laterality: N/A;    hysteroscopy with polypectomy      INCISIONAL BREAST BIOPSY Left 1996    benign; done at same time as reduction    TOTAL REDUCTION MAMMOPLASTY Bilateral 1996       Family History:   Family History   Problem Relation Name Age of Onset    Brain cancer Mother Ravi Mariela Tomasz     Early death Mother Ravi Tolbert 51        Passed at 51    Cancer Mother Ravi Mariela Tomasz         Brain tumor    Arthritis Mother Ravi Gill Macey Leoneent     Diabetes Father Ck pham tomasz         Type 2    Cirrhosis Father Ck pham tomasz     Cancer Father Ck ankit tolbert         Bone    COPD Father Ck ankit tolbert         Smoker    Early death Father Ck tolbert         Passed at 64    Lung cancer Father Ck pham tomasz     Heart disease Sister Mamta (dianna)wescovich         Congestive heart failure (passed 2/11/18    Hypertension Sister Mamta (dianna)wescovich     Kidney disease Sister Mamta (dianna)wescovich         Doc removed when she was a child    Hypertension Sister      Depression Sister Ravi (johnna) macey Mauricio ( sister)         Due to loss of her 27 year old son    Early death Sister Ravi (johnna) macey Luly ( sister)         Pulmonary hypertension, cirrhosis of the liver(passed 7/14/2007   Age 57    Heart disease Brother John Choudhury ( passed )         Heart attack    Hypertension Brother John Choudhury ( passed )     Heart disease Brother Juan Francisco Macey         Heart  blockage    Heart disease Brother Bhanu Macey         Heart attack (passed)    Diabetes Brother Bhanu Macey      Macular degeneration Brother Bhanu Choudhury     Breast cancer Maternal Aunt  30    Breast cancer Paternal Aunt  40    Breast cancer Paternal Aunt  40    Ovarian cancer Neg Hx         Social History:  reports that she has never smoked. She has never used smokeless tobacco. She reports current alcohol use. She reports that she does not use drugs.    Allergies:  Review of patient's allergies indicates:   Allergen Reactions    Duricef [cefadroxil] Hives    Eggs [egg derived] Anaphylaxis and Hives    Cat/feline products Itching    Dairycare [lactobacillus acidoph-lactase] Itching    Dog dander Itching    Wheat containing prod Hives       Medications:  Current Outpatient Medications   Medication Sig Dispense Refill    acetaminophen (TYLENOL) 325 MG tablet Take 325 mg by mouth every 6 (six) hours as needed for Pain.      ALPRAZolam (XANAX) 0.5 MG tablet Take 1 tablet (0.5 mg total) by mouth On call Procedure for Anxiety. 2 tablet 0    blood-glucose meter Misc Use as directed 1 each prn    calcium carbonate (OS-ISH) 600 mg calcium (1,500 mg) Tab Take 1,200 mg by mouth 2 (two) times daily with meals.      cetirizine (ZYRTEC) 10 MG tablet Take 1 tablet (10 mg total) by mouth once daily. 30 tablet 1    fluticasone propionate (FLONASE) 50 mcg/actuation nasal spray 1 spray (50 mcg total) by Each Nostril route once daily. 18.2 mL 0    levothyroxine (SYNTHROID) 50 MCG tablet Take 1 tablet (50 mcg total) by mouth once daily. 90 tablet 1    loratadine (CLARITIN) 10 mg tablet Take 10 mg by mouth every morning.      losartan (COZAAR) 25 MG tablet TAKE 1 TABLET (25 MG TOTAL) BY MOUTH ONCE DAILY 90 tablet 3    meclizine (ANTIVERT) 25 mg tablet Take 1 tablet (25 mg total) by mouth 3 (three) times daily as needed for Dizziness. 30 tablet 0    simvastatin (ZOCOR) 10 MG tablet Take 1 tablet (10 mg total) by mouth every evening. 90 tablet 3    SYNJARDY XR 10-1,000 mg TBph TAKE ONE TABLET BY MOUTH ONCE DAILY 30 tablet 5     TRUE METRIX GLUCOSE TEST STRIP Strp USE 1 STRIP ONCE DAILY TO TEST BLOOD GLUCOSE (50 DAY SUPPLY) 100 each 3    TRUEPLUS LANCETS 33 gauge Misc USE AS DIRECTED TO TEST BLOOD SUGAR ONCE DAILY FOR UP  USES 100 each 2    VITAMIN D2 1,250 mcg (50,000 unit) capsule TAKE 1 CAPSULE (50,000 UNITS TOTAL) BY MOUTH TWICE A WEEK. 24 capsule 3    famotidine (PEPCID) 40 MG tablet TAKE 1 TABLET (40 MG TOTAL) BY MOUTH ONCE DAILY. 30 tablet 11    lipase-protease-amylase 24,000-76,000-120,000 units (CREON) 24,000-76,000 -120,000 unit capsule Take 2 capsules by mouth 3 (three) times daily with meals. 180 capsule 11    mupirocin (BACTROBAN) 2 % ointment Apply topically 3 (three) times daily. (Patient not taking: Reported on 6/19/2024)       No current facility-administered medications for this visit.       Review of Systems   Constitutional:  Positive for unexpected weight change (40 pounds over 3 months). Negative for appetite change, chills, fatigue and fever.   HENT:  Negative for sore throat and trouble swallowing.    Eyes:  Negative for photophobia, pain and visual disturbance.   Respiratory:  Negative for cough, chest tightness and shortness of breath.    Cardiovascular:  Negative for chest pain, palpitations and leg swelling.   Gastrointestinal:  Negative for abdominal pain, constipation, diarrhea, nausea and vomiting.   Genitourinary:  Negative for difficulty urinating, dysuria and frequency.   Musculoskeletal:  Negative for arthralgias and back pain.   Skin:  Negative for color change and rash.   Neurological:  Negative for dizziness, light-headedness, numbness and headaches.   Hematological:  Negative for adenopathy. Does not bruise/bleed easily.   Psychiatric/Behavioral:  The patient is not nervous/anxious.        ECOG Performance Status: 0   Objective:      Vitals:   Vitals:    07/12/24 1006   BP: 122/70   BP Location: Right arm   Patient Position: Sitting   BP Method: Medium (Manual)   Pulse: 72   Resp: 12  "  Temp: 97.6 °F (36.4 °C)   TempSrc: Temporal   SpO2: 99%   Weight: 59.3 kg (130 lb 11.7 oz)   Height: 5' 1" (1.549 m)       Physical Exam  Constitutional:       General: She is not in acute distress.     Appearance: She is well-developed. She is not diaphoretic.   HENT:      Head: Normocephalic and atraumatic.   Eyes:      General: No scleral icterus.        Right eye: No discharge.         Left eye: No discharge.   Cardiovascular:      Rate and Rhythm: Normal rate and regular rhythm.      Heart sounds: Normal heart sounds. No murmur heard.     No friction rub. No gallop.   Pulmonary:      Effort: Pulmonary effort is normal. No respiratory distress.      Breath sounds: Normal breath sounds. No wheezing or rales.   Chest:      Chest wall: No tenderness.   Abdominal:      General: Bowel sounds are normal. There is no distension.      Palpations: Abdomen is soft. There is no mass.      Tenderness: There is no abdominal tenderness. There is no guarding or rebound.   Musculoskeletal:         General: No tenderness. Normal range of motion.   Lymphadenopathy:      Cervical: No cervical adenopathy.      Upper Body:      Right upper body: No supraclavicular or axillary adenopathy.      Left upper body: No supraclavicular or axillary adenopathy.   Skin:     Findings: No erythema or rash.   Neurological:      Mental Status: She is alert and oriented to person, place, and time.   Psychiatric:         Behavior: Behavior normal.       Laboratory Data:  Lab Visit on 07/09/2024   Component Date Value Ref Range Status    Sodium 07/09/2024 139  136 - 145 mmol/L Final    Potassium 07/09/2024 4.4  3.5 - 5.1 mmol/L Final    Chloride 07/09/2024 103  95 - 110 mmol/L Final    CO2 07/09/2024 24  23 - 29 mmol/L Final    Glucose 07/09/2024 105  70 - 110 mg/dL Final    BUN 07/09/2024 15  8 - 23 mg/dL Final    Creatinine 07/09/2024 0.9  0.5 - 1.4 mg/dL Final    Calcium 07/09/2024 9.9  8.7 - 10.5 mg/dL Final    Total Protein 07/09/2024 " 7.2  6.0 - 8.4 g/dL Final    Albumin 07/09/2024 4.1  3.5 - 5.2 g/dL Final    Total Bilirubin 07/09/2024 0.7  0.1 - 1.0 mg/dL Final    Comment: For infants and newborns, interpretation of results should be based  on gestational age, weight and in agreement with clinical  observations.    Premature Infant recommended reference ranges:  Up to 24 hours.............<8.0 mg/dL  Up to 48 hours............<12.0 mg/dL  3-5 days..................<15.0 mg/dL  6-29 days.................<15.0 mg/dL      Alkaline Phosphatase 07/09/2024 66  55 - 135 U/L Final    AST 07/09/2024 16  10 - 40 U/L Final    ALT 07/09/2024 11  10 - 44 U/L Final    eGFR 07/09/2024 >60  >60 mL/min/1.73 m^2 Final    Anion Gap 07/09/2024 12  8 - 16 mmol/L Final    WBC 07/09/2024 5.88  3.90 - 12.70 K/uL Final    RBC 07/09/2024 4.87  4.00 - 5.40 M/uL Final    Hemoglobin 07/09/2024 14.0  12.0 - 16.0 g/dL Final    Hematocrit 07/09/2024 41.6  37.0 - 48.5 % Final    MCV 07/09/2024 85  82 - 98 fL Final    MCH 07/09/2024 28.7  27.0 - 31.0 pg Final    MCHC 07/09/2024 33.7  32.0 - 36.0 g/dL Final    RDW 07/09/2024 12.3  11.5 - 14.5 % Final    Platelets 07/09/2024 185  150 - 450 K/uL Final    MPV 07/09/2024 9.7  9.2 - 12.9 fL Final    Immature Granulocytes 07/09/2024 0.3  0.0 - 0.5 % Final    Gran # (ANC) 07/09/2024 3.1  1.8 - 7.7 K/uL Final    Immature Grans (Abs) 07/09/2024 0.02  0.00 - 0.04 K/uL Final    Comment: Mild elevation in immature granulocytes is non specific and   can be seen in a variety of conditions including stress response,   acute inflammation, trauma and pregnancy. Correlation with other   laboratory and clinical findings is essential.      Lymph # 07/09/2024 2.4  1.0 - 4.8 K/uL Final    Mono # 07/09/2024 0.3  0.3 - 1.0 K/uL Final    Eos # 07/09/2024 0.1  0.0 - 0.5 K/uL Final    Baso # 07/09/2024 0.03  0.00 - 0.20 K/uL Final    nRBC 07/09/2024 0  0 /100 WBC Final    Gran % 07/09/2024 52.9  38.0 - 73.0 % Final    Lymph %  07/09/2024 40.0  18.0 - 48.0 % Final    Mono % 07/09/2024 5.3  4.0 - 15.0 % Final    Eosinophil % 07/09/2024 1.0  0.0 - 8.0 % Final    Basophil % 07/09/2024 0.5  0.0 - 1.9 % Final    Differential Method 07/09/2024 Automated   Final    Creatinine 07/09/2024 0.9  0.5 - 1.4 mg/dL Final    eGFR 07/09/2024 >60  >60 mL/min/1.73 m^2 Final         Imaging:     CXR 11/14/23    Cardiac silhouette and mediastinal contours are normal.  Lungs demonstrates no focal large opacity with possible subtle small nodules within the bilateral mid lungs.  No pleural effusion.  Osseous structures are intact.       CT chest 12/01/23    Base of Neck: No significant abnormality.     Thoracic soft tissues: Normal.     Aorta: Left-sided aortic arch.  No aneurysm and no significant atherosclerosis     Heart: Normal size. No effusion.     Pulmonary vasculature: Pulmonary arteries distribute normally.  There are four pulmonary veins.     Yuliya/Mediastinum: No pathologic richard enlargement.     Airways: Patent.     Lungs/Pleura: Biapical nonspecific fibronodular pleuroparenchymal scarring.  Solid 3 mm nodule in the right upper lobe (series 4, image 119).  Cluster ill-defined centrilobular ground-glass opacities within the posterior right and left upper lobes, not entirely specific but suspicious for small airways inflammation or infection.  There is a 1.2 cm ground-glass opacity in superior segment left lower lobe (series 4, image 282).  No consolidation, pleural effusion or pneumothorax.     Esophagus: Normal.     Upper Abdomen: There is an 8 mm hypodensity in the inferior right hepatic lobe, which measures fluid attenuation and probably represents a cyst.  Cholelithiasis.  No abnormal gallbladder wall thickening or pericholecystic fluid.     Bones: No acute fracture. No suspicious lytic or sclerotic lesions.    CT Chest 5/21/24  Base of Neck: No significant abnormality.     Thoracic soft tissues: Normal.     Aorta: Left-sided aortic arch. No  aneurysm.     Heart: Normal size. No effusion. Mild aortic and coronary artery atherosclerotic calcification.     Pulmonary vasculature: Pulmonary arteries distribute normally.     Yuliya/Mediastinum: No pathologic richard enlargement.     Esophagus: Normal.     Upper Abdomen: 3.7 x 2.7 cm pancreatic body mass with pancreatic tail atrophy suspicious for pancreatic malignancy. Recommend dedicated CT or MRI abdomen with IV contrast pancreatic protocol.     Airways: Patent.     Lungs/Pleura: Unchanged benign 3 mm nodule medial right upper lobe series 5, image 115. No new nodules. Mild bilateral apical pleuroparenchymal scarring again noted.     No airspace disease. Interval resolution of previously described ground-glass.     Bones: No acute fracture. No suspicious lytic or sclerotic lesions.    MRI Abdomen 6/30/24  Lower thorax: Heart is normal in size. No pleural fluid. No consolidation.     Liver: Normal size. No evidence of fatty infiltration. Small cyst in segment 6 measures 9 mm.     Gallbladder: Unremarkable.     Bile ducts: No intrahepatic or extrahepatic biliary dilatation.     Pancreas: Hypoenhancing mass centered in the proximal pancreatic body measures 2.9 x 2.0 cm (series 27, image 31). This lesion abuts and may encase the distal splenic vein which appears narrowed just proximal to the portal confluence. There is atrophy of the pancreatic body and tail distal to this lesion with associated dilatation of the main pancreatic duct measuring 5 mm. There is also atrophy of the pancreatic head without associated ductal dilatation.     Spleen: Normal size. No focal lesions.     Adrenal glands: Unremarkable.     Kidneys: No renal masses. No hydronephrosis.     GI: No evidence of bowel obstruction.     Mesentery/retroperitoneum: No pathologically enlarged lymph nodes.     Vasculature: Visualized aorta is normal in caliber. Portal vein is patent.     Body wall/extraperitoneal soft tissues: Unremarkable.     Bones:  Visualized osseous structures demonstrate normal marrow signal.     Miscellaneous: No intraperitoneal free fluid.    CT C/A/P 7/09/24  Normal thyroid. Heart size is normal. No pericardial effusion. The thoracic aorta and main pulmonary artery are normal in caliber. No enlarged axillary, mediastinal or hilar lymph nodes.     Central airways are clear. No pleural effusion, focal consolidation or pneumothorax.     The liver is normal in morphology and with smooth contour. Inferior right hepatic lobe 9 mm lesion previously characterized as a cyst. No new hepatic lesion.     Cholelithiasis. No gallbladder wall thickening or pericholecystic fluid. No intra or extrahepatic biliary ductal dilatation.     The spleen, kidneys and adrenal glands are normal.     Proximal pancreatic body irregular mass measures 4.4 x 3.7 cm (series 2, image 65) which when measured similarly to the prior chest CT is unchanged in size. The distal pancreatic body and tail are atrophic. Diffuse pancreatic ductal dilatation measuring 8 mm.     The portal and superior mesenteric veins are patent. 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 The mass abuts the portal by less than 180 degrees. The mass encases the proximal splenic artery which remains patent. The SMA is remote from the mass.     Peripancreatic lymph node measuring 0.7 cm in short axis (series 2, image 72). No enlarged retroperitoneal lymph nodes.     No bowel obstruction inflammatory change. The mass focally abuts the distal lesser curvature the stomach. No free fluid or free air.     Circumferential bladder wall thickening. Normal uterus. No adnexal mass. No enlarged pelvic lymph nodes.     Soft tissues are unremarkable. No acute or aggressive osseous abnormality.       Assessment:       1. Malignant neoplasm of pancreas, unspecified location of malignancy    2. Pancreatic insufficiency    3. Hypothyroidism    4. Type 2  diabetes mellitus without complication, without long-term current use of insulin           Plan:     Pancreatic Cancer - Patient with Stage III pancreatic cancer with concern for potential encasement of the distal celiac artery per discussion with Dr Valentin  -Will discuss pt at tumor board next week  -No sign of mets  - 94.5 on 6/17/24  -Will obtain [employer]  -Pharmacogenomic testing to be done   -Will obtain [facility] blood testing   -[facility] tissue testing to be sent   -Perioperative treatment with FOLFIRINOX discussed  -Pt to attend chemo class  -PT to participate in chemocare   Visit today included increased complexity associated with the care of the episodic problem (chemotherapy) addressed and managing the longitudinal care of the patient due to the serious and/or complex managed problem(s) pancreatic cancer.    Pancreatic Insufficiency - Will prescribe Creon  -Will monitor weight    Hypothyroidism - pt on synthroid  -Will monitror    DMII - PT currently on [employer]([facility])  Lab Results   Component Value Date    HGBA1C 6.2 (H) 04/01/2024   -Possibly due to pancreatic cancer  -Will monitor    Route Chart for Scheduling    Med Onc Chart Routing      Follow up with physician Other. Pt needs blood work today with [facility] [facility] testing and pharmacogenomic testing today.  PT needs an appt with general surgery for the PORT.  PT needs a chemo class.  Pt needs a CBC, CMP and cA19-9 the day prior to cycle 1 of chemo with an appt with me keira day before chemo.  Pt needs to be set up for chemocare .  PT needs [facility] blood testing with next lab work   Follow up with [facility]    Infusion scheduling note    Injection scheduling note    Labs    Imaging    Pharmacy appointment    Other referrals            Treatment Plan Information   OP GI FOLFIRINOX (oxaliplatin, leucovorin, irinotecan, fluorouracil) Q2W    Rajat Hunt MD   Upcoming Treatment Dates - OP GI FOLFIRINOX (oxaliplatin, leucovorin, irinotecan,  fluorouracil) Q2W     7/22/2024       Chemotherapy       oxaliplatin (ELOXATIN) 85 mg/m2 = 136 mg in D5W 527.2 mL chemo infusion       leucovorin calcium 400 mg/m2 = 640 mg in D5W 250 mL infusion       irinotecan (CAMPTOSAR) 180 mg/m2 = 288 mg in 0.9% NaCl 514.4 mL chemo infusion       fluorouraciL injection 640 mg       fluorouracil (Adrucil) 2,400 mg/m2 = 3,840 mg in 0.9% NaCl 100 mL chemo infusion       Antiemetics       palonosetron 0.25mg/dexAMETHasone 12mg in NS IVPB 0.25 mg 50 mL  8/5/2024       Chemotherapy       oxaliplatin (ELOXATIN) 85 mg/m2 = 136 mg in D5W 527.2 mL chemo infusion       leucovorin calcium 400 mg/m2 = 640 mg in D5W 250 mL infusion       irinotecan (CAMPTOSAR) 180 mg/m2 = 288 mg in 0.9% NaCl 514.4 mL chemo infusion       fluorouraciL injection 640 mg       fluorouracil (Adrucil) 2,400 mg/m2 = 3,840 mg in 0.9% NaCl 100 mL chemo infusion       Antiemetics       palonosetron 0.25mg/dexAMETHasone 12mg in NS IVPB 0.25 mg 50 mL  8/19/2024       Chemotherapy       oxaliplatin (ELOXATIN) 85 mg/m2 = 136 mg in D5W 527.2 mL chemo infusion       leucovorin calcium 400 mg/m2 = 640 mg in D5W 250 mL infusion       irinotecan (CAMPTOSAR) 180 mg/m2 = 288 mg in 0.9% NaCl 514.4 mL chemo infusion       fluorouraciL injection 640 mg       fluorouracil (Adrucil) 2,400 mg/m2 = 3,840 mg in 0.9% NaCl 100 mL chemo infusion       Antiemetics       palonosetron 0.25mg/dexAMETHasone 12mg in NS IVPB 0.25 mg 50 mL  9/2/2024       Chemotherapy       oxaliplatin (ELOXATIN) 85 mg/m2 = 136 mg in D5W 527.2 mL chemo infusion       leucovorin calcium 400 mg/m2 = 640 mg in D5W 250 mL infusion       irinotecan (CAMPTOSAR) 180 mg/m2 = 288 mg in 0.9% NaCl 514.4 mL chemo infusion       fluorouraciL injection 640 mg       fluorouracil (Adrucil) 2,400 mg/m2 = 3,840 mg in 0.9% NaCl 100 mL chemo infusion       Antiemetics       palonosetron 0.25mg/dexAMETHasone 12mg in NS IVPB 0.25 mg 50 mL      Rajat Hunt MD  [facility]  [facility]   [address] [facility] [place] [id number]   O: [phone]  F: [phone]

## 2024-07-12 NOTE — PLAN OF CARE
START ON PATHWAY REGIMEN - Pancreatic Adenocarcinoma    [id number]        Irinotecan (Camptosar)       Oxaliplatin       Leucovorin       Fluorouracil           Additional Orders: Regimen is based on the PRODIGE 24 trial by Darin et   al., 2018. The use of growth factor was mandated in some, but not all   mFOLFIRINOX studies; please follow institutional protocol/physician discretion   regarding use of growth factor.    **Always confirm dose/schedule in your pharmacy ordering system**    Patient Characteristics:  Preoperative, M0 (Clinical Staging), Borderline Resectable, PS = 0,1, BRCA1/2   and PALB2 Mutation Absent/Unknown  Therapeutic Status: Preoperative, M0 (Clinical Staging)  AJCC T Category: cT4  AJCC N Category: cN0  Resectability Status: Borderline Resectable  AJCC M Category: cM0  AJCC 8 Stage Grouping: III  ECOG Performance Status: 0  BRCA1/2 Mutation Status: Awaiting Test Results  PALB2 Mutation Status: Awaiting Test Results  Intent of Therapy:  Curative Intent, Discussed with Patient

## 2024-07-12 NOTE — PROGRESS NOTES
ANA MARIAW received message from MD requesting that patient may need financial assistance.  will consult with patient and MA.

## 2024-07-13 ENCOUNTER — PATIENT MESSAGE (OUTPATIENT)
Dept: ADMINISTRATIVE | Facility: OTHER | Age: 64
End: 2024-07-13
Payer: MEDICARE

## 2024-07-14 ENCOUNTER — PATIENT MESSAGE (OUTPATIENT)
Dept: ADMINISTRATIVE | Facility: OTHER | Age: 64
End: 2024-07-14
Payer: MEDICARE

## 2024-07-15 ENCOUNTER — TELEPHONE (OUTPATIENT)
Dept: INFUSION THERAPY | Facility: HOSPITAL | Age: 64
End: 2024-07-15
Payer: MEDICARE

## 2024-07-15 ENCOUNTER — DOCUMENTATION ONLY (OUTPATIENT)
Dept: INFUSION THERAPY | Facility: HOSPITAL | Age: 64
End: 2024-07-15
Payer: MEDICARE

## 2024-07-15 DIAGNOSIS — C25.9 MALIGNANT NEOPLASM OF PANCREAS, UNSPECIFIED LOCATION OF MALIGNANCY: Primary | ICD-10-CM

## 2024-07-15 NOTE — TELEPHONE ENCOUNTER
ANA MARIAW spoke with the patient via telephone. She and her  were currently at [facility], where her  is receiving treatment. Now that she has been diagnosed, their treatments will be planned so that they can be together in [place] as needed and back home as needed. The patient shared that she still does not have a working kitchen in her home. She stated that she has an appointment with the financial counselor, Mikki Grey, on July 19th to discuss assistance. The family has qualified for gas cards and the therapeutic food pantry when her  was being treated at this cancer center in the past.  told the patient that we would talk more about assistance when she comes in on July 22nd for her first treatment and after she has met with Mikki. SW will follow up with patient during that treatment in the infusion center.

## 2024-07-16 DIAGNOSIS — Z12.31 VISIT FOR SCREENING MAMMOGRAM: Primary | ICD-10-CM

## 2024-07-17 ENCOUNTER — TUMOR BOARD CONFERENCE (OUTPATIENT)
Dept: HEMATOLOGY/ONCOLOGY | Facility: CLINIC | Age: 64
End: 2024-07-17
Payer: MEDICARE

## 2024-07-17 ENCOUNTER — OFFICE VISIT (OUTPATIENT)
Dept: OBSTETRICS AND GYNECOLOGY | Facility: CLINIC | Age: 64
End: 2024-07-17
Payer: MEDICARE

## 2024-07-17 VITALS — SYSTOLIC BLOOD PRESSURE: 128 MMHG | WEIGHT: 132.5 LBS | DIASTOLIC BLOOD PRESSURE: 68 MMHG | BODY MASS INDEX: 25.03 KG/M2

## 2024-07-17 DIAGNOSIS — Z12.4 ENCOUNTER FOR PAP SMEAR OF CERVIX WITH HPV DNA COTESTING: Primary | ICD-10-CM

## 2024-07-17 PROCEDURE — 1126F AMNT PAIN NOTED NONE PRSNT: CPT | Mod: CPTII,S$GLB,, | Performed by: SPECIALIST

## 2024-07-17 PROCEDURE — 3074F SYST BP LT 130 MM HG: CPT | Mod: CPTII,S$GLB,, | Performed by: SPECIALIST

## 2024-07-17 PROCEDURE — 3288F FALL RISK ASSESSMENT DOCD: CPT | Mod: CPTII,S$GLB,, | Performed by: SPECIALIST

## 2024-07-17 PROCEDURE — 4010F ACE/ARB THERAPY RXD/TAKEN: CPT | Mod: CPTII,S$GLB,, | Performed by: SPECIALIST

## 2024-07-17 PROCEDURE — 3008F BODY MASS INDEX DOCD: CPT | Mod: CPTII,S$GLB,, | Performed by: SPECIALIST

## 2024-07-17 PROCEDURE — 3044F HG A1C LEVEL LT 7.0%: CPT | Mod: CPTII,S$GLB,, | Performed by: SPECIALIST

## 2024-07-17 PROCEDURE — 87624 HPV HI-RISK TYP POOLED RSLT: CPT | Performed by: SPECIALIST

## 2024-07-17 PROCEDURE — 1101F PT FALLS ASSESS-DOCD LE1/YR: CPT | Mod: CPTII,S$GLB,, | Performed by: SPECIALIST

## 2024-07-17 PROCEDURE — 1159F MED LIST DOCD IN RCRD: CPT | Mod: CPTII,S$GLB,, | Performed by: SPECIALIST

## 2024-07-17 PROCEDURE — 99213 OFFICE O/P EST LOW 20 MIN: CPT | Mod: S$GLB,,, | Performed by: SPECIALIST

## 2024-07-17 PROCEDURE — 99999 PR PBB SHADOW E&M-EST. PATIENT-LVL III: CPT | Mod: PBBFAC,,, | Performed by: SPECIALIST

## 2024-07-17 PROCEDURE — 3078F DIAST BP <80 MM HG: CPT | Mod: CPTII,S$GLB,, | Performed by: SPECIALIST

## 2024-07-17 NOTE — PROGRESS NOTES
62 yo WF presents for annual gyn eval newly dxd pancreatic Ca and being followed by [facility]  Past Medical History:   Diagnosis Date    Arthritis, lumbar spine     hands    Diabetes mellitus     General anesthetics causing adverse effect in therapeutic use     following breast rediuct, hard time awakening  also had anxiety rxn to lidocain in dental ofc    GERD (gastroesophageal reflux disease)     Hypothyroidism 10/08/2014    Maternal anesthesia complication     epidural for 1st child went up instead of down; required intubation    Obesity (BMI 30-39.9) 10/08/2014    Thyroid disease     Thyroid nodule 10/08/2014       Past Surgical History:   Procedure Laterality Date    BREAST BIOPSY Left     b9    BREAST SURGERY      Reduction cause of a mass in left breast    c-sections x2       SECTION  3/26/81 & 85    Birth of two girls    COLONOSCOPY  2012         COLONOSCOPY N/A 2018    Procedure: COLONOSCOPY;  Surgeon: Francisco Mcclain MD;  Location: [facility];  Service: Endoscopy;  Laterality: N/A;    COLONOSCOPY N/A 10/24/2023    Procedure: COLONOSCOPY;  Surgeon: Francisco Mcclain MD;  Location: [facility];  Service: Endoscopy;  Laterality: N/A;    ENDOSCOPIC ULTRASOUND OF UPPER GASTROINTESTINAL TRACT Left 2024    Procedure: ULTRASOUND, UPPER GI TRACT, ENDOSCOPIC;  Surgeon: Andrew Gordon MD;  Location: [facility];  Service: Endoscopy;  Laterality: Left;    ESOPHAGOGASTRODUODENOSCOPY N/A 2024    Procedure: EGD (ESOPHAGOGASTRODUODENOSCOPY);  Surgeon: Andrew Gordon MD;  Location: [facility];  Service: Endoscopy;  Laterality: N/A;    hysteroscopy with polypectomy      INCISIONAL BREAST BIOPSY Left     benign; done at same time as reduction    TOTAL REDUCTION MAMMOPLASTY Bilateral        Family History   Problem Relation Name Age of Onset    Brain cancer Mother Ravi Leoneent     Early death Mother Ravi Seaman 51        Passed at 51     Cancer Mother Ravi Seaman         Brain tumor    Arthritis Mother Ravi Seaman     Diabetes Father Ck seaman         Type 2    Cirrhosis Father Ck seaman     Cancer Father Ck seaman         Bone    COPD Father Ck seaman         Smoker    Early death Father Ck seaman         Passed at 64    Lung cancer Father Ck seaman     Heart disease Sister Mamta (dianna)wescovich         Congestive heart failure (passed 2/11/18    Hypertension Sister Mamta (dianna)wescovich     Kidney disease Sister Mamta (dianna)wescovich         Doc removed when she was a child    Hypertension Sister      Depression Sister Ravi (johnna) nicolette Luly ( sister)         Due to loss of her 27 year old son    Early death Sister Ravi (johnna) nicolette Luly ( sister)         Pulmonary hypertension, cirrhosis of the liver(passed 7/14/2007   Age 57    Heart disease Brother John Choudhury ( passed )         Heart attack    Hypertension Brother John Choudhury ( passed )     Heart disease Brother Juan Francisco Choudhury         Heart  blockage    Heart disease Brother Bhanu Choudhury         Heart attack (passed)    Diabetes Brother Bhanu Choudhury     Macular degeneration Brother Bhanu Choudhury     Breast cancer Maternal Aunt  30    Breast cancer Paternal Aunt  40    Breast cancer Paternal Aunt  40    Ovarian cancer Neg Hx         Social History     Socioeconomic History    Marital status:    Tobacco Use    Smoking status: Never    Smokeless tobacco: Never   Substance and Sexual Activity    Alcohol use: Yes     Comment: 2/ month    Drug use: No    Sexual activity: Not Currently     Birth control/protection: Post-menopausal     Comment:  had prostate cancer had it remove     Social Determinants of Health     Financial Resource Strain: Low Risk  (12/5/2023)    Overall Financial Resource Strain (CARDIA)     Difficulty of Paying Living  Expenses: Not hard at all   Recent Concern: Financial Resource Strain - Medium Risk (10/6/2023)    Overall Financial Resource Strain (CARDIA)     Difficulty of Paying Living Expenses: Somewhat hard   Food Insecurity: No Food Insecurity (12/5/2023)    Hunger Vital Sign     Worried About Running Out of Food in the Last Year: Never true     Ran Out of Food in the Last Year: Never true   Transportation Needs: No Transportation Needs (12/5/2023)    PRAPARE - Transportation     Lack of Transportation (Medical): No     Lack of Transportation (Non-Medical): No   Physical Activity: Sufficiently Active (12/5/2023)    Exercise Vital Sign     Days of Exercise per Week: 6 days     Minutes of Exercise per Session: 150+ min   Stress: Stress Concern Present (12/5/2023)    [employer] [place] [employer] - Occupational Stress Questionnaire     Feeling of Stress : To some extent   Housing Stability: High Risk (12/5/2023)    Housing Stability Vital Sign     Unable to Pay for Housing in the Last Year: No     Number of Places Lived in the Last Year: 1     Unstable Housing in the Last Year: Yes       Current Outpatient Medications   Medication Sig Dispense Refill    acetaminophen (TYLENOL) 325 MG tablet Take 325 mg by mouth every 6 (six) hours as needed for Pain.      blood-glucose meter Misc Use as directed 1 each prn    calcium carbonate (OS-ISH) 600 mg calcium (1,500 mg) Tab Take 1,200 mg by mouth 2 (two) times daily with meals.      cetirizine (ZYRTEC) 10 MG tablet Take 1 tablet (10 mg total) by mouth once daily. 30 tablet 1    fluticasone propionate (FLONASE) 50 mcg/actuation nasal spray 1 spray (50 mcg total) by Each Nostril route once daily. 18.2 mL 0    levothyroxine (SYNTHROID) 50 MCG tablet Take 1 tablet (50 mcg total) by mouth once daily. 90 tablet 1    loratadine (CLARITIN) 10 mg tablet Take 10 mg by mouth every morning.      losartan (COZAAR) 25 MG tablet TAKE 1 TABLET (25 MG TOTAL) BY MOUTH ONCE DAILY 90 tablet 3     meclizine (ANTIVERT) 25 mg tablet Take 1 tablet (25 mg total) by mouth 3 (three) times daily as needed for Dizziness. 30 tablet 0    simvastatin (ZOCOR) 10 MG tablet Take 1 tablet (10 mg total) by mouth every evening. 90 tablet 3    SYNJARDY XR 10-1,000 mg TBph TAKE ONE TABLET BY MOUTH ONCE DAILY 30 tablet 5    TRUE METRIX GLUCOSE TEST STRIP Strp USE 1 STRIP ONCE DAILY TO TEST BLOOD GLUCOSE (50 DAY SUPPLY) 100 each 3    TRUEPLUS LANCETS 33 gauge Misc USE AS DIRECTED TO TEST BLOOD SUGAR ONCE DAILY FOR UP  USES 100 each 2    VITAMIN D2 1,250 mcg (50,000 unit) capsule TAKE 1 CAPSULE (50,000 UNITS TOTAL) BY MOUTH TWICE A WEEK. 24 capsule 3    ALPRAZolam (XANAX) 0.5 MG tablet Take 1 tablet (0.5 mg total) by mouth On call Procedure for Anxiety. (Patient not taking: Reported on 7/17/2024) 2 tablet 0    famotidine (PEPCID) 40 MG tablet TAKE 1 TABLET (40 MG TOTAL) BY MOUTH ONCE DAILY. 30 tablet 11    lipase-protease-amylase 24,000-76,000-120,000 units (CREON) 24,000-76,000 -120,000 unit capsule Take 2 capsules by mouth 3 (three) times daily with meals. (Patient not taking: Reported on 7/17/2024) 180 capsule 11    mupirocin (BACTROBAN) 2 % ointment Apply topically 3 (three) times daily. (Patient not taking: Reported on 6/19/2024)       No current facility-administered medications for this visit.       Review of patient's allergies indicates:   Allergen Reactions    Duricef [cefadroxil] Hives    Eggs [egg derived] Anaphylaxis and Hives    Cat/feline products Itching    Dairycare [lactobacillus acidoph-lactase] Itching    Dog dander Itching    Wheat containing prod Hives       Review of System:   General: no chills, fever, night sweats, weight gain or weight loss  Psychological: no depression or suicidal ideation  Breasts: no new or changing breast lumps, nipple discharge or masses.  Respiratory: no cough, shortness of breath, or wheezing  Cardiovascular: no chest pain or dyspnea on exertion  Gastrointestinal: no  abdominal pain, change in bowel habits, or black or bloody stools  Genito-Urinary: no incontinence, urinary frequency/urgency or vulvar/vaginal symptoms, pelvic pain or abnormal vaginal bleeding.  Musculoskeletal: no gait disturbance or muscular weakness                                               General Appearance    A and O x 4, Cooperative, no distress   Breasts    Abdomen   Symmetrical, no masses, no discharge, skin changes , erythema or retraction. No adenopathy  Soft, non-tender, bowel sounds active all four quadrants,  no masses, no organomegaly    Genitourinary:   External rectal exam shows no thrombosed external hemorrhoids.   Pelvic exam was performed with patient supine.  No labial fusion.  There is no rash, lesion or injury on the right labia.  There is no rash, lesion or injury on the left labia.  No bleeding and no signs of injury around the vaginal introitus, urethra is without lesions and well supported. The cervix is visualized with no discharge, lesions or friability.  No vaginal discharge found.  No significant Cystocele, Enterocele or rectocele, and uterus well supported.  Bimanual exam:  The urethra is normal to palpation and there are no palpable vaginal wall masses.  Uterus is not deviated, not enlarged, not fixed, normal shape and not tender.  Cervix exhibits no motion tenderness.   Right adnexum displays no mass and no tenderness.  Left adnexum displays no mass and no tenderness.   Extremities: Extremities normal, atraumatic, no cyanosis or edema                     NOTE  NURSING PERSONAL PRESENT FOR ENTIRE PHYSICAL EXAM     Plan  BSE monthly  Screening mammogram and repeat yearly  Daily calcium intake  RTO 1 year/prn    I spent a total of 30 minutes on the day of the visit. This includes face to face time and non-face to face time preparing to see the patient (eg, review of tests), obtaining and/or reviewing separately obtained history, documenting clinical information in the electronic  or other health record, independently interpreting results and communicating results to the patient/family/caregiver, or care coordinator.

## 2024-07-19 ENCOUNTER — DOCUMENTATION ONLY (OUTPATIENT)
Dept: INFUSION THERAPY | Facility: HOSPITAL | Age: 64
End: 2024-07-19
Payer: MEDICARE

## 2024-07-19 ENCOUNTER — PATIENT MESSAGE (OUTPATIENT)
Dept: HEMATOLOGY/ONCOLOGY | Facility: CLINIC | Age: 64
End: 2024-07-19
Payer: MEDICARE

## 2024-07-19 DIAGNOSIS — C25.9 MALIGNANT NEOPLASM OF PANCREAS, UNSPECIFIED LOCATION OF MALIGNANCY: Primary | ICD-10-CM

## 2024-07-19 RX ORDER — OLANZAPINE 5 MG/1
TABLET ORAL
Qty: 6 TABLET | Refills: 11 | Status: SHIPPED | OUTPATIENT
Start: 2024-07-21

## 2024-07-19 RX ORDER — LOPERAMIDE HYDROCHLORIDE 2 MG/1
CAPSULE ORAL
Qty: 30 CAPSULE | Refills: 11 | Status: SHIPPED | OUTPATIENT
Start: 2024-07-21

## 2024-07-19 RX ORDER — PROCHLORPERAZINE MALEATE 5 MG
5 TABLET ORAL EVERY 6 HOURS PRN
Qty: 20 TABLET | Refills: 5 | Status: SHIPPED | OUTPATIENT
Start: 2024-07-21

## 2024-07-19 NOTE — PROGRESS NOTES
Nutrition Note  RD met pt in lobby of first floor. Pt discussed her new pancreatic cancer diagnosis. RD provided emotional support as appropriated. RD recommend she increase her fluid and calorie intake prior to starting chemotherapy on Monday, July, 22nd.    Wt Readings from Last 20 Encounters:   07/18/24 59.4 kg (130 lb 15.3 oz)   07/17/24 60.1 kg (132 lb 7.9 oz)   07/17/24 60.1 kg (132 lb 7.9 oz)   07/12/24 59.3 kg (130 lb 11.7 oz)   07/05/24 59.1 kg (130 lb 4.7 oz)   06/19/24 59 kg (130 lb)   05/21/24 59.5 kg (131 lb 1 oz)   04/09/24 60.6 kg (133 lb 9.6 oz)   03/06/24 61.4 kg (135 lb 4 oz)   02/20/24 62.3 kg (137 lb 5.6 oz)   01/09/24 63 kg (138 lb 14.2 oz)   11/13/23 63.6 kg (140 lb 5.2 oz)   10/31/23 66.9 kg (147 lb 7.8 oz)   10/24/23 66.2 kg (146 lb)   10/06/23 68.4 kg (150 lb 12.8 oz)   08/25/23 69.2 kg (152 lb 8.9 oz)   07/24/23 69.8 kg (153 lb 15.9 oz)   07/18/23 70.1 kg (154 lb 10.4 oz)   07/17/23 70 kg (154 lb 5.2 oz)   02/14/23 70.7 kg (155 lb 13.8 oz)     Gina Cobian, SARAHN, LDN

## 2024-07-19 NOTE — NURSING
[facility]      GI MULTIDISCIPLINARY TUMOR BOARD      Date:07/17/2024  MRN: [id number]    Site:   Cancer Type: Pancreatic cancer     Cancer Site: Pancreas     Cancer Staging Complete: Yes       Presenting Hospital / Clinic: [facility]     Virtual Tumor Board Conference: In person       PRESENTER:   Presenter: Rajat Hunt MD     Specialties Present: Medical Oncology; Hematology; Radiation Oncology; Research; Navigation; Pathology; Surgical Oncology; Integrative Oncology; Gastrointestinal; Nuclear Medicine; Radiology       PATIENT SUMMARY:   Ms. Lamb is a 63 y.o. female with Arthritis, DMII, GERD, Hypotyroidism, Obesity, Thyroid disease who presents for pancreatic cancer.  The patient initially underwent a CXR on 11/13/23 fur a URI which showed possible subtle pulmonary nodules.  CT chest 12/01/2023 showed a cluster of ill-defined centrilobular ground-glass opacity he inferior right and left upper lobes as well as a 1.2 cm ground-glass opacity in the superior segment of the left lower lobe; solid 3 mm pulmonary nodule in the right upper lobe; subcentimeter right hepatic lobe hypodensity likely representing a cyst.  The patient underwent surveillance CT chest on 05/21/2024 showing a 3.7 x 2.7 pancreatic body mass in the pancreatic tail atrophy suspicious of pancreatic malignancy as well as and unchanged benign 3 mm nodule in the right upper lobe.  MRI abdomen on 06/30/2024 showed hypoenhancing mass centered in the proximal pancreatic body measuring 2.9 x 2 cm with abutment and possible encasement of the distal splenic vein.  EUS was performed on 07/05/2024 mass in the pancreatic body stage T4 N0 M0 by endo sonographic criteria.  Pathology from biopsy of the stomach showed mild focally moderate chronic gastritis with no evidence of the H pylori.  Stomach polyp which was removed code hyperplastic polyps with chronic  inflammation.  Pancreatic biopsy showed adenocarcinoma.  CT of the chest, abdomen, and pelvis on 07/09/2024 showed a 9 mm lesion in the right hepatic lobe previously characterized as cysts; mass in the pancreatic body measuring 4.4 x 3.7 cm with diffuse pancreatic ductal dilatation measuring 8 mm:  Weaning vein at the portal confluence occluded with reconstitution via collaterals.  The mass abuts the portal vein by less than 180° but does not case the proximal splenic artery remains patent.  Also noted was a pancreatic lymph node measuring 0.7 cm.     The patient states she feels well currently with the exception of weight loss.  The patient denies CP, cough, SOB, abdominal pain, nausea, vomiting, constipation, diarrhea.  The patient denies fever, chills, night sweats, new lumps or bumps, easy bruising or bleeding.    Background Information  Patient Status: a new patient  Reason for Consultation: Initial Presentation  Biopsy Date: 07/05/24  Biopsy Results: Cancer  Treatment to Date: None         BOARD RECOMMENDATIONS:   Recommended Plan (General)  Recommended Plan: Surgery; Chemotherapy; Radiation; Imaging  Recommended Plan Note: See Dr. Hunt for chemotherapy, Pancreas protocol CT to reassess following chemotherapy treatment, see Dr. Valentin for surgery consultation.         Consult: Surgery, Hem/Onc, Rad/Onc, GI, Radiology, and Enroll into Research Protocol    Cancer Staging   Malignant neoplasm of pancreas  Staging form: Exocrine Pancreas, AJCC 8th Edition  - Clinical: Stage III (cT4, cN0, cM0) - Signed by Rajat Hunt MD on 7/13/2024           Leticia'l Treatment Guidelines reviewed and care planned is consistent with guidelines.   (i.e., NCCN, NCI, PD, ACO, AUA, etc.)    PRESENTATION AT CANCER CONFERENCE:   Presentation at Cancer Conference: Prospective       CLINICAL TRIAL ELIGIBILITY:   Clinical Trial Eligibility  Clinical Trial Eligibility: Potentially eligible  Clinical Trial Note: Potentially eligible for  the Early detection of Pancreatic Cancer Prospective Study-U01         Rajat Hunt MD

## 2024-07-20 ENCOUNTER — PATIENT MESSAGE (OUTPATIENT)
Dept: ADMINISTRATIVE | Facility: OTHER | Age: 64
End: 2024-07-20
Payer: MEDICARE

## 2024-07-22 ENCOUNTER — INFUSION (OUTPATIENT)
Dept: INFUSION THERAPY | Facility: HOSPITAL | Age: 64
End: 2024-07-22
Attending: INTERNAL MEDICINE
Payer: MEDICARE

## 2024-07-22 ENCOUNTER — DOCUMENTATION ONLY (OUTPATIENT)
Dept: INFUSION THERAPY | Facility: HOSPITAL | Age: 64
End: 2024-07-22
Payer: MEDICARE

## 2024-07-22 ENCOUNTER — LAB VISIT (OUTPATIENT)
Dept: LAB | Facility: HOSPITAL | Age: 64
End: 2024-07-22
Attending: INTERNAL MEDICINE
Payer: MEDICARE

## 2024-07-22 ENCOUNTER — OFFICE VISIT (OUTPATIENT)
Dept: HEMATOLOGY/ONCOLOGY | Facility: CLINIC | Age: 64
End: 2024-07-22
Payer: MEDICARE

## 2024-07-22 VITALS
WEIGHT: 132.69 LBS | OXYGEN SATURATION: 97 % | RESPIRATION RATE: 14 BRPM | TEMPERATURE: 98 F | HEART RATE: 79 BPM | SYSTOLIC BLOOD PRESSURE: 109 MMHG | BODY MASS INDEX: 25.05 KG/M2 | HEIGHT: 61 IN | DIASTOLIC BLOOD PRESSURE: 53 MMHG

## 2024-07-22 VITALS
RESPIRATION RATE: 12 BRPM | HEART RATE: 70 BPM | SYSTOLIC BLOOD PRESSURE: 116 MMHG | OXYGEN SATURATION: 97 % | WEIGHT: 132.69 LBS | BODY MASS INDEX: 25.05 KG/M2 | DIASTOLIC BLOOD PRESSURE: 70 MMHG | HEIGHT: 61 IN | TEMPERATURE: 98 F

## 2024-07-22 DIAGNOSIS — C25.9 MALIGNANT NEOPLASM OF PANCREAS, UNSPECIFIED LOCATION OF MALIGNANCY: ICD-10-CM

## 2024-07-22 DIAGNOSIS — E11.9 TYPE 2 DIABETES MELLITUS WITHOUT COMPLICATION, WITHOUT LONG-TERM CURRENT USE OF INSULIN: ICD-10-CM

## 2024-07-22 DIAGNOSIS — E03.9 HYPOTHYROIDISM: ICD-10-CM

## 2024-07-22 DIAGNOSIS — E03.9 HYPOTHYROIDISM, UNSPECIFIED TYPE: ICD-10-CM

## 2024-07-22 DIAGNOSIS — K86.89 PANCREATIC INSUFFICIENCY: ICD-10-CM

## 2024-07-22 DIAGNOSIS — C25.9 MALIGNANT NEOPLASM OF PANCREAS, UNSPECIFIED LOCATION OF MALIGNANCY: Primary | ICD-10-CM

## 2024-07-22 LAB
ALBUMIN SERPL BCP-MCNC: 4.4 G/DL (ref 3.5–5.2)
ALP SERPL-CCNC: 71 U/L (ref 55–135)
ALT SERPL W/O P-5'-P-CCNC: 11 U/L (ref 10–44)
ANION GAP SERPL CALC-SCNC: 12 MMOL/L (ref 8–16)
AST SERPL-CCNC: 20 U/L (ref 10–40)
BASOPHILS # BLD AUTO: 0.02 K/UL (ref 0–0.2)
BASOPHILS NFR BLD: 0.4 % (ref 0–1.9)
BILIRUB SERPL-MCNC: 0.5 MG/DL (ref 0.1–1)
BUN SERPL-MCNC: 9 MG/DL (ref 8–23)
CALCIUM SERPL-MCNC: 9.9 MG/DL (ref 8.7–10.5)
CANCER AG19-9 SERPL-ACNC: 282.8 U/ML (ref 0–40)
CHLORIDE SERPL-SCNC: 101 MMOL/L (ref 95–110)
CO2 SERPL-SCNC: 26 MMOL/L (ref 23–29)
CREAT SERPL-MCNC: 0.9 MG/DL (ref 0.5–1.4)
DIFFERENTIAL METHOD BLD: NORMAL
EOSINOPHIL # BLD AUTO: 0.1 K/UL (ref 0–0.5)
EOSINOPHIL NFR BLD: 1.6 % (ref 0–8)
ERYTHROCYTE [DISTWIDTH] IN BLOOD BY AUTOMATED COUNT: 12.3 % (ref 11.5–14.5)
EST. GFR  (NO RACE VARIABLE): >60 ML/MIN/1.73 M^2
GLUCOSE SERPL-MCNC: 130 MG/DL (ref 70–110)
HCT VFR BLD AUTO: 42.5 % (ref 37–48.5)
HGB BLD-MCNC: 14.6 G/DL (ref 12–16)
HPV HR 12 DNA SPEC QL NAA+PROBE: NEGATIVE
HPV16 AG SPEC QL: NEGATIVE
HPV18 DNA SPEC QL NAA+PROBE: NEGATIVE
IMM GRANULOCYTES # BLD AUTO: 0.01 K/UL (ref 0–0.04)
IMM GRANULOCYTES NFR BLD AUTO: 0.2 % (ref 0–0.5)
LYMPHOCYTES # BLD AUTO: 2.3 K/UL (ref 1–4.8)
LYMPHOCYTES NFR BLD: 39.8 % (ref 18–48)
MCH RBC QN AUTO: 29 PG (ref 27–31)
MCHC RBC AUTO-ENTMCNC: 34.4 G/DL (ref 32–36)
MCV RBC AUTO: 84 FL (ref 82–98)
MONOCYTES # BLD AUTO: 0.3 K/UL (ref 0.3–1)
MONOCYTES NFR BLD: 5.7 % (ref 4–15)
NEUTROPHILS # BLD AUTO: 3 K/UL (ref 1.8–7.7)
NEUTROPHILS NFR BLD: 52.3 % (ref 38–73)
NRBC BLD-RTO: 0 /100 WBC
ONEOME COMMENT: NORMAL
ONEOME METHOD: NORMAL
PLATELET # BLD AUTO: 200 K/UL (ref 150–450)
PMV BLD AUTO: 9.4 FL (ref 9.2–12.9)
POTASSIUM SERPL-SCNC: 4.2 MMOL/L (ref 3.5–5.1)
PROT SERPL-MCNC: 7.8 G/DL (ref 6–8.4)
RBC # BLD AUTO: 5.04 M/UL (ref 4–5.4)
SODIUM SERPL-SCNC: 139 MMOL/L (ref 136–145)
TSH SERPL DL<=0.005 MIU/L-ACNC: 2.41 UIU/ML (ref 0.4–4)
WBC # BLD AUTO: 5.66 K/UL (ref 3.9–12.7)

## 2024-07-22 PROCEDURE — 96367 TX/PROPH/DG ADDL SEQ IV INF: CPT | Mod: PN

## 2024-07-22 PROCEDURE — 25000003 PHARM REV CODE 250: Mod: PN | Performed by: INTERNAL MEDICINE

## 2024-07-22 PROCEDURE — 96417 CHEMO IV INFUS EACH ADDL SEQ: CPT | Mod: PN

## 2024-07-22 PROCEDURE — 3078F DIAST BP <80 MM HG: CPT | Mod: CPTII,S$GLB,, | Performed by: INTERNAL MEDICINE

## 2024-07-22 PROCEDURE — 96368 THER/DIAG CONCURRENT INF: CPT | Mod: PN

## 2024-07-22 PROCEDURE — 3074F SYST BP LT 130 MM HG: CPT | Mod: CPTII,S$GLB,, | Performed by: INTERNAL MEDICINE

## 2024-07-22 PROCEDURE — 96411 CHEMO IV PUSH ADDL DRUG: CPT | Mod: PN

## 2024-07-22 PROCEDURE — 36415 COLL VENOUS BLD VENIPUNCTURE: CPT | Mod: PN | Performed by: INTERNAL MEDICINE

## 2024-07-22 PROCEDURE — 80053 COMPREHEN METABOLIC PANEL: CPT | Mod: PN | Performed by: INTERNAL MEDICINE

## 2024-07-22 PROCEDURE — 63600175 PHARM REV CODE 636 W HCPCS: Mod: PN | Performed by: INTERNAL MEDICINE

## 2024-07-22 PROCEDURE — 96413 CHEMO IV INFUSION 1 HR: CPT | Mod: PN

## 2024-07-22 PROCEDURE — 96375 TX/PRO/DX INJ NEW DRUG ADDON: CPT | Mod: PN

## 2024-07-22 PROCEDURE — 99999 PR PBB SHADOW E&M-EST. PATIENT-LVL V: CPT | Mod: PBBFAC,,, | Performed by: INTERNAL MEDICINE

## 2024-07-22 PROCEDURE — 1126F AMNT PAIN NOTED NONE PRSNT: CPT | Mod: CPTII,S$GLB,, | Performed by: INTERNAL MEDICINE

## 2024-07-22 PROCEDURE — 1101F PT FALLS ASSESS-DOCD LE1/YR: CPT | Mod: CPTII,S$GLB,, | Performed by: INTERNAL MEDICINE

## 2024-07-22 PROCEDURE — 4010F ACE/ARB THERAPY RXD/TAKEN: CPT | Mod: CPTII,S$GLB,, | Performed by: INTERNAL MEDICINE

## 2024-07-22 PROCEDURE — 84443 ASSAY THYROID STIM HORMONE: CPT | Performed by: INTERNAL MEDICINE

## 2024-07-22 PROCEDURE — 99215 OFFICE O/P EST HI 40 MIN: CPT | Mod: S$GLB,,, | Performed by: INTERNAL MEDICINE

## 2024-07-22 PROCEDURE — 3044F HG A1C LEVEL LT 7.0%: CPT | Mod: CPTII,S$GLB,, | Performed by: INTERNAL MEDICINE

## 2024-07-22 PROCEDURE — G2211 COMPLEX E/M VISIT ADD ON: HCPCS | Mod: S$GLB,,, | Performed by: INTERNAL MEDICINE

## 2024-07-22 PROCEDURE — 86301 IMMUNOASSAY TUMOR CA 19-9: CPT | Performed by: INTERNAL MEDICINE

## 2024-07-22 PROCEDURE — 85025 COMPLETE CBC W/AUTO DIFF WBC: CPT | Mod: PN | Performed by: INTERNAL MEDICINE

## 2024-07-22 PROCEDURE — 96415 CHEMO IV INFUSION ADDL HR: CPT | Mod: PN

## 2024-07-22 PROCEDURE — 96416 CHEMO PROLONG INFUSE W/PUMP: CPT | Mod: PN

## 2024-07-22 PROCEDURE — 3288F FALL RISK ASSESSMENT DOCD: CPT | Mod: CPTII,S$GLB,, | Performed by: INTERNAL MEDICINE

## 2024-07-22 PROCEDURE — 3008F BODY MASS INDEX DOCD: CPT | Mod: CPTII,S$GLB,, | Performed by: INTERNAL MEDICINE

## 2024-07-22 RX ORDER — HEPARIN 100 UNIT/ML
500 SYRINGE INTRAVENOUS
OUTPATIENT
Start: 2024-07-24

## 2024-07-22 RX ORDER — ATROPINE SULFATE 0.4 MG/ML
0.4 INJECTION, SOLUTION ENDOTRACHEAL; INTRAMEDULLARY; INTRAMUSCULAR; INTRAVENOUS; SUBCUTANEOUS ONCE AS NEEDED
Status: COMPLETED | OUTPATIENT
Start: 2024-07-22 | End: 2024-07-22

## 2024-07-22 RX ORDER — HEPARIN 100 UNIT/ML
500 SYRINGE INTRAVENOUS
Status: CANCELLED | OUTPATIENT
Start: 2024-07-22

## 2024-07-22 RX ORDER — SODIUM CHLORIDE 0.9 % (FLUSH) 0.9 %
10 SYRINGE (ML) INJECTION
Status: CANCELLED | OUTPATIENT
Start: 2024-07-22

## 2024-07-22 RX ORDER — SODIUM CHLORIDE 0.9 % (FLUSH) 0.9 %
10 SYRINGE (ML) INJECTION
Status: CANCELLED | OUTPATIENT
Start: 2024-07-24

## 2024-07-22 RX ORDER — EPINEPHRINE 0.3 MG/.3ML
0.3 INJECTION SUBCUTANEOUS ONCE AS NEEDED
Status: CANCELLED | OUTPATIENT
Start: 2024-07-22

## 2024-07-22 RX ORDER — ATROPINE SULFATE 0.4 MG/ML
0.4 INJECTION, SOLUTION ENDOTRACHEAL; INTRAMEDULLARY; INTRAMUSCULAR; INTRAVENOUS; SUBCUTANEOUS ONCE AS NEEDED
Status: CANCELLED | OUTPATIENT
Start: 2024-07-22

## 2024-07-22 RX ORDER — FLUOROURACIL 50 MG/ML
500 INJECTION, SOLUTION INTRAVENOUS
Status: COMPLETED | OUTPATIENT
Start: 2024-07-22 | End: 2024-07-22

## 2024-07-22 RX ORDER — FLUOROURACIL 50 MG/ML
320 INJECTION, SOLUTION INTRAVENOUS
Status: CANCELLED | OUTPATIENT
Start: 2024-07-22

## 2024-07-22 RX ORDER — PROCHLORPERAZINE EDISYLATE 5 MG/ML
5 INJECTION INTRAMUSCULAR; INTRAVENOUS ONCE AS NEEDED
Status: CANCELLED | OUTPATIENT
Start: 2024-07-22

## 2024-07-22 RX ORDER — PROCHLORPERAZINE EDISYLATE 5 MG/ML
5 INJECTION INTRAMUSCULAR; INTRAVENOUS ONCE AS NEEDED
OUTPATIENT
Start: 2024-07-24

## 2024-07-22 RX ORDER — DIPHENHYDRAMINE HYDROCHLORIDE 50 MG/ML
50 INJECTION INTRAMUSCULAR; INTRAVENOUS ONCE AS NEEDED
Status: CANCELLED | OUTPATIENT
Start: 2024-07-22

## 2024-07-22 RX ADMIN — LEUCOVORIN CALCIUM 515 MG: 350 INJECTION, POWDER, LYOPHILIZED, FOR SOLUTION INTRAMUSCULAR; INTRAVENOUS at 01:07

## 2024-07-22 RX ADMIN — FLUOROURACIL 500 MG: 50 INJECTION, SOLUTION INTRAVENOUS at 03:07

## 2024-07-22 RX ADMIN — DEXTROSE MONOHYDRATE: 5 INJECTION, SOLUTION INTRAVENOUS at 11:07

## 2024-07-22 RX ADMIN — SODIUM CHLORIDE 260 MG: 9 INJECTION, SOLUTION INTRAVENOUS at 01:07

## 2024-07-22 RX ADMIN — DEXAMETHASONE SODIUM PHOSPHATE 0.25 MG: 10 INJECTION, SOLUTION INTRAMUSCULAR; INTRAVENOUS at 11:07

## 2024-07-22 RX ADMIN — ATROPINE SULFATE 0.4 MG: 0.4 INJECTION, SOLUTION INTRAVENOUS at 01:07

## 2024-07-22 RX ADMIN — OXALIPLATIN 137 MG: 5 INJECTION, SOLUTION INTRAVENOUS at 11:07

## 2024-07-22 RX ADMIN — FLUOROURACIL 3000 MG: 50 INJECTION, SOLUTION INTRAVENOUS at 03:07

## 2024-07-22 NOTE — PROGRESS NOTES
Oncology Nutrition   New Patient Education  Bessy Jurgen Lamb   1960    Nutrition Education   This is a 63 y.o.female with a medical diagnosis of malignant neoplasm of pancreas.     Met w/ pt and her , [place], at [facility] to discuss current nutritional status and nutrition as it relates to cancer and cancer treatment. Pt currently moderate nutrition risk. Pt states her weight is stable now but she is down 40-45# compared to her UBW. Pt is eating minimally per discussion. Pt states she was prescribed Creon but is not taking it yet per Dr. Hunt. Pt states her bowel movements are somewhat normal. Pt willing to drink ONS- asked about recommendations. Discussed calorie and protein intake needs. Pt VU and very appreciative.     Pt interested in TFP. Registration form filled out and and emergency box provided.    Wt Readings from Last 10 Encounters:   07/22/24 60.2 kg (132 lb 11.5 oz)   07/22/24 60.2 kg (132 lb 11.5 oz)   07/18/24 59.4 kg (130 lb 15.3 oz)   07/17/24 60.1 kg (132 lb 7.9 oz)   07/17/24 60.1 kg (132 lb 7.9 oz)   07/12/24 59.3 kg (130 lb 11.7 oz)   07/05/24 59.1 kg (130 lb 4.7 oz)   06/19/24 59 kg (130 lb)   05/21/24 59.5 kg (131 lb 1 oz)   04/09/24 60.6 kg (133 lb 9.6 oz)      [x] PMHx reviewed  [x] Labs reviewed    Educated on food safety and common nutrition impact symptoms associated with chemotherapy treatment. Reinforced the importance of good hydration. Handouts provided.    Answered all nutrition related questions.     Patient provided with dietitian contact number and advised to call with questions or make future appointment if further intervention is needed. RD to follow throughout tx PRN.    Bernie Gurrola, MS, RD, LDN  07/22/2024  12:20 PM

## 2024-07-22 NOTE — PROGRESS NOTES
Oncology   Chemotherapy School Education  Bessy Lamb   1960    Social Service Education   This is a 63 y.o.female with a medical diagnosis of pancreatic cancer. Pt said she recently learned of her diagnosis and was not expecting to have cancer. She is currently the primary caregiver for her  who has head and neck cancer. Pt started treatment today. Her  was most recently being treated at [facility] will be moving his treatment back here to be with his wife. They are trying to coordinate treatment days to correspond with each other to make it easier on them and their family. SW actively listened and provided emotional support through out visit.    Current Support System: Pt has good supports. She is close with her family and friends. Pt has 6 people in her current household. She lives with her , daughter, granddaughter, granddaughter finance and his brother. She said her daughter Svetlana and Kailey will help get both the pt and her  to the cancer center for treatments. The family has multiple stressors in the home.     Met with pt for new pt education. Reviewed role of  during cancer treatment. Educated on role of SW on care team and resources available at the cancer center.     Answered all psychosocial/socioeconomic related questions. Pt and  receive social security. They have financial strains. SW completed support enrolment with them. Pt has been signed up for TFP and will receive gas cards. Pt received gas card today and emergency box of food today.     Pt reports she and her  both have a living will and medical power of .     Patient provided with social contact number and advised to call with questions or make future appointment if further intervention is needed. SW to follow throughout tx.    Bharati Fritz, JOSSUE  07/22/2024  1:36 PM

## 2024-07-22 NOTE — PLAN OF CARE
[facility] chemo education done with pt and her  on Folfirinox regimen.  Chemo handouts given to pt.  Pt watched educational video on home infusion pump.  Voiced understanding.  All questions answered.  Pt tolerated 1st Folfirinox regimen well.  Pt connected to portable 5Fu pump.  Pt to RTC on Wednesday to be disconnected from portable pump.  Pt reminded to  home meds for regimen (Zofran/Lomotil) from pharmacy.  Instructed to call MD with any problems.

## 2024-07-22 NOTE — PROGRESS NOTES
PATIENT: Bessy Lamb  MRN: [id number]  DATE: 7/22/2024      Diagnosis:   1. Malignant neoplasm of pancreas, unspecified location of malignancy    2. Pancreatic insufficiency    3. Hypothyroidism, unspecified type    4. Type 2 diabetes mellitus without complication, without long-term current use of insulin          Chief Complaint: Malignant neoplasm of pancreas, unspecified location of mal      Oncologic History:      Oncologic History     Oncologic Treatment     Pathology           Subjective:    Initial History: Ms. Lamb is a 63 y.o. female with Arthritis, DMII, GERD, Hypotyroidism, Obesity, Thyroid disease who presents for pancreatic cancer.  Since the last clinic visit the patient's case was discussed at tumor board on 07/17/2024 with recommendation for proceeding with the chemotherapy.  Patient had port placement on 07/18/2024.  The patient states she is not having any additional diarrhea in his not started her Creon yet.  The patient denies CP, cough, SOB, abdominal pain, nausea, vomiting, constipation, diarrhea.  The patient denies fever, chills, night sweats, weight loss, new lumps or bumps, easy bruising or bleeding.    Prior History:   The patient initially underwent a CXR on 11/13/23 fur a URI which showed possible subtle pulmonary nodules.  CT chest 12/01/2023 showed a cluster of ill-defined centrilobular ground-glass opacity he inferior right and left upper lobes as well as a 1.2 cm ground-glass opacity in the superior segment of the left lower lobe; solid 3 mm pulmonary nodule in the right upper lobe; subcentimeter right hepatic lobe hypodensity likely representing a cyst.  The patient underwent surveillance CT chest on 05/21/2024 showing a 3.7 x 2.7 pancreatic body mass in the pancreatic tail atrophy suspicious of pancreatic malignancy as well as and unchanged benign 3 mm nodule in the right upper lobe.  MRI abdomen on 06/30/2024 showed hypoenhancing mass centered in the proximal pancreatic  body measuring 2.9 x 2 cm with abutment and possible encasement of the distal splenic vein.  EUS was performed on 2024 mass in the pancreatic body stage T4 N0 M0 by endo sonographic criteria.  Pathology from biopsy of the stomach showed mild focally moderate chronic gastritis with no evidence of the H pylori.  Stomach polyp which was removed code hyperplastic polyps with chronic inflammation.  Pancreatic biopsy showed adenocarcinoma.  CT of the chest, abdomen, and pelvis on 2024 showed a 9 mm lesion in the right hepatic lobe previously characterized as cysts; mass in the pancreatic body measuring 4.4 x 3.7 cm with diffuse pancreatic ductal dilatation measuring 8 mm:  Weaning vein at the portal confluence occluded with reconstitution via collaterals.  The mass abuts the portal vein by less than 180° but does not case the proximal splenic artery remains patent.  Also noted was a pancreatic lymph node measuring 0.7 cm.    Past Medical History:   Past Medical History:   Diagnosis Date    Arthritis, lumbar spine     hands    Diabetes mellitus     General anesthetics causing adverse effect in therapeutic use     following breast rediuct, hard time awakening  also had anxiety rxn to lidocain in dental ofc    GERD (gastroesophageal reflux disease)     Hypothyroidism 10/08/2014    Maternal anesthesia complication     epidural for 1st child went up instead of down; required intubation    Obesity (BMI 30-39.9) 10/08/2014    Thyroid disease     Thyroid nodule 10/08/2014       Past Surgical HIstory:   Past Surgical History:   Procedure Laterality Date    BREAST BIOPSY Left     b9    BREAST SURGERY      Reduction cause of a mass in left breast    c-sections x2       SECTION  3/26/81 & 85    Birth of two girls    COLONOSCOPY  2012         COLONOSCOPY N/A 2018    Procedure: COLONOSCOPY;  Surgeon: Francisco Mcclain MD;  Location: [facility];  Service: Endoscopy;  Laterality: N/A;    COLONOSCOPY  N/A 10/24/2023    Procedure: COLONOSCOPY;  Surgeon: Francisco Mcclain MD;  Location: [facility] ENDO;  Service: Endoscopy;  Laterality: N/A;    ENDOSCOPIC ULTRASOUND OF UPPER GASTROINTESTINAL TRACT Left 7/5/2024    Procedure: ULTRASOUND, UPPER GI TRACT, ENDOSCOPIC;  Surgeon: Andrew Gordon MD;  [facility];  Service: Endoscopy;  Laterality: Left;    ESOPHAGOGASTRODUODENOSCOPY N/A 7/5/2024    Procedure: EGD (ESOPHAGOGASTRODUODENOSCOPY);  Surgeon: Andrew Gordon MD;  [facility];  Service: Endoscopy;  Laterality: N/A;    hysteroscopy with polypectomy      INCISIONAL BREAST BIOPSY Left 1996    benign; done at same time as reduction    INSERTION OF TUNNELED CENTRAL VENOUS CATHETER (CVC) WITH SUBCUTANEOUS PORT N/A 7/18/2024    Procedure: IESHCXDRI-[id number]-I-CATH;  Surgeon: Blanca Dillard MD;  Location: [facility];  Service: General;  Laterality: N/A;    TOTAL REDUCTION MAMMOPLASTY Bilateral 1996       Family History:   Family History   Problem Relation Name Age of Onset    Brain cancer Mother Ravi Tolbert     Early death Mother Ravi Tolbert 51        Passed at 51    Cancer Mother Ravi Tolbert         Brain tumor    Arthritis Mother Ravi Tolbert     Diabetes Father Ck [place]ent         Type 2    Cirrhosis Father Ck tolbert     Cancer Father Ck ankit tolbert         Bone    COPD Father Ck pham tomasz         Smoker    Early death Father Ck pham tomasz         Passed at 64    Lung cancer Father Ck pham tomasz     Heart disease Sister Mamta (dianna)wescovich         Congestive heart failure (passed 2/11/18    Hypertension Sister Mamta (dianna)wescovich     Kidney disease Sister Mamta (dianna)wescovich         Doc removed when she was a child    Hypertension Sister      Depression Sister Ravi (johnna) nicolette Mauricio ( sister)         Due to loss of her 27 year old son     Early death Sister Ravi (johnna) nicolette Mauricio ( sister)         Pulmonary hypertension, cirrhosis of the liver(passed 7/14/2007   Age 57    Heart disease Brother John Choudhury ( passed )         Heart attack    Hypertension Brother John Choudhury ( passed )     Heart disease Brother Juan Francisco Choudhury         Heart  blockage    Heart disease Brother Bhanu Choudhury         Heart attack (passed)    Diabetes Brother Bhanu Choudhury     Macular degeneration Brother Bhanu Choudhury     Breast cancer Maternal Aunt  30    Breast cancer Paternal Aunt  40    Breast cancer Paternal Aunt  40    Ovarian cancer Neg Hx         Social History:  reports that she has never smoked. She has never used smokeless tobacco. She reports current alcohol use. She reports that she does not use drugs.    Allergies:  Review of patient's allergies indicates:   Allergen Reactions    Duricef [cefadroxil] Hives    Eggs [egg derived] Anaphylaxis and Hives    Cat/feline products Itching    Dairycare [lactobacillus acidoph-lactase] Itching    Dog dander Itching    Wheat containing prod Hives       Medications:  Current Outpatient Medications   Medication Sig Dispense Refill    acetaminophen (TYLENOL) 325 MG tablet Take 325 mg by mouth every 6 (six) hours as needed for Pain.      ALPRAZolam (XANAX) 0.5 MG tablet Take 1 tablet (0.5 mg total) by mouth On call Procedure for Anxiety. 2 tablet 0    blood-glucose meter Misc Use as directed 1 each prn    calcium carbonate (OS-ISH) 600 mg calcium (1,500 mg) Tab Take 1,200 mg by mouth 2 (two) times daily with meals.      cetirizine (ZYRTEC) 10 MG tablet Take 1 tablet (10 mg total) by mouth once daily. 30 tablet 1    famotidine (PEPCID) 40 MG tablet TAKE 1 TABLET (40 MG TOTAL) BY MOUTH ONCE DAILY. 30 tablet 11    fluticasone propionate (FLONASE) 50 mcg/actuation nasal spray 1 spray (50 mcg total) by Each Nostril route once daily. 18.2 mL 0    HYDROcodone-acetaminophen (NORCO) 5-325 mg per tablet Take 1 tablet by mouth  every 6 (six) hours as needed. 10 tablet 0    levothyroxine (SYNTHROID) 50 MCG tablet Take 1 tablet (50 mcg total) by mouth once daily. 90 tablet 1    lipase-protease-amylase 24,000-76,000-120,000 units (CREON) 24,000-76,000 -120,000 unit capsule Take 2 capsules by mouth 3 (three) times daily with meals. 180 capsule 11    loperamide (IMODIUM) 2 mg capsule Take 2 tablets (4mg) by mouth after first loose stool, 1 tablet every 2 hours until diarrhea free for 12 hours. May take 2 tablets (4mg) by mouth every 4 hours at night. May require more than the package labeling maximum dose of 16mg/day. 30 capsule 11    loratadine (CLARITIN) 10 mg tablet Take 10 mg by mouth every morning.      losartan (COZAAR) 25 MG tablet TAKE 1 TABLET (25 MG TOTAL) BY MOUTH ONCE DAILY 90 tablet 3    meclizine (ANTIVERT) 25 mg tablet Take 1 tablet (25 mg total) by mouth 3 (three) times daily as needed for Dizziness. 30 tablet 0    mupirocin (BACTROBAN) 2 % ointment Apply topically 3 (three) times daily.      OLANZapine (ZYPREXA) 5 MG tablet Take 1 tablet by mouth nightly on days 1-3 of each chemotherapy cycle. 6 tablet 11    prochlorperazine (COMPAZINE) 5 MG tablet Take 1 tablet (5 mg total) by mouth every 6 (six) hours as needed for Nausea. 20 tablet 5    simvastatin (ZOCOR) 10 MG tablet Take 1 tablet (10 mg total) by mouth every evening. 90 tablet 3    SYNJARDY XR 10-1,000 mg TBph TAKE ONE TABLET BY MOUTH ONCE DAILY 30 tablet 5    TRUE METRIX GLUCOSE TEST STRIP Strp USE 1 STRIP ONCE DAILY TO TEST BLOOD GLUCOSE (50 DAY SUPPLY) 100 each 3    TRUEPLUS LANCETS 33 gauge Misc USE AS DIRECTED TO TEST BLOOD SUGAR ONCE DAILY FOR UP  USES 100 each 2    VITAMIN D2 1,250 mcg (50,000 unit) capsule TAKE 1 CAPSULE (50,000 UNITS TOTAL) BY MOUTH TWICE A WEEK. 24 capsule 3     No current facility-administered medications for this visit.     Facility-Administered Medications Ordered in Other Visits   Medication Dose Route Frequency Provider Last Rate Last  "Admin    atropine injection 0.4 mg  0.4 mg Intravenous Once PRN Rajat Hunt MD        fluorouracil (Adrucil) 3,000 mg in 0.9% NaCl 100 mL chemo infusion  3,000 mg Intravenous over 46 hr Rajat Hunt MD        fluorouraciL injection 500 mg  500 mg Intravenous 1 time in Clinic/HOD Rajat Hunt MD        irinotecan (CAMPTOSAR) 260 mg in 0.9% NaCl 578 mL chemo infusion  260 mg Intravenous 1 time in Clinic/HOD Rajat Hunt MD        leucovorin calcium 320 mg/m2 = 515 mg in D5W 310.8 mL infusion  320 mg/m2 Intravenous 1 time in Clinic/HOD Rajat Hunt MD        oxaliplatin (ELOXATIN) 85 mg/m2 = 137 mg in D5W 592.4 mL chemo infusion  85 mg/m2 Intravenous 1 time in Clinic/HOD Rajat Hunt .2 mL/hr at 07/22/24 1144 137 mg at 07/22/24 1144       Review of Systems   Constitutional:  Negative for chills, fatigue, fever and unexpected weight change.   Respiratory:  Negative for cough and shortness of breath.    Cardiovascular:  Negative for chest pain and palpitations.   Gastrointestinal:  Negative for abdominal pain, constipation, diarrhea, nausea and vomiting.   Skin:  Negative for rash.   Neurological:  Negative for headaches.   Hematological:  Negative for adenopathy. Does not bruise/bleed easily.       ECOG Performance Status: 0   Objective:      Vitals:   Vitals:    07/22/24 0937   BP: 116/70   BP Location: Left arm   Patient Position: Sitting   BP Method: Medium (Manual)   Pulse: 70   Resp: 12   Temp: 97.6 °F (36.4 °C)   TempSrc: Temporal   SpO2: 97%   Weight: 60.2 kg (132 lb 11.5 oz)   Height: 5' 1" (1.549 m)         Physical Exam  Constitutional:       General: She is not in acute distress.     Appearance: She is well-developed. She is not diaphoretic.   HENT:      Head: Normocephalic and atraumatic.   Cardiovascular:      Rate and Rhythm: Normal rate and regular rhythm.      Heart sounds: Normal heart sounds. No murmur heard.     No friction rub. No gallop.   Pulmonary:      Effort: " Pulmonary effort is normal. No respiratory distress.      Breath sounds: Normal breath sounds. No wheezing or rales.   Chest:      Chest wall: No tenderness.   Abdominal:      General: Bowel sounds are normal. There is no distension.      Palpations: Abdomen is soft. There is no mass.      Tenderness: There is no abdominal tenderness. There is no guarding or rebound.   Musculoskeletal:      Comments: PORT in right chest   Lymphadenopathy:      Cervical: No cervical adenopathy.      Upper Body:      Right upper body: No supraclavicular or axillary adenopathy.      Left upper body: No supraclavicular or axillary adenopathy.   Skin:     Findings: No erythema or rash.   Neurological:      Mental Status: She is alert and oriented to person, place, and time.   Psychiatric:         Behavior: Behavior normal.         Laboratory Data:  Lab Visit on 07/22/2024   Component Date Value Ref Range Status    WBC 07/22/2024 5.66  3.90 - 12.70 K/uL Final    RBC 07/22/2024 5.04  4.00 - 5.40 M/uL Final    Hemoglobin 07/22/2024 14.6  12.0 - 16.0 g/dL Final    Hematocrit 07/22/2024 42.5  37.0 - 48.5 % Final    MCV 07/22/2024 84  82 - 98 fL Final    MCH 07/22/2024 29.0  27.0 - 31.0 pg Final    MCHC 07/22/2024 34.4  32.0 - 36.0 g/dL Final    RDW 07/22/2024 12.3  11.5 - 14.5 % Final    Platelets 07/22/2024 200  150 - 450 K/uL Final    MPV 07/22/2024 9.4  9.2 - 12.9 fL Final    Immature Granulocytes 07/22/2024 0.2  0.0 - 0.5 % Final    Gran # (ANC) 07/22/2024 3.0  1.8 - 7.7 K/uL Final    Immature Grans (Abs) 07/22/2024 0.01  0.00 - 0.04 K/uL Final    Comment: Mild elevation in immature granulocytes is non specific and   can be seen in a variety of conditions including stress response,   acute inflammation, trauma and pregnancy. Correlation with other   laboratory and clinical findings is essential.      Lymph # 07/22/2024 2.3  1.0 - 4.8 K/uL Final    Mono # 07/22/2024 0.3  0.3 - 1.0 K/uL Final    Eos # 07/22/2024 0.1  0.0 - 0.5 K/uL Final     Baso # 07/22/2024 0.02  0.00 - 0.20 K/uL Final    nRBC 07/22/2024 0  0 /100 WBC Final    Gran % 07/22/2024 52.3  38.0 - 73.0 % Final    Lymph % 07/22/2024 39.8  18.0 - 48.0 % Final    Mono % 07/22/2024 5.7  4.0 - 15.0 % Final    Eosinophil % 07/22/2024 1.6  0.0 - 8.0 % Final    Basophil % 07/22/2024 0.4  0.0 - 1.9 % Final    Differential Method 07/22/2024 Automated   Final    Sodium 07/22/2024 139  136 - 145 mmol/L Final    Potassium 07/22/2024 4.2  3.5 - 5.1 mmol/L Final    Chloride 07/22/2024 101  95 - 110 mmol/L Final    CO2 07/22/2024 26  23 - 29 mmol/L Final    Glucose 07/22/2024 130 (H)  70 - 110 mg/dL Final    BUN 07/22/2024 9  8 - 23 mg/dL Final    Creatinine 07/22/2024 0.9  0.5 - 1.4 mg/dL Final    Calcium 07/22/2024 9.9  8.7 - 10.5 mg/dL Final    Total Protein 07/22/2024 7.8  6.0 - 8.4 g/dL Final    Albumin 07/22/2024 4.4  3.5 - 5.2 g/dL Final    Total Bilirubin 07/22/2024 0.5  0.1 - 1.0 mg/dL Final    Comment: For infants and newborns, interpretation of results should be based  on gestational age, weight and in agreement with clinical  observations.    Premature Infant recommended reference ranges:  Up to 24 hours.............<8.0 mg/dL  Up to 48 hours............<12.0 mg/dL  3-5 days..................<15.0 mg/dL  6-29 days.................<15.0 mg/dL      Alkaline Phosphatase 07/22/2024 71  55 - 135 U/L Final    AST 07/22/2024 20  10 - 40 U/L Final    ALT 07/22/2024 11  10 - 44 U/L Final    eGFR 07/22/2024 >60.0  >60 mL/min/1.73 m^2 Final    Anion Gap 07/22/2024 12  8 - 16 mmol/L Final         Imaging:     CXR 11/14/23    Cardiac silhouette and mediastinal contours are normal.  Lungs demonstrates no focal large opacity with possible subtle small nodules within the bilateral mid lungs.  No pleural effusion.  Osseous structures are intact.       CT chest 12/01/23    Base of Neck: No significant abnormality.     Thoracic soft tissues: Normal.     Aorta: Left-sided aortic arch.  No aneurysm and no  significant atherosclerosis     Heart: Normal size. No effusion.     Pulmonary vasculature: Pulmonary arteries distribute normally.  There are four pulmonary veins.     Yuliya/Mediastinum: No pathologic richard enlargement.     Airways: Patent.     Lungs/Pleura: Biapical nonspecific fibronodular pleuroparenchymal scarring.  Solid 3 mm nodule in the right upper lobe (series 4, image 119).  Cluster ill-defined centrilobular ground-glass opacities within the posterior right and left upper lobes, not entirely specific but suspicious for small airways inflammation or infection.  There is a 1.2 cm ground-glass opacity in superior segment left lower lobe (series 4, image 282).  No consolidation, pleural effusion or pneumothorax.     Esophagus: Normal.     Upper Abdomen: There is an 8 mm hypodensity in the inferior right hepatic lobe, which measures fluid attenuation and probably represents a cyst.  Cholelithiasis.  No abnormal gallbladder wall thickening or pericholecystic fluid.     Bones: No acute fracture. No suspicious lytic or sclerotic lesions.    CT Chest 5/21/24  Base of Neck: No significant abnormality.     Thoracic soft tissues: Normal.     Aorta: Left-sided aortic arch. No aneurysm.     Heart: Normal size. No effusion. Mild aortic and coronary artery atherosclerotic calcification.     Pulmonary vasculature: Pulmonary arteries distribute normally.     Yuliya/Mediastinum: No pathologic richard enlargement.     Esophagus: Normal.     Upper Abdomen: 3.7 x 2.7 cm pancreatic body mass with pancreatic tail atrophy suspicious for pancreatic malignancy. Recommend dedicated CT or MRI abdomen with IV contrast pancreatic protocol.     Airways: Patent.     Lungs/Pleura: Unchanged benign 3 mm nodule medial right upper lobe series 5, image 115. No new nodules. Mild bilateral apical pleuroparenchymal scarring again noted.     No airspace disease. Interval resolution of previously described ground-glass.     Bones: No acute fracture. No  suspicious lytic or sclerotic lesions.    MRI Abdomen 6/30/24  Lower thorax: Heart is normal in size. No pleural fluid. No consolidation.     Liver: Normal size. No evidence of fatty infiltration. Small cyst in segment 6 measures 9 mm.     Gallbladder: Unremarkable.     Bile ducts: No intrahepatic or extrahepatic biliary dilatation.     Pancreas: Hypoenhancing mass centered in the proximal pancreatic body measures 2.9 x 2.0 cm (series 27, image 31). This lesion abuts and may encase the distal splenic vein which appears narrowed just proximal to the portal confluence. There is atrophy of the pancreatic body and tail distal to this lesion with associated dilatation of the main pancreatic duct measuring 5 mm. There is also atrophy of the pancreatic head without associated ductal dilatation.     Spleen: Normal size. No focal lesions.     Adrenal glands: Unremarkable.     Kidneys: No renal masses. No hydronephrosis.     GI: No evidence of bowel obstruction.     Mesentery/retroperitoneum: No pathologically enlarged lymph nodes.     Vasculature: Visualized aorta is normal in caliber. Portal vein is patent.     Body wall/extraperitoneal soft tissues: Unremarkable.     Bones: Visualized osseous structures demonstrate normal marrow signal.     Miscellaneous: No intraperitoneal free fluid.    CT C/A/P 7/09/24  Normal thyroid. Heart size is normal. No pericardial effusion. The thoracic aorta and main pulmonary artery are normal in caliber. No enlarged axillary, mediastinal or hilar lymph nodes.     Central airways are clear. No pleural effusion, focal consolidation or pneumothorax.     The liver is normal in morphology and with smooth contour. Inferior right hepatic lobe 9 mm lesion previously characterized as a cyst. No new hepatic lesion.     Cholelithiasis. No gallbladder wall thickening or pericholecystic fluid. No intra or extrahepatic biliary ductal dilatation.     The spleen, kidneys and adrenal glands are normal.      Proximal pancreatic body irregular mass measures 4.4 x 3.7 cm (series 2, image 65) which when measured similarly to the prior chest CT is unchanged in size. The distal pancreatic body and tail are atrophic. Diffuse pancreatic ductal dilatation measuring 8 mm.     The portal and superior mesenteric veins are patent. 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 The mass abuts the portal by less than 180 degrees. The mass encases the proximal splenic artery which remains patent. The SMA is remote from the mass.     Peripancreatic lymph node measuring 0.7 cm in short axis (series 2, image 72). No enlarged retroperitoneal lymph nodes.     No bowel obstruction inflammatory change. The mass focally abuts the distal lesser curvature the stomach. No free fluid or free air.     Circumferential bladder wall thickening. Normal uterus. No adnexal mass. No enlarged pelvic lymph nodes.     Soft tissues are unremarkable. No acute or aggressive osseous abnormality.       Assessment:       1. Malignant neoplasm of pancreas, unspecified location of malignancy    2. Pancreatic insufficiency    3. Hypothyroidism, unspecified type    4. Type 2 diabetes mellitus without complication, without long-term current use of insulin             Plan:     Pancreatic Cancer - Patient with Stage III pancreatic cancer with concern for potential encasement of the distal celiac artery per discussion with Dr Valentin  -Will discuss pt at tumor board next week  -No sign of mets  - 94.5 on 6/17/24  -Case discussed at tumor board 7/17/24 with recommendation to proceed with chemo  -[facility] genetic testing negative for the genes tested  -Pharmacogenomic testing resutls pending  -[facility] blood and tissue testing pending  -Will proceed with FOLFIRINOX with 1st does of irinotecan and fluorouracil reduced pending results of pharmacogenomic testing  Visit today included increased complexity associated  with the care of the episodic problem (chemotherapy) addressed and managing the longitudinal care of the patient due to the serious and/or complex managed problem(s) pancreatic cancer.    Pancreatic Insufficiency - Pt not taking Creon  -Will monitor weight and GI symptoms    Hypothyroidism - pt on synthroid  -Will monitror    DMII - PT currently on Synjardy  Lab Results   Component Value Date    HGBA1C 6.2 (H) 04/01/2024   -Possibly due to pancreatic cancer  -Will monitor    Route Chart for Scheduling    Med Onc Chart Routing      Follow up with physician 2 weeks. Pt can proceed with treatment.  Pt needs a CBC, CMP, Ca19-9, Mg on 8/02/24 with an appt with me that day and treatment 8/05/24.  OK to double book.   Follow up with ANALIA    Infusion scheduling note    Injection scheduling note    Labs    Imaging    Pharmacy appointment    Other referrals                Treatment Plan Information   OP GI FOLFIRINOX (oxaliplatin, leucovorin, irinotecan, fluorouracil) Q2W    Rajat Hunt MD   Upcoming Treatment Dates - OP GI FOLFIRINOX (oxaliplatin, leucovorin, irinotecan, fluorouracil) Q2W     8/5/2024       Chemotherapy       oxaliplatin (ELOXATIN) in D5W 527.4 mL chemo infusion       leucovorin calcium in D5W 250 mL infusion       irinotecan (CAMPTOSAR) in 0.9% NaCl 514.5 mL chemo infusion       fluorouraciL injection       fluorouracil chemo infusion       Antiemetics       palonosetron 0.25mg/dexAMETHasone 12mg in NS IVPB 0.25 mg 50 mL  8/19/2024       Chemotherapy       oxaliplatin (ELOXATIN) in D5W 527.4 mL chemo infusion       leucovorin calcium in D5W 250 mL infusion       irinotecan (CAMPTOSAR) in 0.9% NaCl 514.5 mL chemo infusion       fluorouraciL injection       fluorouracil chemo infusion       Antiemetics       palonosetron 0.25mg/dexAMETHasone 12mg in NS IVPB 0.25 mg 50 mL  9/2/2024       Chemotherapy       oxaliplatin (ELOXATIN) in D5W 527.4 mL chemo infusion       leucovorin calcium in D5W 250 mL  infusion       irinotecan (CAMPTOSAR) in 0.9% NaCl 514.5 mL chemo infusion       fluorouraciL injection       fluorouracil chemo infusion       Antiemetics       palonosetron 0.25mg/dexAMETHasone 12mg in NS IVPB 0.25 mg 50 mL  9/16/2024       Chemotherapy       oxaliplatin (ELOXATIN) in D5W 527.4 mL chemo infusion       leucovorin calcium in D5W 250 mL infusion       irinotecan (CAMPTOSAR) in 0.9% NaCl 514.5 mL chemo infusion       fluorouraciL injection       fluorouracil chemo infusion       Antiemetics       palonosetron 0.25mg/dexAMETHasone 12mg in NS IVPB 0.25 mg 50 mL      Rajat Hunt MD  [facility]  Hematology and Oncology  [facility]   [address]   [place] [address]   O: [phone]  F: [phone]

## 2024-07-24 ENCOUNTER — TELEPHONE (OUTPATIENT)
Dept: SURGERY | Facility: CLINIC | Age: 64
End: 2024-07-24
Payer: MEDICARE

## 2024-07-24 ENCOUNTER — INFUSION (OUTPATIENT)
Dept: INFUSION THERAPY | Facility: HOSPITAL | Age: 64
End: 2024-07-24
Attending: INTERNAL MEDICINE
Payer: MEDICARE

## 2024-07-24 ENCOUNTER — OFFICE VISIT (OUTPATIENT)
Dept: SURGERY | Facility: CLINIC | Age: 64
End: 2024-07-24
Payer: MEDICARE

## 2024-07-24 ENCOUNTER — TELEPHONE (OUTPATIENT)
Dept: HEMATOLOGY/ONCOLOGY | Facility: CLINIC | Age: 64
End: 2024-07-24
Payer: MEDICARE

## 2024-07-24 VITALS
OXYGEN SATURATION: 98 % | RESPIRATION RATE: 12 BRPM | DIASTOLIC BLOOD PRESSURE: 65 MMHG | SYSTOLIC BLOOD PRESSURE: 108 MMHG | HEART RATE: 74 BPM | TEMPERATURE: 98 F

## 2024-07-24 VITALS
SYSTOLIC BLOOD PRESSURE: 137 MMHG | HEART RATE: 78 BPM | WEIGHT: 132.06 LBS | BODY MASS INDEX: 24.95 KG/M2 | OXYGEN SATURATION: 99 % | DIASTOLIC BLOOD PRESSURE: 63 MMHG

## 2024-07-24 DIAGNOSIS — C25.9 MALIGNANT NEOPLASM OF PANCREAS, UNSPECIFIED LOCATION OF MALIGNANCY: Primary | ICD-10-CM

## 2024-07-24 DIAGNOSIS — K86.89 PANCREATIC MASS: ICD-10-CM

## 2024-07-24 PROCEDURE — 3078F DIAST BP <80 MM HG: CPT | Mod: CPTII,S$GLB,, | Performed by: SURGERY

## 2024-07-24 PROCEDURE — 99205 OFFICE O/P NEW HI 60 MIN: CPT | Mod: S$GLB,,, | Performed by: SURGERY

## 2024-07-24 PROCEDURE — 1126F AMNT PAIN NOTED NONE PRSNT: CPT | Mod: CPTII,S$GLB,, | Performed by: SURGERY

## 2024-07-24 PROCEDURE — 3051F HG A1C>EQUAL 7.0%<8.0%: CPT | Mod: CPTII,S$GLB,, | Performed by: SURGERY

## 2024-07-24 PROCEDURE — 3066F NEPHROPATHY DOC TX: CPT | Mod: CPTII,S$GLB,, | Performed by: SURGERY

## 2024-07-24 PROCEDURE — 4010F ACE/ARB THERAPY RXD/TAKEN: CPT | Mod: CPTII,S$GLB,, | Performed by: SURGERY

## 2024-07-24 PROCEDURE — 3061F NEG MICROALBUMINURIA REV: CPT | Mod: CPTII,S$GLB,, | Performed by: SURGERY

## 2024-07-24 PROCEDURE — 3008F BODY MASS INDEX DOCD: CPT | Mod: CPTII,S$GLB,, | Performed by: SURGERY

## 2024-07-24 PROCEDURE — 99999 PR PBB SHADOW E&M-EST. PATIENT-LVL IV: CPT | Mod: PBBFAC,,, | Performed by: SURGERY

## 2024-07-24 PROCEDURE — 3075F SYST BP GE 130 - 139MM HG: CPT | Mod: CPTII,S$GLB,, | Performed by: SURGERY

## 2024-07-24 RX ORDER — SODIUM CHLORIDE 0.9 % (FLUSH) 0.9 %
10 SYRINGE (ML) INJECTION
Status: DISCONTINUED | OUTPATIENT
Start: 2024-07-24 | End: 2024-07-24 | Stop reason: HOSPADM

## 2024-07-24 NOTE — NURSING
Spoke with patient and scheduled appointment for 07/24/24 with Dr. Flip Valentin; Provided patient with appointment time and date, address of [facility] facility and direct line to navigator. All questions and concerns addressed.

## 2024-07-24 NOTE — TELEPHONE ENCOUNTER
Patient was checked in early, spoke with patient, patient verbalized understanding that she may be seen earlier if there is availability.

## 2024-07-24 NOTE — PROGRESS NOTES
Ms. Lamb is a 63 y.o. female with Arthritis, DMII, GERD, Hypotyroidism, Obesity, Thyroid disease who presents for pancreatic cancer. This was incidentally found on CT chest for a URI.    EUS 7/5/2024:  Impression:            - Normal esophagus.                          - Normal mucosa was found in the entire stomach.                          Biopsied.                          - A single gastric polyp. Resected and retrieved.                          - Normal examined duodenum.                          - A mass was identified in the pancreatic body.                          This was staged T4 N0 M0 by endosonographic                          criteria. The staging applies if malignancy is                          confirmed. Fine needle biopsy performed.                          The common bile duct was traced from the papilla                          to the intra-hepatics and appeared normal. It                          measured 3 mm in diameter at the level of the mid                          CBD. There was no evidence of intra-ductal stones                          and sludge.                          There were a few small stones in the gallbladder,                          measuring 3-4mm in size. Otherwise the gallbladder                          appeared normal.                          Limited views of the left lobe of the liver                          appeared normal.                          Limited views of the abdominal aorta, portal vein,                          and splenic vessels appeared normal.                          There was no intra-abdominal lymphadenopathy.                          The mediastinum was unremarkable. There was no                          mediastinal lymphadenopathy. Vascular structures                          all appeared normal.                          The esophagus, stomach, and duodenal stations were                          all viewed.     Path:  PANCREAS, FURTHER  "DESIGNATED "BODY MASS," FINE NEEDLE ASPIRATION:     --POSITIVE FOR ADENOCARCINOMA.     CT 7/9/2024:  Impression:     Proximal pancreatic body 4.4 cm mass consistent known adenocarcinoma.  There is focal occlusion of the splenic vein at the level of the portal confluence and encasement of the splenic artery which remains patent.  The mass also focally abuts the distal lesser curvature of the stomach without evidence of invasion.  Nonspecific peripancreatic lymph node measuring 0.7 cm in short axis.  Recommend attention on follow-up.  Otherwise, no evidence of metastatic disease in the chest, abdomen or pelvis.  Cholelithiasis.  Circumferential bladder wall thickening which could be due to underdistention.  Consider further evaluation with urinalysis to exclude cystitis.          CA 19-9: 281    A/P    Bessy is a 62yo F with locally advanced pancreatic cancer involving the distal celiac artery, portosplenic venous junction with a large splenic-IMV collateral. Her SMA has a preserved fat plane and I suspect her ZULEYMA and GDA are not involved. There is almost certainly posterior gastric wall involvement.    She has seen Dr. Hunt, had port placed and is starting FOLFIRINOX. We discussed the treatment for locally advanced pancreatic cancer, including systemic therapy and possible radiation therapy. Given her locally advanced vascular involvement, I would favor a total neoadjuvant approach if she tolerates therapy. Will plan to follow along with her restaging.    I spent 60 minutes with Ms. Lamb, with >50% of time spent face to face and additional time preparing to see the patient (eg, review of tests), obtaining and/or reviewing separately obtained history, documenting clinical information in the electronic or other health record, independently interpreting results (not separately reported) and communicating results to the patient/family/caregiver, or Care coordination (not separately reported).           Flip Valentin, " MD  Upper GI / Hepatobiliary Surgical Oncology  [facility] [place], [place]  Office: [phone]  Fax: [phone]

## 2024-07-29 ENCOUNTER — PATIENT MESSAGE (OUTPATIENT)
Dept: ADMINISTRATIVE | Facility: OTHER | Age: 64
End: 2024-07-29
Payer: MEDICARE

## 2024-07-30 ENCOUNTER — TELEPHONE (OUTPATIENT)
Dept: HEMATOLOGY/ONCOLOGY | Facility: CLINIC | Age: 64
End: 2024-07-30
Payer: MEDICARE

## 2024-07-30 NOTE — TELEPHONE ENCOUNTER
----- Message from Elvin Goodrich sent at 7/30/2024 11:42 AM CDT -----  Contact: [employer]  Type: Needs Medical Advice  Who Called:  [employer]     Best Call Back Number: [phone]  Additional Information: Needs to discuss Med records for genetic test.  Will be out of us from Wed to next Wed.   Subjective       Chief Complaint: tremor      History of Present Illness   Bryan Khan is a 78 y.o. male who returns to clinic for evaluation of migraines. He initially noted symptoms several years ago marked by a visual aura beginning on the right and then spreading to the left. This typically lasts for approximately 20 minutes. Afterwards, he may or may not experience a headache. These episodes are very infrequent and may occur 1-2 times a year. He denies any additional associated symptoms or modifying factors.       He was previously evaluated in clinic in 6/2018 following an episode of difficulty with speech. On 2/23/18, he noted very mild word finding difficulties shortly after working out at home. He denies any additional associated symptoms, including slurred speech, weakness, dysphagia or changes in vision. It is unclear how long this lasted. His blood pressure was noted to be 210/100 at this time. He was seen at [facility] where a head CT was unremarkable as well an echo and CTA of the head and neck. He was diagnosed with possible hypertensive urgency/emergency. He is currently taking ASA 81mg daily.     Additionally, he has noted an intention tremor in his hands bilaterally for several years. This has worsened over time. He primarily notes symptoms while eating, writing, and holding heavy objects in his hands such as a large cup of coffee. This is worse with caffeine intake. He denies any additional associated symptoms.    Today: Since his last visit in 1/22, he feels essentially unchanged. He feels that his tremor continues to be manageable. He notes increased instability and mild dizziness as well as fatigue.       I have reviewed and confirmed the past family, social and medical history as accurate on 8/2/22.     Review of Systems   Constitutional: Negative.    HENT: Negative.    Eyes: Negative.    Respiratory: Negative.    Cardiovascular: Negative.    Gastrointestinal: Negative.    Endocrine:  Negative.    Genitourinary: Negative.    Musculoskeletal: Negative.    Skin: Negative.    Allergic/Immunologic: Negative.    Hematological: Negative.        Objective     /80   Pulse 63   Resp 18   SpO2 97%     General appearance today is normal.       Physical Exam  Neurological:      Coordination: Finger-Nose-Finger Test normal.      Gait: Gait is intact.      Deep Tendon Reflexes: Strength normal.   Psychiatric:         Speech: Speech normal.          Neurologic Exam     Mental Status   Speech: speech is normal   Level of consciousness: alert  Normal comprehension.     Cranial Nerves   Cranial nerves II through XII intact.     Motor Exam   Muscle bulk: normal  Overall muscle tone: normal    Strength   Strength 5/5 throughout.     Gait, Coordination, and Reflexes     Gait  Gait: normal    Coordination   Finger to nose coordination: normal    Tremor   Resting tremor: absent          Assessment & Plan   Diagnoses and all orders for this visit:    1. Tremor (Primary)          Discussion/Summary   Bryan Khan returns to clinic today for evaluation of Essential Tremor. I again reviewed his current status and treatment options. After discussing potential treatment options, it was elected to continue on his current medications unchanged. We may consider decreasing his primidone dosage in the near future, given his dizziness and fatigue pending his upcoming cardiology appointment. He will then follow up in 6 months , or sooner if needed.   Total time of visit today: 30 minutes. As part of this visit I discussed the history with the patient . I also discussed diagnosis, evaluation, current status, treatment options and management as discussed above.       Sho Red PA-C

## 2024-07-31 ENCOUNTER — PATIENT MESSAGE (OUTPATIENT)
Dept: ADMINISTRATIVE | Facility: OTHER | Age: 64
End: 2024-07-31
Payer: MEDICARE

## 2024-07-31 NOTE — PROGRESS NOTES
PATIENT: Bessy Lamb  MRN: [id number]  DATE: 8/1/2024      Diagnosis:   No diagnosis found.        Chief Complaint: No chief complaint on file.      Oncologic History:      Oncologic History     Oncologic Treatment     Pathology           Subjective:    Initial History: Ms. Lamb is a 63 y.o. female with Arthritis, DMII, GERD, Hypotyroidism, Obesity, Thyroid disease who presents for pancreatic cancer.  Since the last clinic visit the patient's TEMPUS tissue testing showed TP53, KRAS p    .  Patient had port placement on 07/18/2024.  The patient states she is not having any additional diarrhea in his not started her Creon yet.  The patient denies CP, cough, SOB, abdominal pain, nausea, vomiting, constipation, diarrhea.  The patient denies fever, chills, night sweats, weight loss, new lumps or bumps, easy bruising or bleeding.    Prior History:   The patient initially underwent a CXR on 11/13/23 fur a URI which showed possible subtle pulmonary nodules.  CT chest 12/01/2023 showed a cluster of ill-defined centrilobular ground-glass opacity he inferior right and left upper lobes as well as a 1.2 cm ground-glass opacity in the superior segment of the left lower lobe; solid 3 mm pulmonary nodule in the right upper lobe; subcentimeter right hepatic lobe hypodensity likely representing a cyst.  The patient underwent surveillance CT chest on 05/21/2024 showing a 3.7 x 2.7 pancreatic body mass in the pancreatic tail atrophy suspicious of pancreatic malignancy as well as and unchanged benign 3 mm nodule in the right upper lobe.  MRI abdomen on 06/30/2024 showed hypoenhancing mass centered in the proximal pancreatic body measuring 2.9 x 2 cm with abutment and possible encasement of the distal splenic vein.  EUS was performed on 07/05/2024 mass in the pancreatic body stage T4 N0 M0 by endo sonographic criteria.  Pathology from biopsy of the stomach showed mild focally moderate chronic gastritis with no evidence of the H  pylori.  Stomach polyp which was removed code hyperplastic polyps with chronic inflammation.  Pancreatic biopsy showed adenocarcinoma.  CT of the chest, abdomen, and pelvis on 2024 showed a 9 mm lesion in the right hepatic lobe previously characterized as cysts; mass in the pancreatic body measuring 4.4 x 3.7 cm with diffuse pancreatic ductal dilatation measuring 8 mm:  Weaning vein at the portal confluence occluded with reconstitution via collaterals.  The mass abuts the portal vein by less than 180° but does not case the proximal splenic artery remains patent.  Also noted was a pancreatic lymph node measuring 0.7 cm.   The patient's case was discussed at tumor board on 2024 with recommendation for proceeding with the chemotherapy    Past Medical History:   Past Medical History:   Diagnosis Date    Arthritis, lumbar spine     hands    Diabetes mellitus     General anesthetics causing adverse effect in therapeutic use     following breast rediuct, hard time awakening  also had anxiety rxn to lidocain in dental ofc    GERD (gastroesophageal reflux disease)     Hypothyroidism 10/08/2014    Maternal anesthesia complication     epidural for 1st child went up instead of down; required intubation    Obesity (BMI 30-39.9) 10/08/2014    Thyroid disease     Thyroid nodule 10/08/2014       Past Surgical HIstory:   Past Surgical History:   Procedure Laterality Date    BREAST BIOPSY Left     b9    BREAST SURGERY      Reduction cause of a mass in left breast    c-sections x2       SECTION  3/26/81 & 85    Birth of two girls    COLONOSCOPY  2012         COLONOSCOPY N/A 2018    Procedure: COLONOSCOPY;  Surgeon: Francisco Mcclain MD;  Location: [facility];  Service: Endoscopy;  Laterality: N/A;    COLONOSCOPY N/A 10/24/2023    Procedure: COLONOSCOPY;  Surgeon: Francisco Mcclain MD;  Location: [facility];  Service: Endoscopy;  Laterality: N/A;    ENDOSCOPIC ULTRASOUND OF UPPER GASTROINTESTINAL  TRACT Left 7/5/2024    Procedure: ULTRASOUND, UPPER GI TRACT, ENDOSCOPIC;  Surgeon: Andrew Gordon MD;  Location: [facility] ENDO;  Service: Endoscopy;  Laterality: Left;    ESOPHAGOGASTRODUODENOSCOPY N/A 7/5/2024    Procedure: EGD (ESOPHAGOGASTRODUODENOSCOPY);  Surgeon: Andrew Gordon MD;  Location: [facility] ENDO;  Service: Endoscopy;  Laterality: N/A;    hysteroscopy with polypectomy      INCISIONAL BREAST BIOPSY Left 1996    benign; done at same time as reduction    INSERTION OF TUNNELED CENTRAL VENOUS CATHETER (CVC) WITH SUBCUTANEOUS PORT N/A 7/18/2024    Procedure: VMOHLYJAN-FOBT-K-CATH;  Surgeon: Blanca Dillard MD;  Location: [facility];  Service: General;  Laterality: N/A;    TOTAL REDUCTION MAMMOPLASTY Bilateral 1996       Family History:   Family History   Problem Relation Name Age of Onset    Brain cancer Mother Ravi Gill Macey Tomasz     Early death Mother Ravi Gill Macey Tolbert 51        Passed at 51    Cancer Mother Ravi Gill Macey Tolbert         Brain tumor    Arthritis Mother Ravi Gill Macey Tomasz     Diabetes Father Ck pham tomasz         Type 2    Cirrhosis Father Ck pham tomasz     Cancer Father Ck ankit tolbert         Bone    COPD Father Ck ankit tolbert         Smoker    Early death Father Ck pham tomasz         Passed at 64    Lung cancer Father Ck pham tomasz     Heart disease Sister Mamta (dianna)wescovicsalima         Congestive heart failure (passed 2/11/18    Hypertension Sister Mamta (dianna)wescovich     Kidney disease Sister Mamta (dianna)wescovicsalima         Doc removed when she was a child    Hypertension Sister      Depression Sister Ravi (johnna) macey Mauricio ( sister)         Due to loss of her 27 year old son    Early death Sister Ravi (johnna) macey Mauricio ( sister)         Pulmonary hypertension, cirrhosis of the liver(passed 7/14/2007   Age 57    Heart disease Brother Johnkarl Borden Macey (  passed )         Heart attack    Hypertension Brother John Choudhury ( passed )     Heart disease Brother Juan Francisco Choudhury         Heart  blockage    Heart disease Brother Bhanu Choudhury         Heart attack (passed)    Diabetes Brother Bhanu Choudhury     Macular degeneration Brother Bhanu Choudhury     Breast cancer Maternal Aunt  30    Breast cancer Paternal Aunt  40    Breast cancer Paternal Aunt  40    Ovarian cancer Neg Hx         Social History:  reports that she has never smoked. She has never used smokeless tobacco. She reports current alcohol use. She reports that she does not use drugs.    Allergies:  Review of patient's allergies indicates:   Allergen Reactions    Duricef [cefadroxil] Hives    Eggs [egg derived] Anaphylaxis and Hives    Cat/feline products Itching    Dairycare [lactobacillus acidoph-lactase] Itching    Dog dander Itching    Wheat containing prod Hives       Medications:  Current Outpatient Medications   Medication Sig Dispense Refill    acetaminophen (TYLENOL) 325 MG tablet Take 325 mg by mouth every 6 (six) hours as needed for Pain.      ALPRAZolam (XANAX) 0.5 MG tablet Take 1 tablet (0.5 mg total) by mouth On call Procedure for Anxiety. 2 tablet 0    blood-glucose meter Misc Use as directed 1 each prn    calcium carbonate (OS-ISH) 600 mg calcium (1,500 mg) Tab Take 1,200 mg by mouth 2 (two) times daily with meals.      cetirizine (ZYRTEC) 10 MG tablet Take 1 tablet (10 mg total) by mouth once daily. 30 tablet 1    famotidine (PEPCID) 40 MG tablet TAKE 1 TABLET (40 MG TOTAL) BY MOUTH ONCE DAILY. 30 tablet 11    fluticasone propionate (FLONASE) 50 mcg/actuation nasal spray 1 spray (50 mcg total) by Each Nostril route once daily. 18.2 mL 0    HYDROcodone-acetaminophen (NORCO) 5-325 mg per tablet Take 1 tablet by mouth every 6 (six) hours as needed. 10 tablet 0    levothyroxine (SYNTHROID) 50 MCG tablet Take 1 tablet (50 mcg total) by mouth once daily. 90 tablet 1    lipase-protease-amylase  24,000-76,000-120,000 units (CREON) 24,000-76,000 -120,000 unit capsule Take 2 capsules by mouth 3 (three) times daily with meals. 180 capsule 11    loperamide (IMODIUM) 2 mg capsule Take 2 tablets (4mg) by mouth after first loose stool, 1 tablet every 2 hours until diarrhea free for 12 hours. May take 2 tablets (4mg) by mouth every 4 hours at night. May require more than the package labeling maximum dose of 16mg/day. 30 capsule 11    loratadine (CLARITIN) 10 mg tablet Take 10 mg by mouth every morning.      losartan (COZAAR) 25 MG tablet TAKE 1 TABLET (25 MG TOTAL) BY MOUTH ONCE DAILY 90 tablet 3    meclizine (ANTIVERT) 25 mg tablet Take 1 tablet (25 mg total) by mouth 3 (three) times daily as needed for Dizziness. 30 tablet 0    mupirocin (BACTROBAN) 2 % ointment Apply topically 3 (three) times daily.      OLANZapine (ZYPREXA) 5 MG tablet Take 1 tablet by mouth nightly on days 1-3 of each chemotherapy cycle. 6 tablet 11    prochlorperazine (COMPAZINE) 5 MG tablet Take 1 tablet (5 mg total) by mouth every 6 (six) hours as needed for Nausea. 20 tablet 5    simvastatin (ZOCOR) 10 MG tablet Take 1 tablet (10 mg total) by mouth every evening. 90 tablet 3    SYNJARDY XR 10-1,000 mg TBph TAKE ONE TABLET BY MOUTH ONCE DAILY 30 tablet 5    TRUE METRIX GLUCOSE TEST STRIP Strp USE 1 STRIP ONCE DAILY TO TEST BLOOD GLUCOSE (50 DAY SUPPLY) 100 each 3    TRUEPLUS LANCETS 33 gauge Misc USE AS DIRECTED TO TEST BLOOD SUGAR ONCE DAILY FOR UP  USES 100 each 2    VITAMIN D2 1,250 mcg (50,000 unit) capsule TAKE 1 CAPSULE (50,000 UNITS TOTAL) BY MOUTH TWICE A WEEK. 24 capsule 3     No current facility-administered medications for this visit.       Review of Systems   Constitutional:  Negative for appetite change, chills, fatigue, fever and unexpected weight change.   HENT:  Negative for mouth sores.    Eyes:  Negative for visual disturbance.   Respiratory:  Negative for cough and shortness of breath.    Cardiovascular:  Negative  for chest pain and palpitations.   Gastrointestinal:  Negative for abdominal pain, constipation, diarrhea, nausea and vomiting.   Genitourinary:  Negative for frequency.   Musculoskeletal:  Negative for back pain.   Skin:  Negative for rash.   Neurological:  Negative for headaches.   Hematological:  Negative for adenopathy. Does not bruise/bleed easily.   Psychiatric/Behavioral:  The patient is not nervous/anxious.        ECOG Performance Status: 0   Objective:      Vitals:   There were no vitals filed for this visit.        Physical Exam  Constitutional:       General: She is not in acute distress.     Appearance: She is well-developed. She is not diaphoretic.   HENT:      Head: Normocephalic and atraumatic.   Cardiovascular:      Rate and Rhythm: Normal rate and regular rhythm.      Heart sounds: Normal heart sounds. No murmur heard.     No friction rub. No gallop.   Pulmonary:      Effort: Pulmonary effort is normal. No respiratory distress.      Breath sounds: Normal breath sounds. No wheezing or rales.   Chest:      Chest wall: No tenderness.   Abdominal:      General: Bowel sounds are normal. There is no distension.      Palpations: Abdomen is soft. There is no mass.      Tenderness: There is no abdominal tenderness. There is no guarding or rebound.   Musculoskeletal:      Comments: PORT in right chest   Lymphadenopathy:      Cervical: No cervical adenopathy.      Upper Body:      Right upper body: No supraclavicular or axillary adenopathy.      Left upper body: No supraclavicular or axillary adenopathy.   Skin:     Findings: No erythema or rash.   Neurological:      Mental Status: She is alert and oriented to person, place, and time.   Psychiatric:         Behavior: Behavior normal.       Laboratory Data:  No visits with results within 1 Week(s) from this visit.   Latest known visit with results is:   Lab Visit on 07/22/2024   Component Date Value Ref Range Status    WBC 07/22/2024 5.66  3.90 - 12.70 K/uL  Final    RBC 07/22/2024 5.04  4.00 - 5.40 M/uL Final    Hemoglobin 07/22/2024 14.6  12.0 - 16.0 g/dL Final    Hematocrit 07/22/2024 42.5  37.0 - 48.5 % Final    MCV 07/22/2024 84  82 - 98 fL Final    MCH 07/22/2024 29.0  27.0 - 31.0 pg Final    MCHC 07/22/2024 34.4  32.0 - 36.0 g/dL Final    RDW 07/22/2024 12.3  11.5 - 14.5 % Final    Platelets 07/22/2024 200  150 - 450 K/uL Final    MPV 07/22/2024 9.4  9.2 - 12.9 fL Final    Immature Granulocytes 07/22/2024 0.2  0.0 - 0.5 % Final    Gran # (ANC) 07/22/2024 3.0  1.8 - 7.7 K/uL Final    Immature Grans (Abs) 07/22/2024 0.01  0.00 - 0.04 K/uL Final    Comment: Mild elevation in immature granulocytes is non specific and   can be seen in a variety of conditions including stress response,   acute inflammation, trauma and pregnancy. Correlation with other   laboratory and clinical findings is essential.      Lymph # 07/22/2024 2.3  1.0 - 4.8 K/uL Final    Mono # 07/22/2024 0.3  0.3 - 1.0 K/uL Final    Eos # 07/22/2024 0.1  0.0 - 0.5 K/uL Final    Baso # 07/22/2024 0.02  0.00 - 0.20 K/uL Final    nRBC 07/22/2024 0  0 /100 WBC Final    Gran % 07/22/2024 52.3  38.0 - 73.0 % Final    Lymph % 07/22/2024 39.8  18.0 - 48.0 % Final    Mono % 07/22/2024 5.7  4.0 - 15.0 % Final    Eosinophil % 07/22/2024 1.6  0.0 - 8.0 % Final    Basophil % 07/22/2024 0.4  0.0 - 1.9 % Final    Differential Method 07/22/2024 Automated   Final    Sodium 07/22/2024 139  136 - 145 mmol/L Final    Potassium 07/22/2024 4.2  3.5 - 5.1 mmol/L Final    Chloride 07/22/2024 101  95 - 110 mmol/L Final    CO2 07/22/2024 26  23 - 29 mmol/L Final    Glucose 07/22/2024 130 (H)  70 - 110 mg/dL Final    BUN 07/22/2024 9  8 - 23 mg/dL Final    Creatinine 07/22/2024 0.9  0.5 - 1.4 mg/dL Final    Calcium 07/22/2024 9.9  8.7 - 10.5 mg/dL Final    Total Protein 07/22/2024 7.8  6.0 - 8.4 g/dL Final    Albumin 07/22/2024 4.4  3.5 - 5.2 g/dL Final    Total Bilirubin 07/22/2024 0.5  0.1 - 1.0 mg/dL Final    Comment: For  infants and newborns, interpretation of results should be based  on gestational age, weight and in agreement with clinical  observations.    Premature Infant recommended reference ranges:  Up to 24 hours.............<8.0 mg/dL  Up to 48 hours............<12.0 mg/dL  3-5 days..................<15.0 mg/dL  6-29 days.................<15.0 mg/dL      Alkaline Phosphatase 07/22/2024 71  55 - 135 U/L Final    AST 07/22/2024 20  10 - 40 U/L Final    ALT 07/22/2024 11  10 - 44 U/L Final    eGFR 07/22/2024 >60.0  >60 mL/min/1.73 m^2 Final    Anion Gap 07/22/2024 12  8 - 16 mmol/L Final    CA 19-9 07/22/2024 282.8 (H)  0.0 - 40.0 U/mL Final    Comment: The testing method is a chemiluminescent microparticle immunoassay   manufactured by [employer] and performed on the    or   [employer] system. Values obtained with different assay manufacturers   for   methods may be different and cannot be used interchangeably.      TSH 07/22/2024 2.414  0.400 - 4.000 uIU/mL Final         Imaging:     CXR 11/14/23    Cardiac silhouette and mediastinal contours are normal.  Lungs demonstrates no focal large opacity with possible subtle small nodules within the bilateral mid lungs.  No pleural effusion.  Osseous structures are intact.       CT chest 12/01/23    Base of Neck: No significant abnormality.     Thoracic soft tissues: Normal.     Aorta: Left-sided aortic arch.  No aneurysm and no significant atherosclerosis     Heart: Normal size. No effusion.     Pulmonary vasculature: Pulmonary arteries distribute normally.  There are four pulmonary veins.     Yuliya/Mediastinum: No pathologic richard enlargement.     Airways: Patent.     Lungs/Pleura: Biapical nonspecific fibronodular pleuroparenchymal scarring.  Solid 3 mm nodule in the right upper lobe (series 4, image 119).  Cluster ill-defined centrilobular ground-glass opacities within the posterior right and left upper lobes, not entirely specific but suspicious for small  airways inflammation or infection.  There is a 1.2 cm ground-glass opacity in superior segment left lower lobe (series 4, image 282).  No consolidation, pleural effusion or pneumothorax.     Esophagus: Normal.     Upper Abdomen: There is an 8 mm hypodensity in the inferior right hepatic lobe, which measures fluid attenuation and probably represents a cyst.  Cholelithiasis.  No abnormal gallbladder wall thickening or pericholecystic fluid.     Bones: No acute fracture. No suspicious lytic or sclerotic lesions.    CT Chest 5/21/24  Base of Neck: No significant abnormality.     Thoracic soft tissues: Normal.     Aorta: Left-sided aortic arch. No aneurysm.     Heart: Normal size. No effusion. Mild aortic and coronary artery atherosclerotic calcification.     Pulmonary vasculature: Pulmonary arteries distribute normally.     Yuliya/Mediastinum: No pathologic richard enlargement.     Esophagus: Normal.     Upper Abdomen: 3.7 x 2.7 cm pancreatic body mass with pancreatic tail atrophy suspicious for pancreatic malignancy. Recommend dedicated CT or MRI abdomen with IV contrast pancreatic protocol.     Airways: Patent.     Lungs/Pleura: Unchanged benign 3 mm nodule medial right upper lobe series 5, image 115. No new nodules. Mild bilateral apical pleuroparenchymal scarring again noted.     No airspace disease. Interval resolution of previously described ground-glass.     Bones: No acute fracture. No suspicious lytic or sclerotic lesions.    MRI Abdomen 6/30/24  Lower thorax: Heart is normal in size. No pleural fluid. No consolidation.     Liver: Normal size. No evidence of fatty infiltration. Small cyst in segment 6 measures 9 mm.     Gallbladder: Unremarkable.     Bile ducts: No intrahepatic or extrahepatic biliary dilatation.     Pancreas: Hypoenhancing mass centered in the proximal pancreatic body measures 2.9 x 2.0 cm (series 27, image 31). This lesion abuts and may encase the distal splenic vein which appears narrowed just  proximal to the portal confluence. There is atrophy of the pancreatic body and tail distal to this lesion with associated dilatation of the main pancreatic duct measuring 5 mm. There is also atrophy of the pancreatic head without associated ductal dilatation.     Spleen: Normal size. No focal lesions.     Adrenal glands: Unremarkable.     Kidneys: No renal masses. No hydronephrosis.     GI: No evidence of bowel obstruction.     Mesentery/retroperitoneum: No pathologically enlarged lymph nodes.     Vasculature: Visualized aorta is normal in caliber. Portal vein is patent.     Body wall/extraperitoneal soft tissues: Unremarkable.     Bones: Visualized osseous structures demonstrate normal marrow signal.     Miscellaneous: No intraperitoneal free fluid.    CT C/A/P 7/09/24  Normal thyroid. Heart size is normal. No pericardial effusion. The thoracic aorta and main pulmonary artery are normal in caliber. No enlarged axillary, mediastinal or hilar lymph nodes.     Central airways are clear. No pleural effusion, focal consolidation or pneumothorax.     The liver is normal in morphology and with smooth contour. Inferior right hepatic lobe 9 mm lesion previously characterized as a cyst. No new hepatic lesion.     Cholelithiasis. No gallbladder wall thickening or pericholecystic fluid. No intra or extrahepatic biliary ductal dilatation.     The spleen, kidneys and adrenal glands are normal.     Proximal pancreatic body irregular mass measures 4.4 x 3.7 cm (series 2, image 65) which when measured similarly to the prior chest CT is unchanged in size. The distal pancreatic body and tail are atrophic. Diffuse pancreatic ductal dilatation measuring 8 mm.     The portal and superior mesenteric veins are patent. 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 The mass abuts the portal by less than 180 degrees. The mass encases the proximal splenic artery which remains patent.  The SMA is remote from the mass.     Peripancreatic lymph node measuring 0.7 cm in short axis (series 2, image 72). No enlarged retroperitoneal lymph nodes.     No bowel obstruction inflammatory change. The mass focally abuts the distal lesser curvature the stomach. No free fluid or free air.     Circumferential bladder wall thickening. Normal uterus. No adnexal mass. No enlarged pelvic lymph nodes.     Soft tissues are unremarkable. No acute or aggressive osseous abnormality.       Assessment:       No diagnosis found.           Plan:     Pancreatic Cancer - Patient with Stage III pancreatic cancer with concern for potential encasement of the distal celiac artery per discussion with Dr Valentin  -Will discuss pt at tumor board next week  -No sign of mets  - 94.5 on 6/17/24  -Case discussed at tumor board 7/17/24 with recommendation to proceed with chemo  -[facility] genetic testing negative for the genes tested  -Pharmacogenomic testing resutls pending  -TEMPUS blood and tissue testing pending  -Will proceed with FOLFIRINOX with 1st does of irinotecan and fluorouracil reduced pending results of pharmacogenomic testing  Visit today included increased complexity associated with the care of the episodic problem (chemotherapy) addressed and managing the longitudinal care of the patient due to the serious and/or complex managed problem(s) pancreatic cancer.    Pancreatic Insufficiency - Pt not taking Creon  -Will monitor weight and GI symptoms    Hypothyroidism - pt on synthroid  -Will monitror    DMII - PT currently on Synjardy  Lab Results   Component Value Date    HGBA1C 6.2 (H) 04/01/2024   -Possibly due to pancreatic cancer  -Will monitor    Route Chart for Scheduling    Med Onc Chart Routing      Follow up with physician 4 weeks. Pt can proceed with treatment.  Pt needs approval for neulasta.  Pt needs a CBC, CMP, Mg in 2 weeks with appt with NP with friday before treatment.  Pt needs an appt with me in 4 weeks  with CBC, CMP and Mg he friday before treatment.   Follow up with ANALIA 2 weeks.   Infusion scheduling note    Injection scheduling note    Labs    Imaging    Pharmacy appointment    Other referrals          Treatment Plan Information   OP GI FOLFIRINOX (oxaliplatin, leucovorin, irinotecan, fluorouracil) Q2W    Rajat Hunt MD   Upcoming Treatment Dates - OP GI FOLFIRINOX (oxaliplatin, leucovorin, irinotecan, fluorouracil) Q2W     8/5/2024       Chemotherapy       oxaliplatin (ELOXATIN) in D5W 527.4 mL chemo infusion       leucovorin calcium in D5W 250 mL infusion       irinotecan (CAMPTOSAR) in 0.9% NaCl 514.5 mL chemo infusion       fluorouraciL injection       fluorouracil chemo infusion       Antiemetics       palonosetron 0.25mg/dexAMETHasone 12mg in NS IVPB 0.25 mg 50 mL  8/19/2024       Chemotherapy       oxaliplatin (ELOXATIN) in D5W 527.4 mL chemo infusion       leucovorin calcium in D5W 250 mL infusion       irinotecan (CAMPTOSAR) in 0.9% NaCl 514.5 mL chemo infusion       fluorouraciL injection       fluorouracil chemo infusion       Antiemetics       palonosetron 0.25mg/dexAMETHasone 12mg in NS IVPB 0.25 mg 50 mL  9/2/2024       Chemotherapy       oxaliplatin (ELOXATIN) in D5W 527.4 mL chemo infusion       leucovorin calcium in D5W 250 mL infusion       irinotecan (CAMPTOSAR) in 0.9% NaCl 514.5 mL chemo infusion       fluorouraciL injection       fluorouracil chemo infusion       Antiemetics       palonosetron 0.25mg/dexAMETHasone 12mg in NS IVPB 0.25 mg 50 mL  9/16/2024       Chemotherapy       oxaliplatin (ELOXATIN) in D5W 527.4 mL chemo infusion       leucovorin calcium in D5W 250 mL infusion       irinotecan (CAMPTOSAR) in 0.9% NaCl 514.5 mL chemo infusion       fluorouraciL injection       fluorouracil chemo infusion       Antiemetics       palonosetron 0.25mg/dexAMETHasone 12mg in NS IVPB 0.25 mg 50 mL      Rajat Hunt MD  [facility]  Hematology and Oncology  [facility]    [address] [place] [address]   O: [phone]  F: [phone]

## 2024-08-02 ENCOUNTER — LAB VISIT (OUTPATIENT)
Dept: LAB | Facility: HOSPITAL | Age: 64
End: 2024-08-02
Attending: INTERNAL MEDICINE
Payer: MEDICARE

## 2024-08-02 ENCOUNTER — OFFICE VISIT (OUTPATIENT)
Dept: HEMATOLOGY/ONCOLOGY | Facility: CLINIC | Age: 64
End: 2024-08-02
Payer: MEDICARE

## 2024-08-02 ENCOUNTER — RESEARCH ENCOUNTER (OUTPATIENT)
Dept: RESEARCH | Facility: HOSPITAL | Age: 64
End: 2024-08-02
Payer: MEDICARE

## 2024-08-02 VITALS
HEIGHT: 61 IN | RESPIRATION RATE: 14 BRPM | TEMPERATURE: 97 F | BODY MASS INDEX: 24.55 KG/M2 | SYSTOLIC BLOOD PRESSURE: 124 MMHG | HEART RATE: 73 BPM | DIASTOLIC BLOOD PRESSURE: 68 MMHG | WEIGHT: 130.06 LBS | OXYGEN SATURATION: 99 %

## 2024-08-02 DIAGNOSIS — C25.9 MALIGNANT NEOPLASM OF PANCREAS, UNSPECIFIED LOCATION OF MALIGNANCY: ICD-10-CM

## 2024-08-02 DIAGNOSIS — E11.9 TYPE 2 DIABETES MELLITUS WITHOUT COMPLICATION, WITHOUT LONG-TERM CURRENT USE OF INSULIN: ICD-10-CM

## 2024-08-02 DIAGNOSIS — C25.9 MALIGNANT NEOPLASM OF PANCREAS, UNSPECIFIED LOCATION OF MALIGNANCY: Primary | ICD-10-CM

## 2024-08-02 DIAGNOSIS — Z79.899 IMMUNODEFICIENCY DUE TO CHEMOTHERAPY: ICD-10-CM

## 2024-08-02 DIAGNOSIS — E87.6 HYPOKALEMIA: ICD-10-CM

## 2024-08-02 DIAGNOSIS — Z00.6 EXAMINATION OF PARTICIPANT IN CLINICAL TRIAL: ICD-10-CM

## 2024-08-02 DIAGNOSIS — D84.821 IMMUNODEFICIENCY DUE TO CHEMOTHERAPY: ICD-10-CM

## 2024-08-02 DIAGNOSIS — D69.6 THROMBOCYTOPENIA: ICD-10-CM

## 2024-08-02 DIAGNOSIS — K86.89 PANCREATIC INSUFFICIENCY: ICD-10-CM

## 2024-08-02 DIAGNOSIS — E03.9 HYPOTHYROIDISM, UNSPECIFIED TYPE: ICD-10-CM

## 2024-08-02 DIAGNOSIS — T45.1X5A IMMUNODEFICIENCY DUE TO CHEMOTHERAPY: ICD-10-CM

## 2024-08-02 LAB
ALBUMIN SERPL BCP-MCNC: 3.7 G/DL (ref 3.5–5.2)
ALP SERPL-CCNC: 60 U/L (ref 55–135)
ALT SERPL W/O P-5'-P-CCNC: 13 U/L (ref 10–44)
ANION GAP SERPL CALC-SCNC: 10 MMOL/L (ref 8–16)
AST SERPL-CCNC: 14 U/L (ref 10–40)
BASOPHILS # BLD AUTO: 0.02 K/UL (ref 0–0.2)
BASOPHILS NFR BLD: 0.5 % (ref 0–1.9)
BILIRUB SERPL-MCNC: 0.3 MG/DL (ref 0.1–1)
BUN SERPL-MCNC: 11 MG/DL (ref 8–23)
CALCIUM SERPL-MCNC: 9.4 MG/DL (ref 8.7–10.5)
CANCER AG19-9 SERPL-ACNC: 180.8 U/ML (ref 0–40)
CHLORIDE SERPL-SCNC: 107 MMOL/L (ref 95–110)
CO2 SERPL-SCNC: 23 MMOL/L (ref 23–29)
CREAT SERPL-MCNC: 0.9 MG/DL (ref 0.5–1.4)
DIFFERENTIAL METHOD BLD: ABNORMAL
EOSINOPHIL # BLD AUTO: 0.1 K/UL (ref 0–0.5)
EOSINOPHIL NFR BLD: 2.3 % (ref 0–8)
ERYTHROCYTE [DISTWIDTH] IN BLOOD BY AUTOMATED COUNT: 11.8 % (ref 11.5–14.5)
EST. GFR  (NO RACE VARIABLE): >60 ML/MIN/1.73 M^2
GLUCOSE SERPL-MCNC: 149 MG/DL (ref 70–110)
HCT VFR BLD AUTO: 36.1 % (ref 37–48.5)
HGB BLD-MCNC: 12.6 G/DL (ref 12–16)
IMM GRANULOCYTES # BLD AUTO: 0.01 K/UL (ref 0–0.04)
IMM GRANULOCYTES NFR BLD AUTO: 0.3 % (ref 0–0.5)
LYMPHOCYTES # BLD AUTO: 2.1 K/UL (ref 1–4.8)
LYMPHOCYTES NFR BLD: 53.1 % (ref 18–48)
MAGNESIUM SERPL-MCNC: 1.7 MG/DL (ref 1.6–2.6)
MCH RBC QN AUTO: 28.6 PG (ref 27–31)
MCHC RBC AUTO-ENTMCNC: 34.9 G/DL (ref 32–36)
MCV RBC AUTO: 82 FL (ref 82–98)
MONOCYTES # BLD AUTO: 0.2 K/UL (ref 0.3–1)
MONOCYTES NFR BLD: 5.9 % (ref 4–15)
NEUTROPHILS # BLD AUTO: 1.5 K/UL (ref 1.8–7.7)
NEUTROPHILS NFR BLD: 37.9 % (ref 38–73)
NRBC BLD-RTO: 0 /100 WBC
PLATELET # BLD AUTO: 104 K/UL (ref 150–450)
PMV BLD AUTO: 9.5 FL (ref 9.2–12.9)
POTASSIUM SERPL-SCNC: 3.1 MMOL/L (ref 3.5–5.1)
PROT SERPL-MCNC: 6.6 G/DL (ref 6–8.4)
RBC # BLD AUTO: 4.4 M/UL (ref 4–5.4)
SODIUM SERPL-SCNC: 140 MMOL/L (ref 136–145)
WBC # BLD AUTO: 3.92 K/UL (ref 3.9–12.7)

## 2024-08-02 PROCEDURE — 99999 PR PBB SHADOW E&M-EST. PATIENT-LVL V: CPT | Mod: PBBFAC,,, | Performed by: INTERNAL MEDICINE

## 2024-08-02 PROCEDURE — 86301 IMMUNOASSAY TUMOR CA 19-9: CPT | Performed by: INTERNAL MEDICINE

## 2024-08-02 PROCEDURE — 36415 COLL VENOUS BLD VENIPUNCTURE: CPT | Mod: PN | Performed by: INTERNAL MEDICINE

## 2024-08-02 PROCEDURE — 80053 COMPREHEN METABOLIC PANEL: CPT | Mod: PN | Performed by: INTERNAL MEDICINE

## 2024-08-02 PROCEDURE — 85025 COMPLETE CBC W/AUTO DIFF WBC: CPT | Mod: PN | Performed by: INTERNAL MEDICINE

## 2024-08-02 PROCEDURE — 83735 ASSAY OF MAGNESIUM: CPT | Mod: PN | Performed by: INTERNAL MEDICINE

## 2024-08-02 RX ORDER — SODIUM CHLORIDE 0.9 % (FLUSH) 0.9 %
10 SYRINGE (ML) INJECTION
OUTPATIENT
Start: 2024-08-05

## 2024-08-02 RX ORDER — PROCHLORPERAZINE EDISYLATE 5 MG/ML
5 INJECTION INTRAMUSCULAR; INTRAVENOUS ONCE AS NEEDED
OUTPATIENT
Start: 2024-08-05

## 2024-08-02 RX ORDER — FLUOROURACIL 50 MG/ML
400 INJECTION, SOLUTION INTRAVENOUS
OUTPATIENT
Start: 2024-08-05

## 2024-08-02 RX ORDER — ATROPINE SULFATE 0.4 MG/ML
0.4 INJECTION, SOLUTION ENDOTRACHEAL; INTRAMEDULLARY; INTRAMUSCULAR; INTRAVENOUS; SUBCUTANEOUS ONCE AS NEEDED
OUTPATIENT
Start: 2024-08-05

## 2024-08-02 RX ORDER — EPINEPHRINE 0.3 MG/.3ML
0.3 INJECTION SUBCUTANEOUS ONCE AS NEEDED
OUTPATIENT
Start: 2024-08-05

## 2024-08-02 RX ORDER — HEPARIN 100 UNIT/ML
500 SYRINGE INTRAVENOUS
OUTPATIENT
Start: 2024-08-05

## 2024-08-02 RX ORDER — DIPHENHYDRAMINE HYDROCHLORIDE 50 MG/ML
50 INJECTION INTRAMUSCULAR; INTRAVENOUS ONCE AS NEEDED
OUTPATIENT
Start: 2024-08-05

## 2024-08-02 RX ORDER — POTASSIUM CHLORIDE 20 MEQ/1
40 TABLET, EXTENDED RELEASE ORAL DAILY
Qty: 60 TABLET | Refills: 11 | Status: SHIPPED | OUTPATIENT
Start: 2024-08-02 | End: 2025-08-02

## 2024-08-02 NOTE — PROGRESS NOTES
PATIENT: Bessy Lamb  MRN: [id number]  DATE: 8/2/2024      Diagnosis:   1. Malignant neoplasm of pancreas, unspecified location of malignancy    2. 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therapy    3. Thrombocytopenia    4. Hypokalemia    5. Pancreatic insufficiency    6. Hypothyroidism, unspecified type    7. Type 2 diabetes mellitus without complication, without long-term current use of insulin            Chief Complaint: Malignant neoplasm of pancreas, unspecified location of mal (1 week follow up )      Oncologic History:      Oncologic History     Oncologic Treatment     Pathology           Subjective:    Initial History: Ms. Lamb is a 63 y.o. female with Arthritis, DMII, GERD, Hypotyroidism, Obesity, Thyroid disease who presents for pancreatic cancer.  Since the last clinic visit [facility] tissue testing showed TP53, KRAS p.G12D, CDKN2A, CDKN2B mutations.  PD-L1 was 15%.  Pharmacogenomic testing on 7/12/24 showed UGT1A1 *1/*28 mutation.  The patient denies CP, cough, SOB, abdominal pain, nausea, vomiting, constipation.  The patient denies fever, chills, night sweats, weight loss, new lumps or bumps, easy bruising or bleeding.    Prior History:   The patient initially underwent a CXR on 11/13/23 fur a URI which showed possible subtle pulmonary nodules.  CT chest 12/01/2023 showed a cluster of ill-defined centrilobular ground-glass opacity he inferior right and left upper lobes as well as a 1.2 cm ground-glass opacity in the superior segment of the left lower lobe; solid 3 mm pulmonary nodule in the right upper lobe; subcentimeter right hepatic lobe hypodensity likely representing a cyst.  The patient underwent surveillance CT chest on 05/21/2024 showing a 3.7 x 2.7 pancreatic body mass in the pancreatic tail atrophy suspicious of pancreatic malignancy as well as and unchanged benign 3 mm nodule in the right upper lobe.  MRI abdomen on 06/30/2024 showed hypoenhancing mass centered in the proximal pancreatic body  measuring 2.9 x 2 cm with abutment and possible encasement of the distal splenic vein.  EUS was performed on 2024 mass in the pancreatic body stage T4 N0 M0 by endo sonographic criteria.  Pathology from biopsy of the stomach showed mild focally moderate chronic gastritis with no evidence of the H pylori.  Stomach polyp which was removed code hyperplastic polyps with chronic inflammation.  Pancreatic biopsy showed adenocarcinoma.  CT of the chest, abdomen, and pelvis on 2024 showed a 9 mm lesion in the right hepatic lobe previously characterized as cysts; mass in the pancreatic body measuring 4.4 x 3.7 cm with diffuse pancreatic ductal dilatation measuring 8 mm:  Weaning vein at the portal confluence occluded with reconstitution via collaterals.  The mass abuts the portal vein by less than 180° but does not case the proximal splenic artery remains patent.  Also noted was a pancreatic lymph node measuring 0.7 cm.   The patient's case was discussed at tumor board on 2024 with recommendation for proceeding with the chemotherapy.  Patient had port placement on 2024    Past Medical History:   Past Medical History:   Diagnosis Date    Arthritis, lumbar spine     hands    Diabetes mellitus     General anesthetics causing adverse effect in therapeutic use     following breast rediuct, hard time awakening  also had anxiety rxn to lidocain in dental ofc    GERD (gastroesophageal reflux disease)     Hypothyroidism 10/08/2014    Maternal anesthesia complication     epidural for 1st child went up instead of down; required intubation    Obesity (BMI 30-39.9) 10/08/2014    Thyroid disease     Thyroid nodule 10/08/2014       Past Surgical HIstory:   Past Surgical History:   Procedure Laterality Date    BREAST BIOPSY Left     b9    BREAST SURGERY      Reduction cause of a mass in left breast    c-sections x2       SECTION  3/26/81 & 85    Birth of two girls    COLONOSCOPY  2012          COLONOSCOPY N/A 8/30/2018    Procedure: COLONOSCOPY;  Surgeon: Francisco Mcclain MD;  Location: [facility] ENDO;  Service: Endoscopy;  Laterality: N/A;    COLONOSCOPY N/A 10/24/2023    Procedure: COLONOSCOPY;  Surgeon: Francisco Mcclain MD;  Location: [facility];  Service: Endoscopy;  Laterality: N/A;    ENDOSCOPIC ULTRASOUND OF UPPER GASTROINTESTINAL TRACT Left 7/5/2024    Procedure: ULTRASOUND, UPPER GI TRACT, ENDOSCOPIC;  Surgeon: Andrew Gordon MD;  Location: [facility];  Service: Endoscopy;  Laterality: Left;    ESOPHAGOGASTRODUODENOSCOPY N/A 7/5/2024    Procedure: EGD (ESOPHAGOGASTRODUODENOSCOPY);  Surgeon: Andrew Gordon MD;  Location: [facility];  Service: Endoscopy;  Laterality: N/A;    hysteroscopy with polypectomy      INCISIONAL BREAST BIOPSY Left 1996    benign; done at same time as reduction    INSERTION OF TUNNELED CENTRAL VENOUS CATHETER (CVC) WITH SUBCUTANEOUS PORT N/A 7/18/2024    Procedure: [id number];  Surgeon: Blanca Dillard MD;  Location: [facility];  Service: General;  Laterality: N/A;    TOTAL REDUCTION MAMMOPLASTY Bilateral 1996       Family History:   Family History   Problem Relation Name Age of Onset    Brain cancer Mother Ravi Tolbert     Early death Mother Ravi Tolbert 51        Passed at 51    Cancer Mother Ravi Tolbert         Brain tumor    Arthritis Mother Ravi Tolbert     Diabetes Father Ck pham tomasz         Type 2    Cirrhosis Father Ck holbrookent     Cancer Father Ck ankit tolbert         Bone    COPD Father Ck ankit tolbert         Smoker    Early death Father Ck phamcaroline tolbert         Passed at 64    Lung cancer Father Ck ankit tolbert     Heart disease Sister Mamta (dianna)wescovich         Congestive heart failure (passed 2/11/18    Hypertension Sister Mamta (dianna)wescovich     Kidney disease Sister Mamta (dianna)wescovic         Doc  removed when she was a child    Hypertension Sister      Depression Sister Ravi (johnna) nicolette Mauricio ( sister)         Due to loss of her 27 year old son    Early death Sister Ravi (johnna) nicolette Mauricio ( sister)         Pulmonary hypertension, cirrhosis of the liver(passed 7/14/2007   Age 57    Heart disease Brother John Choudhury ( passed )         Heart attack    Hypertension Brother John Choudhury ( passed )     Heart disease Brother Juan Francisco Choudhury         Heart  blockage    Heart disease Brother Bhanu Choudhury         Heart attack (passed)    Diabetes Brother Bhanu Choudhury     Macular degeneration Brother Bhanu Choudhury     Breast cancer Maternal Aunt  30    Breast cancer Paternal Aunt  40    Breast cancer Paternal Aunt  40    Ovarian cancer Neg Hx         Social History:  reports that she has never smoked. She has never used smokeless tobacco. She reports current alcohol use. She reports that she does not use drugs.    Allergies:  Review of patient's allergies indicates:   Allergen Reactions    Duricef [cefadroxil] Hives    Eggs [egg derived] Anaphylaxis and Hives    Cat/feline products Itching    Dairycare [lactobacillus acidoph-lactase] Itching    Dog dander Itching    Wheat containing prod Hives       Medications:  Current Outpatient Medications   Medication Sig Dispense Refill    acetaminophen (TYLENOL) 325 MG tablet Take 325 mg by mouth every 6 (six) hours as needed for Pain.      ALPRAZolam (XANAX) 0.5 MG tablet Take 1 tablet (0.5 mg total) by mouth On call Procedure for Anxiety. 2 tablet 0    blood-glucose meter Misc Use as directed 1 each prn    calcium carbonate (OS-ISH) 600 mg calcium (1,500 mg) Tab Take 1,200 mg by mouth 2 (two) times daily with meals.      cetirizine (ZYRTEC) 10 MG tablet Take 1 tablet (10 mg total) by mouth once daily. 30 tablet 1    famotidine (PEPCID) 40 MG tablet TAKE 1 TABLET (40 MG TOTAL) BY MOUTH ONCE DAILY. 30 tablet 11    fluticasone propionate (FLONASE) 50 mcg/actuation  nasal spray 1 spray (50 mcg total) by Each Nostril route once daily. 18.2 mL 0    HYDROcodone-acetaminophen (NORCO) 5-325 mg per tablet Take 1 tablet by mouth every 6 (six) hours as needed. 10 tablet 0    levothyroxine (SYNTHROID) 50 MCG tablet Take 1 tablet (50 mcg total) by mouth once daily. 90 tablet 1    lipase-protease-amylase 24,000-76,000-120,000 units (CREON) 24,000-76,000 -120,000 unit capsule Take 2 capsules by mouth 3 (three) times daily with meals. 180 capsule 11    loperamide (IMODIUM) 2 mg capsule Take 2 tablets (4mg) by mouth after first loose stool, 1 tablet every 2 hours until diarrhea free for 12 hours. May take 2 tablets (4mg) by mouth every 4 hours at night. May require more than the package labeling maximum dose of 16mg/day. 30 capsule 11    loratadine (CLARITIN) 10 mg tablet Take 10 mg by mouth every morning.      losartan (COZAAR) 25 MG tablet TAKE 1 TABLET (25 MG TOTAL) BY MOUTH ONCE DAILY 90 tablet 3    meclizine (ANTIVERT) 25 mg tablet Take 1 tablet (25 mg total) by mouth 3 (three) times daily as needed for Dizziness. 30 tablet 0    mupirocin (BACTROBAN) 2 % ointment Apply topically 3 (three) times daily.      OLANZapine (ZYPREXA) 5 MG tablet Take 1 tablet by mouth nightly on days 1-3 of each chemotherapy cycle. 6 tablet 11    prochlorperazine (COMPAZINE) 5 MG tablet Take 1 tablet (5 mg total) by mouth every 6 (six) hours as needed for Nausea. 20 tablet 5    SYNJARDY XR 10-1,000 mg TBph TAKE ONE TABLET BY MOUTH ONCE DAILY 30 tablet 5    TRUE METRIX GLUCOSE TEST STRIP Strp USE 1 STRIP ONCE DAILY TO TEST BLOOD GLUCOSE (50 DAY SUPPLY) 100 each 3    TRUEPLUS LANCETS 33 gauge Misc USE AS DIRECTED TO TEST BLOOD SUGAR ONCE DAILY FOR UP  USES 100 each 2    VITAMIN D2 1,250 mcg (50,000 unit) capsule TAKE 1 CAPSULE (50,000 UNITS TOTAL) BY MOUTH TWICE A WEEK. 24 capsule 3    potassium chloride SA (K-DUR,KLOR-CON) 20 MEQ tablet Take 2 tablets (40 mEq total) by mouth once daily. 60 tablet 11     "simvastatin (ZOCOR) 10 MG tablet Take 1 tablet (10 mg total) by mouth every evening. 90 tablet 3     No current facility-administered medications for this visit.       Review of Systems   Constitutional:  Negative for appetite change, chills, fatigue, fever and unexpected weight change.   HENT:  Negative for mouth sores.    Eyes:  Negative for visual disturbance.   Respiratory:  Negative for cough and shortness of breath.    Cardiovascular:  Negative for chest pain and palpitations.   Gastrointestinal:  Negative for abdominal pain, constipation, diarrhea, nausea and vomiting.   Genitourinary:  Negative for frequency.   Musculoskeletal:  Negative for back pain.   Skin:  Negative for rash.   Neurological:  Negative for headaches.   Hematological:  Negative for adenopathy. Does not bruise/bleed easily.   Psychiatric/Behavioral:  The patient is not nervous/anxious.        ECOG Performance Status: 0   Objective:      Vitals:   Vitals:    08/02/24 0910   BP: 124/68   BP Location: Right arm   Patient Position: Sitting   BP Method: Medium (Manual)   Pulse: 73   Resp: 14   Temp: 97 °F (36.1 °C)   TempSrc: Temporal   SpO2: 99%   Weight: 59 kg (130 lb 1.1 oz)   Height: 5' 1" (1.549 m)           Physical Exam  Constitutional:       General: She is not in acute distress.     Appearance: She is well-developed. She is not diaphoretic.   HENT:      Head: Normocephalic and atraumatic.   Cardiovascular:      Rate and Rhythm: Normal rate and regular rhythm.      Heart sounds: Normal heart sounds. No murmur heard.     No friction rub. No gallop.   Pulmonary:      Effort: Pulmonary effort is normal. No respiratory distress.      Breath sounds: Normal breath sounds. No wheezing or rales.   Chest:      Chest wall: No tenderness.   Abdominal:      General: Bowel sounds are normal. There is no distension.      Palpations: Abdomen is soft. There is no mass.      Tenderness: There is no abdominal tenderness. There is no guarding or rebound. "   Musculoskeletal:      Comments: PORT in right chest   Lymphadenopathy:      Cervical: No cervical adenopathy.      Upper Body:      Right upper body: No supraclavicular or axillary adenopathy.      Left upper body: No supraclavicular or axillary adenopathy.   Skin:     Findings: No erythema or rash.   Neurological:      Mental Status: She is alert and oriented to person, place, and time.   Psychiatric:         Behavior: Behavior normal.       Laboratory Data:  Lab Visit on 08/02/2024   Component Date Value Ref Range Status    WBC 08/02/2024 3.92  3.90 - 12.70 K/uL Final    RBC 08/02/2024 4.40  4.00 - 5.40 M/uL Final    Hemoglobin 08/02/2024 12.6  12.0 - 16.0 g/dL Final    Hematocrit 08/02/2024 36.1 (L)  37.0 - 48.5 % Final    MCV 08/02/2024 82  82 - 98 fL Final    MCH 08/02/2024 28.6  27.0 - 31.0 pg Final    MCHC 08/02/2024 34.9  32.0 - 36.0 g/dL Final    RDW 08/02/2024 11.8  11.5 - 14.5 % Final    Platelets 08/02/2024 104 (L)  150 - 450 K/uL Final    MPV 08/02/2024 9.5  9.2 - 12.9 fL Final    Immature Granulocytes 08/02/2024 0.3  0.0 - 0.5 % Final    Gran # (ANC) 08/02/2024 1.5 (L)  1.8 - 7.7 K/uL Final    Immature Grans (Abs) 08/02/2024 0.01  0.00 - 0.04 K/uL Final    Comment: Mild elevation in immature granulocytes is non specific and   can be seen in a variety of conditions including stress response,   acute inflammation, trauma and pregnancy. Correlation with other   laboratory and clinical findings is essential.      Lymph # 08/02/2024 2.1  1.0 - 4.8 K/uL Final    Mono # 08/02/2024 0.2 (L)  0.3 - 1.0 K/uL Final    Eos # 08/02/2024 0.1  0.0 - 0.5 K/uL Final    Baso # 08/02/2024 0.02  0.00 - 0.20 K/uL Final    nRBC 08/02/2024 0  0 /100 WBC Final    Gran % 08/02/2024 37.9 (L)  38.0 - 73.0 % Final    Lymph % 08/02/2024 53.1 (H)  18.0 - 48.0 % Final    Mono % 08/02/2024 5.9  4.0 - 15.0 % Final    Eosinophil % 08/02/2024 2.3  0.0 - 8.0 % Final    Basophil % 08/02/2024 0.5  0.0 - 1.9 % Final    Differential Method  08/02/2024 Automated   Final    Sodium 08/02/2024 140  136 - 145 mmol/L Final    Potassium 08/02/2024 3.1 (L)  3.5 - 5.1 mmol/L Final    Chloride 08/02/2024 107  95 - 110 mmol/L Final    CO2 08/02/2024 23  23 - 29 mmol/L Final    Glucose 08/02/2024 149 (H)  70 - 110 mg/dL Final    BUN 08/02/2024 11  8 - 23 mg/dL Final    Creatinine 08/02/2024 0.9  0.5 - 1.4 mg/dL Final    Calcium 08/02/2024 9.4  8.7 - 10.5 mg/dL Final    Total Protein 08/02/2024 6.6  6.0 - 8.4 g/dL Final    Albumin 08/02/2024 3.7  3.5 - 5.2 g/dL Final    Total Bilirubin 08/02/2024 0.3  0.1 - 1.0 mg/dL Final    Comment: For infants and newborns, interpretation of results should be based  on gestational age, weight and in agreement with clinical  observations.    Premature Infant recommended reference ranges:  Up to 24 hours.............<8.0 mg/dL  Up to 48 hours............<12.0 mg/dL  3-5 days..................<15.0 mg/dL  6-29 days.................<15.0 mg/dL      Alkaline Phosphatase 08/02/2024 60  55 - 135 U/L Final    AST 08/02/2024 14  10 - 40 U/L Final    ALT 08/02/2024 13  10 - 44 U/L Final    eGFR 08/02/2024 >60.0  >60 mL/min/1.73 m^2 Final    Anion Gap 08/02/2024 10  8 - 16 mmol/L Final    Magnesium 08/02/2024 1.7  1.6 - 2.6 mg/dL Final         Imaging:     CXR 11/14/23    Cardiac silhouette and mediastinal contours are normal.  Lungs demonstrates no focal large opacity with possible subtle small nodules within the bilateral mid lungs.  No pleural effusion.  Osseous structures are intact.       CT chest 12/01/23    Base of Neck: No significant abnormality.     Thoracic soft tissues: Normal.     Aorta: Left-sided aortic arch.  No aneurysm and no significant atherosclerosis     Heart: Normal size. No effusion.     Pulmonary vasculature: Pulmonary arteries distribute normally.  There are four pulmonary veins.     Yuliya/Mediastinum: No pathologic richard enlargement.     Airways: Patent.     Lungs/Pleura: Biapical nonspecific fibronodular  pleuroparenchymal scarring.  Solid 3 mm nodule in the right upper lobe (series 4, image 119).  Cluster ill-defined centrilobular ground-glass opacities within the posterior right and left upper lobes, not entirely specific but suspicious for small airways inflammation or infection.  There is a 1.2 cm ground-glass opacity in superior segment left lower lobe (series 4, image 282).  No consolidation, pleural effusion or pneumothorax.     Esophagus: Normal.     Upper Abdomen: There is an 8 mm hypodensity in the inferior right hepatic lobe, which measures fluid attenuation and probably represents a cyst.  Cholelithiasis.  No abnormal gallbladder wall thickening or pericholecystic fluid.     Bones: No acute fracture. No suspicious lytic or sclerotic lesions.    CT Chest 5/21/24  Base of Neck: No significant abnormality.     Thoracic soft tissues: Normal.     Aorta: Left-sided aortic arch. No aneurysm.     Heart: Normal size. No effusion. Mild aortic and coronary artery atherosclerotic calcification.     Pulmonary vasculature: Pulmonary arteries distribute normally.     Yuliya/Mediastinum: No pathologic richard enlargement.     Esophagus: Normal.     Upper Abdomen: 3.7 x 2.7 cm pancreatic body mass with pancreatic tail atrophy suspicious for pancreatic malignancy. Recommend dedicated CT or MRI abdomen with IV contrast pancreatic protocol.     Airways: Patent.     Lungs/Pleura: Unchanged benign 3 mm nodule medial right upper lobe series 5, image 115. No new nodules. Mild bilateral apical pleuroparenchymal scarring again noted.     No airspace disease. Interval resolution of previously described ground-glass.     Bones: No acute fracture. No suspicious lytic or sclerotic lesions.    MRI Abdomen 6/30/24  Lower thorax: Heart is normal in size. No pleural fluid. No consolidation.     Liver: Normal size. No evidence of fatty infiltration. Small cyst in segment 6 measures 9 mm.     Gallbladder: Unremarkable.     Bile ducts: No  intrahepatic or extrahepatic biliary dilatation.     Pancreas: Hypoenhancing mass centered in the proximal pancreatic body measures 2.9 x 2.0 cm (series 27, image 31). This lesion abuts and may encase the distal splenic vein which appears narrowed just proximal to the portal confluence. There is atrophy of the pancreatic body and tail distal to this lesion with associated dilatation of the main pancreatic duct measuring 5 mm. There is also atrophy of the pancreatic head without associated ductal dilatation.     Spleen: Normal size. No focal lesions.     Adrenal glands: Unremarkable.     Kidneys: No renal masses. No hydronephrosis.     GI: No evidence of bowel obstruction.     Mesentery/retroperitoneum: No pathologically enlarged lymph nodes.     Vasculature: Visualized aorta is normal in caliber. Portal vein is patent.     Body wall/extraperitoneal soft tissues: Unremarkable.     Bones: Visualized osseous structures demonstrate normal marrow signal.     Miscellaneous: No intraperitoneal free fluid.    CT C/A/P 7/09/24  Normal thyroid. Heart size is normal. No pericardial effusion. The thoracic aorta and main pulmonary artery are normal in caliber. No enlarged axillary, mediastinal or hilar lymph nodes.     Central airways are clear. No pleural effusion, focal consolidation or pneumothorax.     The liver is normal in morphology and with smooth contour. Inferior right hepatic lobe 9 mm lesion previously characterized as a cyst. No new hepatic lesion.     Cholelithiasis. No gallbladder wall thickening or pericholecystic fluid. No intra or extrahepatic biliary ductal dilatation.     The spleen, kidneys and adrenal glands are normal.     Proximal pancreatic body irregular mass measures 4.4 x 3.7 cm (series 2, image 65) which when measured similarly to the prior chest CT is unchanged in size. The distal pancreatic body and tail are atrophic. Diffuse pancreatic ductal dilatation measuring 8 mm.     The portal and superior  mesenteric veins are patent. 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 The mass abuts the portal by less than 180 degrees. The mass encases the proximal splenic artery which remains patent. The SMA is remote from the mass.     Peripancreatic lymph node measuring 0.7 cm in short axis (series 2, image 72). No enlarged retroperitoneal lymph nodes.     No bowel obstruction inflammatory change. The mass focally abuts the distal lesser curvature the stomach. No free fluid or free air.     Circumferential bladder wall thickening. Normal uterus. No adnexal mass. No enlarged pelvic lymph nodes.     Soft tissues are unremarkable. No acute or aggressive osseous abnormality.       Assessment:       1. Malignant neoplasm of pancreas, unspecified location of malignancy    2. 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therapy    3. Thrombocytopenia    4. Hypokalemia    5. Pancreatic insufficiency    6. Hypothyroidism, unspecified type    7. Type 2 diabetes mellitus without complication, without long-term current use of insulin               Plan:     Pancreatic Cancer - Patient with Stage III pancreatic cancer with concern for potential encasement of the distal celiac artery per discussion with Dr Valentin  -Will discuss pt at tumor board next week  -No sign of mets  - 94.5 on 6/17/24  -Case discussed at tumor board 7/17/24 with recommendation to proceed with chemo  -[facility] genetic testing negative for the genes tested  -[facility] tissue testing showed TP53, KRAS p.G12D, CDKN2A, CDKN2B mutations.  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  -Will need to keep irinotecan dose reduced at 165mg/mm2  -Will start on neulasta as ANC reduced  -Will proceed with FOLFIRINOX with 1st does of irinotecan and fluorouracil reduced pending results of pharmacogenomic testing  Visit today included increased complexity associated with the care of the episodic  problem (chemotherapy) addressed and managing the longitudinal care of the patient due to the serious and/or complex managed problem(s) pancreatic cancer.    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 - pt at increased risk of infection  -No current signs of infection  -Will monitor    Thrombocytopenia - platelets 104k on 8/02/24  -Likely due to chemo  -Will monitor    Hypokalemia - possibly due to diarrhea  -Will start on 40mEq potassium daily    Pancreatic Insufficiency - Pt taking Creon  -Will monitor weight and GI symptoms    Hypothyroidism - pt on synthroid  -Will monitror    DMII - PT currently on Synjardy  Lab Results   Component Value Date    HGBA1C 6.2 (H) 04/01/2024   -Possibly due to pancreatic cancer  -Will monitor    Route Chart for Scheduling    Med Onc Chart Routing      Follow up with physician 4 weeks. Pt can proceed with treatment.  Pt needs approval for neulasta.  Pt needs a CBC, CMP, Mg in 2 weeks with appt with NP with friday before treatment.  Pt needs an appt with me in 4 weeks with CBC, CMP and Mg he friday before treatment.   Follow up with ANALIA 2 weeks.   Infusion scheduling note    Injection scheduling note    Labs    Imaging    Pharmacy appointment    Other referrals          Treatment Plan Information   OP GI FOLFIRINOX (oxaliplatin, leucovorin, irinotecan, fluorouracil) Q2W    Rajat Hunt MD   Upcoming Treatment Dates - OP GI FOLFIRINOX (oxaliplatin, leucovorin, irinotecan, fluorouracil) Q2W     8/5/2024       Chemotherapy       oxaliplatin (ELOXATIN) in D5W 527.4 mL chemo infusion       leucovorin calcium in D5W 250 mL infusion       irinotecan (CAMPTOSAR) in 0.9% NaCl 513.1 mL chemo infusion       fluorouraciL injection       fluorouracil chemo infusion       Antiemetics       palonosetron 0.25mg/dexAMETHasone 12mg in NS IVPB 0.25 mg 50 mL  8/19/2024       Chemotherapy       oxaliplatin (ELOXATIN) in D5W 527 mL chemo infusion       leucovorin calcium in D5W 250 mL infusion       irinotecan  (CAMPTOSAR) in 0.9% NaCl 513.1 mL chemo infusion       fluorouraciL injection       fluorouracil chemo infusion       Antiemetics       palonosetron 0.25mg/dexAMETHasone 12mg in NS IVPB 0.25 mg 50 mL  9/2/2024       Chemotherapy       oxaliplatin (ELOXATIN) in D5W 527 mL chemo infusion       leucovorin calcium in D5W 250 mL infusion       irinotecan (CAMPTOSAR) in 0.9% NaCl 513.1 mL chemo infusion       fluorouraciL injection       fluorouracil chemo infusion       Antiemetics       palonosetron 0.25mg/dexAMETHasone 12mg in NS IVPB 0.25 mg 50 mL  9/16/2024       Chemotherapy       oxaliplatin (ELOXATIN) in D5W 527 mL chemo infusion       leucovorin calcium in D5W 250 mL infusion       irinotecan (CAMPTOSAR) in 0.9% NaCl 513.1 mL chemo infusion       fluorouraciL injection       fluorouracil chemo infusion       Antiemetics       palonosetron 0.25mg/dexAMETHasone 12mg in NS IVPB 0.25 mg 50 mL      Rajat Hunt MD  [facility]  Hematology and Oncology  [facility]   [address] [place] [address]   O: [phone]  F: [phone]

## 2024-08-02 NOTE — PROGRESS NOTES
August 2nd, 2024     Protocol: Early Detection of Pancreatic Cancer: Prospective Study  IRB # [id number]  PI: Darrin Valentin MD  Version Date:01/11/2024  Study #: [id number]     Informed Consent:  Patient found to be eligible by Dr. Barbie Simons, research coordinator, met with the patient after clinic appt with Dr. Hunt to discuss the above-mentioned protocol. She presents with her , is alert and oriented x 3, mood and affect appropriate to the situation.  Patient states that she is consenting to have a sample of her blood sent for analysis at [facility] and [facility].   Purpose of the study reviewed with the patient. Patient states understanding of this information.   Study procedures discussed in detail with the patient to include: screening and baseline assessments as well as all information that will be taken from her chart.  Patient verbalized understanding of this information.  Patient responsibilities discussed over in detail with patient  Pt also informed about what will happen to her test samples.  Pt voiced understanding.   Risks discussed over with patient to include blood draws, psychological and economic in nature.  Pt voices understanding.  Explained to the patient that researchers will be studying her DNA Sequencing in her blood sample. She was also informed that during the testing process there may be Unexpected Findings but since they are not of the same quality as she would receive during regular health care assessments she will not be informed of these findings.  Pt voiced understanding.     Voluntary participation and withdrawal from research stressed to patient; she states she understands that she may withdraw consent at any time without compromise to her care.   Confidentiality and HIPAA discussed with patient; process of protection and security of database explained to patient; she verbalizes understanding of this information. Informed the patient that detailed information on  this trial can be found at www.clinicaltrials.gov.     Throughout the consenting process, the patient was given several opportunities to ask questions; all questions addressed and answered to patient's satisfaction. Patient states her willingness to participate in the study; patient signed and dated the consent form; copy of the consent form given to patient along with my contact information. Instructed patient to notify this CRC or Dr. Valentin for any questions or concerns. Patient verbalized understanding.     Blood draw and Questionnaire:     After reviewing and signing the consent, study questionnaire was administered to and completed by the patient. A lab appointment was scheduled for 8/7/24 at the [facility] lab for 11:30AM. The patient confirmed the lab appt date and time. The patient denied having any more questions and was encouraged to call if any arise. The patient expressed understanding.     Ai Simons, CRC

## 2024-08-05 ENCOUNTER — INFUSION (OUTPATIENT)
Dept: INFUSION THERAPY | Facility: HOSPITAL | Age: 64
End: 2024-08-05
Attending: INTERNAL MEDICINE
Payer: MEDICARE

## 2024-08-05 ENCOUNTER — DOCUMENTATION ONLY (OUTPATIENT)
Dept: INFUSION THERAPY | Facility: HOSPITAL | Age: 64
End: 2024-08-05
Payer: MEDICARE

## 2024-08-05 VITALS
BODY MASS INDEX: 24.89 KG/M2 | HEART RATE: 84 BPM | DIASTOLIC BLOOD PRESSURE: 67 MMHG | OXYGEN SATURATION: 99 % | SYSTOLIC BLOOD PRESSURE: 113 MMHG | RESPIRATION RATE: 16 BRPM | HEIGHT: 61 IN | TEMPERATURE: 98 F | WEIGHT: 131.81 LBS

## 2024-08-05 DIAGNOSIS — C25.9 MALIGNANT NEOPLASM OF PANCREAS, UNSPECIFIED LOCATION OF MALIGNANCY: Primary | ICD-10-CM

## 2024-08-05 PROCEDURE — 96411 CHEMO IV PUSH ADDL DRUG: CPT | Mod: PN

## 2024-08-05 PROCEDURE — 96417 CHEMO IV INFUS EACH ADDL SEQ: CPT | Mod: PN

## 2024-08-05 PROCEDURE — 25000003 PHARM REV CODE 250: Mod: PN | Performed by: INTERNAL MEDICINE

## 2024-08-05 PROCEDURE — 96367 TX/PROPH/DG ADDL SEQ IV INF: CPT | Mod: PN

## 2024-08-05 PROCEDURE — 96368 THER/DIAG CONCURRENT INF: CPT | Mod: PN

## 2024-08-05 PROCEDURE — 63600175 PHARM REV CODE 636 W HCPCS: Mod: PN | Performed by: INTERNAL MEDICINE

## 2024-08-05 PROCEDURE — 96413 CHEMO IV INFUSION 1 HR: CPT | Mod: PN

## 2024-08-05 PROCEDURE — 96375 TX/PRO/DX INJ NEW DRUG ADDON: CPT | Mod: PN

## 2024-08-05 PROCEDURE — 96415 CHEMO IV INFUSION ADDL HR: CPT | Mod: PN

## 2024-08-05 RX ORDER — PROCHLORPERAZINE EDISYLATE 5 MG/ML
5 INJECTION INTRAMUSCULAR; INTRAVENOUS ONCE AS NEEDED
Status: DISCONTINUED | OUTPATIENT
Start: 2024-08-05 | End: 2024-08-05 | Stop reason: HOSPADM

## 2024-08-05 RX ORDER — EPINEPHRINE 0.3 MG/.3ML
0.3 INJECTION SUBCUTANEOUS ONCE AS NEEDED
Status: DISCONTINUED | OUTPATIENT
Start: 2024-08-05 | End: 2024-08-05 | Stop reason: HOSPADM

## 2024-08-05 RX ORDER — FLUOROURACIL 50 MG/ML
400 INJECTION, SOLUTION INTRAVENOUS
Status: COMPLETED | OUTPATIENT
Start: 2024-08-05 | End: 2024-08-05

## 2024-08-05 RX ORDER — HEPARIN 100 UNIT/ML
500 SYRINGE INTRAVENOUS
Status: DISCONTINUED | OUTPATIENT
Start: 2024-08-05 | End: 2024-08-05 | Stop reason: HOSPADM

## 2024-08-05 RX ORDER — DIPHENHYDRAMINE HYDROCHLORIDE 50 MG/ML
50 INJECTION INTRAMUSCULAR; INTRAVENOUS ONCE AS NEEDED
Status: DISCONTINUED | OUTPATIENT
Start: 2024-08-05 | End: 2024-08-05 | Stop reason: HOSPADM

## 2024-08-05 RX ORDER — SODIUM CHLORIDE 0.9 % (FLUSH) 0.9 %
10 SYRINGE (ML) INJECTION
Status: DISCONTINUED | OUTPATIENT
Start: 2024-08-05 | End: 2024-08-05 | Stop reason: HOSPADM

## 2024-08-05 RX ORDER — ATROPINE SULFATE 0.4 MG/ML
0.4 INJECTION, SOLUTION ENDOTRACHEAL; INTRAMEDULLARY; INTRAMUSCULAR; INTRAVENOUS; SUBCUTANEOUS ONCE AS NEEDED
Status: COMPLETED | OUTPATIENT
Start: 2024-08-05 | End: 2024-08-05

## 2024-08-05 RX ADMIN — LEUCOVORIN CALCIUM 640 MG: 350 INJECTION, POWDER, LYOPHILIZED, FOR SUSPENSION INTRAMUSCULAR; INTRAVENOUS at 11:08

## 2024-08-05 RX ADMIN — SODIUM CHLORIDE: 9 INJECTION, SOLUTION INTRAVENOUS at 11:08

## 2024-08-05 RX ADMIN — FLUOROURACIL 640 MG: 50 INJECTION, SOLUTION INTRAVENOUS at 01:08

## 2024-08-05 RX ADMIN — OXALIPLATIN 136 MG: 5 INJECTION, SOLUTION INTRAVENOUS at 09:08

## 2024-08-05 RX ADMIN — SODIUM CHLORIDE 260 MG: 9 INJECTION, SOLUTION INTRAVENOUS at 11:08

## 2024-08-05 RX ADMIN — DEXTROSE MONOHYDRATE: 5 INJECTION, SOLUTION INTRAVENOUS at 09:08

## 2024-08-05 RX ADMIN — ATROPINE SULFATE 0.4 MG: 0.4 INJECTION, SOLUTION INTRAVENOUS at 11:08

## 2024-08-05 RX ADMIN — FLUOROURACIL 3840 MG: 50 INJECTION, SOLUTION INTRAVENOUS at 01:08

## 2024-08-05 RX ADMIN — DEXAMETHASONE SODIUM PHOSPHATE 0.25 MG: 10 INJECTION, SOLUTION INTRAMUSCULAR; INTRAVENOUS at 09:08

## 2024-08-06 ENCOUNTER — DOCUMENTATION ONLY (OUTPATIENT)
Dept: INFUSION THERAPY | Facility: HOSPITAL | Age: 64
End: 2024-08-06
Payer: MEDICARE

## 2024-08-06 ENCOUNTER — TELEPHONE (OUTPATIENT)
Dept: RESEARCH | Facility: HOSPITAL | Age: 64
End: 2024-08-06
Payer: MEDICARE

## 2024-08-07 ENCOUNTER — INFUSION (OUTPATIENT)
Dept: INFUSION THERAPY | Facility: HOSPITAL | Age: 64
End: 2024-08-07
Attending: INTERNAL MEDICINE
Payer: MEDICARE

## 2024-08-07 ENCOUNTER — LAB VISIT (OUTPATIENT)
Dept: LAB | Facility: HOSPITAL | Age: 64
End: 2024-08-07
Attending: INTERNAL MEDICINE
Payer: MEDICARE

## 2024-08-07 ENCOUNTER — RESEARCH ENCOUNTER (OUTPATIENT)
Dept: RESEARCH | Facility: HOSPITAL | Age: 64
End: 2024-08-07
Payer: MEDICARE

## 2024-08-07 VITALS
DIASTOLIC BLOOD PRESSURE: 69 MMHG | HEART RATE: 71 BPM | HEIGHT: 61 IN | TEMPERATURE: 98 F | WEIGHT: 131.81 LBS | OXYGEN SATURATION: 97 % | SYSTOLIC BLOOD PRESSURE: 124 MMHG | RESPIRATION RATE: 20 BRPM | BODY MASS INDEX: 24.89 KG/M2

## 2024-08-07 DIAGNOSIS — C25.9 MALIGNANT NEOPLASM OF PANCREAS, UNSPECIFIED LOCATION OF MALIGNANCY: ICD-10-CM

## 2024-08-07 DIAGNOSIS — Z00.6 EXAMINATION OF PARTICIPANT IN CLINICAL TRIAL: ICD-10-CM

## 2024-08-07 DIAGNOSIS — C25.9 MALIGNANT NEOPLASM OF PANCREAS, UNSPECIFIED LOCATION OF MALIGNANCY: Primary | ICD-10-CM

## 2024-08-07 LAB — DRUG STUDY SPECIMEN TYPE: 3

## 2024-08-07 PROCEDURE — 36415 COLL VENOUS BLD VENIPUNCTURE: CPT | Mod: PN | Performed by: SURGERY

## 2024-08-07 PROCEDURE — A4216 STERILE WATER/SALINE, 10 ML: HCPCS | Mod: PN | Performed by: INTERNAL MEDICINE

## 2024-08-07 PROCEDURE — 25000003 PHARM REV CODE 250: Mod: PN | Performed by: INTERNAL MEDICINE

## 2024-08-07 RX ORDER — SODIUM CHLORIDE 0.9 % (FLUSH) 0.9 %
10 SYRINGE (ML) INJECTION
Status: DISCONTINUED | OUTPATIENT
Start: 2024-08-07 | End: 2024-08-07 | Stop reason: HOSPADM

## 2024-08-07 RX ADMIN — Medication 10 ML: at 12:08

## 2024-08-08 ENCOUNTER — CLINICAL SUPPORT (OUTPATIENT)
Dept: HEMATOLOGY/ONCOLOGY | Facility: CLINIC | Age: 64
End: 2024-08-08
Payer: MEDICARE

## 2024-08-08 ENCOUNTER — INFUSION (OUTPATIENT)
Dept: INFUSION THERAPY | Facility: HOSPITAL | Age: 64
End: 2024-08-08
Attending: INTERNAL MEDICINE
Payer: MEDICARE

## 2024-08-08 VITALS
HEIGHT: 61 IN | TEMPERATURE: 98 F | BODY MASS INDEX: 24.89 KG/M2 | DIASTOLIC BLOOD PRESSURE: 53 MMHG | SYSTOLIC BLOOD PRESSURE: 132 MMHG | WEIGHT: 131.81 LBS | HEART RATE: 107 BPM | RESPIRATION RATE: 16 BRPM

## 2024-08-08 DIAGNOSIS — C25.9 MALIGNANT NEOPLASM OF PANCREAS, UNSPECIFIED LOCATION OF MALIGNANCY: Primary | ICD-10-CM

## 2024-08-08 PROCEDURE — 63600175 PHARM REV CODE 636 W HCPCS: Mod: JZ,JG,PN | Performed by: INTERNAL MEDICINE

## 2024-08-08 PROCEDURE — 96372 THER/PROPH/DIAG INJ SC/IM: CPT | Mod: PN

## 2024-08-08 RX ADMIN — PEGFILGRASTIM-CBQV 6 MG: 6 INJECTION, SOLUTION SUBCUTANEOUS at 01:08

## 2024-08-12 ENCOUNTER — LAB VISIT (OUTPATIENT)
Dept: LAB | Facility: HOSPITAL | Age: 64
End: 2024-08-12
Payer: MEDICARE

## 2024-08-12 DIAGNOSIS — R39.15 URGENCY OF URINATION: Primary | ICD-10-CM

## 2024-08-12 DIAGNOSIS — R39.15 URGENCY OF URINATION: ICD-10-CM

## 2024-08-12 LAB
BACTERIA #/AREA URNS HPF: NORMAL /HPF
BILIRUB UR QL STRIP: NEGATIVE
CLARITY UR: CLEAR
COLOR UR: YELLOW
GLUCOSE UR QL STRIP: ABNORMAL
HGB UR QL STRIP: ABNORMAL
KETONES UR QL STRIP: NEGATIVE
LEUKOCYTE ESTERASE UR QL STRIP: ABNORMAL
MICROSCOPIC COMMENT: NORMAL
NITRITE UR QL STRIP: NEGATIVE
PH UR STRIP: 6 [PH] (ref 5–8)
PROT UR QL STRIP: NEGATIVE
RBC #/AREA URNS HPF: 1 /HPF (ref 0–4)
SP GR UR STRIP: 1.01 (ref 1–1.03)
SQUAMOUS #/AREA URNS HPF: 2 /HPF
URN SPEC COLLECT METH UR: ABNORMAL
WBC #/AREA URNS HPF: 4 /HPF (ref 0–5)
YEAST URNS QL MICRO: NORMAL

## 2024-08-12 PROCEDURE — 81000 URINALYSIS NONAUTO W/SCOPE: CPT | Mod: PN

## 2024-08-15 NOTE — PROGRESS NOTES
PATIENT: Bessy Lamb  MRN: [id number]  DATE: 8/16/2024      Diagnosis:   1. Malignant neoplasm of pancreas, unspecified location of malignancy    2. 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therapy    3. Thrombocytopenia    4. Hypokalemia    5. Pancreatic insufficiency    6. Hypothyroidism, unspecified type    7. Type 2 diabetes mellitus without complication, without long-term current use of insulin        Chief Complaint: Malignant neoplasm of pancreas, unspecified location of mal (Follow up/)          Subjective:    Interval History: Ms. Lamb is a 63 y.o. female who returns for follow up for review of labs and treatment. Pt reports feeling well today. Has had chronic sinus congestion for years. Since last visit, patient has developed scattered, superficial skin excoriations to her inner left leg. Pt denies pruritus or pain to the site. She states that she was working in her garden prior to development but is unaware of coming into contact with anything. She also reports headache with previous treatment.  Denies fever, chills, sob, cp, palpitations, swelling, fatigue, numbness, tingling, n/v, diarrhea, constipation, abdominal pain, new bumps, lumps, bleeding, bruising.     Oncologic History:   Per Record  [facility] tissue testing showed TP53, KRAS p.G12D, CDKN2A, CDKN2B mutations.  PD-L1 was 15%.  Pharmacogenomic testing on 7/12/24 showed UGT1A1 *1/*28 mutation.   he patient initially underwent a CXR on 11/13/23 fur a URI which showed possible subtle pulmonary nodules.  CT chest 12/01/2023 showed a cluster of ill-defined centrilobular ground-glass opacity he inferior right and left upper lobes as well as a 1.2 cm ground-glass opacity in the superior segment of the left lower lobe; solid 3 mm pulmonary nodule in the right upper lobe; subcentimeter right hepatic lobe hypodensity likely representing a cyst.  The patient underwent surveillance CT chest on 05/21/2024 showing a 3.7 x 2.7 pancreatic body mass in the pancreatic  tail atrophy suspicious of pancreatic malignancy as well as and unchanged benign 3 mm nodule in the right upper lobe.  MRI abdomen on 06/30/2024 showed hypoenhancing mass centered in the proximal pancreatic body measuring 2.9 x 2 cm with abutment and possible encasement of the distal splenic vein.  EUS was performed on 07/05/2024 mass in the pancreatic body stage T4 N0 M0 by endo sonographic criteria.  Pathology from biopsy of the stomach showed mild focally moderate chronic gastritis with no evidence of the H pylori.  Stomach polyp which was removed code hyperplastic polyps with chronic inflammation.  Pancreatic biopsy showed adenocarcinoma.  CT of the chest, abdomen, and pelvis on 07/09/2024 showed a 9 mm lesion in the right hepatic lobe previously characterized as cysts; mass in the pancreatic body measuring 4.4 x 3.7 cm with diffuse pancreatic ductal dilatation measuring 8 mm:  Weaning vein at the portal confluence occluded with reconstitution via collaterals.  The mass abuts the portal vein by less than 180° but does not case the proximal splenic artery remains patent.  Also noted was a pancreatic lymph node measuring 0.7 cm.              The patient's case was discussed at tumor board on 07/17/2024 with recommendation for proceeding with the chemotherapy.  Patient had port placement on 07/18/2024     Oncology History   Malignant neoplasm of pancreas   7/12/2024 Initial Diagnosis    Pancreatic cancer     7/13/2024 Cancer Staged    Staging form: Exocrine Pancreas, AJCC 8th Edition  - Clinical: Stage III (cT4, cN0, cM0)     7/22/2024 -  Chemotherapy    Treatment Summary   Plan Name: OP GI FOLFIRINOX (oxaliplatin, leucovorin, irinotecan, fluorouracil) Q2W   Treatment Goal: Curative  Status: Active  Start Date: 7/22/2024  End Date: 7/10/2025 (Planned)  Provider: Rajat Hunt MD  Chemotherapy: fluorouraciL injection 500 mg, 515 mg (100 % of original dose 320 mg/m2), Intravenous, Clinic/HOD 1 time, 2 of 26  cycles  Dose modification: 320 mg/m2 (original dose 320 mg/m2, Cycle 1, Reason: MD Discretion, Comment: Pharamcogenomic)  Administration: 500 mg (2024), 640 mg (2024)  irinotecan (CAMPTOSAR) 260 mg in 0.9% NaCl 578 mL chemo infusion, 266 mg (100 % of original dose 165 mg/m2), Intravenous, Clinic/HOD 1 time, 2 of 26 cycles  Dose modification: 165 mg/m2 (original dose 165 mg/m2, Cycle 1, Reason: Other (see comments), Comment: Waiting for pharmacogenomic panel), 165 mg/m2 (original dose 165 mg/m2, Cycle 2, Reason: Other (see comments), Comment: UGT Mutation)  Administration: 260 mg (2024), 260 mg (2024)  oxaliplatin (ELOXATIN) 85 mg/m2 = 137 mg in D5W 592.4 mL chemo infusion, 85 mg/m2 = 137 mg, Intravenous, Clinic/HOD 1 time, 2 of 26 cycles  Administration: 137 mg (2024), 136 mg (2024)  fluorouracil (Adrucil) 3,000 mg in 0.9% NaCl 100 mL chemo infusion, 3,090 mg (100 % of original dose 1,920 mg/m2), Intravenous, Over 46 hours, 2 of 26 cycles  Dose modification: 1,920 mg/m2 (original dose 1,920 mg/m2, Cycle 1, Reason: MD Discretion, Comment: Pharmcogenomic panel pending)  Administration: 3,000 mg (2024), 3,840 mg (2024)         Past Medical History:   Past Medical History:   Diagnosis Date    Arthritis, lumbar spine     hands    Diabetes mellitus     General anesthetics causing adverse effect in therapeutic use     following breast rediuct, hard time awakening  also had anxiety rxn to lidocain in dental ofc    GERD (gastroesophageal reflux disease)     Hypothyroidism 10/08/2014    Maternal anesthesia complication     epidural for 1st child went up instead of down; required intubation    Obesity (BMI 30-39.9) 10/08/2014    Thyroid disease     Thyroid nodule 10/08/2014       Past Surgical HIstory:   Past Surgical History:   Procedure Laterality Date    BREAST BIOPSY Left     b9    BREAST SURGERY      Reduction cause of a mass in left breast    c-sections x2       SECTION   3/26/81 & 9/12/85    Birth of two girls    COLONOSCOPY  9/25/2012         COLONOSCOPY N/A 8/30/2018    Procedure: COLONOSCOPY;  Surgeon: Francisco Mcclain MD;  Location: [facility];  Service: Endoscopy;  Laterality: N/A;    COLONOSCOPY N/A 10/24/2023    Procedure: COLONOSCOPY;  Surgeon: Francisco Mcclain MD;  Location: [facility];  Service: Endoscopy;  Laterality: N/A;    ENDOSCOPIC ULTRASOUND OF UPPER GASTROINTESTINAL TRACT Left 7/5/2024    Procedure: ULTRASOUND, UPPER GI TRACT, ENDOSCOPIC;  Surgeon: Andrew Gordon MD;  Location: [facility];  Service: Endoscopy;  Laterality: Left;    ESOPHAGOGASTRODUODENOSCOPY N/A 7/5/2024    Procedure: EGD (ESOPHAGOGASTRODUODENOSCOPY);  Surgeon: Andrew Gordon MD;  Location: [facility];  Service: Endoscopy;  Laterality: N/A;    hysteroscopy with polypectomy      INCISIONAL BREAST BIOPSY Left 1996    benign; done at same time as reduction    INSERTION OF TUNNELED CENTRAL VENOUS CATHETER (CVC) WITH SUBCUTANEOUS PORT N/A 7/18/2024    Procedure: [id number];  Surgeon: Blanca Dillard MD;  Location: [facility];  Service: General;  Laterality: N/A;    TOTAL REDUCTION MAMMOPLASTY Bilateral 1996       Family History:   Family History   Problem Relation Name Age of Onset    Brain cancer Mother Ravi oTlbert     Early death Mother Ravi Choudhury Otmasz 51        Passed at 51    Cancer Mother Ravi Mariemacario Tolbert         Brain tumor    Arthritis Mother Ravi Choudhury Tomasz     Diabetes Father Ck ankit tolbert         Type 2    Cirrhosis Father Ck pham tomasz     Cancer Father Ck ankit tolbert         Bone    COPD Father Ck tolbert         Smoker    Early death Father Ck pham tomasz         Passed at 64    Lung cancer Father Ck tolbert     [facility]         Congestive heart failure (passed 2/11/18    Hypertension Sister Mamta  (dianna)felix     Kidney disease Sister Mamta (dianna)felix         Doc removed when she was a child    Hypertension Sister      Depression Sister Ravi (johnna) nicolette Mauricio ( sister)         Due to loss of her 27 year old son    Early death Sister Ravi (johnna) nicolette Mauricio ( sister)         Pulmonary hypertension, cirrhosis of the liver(passed 7/14/2007   Age 57    Heart disease Brother John Choudhury ( passed )         Heart attack    Hypertension Brother John Choudhury ( passed )     Heart disease Brother Juan Francisco Choudhury         Heart  blockage    Heart disease Brother Bhanu Choudhury         Heart attack (passed)    Diabetes Brother Bhanu Choudhury     Macular degeneration Brother Bhanu Choudhury     Breast cancer Maternal Aunt  30    Breast cancer Paternal Aunt  40    Breast cancer Paternal Aunt  40    Ovarian cancer Neg Hx         Social History:  reports that she has never smoked. She has never used smokeless tobacco. She reports current alcohol use. She reports that she does not use drugs.    Allergies:  Review of patient's allergies indicates:   Allergen Reactions    Duricef [cefadroxil] Hives    Eggs [egg derived] Anaphylaxis and Hives    Cat/feline products Itching    Dairycare [lactobacillus acidoph-lactase] Itching    Dog dander Itching    Wheat containing prod Hives       Medications:  Current Outpatient Medications   Medication Sig Dispense Refill    acetaminophen (TYLENOL) 325 MG tablet Take 325 mg by mouth every 6 (six) hours as needed for Pain.      ALPRAZolam (XANAX) 0.5 MG tablet Take 1 tablet (0.5 mg total) by mouth On call Procedure for Anxiety. 2 tablet 0    blood-glucose meter Misc Use as directed 1 each prn    calcium carbonate (OS-ISH) 600 mg calcium (1,500 mg) Tab Take 1,200 mg by mouth 2 (two) times daily with meals.      cetirizine (ZYRTEC) 10 MG tablet Take 1 tablet (10 mg total) by mouth once daily. 30 tablet 1    fluticasone propionate (FLONASE) 50 mcg/actuation nasal spray 1 spray  (50 mcg total) by Each Nostril route once daily. 18.2 mL 0    HYDROcodone-acetaminophen (NORCO) 5-325 mg per tablet Take 1 tablet by mouth every 6 (six) hours as needed. 10 tablet 0    levothyroxine (SYNTHROID) 50 MCG tablet Take 1 tablet (50 mcg total) by mouth once daily. 90 tablet 1    lipase-protease-amylase 24,000-76,000-120,000 units (CREON) 24,000-76,000 -120,000 unit capsule Take 2 capsules by mouth 3 (three) times daily with meals. 180 capsule 11    loperamide (IMODIUM) 2 mg capsule Take 2 tablets (4mg) by mouth after first loose stool, 1 tablet every 2 hours until diarrhea free for 12 hours. May take 2 tablets (4mg) by mouth every 4 hours at night. May require more than the package labeling maximum dose of 16mg/day. 30 capsule 11    loratadine (CLARITIN) 10 mg tablet Take 10 mg by mouth every morning.      losartan (COZAAR) 25 MG tablet TAKE 1 TABLET (25 MG TOTAL) BY MOUTH ONCE DAILY 90 tablet 3    meclizine (ANTIVERT) 25 mg tablet Take 1 tablet (25 mg total) by mouth 3 (three) times daily as needed for Dizziness. 30 tablet 0    mupirocin (BACTROBAN) 2 % ointment Apply topically 3 (three) times daily.      OLANZapine (ZYPREXA) 5 MG tablet Take 1 tablet by mouth nightly on days 1-3 of each chemotherapy cycle. 6 tablet 11    potassium chloride SA (K-DUR,KLOR-CON) 20 MEQ tablet Take 2 tablets (40 mEq total) by mouth once daily. 60 tablet 11    prochlorperazine (COMPAZINE) 5 MG tablet Take 1 tablet (5 mg total) by mouth every 6 (six) hours as needed for Nausea. 20 tablet 5    SYNJARDY XR 10-1,000 mg TBph TAKE ONE TABLET BY MOUTH ONCE DAILY 30 tablet 5    TRUE METRIX GLUCOSE TEST STRIP Strp USE 1 STRIP ONCE DAILY TO TEST BLOOD GLUCOSE (50 DAY SUPPLY) 100 each 3    TRUEPLUS LANCETS 33 gauge Misc USE AS DIRECTED TO TEST BLOOD SUGAR ONCE DAILY FOR UP  USES 100 each 2    VITAMIN D2 1,250 mcg (50,000 unit) capsule TAKE 1 CAPSULE (50,000 UNITS TOTAL) BY MOUTH TWICE A WEEK. 24 capsule 3    famotidine (PEPCID) 40  "MG tablet TAKE 1 TABLET (40 MG TOTAL) BY MOUTH ONCE DAILY. 30 tablet 11    simvastatin (ZOCOR) 10 MG tablet Take 1 tablet (10 mg total) by mouth every evening. 90 tablet 3     No current facility-administered medications for this visit.       Review of Systems   Constitutional:  Negative for appetite change and unexpected weight change.   HENT:  Negative for mouth sores.    Eyes:  Negative for visual disturbance.   Respiratory:  Negative for cough and shortness of breath.    Cardiovascular:  Negative for chest pain.   Gastrointestinal:  Negative for abdominal pain and diarrhea.   Genitourinary:  Negative for frequency.   Musculoskeletal:  Negative for back pain.   Skin:  Positive for rash.   Neurological:  Positive for headaches.   Hematological:  Negative for adenopathy.   Psychiatric/Behavioral:  The patient is not nervous/anxious.        ECOG Performance Status:   ECOG SCORE             Objective:      Vitals:   Vitals:    08/16/24 1330   BP: 109/70   BP Location: Right arm   Patient Position: Sitting   BP Method: Medium (Automatic)   Pulse: 80   Resp: 16   Temp: 97.1 °F (36.2 °C)   TempSrc: Temporal   SpO2: 100%   Weight: 58.1 kg (128 lb 1.4 oz)   Height: 5' 1" (1.549 m)     BMI: Body mass index is 24.2 kg/m².    Physical Exam  HENT:      Head: Normocephalic.      Nose: Nose normal.      Mouth/Throat:      Mouth: Mucous membranes are moist.      Pharynx: Oropharynx is clear.   Eyes:      Pupils: Pupils are equal, round, and reactive to light.   Cardiovascular:      Rate and Rhythm: Normal rate and regular rhythm.      Heart sounds: Normal heart sounds.   Pulmonary:      Effort: Pulmonary effort is normal.      Breath sounds: Normal breath sounds.   Abdominal:      General: Bowel sounds are normal.   Musculoskeletal:         General: Normal range of motion.      Cervical back: Normal range of motion.   Skin:     General: Skin is warm and dry.   Neurological:      Mental Status: She is alert and oriented to " person, place, and time.   Psychiatric:         Mood and Affect: Mood normal.         Behavior: Behavior normal.         Laboratory Data:  Recent Results (from the past 24 hour(s))   CBC w/ DIFF    Collection Time: 08/16/24 12:11 PM   Result Value Ref Range    WBC 15.79 (H) 3.90 - 12.70 K/uL    RBC 4.29 4.00 - 5.40 M/uL    Hemoglobin 12.4 12.0 - 16.0 g/dL    Hematocrit 35.5 (L) 37.0 - 48.5 %    MCV 83 82 - 98 fL    MCH 28.9 27.0 - 31.0 pg    MCHC 34.9 32.0 - 36.0 g/dL    RDW 12.5 11.5 - 14.5 %    Platelets 94 (L) 150 - 450 K/uL    MPV 9.5 9.2 - 12.9 fL    Immature Granulocytes CANCELED 0.0 - 0.5 %    Immature Grans (Abs) CANCELED 0.00 - 0.04 K/uL    Lymph # CANCELED 1.0 - 4.8 K/uL    Mono # CANCELED 0.3 - 1.0 K/uL    Eos # CANCELED 0.0 - 0.5 K/uL    Baso # CANCELED 0.00 - 0.20 K/uL    nRBC 0 0 /100 WBC    Gran % 50.0 38.0 - 73.0 %    Lymph % 25.0 18.0 - 48.0 %    Mono % 6.0 4.0 - 15.0 %    Eosinophil % 1.0 0.0 - 8.0 %    Basophil % 0.0 0.0 - 1.9 %    Bands 13.0 %    Metamyelocytes 5.0 %    Platelet Estimate Decreased (A)     Differential Method Manual    CMP    Collection Time: 08/16/24 12:11 PM   Result Value Ref Range    Sodium 139 136 - 145 mmol/L    Potassium 3.4 (L) 3.5 - 5.1 mmol/L    Chloride 101 95 - 110 mmol/L    CO2 26 23 - 29 mmol/L    Glucose 165 (H) 70 - 110 mg/dL    BUN 9 8 - 23 mg/dL    Creatinine 0.9 0.5 - 1.4 mg/dL    Calcium 9.8 8.7 - 10.5 mg/dL    Total Protein 6.7 6.0 - 8.4 g/dL    Albumin 3.8 3.5 - 5.2 g/dL    Total Bilirubin 0.5 0.1 - 1.0 mg/dL    Alkaline Phosphatase 93 55 - 135 U/L    AST 18 10 - 40 U/L    ALT 11 10 - 44 U/L    eGFR >60.0 >60 mL/min/1.73 m^2    Anion Gap 12 8 - 16 mmol/L   Magnesium    Collection Time: 08/16/24 12:11 PM   Result Value Ref Range    Magnesium 1.7 1.6 - 2.6 mg/dL          Imaging: CXR 11/14/23     Cardiac silhouette and mediastinal contours are normal.  Lungs demonstrates no focal large opacity with possible subtle small nodules within the bilateral mid lungs.   No pleural effusion.  Osseous structures are intact.        CT chest 12/01/23     Base of Neck: No significant abnormality.     Thoracic soft tissues: Normal.     Aorta: Left-sided aortic arch.  No aneurysm and no significant atherosclerosis     Heart: Normal size. No effusion.     Pulmonary vasculature: Pulmonary arteries distribute normally.  There are four pulmonary veins.     Yuliya/Mediastinum: No pathologic richard enlargement.     Airways: Patent.     Lungs/Pleura: Biapical nonspecific fibronodular pleuroparenchymal scarring.  Solid 3 mm nodule in the right upper lobe (series 4, image 119).  Cluster ill-defined centrilobular ground-glass opacities within the posterior right and left upper lobes, not entirely specific but suspicious for small airways inflammation or infection.  There is a 1.2 cm ground-glass opacity in superior segment left lower lobe (series 4, image 282).  No consolidation, pleural effusion or pneumothorax.     Esophagus: Normal.     Upper Abdomen: There is an 8 mm hypodensity in the inferior right hepatic lobe, which measures fluid attenuation and probably represents a cyst.  Cholelithiasis.  No abnormal gallbladder wall thickening or pericholecystic fluid.     Bones: No acute fracture. No suspicious lytic or sclerotic lesions.     CT Chest 5/21/24  Base of Neck: No significant abnormality.     Thoracic soft tissues: Normal.     Aorta: Left-sided aortic arch. No aneurysm.     Heart: Normal size. No effusion. Mild aortic and coronary artery atherosclerotic calcification.     Pulmonary vasculature: Pulmonary arteries distribute normally.     Yuliya/Mediastinum: No pathologic richard enlargement.     Esophagus: Normal.     Upper Abdomen: 3.7 x 2.7 cm pancreatic body mass with pancreatic tail atrophy suspicious for pancreatic malignancy. Recommend dedicated CT or MRI abdomen with IV contrast pancreatic protocol.     Airways: Patent.     Lungs/Pleura: Unchanged benign 3 mm nodule medial right upper lobe  series 5, image 115. No new nodules. Mild bilateral apical pleuroparenchymal scarring again noted.     No airspace disease. Interval resolution of previously described ground-glass.     Bones: No acute fracture. No suspicious lytic or sclerotic lesions.     MRI Abdomen 6/30/24  Lower thorax: Heart is normal in size. No pleural fluid. No consolidation.     Liver: Normal size. No evidence of fatty infiltration. Small cyst in segment 6 measures 9 mm.     Gallbladder: Unremarkable.     Bile ducts: No intrahepatic or extrahepatic biliary dilatation.     Pancreas: Hypoenhancing mass centered in the proximal pancreatic body measures 2.9 x 2.0 cm (series 27, image 31). This lesion abuts and may encase the distal splenic vein which appears narrowed just proximal to the portal confluence. There is atrophy of the pancreatic body and tail distal to this lesion with associated dilatation of the main pancreatic duct measuring 5 mm. There is also atrophy of the pancreatic head without associated ductal dilatation.     Spleen: Normal size. No focal lesions.     Adrenal glands: Unremarkable.     Kidneys: No renal masses. No hydronephrosis.     GI: No evidence of bowel obstruction.     Mesentery/retroperitoneum: No pathologically enlarged lymph nodes.     Vasculature: Visualized aorta is normal in caliber. Portal vein is patent.     Body wall/extraperitoneal soft tissues: Unremarkable.     Bones: Visualized osseous structures demonstrate normal marrow signal.     Miscellaneous: No intraperitoneal free fluid.     CT C/A/P 7/09/24  Normal thyroid. Heart size is normal. No pericardial effusion. The thoracic aorta and main pulmonary artery are normal in caliber. No enlarged axillary, mediastinal or hilar lymph nodes.     Central airways are clear. No pleural effusion, focal consolidation or pneumothorax.     The liver is normal in morphology and with smooth contour. Inferior right hepatic lobe 9 mm lesion previously characterized as a  cyst. No new hepatic lesion.     Cholelithiasis. No gallbladder wall thickening or pericholecystic fluid. No intra or extrahepatic biliary ductal dilatation.     The spleen, kidneys and adrenal glands are normal.     Proximal pancreatic body irregular mass measures 4.4 x 3.7 cm (series 2, image 65) which when measured similarly to the prior chest CT is unchanged in size. The distal pancreatic body and tail are atrophic. Diffuse pancreatic ductal dilatation measuring 8 mm.     The portal and superior mesenteric veins are patent. 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 The mass abuts the portal by less than 180 degrees. The mass encases the proximal splenic artery which remains patent. The SMA is remote from the mass.     Peripancreatic lymph node measuring 0.7 cm in short axis (series 2, image 72). No enlarged retroperitoneal lymph nodes.     No bowel obstruction inflammatory change. The mass focally abuts the distal lesser curvature the stomach. No free fluid or free air.     Circumferential bladder wall thickening. Normal uterus. No adnexal mass. No enlarged pelvic lymph nodes.     Soft tissues are unremarkable. No acute or aggressive osseous abnormality.         Assessment:       1. Malignant neoplasm of pancreas, unspecified location of malignancy    2. 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therapy    3. Thrombocytopenia    4. Hypokalemia    5. Pancreatic insufficiency    6. Hypothyroidism, unspecified type    7. Type 2 diabetes mellitus without complication, without long-term current use of insulin           Plan:     Pancreatic Cancer - Patient with Stage III pancreatic cancer with concern for potential encasement of the distal celiac artery per discussion with Dr Valentin  -Will discuss pt at tumor board next week  -No sign of mets  - 94.5 on 6/17/24  -Case discussed at tumor board 7/17/24 with recommendation to proceed with chemo  -[facility] genetic  testing negative for the genes tested  -[facility] tissue testing showed TP53, KRAS p.G12D, CDKN2A, CDKN2B mutations.  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  -Will need to keep irinotecan dose reduced at 165mg/mm2  -Will start on neulasta as ANC reduced  -Will proceed with FOLFIRINOX with 1st does of irinotecan and fluorouracil reduced pending results of pharmacogenomic testing  -8/16/24: ok to proceed with FOLFIRINOX on 8/19/24    Visit today included increased complexity associated with the care of the episodic problem (chemotherapy) addressed and managing the longitudinal care of the patient due to the serious and/or complex managed problem(s) pancreatic cancer.     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 - pt at increased risk of infection  -No current signs of infection  -Will monitor     Thrombocytopenia   - platelets 94k on 8/16/24  -Likely due to chemo  -Will monitor     Hypokalemia   - 3.4 today  -Will continue on 40mEq potassium daily     Pancreatic Insufficiency   - Pt taking Creon  -Will monitor weight and GI symptoms     Hypothyroidism   - pt on synthroid  -Will monitror     DMII - PT currently on Synjardy        Lab Results   Component Value Date     HGBA1C 6.2 (H) 04/01/2024   -Possibly due to pancreatic cancer  -Will monitor        Med Onc Chart Routing      Follow up with physician . As scheduled   Follow up with [facility]    Infusion scheduling note    Injection scheduling note    Labs    Imaging    Pharmacy appointment    Other referrals                  Plan was discussed with the patient at length, and she verbalized understanding. Bessy was given an opportunity to ask questions that were answered to her satisfaction, and she was advised to call in the interval if any problems or questions arise.    Assessment/Plan reviewed and approved by Dr Hunt    30 minutes were spent in coordination of patient's care, record review and counseling.    PAU Ramirez, FNP-C  Hematology  & Oncology

## 2024-08-16 ENCOUNTER — OFFICE VISIT (OUTPATIENT)
Dept: HEMATOLOGY/ONCOLOGY | Facility: CLINIC | Age: 64
End: 2024-08-16
Payer: MEDICARE

## 2024-08-16 ENCOUNTER — LAB VISIT (OUTPATIENT)
Dept: LAB | Facility: HOSPITAL | Age: 64
End: 2024-08-16
Attending: INTERNAL MEDICINE
Payer: MEDICARE

## 2024-08-16 VITALS
SYSTOLIC BLOOD PRESSURE: 109 MMHG | WEIGHT: 128.06 LBS | DIASTOLIC BLOOD PRESSURE: 70 MMHG | BODY MASS INDEX: 24.18 KG/M2 | HEART RATE: 80 BPM | OXYGEN SATURATION: 100 % | HEIGHT: 61 IN | TEMPERATURE: 97 F | RESPIRATION RATE: 16 BRPM

## 2024-08-16 DIAGNOSIS — D69.6 THROMBOCYTOPENIA: ICD-10-CM

## 2024-08-16 DIAGNOSIS — E87.6 HYPOKALEMIA: ICD-10-CM

## 2024-08-16 DIAGNOSIS — T45.1X5A IMMUNODEFICIENCY DUE TO CHEMOTHERAPY: ICD-10-CM

## 2024-08-16 DIAGNOSIS — E11.9 TYPE 2 DIABETES MELLITUS WITHOUT COMPLICATION, WITHOUT LONG-TERM CURRENT USE OF INSULIN: ICD-10-CM

## 2024-08-16 DIAGNOSIS — K86.89 PANCREATIC INSUFFICIENCY: ICD-10-CM

## 2024-08-16 DIAGNOSIS — D84.821 IMMUNODEFICIENCY DUE TO CHEMOTHERAPY: ICD-10-CM

## 2024-08-16 DIAGNOSIS — Z79.899 IMMUNODEFICIENCY DUE TO CHEMOTHERAPY: ICD-10-CM

## 2024-08-16 DIAGNOSIS — C25.9 MALIGNANT NEOPLASM OF PANCREAS, UNSPECIFIED LOCATION OF MALIGNANCY: Primary | ICD-10-CM

## 2024-08-16 DIAGNOSIS — E03.9 HYPOTHYROIDISM, UNSPECIFIED TYPE: ICD-10-CM

## 2024-08-16 DIAGNOSIS — C25.9 MALIGNANT NEOPLASM OF PANCREAS, UNSPECIFIED LOCATION OF MALIGNANCY: ICD-10-CM

## 2024-08-16 LAB
ALBUMIN SERPL BCP-MCNC: 3.8 G/DL (ref 3.5–5.2)
ALP SERPL-CCNC: 93 U/L (ref 55–135)
ALT SERPL W/O P-5'-P-CCNC: 11 U/L (ref 10–44)
ANION GAP SERPL CALC-SCNC: 12 MMOL/L (ref 8–16)
AST SERPL-CCNC: 18 U/L (ref 10–40)
BASOPHILS # BLD AUTO: ABNORMAL K/UL (ref 0–0.2)
BASOPHILS NFR BLD: 0 % (ref 0–1.9)
BILIRUB SERPL-MCNC: 0.5 MG/DL (ref 0.1–1)
BUN SERPL-MCNC: 9 MG/DL (ref 8–23)
CALCIUM SERPL-MCNC: 9.8 MG/DL (ref 8.7–10.5)
CHLORIDE SERPL-SCNC: 101 MMOL/L (ref 95–110)
CO2 SERPL-SCNC: 26 MMOL/L (ref 23–29)
CREAT SERPL-MCNC: 0.9 MG/DL (ref 0.5–1.4)
DIFFERENTIAL METHOD BLD: ABNORMAL
EOSINOPHIL # BLD AUTO: ABNORMAL K/UL (ref 0–0.5)
EOSINOPHIL NFR BLD: 1 % (ref 0–8)
ERYTHROCYTE [DISTWIDTH] IN BLOOD BY AUTOMATED COUNT: 12.5 % (ref 11.5–14.5)
EST. GFR  (NO RACE VARIABLE): >60 ML/MIN/1.73 M^2
GLUCOSE SERPL-MCNC: 165 MG/DL (ref 70–110)
HCT VFR BLD AUTO: 35.5 % (ref 37–48.5)
HGB BLD-MCNC: 12.4 G/DL (ref 12–16)
IMM GRANULOCYTES # BLD AUTO: ABNORMAL K/UL (ref 0–0.04)
IMM GRANULOCYTES NFR BLD AUTO: ABNORMAL % (ref 0–0.5)
LYMPHOCYTES # BLD AUTO: ABNORMAL K/UL (ref 1–4.8)
LYMPHOCYTES NFR BLD: 25 % (ref 18–48)
MAGNESIUM SERPL-MCNC: 1.7 MG/DL (ref 1.6–2.6)
MCH RBC QN AUTO: 28.9 PG (ref 27–31)
MCHC RBC AUTO-ENTMCNC: 34.9 G/DL (ref 32–36)
MCV RBC AUTO: 83 FL (ref 82–98)
METAMYELOCYTES NFR BLD MANUAL: 5 %
MONOCYTES # BLD AUTO: ABNORMAL K/UL (ref 0.3–1)
MONOCYTES NFR BLD: 6 % (ref 4–15)
NEUTROPHILS NFR BLD: 50 % (ref 38–73)
NEUTS BAND NFR BLD MANUAL: 13 %
NRBC BLD-RTO: 0 /100 WBC
PLATELET # BLD AUTO: 94 K/UL (ref 150–450)
PLATELET BLD QL SMEAR: ABNORMAL
PMV BLD AUTO: 9.5 FL (ref 9.2–12.9)
POTASSIUM SERPL-SCNC: 3.4 MMOL/L (ref 3.5–5.1)
PROT SERPL-MCNC: 6.7 G/DL (ref 6–8.4)
RBC # BLD AUTO: 4.29 M/UL (ref 4–5.4)
SODIUM SERPL-SCNC: 139 MMOL/L (ref 136–145)
WBC # BLD AUTO: 15.79 K/UL (ref 3.9–12.7)

## 2024-08-16 PROCEDURE — 36415 COLL VENOUS BLD VENIPUNCTURE: CPT | Mod: PN | Performed by: INTERNAL MEDICINE

## 2024-08-16 PROCEDURE — 80053 COMPREHEN METABOLIC PANEL: CPT | Mod: PN | Performed by: INTERNAL MEDICINE

## 2024-08-16 PROCEDURE — 85027 COMPLETE CBC AUTOMATED: CPT | Mod: PN | Performed by: INTERNAL MEDICINE

## 2024-08-16 PROCEDURE — 99999 PR PBB SHADOW E&M-EST. PATIENT-LVL V: CPT | Mod: PBBFAC,,,

## 2024-08-16 PROCEDURE — 85007 BL SMEAR W/DIFF WBC COUNT: CPT | Mod: PN | Performed by: INTERNAL MEDICINE

## 2024-08-16 PROCEDURE — 83735 ASSAY OF MAGNESIUM: CPT | Mod: PN | Performed by: INTERNAL MEDICINE

## 2024-08-16 RX ORDER — SODIUM CHLORIDE 0.9 % (FLUSH) 0.9 %
10 SYRINGE (ML) INJECTION
OUTPATIENT
Start: 2024-08-21

## 2024-08-16 RX ORDER — ATROPINE SULFATE 0.4 MG/ML
0.4 INJECTION, SOLUTION ENDOTRACHEAL; INTRAMEDULLARY; INTRAMUSCULAR; INTRAVENOUS; SUBCUTANEOUS ONCE AS NEEDED
OUTPATIENT
Start: 2024-08-19

## 2024-08-16 RX ORDER — HEPARIN 100 UNIT/ML
500 SYRINGE INTRAVENOUS
OUTPATIENT
Start: 2024-08-19

## 2024-08-16 RX ORDER — SODIUM CHLORIDE 0.9 % (FLUSH) 0.9 %
10 SYRINGE (ML) INJECTION
OUTPATIENT
Start: 2024-08-19

## 2024-08-16 RX ORDER — PROCHLORPERAZINE EDISYLATE 5 MG/ML
5 INJECTION INTRAMUSCULAR; INTRAVENOUS ONCE AS NEEDED
OUTPATIENT
Start: 2024-08-19

## 2024-08-16 RX ORDER — DIPHENHYDRAMINE HYDROCHLORIDE 50 MG/ML
50 INJECTION INTRAMUSCULAR; INTRAVENOUS ONCE AS NEEDED
OUTPATIENT
Start: 2024-08-19

## 2024-08-16 RX ORDER — EPINEPHRINE 0.3 MG/.3ML
0.3 INJECTION SUBCUTANEOUS ONCE AS NEEDED
OUTPATIENT
Start: 2024-08-19

## 2024-08-16 RX ORDER — PROCHLORPERAZINE EDISYLATE 5 MG/ML
5 INJECTION INTRAMUSCULAR; INTRAVENOUS ONCE AS NEEDED
OUTPATIENT
Start: 2024-08-21

## 2024-08-16 RX ORDER — HEPARIN 100 UNIT/ML
500 SYRINGE INTRAVENOUS
OUTPATIENT
Start: 2024-08-21

## 2024-08-16 RX ORDER — FLUOROURACIL 50 MG/ML
400 INJECTION, SOLUTION INTRAVENOUS
OUTPATIENT
Start: 2024-08-19

## 2024-08-19 ENCOUNTER — DOCUMENTATION ONLY (OUTPATIENT)
Dept: INFUSION THERAPY | Facility: HOSPITAL | Age: 64
End: 2024-08-19
Payer: MEDICARE

## 2024-08-19 NOTE — PROGRESS NOTES
SW met with pt at chairside. Pt in good spirits. Tomorrow is her birthday and she and her  will be spending time away for her birthday. She is a little worried about feeling poorly after treatment but said time with hier  will be nice.     SW and pt spoke about how it has been having both herself and  with cancer and going through treatment at the same time. Pt said she is angry. She said she has been dealing with her husbands cancer for two years and now she has cancer herself. She said this is not how things are suppose to be at this time. SW allowed pt to speak about her emotions and actively listened as she shared. SW validated feelings and provided emotional support.     Pt was provided a gas card and denied any further needs at this time. She said her brother in law is coming in from [place] this week and is cooking for a  for them. She is grateful for the assistance.     Bharati Fritz LCSW

## 2024-08-19 NOTE — PROGRESS NOTES
Oncology Nutrition   Chemotherapy Infusion Visit    Nutrition Follow Up   RD met with patient at chairside during infusion treatment. Pt states she had some tingling and eye trouble following last cycle but denies any significant nutrition related issues. Weight down 3# in a week but not considered significant (2%). Pt denies any N/V/D/C.     Pt eligible for TFP order today but states she would rather get at the end of the week due to them going out of town as soon as treatment is over today.     Wt Readings from Last 10 Encounters:   08/19/24 58.5 kg (128 lb 15.5 oz)   08/16/24 58.1 kg (128 lb 1.4 oz)   08/08/24 59.8 kg (131 lb 13.4 oz)   08/07/24 59.8 kg (131 lb 13.4 oz)   08/05/24 59.8 kg (131 lb 13.4 oz)   08/02/24 59 kg (130 lb 1.1 oz)   07/24/24 59.9 kg (132 lb 0.9 oz)   07/22/24 60.2 kg (132 lb 11.5 oz)   07/22/24 60.2 kg (132 lb 11.5 oz)   07/18/24 59.4 kg (130 lb 15.3 oz)       All other nutrition questions/concerns addressed as appropriate. Will continue to follow and monitor throughout treatment PRN.     Bernie Gurrola, MS, RD, LDN  08/19/2024  1:52 PM

## 2024-08-21 ENCOUNTER — INFUSION (OUTPATIENT)
Dept: INFUSION THERAPY | Facility: HOSPITAL | Age: 64
End: 2024-08-21
Attending: INTERNAL MEDICINE
Payer: MEDICARE

## 2024-08-21 VITALS
TEMPERATURE: 98 F | HEIGHT: 61 IN | SYSTOLIC BLOOD PRESSURE: 136 MMHG | OXYGEN SATURATION: 97 % | WEIGHT: 129 LBS | DIASTOLIC BLOOD PRESSURE: 62 MMHG | HEART RATE: 71 BPM | BODY MASS INDEX: 24.35 KG/M2 | RESPIRATION RATE: 20 BRPM

## 2024-08-21 DIAGNOSIS — C25.9 MALIGNANT NEOPLASM OF PANCREAS, UNSPECIFIED LOCATION OF MALIGNANCY: Primary | ICD-10-CM

## 2024-08-21 PROCEDURE — 25000003 PHARM REV CODE 250: Mod: PN

## 2024-08-21 PROCEDURE — A4216 STERILE WATER/SALINE, 10 ML: HCPCS | Mod: PN

## 2024-08-21 RX ORDER — SODIUM CHLORIDE 0.9 % (FLUSH) 0.9 %
10 SYRINGE (ML) INJECTION
Status: DISCONTINUED | OUTPATIENT
Start: 2024-08-21 | End: 2024-08-21 | Stop reason: HOSPADM

## 2024-08-21 RX ADMIN — SODIUM CHLORIDE, PRESERVATIVE FREE 10 ML: 5 INJECTION INTRAVENOUS at 01:08

## 2024-08-22 ENCOUNTER — INFUSION (OUTPATIENT)
Dept: INFUSION THERAPY | Facility: HOSPITAL | Age: 64
End: 2024-08-22
Attending: INTERNAL MEDICINE
Payer: MEDICARE

## 2024-08-22 VITALS
TEMPERATURE: 98 F | HEIGHT: 61 IN | DIASTOLIC BLOOD PRESSURE: 58 MMHG | SYSTOLIC BLOOD PRESSURE: 120 MMHG | WEIGHT: 129 LBS | BODY MASS INDEX: 24.35 KG/M2 | RESPIRATION RATE: 16 BRPM | HEART RATE: 84 BPM

## 2024-08-22 DIAGNOSIS — C25.9 MALIGNANT NEOPLASM OF PANCREAS, UNSPECIFIED LOCATION OF MALIGNANCY: Primary | ICD-10-CM

## 2024-08-22 PROCEDURE — 96372 THER/PROPH/DIAG INJ SC/IM: CPT | Mod: PN

## 2024-08-22 NOTE — PLAN OF CARE
Problem: Adult Inpatient Plan of Care  Goal: Plan of Care Review  Outcome: Progressing  Flowsheets (Taken 8/22/2024 1337)  Plan of Care Reviewed With:   patient   spouse  Goal: Patient-Specific Goal (Individualized)  Outcome: Progressing  Flowsheets (Taken 8/22/2024 1337)  Individualized Care Needs: recliner, conversation  Anxieties, Fears or Concerns: neuropathy to hands  Patient/Family-Specific Goals (Include Timeframe): no s/s of reaction to inj     Problem: Fatigue  Goal: Improved Activity Tolerance  Outcome: Progressing  Intervention: Promote Improved Energy  Flowsheets (Taken 8/22/2024 1337)  Activity Management: Up in chair - L3   Patient tolerated Udenyca injection well, d/c in no acute distress.

## 2024-08-23 ENCOUNTER — TELEPHONE (OUTPATIENT)
Dept: HEMATOLOGY/ONCOLOGY | Facility: CLINIC | Age: 64
End: 2024-08-23
Payer: MEDICARE

## 2024-08-23 NOTE — TELEPHONE ENCOUNTER
Pt c/o inflammation to inside of her cheeks x 1 week and throat irritation with white bumps onset today. Also reports diarrhea and dry nasal and oral mucosa. Had chemo this week. Denies any difficulty breathing and swallowing. Will notify Dr. Hunt for plan and possibly new RX. Pt v/u

## 2024-08-23 NOTE — TELEPHONE ENCOUNTER
----- Message from Sofiya Marshall, Patient Care Assistant sent at 8/23/2024  9:36 AM CDT -----  Type: Needs Medical Advice  Who Called:  TY    Best Call Back Number: [phone]    Additional Information: TY states her side  of checks are swollen and throat  , please call to further discuss thank you .

## 2024-08-23 NOTE — TELEPHONE ENCOUNTER
I called the patient and left her a message to call me back at [phone].     Rajat Hunt MD  [facility]  Hematology and Oncology  [facility]   [address] [place] [address]   O: [phone]  F: [phone]

## 2024-08-26 ENCOUNTER — PATIENT OUTREACH (OUTPATIENT)
Dept: ADMINISTRATIVE | Facility: HOSPITAL | Age: 64
End: 2024-08-26
Payer: MEDICARE

## 2024-08-26 ENCOUNTER — PATIENT MESSAGE (OUTPATIENT)
Dept: PRIMARY CARE CLINIC | Facility: CLINIC | Age: 64
End: 2024-08-26
Payer: MEDICARE

## 2024-08-26 DIAGNOSIS — E78.5 HYPERLIPIDEMIA, UNSPECIFIED HYPERLIPIDEMIA TYPE: ICD-10-CM

## 2024-08-26 DIAGNOSIS — E11.9 TYPE 2 DIABETES MELLITUS WITHOUT COMPLICATION, WITHOUT LONG-TERM CURRENT USE OF INSULIN: Primary | ICD-10-CM

## 2024-08-26 RX ORDER — SIMVASTATIN 10 MG/1
10 TABLET, FILM COATED ORAL NIGHTLY
Qty: 90 TABLET | Refills: 3 | Status: SHIPPED | OUTPATIENT
Start: 2024-08-26 | End: 2025-08-26

## 2024-08-26 RX ORDER — LORATADINE 10 MG/1
10 TABLET ORAL EVERY MORNING
Qty: 30 TABLET | Refills: 11 | Status: SHIPPED | OUTPATIENT
Start: 2024-08-26

## 2024-08-28 ENCOUNTER — PATIENT MESSAGE (OUTPATIENT)
Dept: HEMATOLOGY/ONCOLOGY | Facility: CLINIC | Age: 64
End: 2024-08-28
Payer: MEDICARE

## 2024-08-29 ENCOUNTER — TELEPHONE (OUTPATIENT)
Dept: HEMATOLOGY/ONCOLOGY | Facility: CLINIC | Age: 64
End: 2024-08-29
Payer: MEDICARE

## 2024-08-29 NOTE — TELEPHONE ENCOUNTER
m for patient to contact clinic about her dr woodward appt being moved to tues with yohan, as dr woodward had a family emergency.

## 2024-08-30 ENCOUNTER — LAB VISIT (OUTPATIENT)
Dept: LAB | Facility: HOSPITAL | Age: 64
End: 2024-08-30
Payer: MEDICARE

## 2024-08-30 ENCOUNTER — PATIENT MESSAGE (OUTPATIENT)
Dept: INFUSION THERAPY | Facility: HOSPITAL | Age: 64
End: 2024-08-30
Payer: MEDICARE

## 2024-08-30 DIAGNOSIS — C25.9 MALIGNANT NEOPLASM OF PANCREAS, UNSPECIFIED LOCATION OF MALIGNANCY: Primary | ICD-10-CM

## 2024-08-30 DIAGNOSIS — E11.9 TYPE 2 DIABETES MELLITUS WITHOUT COMPLICATION, WITHOUT LONG-TERM CURRENT USE OF INSULIN: ICD-10-CM

## 2024-08-30 DIAGNOSIS — C25.9 MALIGNANT NEOPLASM OF PANCREAS, UNSPECIFIED LOCATION OF MALIGNANCY: ICD-10-CM

## 2024-08-30 LAB
ALBUMIN SERPL BCP-MCNC: 3.8 G/DL (ref 3.5–5.2)
ALBUMIN/CREAT UR: NORMAL UG/MG (ref 0–30)
ALP SERPL-CCNC: 95 U/L (ref 55–135)
ALT SERPL W/O P-5'-P-CCNC: 14 U/L (ref 10–44)
ANION GAP SERPL CALC-SCNC: 12 MMOL/L (ref 8–16)
AST SERPL-CCNC: 18 U/L (ref 10–40)
BASOPHILS # BLD AUTO: ABNORMAL K/UL (ref 0–0.2)
BASOPHILS NFR BLD: 0 % (ref 0–1.9)
BILIRUB SERPL-MCNC: 0.5 MG/DL (ref 0.1–1)
BUN SERPL-MCNC: 6 MG/DL (ref 8–23)
CALCIUM SERPL-MCNC: 9.5 MG/DL (ref 8.7–10.5)
CANCER AG19-9 SERPL-ACNC: 74.2 U/ML (ref 0–40)
CHLORIDE SERPL-SCNC: 103 MMOL/L (ref 95–110)
CHOLEST SERPL-MCNC: 100 MG/DL (ref 120–199)
CHOLEST/HDLC SERPL: 1.5 {RATIO} (ref 2–5)
CO2 SERPL-SCNC: 26 MMOL/L (ref 23–29)
CREAT SERPL-MCNC: 0.8 MG/DL (ref 0.5–1.4)
CREAT UR-MCNC: 53 MG/DL (ref 15–325)
DIFFERENTIAL METHOD BLD: ABNORMAL
EOSINOPHIL # BLD AUTO: ABNORMAL K/UL (ref 0–0.5)
EOSINOPHIL NFR BLD: 0 % (ref 0–8)
ERYTHROCYTE [DISTWIDTH] IN BLOOD BY AUTOMATED COUNT: 13 % (ref 11.5–14.5)
EST. GFR  (NO RACE VARIABLE): >60 ML/MIN/1.73 M^2
GLUCOSE SERPL-MCNC: 127 MG/DL (ref 70–110)
HCT VFR BLD AUTO: 32.6 % (ref 37–48.5)
HDLC SERPL-MCNC: 66 MG/DL (ref 40–75)
HDLC SERPL: 66 % (ref 20–50)
HGB BLD-MCNC: 11.4 G/DL (ref 12–16)
IMM GRANULOCYTES # BLD AUTO: ABNORMAL K/UL (ref 0–0.04)
IMM GRANULOCYTES NFR BLD AUTO: ABNORMAL % (ref 0–0.5)
LDLC SERPL CALC-MCNC: 23.8 MG/DL (ref 63–159)
LYMPHOCYTES # BLD AUTO: ABNORMAL K/UL (ref 1–4.8)
LYMPHOCYTES NFR BLD: 45 % (ref 18–48)
MAGNESIUM SERPL-MCNC: 1.6 MG/DL (ref 1.6–2.6)
MCH RBC QN AUTO: 28.9 PG (ref 27–31)
MCHC RBC AUTO-ENTMCNC: 35 G/DL (ref 32–36)
MCV RBC AUTO: 83 FL (ref 82–98)
METAMYELOCYTES NFR BLD MANUAL: 4 %
MICROALBUMIN UR DL<=1MG/L-MCNC: <5 UG/ML
MONOCYTES # BLD AUTO: ABNORMAL K/UL (ref 0.3–1)
MONOCYTES NFR BLD: 11 % (ref 4–15)
NEUTROPHILS # BLD AUTO: ABNORMAL K/UL (ref 1.8–7.7)
NEUTROPHILS NFR BLD: 34 % (ref 38–73)
NEUTS BAND NFR BLD MANUAL: 6 %
NONHDLC SERPL-MCNC: 34 MG/DL
NRBC BLD-RTO: 1 /100 WBC
PLATELET # BLD AUTO: 78 K/UL (ref 150–450)
PLATELET BLD QL SMEAR: ABNORMAL
PMV BLD AUTO: 9.5 FL (ref 9.2–12.9)
POTASSIUM SERPL-SCNC: 3.3 MMOL/L (ref 3.5–5.1)
PROT SERPL-MCNC: 6.6 G/DL (ref 6–8.4)
RBC # BLD AUTO: 3.94 M/UL (ref 4–5.4)
SODIUM SERPL-SCNC: 141 MMOL/L (ref 136–145)
TRIGL SERPL-MCNC: 51 MG/DL (ref 30–150)
WBC # BLD AUTO: 10.06 K/UL (ref 3.9–12.7)

## 2024-08-30 PROCEDURE — 36415 COLL VENOUS BLD VENIPUNCTURE: CPT | Mod: PN | Performed by: INTERNAL MEDICINE

## 2024-08-30 PROCEDURE — 85007 BL SMEAR W/DIFF WBC COUNT: CPT | Mod: PN | Performed by: INTERNAL MEDICINE

## 2024-08-30 PROCEDURE — 86301 IMMUNOASSAY TUMOR CA 19-9: CPT | Performed by: INTERNAL MEDICINE

## 2024-08-30 PROCEDURE — 80053 COMPREHEN METABOLIC PANEL: CPT | Mod: PN | Performed by: INTERNAL MEDICINE

## 2024-08-30 PROCEDURE — 82570 ASSAY OF URINE CREATININE: CPT | Performed by: INTERNAL MEDICINE

## 2024-08-30 PROCEDURE — 85027 COMPLETE CBC AUTOMATED: CPT | Mod: PN | Performed by: INTERNAL MEDICINE

## 2024-08-30 PROCEDURE — 83735 ASSAY OF MAGNESIUM: CPT | Mod: PN | Performed by: INTERNAL MEDICINE

## 2024-08-30 PROCEDURE — 80061 LIPID PANEL: CPT | Performed by: INTERNAL MEDICINE

## 2024-09-03 ENCOUNTER — OFFICE VISIT (OUTPATIENT)
Dept: HEMATOLOGY/ONCOLOGY | Facility: CLINIC | Age: 64
End: 2024-09-03
Payer: MEDICARE

## 2024-09-03 ENCOUNTER — INFUSION (OUTPATIENT)
Dept: INFUSION THERAPY | Facility: HOSPITAL | Age: 64
End: 2024-09-03
Attending: INTERNAL MEDICINE
Payer: MEDICARE

## 2024-09-03 ENCOUNTER — TELEPHONE (OUTPATIENT)
Dept: HEMATOLOGY/ONCOLOGY | Facility: CLINIC | Age: 64
End: 2024-09-03
Payer: MEDICARE

## 2024-09-03 ENCOUNTER — DOCUMENTATION ONLY (OUTPATIENT)
Dept: INFUSION THERAPY | Facility: HOSPITAL | Age: 64
End: 2024-09-03
Payer: MEDICARE

## 2024-09-03 ENCOUNTER — LAB VISIT (OUTPATIENT)
Dept: LAB | Facility: HOSPITAL | Age: 64
End: 2024-09-03
Attending: INTERNAL MEDICINE
Payer: MEDICARE

## 2024-09-03 VITALS
DIASTOLIC BLOOD PRESSURE: 70 MMHG | HEIGHT: 61 IN | RESPIRATION RATE: 16 BRPM | OXYGEN SATURATION: 98 % | TEMPERATURE: 97 F | BODY MASS INDEX: 24.55 KG/M2 | HEART RATE: 89 BPM | WEIGHT: 130.06 LBS | SYSTOLIC BLOOD PRESSURE: 126 MMHG

## 2024-09-03 VITALS
HEART RATE: 88 BPM | TEMPERATURE: 97 F | BODY MASS INDEX: 24.55 KG/M2 | HEIGHT: 61 IN | WEIGHT: 130.06 LBS | RESPIRATION RATE: 16 BRPM | DIASTOLIC BLOOD PRESSURE: 71 MMHG | OXYGEN SATURATION: 98 % | SYSTOLIC BLOOD PRESSURE: 122 MMHG

## 2024-09-03 DIAGNOSIS — E11.9 TYPE 2 DIABETES MELLITUS WITHOUT COMPLICATION, WITHOUT LONG-TERM CURRENT USE OF INSULIN: ICD-10-CM

## 2024-09-03 DIAGNOSIS — C25.9 MALIGNANT NEOPLASM OF PANCREAS, UNSPECIFIED LOCATION OF MALIGNANCY: Primary | ICD-10-CM

## 2024-09-03 DIAGNOSIS — D69.6 THROMBOCYTOPENIA: ICD-10-CM

## 2024-09-03 DIAGNOSIS — T45.1X5A IMMUNODEFICIENCY DUE TO CHEMOTHERAPY: ICD-10-CM

## 2024-09-03 DIAGNOSIS — D84.821 IMMUNODEFICIENCY DUE TO CHEMOTHERAPY: ICD-10-CM

## 2024-09-03 DIAGNOSIS — K86.89 PANCREATIC INSUFFICIENCY: ICD-10-CM

## 2024-09-03 DIAGNOSIS — Z79.899 IMMUNODEFICIENCY DUE TO CHEMOTHERAPY: ICD-10-CM

## 2024-09-03 DIAGNOSIS — E87.6 HYPOKALEMIA: ICD-10-CM

## 2024-09-03 DIAGNOSIS — C25.9 MALIGNANT NEOPLASM OF PANCREAS, UNSPECIFIED LOCATION OF MALIGNANCY: ICD-10-CM

## 2024-09-03 DIAGNOSIS — E03.9 HYPOTHYROIDISM, UNSPECIFIED TYPE: ICD-10-CM

## 2024-09-03 LAB
BASOPHILS # BLD AUTO: ABNORMAL K/UL (ref 0–0.2)
BASOPHILS NFR BLD: 0 % (ref 0–1.9)
DIFFERENTIAL METHOD BLD: ABNORMAL
EOSINOPHIL # BLD AUTO: ABNORMAL K/UL (ref 0–0.5)
EOSINOPHIL NFR BLD: 0 % (ref 0–8)
ERYTHROCYTE [DISTWIDTH] IN BLOOD BY AUTOMATED COUNT: 14.8 % (ref 11.5–14.5)
HCT VFR BLD AUTO: 32.4 % (ref 37–48.5)
HGB BLD-MCNC: 11.2 G/DL (ref 12–16)
IMM GRANULOCYTES # BLD AUTO: ABNORMAL K/UL (ref 0–0.04)
IMM GRANULOCYTES NFR BLD AUTO: ABNORMAL % (ref 0–0.5)
LYMPHOCYTES # BLD AUTO: ABNORMAL K/UL (ref 1–4.8)
LYMPHOCYTES NFR BLD: 16 % (ref 18–48)
MCH RBC QN AUTO: 29.8 PG (ref 27–31)
MCHC RBC AUTO-ENTMCNC: 34.6 G/DL (ref 32–36)
MCV RBC AUTO: 86 FL (ref 82–98)
METAMYELOCYTES NFR BLD MANUAL: 10 %
MONOCYTES # BLD AUTO: ABNORMAL K/UL (ref 0.3–1)
MONOCYTES NFR BLD: 14 % (ref 4–15)
NEUTROPHILS NFR BLD: 57 % (ref 38–73)
NEUTS BAND NFR BLD MANUAL: 3 %
NRBC BLD-RTO: 0 /100 WBC
PLATELET # BLD AUTO: 108 K/UL (ref 150–450)
PLATELET BLD QL SMEAR: ABNORMAL
PMV BLD AUTO: 9.1 FL (ref 9.2–12.9)
RBC # BLD AUTO: 3.76 M/UL (ref 4–5.4)
TOXIC GRANULES BLD QL SMEAR: PRESENT
WBC # BLD AUTO: 17.74 K/UL (ref 3.9–12.7)

## 2024-09-03 PROCEDURE — 63600175 PHARM REV CODE 636 W HCPCS: Mod: PN

## 2024-09-03 PROCEDURE — 85027 COMPLETE CBC AUTOMATED: CPT | Mod: PN

## 2024-09-03 PROCEDURE — 1159F MED LIST DOCD IN RCRD: CPT | Mod: CPTII,S$GLB,,

## 2024-09-03 PROCEDURE — 1126F AMNT PAIN NOTED NONE PRSNT: CPT | Mod: CPTII,S$GLB,,

## 2024-09-03 PROCEDURE — 1101F PT FALLS ASSESS-DOCD LE1/YR: CPT | Mod: CPTII,S$GLB,,

## 2024-09-03 PROCEDURE — 25000003 PHARM REV CODE 250: Mod: PN

## 2024-09-03 PROCEDURE — 3078F DIAST BP <80 MM HG: CPT | Mod: CPTII,S$GLB,,

## 2024-09-03 PROCEDURE — 96375 TX/PRO/DX INJ NEW DRUG ADDON: CPT | Mod: PN

## 2024-09-03 PROCEDURE — 85007 BL SMEAR W/DIFF WBC COUNT: CPT | Mod: PN

## 2024-09-03 PROCEDURE — 96367 TX/PROPH/DG ADDL SEQ IV INF: CPT | Mod: PN

## 2024-09-03 PROCEDURE — 96411 CHEMO IV PUSH ADDL DRUG: CPT | Mod: PN

## 2024-09-03 PROCEDURE — 99999 PR PBB SHADOW E&M-EST. PATIENT-LVL V: CPT | Mod: PBBFAC,,,

## 2024-09-03 PROCEDURE — 3074F SYST BP LT 130 MM HG: CPT | Mod: CPTII,S$GLB,,

## 2024-09-03 PROCEDURE — 96413 CHEMO IV INFUSION 1 HR: CPT | Mod: PN

## 2024-09-03 PROCEDURE — 96415 CHEMO IV INFUSION ADDL HR: CPT | Mod: PN

## 2024-09-03 PROCEDURE — 3066F NEPHROPATHY DOC TX: CPT | Mod: CPTII,S$GLB,,

## 2024-09-03 PROCEDURE — 1160F RVW MEDS BY RX/DR IN RCRD: CPT | Mod: CPTII,S$GLB,,

## 2024-09-03 PROCEDURE — 3288F FALL RISK ASSESSMENT DOCD: CPT | Mod: CPTII,S$GLB,,

## 2024-09-03 PROCEDURE — 4010F ACE/ARB THERAPY RXD/TAKEN: CPT | Mod: CPTII,S$GLB,,

## 2024-09-03 PROCEDURE — 96368 THER/DIAG CONCURRENT INF: CPT | Mod: PN

## 2024-09-03 PROCEDURE — 96417 CHEMO IV INFUS EACH ADDL SEQ: CPT | Mod: PN

## 2024-09-03 PROCEDURE — 99214 OFFICE O/P EST MOD 30 MIN: CPT | Mod: S$GLB,,,

## 2024-09-03 PROCEDURE — 3044F HG A1C LEVEL LT 7.0%: CPT | Mod: CPTII,S$GLB,,

## 2024-09-03 PROCEDURE — 3008F BODY MASS INDEX DOCD: CPT | Mod: CPTII,S$GLB,,

## 2024-09-03 PROCEDURE — 96416 CHEMO PROLONG INFUSE W/PUMP: CPT | Mod: PN

## 2024-09-03 PROCEDURE — 3061F NEG MICROALBUMINURIA REV: CPT | Mod: CPTII,S$GLB,,

## 2024-09-03 PROCEDURE — 36415 COLL VENOUS BLD VENIPUNCTURE: CPT | Mod: PN

## 2024-09-03 PROCEDURE — G2211 COMPLEX E/M VISIT ADD ON: HCPCS | Mod: S$GLB,,,

## 2024-09-03 RX ORDER — DIPHENHYDRAMINE HYDROCHLORIDE 50 MG/ML
50 INJECTION INTRAMUSCULAR; INTRAVENOUS ONCE AS NEEDED
Status: CANCELLED | OUTPATIENT
Start: 2024-09-03

## 2024-09-03 RX ORDER — PROCHLORPERAZINE EDISYLATE 5 MG/ML
5 INJECTION INTRAMUSCULAR; INTRAVENOUS ONCE AS NEEDED
Status: DISCONTINUED | OUTPATIENT
Start: 2024-09-03 | End: 2024-09-03 | Stop reason: HOSPADM

## 2024-09-03 RX ORDER — EPINEPHRINE 0.3 MG/.3ML
0.3 INJECTION SUBCUTANEOUS ONCE AS NEEDED
Status: DISCONTINUED | OUTPATIENT
Start: 2024-09-03 | End: 2024-09-03 | Stop reason: HOSPADM

## 2024-09-03 RX ORDER — DIPHENHYDRAMINE HYDROCHLORIDE 50 MG/ML
50 INJECTION INTRAMUSCULAR; INTRAVENOUS ONCE AS NEEDED
Status: DISCONTINUED | OUTPATIENT
Start: 2024-09-03 | End: 2024-09-03 | Stop reason: HOSPADM

## 2024-09-03 RX ORDER — FLUOROURACIL 50 MG/ML
400 INJECTION, SOLUTION INTRAVENOUS
Status: CANCELLED | OUTPATIENT
Start: 2024-09-03

## 2024-09-03 RX ORDER — SODIUM CHLORIDE 0.9 % (FLUSH) 0.9 %
10 SYRINGE (ML) INJECTION
Status: CANCELLED | OUTPATIENT
Start: 2024-09-03

## 2024-09-03 RX ORDER — EPINEPHRINE 0.3 MG/.3ML
0.3 INJECTION SUBCUTANEOUS ONCE AS NEEDED
Status: CANCELLED | OUTPATIENT
Start: 2024-09-03

## 2024-09-03 RX ORDER — ATROPINE SULFATE 0.4 MG/ML
0.4 INJECTION, SOLUTION ENDOTRACHEAL; INTRAMEDULLARY; INTRAMUSCULAR; INTRAVENOUS; SUBCUTANEOUS ONCE AS NEEDED
Status: COMPLETED | OUTPATIENT
Start: 2024-09-03 | End: 2024-09-03

## 2024-09-03 RX ORDER — HEPARIN 100 UNIT/ML
500 SYRINGE INTRAVENOUS
Status: DISCONTINUED | OUTPATIENT
Start: 2024-09-03 | End: 2024-09-03 | Stop reason: HOSPADM

## 2024-09-03 RX ORDER — SODIUM CHLORIDE 0.9 % (FLUSH) 0.9 %
10 SYRINGE (ML) INJECTION
Status: CANCELLED | OUTPATIENT
Start: 2024-09-04

## 2024-09-03 RX ORDER — HEPARIN 100 UNIT/ML
500 SYRINGE INTRAVENOUS
Status: CANCELLED | OUTPATIENT
Start: 2024-09-03

## 2024-09-03 RX ORDER — PROCHLORPERAZINE EDISYLATE 5 MG/ML
5 INJECTION INTRAMUSCULAR; INTRAVENOUS ONCE AS NEEDED
Status: CANCELLED | OUTPATIENT
Start: 2024-09-03

## 2024-09-03 RX ORDER — FLUOROURACIL 50 MG/ML
400 INJECTION, SOLUTION INTRAVENOUS
Status: COMPLETED | OUTPATIENT
Start: 2024-09-03 | End: 2024-09-03

## 2024-09-03 RX ORDER — HEPARIN 100 UNIT/ML
500 SYRINGE INTRAVENOUS
Status: CANCELLED | OUTPATIENT
Start: 2024-09-04

## 2024-09-03 RX ORDER — PROCHLORPERAZINE EDISYLATE 5 MG/ML
5 INJECTION INTRAMUSCULAR; INTRAVENOUS ONCE AS NEEDED
Status: CANCELLED | OUTPATIENT
Start: 2024-09-04

## 2024-09-03 RX ORDER — SODIUM CHLORIDE 0.9 % (FLUSH) 0.9 %
10 SYRINGE (ML) INJECTION
Status: DISCONTINUED | OUTPATIENT
Start: 2024-09-03 | End: 2024-09-03 | Stop reason: HOSPADM

## 2024-09-03 RX ORDER — ATROPINE SULFATE 0.4 MG/ML
0.4 INJECTION, SOLUTION ENDOTRACHEAL; INTRAMEDULLARY; INTRAMUSCULAR; INTRAVENOUS; SUBCUTANEOUS ONCE AS NEEDED
Status: CANCELLED | OUTPATIENT
Start: 2024-09-03

## 2024-09-03 RX ADMIN — DEXAMETHASONE SODIUM PHOSPHATE 0.25 MG: 10 INJECTION, SOLUTION INTRAMUSCULAR; INTRAVENOUS at 10:09

## 2024-09-03 RX ADMIN — FLUOROURACIL 635 MG: 50 INJECTION, SOLUTION INTRAVENOUS at 02:09

## 2024-09-03 RX ADMIN — ATROPINE SULFATE 0.4 MG: 0.4 INJECTION, SOLUTION INTRAVENOUS at 12:09

## 2024-09-03 RX ADMIN — OXALIPLATIN 135 MG: 100 INJECTION, SOLUTION, CONCENTRATE INTRAVENOUS at 10:09

## 2024-09-03 RX ADMIN — SODIUM CHLORIDE 260 MG: 9 INJECTION, SOLUTION INTRAVENOUS at 12:09

## 2024-09-03 RX ADMIN — FLUOROURACIL 3815 MG: 50 INJECTION, SOLUTION INTRAVENOUS at 02:09

## 2024-09-03 RX ADMIN — LEUCOVORIN CALCIUM 635 MG: 350 INJECTION, POWDER, LYOPHILIZED, FOR SUSPENSION INTRAMUSCULAR; INTRAVENOUS at 12:09

## 2024-09-03 RX ADMIN — SODIUM CHLORIDE: 9 INJECTION, SOLUTION INTRAVENOUS at 12:09

## 2024-09-03 RX ADMIN — DEXTROSE MONOHYDRATE: 5 INJECTION, SOLUTION INTRAVENOUS at 10:09

## 2024-09-03 NOTE — PLAN OF CARE
Problem: Adult Inpatient Plan of Care  Goal: Patient-Specific Goal (Individualized)  Outcome: Progressing  Flowsheets (Taken 9/3/2024 1119)  Individualized Care Needs: recliner, warm blanket, dim lights, water  Anxieties, Fears or Concerns: neuropathy     Problem: Fatigue  Goal: Improved Activity Tolerance  Intervention: Promote Improved Energy  Flowsheets (Taken 9/3/2024 1119)  Fatigue Management: frequent rest breaks encouraged  Sleep/Rest Enhancement:   noise level reduced   room darkened  Activity Management:   Ambulated -L4   Ambulated to bathroom - L4   Ambulated in [place] - L4  Environmental Support: calm environment promoted

## 2024-09-03 NOTE — PROGRESS NOTES
SW met with pt to provide support and a gas card today. Pt shared how she has been feeling. She has been dealing with diarrhea.  He doctor is aware and she has talked with RD today. No other needs identified at this time.     Bharati Fritz, JOSSUE  09/03/2024  1:39 PM

## 2024-09-03 NOTE — PROGRESS NOTES
PATIENT: Bessy Lamb  MRN: [id number]  DATE: 9/3/2024      Diagnosis:   1. Malignant neoplasm of pancreas, unspecified location of malignancy    2. 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therapy    3. Thrombocytopenia    4. Hypokalemia    5. Pancreatic insufficiency    6. Hypothyroidism, unspecified type    7. Type 2 diabetes mellitus without complication, without long-term current use of insulin          Chief Complaint: Malignant neoplasm of pancreas, unspecified location of mal          Subjective:    Interval History: Ms. Lamb is a 64 y.o. female who returns for follow up for review of labs and treatment. Pt reports feeling well today. Pt reports neuropathy to hands and lower extremities after treatment that resolves approx. 1 week after onset. Also endorses diarrhea after last treatment which has since resolved.   Denies fever, chills, sob, cp, palpitations, swelling, fatigue, numbness, tingling, n/v, diarrhea, constipation, abdominal pain, new bumps, lumps, bleeding, bruising.     Oncologic History:   Per Record  [facility] tissue testing showed TP53, KRAS p.G12D, CDKN2A, CDKN2B mutations.  PD-L1 was 15%.  Pharmacogenomic testing on 7/12/24 showed UGT1A1 *1/*28 mutation.   he patient initially underwent a CXR on 11/13/23 fur a URI which showed possible subtle pulmonary nodules.  CT chest 12/01/2023 showed a cluster of ill-defined centrilobular ground-glass opacity he inferior right and left upper lobes as well as a 1.2 cm ground-glass opacity in the superior segment of the left lower lobe; solid 3 mm pulmonary nodule in the right upper lobe; subcentimeter right hepatic lobe hypodensity likely representing a cyst.  The patient underwent surveillance CT chest on 05/21/2024 showing a 3.7 x 2.7 pancreatic body mass in the pancreatic tail atrophy suspicious of pancreatic malignancy as well as and unchanged benign 3 mm nodule in the right upper lobe.  MRI abdomen on 06/30/2024 showed hypoenhancing mass centered in the  proximal pancreatic body measuring 2.9 x 2 cm with abutment and possible encasement of the distal splenic vein.  EUS was performed on 07/05/2024 mass in the pancreatic body stage T4 N0 M0 by endo sonographic criteria.  Pathology from biopsy of the stomach showed mild focally moderate chronic gastritis with no evidence of the H pylori.  Stomach polyp which was removed code hyperplastic polyps with chronic inflammation.  Pancreatic biopsy showed adenocarcinoma.  CT of the chest, abdomen, and pelvis on 07/09/2024 showed a 9 mm lesion in the right hepatic lobe previously characterized as cysts; mass in the pancreatic body measuring 4.4 x 3.7 cm with diffuse pancreatic ductal dilatation measuring 8 mm:  Weaning vein at the portal confluence occluded with reconstitution via collaterals.  The mass abuts the portal vein by less than 180° but does not case the proximal splenic artery remains patent.  Also noted was a pancreatic lymph node measuring 0.7 cm.              The patient's case was discussed at tumor board on 07/17/2024 with recommendation for proceeding with the chemotherapy.  Patient had port placement on 07/18/2024     Oncology History   Malignant neoplasm of pancreas   7/12/2024 Initial Diagnosis    Pancreatic cancer     7/13/2024 Cancer Staged    Staging form: Exocrine Pancreas, AJCC 8th Edition  - Clinical: Stage III (cT4, cN0, cM0)     7/22/2024 -  Chemotherapy    Treatment Summary   Plan Name: OP GI FOLFIRINOX (oxaliplatin, leucovorin, irinotecan, fluorouracil) Q2W   Treatment Goal: Curative  Status: Active  Start Date: 7/22/2024  End Date: 7/10/2025 (Planned)  Provider: Rajat Hunt MD  Chemotherapy: fluorouraciL injection 500 mg, 515 mg (100 % of original dose 320 mg/m2), Intravenous, Clinic/HOD 1 time, 3 of 26 cycles  Dose modification: 320 mg/m2 (original dose 320 mg/m2, Cycle 1, Reason: MD Discretion, Comment: Pharamcogenomic)  Administration: 500 mg (7/22/2024), 640 mg (8/5/2024), 635 mg  (2024)  irinotecan (CAMPTOSAR) 260 mg in 0.9% NaCl 578 mL chemo infusion, 266 mg (100 % of original dose 165 mg/m2), Intravenous, Clinic/HOD 1 time, 3 of 26 cycles  Dose modification: 165 mg/m2 (original dose 165 mg/m2, Cycle 1, Reason: Other (see comments), Comment: Waiting for pharmacogenomic panel), 165 mg/m2 (original dose 165 mg/m2, Cycle 2, Reason: Other (see comments), Comment: UGT Mutation)  Administration: 260 mg (2024), 260 mg (2024), 260 mg (2024)  oxaliplatin (ELOXATIN) 85 mg/m2 = 137 mg in D5W 592.4 mL chemo infusion, 85 mg/m2 = 137 mg, Intravenous, Clinic/HOD 1 time, 3 of 26 cycles  Administration: 137 mg (2024), 136 mg (2024), 135 mg (2024)  fluorouracil (Adrucil) 3,000 mg in 0.9% NaCl 100 mL chemo infusion, 3,090 mg (100 % of original dose 1,920 mg/m2), Intravenous, Over 46 hours, 3 of 26 cycles  Dose modification: 1,920 mg/m2 (original dose 1,920 mg/m2, Cycle 1, Reason: MD Discretion, Comment: Pharmcogenomic panel pending)  Administration: 3,000 mg (2024), 3,840 mg (2024), 3,815 mg (2024)         Past Medical History:   Past Medical History:   Diagnosis Date    Arthritis, lumbar spine     hands    Diabetes mellitus     General anesthetics causing adverse effect in therapeutic use     following breast rediuct, hard time awakening  also had anxiety rxn to lidocain in dental ofc    GERD (gastroesophageal reflux disease)     Hypothyroidism 10/08/2014    Maternal anesthesia complication     epidural for 1st child went up instead of down; required intubation    Obesity (BMI 30-39.9) 10/08/2014    Thyroid disease     Thyroid nodule 10/08/2014       Past Surgical HIstory:   Past Surgical History:   Procedure Laterality Date    BREAST BIOPSY Left     b9    BREAST SURGERY      Reduction cause of a mass in left breast    c-sections x2       SECTION  3/26/81 & 85    Birth of two girls    COLONOSCOPY  2012         COLONOSCOPY N/A 2018     Procedure: COLONOSCOPY;  Surgeon: Francisco Mcclain MD;  Location: [facility];  Service: Endoscopy;  Laterality: N/A;    COLONOSCOPY N/A 10/24/2023    Procedure: COLONOSCOPY;  Surgeon: Francisco Mcclain MD;  Location: [facility];  Service: Endoscopy;  Laterality: N/A;    ENDOSCOPIC ULTRASOUND OF UPPER GASTROINTESTINAL TRACT Left 7/5/2024    Procedure: ULTRASOUND, UPPER GI TRACT, ENDOSCOPIC;  Surgeon: Andrew Gordon MD;  Location: [facility];  Service: Endoscopy;  Laterality: Left;    ESOPHAGOGASTRODUODENOSCOPY N/A 7/5/2024    Procedure: EGD (ESOPHAGOGASTRODUODENOSCOPY);  Surgeon: Andrew Gordon MD;  Location: [facility];  Service: Endoscopy;  Laterality: N/A;    hysteroscopy with polypectomy      INCISIONAL BREAST BIOPSY Left 1996    benign; done at same time as reduction    INSERTION OF TUNNELED CENTRAL VENOUS CATHETER (CVC) WITH SUBCUTANEOUS PORT N/A 7/18/2024    Procedure: [id number];  Surgeon: Blanca Dillard MD;  Location: [facility];  Service: General;  Laterality: N/A;    TOTAL REDUCTION MAMMOPLASTY Bilateral 1996       Family History:   Family History   Problem Relation Name Age of Onset    Brain cancer Mother Ravi Tolbert     Early death Mother Ravi Tolbert 51        Passed at 51    Cancer Mother Ravi Tolbert         Brain tumor    Arthritis Mother Ravi Tolbert     Diabetes Father Ck holbrookent         Type 2    Cirrhosis Father Ck holbrookent     Cancer Father Ck ankit tolbert         Bone    COPD Father Ck pham tomasz         Smoker    Early death Father Ck tolbert         Passed at 64    Lung cancer Father Ck pham tomasz     Heart disease Sister Mamta (dianna)wescovicsalima         Congestive heart failure (passed 2/11/18    Hypertension Sister Mamta (dianna)wescovich     Kidney disease Sister Mamta (dianna)wescovich         Doc removed when she was a child     Hypertension Sister      Depression Sister Ravi (johnna) nicolette Mauricio ( sister)         Due to loss of her 27 year old son    Early death Sister Ravi (johnna) nicolette Mauricio ( sister)         Pulmonary hypertension, cirrhosis of the liver(passed 7/14/2007   Age 57    Heart disease Brother John Choudhury ( passed )         Heart attack    Hypertension Brother John Choudhury ( passed )     Heart disease Brother Juan Francisco Choudhury         Heart  blockage    Heart disease Brother Bhanu Choudhury         Heart attack (passed)    Diabetes Brother Bhanu Choudhury     Macular degeneration Brother Bhanu Choudhury     Breast cancer Maternal Aunt  30    Breast cancer Paternal Aunt  40    Breast cancer Paternal Aunt  40    Ovarian cancer Neg Hx         Social History:  reports that she has never smoked. She has never used smokeless tobacco. She reports current alcohol use. She reports that she does not use drugs.    Allergies:  Review of patient's allergies indicates:   Allergen Reactions    Duricef [cefadroxil] Hives    Eggs [egg derived] Anaphylaxis and Hives    Cat/feline products Itching    Dairycare [lactobacillus acidoph-lactase] Itching    Dog dander Itching    Wheat containing prod Hives       Medications:  Current Outpatient Medications   Medication Sig Dispense Refill    acetaminophen (TYLENOL) 325 MG tablet Take 325 mg by mouth every 6 (six) hours as needed for Pain.      ALPRAZolam (XANAX) 0.5 MG tablet Take 1 tablet (0.5 mg total) by mouth On call Procedure for Anxiety. 2 tablet 0    blood-glucose meter Misc Use as directed 1 each prn    calcium carbonate (OS-ISH) 600 mg calcium (1,500 mg) Tab Take 1,200 mg by mouth 2 (two) times daily with meals.      fluticasone propionate (FLONASE) 50 mcg/actuation nasal spray 1 spray (50 mcg total) by Each Nostril route once daily. 18.2 mL 0    levothyroxine (SYNTHROID) 50 MCG tablet Take 1 tablet (50 mcg total) by mouth once daily. 90 tablet 1    lipase-protease-amylase  24,000-76,000-120,000 units (CREON) 24,000-76,000 -120,000 unit capsule Take 2 capsules by mouth 3 (three) times daily with meals. 180 capsule 11    loperamide (IMODIUM) 2 mg capsule Take 2 tablets (4mg) by mouth after first loose stool, 1 tablet every 2 hours until diarrhea free for 12 hours. May take 2 tablets (4mg) by mouth every 4 hours at night. May require more than the package labeling maximum dose of 16mg/day. 30 capsule 11    loratadine (CLARITIN) 10 mg tablet Take 1 tablet (10 mg total) by mouth every morning. 30 tablet 11    losartan (COZAAR) 25 MG tablet TAKE 1 TABLET (25 MG TOTAL) BY MOUTH ONCE DAILY 90 tablet 3    meclizine (ANTIVERT) 25 mg tablet Take 1 tablet (25 mg total) by mouth 3 (three) times daily as needed for Dizziness. 30 tablet 0    mupirocin (BACTROBAN) 2 % ointment Apply topically 3 (three) times daily.      OLANZapine (ZYPREXA) 5 MG tablet Take 1 tablet by mouth nightly on days 1-3 of each chemotherapy cycle. 6 tablet 11    potassium chloride SA (K-DUR,KLOR-CON) 20 MEQ tablet Take 2 tablets (40 mEq total) by mouth once daily. 60 tablet 11    simvastatin (ZOCOR) 10 MG tablet Take 1 tablet (10 mg total) by mouth every evening. 90 tablet 3    SYNJARDY XR 10-1,000 mg TBph TAKE ONE TABLET BY MOUTH ONCE DAILY 30 tablet 5    TRUE METRIX GLUCOSE TEST STRIP Strp USE 1 STRIP ONCE DAILY TO TEST BLOOD GLUCOSE (50 DAY SUPPLY) 100 each 3    TRUEPLUS LANCETS 33 gauge Misc USE AS DIRECTED TO TEST BLOOD SUGAR ONCE DAILY FOR UP  USES 100 each 2    VITAMIN D2 1,250 mcg (50,000 unit) capsule TAKE 1 CAPSULE (50,000 UNITS TOTAL) BY MOUTH TWICE A WEEK. 24 capsule 3    famotidine (PEPCID) 40 MG tablet TAKE 1 TABLET (40 MG TOTAL) BY MOUTH ONCE DAILY. 30 tablet 11    HYDROcodone-acetaminophen (NORCO) 5-325 mg per tablet Take 1 tablet by mouth every 6 (six) hours as needed. (Patient not taking: Reported on 9/3/2024) 10 tablet 0    prochlorperazine (COMPAZINE) 5 MG tablet Take 1 tablet (5 mg total) by mouth  "every 6 (six) hours as needed for Nausea. (Patient not taking: Reported on 9/3/2024) 20 tablet 5     No current facility-administered medications for this visit.     Facility-Administered Medications Ordered in Other Visits   Medication Dose Route Frequency Provider Last Rate Last Admin    alteplase injection 2 mg  2 mg Intra-Catheter PRN Arash Fuchs, ADI        diphenhydrAMINE injection 50 mg  50 mg Intravenous Once PRN Arash Fuchs, ADI        EPINEPHrine (EPIPEN) 0.3 mg/0.3 mL pen injection 0.3 mg  0.3 mg Intramuscular Once PRN Arash Fuchs, ADI        heparin, porcine (PF) 100 unit/mL injection flush 500 Units  500 Units Intravenous PRN Arash Fuchs, ADI        hydrocortisone sodium succinate injection 100 mg  100 mg Intravenous Once PRN Arash Fuchs, ADI        prochlorperazine injection Soln 5 mg  5 mg Intravenous Once PRN Arash Fuchs, NP        sodium chloride 0.9% flush 10 mL  10 mL Intravenous PRN Arash Fuchs, ADI           Review of Systems   Constitutional:  Negative for appetite change and unexpected weight change.   HENT:  Negative for mouth sores.    Eyes:  Negative for visual disturbance.   Respiratory:  Negative for cough and shortness of breath.    Cardiovascular:  Negative for chest pain and leg swelling.   Gastrointestinal:  Negative for abdominal pain and diarrhea.   Genitourinary:  Negative for frequency.   Musculoskeletal:  Negative for back pain.   Skin:  Negative for rash.   Neurological:  Negative for headaches.   Hematological:  Negative for adenopathy.   Psychiatric/Behavioral:  The patient is not nervous/anxious.        ECOG Performance Status:   ECOG SCORE             Objective:      Vitals:   Vitals:    09/03/24 0816   BP: 122/71   BP Location: Right arm   Patient Position: Sitting   BP Method: Medium (Automatic)   Pulse: 88   Resp: 16   Temp: 97.1 °F (36.2 °C)   TempSrc: Temporal   SpO2: 98%   Weight: 59 kg (130 lb 1.1 oz)   Height: 5' 1" (1.549 m)       BMI: Body mass index is " 24.58 kg/m².    Physical Exam  HENT:      Head: Normocephalic.      Nose: Nose normal.      Mouth/Throat:      Mouth: Mucous membranes are moist.      Pharynx: Oropharynx is clear.   Eyes:      Pupils: Pupils are equal, round, and reactive to light.   Cardiovascular:      Rate and Rhythm: Normal rate and regular rhythm.      Heart sounds: Normal heart sounds.   Pulmonary:      Effort: Pulmonary effort is normal.      Breath sounds: Normal breath sounds.   Abdominal:      General: Bowel sounds are normal.   Musculoskeletal:         General: Normal range of motion.      Cervical back: Normal range of motion.   Skin:     General: Skin is warm and dry.   Neurological:      Mental Status: She is alert and oriented to person, place, and time.   Psychiatric:         Mood and Affect: Mood normal.         Behavior: Behavior normal.         Laboratory Data:  Component      Latest Ref Rng 8/30/2024   Magnesium       1.6 - 2.6 mg/dL 1.6        Component      Latest Ref Rng 8/30/2024   Sodium      136 - 145 mmol/L 141    Potassium      3.5 - 5.1 mmol/L 3.3 (L)    Chloride      95 - 110 mmol/L 103    CO2      23 - 29 mmol/L 26    Glucose      70 - 110 mg/dL 127 (H)    BUN      8 - 23 mg/dL 6 (L)    Creatinine      0.5 - 1.4 mg/dL 0.8    Calcium      8.7 - 10.5 mg/dL 9.5    PROTEIN TOTAL      6.0 - 8.4 g/dL 6.6    Albumin      3.5 - 5.2 g/dL 3.8    BILIRUBIN TOTAL      0.1 - 1.0 mg/dL 0.5    ALP      55 - 135 U/L 95    AST      10 - 40 U/L 18    ALT      10 - 44 U/L 14    eGFR      >60 mL/min/1.73 m^2 >60.0    Anion Gap      8 - 16 mmol/L 12       Component      Latest Ref Rng 8/30/2024   CA 19-9      0.0 - 40.0 U/mL 74.2 (H)       Component      Latest Ref Rng 9/3/2024   WBC      3.90 - 12.70 K/uL 17.74 (H)    RBC      4.00 - 5.40 M/uL 3.76 (L)    Hemoglobin      12.0 - 16.0 g/dL 11.2 (L)    Hematocrit      37.0 - 48.5 % 32.4 (L)    MCV      82 - 98 fL 86    MCH      27.0 - 31.0 pg 29.8    MCHC      32.0 - 36.0 g/dL 34.6    RDW       11.5 - 14.5 % 14.8 (H)    Platelet Count      150 - 450 K/uL 108 (L)    MPV      9.2 - 12.9 fL 9.1 (L)       Legend:  (H) High  (L) Low        Imaging: CXR 11/14/23     Cardiac silhouette and mediastinal contours are normal.  Lungs demonstrates no focal large opacity with possible subtle small nodules within the bilateral mid lungs.  No pleural effusion.  Osseous structures are intact.        CT chest 12/01/23     Base of Neck: No significant abnormality.     Thoracic soft tissues: Normal.     Aorta: Left-sided aortic arch.  No aneurysm and no significant atherosclerosis     Heart: Normal size. No effusion.     Pulmonary vasculature: Pulmonary arteries distribute normally.  There are four pulmonary veins.     Yuliya/Mediastinum: No pathologic richard enlargement.     Airways: Patent.     Lungs/Pleura: Biapical nonspecific fibronodular pleuroparenchymal scarring.  Solid 3 mm nodule in the right upper lobe (series 4, image 119).  Cluster ill-defined centrilobular ground-glass opacities within the posterior right and left upper lobes, not entirely specific but suspicious for small airways inflammation or infection.  There is a 1.2 cm ground-glass opacity in superior segment left lower lobe (series 4, image 282).  No consolidation, pleural effusion or pneumothorax.     Esophagus: Normal.     Upper Abdomen: There is an 8 mm hypodensity in the inferior right hepatic lobe, which measures fluid attenuation and probably represents a cyst.  Cholelithiasis.  No abnormal gallbladder wall thickening or pericholecystic fluid.     Bones: No acute fracture. No suspicious lytic or sclerotic lesions.     CT Chest 5/21/24  Base of Neck: No significant abnormality.     Thoracic soft tissues: Normal.     Aorta: Left-sided aortic arch. No aneurysm.     Heart: Normal size. No effusion. Mild aortic and coronary artery atherosclerotic calcification.     Pulmonary vasculature: Pulmonary arteries distribute normally.     Yuliya/Mediastinum: No  pathologic richard enlargement.     Esophagus: Normal.     Upper Abdomen: 3.7 x 2.7 cm pancreatic body mass with pancreatic tail atrophy suspicious for pancreatic malignancy. Recommend dedicated CT or MRI abdomen with IV contrast pancreatic protocol.     Airways: Patent.     Lungs/Pleura: Unchanged benign 3 mm nodule medial right upper lobe series 5, image 115. No new nodules. Mild bilateral apical pleuroparenchymal scarring again noted.     No airspace disease. Interval resolution of previously described ground-glass.     Bones: No acute fracture. No suspicious lytic or sclerotic lesions.     MRI Abdomen 6/30/24  Lower thorax: Heart is normal in size. No pleural fluid. No consolidation.     Liver: Normal size. No evidence of fatty infiltration. Small cyst in segment 6 measures 9 mm.     Gallbladder: Unremarkable.     Bile ducts: No intrahepatic or extrahepatic biliary dilatation.     Pancreas: Hypoenhancing mass centered in the proximal pancreatic body measures 2.9 x 2.0 cm (series 27, image 31). This lesion abuts and may encase the distal splenic vein which appears narrowed just proximal to the portal confluence. There is atrophy of the pancreatic body and tail distal to this lesion with associated dilatation of the main pancreatic duct measuring 5 mm. There is also atrophy of the pancreatic head without associated ductal dilatation.     Spleen: Normal size. No focal lesions.     Adrenal glands: Unremarkable.     Kidneys: No renal masses. No hydronephrosis.     GI: No evidence of bowel obstruction.     Mesentery/retroperitoneum: No pathologically enlarged lymph nodes.     Vasculature: Visualized aorta is normal in caliber. Portal vein is patent.     Body wall/extraperitoneal soft tissues: Unremarkable.     Bones: Visualized osseous structures demonstrate normal marrow signal.     Miscellaneous: No intraperitoneal free fluid.     CT C/A/P 7/09/24  Normal thyroid. Heart size is normal. No pericardial effusion. The  thoracic aorta and main pulmonary artery are normal in caliber. No enlarged axillary, mediastinal or hilar lymph nodes.     Central airways are clear. No pleural effusion, focal consolidation or pneumothorax.     The liver is normal in morphology and with smooth contour. Inferior right hepatic lobe 9 mm lesion previously characterized as a cyst. No new hepatic lesion.     Cholelithiasis. No gallbladder wall thickening or pericholecystic fluid. No intra or extrahepatic biliary ductal dilatation.     The spleen, kidneys and adrenal glands are normal.     Proximal pancreatic body irregular mass measures 4.4 x 3.7 cm (series 2, image 65) which when measured similarly to the prior chest CT is unchanged in size. The distal pancreatic body and tail are atrophic. Diffuse pancreatic ductal dilatation measuring 8 mm.     The portal and superior mesenteric veins are patent. 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 The mass abuts the portal by less than 180 degrees. The mass encases the proximal splenic artery which remains patent. The SMA is remote from the mass.     Peripancreatic lymph node measuring 0.7 cm in short axis (series 2, image 72). No enlarged retroperitoneal lymph nodes.     No bowel obstruction inflammatory change. The mass focally abuts the distal lesser curvature the stomach. No free fluid or free air.     Circumferential bladder wall thickening. Normal uterus. No adnexal mass. No enlarged pelvic lymph nodes.     Soft tissues are unremarkable. No acute or aggressive osseous abnormality.         Assessment:       1. Malignant neoplasm of pancreas, unspecified location of malignancy    2. 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therapy    3. Thrombocytopenia    4. Hypokalemia    5. Pancreatic insufficiency    6. Hypothyroidism, unspecified type    7. Type 2 diabetes mellitus without complication, without long-term current use of insulin             Plan:      Pancreatic Cancer - Patient with Stage III pancreatic cancer with concern for potential encasement of the distal celiac artery per discussion with Dr Valentin  -Will discuss pt at tumor board next week  -No sign of mets  - 94.5 on 6/17/24  -Case discussed at tumor board 7/17/24 with recommendation to proceed with chemo  -[facility] genetic testing negative for the genes tested  -[facility] tissue testing showed TP53, KRAS p.G12D, CDKN2A, CDKN2B mutations.  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  -Will need to keep irinotecan dose reduced at 165mg/mm2  -Will start on neulasta as ANC reduced  -Will proceed with FOLFIRINOX with 1st does of irinotecan and fluorouracil reduced pending results of pharmacogenomic testing  -8/16/24: ok to proceed with FOLFIRINOX on 8/19/24  9/3/24: We will proceed with FOLFIRINOX today.     Visit today included increased complexity associated with the care of the episodic problem (chemotherapy) addressed and managing the longitudinal care of the patient due to the serious and/or complex managed problem(s) pancreatic cancer.     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 - pt at increased risk of infection  -No current signs of infection  -Will monitor     Thrombocytopenia  - platelets 108k today.   -Will monitor     Hypokalemia   - 3.3 today  -Will continue on 40mEq potassium daily     Pancreatic Insufficiency   - Pt taking Creon  -Will monitor weight and GI symptoms     Hypothyroidism   - pt on synthroid  -Will monitror     DMII - PT currently on Synjardy        Lab Results   Component Value Date     HGBA1C 6.2 (H) 04/01/2024   -Possibly due to pancreatic cancer  -Will monitor        Med Onc Chart Routing      Follow up with physician    Follow up with [facility] . As scheduled with labs   Infusion scheduling note   ok to proceed with treatment   Injection scheduling note    Labs    Imaging    Pharmacy appointment    Other referrals                  Plan was  discussed with the patient at length, and she verbalized understanding. Bessy was given an opportunity to ask questions that were answered to her satisfaction, and she was advised to call in the interval if any problems or questions arise.    Assessment/Plan reviewed and approved by Dr Hunt    30 minutes were spent in coordination of patient's care, record review and counseling.    PAU Ramirez, FNP-C  Hematology & Oncology

## 2024-09-03 NOTE — TELEPHONE ENCOUNTER
Noted.  Pt now checked in for appt.    ----- Message from Della Cristina sent at 9/3/2024  8:07 AM CDT -----  Type: Needs Medical Advice  Who Called:  Patient   Symptoms (please be specific):    How long has patient had these symptoms:    Pharmacy name and phone #:    Best Call Back Number: [phone]  Additional Information: Patient called to inform she is down stairs getting labs done and will be up shortly.

## 2024-09-03 NOTE — PLAN OF CARE
Problem: Adult Inpatient Plan of Care  Goal: Plan of Care Review  Outcome: Progressing  Flowsheets (Taken 9/3/2024 1522)  Plan of Care Reviewed With:   patient   spouse   Pt tolerated Oxaliplatin, Irinotecan infusion and 5FU push well.   No adverse reaction noted, When getting ready to leave did notice some tightness to her hands. She stated this happened before but went away. She said it was already fading and felt better when she was leaving.  PAC flushed with NS and connected to pump per protocol.   5FU Pump is infusing. Pt aware of pump procedures.  Pt left clinic in no acute distress.

## 2024-09-04 ENCOUNTER — DOCUMENTATION ONLY (OUTPATIENT)
Dept: INFUSION THERAPY | Facility: HOSPITAL | Age: 64
End: 2024-09-04
Payer: MEDICARE

## 2024-09-04 ENCOUNTER — TELEPHONE (OUTPATIENT)
Dept: FAMILY MEDICINE | Facility: CLINIC | Age: 64
End: 2024-09-04
Payer: MEDICARE

## 2024-09-04 NOTE — PROGRESS NOTES
Late entry, RD visit on 09/03/24  ONCOLOGY NUTRITION   FOLLOW UP VISIT        Bessy Lamb is a 64 y.o. female.  DATE: 09/04/2024        Oncology Diagnosis: pancreatic cancer     REFERRAL FROM:   [] Integrative Oncology   [] Med/Heme Oncology  [] Radiation Oncology  [] Surgical Oncology   [] Infusion Nurse    [x] Routine Nutrition follow up    TREATMENT PLAN:   [] Full treatment plan pending  [x] Chemotherapy  [] Immunotherapy  [] Radiation  [] Concurrent  [] Surgery  [] Treatment complete/post-treatment    ANTHROPOMETRICS:  Wt Readings from Last 10 Encounters:   09/03/24 59 kg (130 lb 1.1 oz)   09/03/24 59 kg (130 lb 1.1 oz)   08/22/24 58.5 kg (128 lb 15.5 oz)   08/21/24 58.5 kg (128 lb 15.5 oz)   08/19/24 58.5 kg (128 lb 15.5 oz)   08/16/24 58.1 kg (128 lb 1.4 oz)   08/08/24 59.8 kg (131 lb 13.4 oz)   08/07/24 59.8 kg (131 lb 13.4 oz)   08/05/24 59.8 kg (131 lb 13.4 oz)   08/02/24 59 kg (130 lb 1.1 oz)      Weight Changes: has been stable    PHYSICAL EXAM:  Muscle Wasting Observed:  [] No Deficit   [x] Mild Deficit   [] Moderate   [] Severe    INTAKE:  [x] PO Intake [] TF Intake  Current Diet: regular diet  Dietary Patterns:  Eating meals/snacks as tolerated  [] Oral nutritional supplements:     SYMPTOMS/COMPLAINTS:   [] No nutritional concerns at current  [x] Diarrhea                    [] Constipation           [] Nausea                 [] Vomiting                [] Indigestion                [] Reflux              [] Poor Appetite            [] Anorexia                 [] Early Satiety         [] Gas                       [] Bloating                     [] Dry Mouth    [] Mucositis                   [] Mouth Sores           [] Poor Dentition      [] Difficulty chewing  [] Difficulty Swallowing   [] Pain with swallowing [] Change in taste      [] Change in smell   [] Pain (general)       [] Fatigue                      [] Sleep issues    [] Weight loss  [] other, please specify-     Nutrition  Pt on RA, no respiratory distress noted. Will continue to monitor.      Re-Assessment Risk: low to moderate    [x] Labs reviewed   [x] Meds reviewed    Education Provided:   [] No Education Needed at this time  [x] Diarrhea                                              [] Constipation                          [] Nausea/Vomiting  [] Mucositis                [] Dry Mouth    [] Dealing with changes in Taste/Smell  [] Dealing with Poor Appetite   [] Soft/moist Diet      [] Weight Loss/Gain     [] Weight Maintenance                           [] Indigestion/GERD                 [] Gas/Bloating          [] Foods High/ Low in specific nutrients [] Increasing Calories/Protein   [] Milkshake/Smoothies Recipes   [] Nutrition Supplements                        [] Increasing Fluid Intakes         [] Foods that fight cancer    [] Evidence bases resources                 [] Fermented Foods/Probiotics  [] Mediterranean/Plant Based Diet     [] Other, specify                                   [] Handouts provided      [] Samples provided     RD NOTE:  RD met with pt at chairside during infusion tx. Pt present for cycle 4 of folfirinox. Pt reports she did not feel great after her third treatment. Pt states she has diarrhea daily. Pt is eating 3 meals/day and taking 2 Creon with each meal. Pt enjoys potato chips and candy bars in between her meals and is not taking Creon with snacks. SARAH reached out to Dr. Hunt to confirm if pt needs to be taking Creon with snacks. BHAVNA Sorenson MA replied and said pt should be taking with snacks. DINESH Gurrola RD relayed this information to patient. Pt weight has been stable.    Pt eligible for TFP order. Food order filled by this RD- will provide to patient at end of infusion today.      RD Goals:   [x] Weight stable                  [] Weight gain                      [] Weight Loss                               [x] Continue adequate Kcal/protein   [] Increase Kcal/protein      [] Adjust Tube-feeding Rx   [] Tolerate Tube Feedings             [] Increase tube feedings to goal      [] Tolerate Supplements     [x] Symptom Improvement   [] Understand nutrition Education  [] Offer supportive visits   [] other, please specify    RECOMMENDATIONS:  Take 2 Creon with meals and 1 with snack  Increase fluid intake to a minimum of 64fl/oz    Follow up: PRN throughout tx    Gina Cobian, CARMELA, LDN  09/04/2024  10:02 AM

## 2024-09-04 NOTE — TELEPHONE ENCOUNTER
I spoke with the patient about this. Pt reports low blood pressure. Pt requesting an appointment with Dayan Jimenez NP. Appointment scheduled

## 2024-09-04 NOTE — PROGRESS NOTES
Results have been released via my chart. Please verify that these have been reviewed by the pt. If not, please call pt with results.       LIPID NORMAL ON MEDS-The cholesterol panel screening showed levels that are considered at target with the current medications. Continue meds & check annually.  URINE MICROALBUMIN NORMAL-The urine microalbumin screen for protein was normal.  Repeat this annually for screening for kidney damage due to diabetes.

## 2024-09-04 NOTE — TELEPHONE ENCOUNTER
----- Message from Christina Lino sent at 9/4/2024  1:29 PM CDT -----  Contact: Bessy  .Type:  Patient Returning Call    Who Called:Zelda  Who Left Message for Patient:nurse  Does the patient know what this is regarding?:yes  Would the patient rather a call back or a response via [facility]? Call back  Best Call Back Number:[phone]  Additional Information: na      Thanks  MARTY

## 2024-09-05 ENCOUNTER — INFUSION (OUTPATIENT)
Dept: INFUSION THERAPY | Facility: HOSPITAL | Age: 64
End: 2024-09-05
Attending: INTERNAL MEDICINE
Payer: MEDICARE

## 2024-09-05 VITALS
RESPIRATION RATE: 16 BRPM | TEMPERATURE: 98 F | SYSTOLIC BLOOD PRESSURE: 119 MMHG | DIASTOLIC BLOOD PRESSURE: 56 MMHG | HEART RATE: 77 BPM

## 2024-09-05 DIAGNOSIS — C25.9 MALIGNANT NEOPLASM OF PANCREAS, UNSPECIFIED LOCATION OF MALIGNANCY: Primary | ICD-10-CM

## 2024-09-05 PROCEDURE — 25000003 PHARM REV CODE 250: Mod: PN

## 2024-09-05 PROCEDURE — A4216 STERILE WATER/SALINE, 10 ML: HCPCS | Mod: PN

## 2024-09-05 RX ORDER — SODIUM CHLORIDE 0.9 % (FLUSH) 0.9 %
10 SYRINGE (ML) INJECTION
Status: DISCONTINUED | OUTPATIENT
Start: 2024-09-05 | End: 2024-09-05 | Stop reason: HOSPADM

## 2024-09-05 RX ORDER — PROCHLORPERAZINE EDISYLATE 5 MG/ML
5 INJECTION INTRAMUSCULAR; INTRAVENOUS ONCE AS NEEDED
Status: DISCONTINUED | OUTPATIENT
Start: 2024-09-05 | End: 2024-09-05 | Stop reason: HOSPADM

## 2024-09-05 RX ORDER — HEPARIN 100 UNIT/ML
500 SYRINGE INTRAVENOUS
Status: DISCONTINUED | OUTPATIENT
Start: 2024-09-05 | End: 2024-09-05 | Stop reason: HOSPADM

## 2024-09-05 RX ADMIN — SODIUM CHLORIDE, PRESERVATIVE FREE 10 ML: 5 INJECTION INTRAVENOUS at 02:09

## 2024-09-06 ENCOUNTER — INFUSION (OUTPATIENT)
Dept: INFUSION THERAPY | Facility: HOSPITAL | Age: 64
End: 2024-09-06
Attending: INTERNAL MEDICINE
Payer: MEDICARE

## 2024-09-06 ENCOUNTER — OFFICE VISIT (OUTPATIENT)
Dept: PRIMARY CARE CLINIC | Facility: CLINIC | Age: 64
End: 2024-09-06
Payer: MEDICARE

## 2024-09-06 VITALS
BODY MASS INDEX: 24.18 KG/M2 | TEMPERATURE: 97 F | HEART RATE: 83 BPM | RESPIRATION RATE: 20 BRPM | DIASTOLIC BLOOD PRESSURE: 58 MMHG | OXYGEN SATURATION: 97 % | SYSTOLIC BLOOD PRESSURE: 125 MMHG | HEIGHT: 61 IN | WEIGHT: 128.06 LBS

## 2024-09-06 VITALS
TEMPERATURE: 98 F | HEIGHT: 61 IN | DIASTOLIC BLOOD PRESSURE: 72 MMHG | SYSTOLIC BLOOD PRESSURE: 118 MMHG | HEART RATE: 84 BPM | BODY MASS INDEX: 24.18 KG/M2 | OXYGEN SATURATION: 97 % | WEIGHT: 128.06 LBS

## 2024-09-06 DIAGNOSIS — I10 PRIMARY HYPERTENSION: Primary | ICD-10-CM

## 2024-09-06 DIAGNOSIS — C25.9 MALIGNANT NEOPLASM OF PANCREAS, UNSPECIFIED LOCATION OF MALIGNANCY: Primary | ICD-10-CM

## 2024-09-06 DIAGNOSIS — E11.9 TYPE 2 DIABETES MELLITUS WITHOUT COMPLICATION, WITHOUT LONG-TERM CURRENT USE OF INSULIN: ICD-10-CM

## 2024-09-06 DIAGNOSIS — I70.0 AORTIC ATHEROSCLEROSIS: ICD-10-CM

## 2024-09-06 DIAGNOSIS — C25.9 MALIGNANT NEOPLASM OF PANCREAS, UNSPECIFIED LOCATION OF MALIGNANCY: ICD-10-CM

## 2024-09-06 DIAGNOSIS — E03.9 HYPOTHYROIDISM, UNSPECIFIED TYPE: ICD-10-CM

## 2024-09-06 PROCEDURE — 96372 THER/PROPH/DIAG INJ SC/IM: CPT | Mod: PN

## 2024-09-06 PROCEDURE — 99999 PR PBB SHADOW E&M-EST. PATIENT-LVL IV: CPT | Mod: PBBFAC,,, | Performed by: NURSE PRACTITIONER

## 2024-09-06 RX ORDER — LOSARTAN POTASSIUM 25 MG/1
25 TABLET ORAL
Start: 2024-09-06 | End: 2025-09-06

## 2024-09-06 NOTE — PROGRESS NOTES
Assessment/Plan:    Problem List Items Addressed This Visit          Cardiac/Vascular    Primary hypertension - Primary    Overview     Hypertension Medications               losartan (COZAAR) 25 MG tablet Take 1 tablet (25 mg total) by mouth once daily.        -at goal today  -continue lifestyle modification with low sodium diet and exercise   -discussed hypertension disease course and importance of treating high blood pressure  -patient understood and advised of risk of untreated blood pressure.  ER precautions were given   for symptoms of hypertensive urgency and emergency.    9/6/24  - since starting chemo blood pressures have been marginally low.  Will change losartan from daily to as needed for blood pressure above 120/70.   - after chemo is complete we will re-evaluate.          Relevant Medications    losartan (COZAAR) 25 MG tablet    RESOLVED: Aortic atherosclerosis       Oncology    Malignant neoplasm of pancreas    Overview     Followed Dr. Rajat Hunt with oncology   Currently undergoing chemo infusions.             Endocrine    Hypothyroidism    Overview     Chronic.   Continue levothyroxine   Lab Results   Component Value Date    TSH 2.414 07/22/2024             Type 2 diabetes mellitus without complication, without long-term current use of insulin    Overview     Diabetes Medications               SYNJARDY XR 10-1,000 mg TBph TAKE ONE TABLET BY MOUTH ONCE DAILY          -condition is currently controlled   -A1C every 3 months     Lab Results   Component Value Date    HGBA1C 6.2 (H) 04/01/2024      -see diabetic health maintenance listed below  -on statin: Yes  -on ACE-I/ARB: Yes  -counseling provided on importance of diabetic diet and medication compliance in order to treat diabetes  -discussed diabetes disease course and potential complications             Follow up in about 3 months (around 12/6/2024).    Dayan Jimenez  NP  _____________________________________________________________________________________________________________________________________________________      History of Present Illness    CHIEF COMPLAINT:  Ms. Lamb presents today for low blood pressure.     PANCREATIC CANCER:  She has stage 3 pancreatic cancer, currently undergoing chemotherapy. She has completed two months of a planned six-month treatment. Dr. Valentin, her oncologist, is considering an aggressive approach and may reassess after three months. 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 The main artery in the area is currently not compromised. Dr. Valentin is optimistic about possible surgical intervention if the tumor shrinks sufficiently after three months of chemotherapy. She reports side effects including balance issues described as feeling 'like a palsy' and heaviness in her arms and legs. She has been proactive in communicating symptoms.    BLOOD PRESSURE:  She reports low blood pressure readings in the 80s/50s and 90s/60s range, associated with dizziness. She has been advised to stop taking daily blood pressure medication and only take losartan as needed when readings are above 120/70.    BLOOD SUGAR:  She reports elevated blood sugar readings, particularly fasting glucose levels. She acknowledges that substantial evening meals tend to increase her sugar levels. Her recent hemoglobin A1C from June was 6.5. She continues to take diabetes medication as prescribed.    UPCOMING APPOINTMENTS:  She has an appointment with Dr. Welch on 10/09 for diabetes management.          No other new complaints today.  Remaining chronic conditions have been reviewed and remain stable. Further detail as stated above.     HM reviewed.     No recent changes to medical/surgical history.    Current Outpatient Medications on File Prior to Visit   Medication Sig Dispense Refill    acetaminophen (TYLENOL) 325 MG  tablet Take 325 mg by mouth every 6 (six) hours as needed for Pain.      blood-glucose meter Misc Use as directed 1 each prn    fluticasone propionate (FLONASE) 50 mcg/actuation nasal spray 1 spray (50 mcg total) by Each Nostril route once daily. 18.2 mL 0    levothyroxine (SYNTHROID) 50 MCG tablet Take 1 tablet (50 mcg total) by mouth once daily. 90 tablet 1    lipase-protease-amylase 24,000-76,000-120,000 units (CREON) 24,000-76,000 -120,000 unit capsule Take 2 capsules by mouth 3 (three) times daily with meals. 180 capsule 11    loperamide (IMODIUM) 2 mg capsule Take 2 tablets (4mg) by mouth after first loose stool, 1 tablet every 2 hours until diarrhea free for 12 hours. May take 2 tablets (4mg) by mouth every 4 hours at night. May require more than the package labeling maximum dose of 16mg/day. 30 capsule 11    loratadine (CLARITIN) 10 mg tablet Take 1 tablet (10 mg total) by mouth every morning. 30 tablet 11    meclizine (ANTIVERT) 25 mg tablet Take 1 tablet (25 mg total) by mouth 3 (three) times daily as needed for Dizziness. 30 tablet 0    mupirocin (BACTROBAN) 2 % ointment Apply topically 3 (three) times daily.      OLANZapine (ZYPREXA) 5 MG tablet Take 1 tablet by mouth nightly on days 1-3 of each chemotherapy cycle. 6 tablet 11    potassium chloride SA (K-DUR,KLOR-CON) 20 MEQ tablet Take 2 tablets (40 mEq total) by mouth once daily. 60 tablet 11    prochlorperazine (COMPAZINE) 5 MG tablet Take 1 tablet (5 mg total) by mouth every 6 (six) hours as needed for Nausea. 20 tablet 5    simvastatin (ZOCOR) 10 MG tablet Take 1 tablet (10 mg total) by mouth every evening. 90 tablet 3    SYNJARDY XR 10-1,000 mg TBph TAKE ONE TABLET BY MOUTH ONCE DAILY 30 tablet 5    TRUE METRIX GLUCOSE TEST STRIP Strp USE 1 STRIP ONCE DAILY TO TEST BLOOD GLUCOSE (50 DAY SUPPLY) 100 each 3    TRUEPLUS LANCETS 33 gauge Misc USE AS DIRECTED TO TEST BLOOD SUGAR ONCE DAILY FOR UP  USES 100 each 2    VITAMIN D2 1,250 mcg (50,000  unit) capsule TAKE 1 CAPSULE (50,000 UNITS TOTAL) BY MOUTH TWICE A WEEK. 24 capsule 3    [DISCONTINUED] losartan (COZAAR) 25 MG tablet TAKE 1 TABLET (25 MG TOTAL) BY MOUTH ONCE DAILY 90 tablet 3    [DISCONTINUED] ALPRAZolam (XANAX) 0.5 MG tablet Take 1 tablet (0.5 mg total) by mouth On call Procedure for Anxiety. (Patient not taking: Reported on 9/6/2024) 2 tablet 0    [DISCONTINUED] calcium carbonate (OS-ISH) 600 mg calcium (1,500 mg) Tab Take 1,200 mg by mouth 2 (two) times daily with meals. (Patient not taking: Reported on 9/6/2024)      [DISCONTINUED] famotidine (PEPCID) 40 MG tablet TAKE 1 TABLET (40 MG TOTAL) BY MOUTH ONCE DAILY. (Patient not taking: Reported on 9/6/2024) 30 tablet 11    [DISCONTINUED] HYDROcodone-acetaminophen (NORCO) 5-325 mg per tablet Take 1 tablet by mouth every 6 (six) hours as needed. (Patient not taking: Reported on 9/3/2024) 10 tablet 0     Current Facility-Administered Medications on File Prior to Visit   Medication Dose Route Frequency Provider Last Rate Last Admin    alteplase injection 2 mg  2 mg Intra-Catheter PRN Arash Fuchs NP        diphenhydrAMINE injection 50 mg  50 mg Intravenous Once PRN Arash Fuchs NP        EPINEPHrine (EPIPEN) 0.3 mg/0.3 mL pen injection 0.3 mg  0.3 mg Intramuscular Once PRN Arash Fuchs NP        heparin, porcine (PF) 100 unit/mL injection flush 500 Units  500 Units Intravenous PRN Arash Fuchs NP        hydrocortisone sodium succinate injection 100 mg  100 mg Intravenous Once PRN Arash Fuchs NP        prochlorperazine injection Soln 5 mg  5 mg Intravenous Once PRN Arash Fuchs NP        sodium chloride 0.9% flush 10 mL  10 mL Intravenous PRArash Grigsby NP        [DISCONTINUED] alteplase injection 2 mg  2 mg Intra-Catheter PRN Arash Fuchs NP        [DISCONTINUED] heparin, porcine (PF) 100 unit/mL injection flush 500 Units  500 Units Intravenous PRArash Grigsby NP        [DISCONTINUED] prochlorperazine injection Soln 5 mg  5 mg  "Intravenous Once PRN Arash Fuchs, ADI        [DISCONTINUED] sodium chloride 0.9% flush 10 mL  10 mL Intravenous PRN Arash Fuchs, NP   10 mL at 09/05/24 1417       Review of Systems   Constitutional: Negative.         Low blood pressure readings    HENT: Negative.     Eyes: Negative.    Respiratory: Negative.     Cardiovascular: Negative.    Gastrointestinal: Negative.    Endocrine: Negative.    Genitourinary: Negative.    Musculoskeletal: Negative.    Skin: Negative.    Allergic/Immunologic: Negative.    Neurological: Negative.    Hematological: Negative.    Psychiatric/Behavioral: Negative.         Vitals:    09/06/24 0953   BP: 118/72   Pulse: 84   Temp: 98.4 °F (36.9 °C)   SpO2: 97%   Weight: 58.1 kg (128 lb 1.4 oz)   Height: 5' 1" (1.549 m)       Wt Readings from Last 3 Encounters:   09/06/24 58.1 kg (128 lb 1.4 oz)   09/03/24 59 kg (130 lb 1.1 oz)   09/03/24 59 kg (130 lb 1.1 oz)       Physical Exam  Vitals and nursing note reviewed.   Constitutional:       Appearance: She is well-developed.   HENT:      Head: Normocephalic and atraumatic.   Eyes:      Conjunctiva/sclera: Conjunctivae normal.   Cardiovascular:      Rate and Rhythm: Normal rate and regular rhythm.      Heart sounds: Normal heart sounds.   Pulmonary:      Effort: Pulmonary effort is normal.      Breath sounds: Normal breath sounds.   Chest:       Musculoskeletal:         General: Normal range of motion.      Cervical back: Normal range of motion and neck supple.   Skin:     General: Skin is warm and dry.      Comments: Small red bumps noted to abdomen, breast, and back.    Neurological:      Mental Status: She is alert and oriented to person, place, and time.   Psychiatric:         Behavior: Behavior normal.         Thought Content: Thought content normal.         Judgment: Judgment normal.         Health Maintenance   Topic Date Due    Shingles Vaccine (1 of 2) Never done    Mammogram  07/17/2024    Foot Exam  09/25/2024    Hemoglobin A1c  " 12/17/2024    Eye Exam  03/05/2025    Lipid Panel  08/30/2025    Low Dose Statin  09/06/2025    DEXA Scan  08/21/2026    Colorectal Cancer Screening  10/24/2033    TETANUS VACCINE  02/28/2034    Hepatitis C Screening  Completed       This note was generated with the assistance of ambient listening technology. Verbal consent was obtained by the patient and accompanying visitor(s) for the recording of patient appointment to facilitate this note. I attest to having reviewed and edited the generated note for accuracy, though some syntax or spelling errors may persist. Please contact the author of this note for any clarification.

## 2024-09-09 ENCOUNTER — TELEPHONE (OUTPATIENT)
Dept: HEMATOLOGY/ONCOLOGY | Facility: CLINIC | Age: 64
End: 2024-09-09
Payer: MEDICARE

## 2024-09-13 ENCOUNTER — OFFICE VISIT (OUTPATIENT)
Dept: HEMATOLOGY/ONCOLOGY | Facility: CLINIC | Age: 64
End: 2024-09-13
Payer: MEDICARE

## 2024-09-13 ENCOUNTER — LAB VISIT (OUTPATIENT)
Dept: LAB | Facility: HOSPITAL | Age: 64
End: 2024-09-13
Attending: INTERNAL MEDICINE
Payer: MEDICARE

## 2024-09-13 ENCOUNTER — PATIENT MESSAGE (OUTPATIENT)
Dept: HEMATOLOGY/ONCOLOGY | Facility: CLINIC | Age: 64
End: 2024-09-13
Payer: MEDICARE

## 2024-09-13 VITALS
HEART RATE: 87 BPM | WEIGHT: 123.88 LBS | TEMPERATURE: 98 F | SYSTOLIC BLOOD PRESSURE: 115 MMHG | HEIGHT: 61 IN | OXYGEN SATURATION: 98 % | RESPIRATION RATE: 16 BRPM | BODY MASS INDEX: 23.39 KG/M2 | DIASTOLIC BLOOD PRESSURE: 71 MMHG

## 2024-09-13 DIAGNOSIS — C25.9 MALIGNANT NEOPLASM OF PANCREAS, UNSPECIFIED LOCATION OF MALIGNANCY: ICD-10-CM

## 2024-09-13 DIAGNOSIS — C25.9 MALIGNANT NEOPLASM OF PANCREAS, UNSPECIFIED LOCATION OF MALIGNANCY: Primary | ICD-10-CM

## 2024-09-13 DIAGNOSIS — K12.30 MUCOSITIS: ICD-10-CM

## 2024-09-13 DIAGNOSIS — T45.1X5A CHEMOTHERAPY INDUCED DIARRHEA: ICD-10-CM

## 2024-09-13 DIAGNOSIS — D69.6 THROMBOCYTOPENIA: ICD-10-CM

## 2024-09-13 DIAGNOSIS — K52.1 CHEMOTHERAPY INDUCED DIARRHEA: ICD-10-CM

## 2024-09-13 LAB
ALBUMIN SERPL BCP-MCNC: 3.8 G/DL (ref 3.5–5.2)
ALP SERPL-CCNC: 119 U/L (ref 55–135)
ALT SERPL W/O P-5'-P-CCNC: 16 U/L (ref 10–44)
ANION GAP SERPL CALC-SCNC: 12 MMOL/L (ref 8–16)
AST SERPL-CCNC: 20 U/L (ref 10–40)
BASOPHILS # BLD AUTO: ABNORMAL K/UL (ref 0–0.2)
BASOPHILS NFR BLD: 2 % (ref 0–1.9)
BILIRUB SERPL-MCNC: 0.6 MG/DL (ref 0.1–1)
BUN SERPL-MCNC: 6 MG/DL (ref 8–23)
CALCIUM SERPL-MCNC: 9.3 MG/DL (ref 8.7–10.5)
CANCER AG19-9 SERPL-ACNC: 43.7 U/ML (ref 0–40)
CHLORIDE SERPL-SCNC: 106 MMOL/L (ref 95–110)
CO2 SERPL-SCNC: 23 MMOL/L (ref 23–29)
CREAT SERPL-MCNC: 0.9 MG/DL (ref 0.5–1.4)
DIFFERENTIAL METHOD BLD: ABNORMAL
EOSINOPHIL # BLD AUTO: ABNORMAL K/UL (ref 0–0.5)
EOSINOPHIL NFR BLD: 0 % (ref 0–8)
ERYTHROCYTE [DISTWIDTH] IN BLOOD BY AUTOMATED COUNT: 15 % (ref 11.5–14.5)
EST. GFR  (NO RACE VARIABLE): >60 ML/MIN/1.73 M^2
GLUCOSE SERPL-MCNC: 206 MG/DL (ref 70–110)
HCT VFR BLD AUTO: 30.7 % (ref 37–48.5)
HGB BLD-MCNC: 10.8 G/DL (ref 12–16)
IMM GRANULOCYTES # BLD AUTO: ABNORMAL K/UL (ref 0–0.04)
IMM GRANULOCYTES NFR BLD AUTO: ABNORMAL % (ref 0–0.5)
LYMPHOCYTES # BLD AUTO: ABNORMAL K/UL (ref 1–4.8)
LYMPHOCYTES NFR BLD: 43 % (ref 18–48)
MAGNESIUM SERPL-MCNC: 1.9 MG/DL (ref 1.6–2.6)
MCH RBC QN AUTO: 29.7 PG (ref 27–31)
MCHC RBC AUTO-ENTMCNC: 35.2 G/DL (ref 32–36)
MCV RBC AUTO: 84 FL (ref 82–98)
METAMYELOCYTES NFR BLD MANUAL: 4 %
MONOCYTES # BLD AUTO: ABNORMAL K/UL (ref 0.3–1)
MONOCYTES NFR BLD: 6 % (ref 4–15)
MYELOCYTES NFR BLD MANUAL: 1 %
NEUTROPHILS NFR BLD: 44 % (ref 38–73)
NRBC BLD-RTO: 1 /100 WBC
PLATELET # BLD AUTO: 80 K/UL (ref 150–450)
PLATELET BLD QL SMEAR: ABNORMAL
PMV BLD AUTO: 9.9 FL (ref 9.2–12.9)
POTASSIUM SERPL-SCNC: 3.7 MMOL/L (ref 3.5–5.1)
PROT SERPL-MCNC: 6.8 G/DL (ref 6–8.4)
RBC # BLD AUTO: 3.64 M/UL (ref 4–5.4)
SODIUM SERPL-SCNC: 141 MMOL/L (ref 136–145)
TOXIC GRANULES BLD QL SMEAR: PRESENT
WBC # BLD AUTO: 6.92 K/UL (ref 3.9–12.7)

## 2024-09-13 PROCEDURE — 85027 COMPLETE CBC AUTOMATED: CPT | Mod: PN

## 2024-09-13 PROCEDURE — 80053 COMPREHEN METABOLIC PANEL: CPT | Mod: PN

## 2024-09-13 PROCEDURE — 86301 IMMUNOASSAY TUMOR CA 19-9: CPT

## 2024-09-13 PROCEDURE — 36415 COLL VENOUS BLD VENIPUNCTURE: CPT | Mod: PN

## 2024-09-13 PROCEDURE — 83735 ASSAY OF MAGNESIUM: CPT | Mod: PN

## 2024-09-13 PROCEDURE — 99999 PR PBB SHADOW E&M-EST. PATIENT-LVL V: CPT | Mod: PBBFAC,,, | Performed by: NURSE PRACTITIONER

## 2024-09-13 PROCEDURE — 85007 BL SMEAR W/DIFF WBC COUNT: CPT | Mod: PN

## 2024-09-13 RX ORDER — LOPERAMIDE HYDROCHLORIDE 2 MG/1
CAPSULE ORAL
Qty: 30 CAPSULE | Refills: 11 | Status: SHIPPED | OUTPATIENT
Start: 2024-09-13

## 2024-09-13 NOTE — PROGRESS NOTES
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    HGBA1C 6.2 (H) 04/01/2024   -Possibly due to pancreatic cancer  -Will monitor    Chemotherapy associated diarrhea  Use of Imodium - refill sent  Use of Creon, diet        Med Onc Chart Routing      Follow up with physician . F/u in 2 weeks with Dr. Hunt WITH CBC, CMP, MAGNESIUM prior   Follow up with [facility]    Infusion scheduling note   Need CBC on Monday to evaluate platelets prior to tx   Injection scheduling note    Labs    Imaging    Pharmacy appointment    Other referrals                  Plan was discussed with the patient at length, and she verbalized understanding. Bessy was given an opportunity to ask questions that were answered to her satisfaction, and she was advised to call in the interval if any problems or questions arise.    Assessment/Plan reviewed and approved by PAU Contreras, FNP-C  [facility]  45 minutes were spent in coordination of patient's care, record review and counseling.

## 2024-09-16 ENCOUNTER — INFUSION (OUTPATIENT)
Dept: INFUSION THERAPY | Facility: HOSPITAL | Age: 64
End: 2024-09-16
Attending: INTERNAL MEDICINE
Payer: MEDICARE

## 2024-09-16 ENCOUNTER — DOCUMENTATION ONLY (OUTPATIENT)
Dept: INFUSION THERAPY | Facility: HOSPITAL | Age: 64
End: 2024-09-16
Payer: MEDICARE

## 2024-09-16 VITALS
TEMPERATURE: 98 F | HEART RATE: 80 BPM | WEIGHT: 126.31 LBS | SYSTOLIC BLOOD PRESSURE: 113 MMHG | HEIGHT: 61 IN | RESPIRATION RATE: 16 BRPM | BODY MASS INDEX: 23.85 KG/M2 | DIASTOLIC BLOOD PRESSURE: 69 MMHG | OXYGEN SATURATION: 97 %

## 2024-09-16 DIAGNOSIS — C25.9 MALIGNANT NEOPLASM OF PANCREAS, UNSPECIFIED LOCATION OF MALIGNANCY: ICD-10-CM

## 2024-09-16 DIAGNOSIS — C25.9 MALIGNANT NEOPLASM OF PANCREAS, UNSPECIFIED LOCATION OF MALIGNANCY: Primary | ICD-10-CM

## 2024-09-16 DIAGNOSIS — D69.6 THROMBOCYTOPENIA: ICD-10-CM

## 2024-09-16 LAB
ANISOCYTOSIS BLD QL SMEAR: SLIGHT
BASOPHILS # BLD AUTO: ABNORMAL K/UL (ref 0–0.2)
BASOPHILS NFR BLD: 0 % (ref 0–1.9)
DIFFERENTIAL METHOD BLD: ABNORMAL
EOSINOPHIL # BLD AUTO: ABNORMAL K/UL (ref 0–0.5)
EOSINOPHIL NFR BLD: 0 % (ref 0–8)
ERYTHROCYTE [DISTWIDTH] IN BLOOD BY AUTOMATED COUNT: 17.2 % (ref 11.5–14.5)
HCT VFR BLD AUTO: 29.3 % (ref 37–48.5)
HGB BLD-MCNC: 10.4 G/DL (ref 12–16)
IMM GRANULOCYTES # BLD AUTO: ABNORMAL K/UL (ref 0–0.04)
IMM GRANULOCYTES NFR BLD AUTO: ABNORMAL % (ref 0–0.5)
LYMPHOCYTES # BLD AUTO: ABNORMAL K/UL (ref 1–4.8)
LYMPHOCYTES NFR BLD: 16 % (ref 18–48)
MCH RBC QN AUTO: 29.9 PG (ref 27–31)
MCHC RBC AUTO-ENTMCNC: 35.5 G/DL (ref 32–36)
MCV RBC AUTO: 84 FL (ref 82–98)
METAMYELOCYTES NFR BLD MANUAL: 9 %
MONOCYTES # BLD AUTO: ABNORMAL K/UL (ref 0.3–1)
MONOCYTES NFR BLD: 3 % (ref 4–15)
MYELOCYTES NFR BLD MANUAL: 1 %
NEUTROPHILS NFR BLD: 65 % (ref 38–73)
NEUTS BAND NFR BLD MANUAL: 6 %
NRBC BLD-RTO: 1 /100 WBC
PLATELET # BLD AUTO: 73 K/UL (ref 150–450)
PLATELET BLD QL SMEAR: ABNORMAL
PMV BLD AUTO: 9.5 FL (ref 9.2–12.9)
POLYCHROMASIA BLD QL SMEAR: ABNORMAL
RBC # BLD AUTO: 3.48 M/UL (ref 4–5.4)
TOXIC GRANULES BLD QL SMEAR: PRESENT
WBC # BLD AUTO: 18.39 K/UL (ref 3.9–12.7)

## 2024-09-16 PROCEDURE — 85007 BL SMEAR W/DIFF WBC COUNT: CPT | Mod: PN | Performed by: NURSE PRACTITIONER

## 2024-09-16 PROCEDURE — 36591 DRAW BLOOD OFF VENOUS DEVICE: CPT | Mod: PN

## 2024-09-16 PROCEDURE — 85027 COMPLETE CBC AUTOMATED: CPT | Mod: PN | Performed by: NURSE PRACTITIONER

## 2024-09-16 NOTE — PLAN OF CARE
Problem: Adult Inpatient Plan of Care  Goal: Patient-Specific Goal (Individualized)  Outcome: Progressing  Flowsheets (Taken 9/16/2024 1030)  Individualized Care Needs: Recliner, blanket  Anxieties, Fears or Concerns: None     Problem: Fatigue  Goal: Improved Activity Tolerance  Intervention: Promote Improved Energy  Flowsheets (Taken 9/16/2024 1030)  Fatigue Management:   activity schedule adjusted   paced activity encouraged  Sleep/Rest Enhancement: relaxation techniques promoted  Activity Management:   Ambulated -L4   Ambulated in lopez - L4  Environmental Support:   calm environment promoted   environmental consistency promoted     Problem: Adult Inpatient Plan of Care  Goal: Plan of Care Review  Outcome: Progressing  Flowsheets (Taken 9/16/2024 1030)  Plan of Care Reviewed With: patient  Tolerated treatment with no known distress.  Ambulated from infusion center with steady gait.

## 2024-09-16 NOTE — PROGRESS NOTES
Lab Results   Component Value Date    WBC 18.39 (H) 09/16/2024    HGB 10.4 (L) 09/16/2024    HCT 29.3 (L) 09/16/2024    MCV 84 09/16/2024    PLT 73 (L) 09/16/2024       Platelets down further from 80 to 73  Defer C5 x 1 week; repeat CBC, CMP, magnesium prior.

## 2024-09-16 NOTE — PROGRESS NOTES
CELIO met with pt at chairside for support. Pt said she probably will not be able to get her treatment today because of her labs. She said she is disappointed. She said she works her self up to come and then with a delay it delays when she will be able to have her scan. She anxiously wants to see what the scans show to see if chemo is working. SW validated her feelings and provided support.  Pt is also worried about her . He has a peg tube and she thinks it may be infected. She said  she may have to take him to the hospital because of this. He is seeing the NP in a few minutes and pt will make sure NP is aware of her concerns.     SW provided pt with a gas card. No other practice needs noted at this time.     Bharati Fritz, ANA MARIAW

## 2024-09-16 NOTE — PROGRESS NOTES
Oncology Nutrition   Chemotherapy Infusion Visit    Nutrition Follow Up   RD met with patient at chairside during infusion tx. At time of visit patient states she is likely not getting tx, waiting on word from provider d/t low platelets. Per chart review, patient did not get cleared.   RD offered patient TFP order but pt declined at this time.   Weight decreased but not significant at this time; 4# and 3% since new patient edu 7/19.    Wt Readings from Last 10 Encounters:   09/16/24 57.3 kg (126 lb 5.2 oz)   09/13/24 56.2 kg (123 lb 14.4 oz)   09/06/24 58.1 kg (128 lb 1.4 oz)   09/06/24 58.1 kg (128 lb 1.4 oz)   09/03/24 59 kg (130 lb 1.1 oz)   09/03/24 59 kg (130 lb 1.1 oz)   08/22/24 58.5 kg (128 lb 15.5 oz)   08/21/24 58.5 kg (128 lb 15.5 oz)   08/19/24 58.5 kg (128 lb 15.5 oz)   08/16/24 58.1 kg (128 lb 1.4 oz)       All other nutrition questions/concerns addressed as appropriate. Will continue to follow and monitor throughout treatment PRN.     Bernie Gurrola MS, RD, LDN  09/16/2024  1:39 PM

## 2024-09-19 NOTE — PROGRESS NOTES
PATIENT: Bessy Lamb  MRN: [id number]  DATE: 9/19/2024      Diagnosis:   No diagnosis found.          Chief Complaint: No chief complaint on file.      Oncologic History:      Oncologic History     Oncologic Treatment     Pathology           Subjective:    Initial History: Ms. Lamb is a 64 y.o. female with Arthritis, DMII, GERD, Hypotyroidism, Obesity, Thyroid disease who presents for pancreatic cancer.  Since the last clinic visit [facility] tissue testing showed TP53, KRAS p.G12D, CDKN2A, CDKN2B mutations.  PD-L1 was 15%.  Pharmacogenomic testing on 7/12/24 showed UGT1A1 *1/*28 mutation.  The patient denies CP, cough, SOB, abdominal pain, nausea, vomiting, constipation.  The patient denies fever, chills, night sweats, weight loss, new lumps or bumps, easy bruising or bleeding.    Prior History:   The patient initially underwent a CXR on 11/13/23 fur a URI which showed possible subtle pulmonary nodules.  CT chest 12/01/2023 showed a cluster of ill-defined centrilobular ground-glass opacity he inferior right and left upper lobes as well as a 1.2 cm ground-glass opacity in the superior segment of the left lower lobe; solid 3 mm pulmonary nodule in the right upper lobe; subcentimeter right hepatic lobe hypodensity likely representing a cyst.  The patient underwent surveillance CT chest on 05/21/2024 showing a 3.7 x 2.7 pancreatic body mass in the pancreatic tail atrophy suspicious of pancreatic malignancy as well as and unchanged benign 3 mm nodule in the right upper lobe.  MRI abdomen on 06/30/2024 showed hypoenhancing mass centered in the proximal pancreatic body measuring 2.9 x 2 cm with abutment and possible encasement of the distal splenic vein.  EUS was performed on 07/05/2024 mass in the pancreatic body stage T4 N0 M0 by endo sonographic criteria.  Pathology from biopsy of the stomach showed mild focally moderate chronic gastritis with no evidence of the H pylori.  Stomach polyp which was removed code  hyperplastic polyps with chronic inflammation.  Pancreatic biopsy showed adenocarcinoma.  CT of the chest, abdomen, and pelvis on 2024 showed a 9 mm lesion in the right hepatic lobe previously characterized as cysts; mass in the pancreatic body measuring 4.4 x 3.7 cm with diffuse pancreatic ductal dilatation measuring 8 mm:  Weaning vein at the portal confluence occluded with reconstitution via collaterals.  The mass abuts the portal vein by less than 180° but does not case the proximal splenic artery remains patent.  Also noted was a pancreatic lymph node measuring 0.7 cm.   The patient's case was discussed at tumor board on 2024 with recommendation for proceeding with the chemotherapy.  Patient had port placement on 2024    Past Medical History:   Past Medical History:   Diagnosis Date    Arthritis, lumbar spine     hands    Diabetes mellitus     General anesthetics causing adverse effect in therapeutic use     following breast rediuct, hard time awakening  also had anxiety rxn to lidocain in dental ofc    GERD (gastroesophageal reflux disease)     Hypothyroidism 10/08/2014    Maternal anesthesia complication     epidural for 1st child went up instead of down; required intubation    Obesity (BMI 30-39.9) 10/08/2014    Thyroid disease     Thyroid nodule 10/08/2014       Past Surgical HIstory:   Past Surgical History:   Procedure Laterality Date    BREAST BIOPSY Left     b9    BREAST SURGERY      Reduction cause of a mass in left breast    c-sections x2       SECTION  3/26/81 & 85    Birth of two girls    COLONOSCOPY  2012         COLONOSCOPY N/A 2018    Procedure: COLONOSCOPY;  Surgeon: Francisco Mcclain MD;  Location: [facility];  Service: Endoscopy;  Laterality: N/A;    COLONOSCOPY N/A 10/24/2023    Procedure: COLONOSCOPY;  Surgeon: Francisco Mcclain MD;  Location: [facility];  Service: Endoscopy;  Laterality: N/A;    ENDOSCOPIC ULTRASOUND OF UPPER GASTROINTESTINAL TRACT  Left 7/5/2024    Procedure: ULTRASOUND, UPPER GI TRACT, ENDOSCOPIC;  Surgeon: Andrew Gordon MD;  Location: [facility] ENDO;  Service: Endoscopy;  Laterality: Left;    ESOPHAGOGASTRODUODENOSCOPY N/A 7/5/2024    Procedure: EGD (ESOPHAGOGASTRODUODENOSCOPY);  Surgeon: Andrew Gordon MD;  Location: [facility] ENDO;  Service: Endoscopy;  Laterality: N/A;    hysteroscopy with polypectomy      INCISIONAL BREAST BIOPSY Left 1996    benign; done at same time as reduction    INSERTION OF TUNNELED CENTRAL VENOUS CATHETER (CVC) WITH SUBCUTANEOUS PORT N/A 7/18/2024    Procedure: [id number];  Surgeon: Blanca Dillard MD;  Location: [facility];  Service: General;  Laterality: N/A;    TOTAL REDUCTION MAMMOPLASTY Bilateral 1996       Family History:   Family History   Problem Relation Name Age of Onset    Brain cancer Mother Ravi Gill Maceymacario Tolbert     Early death Mother Ravi Gill Macey Tolbert 51        Passed at 51    Cancer Mother Ravi Gill Macey Tolbert         Brain tumor    Arthritis Mother Ravi Gill Macey Tomasz     Diabetes Father Ck pham tomasz         Type 2    Cirrhosis Father Ck pham tomasz     Cancer Father Ck ankit tolbert         Bone    COPD Father Ck ankit tolbert         Smoker    Early death Father Ck tolbert         Passed at 64    Lung cancer Father Ck pham tomasz     Heart disease Sister Mamta (dianna)wescovich         Congestive heart failure (passed 2/11/18    Hypertension Sister Mamta (dianna)wescovich     Kidney disease Sister Mamta (dianna)wescovich         Doc removed when she was a child    Hypertension Sister Mayank     Depression Sister Ravi (mayank) macey Luly ( sister)         Due to loss of her 27 year old son    Early death Sister Ravi (mayank) macey Luly ( sister)         Pulmonary hypertension, cirrhosis of the liver(passed 7/14/2007   Age 57    Heart disease Brother John Borden Macey (  passed )         Heart attack    Hypertension Brother John Choudhury ( passed )     Heart disease Brother Juan Francisco Choudhury         Heart  blockage    Heart disease Brother Bhanu Choudhury         Heart attack (passed)    Diabetes Brother Bhanu Choudhury     Macular degeneration Brother Bhanu Choudhury     Breast cancer Maternal Aunt  30    Breast cancer Paternal Aunt  40    Breast cancer Paternal Aunt  40    Ovarian cancer Neg Hx         Social History:  reports that she has never smoked. She has never used smokeless tobacco. She reports that she does not currently use alcohol. She reports that she does not use drugs.    Allergies:  Review of patient's allergies indicates:   Allergen Reactions    Duricef [cefadroxil] Hives    Eggs [egg derived] Anaphylaxis and Hives    Cat/feline products Itching    Dairycare [lactobacillus acidoph-lactase] Itching    Dog dander Itching    Wheat containing prod Hives       Medications:  Current Outpatient Medications   Medication Sig Dispense Refill    acetaminophen (TYLENOL) 325 MG tablet Take 325 mg by mouth every 6 (six) hours as needed for Pain.      blood-glucose meter Misc Use as directed 1 each prn    duke's soln (benadryl 30 mL, mylanta 30 mL, LIDOcaine 30 mL, nystatin 30 mL) 120mL 10 ml swish & spit/swallow every 4 to 6 hours as needed for mouth pain/ulcers/yeast/throat pain 240 mL 1    fluticasone propionate (FLONASE) 50 mcg/actuation nasal spray 1 spray (50 mcg total) by Each Nostril route once daily. 18.2 mL 0    levothyroxine (SYNTHROID) 50 MCG tablet Take 1 tablet (50 mcg total) by mouth once daily. 90 tablet 1    lipase-protease-amylase 24,000-76,000-120,000 units (CREON) 24,000-76,000 -120,000 unit capsule Take 2 capsules by mouth 3 (three) times daily with meals. 180 capsule 11    loperamide (IMODIUM) 2 mg capsule Take 2 tablets (4mg) by mouth after first loose stool, 1 tablet every 2 hours until diarrhea free for 12 hours. May take 2 tablets (4mg) by mouth every 4 hours at night.  May require more than the package labeling maximum dose of 16mg/day. 30 capsule 11    loratadine (CLARITIN) 10 mg tablet Take 1 tablet (10 mg total) by mouth every morning. 30 tablet 11    losartan (COZAAR) 25 MG tablet Take 1 tablet (25 mg total) by mouth as needed (take for bp equal or greater than 120/70).      meclizine (ANTIVERT) 25 mg tablet Take 1 tablet (25 mg total) by mouth 3 (three) times daily as needed for Dizziness. 30 tablet 0    mupirocin (BACTROBAN) 2 % ointment Apply topically 3 (three) times daily.      OLANZapine (ZYPREXA) 5 MG tablet Take 1 tablet by mouth nightly on days 1-3 of each chemotherapy cycle. 6 tablet 11    potassium chloride SA (K-DUR,KLOR-CON) 20 MEQ tablet Take 2 tablets (40 mEq total) by mouth once daily. 60 tablet 11    prochlorperazine (COMPAZINE) 5 MG tablet Take 1 tablet (5 mg total) by mouth every 6 (six) hours as needed for Nausea. 20 tablet 5    simvastatin (ZOCOR) 10 MG tablet Take 1 tablet (10 mg total) by mouth every evening. 90 tablet 3    SYNJARDY XR 10-1,000 mg TBph TAKE ONE TABLET BY MOUTH ONCE DAILY 30 tablet 5    TRUE METRIX GLUCOSE TEST STRIP Strp USE 1 STRIP ONCE DAILY TO TEST BLOOD GLUCOSE (50 DAY SUPPLY) 100 each 3    TRUEPLUS LANCETS 33 gauge Misc USE AS DIRECTED TO TEST BLOOD SUGAR ONCE DAILY FOR UP  USES 100 each 2    VITAMIN D2 1,250 mcg (50,000 unit) capsule TAKE 1 CAPSULE (50,000 UNITS TOTAL) BY MOUTH TWICE A WEEK. 24 capsule 3     No current facility-administered medications for this visit.     Facility-Administered Medications Ordered in Other Visits   Medication Dose Route Frequency Provider Last Rate Last Admin    alteplase injection 2 mg  2 mg Intra-Catheter PRN Arash Fuchs, NP        diphenhydrAMINE injection 50 mg  50 mg Intravenous Once PRN Arash Fuchs, NP        EPINEPHrine (EPIPEN) 0.3 mg/0.3 mL pen injection 0.3 mg  0.3 mg Intramuscular Once PRN Arash Fuchs, NP        heparin, porcine (PF) 100 unit/mL injection flush 500 Units  500  Units Intravenous PRN Arash Fuchs, ADI        hydrocortisone sodium succinate injection 100 mg  100 mg Intravenous Once PRN Arash Fuchs, ADI        prochlorperazine injection Soln 5 mg  5 mg Intravenous Once PRN Arash Fuchs, NP        sodium chloride 0.9% flush 10 mL  10 mL Intravenous PRN Arash Fuchs NP           Review of Systems   Constitutional:  Negative for appetite change, chills, fatigue, fever and unexpected weight change.   HENT:  Negative for mouth sores.    Eyes:  Negative for visual disturbance.   Respiratory:  Negative for cough and shortness of breath.    Cardiovascular:  Negative for chest pain and palpitations.   Gastrointestinal:  Negative for abdominal pain, constipation, diarrhea, nausea and vomiting.   Genitourinary:  Negative for frequency.   Musculoskeletal:  Negative for back pain.   Skin:  Negative for rash.   Neurological:  Negative for headaches.   Hematological:  Negative for adenopathy. Does not bruise/bleed easily.   Psychiatric/Behavioral:  The patient is not nervous/anxious.        ECOG Performance Status: 0   Objective:      Vitals:   There were no vitals filed for this visit.          Physical Exam  Constitutional:       General: She is not in acute distress.     Appearance: She is well-developed. She is not diaphoretic.   HENT:      Head: Normocephalic and atraumatic.   Cardiovascular:      Rate and Rhythm: Normal rate and regular rhythm.      Heart sounds: Normal heart sounds. No murmur heard.     No friction rub. No gallop.   Pulmonary:      Effort: Pulmonary effort is normal. No respiratory distress.      Breath sounds: Normal breath sounds. No wheezing or rales.   Chest:      Chest wall: No tenderness.   Abdominal:      General: Bowel sounds are normal. There is no distension.      Palpations: Abdomen is soft. There is no mass.      Tenderness: There is no abdominal tenderness. There is no guarding or rebound.   Musculoskeletal:      Comments: PORT in right chest    Lymphadenopathy:      Cervical: No cervical adenopathy.      Upper Body:      Right upper body: No supraclavicular or axillary adenopathy.      Left upper body: No supraclavicular or axillary adenopathy.   Skin:     Findings: No erythema or rash.   Neurological:      Mental Status: She is alert and oriented to person, place, and time.   Psychiatric:         Behavior: Behavior normal.       Laboratory Data:  Infusion on 09/16/2024   Component Date Value Ref Range Status    WBC 09/16/2024 18.39 (H)  3.90 - 12.70 K/uL Final    RBC 09/16/2024 3.48 (L)  4.00 - 5.40 M/uL Final    Hemoglobin 09/16/2024 10.4 (L)  12.0 - 16.0 g/dL Final    Hematocrit 09/16/2024 29.3 (L)  37.0 - 48.5 % Final    MCV 09/16/2024 84  82 - 98 fL Final    MCH 09/16/2024 29.9  27.0 - 31.0 pg Final    MCHC 09/16/2024 35.5  32.0 - 36.0 g/dL Final    RDW 09/16/2024 17.2 (H)  11.5 - 14.5 % Final    Platelets 09/16/2024 73 (L)  150 - 450 K/uL Final    MPV 09/16/2024 9.5  9.2 - 12.9 fL Final    Immature Granulocytes 09/16/2024 CANCELED  0.0 - 0.5 % Final    Result canceled by the ancillary.    Immature Grans (Abs) 09/16/2024 CANCELED  0.00 - 0.04 K/uL Final    Comment: Mild elevation in immature granulocytes is non specific and   can be seen in a variety of conditions including stress response,   acute inflammation, trauma and pregnancy. Correlation with other   laboratory and clinical findings is essential.    Result canceled by the ancillary.      Lymph # 09/16/2024 CANCELED  1.0 - 4.8 K/uL Final    Result canceled by the ancillary.    Mono # 09/16/2024 CANCELED  0.3 - 1.0 K/uL Final    Result canceled by the ancillary.    Eos # 09/16/2024 CANCELED  0.0 - 0.5 K/uL Final    Result canceled by the ancillary.    Baso # 09/16/2024 CANCELED  0.00 - 0.20 K/uL Final    Result canceled by the ancillary.    nRBC 09/16/2024 1 (A)  0 /100 WBC Final    Gran % 09/16/2024 65.0  38.0 - 73.0 % Final    Lymph % 09/16/2024 16.0 (L)  18.0 - 48.0 % Final    Mono %  09/16/2024 3.0 (L)  4.0 - 15.0 % Final    Eosinophil % 09/16/2024 0.0  0.0 - 8.0 % Final    Basophil % 09/16/2024 0.0  0.0 - 1.9 % Final    Bands 09/16/2024 6.0  % Final    Metamyelocytes 09/16/2024 9.0  % Final    Myelocytes 09/16/2024 1.0  % Final    Platelet Estimate 09/16/2024 Decreased (A)   Final    Aniso 09/16/2024 Slight   Final    Poly 09/16/2024 Occasional   Final    Toxic Granulation 09/16/2024 Present   Final    Differential Method 09/16/2024 Manual   Final         Imaging:     CXR 11/14/23    Cardiac silhouette and mediastinal contours are normal.  Lungs demonstrates no focal large opacity with possible subtle small nodules within the bilateral mid lungs.  No pleural effusion.  Osseous structures are intact.       CT chest 12/01/23    Base of Neck: No significant abnormality.     Thoracic soft tissues: Normal.     Aorta: Left-sided aortic arch.  No aneurysm and no significant atherosclerosis     Heart: Normal size. No effusion.     Pulmonary vasculature: Pulmonary arteries distribute normally.  There are four pulmonary veins.     Yuliya/Mediastinum: No pathologic richard enlargement.     Airways: Patent.     Lungs/Pleura: Biapical nonspecific fibronodular pleuroparenchymal scarring.  Solid 3 mm nodule in the right upper lobe (series 4, image 119).  Cluster ill-defined centrilobular ground-glass opacities within the posterior right and left upper lobes, not entirely specific but suspicious for small airways inflammation or infection.  There is a 1.2 cm ground-glass opacity in superior segment left lower lobe (series 4, image 282).  No consolidation, pleural effusion or pneumothorax.     Esophagus: Normal.     Upper Abdomen: There is an 8 mm hypodensity in the inferior right hepatic lobe, which measures fluid attenuation and probably represents a cyst.  Cholelithiasis.  No abnormal gallbladder wall thickening or pericholecystic fluid.     Bones: No acute fracture. No suspicious lytic or sclerotic  lesions.    CT Chest 5/21/24  Base of Neck: No significant abnormality.     Thoracic soft tissues: Normal.     Aorta: Left-sided aortic arch. No aneurysm.     Heart: Normal size. No effusion. Mild aortic and coronary artery atherosclerotic calcification.     Pulmonary vasculature: Pulmonary arteries distribute normally.     Yuliya/Mediastinum: No pathologic richard enlargement.     Esophagus: Normal.     Upper Abdomen: 3.7 x 2.7 cm pancreatic body mass with pancreatic tail atrophy suspicious for pancreatic malignancy. Recommend dedicated CT or MRI abdomen with IV contrast pancreatic protocol.     Airways: Patent.     Lungs/Pleura: Unchanged benign 3 mm nodule medial right upper lobe series 5, image 115. No new nodules. Mild bilateral apical pleuroparenchymal scarring again noted.     No airspace disease. Interval resolution of previously described ground-glass.     Bones: No acute fracture. No suspicious lytic or sclerotic lesions.    MRI Abdomen 6/30/24  Lower thorax: Heart is normal in size. No pleural fluid. No consolidation.     Liver: Normal size. No evidence of fatty infiltration. Small cyst in segment 6 measures 9 mm.     Gallbladder: Unremarkable.     Bile ducts: No intrahepatic or extrahepatic biliary dilatation.     Pancreas: Hypoenhancing mass centered in the proximal pancreatic body measures 2.9 x 2.0 cm (series 27, image 31). This lesion abuts and may encase the distal splenic vein which appears narrowed just proximal to the portal confluence. There is atrophy of the pancreatic body and tail distal to this lesion with associated dilatation of the main pancreatic duct measuring 5 mm. There is also atrophy of the pancreatic head without associated ductal dilatation.     Spleen: Normal size. No focal lesions.     Adrenal glands: Unremarkable.     Kidneys: No renal masses. No hydronephrosis.     GI: No evidence of bowel obstruction.     Mesentery/retroperitoneum: No pathologically enlarged lymph nodes.      Vasculature: Visualized aorta is normal in caliber. Portal vein is patent.     Body wall/extraperitoneal soft tissues: Unremarkable.     Bones: Visualized osseous structures demonstrate normal marrow signal.     Miscellaneous: No intraperitoneal free fluid.    CT C/A/P 7/09/24  Normal thyroid. Heart size is normal. No pericardial effusion. The thoracic aorta and main pulmonary artery are normal in caliber. No enlarged axillary, mediastinal or hilar lymph nodes.     Central airways are clear. No pleural effusion, focal consolidation or pneumothorax.     The liver is normal in morphology and with smooth contour. Inferior right hepatic lobe 9 mm lesion previously characterized as a cyst. No new hepatic lesion.     Cholelithiasis. No gallbladder wall thickening or pericholecystic fluid. No intra or extrahepatic biliary ductal dilatation.     The spleen, kidneys and adrenal glands are normal.     Proximal pancreatic body irregular mass measures 4.4 x 3.7 cm (series 2, image 65) which when measured similarly to the prior chest CT is unchanged in size. The distal pancreatic body and tail are atrophic. Diffuse pancreatic ductal dilatation measuring 8 mm.     The portal and superior mesenteric veins are patent. 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 The mass abuts the portal by less than 180 degrees. The mass encases the proximal splenic artery which remains patent. The SMA is remote from the mass.     Peripancreatic lymph node measuring 0.7 cm in short axis (series 2, image 72). No enlarged retroperitoneal lymph nodes.     No bowel obstruction inflammatory change. The mass focally abuts the distal lesser curvature the stomach. No free fluid or free air.     Circumferential bladder wall thickening. Normal uterus. No adnexal mass. No enlarged pelvic lymph nodes.     Soft tissues are unremarkable. No acute or aggressive osseous abnormality.       Assessment:        No diagnosis found.             Plan:     Pancreatic Cancer - Patient with Stage III pancreatic cancer with concern for potential encasement of the distal celiac artery per discussion with Dr Valentin  -Will discuss pt at tumor board next week  -No sign of mets  - 94.5 on 6/17/24  -Case discussed at tumor board 7/17/24 with recommendation to proceed with chemo  -[facility] genetic testing negative for the genes tested  -[facility] tissue testing showed TP53, KRAS p.G12D, CDKN2A, CDKN2B mutations.  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  -Will need to keep irinotecan dose reduced at 165mg/mm2  -Will start on neulasta as ANC reduced  -Will proceed with FOLFIRINOX with 1st does of irinotecan and fluorouracil reduced pending results of pharmacogenomic testing  Visit today included increased complexity associated with the care of the episodic problem (chemotherapy) addressed and managing the longitudinal care of the patient due to the serious and/or complex managed problem(s) pancreatic cancer.    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 - pt at increased risk of infection  -No current signs of infection  -Will monitor    Thrombocytopenia - platelets 104k on 8/02/24  -Likely due to chemo  -Will monitor    Hypokalemia - possibly due to diarrhea  -Will start on 40mEq potassium daily    Pancreatic Insufficiency - Pt taking Creon  -Will monitor weight and GI symptoms    Hypothyroidism - pt on synthroid  -Will monitror    DMII - PT currently on Synjardy  Lab Results   Component Value Date    HGBA1C 6.2 (H) 04/01/2024   -Possibly due to pancreatic cancer  -Will monitor    Route Chart for Scheduling    Med Onc Chart Routing      Follow up with physician 4 weeks. Pt can proceed with treatment.  Pt needs approval for neulasta.  Pt needs a CBC, CMP, Mg in 2 weeks with appt with NP with friday before treatment.  Pt needs an appt with me in 4 weeks with CBC, CMP and Mg he friday before treatment.    Follow up with ANALIA 2 weeks.   Infusion scheduling note    Injection scheduling note    Labs    Imaging    Pharmacy appointment    Other referrals          Treatment Plan Information   OP GI FOLFIRINOX (oxaliplatin, leucovorin, irinotecan, fluorouracil) Q2W  Rajat Hunt MD   Associated diagnosis: Malignant neoplasm of pancreas Stage III cT4, cN0, cM0 noted on 7/12/2024   Line of treatment: Neoadjuvant  Treatment Goal: Curative     Upcoming Treatment Dates - OP GI FOLFIRINOX (oxaliplatin, leucovorin, irinotecan, fluorouracil) Q2W     9/24/2024       Chemotherapy       oxaliplatin (ELOXATIN) in D5W 526.6 mL chemo infusion       leucovorin calcium in D5W 250 mL infusion       irinotecan (CAMPTOSAR) in 0.9% NaCl 513 mL chemo infusion       fluorouraciL injection       fluorouracil chemo infusion       Antiemetics       palonosetron 0.25mg/dexAMETHasone 12mg in NS IVPB 0.25 mg 50 mL  10/8/2024       Chemotherapy       oxaliplatin (ELOXATIN) in D5W 526.6 mL chemo infusion       leucovorin calcium in D5W 250 mL infusion       irinotecan (CAMPTOSAR) in 0.9% NaCl 513 mL chemo infusion       fluorouraciL injection       fluorouracil chemo infusion       Antiemetics       palonosetron 0.25mg/dexAMETHasone 12mg in NS IVPB 0.25 mg 50 mL  10/22/2024       Chemotherapy       oxaliplatin (ELOXATIN) in D5W 526.6 mL chemo infusion       leucovorin calcium in D5W 250 mL infusion       irinotecan (CAMPTOSAR) in 0.9% NaCl 513 mL chemo infusion       fluorouraciL injection       fluorouracil chemo infusion       Antiemetics       palonosetron 0.25mg/dexAMETHasone 12mg in NS IVPB 0.25 mg 50 mL  11/5/2024       Chemotherapy       oxaliplatin (ELOXATIN) in D5W 526.6 mL chemo infusion       leucovorin calcium in D5W 250 mL infusion       irinotecan (CAMPTOSAR) in 0.9% NaCl 513 mL chemo infusion       fluorouraciL injection       fluorouracil chemo infusion       Antiemetics       palonosetron 0.25mg/dexAMETHasone 12mg in NS IVPB 0.25  mg 50 mL      Rajat Hunt MD  [facility]  Hematology and Oncology  [facility]   [address]   [place] [address]   O: [phone]  F: [phone]

## 2024-09-20 ENCOUNTER — DOCUMENTATION ONLY (OUTPATIENT)
Dept: HEMATOLOGY/ONCOLOGY | Facility: CLINIC | Age: 64
End: 2024-09-20
Payer: MEDICARE

## 2024-09-20 ENCOUNTER — PATIENT MESSAGE (OUTPATIENT)
Dept: HEMATOLOGY/ONCOLOGY | Facility: CLINIC | Age: 64
End: 2024-09-20

## 2024-09-20 ENCOUNTER — LAB VISIT (OUTPATIENT)
Dept: LAB | Facility: HOSPITAL | Age: 64
End: 2024-09-20
Attending: INTERNAL MEDICINE
Payer: MEDICARE

## 2024-09-20 ENCOUNTER — OFFICE VISIT (OUTPATIENT)
Dept: HEMATOLOGY/ONCOLOGY | Facility: CLINIC | Age: 64
End: 2024-09-20
Payer: MEDICARE

## 2024-09-20 VITALS
OXYGEN SATURATION: 98 % | WEIGHT: 125.44 LBS | BODY MASS INDEX: 23.68 KG/M2 | TEMPERATURE: 97 F | SYSTOLIC BLOOD PRESSURE: 136 MMHG | DIASTOLIC BLOOD PRESSURE: 68 MMHG | RESPIRATION RATE: 15 BRPM | HEART RATE: 91 BPM | HEIGHT: 61 IN

## 2024-09-20 DIAGNOSIS — K86.89 PANCREATIC INSUFFICIENCY: ICD-10-CM

## 2024-09-20 DIAGNOSIS — T45.1X5A IMMUNODEFICIENCY DUE TO CHEMOTHERAPY: ICD-10-CM

## 2024-09-20 DIAGNOSIS — D69.6 THROMBOCYTOPENIA: ICD-10-CM

## 2024-09-20 DIAGNOSIS — C25.9 MALIGNANT NEOPLASM OF PANCREAS, UNSPECIFIED LOCATION OF MALIGNANCY: Primary | ICD-10-CM

## 2024-09-20 DIAGNOSIS — E87.6 HYPOKALEMIA: ICD-10-CM

## 2024-09-20 DIAGNOSIS — C25.9 MALIGNANT NEOPLASM OF PANCREAS, UNSPECIFIED LOCATION OF MALIGNANCY: ICD-10-CM

## 2024-09-20 DIAGNOSIS — D84.821 IMMUNODEFICIENCY DUE TO CHEMOTHERAPY: ICD-10-CM

## 2024-09-20 DIAGNOSIS — E11.9 TYPE 2 DIABETES MELLITUS WITHOUT COMPLICATION, WITHOUT LONG-TERM CURRENT USE OF INSULIN: ICD-10-CM

## 2024-09-20 DIAGNOSIS — Z79.899 IMMUNODEFICIENCY DUE TO CHEMOTHERAPY: ICD-10-CM

## 2024-09-20 DIAGNOSIS — E03.9 HYPOTHYROIDISM, UNSPECIFIED TYPE: ICD-10-CM

## 2024-09-20 LAB
ALBUMIN SERPL BCP-MCNC: 4 G/DL (ref 3.5–5.2)
ALP SERPL-CCNC: 116 U/L (ref 55–135)
ALT SERPL W/O P-5'-P-CCNC: 16 U/L (ref 10–44)
ANION GAP SERPL CALC-SCNC: 13 MMOL/L (ref 8–16)
AST SERPL-CCNC: 23 U/L (ref 10–40)
BASOPHILS # BLD AUTO: 0.12 K/UL (ref 0–0.2)
BASOPHILS NFR BLD: 0.5 % (ref 0–1.9)
BILIRUB SERPL-MCNC: 1.3 MG/DL (ref 0.1–1)
BUN SERPL-MCNC: 8 MG/DL (ref 8–23)
CALCIUM SERPL-MCNC: 9.5 MG/DL (ref 8.7–10.5)
CHLORIDE SERPL-SCNC: 101 MMOL/L (ref 95–110)
CO2 SERPL-SCNC: 26 MMOL/L (ref 23–29)
CREAT SERPL-MCNC: 0.9 MG/DL (ref 0.5–1.4)
DIFFERENTIAL METHOD BLD: ABNORMAL
EOSINOPHIL # BLD AUTO: 0.3 K/UL (ref 0–0.5)
EOSINOPHIL NFR BLD: 1.1 % (ref 0–8)
ERYTHROCYTE [DISTWIDTH] IN BLOOD BY AUTOMATED COUNT: 18.1 % (ref 11.5–14.5)
EST. GFR  (NO RACE VARIABLE): >60 ML/MIN/1.73 M^2
GLUCOSE SERPL-MCNC: 122 MG/DL (ref 70–110)
HCT VFR BLD AUTO: 31.5 % (ref 37–48.5)
HGB BLD-MCNC: 11.4 G/DL (ref 12–16)
IMM GRANULOCYTES # BLD AUTO: 0.89 K/UL (ref 0–0.04)
IMM GRANULOCYTES NFR BLD AUTO: 3.9 % (ref 0–0.5)
LYMPHOCYTES # BLD AUTO: 3.4 K/UL (ref 1–4.8)
LYMPHOCYTES NFR BLD: 14.9 % (ref 18–48)
MAGNESIUM SERPL-MCNC: 1.8 MG/DL (ref 1.6–2.6)
MCH RBC QN AUTO: 30.2 PG (ref 27–31)
MCHC RBC AUTO-ENTMCNC: 36.2 G/DL (ref 32–36)
MCV RBC AUTO: 84 FL (ref 82–98)
MONOCYTES # BLD AUTO: 1.4 K/UL (ref 0.3–1)
MONOCYTES NFR BLD: 5.9 % (ref 4–15)
NEUTROPHILS # BLD AUTO: 17 K/UL (ref 1.8–7.7)
NEUTROPHILS NFR BLD: 73.7 % (ref 38–73)
NRBC BLD-RTO: 0 /100 WBC
PLATELET # BLD AUTO: 133 K/UL (ref 150–450)
PLATELET BLD QL SMEAR: ABNORMAL
PMV BLD AUTO: 9.8 FL (ref 9.2–12.9)
POTASSIUM SERPL-SCNC: 2.3 MMOL/L (ref 3.5–5.1)
PROT SERPL-MCNC: 6.8 G/DL (ref 6–8.4)
RBC # BLD AUTO: 3.77 M/UL (ref 4–5.4)
SODIUM SERPL-SCNC: 140 MMOL/L (ref 136–145)
WBC # BLD AUTO: 23.09 K/UL (ref 3.9–12.7)

## 2024-09-20 PROCEDURE — 36415 COLL VENOUS BLD VENIPUNCTURE: CPT | Mod: PN | Performed by: NURSE PRACTITIONER

## 2024-09-20 PROCEDURE — 85025 COMPLETE CBC W/AUTO DIFF WBC: CPT | Mod: 91,PN | Performed by: NURSE PRACTITIONER

## 2024-09-20 PROCEDURE — 99999 PR PBB SHADOW E&M-EST. PATIENT-LVL V: CPT | Mod: PBBFAC,,, | Performed by: INTERNAL MEDICINE

## 2024-09-20 PROCEDURE — 83735 ASSAY OF MAGNESIUM: CPT | Mod: 91,PN | Performed by: NURSE PRACTITIONER

## 2024-09-20 PROCEDURE — 80053 COMPREHEN METABOLIC PANEL: CPT | Mod: 91,PN | Performed by: NURSE PRACTITIONER

## 2024-09-20 RX ORDER — LIDOCAINE HYDROCHLORIDE 20 MG/ML
SOLUTION ORAL; TOPICAL
COMMUNITY
Start: 2024-09-13 | End: 2024-09-20 | Stop reason: CLARIF

## 2024-09-20 NOTE — PROGRESS NOTES
Received critical lab of K+ 2.3 from [facility] lab.  Sent secure chat to Dr Hunt to notify.

## 2024-09-20 NOTE — PROGRESS NOTES
PATIENT: eBssy Lamb  MRN: [id number]  DATE: 9/20/2024      Diagnosis:   1. Malignant neoplasm of pancreas, unspecified location of malignancy    2. 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therapy    3. Thrombocytopenia    4. Hypokalemia    5. Pancreatic insufficiency    6. Hypothyroidism, unspecified type    7. Type 2 diabetes mellitus without complication, without long-term current use of insulin          Chief Complaint: Malignant neoplasm of pancreas, unspecified location of mal      Oncologic History:      Oncologic History     Oncologic Treatment     Pathology           Subjective:    Initial History: Ms. Lamb is a 64 y.o. female with Arthritis, DMII, GERD, Hypotyroidism, Obesity, Thyroid disease who presents for pancreatic cancer.  Since the last clinic visit the patient had diarrhea previously which has now improved.  The patient states she was not taking oral potassium regularly.  The patient denies CP, cough, SOB, abdominal pain, nausea, vomiting, constipation.  The patient denies fever, chills, night sweats, weight loss, new lumps or bumps, easy bruising or bleeding.    Prior History:   The patient initially underwent a CXR on 11/13/23 fur a URI which showed possible subtle pulmonary nodules.  CT chest 12/01/2023 showed a cluster of ill-defined centrilobular ground-glass opacity he inferior right and left upper lobes as well as a 1.2 cm ground-glass opacity in the superior segment of the left lower lobe; solid 3 mm pulmonary nodule in the right upper lobe; subcentimeter right hepatic lobe hypodensity likely representing a cyst.  The patient underwent surveillance CT chest on 05/21/2024 showing a 3.7 x 2.7 pancreatic body mass in the pancreatic tail atrophy suspicious of pancreatic malignancy as well as and unchanged benign 3 mm nodule in the right upper lobe.  MRI abdomen on 06/30/2024 showed hypoenhancing mass centered in the proximal pancreatic body measuring 2.9 x 2 cm with abutment and possible encasement  of the distal splenic vein.  EUS was performed on 2024 mass in the pancreatic body stage T4 N0 M0 by endo sonographic criteria.  Pathology from biopsy of the stomach showed mild focally moderate chronic gastritis with no evidence of the H pylori.  Stomach polyp which was removed code hyperplastic polyps with chronic inflammation.  Pancreatic biopsy showed adenocarcinoma.  CT of the chest, abdomen, and pelvis on 2024 showed a 9 mm lesion in the right hepatic lobe previously characterized as cysts; mass in the pancreatic body measuring 4.4 x 3.7 cm with diffuse pancreatic ductal dilatation measuring 8 mm:  Weaning vein at the portal confluence occluded with reconstitution via collaterals.  The mass abuts the portal vein by less than 180° but does not case the proximal splenic artery remains patent.  Also noted was a pancreatic lymph node measuring 0.7 cm.   Patient's case was discussed at tumor board on 2024 with recommendation for proceeding with the chemotherapy.  Patient had port placement on 2024.    Past Medical History:   Past Medical History:   Diagnosis Date    Arthritis, lumbar spine     hands    Diabetes mellitus     General anesthetics causing adverse effect in therapeutic use     following breast rediuct, hard time awakening  also had anxiety rxn to lidocain in dental ofc    GERD (gastroesophageal reflux disease)     Hypothyroidism 10/08/2014    Maternal anesthesia complication     epidural for 1st child went up instead of down; required intubation    Obesity (BMI 30-39.9) 10/08/2014    Thyroid disease     Thyroid nodule 10/08/2014       Past Surgical HIstory:   Past Surgical History:   Procedure Laterality Date    BREAST BIOPSY Left     b9    BREAST SURGERY      Reduction cause of a mass in left breast    c-sections x2       SECTION  3/26/81 & 85    Birth of two girls    COLONOSCOPY  2012         COLONOSCOPY N/A 2018    Procedure: COLONOSCOPY;  Surgeon:  Francisco Mcclain MD;  Location: [facility];  Service: Endoscopy;  Laterality: N/A;    COLONOSCOPY N/A 10/24/2023    Procedure: COLONOSCOPY;  Surgeon: Francisco Mcclain MD;  Location: [facility];  Service: Endoscopy;  Laterality: N/A;    ENDOSCOPIC ULTRASOUND OF UPPER GASTROINTESTINAL TRACT Left 7/5/2024    Procedure: ULTRASOUND, UPPER GI TRACT, ENDOSCOPIC;  Surgeon: Andrew Gordon MD;  Location: [facility];  Service: Endoscopy;  Laterality: Left;    ESOPHAGOGASTRODUODENOSCOPY N/A 7/5/2024    Procedure: EGD (ESOPHAGOGASTRODUODENOSCOPY);  Surgeon: Andrew Gordon MD;  Location: [facility];  Service: Endoscopy;  Laterality: N/A;    hysteroscopy with polypectomy      INCISIONAL BREAST BIOPSY Left 1996    benign; done at same time as reduction    INSERTION OF TUNNELED CENTRAL VENOUS CATHETER (CVC) WITH SUBCUTANEOUS PORT N/A 7/18/2024    Procedure: [id number];  Surgeon: Blanca Dillard MD;  Location: [facility];  Service: General;  Laterality: N/A;    TOTAL REDUCTION MAMMOPLASTY Bilateral 1996       Family History:   Family History   Problem Relation Name Age of Onset    Brain cancer Mother Ravi Tolbert     Early death Mother Ravi Tolbert 51        Passed at 51    Cancer Mother Ravi Tolbert         Brain tumor    Arthritis Mother Ravi Tolbert     Diabetes Father Ck tolbert         Type 2    Cirrhosis Father Ck tolbert     Cancer Father Ck pham tomasz         Bone    COPD Father Ck pham tomasz         Smoker    Early death Father Ck tolbert         Passed at 64    Lung cancer Father Ck pham tomasz     Heart disease Sister Mamta (dianna)wescovich         Congestive heart failure (passed 2/11/18    Hypertension Sister Mamta (dianna)wescovich     Kidney disease Sister Mamta (dianna)wescovich         Doc removed when she was a child    Hypertension Sister Mayank      Depression Sister Ravi (johnna) nicolette Mauricio ( sister)         Due to loss of her 27 year old son    Early death Sister Ravi (johnna) nicoletet Mauricio ( sister)         Pulmonary hypertension, cirrhosis of the liver(passed 7/14/2007   Age 57    Heart disease Brother John Choudhury ( passed )         Heart attack    Hypertension Brother John Choudhury ( passed )     Heart disease Brother Juan Francisco Choudhury         Heart  blockage    Heart disease Brother Bhanu Choudhury         Heart attack (passed)    Diabetes Brother Bhanu Choudhury     Macular degeneration Brother Bhanu Choudhury     Breast cancer Maternal Aunt  30    Breast cancer Paternal Aunt  40    Breast cancer Paternal Aunt  40    Ovarian cancer Neg Hx         Social History:  reports that she has never smoked. She has never used smokeless tobacco. She reports that she does not currently use alcohol. She reports that she does not use drugs.    Allergies:  Review of patient's allergies indicates:   Allergen Reactions    Duricef [cefadroxil] Hives    Eggs [egg derived] Anaphylaxis and Hives    Cat/feline products Itching    Dairycare [lactobacillus acidoph-lactase] Itching    Dog dander Itching    Wheat containing prod Hives       Medications:  No current facility-administered medications for this visit.     Current Outpatient Medications   Medication Sig Dispense Refill    acetaminophen (TYLENOL) 325 MG tablet Take 325 mg by mouth every 6 (six) hours as needed for Pain.      blood-glucose meter Misc Use as directed 1 each prn    duke's soln (benadryl 30 mL, mylanta 30 mL, LIDOcaine 30 mL, nystatin 30 mL) 120mL 10 ml swish & spit/swallow every 4 to 6 hours as needed for mouth pain/ulcers/yeast/throat pain 240 mL 1    fluticasone propionate (FLONASE) 50 mcg/actuation nasal spray 1 spray (50 mcg total) by Each Nostril route once daily. 18.2 mL 0    levothyroxine (SYNTHROID) 50 MCG tablet Take 1 tablet (50 mcg total) by mouth once daily. 90 tablet 1    LIDOCAINE VISCOUS 2 %  solution       lipase-protease-amylase 24,000-76,000-120,000 units (CREON) 24,000-76,000 -120,000 unit capsule Take 2 capsules by mouth 3 (three) times daily with meals. 180 capsule 11    loperamide (IMODIUM) 2 mg capsule Take 2 tablets (4mg) by mouth after first loose stool, 1 tablet every 2 hours until diarrhea free for 12 hours. May take 2 tablets (4mg) by mouth every 4 hours at night. May require more than the package labeling maximum dose of 16mg/day. 30 capsule 11    loratadine (CLARITIN) 10 mg tablet Take 1 tablet (10 mg total) by mouth every morning. 30 tablet 11    meclizine (ANTIVERT) 25 mg tablet Take 1 tablet (25 mg total) by mouth 3 (three) times daily as needed for Dizziness. 30 tablet 0    mupirocin (BACTROBAN) 2 % ointment Apply topically 3 (three) times daily.      OLANZapine (ZYPREXA) 5 MG tablet Take 1 tablet by mouth nightly on days 1-3 of each chemotherapy cycle. 6 tablet 11    potassium chloride SA (K-DUR,KLOR-CON) 20 MEQ tablet Take 2 tablets (40 mEq total) by mouth once daily. 60 tablet 11    prochlorperazine (COMPAZINE) 5 MG tablet Take 1 tablet (5 mg total) by mouth every 6 (six) hours as needed for Nausea. 20 tablet 5    simvastatin (ZOCOR) 10 MG tablet Take 1 tablet (10 mg total) by mouth every evening. 90 tablet 3    SYNJARDY XR 10-1,000 mg TBph TAKE ONE TABLET BY MOUTH ONCE DAILY 30 tablet 5    TRUE METRIX GLUCOSE TEST STRIP Strp USE 1 STRIP ONCE DAILY TO TEST BLOOD GLUCOSE (50 DAY SUPPLY) 100 each 3    TRUEPLUS LANCETS 33 gauge Misc USE AS DIRECTED TO TEST BLOOD SUGAR ONCE DAILY FOR UP  USES 100 each 2    VITAMIN D2 1,250 mcg (50,000 unit) capsule TAKE 1 CAPSULE (50,000 UNITS TOTAL) BY MOUTH TWICE A WEEK. 24 capsule 3    losartan (COZAAR) 25 MG tablet Take 1 tablet (25 mg total) by mouth as needed (take for bp equal or greater than 120/70). (Patient not taking: Reported on 9/20/2024)       Facility-Administered Medications Ordered in Other Visits   Medication Dose Route Frequency  "Provider Last Rate Last Admin    alteplase injection 2 mg  2 mg Intra-Catheter PRN Arash Fuchs, ADI        diphenhydrAMINE injection 50 mg  50 mg Intravenous Once PRN Arash Fuchs NP        EPINEPHrine (EPIPEN) 0.3 mg/0.3 mL pen injection 0.3 mg  0.3 mg Intramuscular Once PRN Arash Fuchs NP        heparin, porcine (PF) 100 unit/mL injection flush 500 Units  500 Units Intravenous PRN Arash Fuchs NP        hydrocortisone sodium succinate injection 100 mg  100 mg Intravenous Once PRN Arash Fuchs NP        magnesium sulfate in dextrose IVPB (premix) 1 g  1 g Intravenous ED 1 Time Nemesio Richmond MD        prochlorperazine injection Soln 5 mg  5 mg Intravenous Once PRN Arash Fuchs NP        sodium chloride 0.9% flush 10 mL  10 mL Intravenous PRN Arash Fuchs NP           Review of Systems   Constitutional:  Negative for chills, fatigue, fever and unexpected weight change.   Respiratory:  Negative for cough and shortness of breath.    Cardiovascular:  Negative for chest pain and palpitations.   Gastrointestinal:  Positive for diarrhea (now improved). Negative for abdominal pain, constipation, nausea and vomiting.   Skin:  Negative for rash.   Neurological:  Negative for headaches.   Hematological:  Negative for adenopathy. Does not bruise/bleed easily.       ECOG Performance Status: 0   Objective:      Vitals:   Vitals:    09/20/24 1335   BP: 136/68   BP Location: Right arm   Patient Position: Sitting   BP Method: Medium (Manual)   Pulse: 91   Resp: 15   Temp: 97.4 °F (36.3 °C)   TempSrc: Temporal   SpO2: 98%   Weight: 56.9 kg (125 lb 7.1 oz)   Height: 5' 1" (1.549 m)         Physical Exam  Constitutional:       General: She is not in acute distress.     Appearance: She is well-developed. She is not diaphoretic.   HENT:      Head: Normocephalic and atraumatic.      Comments: Alopecia  Cardiovascular:      Rate and Rhythm: Normal rate and regular rhythm.      Heart sounds: Normal heart sounds. No " murmur heard.     No friction rub. No gallop.   Pulmonary:      Effort: Pulmonary effort is normal. No respiratory distress.      Breath sounds: Normal breath sounds. No wheezing or rales.   Chest:      Chest wall: No tenderness.   Abdominal:      General: Bowel sounds are normal. There is no distension.      Palpations: Abdomen is soft. There is no mass.      Tenderness: There is no abdominal tenderness. There is no guarding or rebound.   Musculoskeletal:      Comments: PORT in right chest   Lymphadenopathy:      Cervical: No cervical adenopathy.      Upper Body:      Right upper body: No supraclavicular or axillary adenopathy.      Left upper body: No supraclavicular or axillary adenopathy.   Skin:     Findings: No erythema or rash.   Neurological:      Mental Status: She is alert and oriented to person, place, and time.   Psychiatric:         Behavior: Behavior normal.         Laboratory Data:  Admission on 09/20/2024   Component Date Value Ref Range Status    Magnesium 09/20/2024 1.9  1.6 - 2.6 mg/dL Final    WBC 09/20/2024 24.18 (H)  3.90 - 12.70 K/uL Final    RBC 09/20/2024 4.09  4.00 - 5.40 M/uL Final    Hemoglobin 09/20/2024 12.0  12.0 - 16.0 g/dL Final    Hematocrit 09/20/2024 35.1 (L)  37.0 - 48.5 % Final    MCV 09/20/2024 86  82 - 98 fL Final    MCH 09/20/2024 29.3  27.0 - 31.0 pg Final    MCHC 09/20/2024 34.2  32.0 - 36.0 g/dL Final    RDW 09/20/2024 18.6 (H)  11.5 - 14.5 % Final    Platelets 09/20/2024 144 (L)  150 - 450 K/uL Final    MPV 09/20/2024 9.7  9.2 - 12.9 fL Final    Immature Granulocytes 09/20/2024 4.1 (H)  0.0 - 0.5 % Final    Gran # (ANC) 09/20/2024 17.6 (H)  1.8 - 7.7 K/uL Final    Immature Grans (Abs) 09/20/2024 0.98 (H)  0.00 - 0.04 K/uL Final    Comment: Mild elevation in immature granulocytes is non specific and   can be seen in a variety of conditions including stress response,   acute inflammation, trauma and pregnancy. Correlation with other   laboratory and clinical findings is  essential.      Lymph # 09/20/2024 3.6  1.0 - 4.8 K/uL Final    Mono # 09/20/2024 1.5 (H)  0.3 - 1.0 K/uL Final    Eos # 09/20/2024 0.3  0.0 - 0.5 K/uL Final    Baso # 09/20/2024 0.15  0.00 - 0.20 K/uL Final    nRBC 09/20/2024 0  0 /100 WBC Final    Gran % 09/20/2024 72.8  38.0 - 73.0 % Final    Lymph % 09/20/2024 15.1 (L)  18.0 - 48.0 % Final    Mono % 09/20/2024 6.3  4.0 - 15.0 % Final    Eosinophil % 09/20/2024 1.1  0.0 - 8.0 % Final    Basophil % 09/20/2024 0.6  0.0 - 1.9 % Final    Differential Method 09/20/2024 Automated   Final    Sodium 09/20/2024 136  136 - 145 mmol/L Final    Potassium 09/20/2024 2.7 (LL)  3.5 - 5.1 mmol/L Final    Comment: Anion Gap reference range revised on 4/28/2023  K critical result(s) called and verbal readback obtained from   Farhad Gillespie RN  by RP9 09/20/2024 15:34      Chloride 09/20/2024 99  95 - 110 mmol/L Final    CO2 09/20/2024 26  22 - 31 mmol/L Final    Glucose 09/20/2024 108  70 - 110 mg/dL Final    Comment: The ADA recommends the following guidelines for fasting glucose:    Normal:       less than 100 mg/dL    Prediabetes:  100 mg/dL to 125 mg/dL    Diabetes:     126 mg/dL or higher      BUN 09/20/2024 9  7 - 18 mg/dL Final    Creatinine 09/20/2024 0.76  0.50 - 1.40 mg/dL Final    Calcium 09/20/2024 9.4  8.4 - 10.2 mg/dL Final    Total Protein 09/20/2024 7.8  6.0 - 8.4 g/dL Final    Albumin 09/20/2024 4.8  3.5 - 5.2 g/dL Final    Total Bilirubin 09/20/2024 1.5 (H)  0.2 - 1.3 mg/dL Final    Alkaline Phosphatase 09/20/2024 132  38 - 145 U/L Final    AST 09/20/2024 42 (H)  14 - 36 U/L Final    ALT 09/20/2024 26  0 - 35 U/L Final    Anion Gap 09/20/2024 11  5 - 12 mmol/L Final    Anion Gap reference range revised on 4/28/2023    eGFR 09/20/2024 >60  >60 mL/min/1.73 m^2 Final   Lab Visit on 09/20/2024   Component Date Value Ref Range Status    WBC 09/20/2024 23.09 (H)  3.90 - 12.70 K/uL Final    RBC 09/20/2024 3.77 (L)  4.00 - 5.40 M/uL Final    Hemoglobin 09/20/2024 11.4 (L)   12.0 - 16.0 g/dL Final    Hematocrit 09/20/2024 31.5 (L)  37.0 - 48.5 % Final    MCV 09/20/2024 84  82 - 98 fL Final    MCH 09/20/2024 30.2  27.0 - 31.0 pg Final    MCHC 09/20/2024 36.2 (H)  32.0 - 36.0 g/dL Final    RDW 09/20/2024 18.1 (H)  11.5 - 14.5 % Final    Platelets 09/20/2024 133 (L)  150 - 450 K/uL Final    MPV 09/20/2024 9.8  9.2 - 12.9 fL Final    Immature Granulocytes 09/20/2024 3.9 (H)  0.0 - 0.5 % Final    Gran # (ANC) 09/20/2024 17.0 (H)  1.8 - 7.7 K/uL Final    Immature Grans (Abs) 09/20/2024 0.89 (H)  0.00 - 0.04 K/uL Final    Comment: Mild elevation in immature granulocytes is non specific and   can be seen in a variety of conditions including stress response,   acute inflammation, trauma and pregnancy. Correlation with other   laboratory and clinical findings is essential.      Lymph # 09/20/2024 3.4  1.0 - 4.8 K/uL Final    Mono # 09/20/2024 1.4 (H)  0.3 - 1.0 K/uL Final    Eos # 09/20/2024 0.3  0.0 - 0.5 K/uL Final    Baso # 09/20/2024 0.12  0.00 - 0.20 K/uL Final    nRBC 09/20/2024 0  0 /100 WBC Final    Gran % 09/20/2024 73.7 (H)  38.0 - 73.0 % Final    Lymph % 09/20/2024 14.9 (L)  18.0 - 48.0 % Final    Mono % 09/20/2024 5.9  4.0 - 15.0 % Final    Eosinophil % 09/20/2024 1.1  0.0 - 8.0 % Final    Basophil % 09/20/2024 0.5  0.0 - 1.9 % Final    Platelet Estimate 09/20/2024 Decreased (A)   Final    Differential Method 09/20/2024 Automated   Final    Sodium 09/20/2024 140  136 - 145 mmol/L Final    Potassium 09/20/2024 2.3 (LL)  3.5 - 5.1 mmol/L Final    Comment: K   critical result(s) called and verbal readback obtained from   Kailee Ngo RN by CORNELIUS 09/20/2024 13:42      Chloride 09/20/2024 101  95 - 110 mmol/L Final    CO2 09/20/2024 26  23 - 29 mmol/L Final    Glucose 09/20/2024 122 (H)  70 - 110 mg/dL Final    BUN 09/20/2024 8  8 - 23 mg/dL Final    Creatinine 09/20/2024 0.9  0.5 - 1.4 mg/dL Final    Calcium 09/20/2024 9.5  8.7 - 10.5 mg/dL Final    Total Protein 09/20/2024 6.8  6.0  - 8.4 g/dL Final    Albumin 09/20/2024 4.0  3.5 - 5.2 g/dL Final    Total Bilirubin 09/20/2024 1.3 (H)  0.1 - 1.0 mg/dL Final    Comment: For infants and newborns, interpretation of results should be based  on gestational age, weight and in agreement with clinical  observations.    Premature Infant recommended reference ranges:  Up to 24 hours.............<8.0 mg/dL  Up to 48 hours............<12.0 mg/dL  3-5 days..................<15.0 mg/dL  6-29 days.................<15.0 mg/dL      Alkaline Phosphatase 09/20/2024 116  55 - 135 U/L Final    AST 09/20/2024 23  10 - 40 U/L Final    ALT 09/20/2024 16  10 - 44 U/L Final    eGFR 09/20/2024 >60.0  >60 mL/min/1.73 m^2 Final    Anion Gap 09/20/2024 13  8 - 16 mmol/L Final    Magnesium 09/20/2024 1.8  1.6 - 2.6 mg/dL Final   Infusion on 09/16/2024   Component Date Value Ref Range Status    WBC 09/16/2024 18.39 (H)  3.90 - 12.70 K/uL Final    RBC 09/16/2024 3.48 (L)  4.00 - 5.40 M/uL Final    Hemoglobin 09/16/2024 10.4 (L)  12.0 - 16.0 g/dL Final    Hematocrit 09/16/2024 29.3 (L)  37.0 - 48.5 % Final    MCV 09/16/2024 84  82 - 98 fL Final    MCH 09/16/2024 29.9  27.0 - 31.0 pg Final    MCHC 09/16/2024 35.5  32.0 - 36.0 g/dL Final    RDW 09/16/2024 17.2 (H)  11.5 - 14.5 % Final    Platelets 09/16/2024 73 (L)  150 - 450 K/uL Final    MPV 09/16/2024 9.5  9.2 - 12.9 fL Final    Immature Granulocytes 09/16/2024 CANCELED  0.0 - 0.5 % Final    Result canceled by the ancillary.    Immature Grans (Abs) 09/16/2024 CANCELED  0.00 - 0.04 K/uL Final    Comment: Mild elevation in immature granulocytes is non specific and   can be seen in a variety of conditions including stress response,   acute inflammation, trauma and pregnancy. Correlation with other   laboratory and clinical findings is essential.    Result canceled by the ancillary.      Lymph # 09/16/2024 CANCELED  1.0 - 4.8 K/uL Final    Result canceled by the ancillary.    Mono # 09/16/2024 CANCELED  0.3 - 1.0 K/uL Final     Result canceled by the ancillary.    Eos # 09/16/2024 CANCELED  0.0 - 0.5 K/uL Final    Result canceled by the ancillary.    Baso # 09/16/2024 CANCELED  0.00 - 0.20 K/uL Final    Result canceled by the ancillary.    nRBC 09/16/2024 1 (A)  0 /100 WBC Final    Gran % 09/16/2024 65.0  38.0 - 73.0 % Final    Lymph % 09/16/2024 16.0 (L)  18.0 - 48.0 % Final    Mono % 09/16/2024 3.0 (L)  4.0 - 15.0 % Final    Eosinophil % 09/16/2024 0.0  0.0 - 8.0 % Final    Basophil % 09/16/2024 0.0  0.0 - 1.9 % Final    Bands 09/16/2024 6.0  % Final    Metamyelocytes 09/16/2024 9.0  % Final    Myelocytes 09/16/2024 1.0  % Final    Platelet Estimate 09/16/2024 Decreased (A)   Final    Aniso 09/16/2024 Slight   Final    Poly 09/16/2024 Occasional   Final    Toxic Granulation 09/16/2024 Present   Final    Differential Method 09/16/2024 Manual   Final         Imaging:     CXR 11/14/23    Cardiac silhouette and mediastinal contours are normal.  Lungs demonstrates no focal large opacity with possible subtle small nodules within the bilateral mid lungs.  No pleural effusion.  Osseous structures are intact.       CT chest 12/01/23    Base of Neck: No significant abnormality.     Thoracic soft tissues: Normal.     Aorta: Left-sided aortic arch.  No aneurysm and no significant atherosclerosis     Heart: Normal size. No effusion.     Pulmonary vasculature: Pulmonary arteries distribute normally.  There are four pulmonary veins.     Yuliya/Mediastinum: No pathologic richard enlargement.     Airways: Patent.     Lungs/Pleura: Biapical nonspecific fibronodular pleuroparenchymal scarring.  Solid 3 mm nodule in the right upper lobe (series 4, image 119).  Cluster ill-defined centrilobular ground-glass opacities within the posterior right and left upper lobes, not entirely specific but suspicious for small airways inflammation or infection.  There is a 1.2 cm ground-glass opacity in superior segment left lower lobe (series 4, image 282).  No consolidation,  pleural effusion or pneumothorax.     Esophagus: Normal.     Upper Abdomen: There is an 8 mm hypodensity in the inferior right hepatic lobe, which measures fluid attenuation and probably represents a cyst.  Cholelithiasis.  No abnormal gallbladder wall thickening or pericholecystic fluid.     Bones: No acute fracture. No suspicious lytic or sclerotic lesions.    CT Chest 5/21/24  Base of Neck: No significant abnormality.     Thoracic soft tissues: Normal.     Aorta: Left-sided aortic arch. No aneurysm.     Heart: Normal size. No effusion. Mild aortic and coronary artery atherosclerotic calcification.     Pulmonary vasculature: Pulmonary arteries distribute normally.     Yuliya/Mediastinum: No pathologic richard enlargement.     Esophagus: Normal.     Upper Abdomen: 3.7 x 2.7 cm pancreatic body mass with pancreatic tail atrophy suspicious for pancreatic malignancy. Recommend dedicated CT or MRI abdomen with IV contrast pancreatic protocol.     Airways: Patent.     Lungs/Pleura: Unchanged benign 3 mm nodule medial right upper lobe series 5, image 115. No new nodules. Mild bilateral apical pleuroparenchymal scarring again noted.     No airspace disease. Interval resolution of previously described ground-glass.     Bones: No acute fracture. No suspicious lytic or sclerotic lesions.    MRI Abdomen 6/30/24  Lower thorax: Heart is normal in size. No pleural fluid. No consolidation.     Liver: Normal size. No evidence of fatty infiltration. Small cyst in segment 6 measures 9 mm.     Gallbladder: Unremarkable.     Bile ducts: No intrahepatic or extrahepatic biliary dilatation.     Pancreas: Hypoenhancing mass centered in the proximal pancreatic body measures 2.9 x 2.0 cm (series 27, image 31). This lesion abuts and may encase the distal splenic vein which appears narrowed just proximal to the portal confluence. There is atrophy of the pancreatic body and tail distal to this lesion with associated dilatation of the main  pancreatic duct measuring 5 mm. There is also atrophy of the pancreatic head without associated ductal dilatation.     Spleen: Normal size. No focal lesions.     Adrenal glands: Unremarkable.     Kidneys: No renal masses. No hydronephrosis.     GI: No evidence of bowel obstruction.     Mesentery/retroperitoneum: No pathologically enlarged lymph nodes.     Vasculature: Visualized aorta is normal in caliber. Portal vein is patent.     Body wall/extraperitoneal soft tissues: Unremarkable.     Bones: Visualized osseous structures demonstrate normal marrow signal.     Miscellaneous: No intraperitoneal free fluid.    CT C/A/P 7/09/24  Normal thyroid. Heart size is normal. No pericardial effusion. The thoracic aorta and main pulmonary artery are normal in caliber. No enlarged axillary, mediastinal or hilar lymph nodes.     Central airways are clear. No pleural effusion, focal consolidation or pneumothorax.     The liver is normal in morphology and with smooth contour. Inferior right hepatic lobe 9 mm lesion previously characterized as a cyst. No new hepatic lesion.     Cholelithiasis. No gallbladder wall thickening or pericholecystic fluid. No intra or extrahepatic biliary ductal dilatation.     The spleen, kidneys and adrenal glands are normal.     Proximal pancreatic body irregular mass measures 4.4 x 3.7 cm (series 2, image 65) which when measured similarly to the prior chest CT is unchanged in size. The distal pancreatic body and tail are atrophic. Diffuse pancreatic ductal dilatation measuring 8 mm.     The portal and superior mesenteric veins are patent. 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 The mass abuts the portal by less than 180 degrees. The mass encases the proximal splenic artery which remains patent. The SMA is remote from the mass.     Peripancreatic lymph node measuring 0.7 cm in short axis (series 2, image 72). No enlarged retroperitoneal  lymph nodes.     No bowel obstruction inflammatory change. The mass focally abuts the distal lesser curvature the stomach. No free fluid or free air.     Circumferential bladder wall thickening. Normal uterus. No adnexal mass. No enlarged pelvic lymph nodes.     Soft tissues are unremarkable. No acute or aggressive osseous abnormality.       Assessment:       1. Malignant neoplasm of pancreas, unspecified location of malignancy    2. 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therapy    3. Thrombocytopenia    4. Hypokalemia    5. Pancreatic insufficiency    6. Hypothyroidism, unspecified type    7. Type 2 diabetes mellitus without complication, without long-term current use of insulin             Plan:   Pancreatic Cancer - Patient with Stage III pancreatic cancer with concern for potential encasement of the distal celiac artery per discussion with Dr Valentin  -No sign of mets  - 94.5 on 6/17/24  -Case discussed at tumor board 7/17/24 with recommendation to proceed with chemo  -[employer] genetic testing negative for the genes tested  -[facility] tissue testing showed TP53, KRAS p.G12D, CDKN2A, CDKN2B mutations.  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  If potassium better by Monday, pt can proceed with chemo  Visit today included increased complexity associated with the care of the episodic problem (chemotherapy) addressed and managing the longitudinal care of the patient due to the serious and/or complex managed problem(s) pancreatic cancer.     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 - pt at increased risk of infection  -No current signs of infection  -Will monitor     Thrombocytopenia - platelets 144k on 9/20/24  -Due to chemo  -Will monitor     Hypokalemia - due to diarrhea  -PT was not taking home oral potassium  -Pt to go to the ER     Pancreatic  Insufficiency - Pt taking Creon  -Diarrhea not from pancreaitc insufficiency     Hypothyroidism - pt on synthroid  -Will monitror     DMII - PT currently on Synjardy  Lab Results   Component Value Date    HGBA1C 6.2 (H) 04/01/2024   -Possibly due to pancreatic cancer  -Will monitor    Route Chart for Scheduling    Med Onc Chart Routing      Follow up with physician 2 weeks. PT needs to go to the ER for hypokalemia.  PT needs a BMP on 9/23/24 prior to chemo.  PT needs a CT abdomen pancreatic protocol 10/02/24 with CBC, CMP,  and Mg.  Pt needs an appt with me on 10/03/24.  OK to double book.  Please cancel appt with me on 9/30/24.   Follow up with ANALIA    Infusion scheduling note    Injection scheduling note    Labs    Imaging    Pharmacy appointment    Other referrals                Treatment Plan Information   OP GI FOLFIRINOX (oxaliplatin, leucovorin, irinotecan, fluorouracil) Q2W  Rajat Hunt MD   Associated diagnosis: Malignant neoplasm of pancreas Stage III cT4, cN0, cM0 noted on 7/12/2024   Line of treatment: Neoadjuvant  Treatment Goal: Curative     Upcoming Treatment Dates - OP GI FOLFIRINOX (oxaliplatin, leucovorin, irinotecan, fluorouracil) Q2W     9/23/2024       Chemotherapy       oxaliplatin (ELOXATIN) in D5W 526.6 mL chemo infusion       leucovorin calcium in D5W 250 mL infusion       irinotecan (CAMPTOSAR) in 0.9% NaCl 510.9 mL chemo infusion       fluorouraciL injection       fluorouracil chemo infusion       Antiemetics       palonosetron 0.25mg/dexAMETHasone 12mg in NS IVPB 0.25 mg 50 mL  10/7/2024       Chemotherapy       oxaliplatin (ELOXATIN) in D5W 526.6 mL chemo infusion       leucovorin calcium in D5W 250 mL infusion       irinotecan (CAMPTOSAR) in 0.9% NaCl 510.9 mL chemo infusion       fluorouraciL injection       fluorouracil chemo infusion       Antiemetics       palonosetron 0.25mg/dexAMETHasone 12mg in NS IVPB 0.25 mg 50 mL  10/21/2024       Chemotherapy       oxaliplatin  (ELOXATIN) in D5W 526.6 mL chemo infusion       leucovorin calcium in D5W 250 mL infusion       irinotecan (CAMPTOSAR) in 0.9% NaCl 510.9 mL chemo infusion       fluorouraciL injection       fluorouracil chemo infusion       Antiemetics       palonosetron 0.25mg/dexAMETHasone 12mg in NS IVPB 0.25 mg 50 mL  11/4/2024       Chemotherapy       oxaliplatin (ELOXATIN) in D5W 526.6 mL chemo infusion       leucovorin calcium in D5W 250 mL infusion       irinotecan (CAMPTOSAR) in 0.9% NaCl 510.9 mL chemo infusion       fluorouraciL injection       fluorouracil chemo infusion       Antiemetics       palonosetron 0.25mg/dexAMETHasone 12mg in NS IVPB 0.25 mg 50 mL      Rajat Hunt MD  [facility]  Hematology and Oncology  [facility]   [address] [facility] [place] [address]   O: [phone]  F: [phone]

## 2024-09-21 PROBLEM — K86.89 PANCREATIC INSUFFICIENCY: Status: ACTIVE | Noted: 2024-09-21

## 2024-09-21 PROBLEM — D69.6 THROMBOCYTOPENIA: Status: ACTIVE | Noted: 2024-09-21

## 2024-09-21 PROBLEM — D64.9 NORMOCYTIC ANEMIA: Status: ACTIVE | Noted: 2024-09-21

## 2024-09-23 ENCOUNTER — INFUSION (OUTPATIENT)
Dept: INFUSION THERAPY | Facility: HOSPITAL | Age: 64
End: 2024-09-23
Attending: INTERNAL MEDICINE
Payer: MEDICARE

## 2024-09-23 ENCOUNTER — DOCUMENTATION ONLY (OUTPATIENT)
Dept: INFUSION THERAPY | Facility: HOSPITAL | Age: 64
End: 2024-09-23
Payer: MEDICARE

## 2024-09-23 VITALS
HEART RATE: 80 BPM | HEIGHT: 61 IN | BODY MASS INDEX: 24.85 KG/M2 | WEIGHT: 131.63 LBS | RESPIRATION RATE: 16 BRPM | SYSTOLIC BLOOD PRESSURE: 111 MMHG | OXYGEN SATURATION: 97 % | DIASTOLIC BLOOD PRESSURE: 71 MMHG | TEMPERATURE: 98 F

## 2024-09-23 DIAGNOSIS — C25.9 MALIGNANT NEOPLASM OF PANCREAS, UNSPECIFIED LOCATION OF MALIGNANCY: Primary | ICD-10-CM

## 2024-09-23 DIAGNOSIS — E87.6 HYPOKALEMIA: Primary | ICD-10-CM

## 2024-09-23 PROCEDURE — 96413 CHEMO IV INFUSION 1 HR: CPT | Mod: PN

## 2024-09-23 PROCEDURE — 25000003 PHARM REV CODE 250: Mod: PN | Performed by: INTERNAL MEDICINE

## 2024-09-23 PROCEDURE — 96367 TX/PROPH/DG ADDL SEQ IV INF: CPT | Mod: PN

## 2024-09-23 PROCEDURE — A4216 STERILE WATER/SALINE, 10 ML: HCPCS | Mod: PN | Performed by: INTERNAL MEDICINE

## 2024-09-23 PROCEDURE — 96411 CHEMO IV PUSH ADDL DRUG: CPT | Mod: PN

## 2024-09-23 PROCEDURE — 96368 THER/DIAG CONCURRENT INF: CPT | Mod: PN

## 2024-09-23 PROCEDURE — 63600175 PHARM REV CODE 636 W HCPCS: Mod: PN | Performed by: INTERNAL MEDICINE

## 2024-09-23 PROCEDURE — 96417 CHEMO IV INFUS EACH ADDL SEQ: CPT | Mod: PN

## 2024-09-23 PROCEDURE — 96375 TX/PRO/DX INJ NEW DRUG ADDON: CPT | Mod: PN

## 2024-09-23 PROCEDURE — 96415 CHEMO IV INFUSION ADDL HR: CPT | Mod: PN

## 2024-09-23 RX ORDER — PROCHLORPERAZINE EDISYLATE 5 MG/ML
5 INJECTION INTRAMUSCULAR; INTRAVENOUS ONCE AS NEEDED
Status: CANCELLED | OUTPATIENT
Start: 2024-09-25

## 2024-09-23 RX ORDER — FLUOROURACIL 50 MG/ML
400 INJECTION, SOLUTION INTRAVENOUS
Status: CANCELLED | OUTPATIENT
Start: 2024-09-23

## 2024-09-23 RX ORDER — DIPHENHYDRAMINE HYDROCHLORIDE 50 MG/ML
50 INJECTION INTRAMUSCULAR; INTRAVENOUS ONCE AS NEEDED
Status: DISCONTINUED | OUTPATIENT
Start: 2024-09-23 | End: 2024-09-23 | Stop reason: HOSPADM

## 2024-09-23 RX ORDER — DIPHENHYDRAMINE HYDROCHLORIDE 50 MG/ML
50 INJECTION INTRAMUSCULAR; INTRAVENOUS ONCE AS NEEDED
Status: CANCELLED | OUTPATIENT
Start: 2024-09-23

## 2024-09-23 RX ORDER — ATROPINE SULFATE 0.4 MG/ML
0.4 INJECTION, SOLUTION ENDOTRACHEAL; INTRAMEDULLARY; INTRAMUSCULAR; INTRAVENOUS; SUBCUTANEOUS ONCE AS NEEDED
Status: COMPLETED | OUTPATIENT
Start: 2024-09-23 | End: 2024-09-23

## 2024-09-23 RX ORDER — SODIUM CHLORIDE 0.9 % (FLUSH) 0.9 %
10 SYRINGE (ML) INJECTION
Status: CANCELLED | OUTPATIENT
Start: 2024-09-25

## 2024-09-23 RX ORDER — HEPARIN 100 UNIT/ML
500 SYRINGE INTRAVENOUS
Status: DISCONTINUED | OUTPATIENT
Start: 2024-09-23 | End: 2024-09-23 | Stop reason: HOSPADM

## 2024-09-23 RX ORDER — HEPARIN 100 UNIT/ML
500 SYRINGE INTRAVENOUS
Status: CANCELLED | OUTPATIENT
Start: 2024-09-25

## 2024-09-23 RX ORDER — FLUOROURACIL 50 MG/ML
400 INJECTION, SOLUTION INTRAVENOUS
Status: COMPLETED | OUTPATIENT
Start: 2024-09-23 | End: 2024-09-23

## 2024-09-23 RX ORDER — PROCHLORPERAZINE EDISYLATE 5 MG/ML
5 INJECTION INTRAMUSCULAR; INTRAVENOUS ONCE AS NEEDED
Status: DISCONTINUED | OUTPATIENT
Start: 2024-09-23 | End: 2024-09-23 | Stop reason: HOSPADM

## 2024-09-23 RX ORDER — HEPARIN 100 UNIT/ML
500 SYRINGE INTRAVENOUS
Status: CANCELLED | OUTPATIENT
Start: 2024-09-23

## 2024-09-23 RX ORDER — SODIUM CHLORIDE 0.9 % (FLUSH) 0.9 %
10 SYRINGE (ML) INJECTION
Status: DISCONTINUED | OUTPATIENT
Start: 2024-09-23 | End: 2024-09-23 | Stop reason: HOSPADM

## 2024-09-23 RX ORDER — EPINEPHRINE 0.3 MG/.3ML
0.3 INJECTION SUBCUTANEOUS ONCE AS NEEDED
Status: DISCONTINUED | OUTPATIENT
Start: 2024-09-23 | End: 2024-09-23 | Stop reason: HOSPADM

## 2024-09-23 RX ORDER — EPINEPHRINE 0.3 MG/.3ML
0.3 INJECTION SUBCUTANEOUS ONCE AS NEEDED
Status: CANCELLED | OUTPATIENT
Start: 2024-09-23

## 2024-09-23 RX ORDER — PROCHLORPERAZINE EDISYLATE 5 MG/ML
5 INJECTION INTRAMUSCULAR; INTRAVENOUS ONCE AS NEEDED
Status: CANCELLED | OUTPATIENT
Start: 2024-09-23

## 2024-09-23 RX ORDER — SODIUM CHLORIDE 0.9 % (FLUSH) 0.9 %
10 SYRINGE (ML) INJECTION
Status: CANCELLED | OUTPATIENT
Start: 2024-09-23

## 2024-09-23 RX ORDER — ATROPINE SULFATE 0.4 MG/ML
0.4 INJECTION, SOLUTION ENDOTRACHEAL; INTRAMEDULLARY; INTRAMUSCULAR; INTRAVENOUS; SUBCUTANEOUS ONCE AS NEEDED
Status: CANCELLED | OUTPATIENT
Start: 2024-09-23

## 2024-09-23 RX ADMIN — DEXTROSE MONOHYDRATE: 5 INJECTION, SOLUTION INTRAVENOUS at 09:09

## 2024-09-23 RX ADMIN — DEXAMETHASONE SODIUM PHOSPHATE 0.25 MG: 10 INJECTION, SOLUTION INTRAMUSCULAR; INTRAVENOUS at 09:09

## 2024-09-23 RX ADMIN — OXALIPLATIN 136 MG: 100 INJECTION, SOLUTION, CONCENTRATE INTRAVENOUS at 10:09

## 2024-09-23 RX ADMIN — ATROPINE SULFATE 0.4 MG: 0.4 INJECTION, SOLUTION INTRAVENOUS at 12:09

## 2024-09-23 RX ADMIN — FLUOROURACIL 640 MG: 50 INJECTION, SOLUTION INTRAVENOUS at 02:09

## 2024-09-23 RX ADMIN — FLUOROURACIL 3840 MG: 50 INJECTION, SOLUTION INTRAVENOUS at 02:09

## 2024-09-23 RX ADMIN — SODIUM CHLORIDE: 9 INJECTION, SOLUTION INTRAVENOUS at 12:09

## 2024-09-23 RX ADMIN — LEUCOVORIN CALCIUM 640 MG: 350 INJECTION, POWDER, LYOPHILIZED, FOR SUSPENSION INTRAMUSCULAR; INTRAVENOUS at 12:09

## 2024-09-23 RX ADMIN — SODIUM CHLORIDE 260 MG: 9 INJECTION, SOLUTION INTRAVENOUS at 12:09

## 2024-09-23 RX ADMIN — SODIUM CHLORIDE, PRESERVATIVE FREE 10 ML: 5 INJECTION INTRAVENOUS at 09:09

## 2024-09-23 NOTE — PROGRESS NOTES
Oncology Nutrition   Chemotherapy Infusion Visit    Nutrition Follow Up   RD met with patient at chairside during infusion tx. Pt recently hospitalized for hypokalemia. Pt received IV K+ and is now taking it orally BID. Pt was having diarrhea the week prior but does not classify it as severe. Pt taking Creon as directed and trying to eat adequately. Weight has been relatively stable, up 5# from the week prior. RD provided patient's  Banatrol samples, who is also a cancer pt, and he has not used them as his diarrhea resolved. Mrs. Doherty states she has them in case her diarrhea starts again to try.     Wt Readings from Last 10 Encounters:   09/23/24 59.7 kg (131 lb 9.8 oz)   09/21/24 57.2 kg (126 lb 1.7 oz)   09/20/24 56.9 kg (125 lb 7.1 oz)   09/16/24 57.3 kg (126 lb 5.2 oz)   09/13/24 56.2 kg (123 lb 14.4 oz)   09/06/24 58.1 kg (128 lb 1.4 oz)   09/06/24 58.1 kg (128 lb 1.4 oz)   09/03/24 59 kg (130 lb 1.1 oz)   09/03/24 59 kg (130 lb 1.1 oz)   08/22/24 58.5 kg (128 lb 15.5 oz)       All other nutrition questions/concerns addressed as appropriate. Will continue to follow and monitor throughout treatment PRN.     Bernie Gurrola, MS, RD, LDN  09/23/2024  11:19 AM

## 2024-09-23 NOTE — PROGRESS NOTES
CELIO met with pt at chairside. Pt reports last week she had to go to the hospital because her potasium was low. She spent several days there. She was happy to return home yesterday. Pt reports she will be getting her scan 10/2 and then her  will be going to [facility] in [place]. Pt is worried about the timing of this. She said pending her scan results she may need surgery. She does not want to be in the phosphatidal when her  has to go to [place]. SW listened to pt's concerns and provided support.     SW provided pt with a gas card.     ANA MARIA GraceW

## 2024-09-23 NOTE — PLAN OF CARE
Problem: Diarrhea  Goal: Effective Diarrhea Management  Intervention: Manage Diarrhea  Flowsheets (Taken 9/23/2024 0905)  Medication Review/Management:   medications reviewed   high-risk medications identified   infusion initiated     Problem: Diarrhea  Goal: Effective Diarrhea Management  Intervention: Manage Diarrhea  Flowsheets (Taken 9/23/2024 0905)  Medication Review/Management:   medications reviewed   high-risk medications identified   infusion initiated   Tolerated infusion well today Able to be d/c to home  Discharge instructions given with copy of avs and pt ambulated to private vehicle without difficulty NAD

## 2024-09-25 ENCOUNTER — INFUSION (OUTPATIENT)
Dept: INFUSION THERAPY | Facility: HOSPITAL | Age: 64
End: 2024-09-25
Attending: INTERNAL MEDICINE
Payer: MEDICARE

## 2024-09-25 VITALS
SYSTOLIC BLOOD PRESSURE: 114 MMHG | RESPIRATION RATE: 16 BRPM | TEMPERATURE: 98 F | HEART RATE: 71 BPM | DIASTOLIC BLOOD PRESSURE: 73 MMHG

## 2024-09-25 DIAGNOSIS — C25.9 MALIGNANT NEOPLASM OF PANCREAS, UNSPECIFIED LOCATION OF MALIGNANCY: Primary | ICD-10-CM

## 2024-09-25 PROCEDURE — 25000003 PHARM REV CODE 250: Mod: PN | Performed by: INTERNAL MEDICINE

## 2024-09-25 PROCEDURE — A4216 STERILE WATER/SALINE, 10 ML: HCPCS | Mod: PN | Performed by: INTERNAL MEDICINE

## 2024-09-25 RX ORDER — SODIUM CHLORIDE 0.9 % (FLUSH) 0.9 %
10 SYRINGE (ML) INJECTION
Status: DISCONTINUED | OUTPATIENT
Start: 2024-09-25 | End: 2024-09-25 | Stop reason: HOSPADM

## 2024-09-25 RX ORDER — HEPARIN 100 UNIT/ML
500 SYRINGE INTRAVENOUS
Status: DISCONTINUED | OUTPATIENT
Start: 2024-09-25 | End: 2024-09-25 | Stop reason: HOSPADM

## 2024-09-25 RX ADMIN — SODIUM CHLORIDE, PRESERVATIVE FREE 10 ML: 5 INJECTION INTRAVENOUS at 11:09

## 2024-09-26 ENCOUNTER — INFUSION (OUTPATIENT)
Dept: INFUSION THERAPY | Facility: HOSPITAL | Age: 64
End: 2024-09-26
Attending: INTERNAL MEDICINE
Payer: MEDICARE

## 2024-09-26 VITALS
HEART RATE: 80 BPM | OXYGEN SATURATION: 97 % | TEMPERATURE: 97 F | DIASTOLIC BLOOD PRESSURE: 61 MMHG | SYSTOLIC BLOOD PRESSURE: 121 MMHG | WEIGHT: 131.63 LBS | HEIGHT: 61 IN | BODY MASS INDEX: 24.85 KG/M2 | RESPIRATION RATE: 20 BRPM

## 2024-09-26 DIAGNOSIS — C25.9 MALIGNANT NEOPLASM OF PANCREAS, UNSPECIFIED LOCATION OF MALIGNANCY: Primary | ICD-10-CM

## 2024-09-26 PROCEDURE — 96372 THER/PROPH/DIAG INJ SC/IM: CPT | Mod: PN

## 2024-09-26 PROCEDURE — 63600175 PHARM REV CODE 636 W HCPCS: Mod: JZ,JG,PN | Performed by: INTERNAL MEDICINE

## 2024-09-26 RX ADMIN — PEGFILGRASTIM-CBQV 6 MG: 6 INJECTION, SOLUTION SUBCUTANEOUS at 12:09

## 2024-09-30 ENCOUNTER — INFUSION (OUTPATIENT)
Dept: INFUSION THERAPY | Facility: HOSPITAL | Age: 64
End: 2024-09-30
Attending: INTERNAL MEDICINE
Payer: MEDICARE

## 2024-09-30 DIAGNOSIS — C25.9 MALIGNANT NEOPLASM OF PANCREAS, UNSPECIFIED LOCATION OF MALIGNANCY: Primary | ICD-10-CM

## 2024-09-30 LAB
ALBUMIN SERPL BCP-MCNC: 3.8 G/DL (ref 3.5–5.2)
ALP SERPL-CCNC: 104 U/L (ref 55–135)
ALT SERPL W/O P-5'-P-CCNC: 13 U/L (ref 10–44)
ANION GAP SERPL CALC-SCNC: 12 MMOL/L (ref 8–16)
ANISOCYTOSIS BLD QL SMEAR: SLIGHT
AST SERPL-CCNC: 17 U/L (ref 10–40)
BASOPHILS NFR BLD: 2 % (ref 0–1.9)
BILIRUB SERPL-MCNC: 1.2 MG/DL (ref 0.1–1)
BUN SERPL-MCNC: 7 MG/DL (ref 8–23)
CALCIUM SERPL-MCNC: 8.5 MG/DL (ref 8.7–10.5)
CHLORIDE SERPL-SCNC: 105 MMOL/L (ref 95–110)
CO2 SERPL-SCNC: 20 MMOL/L (ref 23–29)
CREAT SERPL-MCNC: 0.7 MG/DL (ref 0.5–1.4)
DACRYOCYTES BLD QL SMEAR: ABNORMAL
DIFFERENTIAL METHOD BLD: ABNORMAL
EOSINOPHIL NFR BLD: 1 % (ref 0–8)
ERYTHROCYTE [DISTWIDTH] IN BLOOD BY AUTOMATED COUNT: 18.2 % (ref 11.5–14.5)
EST. GFR  (NO RACE VARIABLE): >60 ML/MIN/1.73 M^2
GLUCOSE SERPL-MCNC: 168 MG/DL (ref 70–110)
HCT VFR BLD AUTO: 28.4 % (ref 37–48.5)
HGB BLD-MCNC: 9.8 G/DL (ref 12–16)
IMM GRANULOCYTES # BLD AUTO: ABNORMAL K/UL (ref 0–0.04)
IMM GRANULOCYTES NFR BLD AUTO: ABNORMAL % (ref 0–0.5)
LYMPHOCYTES NFR BLD: 23 % (ref 18–48)
MAGNESIUM SERPL-MCNC: 1.8 MG/DL (ref 1.6–2.6)
MCH RBC QN AUTO: 30.5 PG (ref 27–31)
MCHC RBC AUTO-ENTMCNC: 34.5 G/DL (ref 32–36)
MCV RBC AUTO: 89 FL (ref 82–98)
METAMYELOCYTES NFR BLD MANUAL: 1 %
MONOCYTES NFR BLD: 8 % (ref 4–15)
NEUTROPHILS NFR BLD: 65 % (ref 38–73)
NRBC BLD-RTO: 0 /100 WBC
OVALOCYTES BLD QL SMEAR: ABNORMAL
PLATELET # BLD AUTO: 56 K/UL (ref 150–450)
PLATELET BLD QL SMEAR: ABNORMAL
PMV BLD AUTO: 9.8 FL (ref 9.2–12.9)
POLYCHROMASIA BLD QL SMEAR: ABNORMAL
POTASSIUM SERPL-SCNC: 3.5 MMOL/L (ref 3.5–5.1)
PROT SERPL-MCNC: 6.6 G/DL (ref 6–8.4)
RBC # BLD AUTO: 3.21 M/UL (ref 4–5.4)
SODIUM SERPL-SCNC: 137 MMOL/L (ref 136–145)
TOXIC GRANULES BLD QL SMEAR: PRESENT
WBC # BLD AUTO: 8.17 K/UL (ref 3.9–12.7)

## 2024-09-30 PROCEDURE — 85007 BL SMEAR W/DIFF WBC COUNT: CPT | Mod: PN | Performed by: NURSE PRACTITIONER

## 2024-09-30 PROCEDURE — 36591 DRAW BLOOD OFF VENOUS DEVICE: CPT | Mod: PN

## 2024-09-30 PROCEDURE — 85027 COMPLETE CBC AUTOMATED: CPT | Mod: PN | Performed by: NURSE PRACTITIONER

## 2024-09-30 PROCEDURE — 83735 ASSAY OF MAGNESIUM: CPT | Mod: PN | Performed by: NURSE PRACTITIONER

## 2024-09-30 PROCEDURE — A4216 STERILE WATER/SALINE, 10 ML: HCPCS | Mod: PN | Performed by: INTERNAL MEDICINE

## 2024-09-30 PROCEDURE — 25000003 PHARM REV CODE 250: Mod: PN | Performed by: INTERNAL MEDICINE

## 2024-09-30 PROCEDURE — 80053 COMPREHEN METABOLIC PANEL: CPT | Mod: PN | Performed by: NURSE PRACTITIONER

## 2024-09-30 RX ORDER — HEPARIN 100 UNIT/ML
500 SYRINGE INTRAVENOUS
Status: DISCONTINUED | OUTPATIENT
Start: 2024-09-30 | End: 2024-09-30 | Stop reason: HOSPADM

## 2024-09-30 RX ORDER — SODIUM CHLORIDE 0.9 % (FLUSH) 0.9 %
10 SYRINGE (ML) INJECTION
OUTPATIENT
Start: 2024-09-30

## 2024-09-30 RX ORDER — HEPARIN 100 UNIT/ML
500 SYRINGE INTRAVENOUS
OUTPATIENT
Start: 2024-09-30

## 2024-09-30 RX ORDER — SODIUM CHLORIDE 0.9 % (FLUSH) 0.9 %
10 SYRINGE (ML) INJECTION
Status: DISCONTINUED | OUTPATIENT
Start: 2024-09-30 | End: 2024-09-30 | Stop reason: HOSPADM

## 2024-09-30 RX ADMIN — Medication 10 ML: at 11:09

## 2024-09-30 NOTE — PLAN OF CARE
Problem: Adult Inpatient Plan of Care  Goal: Plan of Care Review  Outcome: Progressing  Flowsheets (Taken 9/30/2024 1211)  Plan of Care Reviewed With:   patient   spouse  Pt tolerated labs through port well.  No adverse reaction noted.   PAD flushed with NS and de-accessed per protocol.  Patient left clinic in no acute distress.

## 2024-09-30 NOTE — PLAN OF CARE
Problem: Fatigue  Goal: Improved Activity Tolerance  Outcome: Progressing  Intervention: Promote Improved Energy  Flowsheets (Taken 9/30/2024 1115)  Fatigue Management: frequent rest breaks encouraged  Sleep/Rest Enhancement: regular sleep/rest pattern promoted  Activity Management: Ambulated -L4  Environmental Support: rest periods encouraged     Problem: Adult Inpatient Plan of Care  Goal: Patient-Specific Goal (Individualized)  Outcome: Progressing  Flowsheets (Taken 9/30/2024 1115)  Individualized Care Needs: recliner, spouse chairside  Anxieties, Fears or Concerns: none today  Patient/Family-Specific Goals (Include Timeframe): receive potassium today if level is low

## 2024-10-01 ENCOUNTER — TELEPHONE (OUTPATIENT)
Dept: HEMATOLOGY/ONCOLOGY | Facility: CLINIC | Age: 64
End: 2024-10-01
Payer: MEDICARE

## 2024-10-02 ENCOUNTER — TELEPHONE (OUTPATIENT)
Dept: HEMATOLOGY/ONCOLOGY | Facility: CLINIC | Age: 64
End: 2024-10-02
Payer: MEDICARE

## 2024-10-02 ENCOUNTER — HOSPITAL ENCOUNTER (OUTPATIENT)
Dept: RADIOLOGY | Facility: HOSPITAL | Age: 64
Discharge: HOME | End: 2024-10-02
Attending: INTERNAL MEDICINE
Payer: MEDICARE

## 2024-10-02 DIAGNOSIS — C25.9 MALIGNANT NEOPLASM OF PANCREAS, UNSPECIFIED LOCATION OF MALIGNANCY: ICD-10-CM

## 2024-10-02 PROCEDURE — A9698 NON-RAD CONTRAST MATERIALNOC: HCPCS | Mod: PO | Performed by: INTERNAL MEDICINE

## 2024-10-02 PROCEDURE — 74177 CT ABD & PELVIS W/CONTRAST: CPT | Mod: TC,PO

## 2024-10-02 PROCEDURE — 71260 CT THORAX DX C+: CPT | Mod: 26,,, | Performed by: RADIOLOGY

## 2024-10-02 PROCEDURE — 74177 CT ABD & PELVIS W/CONTRAST: CPT | Mod: 26,,, | Performed by: RADIOLOGY

## 2024-10-02 PROCEDURE — 25500020 PHARM REV CODE 255: Mod: PO | Performed by: INTERNAL MEDICINE

## 2024-10-02 PROCEDURE — 71260 CT THORAX DX C+: CPT | Mod: TC,PO

## 2024-10-02 RX ADMIN — IOHEXOL 75 ML: 350 INJECTION, SOLUTION INTRAVENOUS at 11:10

## 2024-10-02 RX ADMIN — IOHEXOL 1000 ML: 9 SOLUTION ORAL at 11:10

## 2024-10-02 NOTE — PROGRESS NOTES
PATIENT: Bessy Lamb  MRN: [id number]  DATE: 10/6/2024      Diagnosis:   1. Malignant neoplasm of pancreas, unspecified location of malignancy    2. 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therapy    3. Thrombocytopenia    4. Hypokalemia    5. Pancreatic insufficiency    6. Hypothyroidism, unspecified type    7. Type 2 diabetes mellitus without complication, without long-term current use of insulin    8. Pulmonary nodule, right            Chief Complaint: Malignant neoplasm of pancreas, unspecified location of mal (2 week follow up )      Oncologic History:      Oncologic History     Oncologic Treatment     Pathology           Subjective:    Interval History:  Ms. Lamb is a 64 y.o. female with Arthritis, DMII, GERD, Hypotyroidism, Obesity, Thyroid disease who presents for pancreatic cancer.  Since the last clinic visit the patient is admitted to the hospital from 09/20/2024 until 09/22/2024 for hypokalemia with potassium of 2.3.  The patient was subsequently discharged underwent treatment with FOLFIRINOX on 09/23/2024.  CT of the chest, abdomen, and pelvis on 10/02/2024 showed a new 5 mm ground-glass nodule in the right lower lobe; stable 8 mm hypodensity present within the right hepatic lobe suggestive of a cyst; 37 x 35 mm proximal pancreatic body mass slightly smaller in transverse dimension involving 90 degree area of the celiac artery and 100 degree area of the splenic artery with soft tissue extending along the anterior right lateral wall of the portal vein; abnormal soft tissue insinuating between the lateral margin of the celiac artery and adjacent splenic vein; invasion and focal occlusion of the proximal most aspect of the splenic vein; concentric wall thickening present within the distal sigmoid colon/rectum with infectious and inflammatory etiologies possible.    Currently the patient states he has been having less diarrhea and nausea than previous.  The patient states was having active diarrhea on the day  of her recent scan.  The patient denies CP, cough, SOB, abdominal pain, vomiting, constipation.  The patient denies fever, chills, night sweats, weight loss, new lumps or bumps, easy bruising or bleeding.    Prior History:   The patient initially underwent a CXR on 11/13/23 fur a URI which showed possible subtle pulmonary nodules.  CT chest 12/01/2023 showed a cluster of ill-defined centrilobular ground-glass opacity he inferior right and left upper lobes as well as a 1.2 cm ground-glass opacity in the superior segment of the left lower lobe; solid 3 mm pulmonary nodule in the right upper lobe; subcentimeter right hepatic lobe hypodensity likely representing a cyst.  The patient underwent surveillance CT chest on 05/21/2024 showing a 3.7 x 2.7 pancreatic body mass in the pancreatic tail atrophy suspicious of pancreatic malignancy as well as and unchanged benign 3 mm nodule in the right upper lobe.  MRI abdomen on 06/30/2024 showed hypoenhancing mass centered in the proximal pancreatic body measuring 2.9 x 2 cm with abutment and possible encasement of the distal splenic vein.  EUS was performed on 07/05/2024 mass in the pancreatic body stage T4 N0 M0 by endo sonographic criteria.  Pathology from biopsy of the stomach showed mild focally moderate chronic gastritis with no evidence of the H pylori.  Stomach polyp which was removed code hyperplastic polyps with chronic inflammation.  Pancreatic biopsy showed adenocarcinoma.  CT of the chest, abdomen, and pelvis on 07/09/2024 showed a 9 mm lesion in the right hepatic lobe previously characterized as cysts; mass in the pancreatic body measuring 4.4 x 3.7 cm with diffuse pancreatic ductal dilatation measuring 8 mm:  Weaning vein at the portal confluence occluded with reconstitution via collaterals.  The mass abuts the portal vein by less than 180° but does not case the proximal splenic artery remains patent.  Also noted was a pancreatic lymph node measuring 0.7  cm.   Patient's case was discussed at tumor board on 2024 with recommendation for proceeding with the chemotherapy.  Patient had port placement on 2024.    Past Medical History:   Past Medical History:   Diagnosis Date    Arthritis, lumbar spine     hands    Diabetes mellitus     General anesthetics causing adverse effect in therapeutic use     following breast rediuct, hard time awakening  also had anxiety rxn to lidocain in dental ofc    GERD (gastroesophageal reflux disease)     Hypothyroidism 10/08/2014    Maternal anesthesia complication     epidural for 1st child went up instead of down; required intubation    Normocytic anemia 2024    Obesity (BMI 30-39.9) 10/08/2014    Thyroid disease     Thyroid nodule 10/08/2014       Past Surgical HIstory:   Past Surgical History:   Procedure Laterality Date    BREAST BIOPSY Left     b9    BREAST SURGERY      Reduction cause of a mass in left breast    c-sections x2       SECTION  3/26/81 & 85    Birth of two girls    COLONOSCOPY  2012         COLONOSCOPY N/A 2018    Procedure: COLONOSCOPY;  Surgeon: Francisco Mcclain MD;  Location: [facility];  Service: Endoscopy;  Laterality: N/A;    COLONOSCOPY N/A 10/24/2023    Procedure: COLONOSCOPY;  Surgeon: Francisco Mcclain MD;  Location: [facility];  Service: Endoscopy;  Laterality: N/A;    ENDOSCOPIC ULTRASOUND OF UPPER GASTROINTESTINAL TRACT Left 2024    Procedure: ULTRASOUND, UPPER GI TRACT, ENDOSCOPIC;  Surgeon: Andrew Gordon MD;  Location: [facility];  Service: Endoscopy;  Laterality: Left;    ESOPHAGOGASTRODUODENOSCOPY N/A 2024    Procedure: EGD (ESOPHAGOGASTRODUODENOSCOPY);  Surgeon: Andrew Gordon MD;  Location: [facility];  Service: Endoscopy;  Laterality: N/A;    hysteroscopy with polypectomy      INCISIONAL BREAST BIOPSY Left     benign; done at same time as reduction    INSERTION OF TUNNELED CENTRAL VENOUS CATHETER (CVC) WITH SUBCUTANEOUS PORT N/A  7/18/2024    Procedure: [id number];  Surgeon: Blanca Dillard MD;  Location: [facility];  Service: General;  Laterality: N/A;    TOTAL REDUCTION MAMMOPLASTY Bilateral 1996       Family History:   Family History   Problem Relation Name Age of Onset    Brain cancer Mother Ravi Seaman     Early death Mother Ravi Seaman 51        Passed at 51    Cancer Mother Ravi Seaman         Brain tumor    Arthritis Mother Ravi Seaman     Diabetes Father Ck seaman         Type 2    Cirrhosis Father Ck seaman     Cancer Father Ck seaman         Bone    COPD Father Ck seaman         Smoker    Early death Father Ck seaman         Passed at 64    Lung cancer Father Ck seaman     Heart disease Sister Mamta (dianna)wescovich         Congestive heart failure (passed 2/11/18    Hypertension Sister Mamta (dianna)wescovich     Kidney disease Sister Mamta (dianna)wescoradha         Doc removed when she was a child    Hypertension Sister Mayank     Depression Sister Ravi (mayank) macey Luly ( sister)         Due to loss of her 27 year old son    Early death Sister Ravi (mayank) macey Luly ( sister)         Pulmonary hypertension, cirrhosis of the liver(passed 7/14/2007   Age 57    Heart disease Brother John Borden Macey ( passed )         Heart attack    Hypertension Brother John Borden Macey ( passed )     Heart disease Brother Juan Francisco Macey         Heart  blockage    Heart disease Brother Bhanu Macey         Heart attack (passed)    Diabetes Brother Bhanu Macey     Macular degeneration Brother Bhanu Macey     Breast cancer Maternal Aunt  30    Breast cancer Paternal Aunt  40    Breast cancer Paternal Aunt  40    Ovarian cancer Neg Hx         Social History:  reports that she has never smoked. She has never used smokeless tobacco. She reports that she does not currently  use alcohol. She reports that she does not use drugs.    Allergies:  Review of patient's allergies indicates:   Allergen Reactions    Duricef [cefadroxil] Hives       Medications:  Current Outpatient Medications   Medication Sig Dispense Refill    acetaminophen (TYLENOL) 325 MG tablet Take 325 mg by mouth every 6 (six) hours as needed for Pain.      blood-glucose meter Misc Use as directed 1 each prn    duke's soln (benadryl 30 mL, mylanta 30 mL, LIDOcaine 30 mL, nystatin 30 mL) 120mL 10 ml swish & spit/swallow every 4 to 6 hours as needed for mouth pain/ulcers/yeast/throat pain 240 mL 1    fluticasone propionate (FLONASE) 50 mcg/actuation nasal spray 1 spray (50 mcg total) by Each Nostril route once daily. 18.2 mL 0    levothyroxine (SYNTHROID) 50 MCG tablet Take 1 tablet (50 mcg total) by mouth once daily. 90 tablet 1    lipase-protease-amylase 24,000-76,000-120,000 units (CREON) 24,000-76,000 -120,000 unit capsule Take 2 capsules by mouth 3 (three) times daily with meals. 180 capsule 11    lipase-protease-amylase 24,000-76,000-120,000 units (CREON) 24,000-76,000 -120,000 unit capsule Take 1 capsule by mouth with snacks.      loperamide (IMODIUM) 2 mg capsule Take 2 tablets (4mg) by mouth after first loose stool, 1 tablet every 2 hours until diarrhea free for 12 hours. May take 2 tablets (4mg) by mouth every 4 hours at night. May require more than the package labeling maximum dose of 16mg/day. 30 capsule 11    loratadine (CLARITIN) 10 mg tablet Take 1 tablet (10 mg total) by mouth every morning. 30 tablet 11    meclizine (ANTIVERT) 25 mg tablet Take 1 tablet (25 mg total) by mouth 3 (three) times daily as needed for Dizziness. 30 tablet 0    mupirocin (BACTROBAN) 2 % ointment Apply topically 3 (three) times daily.      OLANZapine (ZYPREXA) 5 MG tablet Take 1 tablet by mouth nightly on days 1-3 of each chemotherapy cycle. 6 tablet 11    potassium chloride SA (K-DUR,KLOR-CON) 20 MEQ tablet Take 20 mEq by mouth once.       prochlorperazine (COMPAZINE) 5 MG tablet Take 1 tablet (5 mg total) by mouth every 6 (six) hours as needed for Nausea. 20 tablet 5    simvastatin (ZOCOR) 10 MG tablet Take 1 tablet (10 mg total) by mouth every evening. 90 tablet 3    TRUE METRIX GLUCOSE TEST STRIP Strp USE 1 STRIP ONCE DAILY TO TEST BLOOD GLUCOSE (50 DAY SUPPLY) 100 each 3    TRUEPLUS LANCETS 33 gauge Misc USE AS DIRECTED TO TEST BLOOD SUGAR ONCE DAILY FOR UP  USES 100 each 2    VITAMIN D2 1,250 mcg (50,000 unit) capsule TAKE 1 CAPSULE (50,000 UNITS TOTAL) BY MOUTH TWICE A WEEK. (Patient taking differently: Take by mouth every 7 days.) 24 capsule 3    losartan (COZAAR) 25 MG tablet Take 1 tablet (25 mg total) by mouth as needed (take for bp equal or greater than 120/70).      SYNJARDY XR 10-1,000 mg TBph TAKE ONE TABLET BY MOUTH ONCE DAILY 30 tablet 5     No current facility-administered medications for this visit.     Facility-Administered Medications Ordered in Other Visits   Medication Dose Route Frequency Provider Last Rate Last Admin    alteplase injection 2 mg  2 mg Intra-Catheter PRN Arash Fuchs, NP        diphenhydrAMINE injection 50 mg  50 mg Intravenous Once PRN Arash Fuchs, ADI        EPINEPHrine (EPIPEN) 0.3 mg/0.3 mL pen injection 0.3 mg  0.3 mg Intramuscular Once PRN Arash Fuchs, NP        heparin, porcine (PF) 100 unit/mL injection flush 500 Units  500 Units Intravenous PRN Arash Fuchs, NP        hydrocortisone sodium succinate injection 100 mg  100 mg Intravenous Once PRN Arash Fuchs, NP        prochlorperazine injection Soln 5 mg  5 mg Intravenous Once PRN Arash Fuchs, NP        sodium chloride 0.9% flush 10 mL  10 mL Intravenous PRN Arash Fuchs, NP           Review of Systems   Constitutional:  Negative for chills, fatigue, fever and unexpected weight change.   Respiratory:  Negative for cough and shortness of breath.    Cardiovascular:  Negative for chest pain and palpitations.   Gastrointestinal:  Positive  "for diarrhea and nausea. Negative for abdominal pain, constipation and vomiting.   Skin:  Negative for rash.   Neurological:  Negative for headaches.   Hematological:  Negative for adenopathy. Does not bruise/bleed easily.       ECOG Performance Status: 0   Objective:      Vitals:   Vitals:    10/03/24 1330   BP: 122/70   BP Location: Right arm   Patient Position: Sitting   Pulse: 102   Resp: 16   Temp: 97.6 °F (36.4 °C)   TempSrc: Temporal   SpO2: 97%   Weight: 57.2 kg (126 lb 1.7 oz)   Height: 5' 1" (1.549 m)           Physical Exam  Constitutional:       General: She is not in acute distress.     Appearance: She is well-developed. She is not diaphoretic.   HENT:      Head: Normocephalic and atraumatic.      Comments: Alopecia  Cardiovascular:      Rate and Rhythm: Normal rate and regular rhythm.      Heart sounds: Normal heart sounds. No murmur heard.     No friction rub. No gallop.   Pulmonary:      Effort: Pulmonary effort is normal. No respiratory distress.      Breath sounds: Normal breath sounds. No wheezing or rales.   Chest:      Chest wall: No tenderness.   Abdominal:      General: Bowel sounds are normal. There is no distension.      Palpations: Abdomen is soft. There is no mass.      Tenderness: There is no abdominal tenderness. There is no guarding or rebound.   Musculoskeletal:      Comments: PORT in right chest   Lymphadenopathy:      Cervical: No cervical adenopathy.      Upper Body:      Right upper body: No supraclavicular or axillary adenopathy.      Left upper body: No supraclavicular or axillary adenopathy.   Skin:     Findings: No erythema or rash.   Neurological:      Mental Status: She is alert and oriented to person, place, and time.   Psychiatric:         Behavior: Behavior normal.         Laboratory Data:  Lab Visit on 10/02/2024   Component Date Value Ref Range Status    CA 19-9 10/02/2024 25.4  0.0 - 40.0 U/mL Final    Comment: The testing method is a chemiluminescent microparticle " immunoassay   manufactured by [employer] and performed on the    or   [employer] system. Values obtained with different assay manufacturers   for   methods may be different and cannot be used interchangeably.      WBC 10/02/2024 6.13  3.90 - 12.70 K/uL Final    RBC 10/02/2024 3.21 (L)  4.00 - 5.40 M/uL Final    Hemoglobin 10/02/2024 9.9 (L)  12.0 - 16.0 g/dL Final    Hematocrit 10/02/2024 28.7 (L)  37.0 - 48.5 % Final    MCV 10/02/2024 89  82 - 98 fL Final    MCH 10/02/2024 30.8  27.0 - 31.0 pg Final    MCHC 10/02/2024 34.5  32.0 - 36.0 g/dL Final    RDW 10/02/2024 18.0 (H)  11.5 - 14.5 % Final    Platelets 10/02/2024 44 (L)  150 - 450 K/uL Final    MPV 10/02/2024 10.6  9.2 - 12.9 fL Final    Immature Granulocytes 10/02/2024 0.5  0.0 - 0.5 % Final    Gran # (ANC) 10/02/2024 3.7  1.8 - 7.7 K/uL Final    Immature Grans (Abs) 10/02/2024 0.03  0.00 - 0.04 K/uL Final    Comment: Mild elevation in immature granulocytes is non specific and   can be seen in a variety of conditions including stress response,   acute inflammation, trauma and pregnancy. Correlation with other   laboratory and clinical findings is essential.      Lymph # 10/02/2024 1.8  1.0 - 4.8 K/uL Final    Mono # 10/02/2024 0.5  0.3 - 1.0 K/uL Final    Eos # 10/02/2024 0.1  0.0 - 0.5 K/uL Final    Baso # 10/02/2024 0.10  0.00 - 0.20 K/uL Final    nRBC 10/02/2024 0  0 /100 WBC Final    Gran % 10/02/2024 59.8  38.0 - 73.0 % Final    Lymph % 10/02/2024 28.5  18.0 - 48.0 % Final    Mono % 10/02/2024 8.6  4.0 - 15.0 % Final    Eosinophil % 10/02/2024 1.0  0.0 - 8.0 % Final    Basophil % 10/02/2024 1.6  0.0 - 1.9 % Final    Platelet Estimate 10/02/2024 Decreased (A)   Final    Aniso 10/02/2024 Slight   Final    Poik 10/02/2024 Slight   Final    Poly 10/02/2024 Occasional   Final    Ovalocytes 10/02/2024 Occasional   Final    Tear Drop Cells 10/02/2024 Occasional   Final    Dohle Bodies 10/02/2024 Present   Final    Differential Method 10/02/2024  Automated   Final    Sodium 10/02/2024 140  136 - 145 mmol/L Final    Potassium 10/02/2024 4.1  3.5 - 5.1 mmol/L Final    Chloride 10/02/2024 108  95 - 110 mmol/L Final    CO2 10/02/2024 22 (L)  23 - 29 mmol/L Final    Glucose 10/02/2024 116 (H)  70 - 110 mg/dL Final    BUN 10/02/2024 8  8 - 23 mg/dL Final    Creatinine 10/02/2024 0.7  0.5 - 1.4 mg/dL Final    Calcium 10/02/2024 9.2  8.7 - 10.5 mg/dL Final    Total Protein 10/02/2024 6.5  6.0 - 8.4 g/dL Final    Albumin 10/02/2024 3.9  3.5 - 5.2 g/dL Final    Total Bilirubin 10/02/2024 0.6  0.1 - 1.0 mg/dL Final    Comment: For infants and newborns, interpretation of results should be based  on gestational age, weight and in agreement with clinical  observations.    Premature Infant recommended reference ranges:  Up to 24 hours.............<8.0 mg/dL  Up to 48 hours............<12.0 mg/dL  3-5 days..................<15.0 mg/dL  6-29 days.................<15.0 mg/dL      Alkaline Phosphatase 10/02/2024 96  55 - 135 U/L Final    AST 10/02/2024 17  10 - 40 U/L Final    ALT 10/02/2024 15  10 - 44 U/L Final    eGFR 10/02/2024 >60  >60 mL/min/1.73 m^2 Final    Anion Gap 10/02/2024 10  8 - 16 mmol/L Final    Magnesium 10/02/2024 1.8  1.6 - 2.6 mg/dL Final   Infusion on 09/30/2024   Component Date Value Ref Range Status    WBC 09/30/2024 8.17  3.90 - 12.70 K/uL Final    RBC 09/30/2024 3.21 (L)  4.00 - 5.40 M/uL Final    Hemoglobin 09/30/2024 9.8 (L)  12.0 - 16.0 g/dL Final    Hematocrit 09/30/2024 28.4 (L)  37.0 - 48.5 % Final    MCV 09/30/2024 89  82 - 98 fL Final    MCH 09/30/2024 30.5  27.0 - 31.0 pg Final    MCHC 09/30/2024 34.5  32.0 - 36.0 g/dL Final    RDW 09/30/2024 18.2 (H)  11.5 - 14.5 % Final    Platelets 09/30/2024 56 (L)  150 - 450 K/uL Final    MPV 09/30/2024 9.8  9.2 - 12.9 fL Final    Immature Granulocytes 09/30/2024 CANCELED  0.0 - 0.5 % Final    Result canceled by the ancillary.    Immature Grans (Abs) 09/30/2024 CANCELED  0.00 - 0.04 K/uL Final     Comment: Mild elevation in immature granulocytes is non specific and   can be seen in a variety of conditions including stress response,   acute inflammation, trauma and pregnancy. Correlation with other   laboratory and clinical findings is essential.    Result canceled by the ancillary.      nRBC 09/30/2024 0  0 /100 WBC Final    Gran % 09/30/2024 65.0  38.0 - 73.0 % Final    Lymph % 09/30/2024 23.0  18.0 - 48.0 % Final    Mono % 09/30/2024 8.0  4.0 - 15.0 % Final    Eosinophil % 09/30/2024 1.0  0.0 - 8.0 % Final    Basophil % 09/30/2024 2.0 (H)  0.0 - 1.9 % Final    Metamyelocytes 09/30/2024 1.0  % Final    Platelet Estimate 09/30/2024 Decreased (A)   Final    Aniso 09/30/2024 Slight   Final    Poly 09/30/2024 Occasional   Final    Ovalocytes 09/30/2024 Occasional   Final    Tear Drop Cells 09/30/2024 Occasional   Final    Toxic Granulation 09/30/2024 Present   Final    Differential Method 09/30/2024 Manual   Corrected    Comment: CORRECTED RESULT; previously reported as Automated on 09/30/2024 at   11:55.      Sodium 09/30/2024 137  136 - 145 mmol/L Final    Potassium 09/30/2024 3.5  3.5 - 5.1 mmol/L Final    Chloride 09/30/2024 105  95 - 110 mmol/L Final    CO2 09/30/2024 20 (L)  23 - 29 mmol/L Final    Glucose 09/30/2024 168 (H)  70 - 110 mg/dL Final    BUN 09/30/2024 7 (L)  8 - 23 mg/dL Final    Creatinine 09/30/2024 0.7  0.5 - 1.4 mg/dL Final    Calcium 09/30/2024 8.5 (L)  8.7 - 10.5 mg/dL Final    Total Protein 09/30/2024 6.6  6.0 - 8.4 g/dL Final    Albumin 09/30/2024 3.8  3.5 - 5.2 g/dL Final    Total Bilirubin 09/30/2024 1.2 (H)  0.1 - 1.0 mg/dL Final    Comment: For infants and newborns, interpretation of results should be based  on gestational age, weight and in agreement with clinical  observations.    Premature Infant recommended reference ranges:  Up to 24 hours.............<8.0 mg/dL  Up to 48 hours............<12.0 mg/dL  3-5 days..................<15.0 mg/dL  6-29 days.................<15.0  mg/dL      Alkaline Phosphatase 09/30/2024 104  55 - 135 U/L Final    AST 09/30/2024 17  10 - 40 U/L Final    ALT 09/30/2024 13  10 - 44 U/L Final    eGFR 09/30/2024 >60.0  >60 mL/min/1.73 m^2 Final    Anion Gap 09/30/2024 12  8 - 16 mmol/L Final    Magnesium 09/30/2024 1.8  1.6 - 2.6 mg/dL Final         Imaging:     Ct C/A/P 10/02/24  Chest:     There is a right chest chemotherapy port present with the tip of its catheter noted in the SVC. The thyroid gland enhances homogeneously. There is atherosclerotic calcification present within the thoracic aortic arch and left subclavian artery. No thoracic aortic aneurysm or dissection. No pericardial fluid. No pathologically enlarged lymph nodes in the chest or axilla. The trachea and mainstem bronchi are unremarkable. There is a new 5 mm ground-glass nodule present in the right lower lobe (series 301, image 123). No emphysematous lung architecture or bronchiectasis. There is biapical pleuro-parenchymal scarring.. No dense airspace consolidation, pleural fluid, or pneumothorax.     Abdomen and pelvis:     There is diffuse hepatic steatosis. There is an 8 mm hypodensity present within the right hepatic lobe on series 3, image 128, possibly a cyst but too small to characterize and unchanged when compared to the prior study. No new hyperenhancing hepatic mass observed in the liver on the early arterial phase images. There are calcified gallstones present in the gallbladder lumen. No gallbladder inflammatory change. The spleen is unremarkable. The stomach, duodenal C-loop, and adrenal glands are unremarkable.     The most significant abnormality relates to the presence of an approximately 37 x 35 mm proximal pancreatic body mass, slightly smaller in transverse dimension when compared with the previous examination. The mass involves a 90° area the celiac artery and a 180° area of the splenic artery on series 2 images 76-81. There is also soft tissue extending along the anterior  and right lateral wall of the portal vein on series 2, image 78 which may represent tumor infiltrating the retroperitoneal soft tissue planes adjacent to the portal vein/portal confluence. There is also abnormal soft tissue insinuating between the left lateral margin of the celiac artery and adjacent splenic vein on series 2, image 77. As best appreciated on series 2, image 84 and series 601, image 59, there is invasion and focal occlusion of the proximal most aspect of the splenic vein. There is diffuse dilatation of the main pancreatic duct within the pancreatic body and tail and there is complete atrophy of the mid and distal pancreatic body and tail.     The abdominal aorta is not aneurysmal. There is minimal calcified atherosclerotic plaque deposition noted within the distal infrarenal abdominal aorta. No bulky periaortic or retroperitoneal lymphadenopathy appreciated. The kidneys show no evidence of stones, hydronephrosis, or solid enhancing mass. The ureters are normal in caliber and course without stones. The urinary bladder shows no significant abnormalities. The uterus and ovaries show no significant abnormalities.     The appendix is unremarkable. No evidence of high-grade bowel obstruction. There is concentric wall thickening present within the distal sigmoid colon/rectum on series 3, image 218. Infectious and inflammatory etiologies of proctocolitis are possible. No ascites. No pneumoperitoneum. No inguinal hernia or inguinal lymphadenopathy detected. No mesenteric adenopathy or new peritoneal soft tissue mass.     Bones: No evidence of acute thoracolumbar compression fracture. No imaging evidence of lytic or blastic osseous metastatic disease on the provided images.       Assessment:       1. Malignant neoplasm of pancreas, unspecified location of malignancy    2. 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therapy    3. Thrombocytopenia    4. Hypokalemia    5. Pancreatic insufficiency    6. Hypothyroidism,  unspecified type    7. Type 2 diabetes mellitus without complication, without long-term current use of insulin    8. Pulmonary nodule, right               Plan:   Pancreatic Cancer - Patient with Stage III pancreatic cancer with concern for potential encasement of the distal celiac artery per discussion with Dr Valentin  -No sign of mets  - 94.5 on 6/17/24  -Case discussed at tumor board 7/17/24 with recommendation to proceed with chemo  -[employer] genetic testing negative for the genes tested  -[facility] tissue testing showed TP53, KRAS p.G12D, CDKN2A, CDKN2B mutations.  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  -Will need to keep irinotecan dose reduced at 165mg/mm2  -Pt completed 5 cycles  -Will need to repeat CBC next week given thrombocytopenia with platelets of 44k on 10/02/24  -CT C/A/P on 10/02/24 shows stable disease and a possible new RLL nodule  - decreased all the way to 25.4U/mL on 10/02/24   -Pt to see Dr Valentin 10/16/24 to discuss surgical options/expectations  Visit today included increased complexity associated with the care of the episodic problem (chemotherapy) addressed and managing the longitudinal care of the patient due to the serious and/or complex managed problem(s) pancreatic cancer.     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 - pt at increased risk of infection  -No current signs of infection  -Will monitor     Pulmonary Nodule - pt with RLL on CT C/A/P 10/02/24  -%mm and described as ground glass  -Will monitor    Thrombocytopenia - platelets 44k on 10/02/24  -Due to chemo  -Will hold treatment   -Will monitor     Hypokalemia - improved to 4.1 on 10/02/24  -PT to continue potassium chloride     Pancreatic Insufficiency - Pt taking Creon  -Diarrhea not from pancreaitc insufficiency     Hypothyroidism - pt on synthroid  -Will monitror     DMII - PT currently on Synjardy  Lab Results   Component Value Date    HGBA1C 7.0 (H) 09/20/2024   -Possibly due to  pancreatic cancer  -Will monitor    Route Chart for Scheduling    Med Onc Chart Routing      Follow up with physician 2 weeks. Pt needs a repeat CBC on 10/07/24 prior to chemo.  Pt can proceed with chemo as long as platelets are above 100k.  Pt needs a CBC, CMP, Mg and  on 10/17/24 with appt with me on 10/18/24.   Follow up with ANALIA    Infusion scheduling note    Injection scheduling note    Labs    Imaging    Pharmacy appointment    Other referrals                Treatment Plan Information   OP GI FOLFIRINOX (oxaliplatin, leucovorin, irinotecan, fluorouracil) Q2W  Rajat Hunt MD   Associated diagnosis: Malignant neoplasm of pancreas Stage III cT4, cN0, cM0 noted on 7/12/2024   Line of treatment: Neoadjuvant  Treatment Goal: Curative     Upcoming Treatment Dates - OP GI FOLFIRINOX (oxaliplatin, leucovorin, irinotecan, fluorouracil) Q2W     10/7/2024       Chemotherapy       oxaliplatin (ELOXATIN) 85 mg/m2 = 136 mg in D5W 527.2 mL chemo infusion       leucovorin calcium 400 mg/m2 = 640 mg in D5W 250 mL infusion       irinotecan (CAMPTOSAR) 165 mg/m2 = 264 mg in 0.9% NaCl 513.2 mL chemo infusion       fluorouraciL injection 640 mg       fluorouracil (Adrucil) 2,400 mg/m2 = 3,840 mg in 0.9% NaCl 100 mL chemo infusion       Antiemetics       palonosetron 0.25mg/dexAMETHasone 12mg in NS IVPB 0.25 mg 50 mL  10/21/2024       Chemotherapy       oxaliplatin (ELOXATIN) 85 mg/m2 = 136 mg in D5W 527.2 mL chemo infusion       leucovorin calcium 400 mg/m2 = 640 mg in D5W 250 mL infusion       irinotecan (CAMPTOSAR) 165 mg/m2 = 264 mg in 0.9% NaCl 513.2 mL chemo infusion       fluorouraciL injection 640 mg       fluorouracil (Adrucil) 2,400 mg/m2 = 3,840 mg in 0.9% NaCl 100 mL chemo infusion       Antiemetics       palonosetron 0.25mg/dexAMETHasone 12mg in NS IVPB 0.25 mg 50 mL  11/4/2024       Chemotherapy       oxaliplatin (ELOXATIN) 85 mg/m2 = 136 mg in D5W 527.2 mL chemo infusion       leucovorin calcium 400 mg/m2 =  640 mg in D5W 250 mL infusion       irinotecan (CAMPTOSAR) 165 mg/m2 = 264 mg in 0.9% NaCl 513.2 mL chemo infusion       fluorouraciL injection 640 mg       fluorouracil (Adrucil) 2,400 mg/m2 = 3,840 mg in 0.9% NaCl 100 mL chemo infusion       Antiemetics       palonosetron 0.25mg/dexAMETHasone 12mg in NS IVPB 0.25 mg 50 mL  11/18/2024       Chemotherapy       oxaliplatin (ELOXATIN) 85 mg/m2 = 136 mg in D5W 527.2 mL chemo infusion       leucovorin calcium 400 mg/m2 = 640 mg in D5W 250 mL infusion       irinotecan (CAMPTOSAR) 165 mg/m2 = 264 mg in 0.9% NaCl 513.2 mL chemo infusion       fluorouraciL injection 640 mg       fluorouracil (Adrucil) 2,400 mg/m2 = 3,840 mg in 0.9% NaCl 100 mL chemo infusion       Antiemetics       palonosetron 0.25mg/dexAMETHasone 12mg in NS IVPB 0.25 mg 50 mL    Therapy Plan Information  PORT FLUSH for Malignant neoplasm of pancreas, noted on 7/12/2024  Flushes  heparin, porcine (PF) 100 unit/mL injection flush 500 Units  500 Units, Intravenous, Every visit  sodium chloride 0.9% flush 10 mL  10 mL, Intravenous, Every visit      No therapy plan of the specified type found.    No therapy plan of the specified type found.      Rajat Hunt MD  [facility]  Hematology and Oncology  [facility]   [address]   [place] [address]   O: [phone]  F: [phone]

## 2024-10-03 ENCOUNTER — OFFICE VISIT (OUTPATIENT)
Dept: HEMATOLOGY/ONCOLOGY | Facility: CLINIC | Age: 64
End: 2024-10-03
Payer: MEDICARE

## 2024-10-03 ENCOUNTER — TELEPHONE (OUTPATIENT)
Dept: HEMATOLOGY/ONCOLOGY | Facility: CLINIC | Age: 64
End: 2024-10-03
Payer: MEDICARE

## 2024-10-03 VITALS
HEART RATE: 102 BPM | HEIGHT: 61 IN | WEIGHT: 126.13 LBS | BODY MASS INDEX: 23.81 KG/M2 | RESPIRATION RATE: 16 BRPM | DIASTOLIC BLOOD PRESSURE: 70 MMHG | SYSTOLIC BLOOD PRESSURE: 122 MMHG | OXYGEN SATURATION: 97 % | TEMPERATURE: 98 F

## 2024-10-03 DIAGNOSIS — D84.821 IMMUNODEFICIENCY DUE TO CHEMOTHERAPY: ICD-10-CM

## 2024-10-03 DIAGNOSIS — D69.6 THROMBOCYTOPENIA: ICD-10-CM

## 2024-10-03 DIAGNOSIS — Z79.899 IMMUNODEFICIENCY DUE TO CHEMOTHERAPY: ICD-10-CM

## 2024-10-03 DIAGNOSIS — R91.1 PULMONARY NODULE, RIGHT: ICD-10-CM

## 2024-10-03 DIAGNOSIS — E11.9 TYPE 2 DIABETES MELLITUS WITHOUT COMPLICATION, WITHOUT LONG-TERM CURRENT USE OF INSULIN: ICD-10-CM

## 2024-10-03 DIAGNOSIS — K86.89 PANCREATIC INSUFFICIENCY: ICD-10-CM

## 2024-10-03 DIAGNOSIS — E03.9 HYPOTHYROIDISM, UNSPECIFIED TYPE: ICD-10-CM

## 2024-10-03 DIAGNOSIS — E87.6 HYPOKALEMIA: ICD-10-CM

## 2024-10-03 DIAGNOSIS — T45.1X5A IMMUNODEFICIENCY DUE TO CHEMOTHERAPY: ICD-10-CM

## 2024-10-03 DIAGNOSIS — C25.9 MALIGNANT NEOPLASM OF PANCREAS, UNSPECIFIED LOCATION OF MALIGNANCY: Primary | ICD-10-CM

## 2024-10-03 PROCEDURE — 3008F BODY MASS INDEX DOCD: CPT | Mod: CPTII,S$GLB,, | Performed by: INTERNAL MEDICINE

## 2024-10-03 PROCEDURE — G2211 COMPLEX E/M VISIT ADD ON: HCPCS | Mod: S$GLB,,, | Performed by: INTERNAL MEDICINE

## 2024-10-03 PROCEDURE — 3061F NEG MICROALBUMINURIA REV: CPT | Mod: CPTII,S$GLB,, | Performed by: INTERNAL MEDICINE

## 2024-10-03 PROCEDURE — 3078F DIAST BP <80 MM HG: CPT | Mod: CPTII,S$GLB,, | Performed by: INTERNAL MEDICINE

## 2024-10-03 PROCEDURE — 99215 OFFICE O/P EST HI 40 MIN: CPT | Mod: S$GLB,,, | Performed by: INTERNAL MEDICINE

## 2024-10-03 PROCEDURE — 1101F PT FALLS ASSESS-DOCD LE1/YR: CPT | Mod: CPTII,S$GLB,, | Performed by: INTERNAL MEDICINE

## 2024-10-03 PROCEDURE — 3074F SYST BP LT 130 MM HG: CPT | Mod: CPTII,S$GLB,, | Performed by: INTERNAL MEDICINE

## 2024-10-03 PROCEDURE — 99999 PR PBB SHADOW E&M-EST. PATIENT-LVL IV: CPT | Mod: PBBFAC,,, | Performed by: INTERNAL MEDICINE

## 2024-10-03 PROCEDURE — 1159F MED LIST DOCD IN RCRD: CPT | Mod: CPTII,S$GLB,, | Performed by: INTERNAL MEDICINE

## 2024-10-03 PROCEDURE — 4010F ACE/ARB THERAPY RXD/TAKEN: CPT | Mod: CPTII,S$GLB,, | Performed by: INTERNAL MEDICINE

## 2024-10-03 PROCEDURE — 3051F HG A1C>EQUAL 7.0%<8.0%: CPT | Mod: CPTII,S$GLB,, | Performed by: INTERNAL MEDICINE

## 2024-10-03 PROCEDURE — 1126F AMNT PAIN NOTED NONE PRSNT: CPT | Mod: CPTII,S$GLB,, | Performed by: INTERNAL MEDICINE

## 2024-10-03 PROCEDURE — 3288F FALL RISK ASSESSMENT DOCD: CPT | Mod: CPTII,S$GLB,, | Performed by: INTERNAL MEDICINE

## 2024-10-03 PROCEDURE — 3066F NEPHROPATHY DOC TX: CPT | Mod: CPTII,S$GLB,, | Performed by: INTERNAL MEDICINE

## 2024-10-03 RX ORDER — POTASSIUM CHLORIDE 20 MEQ/1
20 TABLET, EXTENDED RELEASE ORAL ONCE
COMMUNITY
Start: 2024-09-26

## 2024-10-04 ENCOUNTER — DOCUMENTATION ONLY (OUTPATIENT)
Dept: INFUSION THERAPY | Facility: HOSPITAL | Age: 64
End: 2024-10-04
Payer: MEDICARE

## 2024-10-04 NOTE — PROGRESS NOTES
Late entry 10/3/24    SW met with pt briefly and provided a gas card. No other needs identified at this time.     Bharati Fritz, ANA MARIAW  10/04/2024  12:11 PM

## 2024-10-07 ENCOUNTER — LAB VISIT (OUTPATIENT)
Dept: LAB | Facility: HOSPITAL | Age: 64
End: 2024-10-07
Attending: INTERNAL MEDICINE
Payer: MEDICARE

## 2024-10-07 ENCOUNTER — INFUSION (OUTPATIENT)
Dept: INFUSION THERAPY | Facility: HOSPITAL | Age: 64
End: 2024-10-07
Attending: INTERNAL MEDICINE
Payer: MEDICARE

## 2024-10-07 DIAGNOSIS — C25.9 MALIGNANT NEOPLASM OF PANCREAS, UNSPECIFIED LOCATION OF MALIGNANCY: ICD-10-CM

## 2024-10-07 LAB
ANISOCYTOSIS BLD QL SMEAR: SLIGHT
BASOPHILS # BLD AUTO: 0.04 K/UL (ref 0–0.2)
BASOPHILS NFR BLD: 0.5 % (ref 0–1.9)
DACRYOCYTES BLD QL SMEAR: ABNORMAL
DIFFERENTIAL METHOD BLD: ABNORMAL
EOSINOPHIL # BLD AUTO: 0.1 K/UL (ref 0–0.5)
EOSINOPHIL NFR BLD: 1.2 % (ref 0–8)
ERYTHROCYTE [DISTWIDTH] IN BLOOD BY AUTOMATED COUNT: 20.3 % (ref 11.5–14.5)
HCT VFR BLD AUTO: 30.3 % (ref 37–48.5)
HGB BLD-MCNC: 10.1 G/DL (ref 12–16)
IMM GRANULOCYTES # BLD AUTO: 0.14 K/UL (ref 0–0.04)
IMM GRANULOCYTES NFR BLD AUTO: 1.9 % (ref 0–0.5)
LYMPHOCYTES # BLD AUTO: 2.3 K/UL (ref 1–4.8)
LYMPHOCYTES NFR BLD: 31.4 % (ref 18–48)
MCH RBC QN AUTO: 31.2 PG (ref 27–31)
MCHC RBC AUTO-ENTMCNC: 33.3 G/DL (ref 32–36)
MCV RBC AUTO: 94 FL (ref 82–98)
MONOCYTES # BLD AUTO: 0.5 K/UL (ref 0.3–1)
MONOCYTES NFR BLD: 7.1 % (ref 4–15)
NEUTROPHILS # BLD AUTO: 4.2 K/UL (ref 1.8–7.7)
NEUTROPHILS NFR BLD: 57.9 % (ref 38–73)
NRBC BLD-RTO: 0 /100 WBC
PLATELET # BLD AUTO: 68 K/UL (ref 150–450)
PLATELET BLD QL SMEAR: ABNORMAL
PMV BLD AUTO: 9.9 FL (ref 9.2–12.9)
POIKILOCYTOSIS BLD QL SMEAR: SLIGHT
RBC # BLD AUTO: 3.24 M/UL (ref 4–5.4)
WBC # BLD AUTO: 7.3 K/UL (ref 3.9–12.7)

## 2024-10-07 PROCEDURE — 85025 COMPLETE CBC W/AUTO DIFF WBC: CPT | Mod: PN | Performed by: INTERNAL MEDICINE

## 2024-10-07 PROCEDURE — 36415 COLL VENOUS BLD VENIPUNCTURE: CPT | Mod: PN | Performed by: INTERNAL MEDICINE

## 2024-10-08 ENCOUNTER — PATIENT MESSAGE (OUTPATIENT)
Dept: DIABETES | Facility: CLINIC | Age: 64
End: 2024-10-08
Payer: MEDICARE

## 2024-10-13 ENCOUNTER — PATIENT MESSAGE (OUTPATIENT)
Dept: ADMINISTRATIVE | Facility: OTHER | Age: 64
End: 2024-10-13
Payer: MEDICARE

## 2024-10-14 ENCOUNTER — INFUSION (OUTPATIENT)
Dept: INFUSION THERAPY | Facility: HOSPITAL | Age: 64
End: 2024-10-14
Attending: INTERNAL MEDICINE
Payer: MEDICARE

## 2024-10-14 ENCOUNTER — DOCUMENTATION ONLY (OUTPATIENT)
Dept: INFUSION THERAPY | Facility: HOSPITAL | Age: 64
End: 2024-10-14

## 2024-10-14 ENCOUNTER — LAB VISIT (OUTPATIENT)
Dept: LAB | Facility: HOSPITAL | Age: 64
End: 2024-10-14
Attending: INTERNAL MEDICINE
Payer: MEDICARE

## 2024-10-14 VITALS
HEART RATE: 81 BPM | TEMPERATURE: 98 F | HEIGHT: 61 IN | RESPIRATION RATE: 18 BRPM | WEIGHT: 129.19 LBS | SYSTOLIC BLOOD PRESSURE: 112 MMHG | OXYGEN SATURATION: 99 % | BODY MASS INDEX: 24.39 KG/M2 | DIASTOLIC BLOOD PRESSURE: 62 MMHG

## 2024-10-14 DIAGNOSIS — C25.9 MALIGNANT NEOPLASM OF PANCREAS, UNSPECIFIED LOCATION OF MALIGNANCY: ICD-10-CM

## 2024-10-14 DIAGNOSIS — C25.9 MALIGNANT NEOPLASM OF PANCREAS, UNSPECIFIED LOCATION OF MALIGNANCY: Primary | ICD-10-CM

## 2024-10-14 LAB
ALBUMIN SERPL BCP-MCNC: 4 G/DL (ref 3.5–5.2)
ALP SERPL-CCNC: 107 U/L (ref 55–135)
ALT SERPL W/O P-5'-P-CCNC: 13 U/L (ref 10–44)
ANION GAP SERPL CALC-SCNC: 11 MMOL/L (ref 8–16)
AST SERPL-CCNC: 20 U/L (ref 10–40)
BASOPHILS # BLD AUTO: 0.02 K/UL (ref 0–0.2)
BASOPHILS NFR BLD: 0.3 % (ref 0–1.9)
BILIRUB SERPL-MCNC: 0.7 MG/DL (ref 0.1–1)
BUN SERPL-MCNC: 10 MG/DL (ref 8–23)
CALCIUM SERPL-MCNC: 8.9 MG/DL (ref 8.7–10.5)
CHLORIDE SERPL-SCNC: 105 MMOL/L (ref 95–110)
CO2 SERPL-SCNC: 21 MMOL/L (ref 23–29)
CREAT SERPL-MCNC: 0.8 MG/DL (ref 0.5–1.4)
DIFFERENTIAL METHOD BLD: ABNORMAL
EOSINOPHIL # BLD AUTO: 0.1 K/UL (ref 0–0.5)
EOSINOPHIL NFR BLD: 1.6 % (ref 0–8)
ERYTHROCYTE [DISTWIDTH] IN BLOOD BY AUTOMATED COUNT: 19.8 % (ref 11.5–14.5)
EST. GFR  (NO RACE VARIABLE): >60 ML/MIN/1.73 M^2
GLUCOSE SERPL-MCNC: 162 MG/DL (ref 70–110)
HCT VFR BLD AUTO: 33.6 % (ref 37–48.5)
HGB BLD-MCNC: 11.3 G/DL (ref 12–16)
IMM GRANULOCYTES # BLD AUTO: 0.03 K/UL (ref 0–0.04)
IMM GRANULOCYTES NFR BLD AUTO: 0.5 % (ref 0–0.5)
LYMPHOCYTES # BLD AUTO: 2.2 K/UL (ref 1–4.8)
LYMPHOCYTES NFR BLD: 34.7 % (ref 18–48)
MCH RBC QN AUTO: 32 PG (ref 27–31)
MCHC RBC AUTO-ENTMCNC: 33.6 G/DL (ref 32–36)
MCV RBC AUTO: 95 FL (ref 82–98)
MONOCYTES # BLD AUTO: 0.7 K/UL (ref 0.3–1)
MONOCYTES NFR BLD: 10.9 % (ref 4–15)
NEUTROPHILS # BLD AUTO: 3.3 K/UL (ref 1.8–7.7)
NEUTROPHILS NFR BLD: 52 % (ref 38–73)
NRBC BLD-RTO: 0 /100 WBC
PLATELET # BLD AUTO: 156 K/UL (ref 150–450)
PMV BLD AUTO: 8.6 FL (ref 9.2–12.9)
POTASSIUM SERPL-SCNC: 4.2 MMOL/L (ref 3.5–5.1)
PROT SERPL-MCNC: 7 G/DL (ref 6–8.4)
RBC # BLD AUTO: 3.53 M/UL (ref 4–5.4)
SODIUM SERPL-SCNC: 137 MMOL/L (ref 136–145)
WBC # BLD AUTO: 6.32 K/UL (ref 3.9–12.7)

## 2024-10-14 PROCEDURE — 36415 COLL VENOUS BLD VENIPUNCTURE: CPT | Mod: PN | Performed by: INTERNAL MEDICINE

## 2024-10-14 PROCEDURE — 96416 CHEMO PROLONG INFUSE W/PUMP: CPT | Mod: PN

## 2024-10-14 PROCEDURE — 63600175 PHARM REV CODE 636 W HCPCS: Mod: PN | Performed by: INTERNAL MEDICINE

## 2024-10-14 PROCEDURE — 96413 CHEMO IV INFUSION 1 HR: CPT | Mod: PN

## 2024-10-14 PROCEDURE — 96411 CHEMO IV PUSH ADDL DRUG: CPT | Mod: PN

## 2024-10-14 PROCEDURE — 25000003 PHARM REV CODE 250: Mod: PN | Performed by: INTERNAL MEDICINE

## 2024-10-14 PROCEDURE — 96375 TX/PRO/DX INJ NEW DRUG ADDON: CPT | Mod: PN

## 2024-10-14 PROCEDURE — 80053 COMPREHEN METABOLIC PANEL: CPT | Mod: PN | Performed by: INTERNAL MEDICINE

## 2024-10-14 PROCEDURE — 85025 COMPLETE CBC W/AUTO DIFF WBC: CPT | Mod: PN | Performed by: INTERNAL MEDICINE

## 2024-10-14 PROCEDURE — 96368 THER/DIAG CONCURRENT INF: CPT | Mod: PN

## 2024-10-14 PROCEDURE — 96417 CHEMO IV INFUS EACH ADDL SEQ: CPT | Mod: PN

## 2024-10-14 PROCEDURE — 96367 TX/PROPH/DG ADDL SEQ IV INF: CPT | Mod: PN

## 2024-10-14 PROCEDURE — 96415 CHEMO IV INFUSION ADDL HR: CPT | Mod: PN

## 2024-10-14 RX ORDER — EPINEPHRINE 0.3 MG/.3ML
0.3 INJECTION SUBCUTANEOUS ONCE AS NEEDED
Status: CANCELLED | OUTPATIENT
Start: 2024-10-14

## 2024-10-14 RX ORDER — HEPARIN 100 UNIT/ML
500 SYRINGE INTRAVENOUS
Status: CANCELLED | OUTPATIENT
Start: 2024-10-14

## 2024-10-14 RX ORDER — SODIUM CHLORIDE 0.9 % (FLUSH) 0.9 %
10 SYRINGE (ML) INJECTION
Status: DISCONTINUED | OUTPATIENT
Start: 2024-10-14 | End: 2024-10-14 | Stop reason: HOSPADM

## 2024-10-14 RX ORDER — HEPARIN 100 UNIT/ML
500 SYRINGE INTRAVENOUS
Status: CANCELLED | OUTPATIENT
Start: 2024-10-16

## 2024-10-14 RX ORDER — PROCHLORPERAZINE EDISYLATE 5 MG/ML
5 INJECTION INTRAMUSCULAR; INTRAVENOUS ONCE AS NEEDED
Status: DISCONTINUED | OUTPATIENT
Start: 2024-10-14 | End: 2024-10-14 | Stop reason: HOSPADM

## 2024-10-14 RX ORDER — SODIUM CHLORIDE 0.9 % (FLUSH) 0.9 %
10 SYRINGE (ML) INJECTION
Status: CANCELLED | OUTPATIENT
Start: 2024-10-16

## 2024-10-14 RX ORDER — DIPHENHYDRAMINE HYDROCHLORIDE 50 MG/ML
50 INJECTION INTRAMUSCULAR; INTRAVENOUS ONCE AS NEEDED
Status: DISCONTINUED | OUTPATIENT
Start: 2024-10-14 | End: 2024-10-14 | Stop reason: HOSPADM

## 2024-10-14 RX ORDER — EPINEPHRINE 0.3 MG/.3ML
0.3 INJECTION SUBCUTANEOUS ONCE AS NEEDED
Status: DISCONTINUED | OUTPATIENT
Start: 2024-10-14 | End: 2024-10-14 | Stop reason: HOSPADM

## 2024-10-14 RX ORDER — ATROPINE SULFATE 0.4 MG/ML
0.4 INJECTION, SOLUTION ENDOTRACHEAL; INTRAMEDULLARY; INTRAMUSCULAR; INTRAVENOUS; SUBCUTANEOUS ONCE AS NEEDED
Status: CANCELLED | OUTPATIENT
Start: 2024-10-14

## 2024-10-14 RX ORDER — PROCHLORPERAZINE EDISYLATE 5 MG/ML
5 INJECTION INTRAMUSCULAR; INTRAVENOUS ONCE AS NEEDED
Status: CANCELLED | OUTPATIENT
Start: 2024-10-16

## 2024-10-14 RX ORDER — PROCHLORPERAZINE EDISYLATE 5 MG/ML
5 INJECTION INTRAMUSCULAR; INTRAVENOUS ONCE AS NEEDED
Status: CANCELLED | OUTPATIENT
Start: 2024-10-14

## 2024-10-14 RX ORDER — SODIUM CHLORIDE 0.9 % (FLUSH) 0.9 %
10 SYRINGE (ML) INJECTION
Status: CANCELLED | OUTPATIENT
Start: 2024-10-14

## 2024-10-14 RX ORDER — DIPHENHYDRAMINE HYDROCHLORIDE 50 MG/ML
50 INJECTION INTRAMUSCULAR; INTRAVENOUS ONCE AS NEEDED
Status: CANCELLED | OUTPATIENT
Start: 2024-10-14

## 2024-10-14 RX ORDER — FLUOROURACIL 50 MG/ML
320 INJECTION, SOLUTION INTRAVENOUS
Status: CANCELLED | OUTPATIENT
Start: 2024-10-14

## 2024-10-14 RX ORDER — ATROPINE SULFATE 0.4 MG/ML
0.4 INJECTION, SOLUTION ENDOTRACHEAL; INTRAMEDULLARY; INTRAMUSCULAR; INTRAVENOUS; SUBCUTANEOUS ONCE AS NEEDED
Status: COMPLETED | OUTPATIENT
Start: 2024-10-14 | End: 2024-10-14

## 2024-10-14 RX ORDER — FLUOROURACIL 50 MG/ML
500 INJECTION, SOLUTION INTRAVENOUS
Status: COMPLETED | OUTPATIENT
Start: 2024-10-14 | End: 2024-10-14

## 2024-10-14 RX ADMIN — FLUOROURACIL 500 MG: 50 INJECTION, SOLUTION INTRAVENOUS at 03:10

## 2024-10-14 RX ADMIN — OXALIPLATIN 100 MG: 5 INJECTION, SOLUTION INTRAVENOUS at 11:10

## 2024-10-14 RX ADMIN — ATROPINE SULFATE 0.4 MG: 0.4 INJECTION, SOLUTION INTRAVENOUS at 01:10

## 2024-10-14 RX ADMIN — DEXTROSE MONOHYDRATE: 5 INJECTION, SOLUTION INTRAVENOUS at 11:10

## 2024-10-14 RX ADMIN — DEXAMETHASONE SODIUM PHOSPHATE 0.25 MG: 10 INJECTION, SOLUTION INTRAMUSCULAR; INTRAVENOUS at 10:10

## 2024-10-14 RX ADMIN — FLUOROURACIL 3000 MG: 50 INJECTION, SOLUTION INTRAVENOUS at 03:10

## 2024-10-14 RX ADMIN — LEUCOVORIN CALCIUM 510 MG: 350 INJECTION, POWDER, LYOPHILIZED, FOR SUSPENSION INTRAMUSCULAR; INTRAVENOUS at 11:10

## 2024-10-14 RX ADMIN — SODIUM CHLORIDE 220 MG: 9 INJECTION, SOLUTION INTRAVENOUS at 02:10

## 2024-10-14 RX ADMIN — SODIUM CHLORIDE: 9 INJECTION, SOLUTION INTRAVENOUS at 10:10

## 2024-10-14 NOTE — PROGRESS NOTES
Oncology Nutrition   Chemotherapy Infusion Visit    Nutrition Follow Up   RD met with patient at chairside during infusion tx. Pt states she is having some issues with the Creon that she is planning to discuss with Dr. Valentin and Dr. Hunt later in the week. Besides changes in stool, patient denies nutrition issues. Pt did not get cleared for chemotherapy last visit which actually helped patient's appetite and intake. Weight stable.     Pt eligible for TFP. Denies need today.    Wt Readings from Last 10 Encounters:   10/14/24 58.6 kg (129 lb 3 oz)   10/03/24 57.2 kg (126 lb 1.7 oz)   09/26/24 59.7 kg (131 lb 9.8 oz)   09/23/24 59.7 kg (131 lb 9.8 oz)   09/21/24 57.2 kg (126 lb 1.7 oz)   09/20/24 56.9 kg (125 lb 7.1 oz)   09/16/24 57.3 kg (126 lb 5.2 oz)   09/13/24 56.2 kg (123 lb 14.4 oz)   09/06/24 58.1 kg (128 lb 1.4 oz)   09/06/24 58.1 kg (128 lb 1.4 oz)       All other nutrition questions/concerns addressed as appropriate. Will continue to follow and monitor throughout treatment PRN.     Bernie Gurrola, MS, RD, LDN  10/14/2024  2:51 PM

## 2024-10-14 NOTE — PLAN OF CARE
Pt arrived to clinic for Folfirinox treatment and tolerated well with no changes throughout therapy. Pt educated on line care at home and aware of number to call for any pump issues. Pt with chemo spill kit and aware of how to use if needed. Pt aware of side effects of meds and aware of f/u appts and discharged to home in NAD with 5FU pump infusing with ease.

## 2024-10-15 NOTE — PROGRESS NOTES
Ms. Lamb is a 63 y.o. female with Arthritis, DMII, GERD, Hypotyroidism, Obesity, Thyroid disease who presents for pancreatic cancer. This was incidentally found on CT chest for a URI.     EUS 7/5/2024:  Impression:            - Normal esophagus.                          - Normal mucosa was found in the entire stomach.                          Biopsied.                          - A single gastric polyp. Resected and retrieved.                          - Normal examined duodenum.                          - A mass was identified in the pancreatic body.                          This was staged T4 N0 M0 by endosonographic                          criteria. The staging applies if malignancy is                          confirmed. Fine needle biopsy performed.                          The common bile duct was traced from the papilla                          to the intra-hepatics and appeared normal. It                          measured 3 mm in diameter at the level of the mid                          CBD. There was no evidence of intra-ductal stones                          and sludge.                          There were a few small stones in the gallbladder,                          measuring 3-4mm in size. Otherwise the gallbladder                          appeared normal.                          Limited views of the left lobe of the liver                          appeared normal.                          Limited views of the abdominal aorta, portal vein,                          and splenic vessels appeared normal.                          There was no intra-abdominal lymphadenopathy.                          The mediastinum was unremarkable. There was no                          mediastinal lymphadenopathy. Vascular structures                          all appeared normal.                          The esophagus, stomach, and duodenal stations were                          all viewed.      Path:  PANCREAS, FURTHER  "DESIGNATED "BODY MASS," FINE NEEDLE ASPIRATION:     --POSITIVE FOR ADENOCARCINOMA.      CT 7/9/2024:  Impression:     Proximal pancreatic body 4.4 cm mass consistent known adenocarcinoma.  There is focal occlusion of the splenic vein at the level of the portal confluence and encasement of the splenic artery which remains patent.  The mass also focally abuts the distal lesser curvature of the stomach without evidence of invasion.  Nonspecific peripancreatic lymph node measuring 0.7 cm in short axis.  Recommend attention on follow-up.  Otherwise, no evidence of metastatic disease in the chest, abdomen or pelvis.  Cholelithiasis.  Circumferential bladder wall thickening which could be due to underdistention.  Consider further evaluation with urinalysis to exclude cystitis.             CA 19-9: 281     A/P     Bessy is a 62yo F with locally advanced pancreatic cancer involving the distal celiac artery, portosplenic venous junction with a large splenic-IMV collateral. Her SMA has a preserved fat plane and I suspect her ZULEYMA and GDA are not involved. There is almost certainly posterior gastric wall involvement.     She has seen Dr. Hunt, had port placed and is starting FOLFIRINOX. We discussed the treatment for locally advanced pancreatic cancer, including systemic therapy and possible radiation therapy. Given her locally advanced vascular involvement, I would favor a total neoadjuvant approach if she tolerates therapy. Will plan to follow along with her restaging.  ---------------------------------------------------------  Bessy returns after 5 cycles FOLFIRINOX with some delays due to s/e and hospitalization.    Her CA 19-9 has normalized from a high of 280.    CT 10/2024:  Impression:     1. 37 x 35 mm infiltrating/obstruct proximal pancreatic body mass, slightly smaller in transverse dimension on today's study, which involves the celiac artery origin, splenic artery origin, and proximal splenic vein as detailed above.  " There is diffuse dilatation of the main pancreatic duct within the pancreatic body and tail and there is atrophy of the pancreatic body and tail.  No definitive imaging evidence of new metastatic disease in the chest, abdomen, or pelvis.  2. 5 mm possible new ground-glass nodule in the right lower lobe, nonspecific.  Short-term follow-up is recommended to ensure stability.  3. Long segment concentric wall thickening involving the distal sigmoid colon/rectum.  Infectious and inflammatory etiologies for proctocolitis are possible.      All in all a good response. I think we have all the options in front of us. We could argue here she is closing in on maximum systemic response and has already had one chemotherapy related admission, although in talking to Dr. Hunt this was for potassium and she was never very ill. There is a fat plane on the SMA, and the vein is reconstructable. Her GDA looks uninvolved. We discussed what this operation will entail, including subtotal pancreatectomy, resection of the celiac artery, potential hepatic artery reconstruction with saphenous vain graft, and potential venous reconstruction. I think her biggest risk here is PNI along her celiac at its base:      I think its worthwhile to consider preop RT to maximize our shot at an R0 margin here. Will re-present her at tumor board for that consideration, but plan to give her a couple more cycles, reassess and transition to perioperative RT.    I spent 40 minutes with Ms. Lamb and her family, with >50% of time spent face to face and additional time preparing to see the patient (eg, review of tests), obtaining and/or reviewing separately obtained history, documenting clinical information in the electronic or other health record, independently interpreting results (not separately reported) and communicating results to the patient/family/caregiver, or Care coordination (not separately reported).           Flip Valentin MD  Upper GI /  Hepatobiliary Surgical Oncology  [facility] [place], [place]  Office: [phone]  Fax: [phone]

## 2024-10-16 ENCOUNTER — INFUSION (OUTPATIENT)
Dept: INFUSION THERAPY | Facility: HOSPITAL | Age: 64
End: 2024-10-16
Attending: INTERNAL MEDICINE
Payer: MEDICARE

## 2024-10-16 ENCOUNTER — OFFICE VISIT (OUTPATIENT)
Dept: HEMATOLOGY/ONCOLOGY | Facility: CLINIC | Age: 64
End: 2024-10-16
Payer: MEDICARE

## 2024-10-16 VITALS
HEART RATE: 72 BPM | SYSTOLIC BLOOD PRESSURE: 124 MMHG | TEMPERATURE: 98 F | RESPIRATION RATE: 18 BRPM | DIASTOLIC BLOOD PRESSURE: 68 MMHG

## 2024-10-16 VITALS
DIASTOLIC BLOOD PRESSURE: 68 MMHG | WEIGHT: 128.31 LBS | OXYGEN SATURATION: 99 % | BODY MASS INDEX: 24.24 KG/M2 | SYSTOLIC BLOOD PRESSURE: 124 MMHG | HEART RATE: 72 BPM

## 2024-10-16 DIAGNOSIS — C25.9 MALIGNANT NEOPLASM OF PANCREAS, UNSPECIFIED LOCATION OF MALIGNANCY: Primary | ICD-10-CM

## 2024-10-16 DIAGNOSIS — E11.9 TYPE 2 DIABETES MELLITUS WITHOUT COMPLICATION, WITHOUT LONG-TERM CURRENT USE OF INSULIN: ICD-10-CM

## 2024-10-16 DIAGNOSIS — C25.9 PANCREATIC ADENOCARCINOMA: Primary | ICD-10-CM

## 2024-10-16 PROCEDURE — 4010F ACE/ARB THERAPY RXD/TAKEN: CPT | Mod: CPTII,S$GLB,, | Performed by: SURGERY

## 2024-10-16 PROCEDURE — 99999 PR PBB SHADOW E&M-EST. PATIENT-LVL III: CPT | Mod: PBBFAC,,, | Performed by: SURGERY

## 2024-10-16 PROCEDURE — 3074F SYST BP LT 130 MM HG: CPT | Mod: CPTII,S$GLB,, | Performed by: SURGERY

## 2024-10-16 PROCEDURE — 1101F PT FALLS ASSESS-DOCD LE1/YR: CPT | Mod: CPTII,S$GLB,, | Performed by: SURGERY

## 2024-10-16 PROCEDURE — 3066F NEPHROPATHY DOC TX: CPT | Mod: CPTII,S$GLB,, | Performed by: SURGERY

## 2024-10-16 PROCEDURE — 3078F DIAST BP <80 MM HG: CPT | Mod: CPTII,S$GLB,, | Performed by: SURGERY

## 2024-10-16 PROCEDURE — 3008F BODY MASS INDEX DOCD: CPT | Mod: CPTII,S$GLB,, | Performed by: SURGERY

## 2024-10-16 PROCEDURE — 3061F NEG MICROALBUMINURIA REV: CPT | Mod: CPTII,S$GLB,, | Performed by: SURGERY

## 2024-10-16 PROCEDURE — 3288F FALL RISK ASSESSMENT DOCD: CPT | Mod: CPTII,S$GLB,, | Performed by: SURGERY

## 2024-10-16 PROCEDURE — 3051F HG A1C>EQUAL 7.0%<8.0%: CPT | Mod: CPTII,S$GLB,, | Performed by: SURGERY

## 2024-10-16 PROCEDURE — 99215 OFFICE O/P EST HI 40 MIN: CPT | Mod: S$GLB,,, | Performed by: SURGERY

## 2024-10-16 RX ORDER — HEPARIN 100 UNIT/ML
500 SYRINGE INTRAVENOUS
Status: DISCONTINUED | OUTPATIENT
Start: 2024-10-16 | End: 2024-10-16 | Stop reason: HOSPADM

## 2024-10-16 RX ORDER — PROCHLORPERAZINE EDISYLATE 5 MG/ML
5 INJECTION INTRAMUSCULAR; INTRAVENOUS ONCE AS NEEDED
Status: DISCONTINUED | OUTPATIENT
Start: 2024-10-16 | End: 2024-10-16 | Stop reason: HOSPADM

## 2024-10-16 RX ORDER — SODIUM CHLORIDE 0.9 % (FLUSH) 0.9 %
10 SYRINGE (ML) INJECTION
Status: DISCONTINUED | OUTPATIENT
Start: 2024-10-16 | End: 2024-10-16 | Stop reason: HOSPADM

## 2024-10-16 RX ORDER — EMPAGLIFLOZIN, METFORMIN HYDROCHLORIDE 10; 1000 MG/1; MG/1
1 TABLET, EXTENDED RELEASE ORAL
Qty: 30 TABLET | Refills: 5 | Status: SHIPPED | OUTPATIENT
Start: 2024-10-16

## 2024-10-16 NOTE — PROGRESS NOTES
Met with pt and family members. Introduction and contact information reviewed. Plan of care reviewed. Pt and family members verbalized understanding.

## 2024-10-17 ENCOUNTER — LAB VISIT (OUTPATIENT)
Dept: LAB | Facility: HOSPITAL | Age: 64
End: 2024-10-17
Attending: INTERNAL MEDICINE
Payer: MEDICARE

## 2024-10-17 ENCOUNTER — INFUSION (OUTPATIENT)
Dept: INFUSION THERAPY | Facility: HOSPITAL | Age: 64
End: 2024-10-17
Attending: INTERNAL MEDICINE
Payer: MEDICARE

## 2024-10-17 VITALS
DIASTOLIC BLOOD PRESSURE: 60 MMHG | SYSTOLIC BLOOD PRESSURE: 133 MMHG | RESPIRATION RATE: 20 BRPM | HEART RATE: 75 BPM | OXYGEN SATURATION: 97 % | WEIGHT: 128.31 LBS | BODY MASS INDEX: 24.22 KG/M2 | TEMPERATURE: 98 F | HEIGHT: 61 IN

## 2024-10-17 DIAGNOSIS — C25.9 MALIGNANT NEOPLASM OF PANCREAS, UNSPECIFIED LOCATION OF MALIGNANCY: ICD-10-CM

## 2024-10-17 DIAGNOSIS — C25.9 MALIGNANT NEOPLASM OF PANCREAS, UNSPECIFIED LOCATION OF MALIGNANCY: Primary | ICD-10-CM

## 2024-10-17 LAB
ALBUMIN SERPL BCP-MCNC: 3.8 G/DL (ref 3.5–5.2)
ALP SERPL-CCNC: 84 U/L (ref 55–135)
ALT SERPL W/O P-5'-P-CCNC: 15 U/L (ref 10–44)
ANION GAP SERPL CALC-SCNC: 11 MMOL/L (ref 8–16)
AST SERPL-CCNC: 21 U/L (ref 10–40)
BASOPHILS # BLD AUTO: 0.02 K/UL (ref 0–0.2)
BASOPHILS NFR BLD: 0.6 % (ref 0–1.9)
BILIRUB SERPL-MCNC: 1.4 MG/DL (ref 0.1–1)
BUN SERPL-MCNC: 14 MG/DL (ref 8–23)
CALCIUM SERPL-MCNC: 9.1 MG/DL (ref 8.7–10.5)
CANCER AG19-9 SERPL-ACNC: 16.7 U/ML (ref 0–40)
CHLORIDE SERPL-SCNC: 103 MMOL/L (ref 95–110)
CO2 SERPL-SCNC: 23 MMOL/L (ref 23–29)
CREAT SERPL-MCNC: 0.8 MG/DL (ref 0.5–1.4)
DIFFERENTIAL METHOD BLD: ABNORMAL
EOSINOPHIL # BLD AUTO: 0 K/UL (ref 0–0.5)
EOSINOPHIL NFR BLD: 1.2 % (ref 0–8)
ERYTHROCYTE [DISTWIDTH] IN BLOOD BY AUTOMATED COUNT: 18.8 % (ref 11.5–14.5)
EST. GFR  (NO RACE VARIABLE): >60 ML/MIN/1.73 M^2
GLUCOSE SERPL-MCNC: 118 MG/DL (ref 70–110)
HCT VFR BLD AUTO: 35 % (ref 37–48.5)
HGB BLD-MCNC: 11.7 G/DL (ref 12–16)
IMM GRANULOCYTES # BLD AUTO: 0 K/UL (ref 0–0.04)
IMM GRANULOCYTES NFR BLD AUTO: 0 % (ref 0–0.5)
LYMPHOCYTES # BLD AUTO: 1.3 K/UL (ref 1–4.8)
LYMPHOCYTES NFR BLD: 41.7 % (ref 18–48)
MAGNESIUM SERPL-MCNC: 2.1 MG/DL (ref 1.6–2.6)
MCH RBC QN AUTO: 31.6 PG (ref 27–31)
MCHC RBC AUTO-ENTMCNC: 33.4 G/DL (ref 32–36)
MCV RBC AUTO: 95 FL (ref 82–98)
MONOCYTES # BLD AUTO: 0.2 K/UL (ref 0.3–1)
MONOCYTES NFR BLD: 5.6 % (ref 4–15)
NEUTROPHILS # BLD AUTO: 1.6 K/UL (ref 1.8–7.7)
NEUTROPHILS NFR BLD: 50.9 % (ref 38–73)
NRBC BLD-RTO: 0 /100 WBC
PLATELET # BLD AUTO: 119 K/UL (ref 150–450)
PMV BLD AUTO: 8.6 FL (ref 9.2–12.9)
POTASSIUM SERPL-SCNC: 4.5 MMOL/L (ref 3.5–5.1)
PROT SERPL-MCNC: 6.8 G/DL (ref 6–8.4)
RBC # BLD AUTO: 3.7 M/UL (ref 4–5.4)
SODIUM SERPL-SCNC: 137 MMOL/L (ref 136–145)
WBC # BLD AUTO: 3.21 K/UL (ref 3.9–12.7)

## 2024-10-17 PROCEDURE — 36415 COLL VENOUS BLD VENIPUNCTURE: CPT | Mod: PN | Performed by: INTERNAL MEDICINE

## 2024-10-17 PROCEDURE — 86301 IMMUNOASSAY TUMOR CA 19-9: CPT | Performed by: INTERNAL MEDICINE

## 2024-10-17 PROCEDURE — 83735 ASSAY OF MAGNESIUM: CPT | Mod: PN | Performed by: INTERNAL MEDICINE

## 2024-10-17 PROCEDURE — 63600175 PHARM REV CODE 636 W HCPCS: Mod: JZ,JG,PN | Performed by: INTERNAL MEDICINE

## 2024-10-17 PROCEDURE — 96372 THER/PROPH/DIAG INJ SC/IM: CPT | Mod: PN

## 2024-10-17 PROCEDURE — 80053 COMPREHEN METABOLIC PANEL: CPT | Mod: PN | Performed by: INTERNAL MEDICINE

## 2024-10-17 PROCEDURE — 85025 COMPLETE CBC W/AUTO DIFF WBC: CPT | Mod: PN | Performed by: INTERNAL MEDICINE

## 2024-10-17 RX ADMIN — PEGFILGRASTIM 6 MG: 6 INJECTION SUBCUTANEOUS at 08:10

## 2024-10-17 NOTE — PROGRESS NOTES
PATIENT: Bessy Lamb  MRN: [id number]  DATE: 10/18/2024      Diagnosis:   1. Pancreatic adenocarcinoma    2. Malignant neoplasm of pancreas, unspecified location of malignancy    3. 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therapy    4. Thrombocytopenia    5. Hypokalemia    6. Pancreatic insufficiency    7. Hypothyroidism, unspecified type    8. Type 2 diabetes mellitus without complication, without long-term current use of insulin    9. Pulmonary nodule, right              Chief Complaint: Malignant neoplasm of pancreas, unspecified location of mal (2 week follow up )      Oncologic History:      Oncologic History     Oncologic Treatment     Pathology           Subjective:    Interval History:  Ms. Lamb is a 64 y.o. female with Arthritis, DMII, GERD, Hypotyroidism, Obesity, Thyroid disease who presents for pancreatic cancer.  Since the last clinic visit the patient met with Dr Valentin on 10/16/24 whom felt the patient could receive a few more cycles of chemo and then transition to perioperative radiation therapy.  The patient denies CP, cough, SOB, abdominal pain, nausea, vomiting, constipation, diarrhea.  The patient denies fever, chills, night sweats, weight loss, new lumps or bumps, easy bruising or bleeding.    Prior History:   The patient initially underwent a CXR on 11/13/23 fur a URI which showed possible subtle pulmonary nodules.  CT chest 12/01/2023 showed a cluster of ill-defined centrilobular ground-glass opacity he inferior right and left upper lobes as well as a 1.2 cm ground-glass opacity in the superior segment of the left lower lobe; solid 3 mm pulmonary nodule in the right upper lobe; subcentimeter right hepatic lobe hypodensity likely representing a cyst.  The patient underwent surveillance CT chest on 05/21/2024 showing a 3.7 x 2.7 pancreatic body mass in the pancreatic tail atrophy suspicious of pancreatic malignancy as well as and unchanged benign 3 mm nodule in the right upper lobe.  MRI abdomen on  06/30/2024 showed hypoenhancing mass centered in the proximal pancreatic body measuring 2.9 x 2 cm with abutment and possible encasement of the distal splenic vein.  EUS was performed on 07/05/2024 mass in the pancreatic body stage T4 N0 M0 by endo sonographic criteria.  Pathology from biopsy of the stomach showed mild focally moderate chronic gastritis with no evidence of the H pylori.  Stomach polyp which was removed code hyperplastic polyps with chronic inflammation.  Pancreatic biopsy showed adenocarcinoma.  CT of the chest, abdomen, and pelvis on 07/09/2024 showed a 9 mm lesion in the right hepatic lobe previously characterized as cysts; mass in the pancreatic body measuring 4.4 x 3.7 cm with diffuse pancreatic ductal dilatation measuring 8 mm:  Weaning vein at the portal confluence occluded with reconstitution via collaterals.  The mass abuts the portal vein by less than 180° but does not case the proximal splenic artery remains patent.  Also noted was a pancreatic lymph node measuring 0.7 cm.   Patient's case was discussed at tumor board on 07/17/2024 with recommendation for proceeding with the chemotherapy.  Patient had port placement on 07/18/2024.   The patient is admitted to the hospital from 09/20/2024 until 09/22/2024 for hypokalemia with potassium of 2.3.  The patient was subsequently discharged underwent treatment with FOLFIRINOX on 09/23/2024.  CT of the chest, abdomen, and pelvis on 10/02/2024 showed a new 5 mm ground-glass nodule in the right lower lobe; stable 8 mm hypodensity present within the right hepatic lobe suggestive of a cyst; 37 x 35 mm proximal pancreatic body mass slightly smaller in transverse dimension involving 90 degree area of the celiac artery and 100 degree area of the splenic artery with soft tissue extending along the anterior right lateral wall of the portal vein; abnormal soft tissue insinuating between the lateral margin of the celiac artery and adjacent splenic vein;  invasion and focal occlusion of the proximal most aspect of the splenic vein; concentric wall thickening present within the distal sigmoid colon/rectum with infectious and inflammatory etiologies possible.    Past Medical History:   Past Medical History:   Diagnosis Date    Arthritis, lumbar spine     hands    Diabetes mellitus     General anesthetics causing adverse effect in therapeutic use     following breast rediuct, hard time awakening  also had anxiety rxn to lidocain in dental ofc    GERD (gastroesophageal reflux disease)     Hypothyroidism 10/08/2014    Maternal anesthesia complication     epidural for 1st child went up instead of down; required intubation    Normocytic anemia 2024    Obesity (BMI 30-39.9) 10/08/2014    Thyroid disease     Thyroid nodule 10/08/2014       Past Surgical HIstory:   Past Surgical History:   Procedure Laterality Date    BREAST BIOPSY Left     b9    BREAST SURGERY      Reduction cause of a mass in left breast    c-sections x2       SECTION  3/26/81 & 85    Birth of two girls    COLONOSCOPY  2012         COLONOSCOPY N/A 2018    Procedure: COLONOSCOPY;  Surgeon: Francisco Mcclain MD;  Location: [facility];  Service: Endoscopy;  Laterality: N/A;    COLONOSCOPY N/A 10/24/2023    Procedure: COLONOSCOPY;  Surgeon: Francisco Mcclain MD;  Location: [facility];  Service: Endoscopy;  Laterality: N/A;    ENDOSCOPIC ULTRASOUND OF UPPER GASTROINTESTINAL TRACT Left 2024    Procedure: ULTRASOUND, UPPER GI TRACT, ENDOSCOPIC;  Surgeon: Andrew Gordon MD;  Location: [facility];  Service: Endoscopy;  Laterality: Left;    ESOPHAGOGASTRODUODENOSCOPY N/A 2024    Procedure: EGD (ESOPHAGOGASTRODUODENOSCOPY);  Surgeon: Andrew Gordon MD;  Location: [facility];  Service: Endoscopy;  Laterality: N/A;    hysteroscopy with polypectomy      INCISIONAL BREAST BIOPSY Left     benign; done at same time as reduction    INSERTION OF TUNNELED CENTRAL VENOUS  CATHETER (CVC) WITH SUBCUTANEOUS PORT N/A 7/18/2024    Procedure: [id number];  Surgeon: Blanca Dillard MD;  Location: [facility];  Service: General;  Laterality: N/A;    TOTAL REDUCTION MAMMOPLASTY Bilateral 1996       Family History:   Family History   Problem Relation Name Age of Onset    Brain cancer Mother Ravi Tolbert     Early death Mother Ravi Tolbert 51        Passed at 51    Cancer Mother Ravi Tolbert         Brain tumor    Arthritis Mother Ravi Tolbert     Diabetes Father Ck tolbert         Type 2    Cirrhosis Father Ck tolbert     Cancer Father Ck tolbert         Bone    COPD Father Ck tolbert         Smoker    Early death Father Ck tolbert         Passed at 64    Lung cancer Father Ck tolbert     Heart disease Sister Mamta (dianna)wescovich         Congestive heart failure (passed 2/11/18    Hypertension Sister Mamta (dianna)wescovich     Kidney disease Sister Mamta (dianna)felix         Doc removed when she was a child    Hypertension Sister Mayank     Depression Sister Ravi (mayank) macey Mauricio ( sister)         Due to loss of her 27 year old son    Early death Sister Ravi (mayank) macey Mauricio ( sister)         Pulmonary hypertension, cirrhosis of the liver(passed 7/14/2007   Age 57    Heart disease Brother John Borden Macey ( passed )         Heart attack    Hypertension Brother John Borden Macey ( passed )     Heart disease Brother Juan Francisco Macey         Heart  blockage    Heart disease Brother Bhanu Macey         Heart attack (passed)    Diabetes Brother Bhanu Macey     Macular degeneration Brother Bhanu Macey     Breast cancer Maternal Aunt  30    Breast cancer Paternal Aunt  40    Breast cancer Paternal Aunt  40    Ovarian cancer Neg Hx         Social History:  reports that she has never smoked. She has never used smokeless  tobacco. She reports that she does not currently use alcohol. She reports that she does not use drugs.    Allergies:  Review of patient's allergies indicates:   Allergen Reactions    Duricef [cefadroxil] Hives       Medications:  Current Outpatient Medications   Medication Sig Dispense Refill    acetaminophen (TYLENOL) 325 MG tablet Take 325 mg by mouth every 6 (six) hours as needed for Pain.      blood-glucose meter Misc Use as directed 1 each prn    duke's soln (benadryl 30 mL, mylanta 30 mL, LIDOcaine 30 mL, nystatin 30 mL) 120mL 10 ml swish & spit/swallow every 4 to 6 hours as needed for mouth pain/ulcers/yeast/throat pain 240 mL 1    fluticasone propionate (FLONASE) 50 mcg/actuation nasal spray 1 spray (50 mcg total) by Each Nostril route once daily. 18.2 mL 0    levothyroxine (SYNTHROID) 50 MCG tablet Take 1 tablet (50 mcg total) by mouth once daily. 90 tablet 1    lipase-protease-amylase 24,000-76,000-120,000 units (CREON) 24,000-76,000 -120,000 unit capsule Take 2 capsules by mouth 3 (three) times daily with meals. 180 capsule 11    lipase-protease-amylase 24,000-76,000-120,000 units (CREON) 24,000-76,000 -120,000 unit capsule Take 1 capsule by mouth with snacks.      loperamide (IMODIUM) 2 mg capsule Take 2 tablets (4mg) by mouth after first loose stool, 1 tablet every 2 hours until diarrhea free for 12 hours. May take 2 tablets (4mg) by mouth every 4 hours at night. May require more than the package labeling maximum dose of 16mg/day. 30 capsule 11    loratadine (CLARITIN) 10 mg tablet Take 1 tablet (10 mg total) by mouth every morning. 30 tablet 11    meclizine (ANTIVERT) 25 mg tablet Take 1 tablet (25 mg total) by mouth 3 (three) times daily as needed for Dizziness. 30 tablet 0    mupirocin (BACTROBAN) 2 % ointment Apply topically 3 (three) times daily.      OLANZapine (ZYPREXA) 5 MG tablet Take 1 tablet by mouth nightly on days 1-3 of each chemotherapy cycle. 6 tablet 11    potassium chloride SA  (K-DUR,KLOR-CON) 20 MEQ tablet Take 20 mEq by mouth once.      prochlorperazine (COMPAZINE) 5 MG tablet Take 1 tablet (5 mg total) by mouth every 6 (six) hours as needed for Nausea. 20 tablet 5    simvastatin (ZOCOR) 10 MG tablet Take 1 tablet (10 mg total) by mouth every evening. 90 tablet 3    SYNJARDY XR 10-1,000 mg TBph TAKE ONE TABLET BY MOUTH ONCE DAILY 30 tablet 5    TRUE METRIX GLUCOSE TEST STRIP Strp USE 1 STRIP ONCE DAILY TO TEST BLOOD GLUCOSE (50 DAY SUPPLY) 100 each 3    TRUEPLUS LANCETS 33 gauge Misc USE AS DIRECTED TO TEST BLOOD SUGAR ONCE DAILY FOR UP  USES 100 each 2    VITAMIN D2 1,250 mcg (50,000 unit) capsule TAKE 1 CAPSULE (50,000 UNITS TOTAL) BY MOUTH TWICE A WEEK. 24 capsule 3    losartan (COZAAR) 25 MG tablet Take 1 tablet (25 mg total) by mouth as needed (take for bp equal or greater than 120/70).       No current facility-administered medications for this visit.     Facility-Administered Medications Ordered in Other Visits   Medication Dose Route Frequency Provider Last Rate Last Admin    alteplase injection 2 mg  2 mg Intra-Catheter PRN Arash Fuchs, NP        diphenhydrAMINE injection 50 mg  50 mg Intravenous Once PRN Arash Fuchs NP        EPINEPHrine (EPIPEN) 0.3 mg/0.3 mL pen injection 0.3 mg  0.3 mg Intramuscular Once PRN Arash Fuchs, NP        heparin, porcine (PF) 100 unit/mL injection flush 500 Units  500 Units Intravenous PRN Arash Fuchs, ADI        hydrocortisone sodium succinate injection 100 mg  100 mg Intravenous Once PRN Arash Fuchs, NP        prochlorperazine injection Soln 5 mg  5 mg Intravenous Once PRN Arash Fuchs, NP        sodium chloride 0.9% flush 10 mL  10 mL Intravenous PRN Arash Fuchs, NP           Review of Systems   Constitutional:  Negative for chills, fatigue, fever and unexpected weight change.   Respiratory:  Negative for cough and shortness of breath.    Cardiovascular:  Negative for chest pain and palpitations.   Gastrointestinal:  Negative  "for abdominal pain, constipation, diarrhea, nausea and vomiting.   Skin:  Negative for rash.   Neurological:  Negative for headaches.   Hematological:  Negative for adenopathy. Does not bruise/bleed easily.       ECOG Performance Status: 0   Objective:      Vitals:   Vitals:    10/18/24 0843   BP: 120/70   BP Location: Right arm   Patient Position: Sitting   Pulse: 68   Resp: 12   Temp: 97.1 °F (36.2 °C)   TempSrc: Temporal   SpO2: 95%   Weight: 56.8 kg (125 lb 3.5 oz)   Height: 5' 1" (1.549 m)             Physical Exam  Constitutional:       General: She is not in acute distress.     Appearance: She is well-developed. She is not diaphoretic.   HENT:      Head: Normocephalic and atraumatic.      Comments: Alopecia  Cardiovascular:      Rate and Rhythm: Normal rate and regular rhythm.      Heart sounds: Normal heart sounds. No murmur heard.     No friction rub. No gallop.   Pulmonary:      Effort: Pulmonary effort is normal. No respiratory distress.      Breath sounds: Normal breath sounds. No wheezing or rales.   Chest:      Chest wall: No tenderness.   Abdominal:      General: Bowel sounds are normal. There is no distension.      Palpations: Abdomen is soft. There is no mass.      Tenderness: There is no abdominal tenderness. There is no guarding or rebound.   Musculoskeletal:      Comments: PORT in right chest   Lymphadenopathy:      Cervical: No cervical adenopathy.      Upper Body:      Right upper body: No supraclavicular or axillary adenopathy.      Left upper body: No supraclavicular or axillary adenopathy.   Skin:     Findings: No erythema or rash.   Neurological:      Mental Status: She is alert and oriented to person, place, and time.   Psychiatric:         Behavior: Behavior normal.         Laboratory Data:  Lab Visit on 10/17/2024   Component Date Value Ref Range Status    WBC 10/17/2024 3.21 (L)  3.90 - 12.70 K/uL Final    RBC 10/17/2024 3.70 (L)  4.00 - 5.40 M/uL Final    Hemoglobin 10/17/2024 11.7 (L) "  12.0 - 16.0 g/dL Final    Hematocrit 10/17/2024 35.0 (L)  37.0 - 48.5 % Final    MCV 10/17/2024 95  82 - 98 fL Final    MCH 10/17/2024 31.6 (H)  27.0 - 31.0 pg Final    MCHC 10/17/2024 33.4  32.0 - 36.0 g/dL Final    RDW 10/17/2024 18.8 (H)  11.5 - 14.5 % Final    Platelets 10/17/2024 119 (L)  150 - 450 K/uL Final    MPV 10/17/2024 8.6 (L)  9.2 - 12.9 fL Final    Immature Granulocytes 10/17/2024 0.0  0.0 - 0.5 % Final    Gran # (ANC) 10/17/2024 1.6 (L)  1.8 - 7.7 K/uL Final    Immature Grans (Abs) 10/17/2024 0.00  0.00 - 0.04 K/uL Final    Comment: Mild elevation in immature granulocytes is non specific and   can be seen in a variety of conditions including stress response,   acute inflammation, trauma and pregnancy. Correlation with other   laboratory and clinical findings is essential.      Lymph # 10/17/2024 1.3  1.0 - 4.8 K/uL Final    Mono # 10/17/2024 0.2 (L)  0.3 - 1.0 K/uL Final    Eos # 10/17/2024 0.0  0.0 - 0.5 K/uL Final    Baso # 10/17/2024 0.02  0.00 - 0.20 K/uL Final    nRBC 10/17/2024 0  0 /100 WBC Final    Gran % 10/17/2024 50.9  38.0 - 73.0 % Final    Lymph % 10/17/2024 41.7  18.0 - 48.0 % Final    Mono % 10/17/2024 5.6  4.0 - 15.0 % Final    Eosinophil % 10/17/2024 1.2  0.0 - 8.0 % Final    Basophil % 10/17/2024 0.6  0.0 - 1.9 % Final    Differential Method 10/17/2024 Automated   Final    Sodium 10/17/2024 137  136 - 145 mmol/L Final    Potassium 10/17/2024 4.5  3.5 - 5.1 mmol/L Final    Chloride 10/17/2024 103  95 - 110 mmol/L Final    CO2 10/17/2024 23  23 - 29 mmol/L Final    Glucose 10/17/2024 118 (H)  70 - 110 mg/dL Final    BUN 10/17/2024 14  8 - 23 mg/dL Final    Creatinine 10/17/2024 0.8  0.5 - 1.4 mg/dL Final    Calcium 10/17/2024 9.1  8.7 - 10.5 mg/dL Final    Total Protein 10/17/2024 6.8  6.0 - 8.4 g/dL Final    Albumin 10/17/2024 3.8  3.5 - 5.2 g/dL Final    Total Bilirubin 10/17/2024 1.4 (H)  0.1 - 1.0 mg/dL Final    Comment: For infants and newborns, interpretation of results should  be based  on gestational age, weight and in agreement with clinical  observations.    Premature Infant recommended reference ranges:  Up to 24 hours.............<8.0 mg/dL  Up to 48 hours............<12.0 mg/dL  3-5 days..................<15.0 mg/dL  6-29 days.................<15.0 mg/dL      Alkaline Phosphatase 10/17/2024 84  55 - 135 U/L Final    AST 10/17/2024 21  10 - 40 U/L Final    ALT 10/17/2024 15  10 - 44 U/L Final    eGFR 10/17/2024 >60.0  >60 mL/min/1.73 m^2 Final    Anion Gap 10/17/2024 11  8 - 16 mmol/L Final    Magnesium 10/17/2024 2.1  1.6 - 2.6 mg/dL Final    CA 19-9 10/17/2024 16.7  0.0 - 40.0 U/mL Final    Comment: The testing method is a chemiluminescent microparticle immunoassay   manufactured by [employer] and performed on the    or   [employer] system. Values obtained with different assay manufacturers   for   methods may be different and cannot be used interchangeably.     Lab Visit on 10/14/2024   Component Date Value Ref Range Status    WBC 10/14/2024 6.32  3.90 - 12.70 K/uL Final    RBC 10/14/2024 3.53 (L)  4.00 - 5.40 M/uL Final    Hemoglobin 10/14/2024 11.3 (L)  12.0 - 16.0 g/dL Final    Hematocrit 10/14/2024 33.6 (L)  37.0 - 48.5 % Final    MCV 10/14/2024 95  82 - 98 fL Final    MCH 10/14/2024 32.0 (H)  27.0 - 31.0 pg Final    MCHC 10/14/2024 33.6  32.0 - 36.0 g/dL Final    RDW 10/14/2024 19.8 (H)  11.5 - 14.5 % Final    Platelets 10/14/2024 156  150 - 450 K/uL Final    MPV 10/14/2024 8.6 (L)  9.2 - 12.9 fL Final    Immature Granulocytes 10/14/2024 0.5  0.0 - 0.5 % Final    Gran # (ANC) 10/14/2024 3.3  1.8 - 7.7 K/uL Final    Immature Grans (Abs) 10/14/2024 0.03  0.00 - 0.04 K/uL Final    Comment: Mild elevation in immature granulocytes is non specific and   can be seen in a variety of conditions including stress response,   acute inflammation, trauma and pregnancy. Correlation with other   laboratory and clinical findings is essential.      Lymph # 10/14/2024 2.2  1.0  - 4.8 K/uL Final    Mono # 10/14/2024 0.7  0.3 - 1.0 K/uL Final    Eos # 10/14/2024 0.1  0.0 - 0.5 K/uL Final    Baso # 10/14/2024 0.02  0.00 - 0.20 K/uL Final    nRBC 10/14/2024 0  0 /100 WBC Final    Gran % 10/14/2024 52.0  38.0 - 73.0 % Final    Lymph % 10/14/2024 34.7  18.0 - 48.0 % Final    Mono % 10/14/2024 10.9  4.0 - 15.0 % Final    Eosinophil % 10/14/2024 1.6  0.0 - 8.0 % Final    Basophil % 10/14/2024 0.3  0.0 - 1.9 % Final    Differential Method 10/14/2024 Automated   Final    Sodium 10/14/2024 137  136 - 145 mmol/L Final    Potassium 10/14/2024 4.2  3.5 - 5.1 mmol/L Final    Chloride 10/14/2024 105  95 - 110 mmol/L Final    CO2 10/14/2024 21 (L)  23 - 29 mmol/L Final    Glucose 10/14/2024 162 (H)  70 - 110 mg/dL Final    BUN 10/14/2024 10  8 - 23 mg/dL Final    Creatinine 10/14/2024 0.8  0.5 - 1.4 mg/dL Final    Calcium 10/14/2024 8.9  8.7 - 10.5 mg/dL Final    Total Protein 10/14/2024 7.0  6.0 - 8.4 g/dL Final    Albumin 10/14/2024 4.0  3.5 - 5.2 g/dL Final    Total Bilirubin 10/14/2024 0.7  0.1 - 1.0 mg/dL Final    Comment: For infants and newborns, interpretation of results should be based  on gestational age, weight and in agreement with clinical  observations.    Premature Infant recommended reference ranges:  Up to 24 hours.............<8.0 mg/dL  Up to 48 hours............<12.0 mg/dL  3-5 days..................<15.0 mg/dL  6-29 days.................<15.0 mg/dL      Alkaline Phosphatase 10/14/2024 107  55 - 135 U/L Final    AST 10/14/2024 20  10 - 40 U/L Final    ALT 10/14/2024 13  10 - 44 U/L Final    eGFR 10/14/2024 >60.0  >60 mL/min/1.73 m^2 Final    Anion Gap 10/14/2024 11  8 - 16 mmol/L Final         Imaging:     Ct C/A/P 10/02/24  Chest:     There is a right chest chemotherapy port present with the tip of its catheter noted in the SVC. The thyroid gland enhances homogeneously. There is atherosclerotic calcification present within the thoracic aortic arch and left subclavian artery. No  thoracic aortic aneurysm or dissection. No pericardial fluid. No pathologically enlarged lymph nodes in the chest or axilla. The trachea and mainstem bronchi are unremarkable. There is a new 5 mm ground-glass nodule present in the right lower lobe (series 301, image 123). No emphysematous lung architecture or bronchiectasis. There is biapical pleuro-parenchymal scarring.. No dense airspace consolidation, pleural fluid, or pneumothorax.     Abdomen and pelvis:     There is diffuse hepatic steatosis. There is an 8 mm hypodensity present within the right hepatic lobe on series 3, image 128, possibly a cyst but too small to characterize and unchanged when compared to the prior study. No new hyperenhancing hepatic mass observed in the liver on the early arterial phase images. There are calcified gallstones present in the gallbladder lumen. No gallbladder inflammatory change. The spleen is unremarkable. The stomach, duodenal C-loop, and adrenal glands are unremarkable.     The most significant abnormality relates to the presence of an approximately 37 x 35 mm proximal pancreatic body mass, slightly smaller in transverse dimension when compared with the previous examination. The mass involves a 90° area the celiac artery and a 180° area of the splenic artery on series 2 images 76-81. There is also soft tissue extending along the anterior and right lateral wall of the portal vein on series 2, image 78 which may represent tumor infiltrating the retroperitoneal soft tissue planes adjacent to the portal vein/portal confluence. There is also abnormal soft tissue insinuating between the left lateral margin of the celiac artery and adjacent splenic vein on series 2, image 77. As best appreciated on series 2, image 84 and series 601, image 59, there is invasion and focal occlusion of the proximal most aspect of the splenic vein. There is diffuse dilatation of the main pancreatic duct within the pancreatic body and tail and there  is complete atrophy of the mid and distal pancreatic body and tail.     The abdominal aorta is not aneurysmal. There is minimal calcified atherosclerotic plaque deposition noted within the distal infrarenal abdominal aorta. No bulky periaortic or retroperitoneal lymphadenopathy appreciated. The kidneys show no evidence of stones, hydronephrosis, or solid enhancing mass. The ureters are normal in caliber and course without stones. The urinary bladder shows no significant abnormalities. The uterus and ovaries show no significant abnormalities.     The appendix is unremarkable. No evidence of high-grade bowel obstruction. There is concentric wall thickening present within the distal sigmoid colon/rectum on series 3, image 218. Infectious and inflammatory etiologies of proctocolitis are possible. No ascites. No pneumoperitoneum. No inguinal hernia or inguinal lymphadenopathy detected. No mesenteric adenopathy or new peritoneal soft tissue mass.     Bones: No evidence of acute thoracolumbar compression fracture. No imaging evidence of lytic or blastic osseous metastatic disease on the provided images.       Assessment:       1. Pancreatic adenocarcinoma    2. Malignant neoplasm of pancreas, unspecified location of malignancy    3. 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therapy    4. Thrombocytopenia    5. Hypokalemia    6. Pancreatic insufficiency    7. Hypothyroidism, unspecified type    8. Type 2 diabetes mellitus without complication, without long-term current use of insulin    9. Pulmonary nodule, right                 Plan:   Pancreatic Cancer - Patient with Stage III pancreatic cancer with concern for potential encasement of the distal celiac artery per discussion with Dr Valentin  -No sign of mets  - 94.5 on 6/17/24  -Case discussed at tumor board 7/17/24 with recommendation to proceed with chemo  -[facility] genetic testing negative for the genes tested  -[facility] tissue testing showed TP53, KRAS p.G12D, CDKN2A, CDKN2B  mutations.  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  -Will need to keep irinotecan dose reduced at 165mg/mm2  -CT C/A/P on 10/02/24 shows stable disease and a possible new RLL nodule  -Pt completed 6 cycles of FOLFIRINOX  - decreased all the way to 16.7U/mL on 10/17/24   -Pt saw Dr Valentin 10/16/24 whom recommended a few more cycles of chemo and then transition to perioperative radiation therapy  -Will have pt see radiation oncology with return appt  -Will discuss case at next GI tumor board  Visit today included increased complexity associated with the care of the episodic problem (chemotherapy) addressed and managing the longitudinal care of the patient due to the serious and/or complex managed problem(s) pancreatic cancer.     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 - pt at increased risk of infection  -No current signs of infection  -Will monitor     Pulmonary Nodule - pt with RLL on CT C/A/P 10/02/24  -5mm and described as ground glass  -Will monitor    Thrombocytopenia - platelets 119k on 10/17/24  -Due to chemo  -Will monitor     Hypokalemia - improved to 4.5 on 10/17/24  -PT to continue potassium chloride     Pancreatic Insufficiency - Pt taking Creon  -Diarrhea not from pancreaitc insufficiency     Hypothyroidism - pt on synthroid  -Will monitror     DMII - PT currently on Synjardy  Lab Results   Component Value Date    HGBA1C 7.0 (H) 09/20/2024   -Possibly due to pancreatic cancer  -Will monitor    Route Chart for Scheduling    Med Onc Chart Routing      Follow up with physician Other. Pt needs an appt with radiation oncology ASAP with an appt with me after they have seen [facility] radiation oncologist.   Follow up with [facility]    Infusion scheduling note    Injection scheduling note    Labs    Imaging    Pharmacy appointment    Other referrals              Treatment Plan Information   OP GI FOLFIRINOX (oxaliplatin, leucovorin, irinotecan, fluorouracil) Q2W  Rajat Hunt MD    Associated diagnosis: Malignant neoplasm of pancreas Stage III cT4, cN0, cM0 noted on 7/12/2024   Line of treatment: Neoadjuvant  Treatment Goal: Curative     Upcoming Treatment Dates - OP GI FOLFIRINOX (oxaliplatin, leucovorin, irinotecan, fluorouracil) Q2W     10/28/2024       Chemotherapy       oxaliplatin (ELOXATIN) in D5W 520.2 mL chemo infusion       leucovorin calcium in D5W 250 mL infusion       irinotecan (CAMPTOSAR) in 0.9% NaCl 510.9 mL chemo infusion       fluorouraciL injection       fluorouracil chemo infusion       Antiemetics       palonosetron 0.25mg/dexAMETHasone 12mg in NS IVPB 0.25 mg 50 mL  11/11/2024       Chemotherapy       oxaliplatin (ELOXATIN) in D5W 520.2 mL chemo infusion       leucovorin calcium in D5W 250 mL infusion       irinotecan (CAMPTOSAR) in 0.9% NaCl 510.9 mL chemo infusion       fluorouraciL injection       fluorouracil chemo infusion       Antiemetics       palonosetron 0.25mg/dexAMETHasone 12mg in NS IVPB 0.25 mg 50 mL  11/25/2024       Chemotherapy       oxaliplatin (ELOXATIN) in D5W 520.2 mL chemo infusion       leucovorin calcium in D5W 250 mL infusion       irinotecan (CAMPTOSAR) in 0.9% NaCl 510.9 mL chemo infusion       fluorouraciL injection       fluorouracil chemo infusion       Antiemetics       palonosetron 0.25mg/dexAMETHasone 12mg in NS IVPB 0.25 mg 50 mL  12/9/2024       Chemotherapy       oxaliplatin (ELOXATIN) in D5W 520.2 mL chemo infusion       leucovorin calcium in D5W 250 mL infusion       irinotecan (CAMPTOSAR) in 0.9% NaCl 510.9 mL chemo infusion       fluorouraciL injection       fluorouracil chemo infusion       Antiemetics       palonosetron 0.25mg/dexAMETHasone 12mg in NS IVPB 0.25 mg 50 mL    Therapy Plan Information  PORT FLUSH for Malignant neoplasm of pancreas, noted on 7/12/2024  Flushes  heparin, porcine (PF) 100 unit/mL injection flush 500 Units  500 Units, Intravenous, Every visit  sodium chloride 0.9% flush 10 mL  10 mL, Intravenous, Every  visit      No therapy plan of the specified type found.    No therapy plan of the specified type found.      Rajat Hunt MD  [facility]  Hematology and Oncology  [facility]   [address] [place] [address]   O: [phone]  F: [phone]

## 2024-10-18 ENCOUNTER — OFFICE VISIT (OUTPATIENT)
Dept: HEMATOLOGY/ONCOLOGY | Facility: CLINIC | Age: 64
End: 2024-10-18
Payer: MEDICARE

## 2024-10-18 VITALS
OXYGEN SATURATION: 95 % | TEMPERATURE: 97 F | WEIGHT: 125.25 LBS | HEART RATE: 68 BPM | SYSTOLIC BLOOD PRESSURE: 120 MMHG | BODY MASS INDEX: 23.65 KG/M2 | RESPIRATION RATE: 12 BRPM | HEIGHT: 61 IN | DIASTOLIC BLOOD PRESSURE: 70 MMHG

## 2024-10-18 DIAGNOSIS — K86.89 PANCREATIC INSUFFICIENCY: ICD-10-CM

## 2024-10-18 DIAGNOSIS — T45.1X5A IMMUNODEFICIENCY DUE TO CHEMOTHERAPY: ICD-10-CM

## 2024-10-18 DIAGNOSIS — E87.6 HYPOKALEMIA: ICD-10-CM

## 2024-10-18 DIAGNOSIS — R91.1 PULMONARY NODULE, RIGHT: ICD-10-CM

## 2024-10-18 DIAGNOSIS — E11.9 TYPE 2 DIABETES MELLITUS WITHOUT COMPLICATION, WITHOUT LONG-TERM CURRENT USE OF INSULIN: ICD-10-CM

## 2024-10-18 DIAGNOSIS — D84.821 IMMUNODEFICIENCY DUE TO CHEMOTHERAPY: ICD-10-CM

## 2024-10-18 DIAGNOSIS — C25.9 PANCREATIC ADENOCARCINOMA: Primary | ICD-10-CM

## 2024-10-18 DIAGNOSIS — E03.9 HYPOTHYROIDISM, UNSPECIFIED TYPE: ICD-10-CM

## 2024-10-18 DIAGNOSIS — Z79.899 IMMUNODEFICIENCY DUE TO CHEMOTHERAPY: ICD-10-CM

## 2024-10-18 DIAGNOSIS — C25.9 MALIGNANT NEOPLASM OF PANCREAS, UNSPECIFIED LOCATION OF MALIGNANCY: ICD-10-CM

## 2024-10-18 DIAGNOSIS — D69.6 THROMBOCYTOPENIA: ICD-10-CM

## 2024-10-18 PROCEDURE — 99999 PR PBB SHADOW E&M-EST. PATIENT-LVL V: CPT | Mod: PBBFAC,,, | Performed by: INTERNAL MEDICINE

## 2024-10-18 RX ORDER — SODIUM CHLORIDE 0.9 % (FLUSH) 0.9 %
10 SYRINGE (ML) INJECTION
OUTPATIENT
Start: 2024-10-28

## 2024-10-18 RX ORDER — HEPARIN 100 UNIT/ML
500 SYRINGE INTRAVENOUS
OUTPATIENT
Start: 2024-10-30

## 2024-10-18 RX ORDER — DIPHENHYDRAMINE HYDROCHLORIDE 50 MG/ML
50 INJECTION INTRAMUSCULAR; INTRAVENOUS ONCE AS NEEDED
OUTPATIENT
Start: 2024-10-28

## 2024-10-18 RX ORDER — FLUOROURACIL 50 MG/ML
320 INJECTION, SOLUTION INTRAVENOUS
OUTPATIENT
Start: 2024-10-28

## 2024-10-18 RX ORDER — ATROPINE SULFATE 0.4 MG/ML
0.4 INJECTION, SOLUTION ENDOTRACHEAL; INTRAMEDULLARY; INTRAMUSCULAR; INTRAVENOUS; SUBCUTANEOUS ONCE AS NEEDED
OUTPATIENT
Start: 2024-10-28

## 2024-10-18 RX ORDER — PROCHLORPERAZINE EDISYLATE 5 MG/ML
5 INJECTION INTRAMUSCULAR; INTRAVENOUS ONCE AS NEEDED
OUTPATIENT
Start: 2024-10-28

## 2024-10-18 RX ORDER — HEPARIN 100 UNIT/ML
500 SYRINGE INTRAVENOUS
OUTPATIENT
Start: 2024-10-28

## 2024-10-18 RX ORDER — EPINEPHRINE 0.3 MG/.3ML
0.3 INJECTION SUBCUTANEOUS ONCE AS NEEDED
OUTPATIENT
Start: 2024-10-28

## 2024-10-18 RX ORDER — PROCHLORPERAZINE EDISYLATE 5 MG/ML
5 INJECTION INTRAMUSCULAR; INTRAVENOUS ONCE AS NEEDED
OUTPATIENT
Start: 2024-10-30

## 2024-10-18 RX ORDER — SODIUM CHLORIDE 0.9 % (FLUSH) 0.9 %
10 SYRINGE (ML) INJECTION
OUTPATIENT
Start: 2024-10-30

## 2024-10-21 ENCOUNTER — OFFICE VISIT (OUTPATIENT)
Dept: DIABETES | Facility: CLINIC | Age: 64
End: 2024-10-21
Payer: MEDICARE

## 2024-10-21 DIAGNOSIS — E11.9 TYPE 2 DIABETES MELLITUS WITHOUT COMPLICATION, WITHOUT LONG-TERM CURRENT USE OF INSULIN: Primary | ICD-10-CM

## 2024-10-21 DIAGNOSIS — C25.9 MALIGNANT NEOPLASM OF PANCREAS, UNSPECIFIED LOCATION OF MALIGNANCY: ICD-10-CM

## 2024-10-21 PROCEDURE — 3051F HG A1C>EQUAL 7.0%<8.0%: CPT | Mod: CPTII,95,, | Performed by: NURSE PRACTITIONER

## 2024-10-21 PROCEDURE — 99214 OFFICE O/P EST MOD 30 MIN: CPT | Mod: 95,,, | Performed by: NURSE PRACTITIONER

## 2024-10-21 PROCEDURE — 3066F NEPHROPATHY DOC TX: CPT | Mod: CPTII,95,, | Performed by: NURSE PRACTITIONER

## 2024-10-21 PROCEDURE — 3061F NEG MICROALBUMINURIA REV: CPT | Mod: CPTII,95,, | Performed by: NURSE PRACTITIONER

## 2024-10-21 PROCEDURE — 4010F ACE/ARB THERAPY RXD/TAKEN: CPT | Mod: CPTII,95,, | Performed by: NURSE PRACTITIONER

## 2024-10-21 PROCEDURE — G2211 COMPLEX E/M VISIT ADD ON: HCPCS | Mod: 95,,, | Performed by: NURSE PRACTITIONER

## 2024-10-21 NOTE — PATIENT INSTRUCTIONS
PATIENT INSTRUCTIONS      Continue Synjardy XR  mg by mouth daily.      Check blood sugar twice daily. Every morning when you wake up and 1-2 hours after main meal of the day. Keep log and upload to [employer] prior to each visit.   Blood Sugar Goals:       Fastin-130.       1-2 hours after a meal: Less than 180.
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
## 2024-10-23 ENCOUNTER — OFFICE VISIT (OUTPATIENT)
Dept: RADIATION ONCOLOGY | Facility: CLINIC | Age: 64
End: 2024-10-23
Payer: MEDICARE

## 2024-10-23 VITALS
WEIGHT: 126.31 LBS | RESPIRATION RATE: 18 BRPM | HEART RATE: 89 BPM | DIASTOLIC BLOOD PRESSURE: 75 MMHG | SYSTOLIC BLOOD PRESSURE: 130 MMHG | OXYGEN SATURATION: 99 % | HEIGHT: 66 IN | BODY MASS INDEX: 20.3 KG/M2 | TEMPERATURE: 98 F

## 2024-10-23 DIAGNOSIS — C25.9 PANCREATIC ADENOCARCINOMA: ICD-10-CM

## 2024-10-23 PROCEDURE — 3066F NEPHROPATHY DOC TX: CPT | Mod: CPTII,S$GLB,, | Performed by: RADIOLOGY

## 2024-10-23 PROCEDURE — 3051F HG A1C>EQUAL 7.0%<8.0%: CPT | Mod: CPTII,S$GLB,, | Performed by: RADIOLOGY

## 2024-10-23 PROCEDURE — 3061F NEG MICROALBUMINURIA REV: CPT | Mod: CPTII,S$GLB,, | Performed by: RADIOLOGY

## 2024-10-23 PROCEDURE — 1159F MED LIST DOCD IN RCRD: CPT | Mod: CPTII,S$GLB,, | Performed by: RADIOLOGY

## 2024-10-23 PROCEDURE — 3075F SYST BP GE 130 - 139MM HG: CPT | Mod: CPTII,S$GLB,, | Performed by: RADIOLOGY

## 2024-10-23 PROCEDURE — 4010F ACE/ARB THERAPY RXD/TAKEN: CPT | Mod: CPTII,S$GLB,, | Performed by: RADIOLOGY

## 2024-10-23 PROCEDURE — 1126F AMNT PAIN NOTED NONE PRSNT: CPT | Mod: CPTII,S$GLB,, | Performed by: RADIOLOGY

## 2024-10-23 PROCEDURE — 3078F DIAST BP <80 MM HG: CPT | Mod: CPTII,S$GLB,, | Performed by: RADIOLOGY

## 2024-10-23 PROCEDURE — 3008F BODY MASS INDEX DOCD: CPT | Mod: CPTII,S$GLB,, | Performed by: RADIOLOGY

## 2024-10-23 PROCEDURE — 99205 OFFICE O/P NEW HI 60 MIN: CPT | Mod: S$GLB,,, | Performed by: RADIOLOGY

## 2024-10-23 PROCEDURE — 3288F FALL RISK ASSESSMENT DOCD: CPT | Mod: CPTII,S$GLB,, | Performed by: RADIOLOGY

## 2024-10-23 PROCEDURE — 1101F PT FALLS ASSESS-DOCD LE1/YR: CPT | Mod: CPTII,S$GLB,, | Performed by: RADIOLOGY

## 2024-10-23 PROCEDURE — 99999 PR PBB SHADOW E&M-EST. PATIENT-LVL V: CPT | Mod: PBBFAC,,, | Performed by: RADIOLOGY

## 2024-10-24 NOTE — PROGRESS NOTES
10/24/2024    [facility]   Radiation Oncology Consultation    ASSESSMENT  Bessy Lamb is a 64 y.o. y/o female with Stage III [id number] locally advanced adenocarcinoma of the pancreas with abutment of the the distal celiac artery, portosplenic venous junction with a large splenic-IMV collateral. SMA, ZULEYMA, and GDA are not involved. There is almost certainly posterior gastric wall involvement. She has completed 6 cycles of FOLFIRINOX 10/17/24.       PLAN    Treatment options were discussed with the patient including concurrent chemoradiotherapy.  We discussed the goals of treatment to be curative.  The risks, benefits, scheduling, alternatives to and rationale of radiation therapy were explained in detail.    After this discussion, she elected to proceed with therapy.    Consent was obtained and all questions were answered to the best of my ability  A CT simulation will be performed on once plan has been fully coordinated with team. She will need sim early in AM with empty stomach. This will begin the planning process for the patient's radiation therapy.     She was given our contact information, and she was told that she could call our clinic at any time if she has any questions or concerns.      Radiation Treatment Details:   We plan to treat Pancreas tumor to a dose of 50 Gy in 25 fractions at 2 Gy per fraction delivered daily  We will utilize a(n) IMRT technique.   IMRT is medically necessary to treat complex dose painted target volumes in the pancreas region with concave and convex isodose lines with steep isodose gradients to spare multiple adjacent organs at risk including the intestines, stomach, kidneys, liver  We will utilize daily CT image guidance due to the need for accurate daily patient alignment to treat the target volumes accurately and avoid radiation overdose to multiple regional organs at risk since we are treating the patient with complex target volumes with multiple  steep isodose gradients.         Chief Complaint   Patient presents with    Pancreatic Cancer       Oncology History   Malignant neoplasm of pancreas   7/12/2024 Initial Diagnosis    Pancreatic cancer     7/13/2024 Cancer Staged    Staging form: Exocrine Pancreas, AJCC 8th Edition  - Clinical: Stage III (cT4, cN0, cM0)     7/22/2024 -  Chemotherapy    Treatment Summary   Plan Name: OP GI FOLFIRINOX (oxaliplatin, leucovorin, irinotecan, fluorouracil) Q2W   Treatment Goal: Curative  Status: Active  Start Date: 7/22/2024  End Date: 7/24/2025 (Planned)  Provider: Rajat Hunt MD  Chemotherapy: fluorouraciL injection 500 mg, 515 mg (100 % of original dose 320 mg/m2), Intravenous, Clinic/[facility] 1 time, 6 of 26 cycles  Dose modification: 320 mg/m2 (original dose 320 mg/m2, Cycle 1, Reason: MD Discretion, Comment: Pharamcogenomic), 320 mg/m2 (original dose 400 mg/m2, Cycle 6)  Administration: 500 mg (7/22/2024), 640 mg (8/5/2024), 635 mg (8/19/2024), 635 mg (9/3/2024), 640 mg (9/23/2024), 500 mg (10/14/2024)  irinotecan (CAMPTOSAR) 260 mg in 0.9% NaCl 578 mL chemo infusion, 266 mg (100 % of original dose 165 mg/m2), Intravenous, Clinic/[facility] 1 time, 6 of 26 cycles  Dose modification: 165 mg/m2 (original dose 165 mg/m2, Cycle 1, Reason: Other (see comments), Comment: Waiting for pharmacogenomic panel), 165 mg/m2 (original dose 165 mg/m2, Cycle 2, Reason: Other (see comments), Comment: UGT Mutation), 140 mg/m2 (original dose 165 mg/m2, Cycle 5, Reason: Other (see comments), Comment: UGT1A1 mutation), 165 mg/m2 (original dose 165 mg/m2, Cycle 5, Reason: MD Discretion), 140 mg/m2 (original dose 165 mg/m2, Cycle 6, Reason: MD Discretion, Comment: Thrombocytopenia)  Administration: 260 mg (7/22/2024), 260 mg (8/5/2024), 260 mg (8/19/2024), 260 mg (9/3/2024), 260 mg (9/23/2024), 220 mg (10/14/2024)  oxaliplatin (ELOXATIN) 85 mg/m2 = 137 mg in D5W 592.4 mL chemo infusion, 85 mg/m2 = 137 mg, Intravenous, Clinic/[facility] 1 time, 6 of 26  cycles  Dose modification: 65 mg/m2 (original dose 85 mg/m2, Cycle 6)  Administration: 137 mg (7/22/2024), 136 mg (8/5/2024), 135 mg (8/19/2024), 135 mg (9/3/2024), 136 mg (9/23/2024), 100 mg (10/14/2024)  fluorouracil (Adrucil) 3,000 mg in 0.9% NaCl 100 mL chemo infusion, 3,090 mg (100 % of original dose 1,920 mg/m2), Intravenous, Over 46 hours, 6 of 26 cycles  Dose modification: 1,920 mg/m2 (original dose 1,920 mg/m2, Cycle 1, Reason: MD Discretion, Comment: Pharmcogenomic panel pending), 1,920 mg/m2 (original dose 2,400 mg/m2, Cycle 6)  Administration: 3,000 mg (7/22/2024), 3,840 mg (8/5/2024), 3,815 mg (8/19/2024), 3,815 mg (9/3/2024), 3,840 mg (9/23/2024), 3,000 mg (10/14/2024)       HPI: Mrs. Lamb is a 64 year old who was diagnosed with locally advanced pancreatic cancer. She had weight loss, new onset diabetes, and rash prior to diagnosis. EUS 7/5/24 revealed: The mass was hypoechoic. The mass measured 30 mm by 25 mm in maximal cross-sectional diameter. The endosonographic borders were well-defined. There was sonographic evidence suggesting invasion into the celiac trunk (manifested by encasement). Biopsy revealed adenocarcinoma.    CT c/a/p 7/8/24 revealed:  Proximal pancreatic body 4.4 cm mass consistent known adenocarcinoma.  There is focal occlusion of the splenic vein at the level of the portal confluence and encasement of the splenic artery which remains patent.  The mass also focally abuts the distal lesser curvature of the stomach without evidence of invasion.     Nonspecific peripancreatic lymph node measuring 0.7 cm in short axis.  Recommend attention on follow-up.     Otherwise, no evidence of metastatic disease in the chest, abdomen or pelvis.     Cholelithiasis.     Circumferential bladder wall thickening which could be due to underdistention.  Consider further evaluation with urinalysis to exclude cystitis.    Patient has received 6 cycles of FOLFIRINOX last dose 10/17/24.    CT c/a/p  10/2/24:  1. 37 x 35 mm infiltrating/obstruct proximal pancreatic body mass, slightly smaller in transverse dimension on today's study, which involves the celiac artery origin, splenic artery origin, and proximal splenic vein as detailed above.  There is diffuse dilatation of the main pancreatic duct within the pancreatic body and tail and there is atrophy of the pancreatic body and tail.  No definitive imaging evidence of new metastatic disease in the chest, abdomen, or pelvis.  2. 5 mm possible new ground-glass nodule in the right lower lobe, nonspecific.  Short-term follow-up is recommended to ensure stability.  3. Long segment concentric wall thickening involving the distal sigmoid colon/rectum.  Infectious and inflammatory etiologies for proctocolitis are possible.    In clinic today patient states that she had hypokalemia and thrombocytopenia as a result of treatment. Overall she is doing well.    Possibility of pregnancy: No  History of prior irradiation: No  History of prior systemic anti-cancer therapy: Yes   History of collagen vascular disease: No  Implanted electronic device (pacer/defib/nerve stimulator): No    Past Medical History:   Diagnosis Date    Arthritis, lumbar spine     hands    Diabetes mellitus     General anesthetics causing adverse effect in therapeutic use     following breast rediuct, hard time awakening  also had anxiety rxn to lidocain in dental ofc    GERD (gastroesophageal reflux disease)     Hypothyroidism 10/08/2014    Maternal anesthesia complication     epidural for 1st child went up instead of down; required intubation    Normocytic anemia 2024    Obesity (BMI 30-39.9) 10/08/2014    Thyroid disease     Thyroid nodule 10/08/2014       Past Surgical History:   Procedure Laterality Date    BREAST BIOPSY Left     b9    BREAST SURGERY      Reduction cause of a mass in left breast    c-sections x2       SECTION  3/26/81 & 85    Birth of two girls    COLONOSCOPY   9/25/2012         COLONOSCOPY N/A 8/30/2018    Procedure: COLONOSCOPY;  Surgeon: Francisco Mcclain MD;  Location: [facility];  Service: Endoscopy;  Laterality: N/A;    COLONOSCOPY N/A 10/24/2023    Procedure: COLONOSCOPY;  Surgeon: Francisco Mcclain MD;  Location: [facility];  Service: Endoscopy;  Laterality: N/A;    ENDOSCOPIC ULTRASOUND OF UPPER GASTROINTESTINAL TRACT Left 7/5/2024    Procedure: ULTRASOUND, UPPER GI TRACT, ENDOSCOPIC;  Surgeon: Andrew Gordon MD;  Location: [facility];  Service: Endoscopy;  Laterality: Left;    ESOPHAGOGASTRODUODENOSCOPY N/A 7/5/2024    Procedure: EGD (ESOPHAGOGASTRODUODENOSCOPY);  Surgeon: Andrew Gordon MD;  Location: [facility];  Service: Endoscopy;  Laterality: N/A;    hysteroscopy with polypectomy      INCISIONAL BREAST BIOPSY Left 1996    benign; done at same time as reduction    INSERTION OF TUNNELED CENTRAL VENOUS CATHETER (CVC) WITH SUBCUTANEOUS PORT N/A 7/18/2024    Procedure: [id number];  Surgeon: Blanca Dillard MD;  Location: [facility];  Service: General;  Laterality: N/A;    TOTAL REDUCTION MAMMOPLASTY Bilateral 1996       Social History     Tobacco Use    Smoking status: Never    Smokeless tobacco: Never   Substance Use Topics    Alcohol use: Not Currently     Comment: 2/ month    Drug use: No       Cancer-related family history includes Brain cancer in her mother; Breast cancer (age of onset: 30) in her maternal aunt; Breast cancer (age of onset: 40) in her paternal aunt and paternal aunt; Cancer in her father and mother; Lung cancer in her father. There is no history of Ovarian cancer.    Current Outpatient Medications on File Prior to Visit   Medication Sig Dispense Refill    acetaminophen (TYLENOL) 325 MG tablet Take 325 mg by mouth every 6 (six) hours as needed for Pain.      blood-glucose meter Misc Use as directed 1 each prn    duke's soln (benadryl 30 mL, mylanta 30 mL, LIDOcaine 30 mL, nystatin 30 mL) 120mL 10 ml swish & spit/swallow  every 4 to 6 hours as needed for mouth pain/ulcers/yeast/throat pain 240 mL 1    fluticasone propionate (FLONASE) 50 mcg/actuation nasal spray 1 spray (50 mcg total) by Each Nostril route once daily. 18.2 mL 0    levothyroxine (SYNTHROID) 50 MCG tablet Take 1 tablet (50 mcg total) by mouth once daily. 90 tablet 1    lipase-protease-amylase 24,000-76,000-120,000 units (CREON) 24,000-76,000 -120,000 unit capsule Take 2 capsules by mouth 3 (three) times daily with meals. 180 capsule 11    lipase-protease-amylase 24,000-76,000-120,000 units (CREON) 24,000-76,000 -120,000 unit capsule Take 1 capsule by mouth with snacks.      loperamide (IMODIUM) 2 mg capsule Take 2 tablets (4mg) by mouth after first loose stool, 1 tablet every 2 hours until diarrhea free for 12 hours. May take 2 tablets (4mg) by mouth every 4 hours at night. May require more than the package labeling maximum dose of 16mg/day. 30 capsule 11    loratadine (CLARITIN) 10 mg tablet Take 1 tablet (10 mg total) by mouth every morning. 30 tablet 11    losartan (COZAAR) 25 MG tablet Take 1 tablet (25 mg total) by mouth as needed (take for bp equal or greater than 120/70).      meclizine (ANTIVERT) 25 mg tablet Take 1 tablet (25 mg total) by mouth 3 (three) times daily as needed for Dizziness. 30 tablet 0    mupirocin (BACTROBAN) 2 % ointment Apply topically 3 (three) times daily.      OLANZapine (ZYPREXA) 5 MG tablet Take 1 tablet by mouth nightly on days 1-3 of each chemotherapy cycle. 6 tablet 11    potassium chloride SA (K-DUR,KLOR-CON) 20 MEQ tablet Take 20 mEq by mouth once.      prochlorperazine (COMPAZINE) 5 MG tablet Take 1 tablet (5 mg total) by mouth every 6 (six) hours as needed for Nausea. 20 tablet 5    simvastatin (ZOCOR) 10 MG tablet Take 1 tablet (10 mg total) by mouth every evening. 90 tablet 3    SYNJARDY XR 10-1,000 mg TBph TAKE ONE TABLET BY MOUTH ONCE DAILY 30 tablet 5    TRUE METRIX GLUCOSE TEST STRIP Strp USE 1 STRIP ONCE DAILY TO TEST  BLOOD GLUCOSE (50 DAY SUPPLY) 100 each 3    TRUEPLUS LANCETS 33 gauge Misc USE AS DIRECTED TO TEST BLOOD SUGAR ONCE DAILY FOR UP  USES 100 each 2    VITAMIN D2 1,250 mcg (50,000 unit) capsule TAKE 1 CAPSULE (50,000 UNITS TOTAL) BY MOUTH TWICE A WEEK. 24 capsule 3     Current Facility-Administered Medications on File Prior to Visit   Medication Dose Route Frequency Provider Last Rate Last Admin    alteplase injection 2 mg  2 mg Intra-Catheter PRN Arash Fuchs NP        diphenhydrAMINE injection 50 mg  50 mg Intravenous Once PRN Arash Fuchs NP        EPINEPHrine (EPIPEN) 0.3 mg/0.3 mL pen injection 0.3 mg  0.3 mg Intramuscular Once PRN Arash Fuchs NP        heparin, porcine (PF) 100 unit/mL injection flush 500 Units  500 Units Intravenous PRN Arash Fuchs NP        hydrocortisone sodium succinate injection 100 mg  100 mg Intravenous Once PRN Arash Fuchs NP        prochlorperazine injection Soln 5 mg  5 mg Intravenous Once PRN Arash Fuchs NP        sodium chloride 0.9% flush 10 mL  10 mL Intravenous PRN Arash Fuchs NP           Review of patient's allergies indicates:   Allergen Reactions    Duricef [cefadroxil] Hives       Review of Systems:   Constistutional: Denies fever, chills. No recent weight changes. Denies nights sweats.  Eyes: No redness or dryness.  ENT: Denies dry mouth. No ulcers.  Card: Denies chest pain. No PND, or orthopnea.   Resp: Denies cough. Denies shortness of breath.  Gastro: Denies nausea or vomiting, constipation, diarrhea.  Genito: No kidney stones. Denies dysuria.  Skin: No rash, psoriasis, or alopecia.  Musculoskeletal:No myalgia. No muscle weakness.  Neuro: No numbness or tingling. No history of seizures or psychosis.  Psych: Denies depression. Denies anxiety.  Endo: Denies history of diabetes. No thyroid disease.  Heme: Denies anemia. No blood clots or bleeding diathesis.  Allergic: Denies seasonal allergies.   All other systems negative    Vital Signs:   Vitals:  "   10/23/24 1300   BP: 130/75   Pulse: 89   Resp: 18   Temp: 98.2 °F (36.8 °C)   SpO2: 99%   Weight: 57.3 kg (126 lb 5.2 oz)   Height: 5' 6" (1.676 m)        ECOG Performance Status: 0 - Fully Active    Physical Exam:  General:  Well-appearing, nontoxic  Eyes:  Equal and round pupils, EOMI, no scleral icterus  Mouth:  No lesions, moist  Cardiovascular:  Warm, well-perfused, no peripheral edema  Lungs:  Unlabored on room air, no wheezing  Neurologic:  Awake, alert and oriented, participating in the exam  Psych:  Appropriate mood and affect  Skin:  Normal pallor, No rashes  Heme:  No petechiae, no purpura       Imaging: I have personally reviewed the patient's available images and reports and summarized pertinent findings above in HPI.     Pathology: I have personally reviewed the patient's available pathology and summarized pertinent findings above in HPI.     This case was discussed with Dr. Hunt and Dr. Valentin.      Rene Rascon MD  Radiation Oncology  "

## 2024-10-28 ENCOUNTER — INFUSION (OUTPATIENT)
Dept: INFUSION THERAPY | Facility: HOSPITAL | Age: 64
End: 2024-10-28
Attending: INTERNAL MEDICINE
Payer: MEDICARE

## 2024-10-28 ENCOUNTER — LAB VISIT (OUTPATIENT)
Dept: LAB | Facility: HOSPITAL | Age: 64
End: 2024-10-28
Attending: INTERNAL MEDICINE
Payer: MEDICARE

## 2024-10-28 ENCOUNTER — DOCUMENTATION ONLY (OUTPATIENT)
Dept: SURGERY | Facility: CLINIC | Age: 64
End: 2024-10-28
Payer: MEDICARE

## 2024-10-28 ENCOUNTER — OFFICE VISIT (OUTPATIENT)
Dept: HEMATOLOGY/ONCOLOGY | Facility: CLINIC | Age: 64
End: 2024-10-28
Payer: MEDICARE

## 2024-10-28 ENCOUNTER — DOCUMENTATION ONLY (OUTPATIENT)
Dept: INFUSION THERAPY | Facility: HOSPITAL | Age: 64
End: 2024-10-28
Payer: MEDICARE

## 2024-10-28 VITALS
SYSTOLIC BLOOD PRESSURE: 120 MMHG | HEART RATE: 88 BPM | HEIGHT: 61 IN | WEIGHT: 130.06 LBS | DIASTOLIC BLOOD PRESSURE: 72 MMHG | BODY MASS INDEX: 24.55 KG/M2 | OXYGEN SATURATION: 99 % | RESPIRATION RATE: 14 BRPM | TEMPERATURE: 98 F

## 2024-10-28 VITALS
HEART RATE: 88 BPM | RESPIRATION RATE: 14 BRPM | TEMPERATURE: 98 F | WEIGHT: 129 LBS | SYSTOLIC BLOOD PRESSURE: 120 MMHG | BODY MASS INDEX: 24.35 KG/M2 | HEIGHT: 61 IN | DIASTOLIC BLOOD PRESSURE: 72 MMHG

## 2024-10-28 DIAGNOSIS — D69.6 THROMBOCYTOPENIA: ICD-10-CM

## 2024-10-28 DIAGNOSIS — C25.9 PANCREATIC ADENOCARCINOMA: Primary | ICD-10-CM

## 2024-10-28 DIAGNOSIS — C25.9 MALIGNANT NEOPLASM OF PANCREAS, UNSPECIFIED LOCATION OF MALIGNANCY: ICD-10-CM

## 2024-10-28 DIAGNOSIS — E87.6 HYPOKALEMIA: ICD-10-CM

## 2024-10-28 DIAGNOSIS — E11.9 TYPE 2 DIABETES MELLITUS WITHOUT COMPLICATION, WITHOUT LONG-TERM CURRENT USE OF INSULIN: ICD-10-CM

## 2024-10-28 DIAGNOSIS — D84.821 IMMUNODEFICIENCY DUE TO CHEMOTHERAPY: ICD-10-CM

## 2024-10-28 DIAGNOSIS — K86.89 PANCREATIC INSUFFICIENCY: ICD-10-CM

## 2024-10-28 DIAGNOSIS — C25.9 MALIGNANT NEOPLASM OF PANCREAS, UNSPECIFIED LOCATION OF MALIGNANCY: Primary | ICD-10-CM

## 2024-10-28 DIAGNOSIS — Z79.899 IMMUNODEFICIENCY DUE TO CHEMOTHERAPY: ICD-10-CM

## 2024-10-28 DIAGNOSIS — C25.9 PANCREATIC ADENOCARCINOMA: ICD-10-CM

## 2024-10-28 DIAGNOSIS — T45.1X5A IMMUNODEFICIENCY DUE TO CHEMOTHERAPY: ICD-10-CM

## 2024-10-28 DIAGNOSIS — R91.1 PULMONARY NODULE, RIGHT: ICD-10-CM

## 2024-10-28 DIAGNOSIS — E03.9 HYPOTHYROIDISM, UNSPECIFIED TYPE: ICD-10-CM

## 2024-10-28 LAB
ALBUMIN SERPL BCP-MCNC: 4 G/DL (ref 3.5–5.2)
ALP SERPL-CCNC: 120 U/L (ref 40–150)
ALT SERPL W/O P-5'-P-CCNC: 12 U/L (ref 10–44)
ANION GAP SERPL CALC-SCNC: 10 MMOL/L (ref 8–16)
AST SERPL-CCNC: 17 U/L (ref 10–40)
BASOPHILS # BLD AUTO: 0.02 K/UL (ref 0–0.2)
BASOPHILS NFR BLD: 0.3 % (ref 0–1.9)
BILIRUB SERPL-MCNC: 0.6 MG/DL (ref 0.1–1)
BUN SERPL-MCNC: 9 MG/DL (ref 8–23)
CALCIUM SERPL-MCNC: 9.6 MG/DL (ref 8.7–10.5)
CHLORIDE SERPL-SCNC: 105 MMOL/L (ref 95–110)
CO2 SERPL-SCNC: 24 MMOL/L (ref 23–29)
CREAT SERPL-MCNC: 0.8 MG/DL (ref 0.5–1.4)
DIFFERENTIAL METHOD BLD: ABNORMAL
EOSINOPHIL # BLD AUTO: 0.1 K/UL (ref 0–0.5)
EOSINOPHIL NFR BLD: 1 % (ref 0–8)
ERYTHROCYTE [DISTWIDTH] IN BLOOD BY AUTOMATED COUNT: 17.8 % (ref 11.5–14.5)
EST. GFR  (NO RACE VARIABLE): >60 ML/MIN/1.73 M^2
GLUCOSE SERPL-MCNC: 125 MG/DL (ref 70–110)
HCT VFR BLD AUTO: 34.8 % (ref 37–48.5)
HGB BLD-MCNC: 11.6 G/DL (ref 12–16)
IMM GRANULOCYTES # BLD AUTO: 0.08 K/UL (ref 0–0.04)
IMM GRANULOCYTES NFR BLD AUTO: 1.2 % (ref 0–0.5)
LYMPHOCYTES # BLD AUTO: 2 K/UL (ref 1–4.8)
LYMPHOCYTES NFR BLD: 29.4 % (ref 18–48)
MAGNESIUM SERPL-MCNC: 2 MG/DL (ref 1.6–2.6)
MCH RBC QN AUTO: 32.1 PG (ref 27–31)
MCHC RBC AUTO-ENTMCNC: 33.3 G/DL (ref 32–36)
MCV RBC AUTO: 96 FL (ref 82–98)
MONOCYTES # BLD AUTO: 0.5 K/UL (ref 0.3–1)
MONOCYTES NFR BLD: 7.3 % (ref 4–15)
NEUTROPHILS # BLD AUTO: 4.1 K/UL (ref 1.8–7.7)
NEUTROPHILS NFR BLD: 60.8 % (ref 38–73)
NRBC BLD-RTO: 0 /100 WBC
PLATELET # BLD AUTO: 122 K/UL (ref 150–450)
PMV BLD AUTO: 9.3 FL (ref 9.2–12.9)
POTASSIUM SERPL-SCNC: 5 MMOL/L (ref 3.5–5.1)
PROT SERPL-MCNC: 7 G/DL (ref 6–8.4)
RBC # BLD AUTO: 3.61 M/UL (ref 4–5.4)
SODIUM SERPL-SCNC: 139 MMOL/L (ref 136–145)
WBC # BLD AUTO: 6.73 K/UL (ref 3.9–12.7)

## 2024-10-28 PROCEDURE — 3066F NEPHROPATHY DOC TX: CPT | Mod: CPTII,S$GLB,, | Performed by: INTERNAL MEDICINE

## 2024-10-28 PROCEDURE — 3061F NEG MICROALBUMINURIA REV: CPT | Mod: CPTII,S$GLB,, | Performed by: INTERNAL MEDICINE

## 2024-10-28 PROCEDURE — 96375 TX/PRO/DX INJ NEW DRUG ADDON: CPT | Mod: PN

## 2024-10-28 PROCEDURE — 96368 THER/DIAG CONCURRENT INF: CPT | Mod: PN

## 2024-10-28 PROCEDURE — 3078F DIAST BP <80 MM HG: CPT | Mod: CPTII,S$GLB,, | Performed by: INTERNAL MEDICINE

## 2024-10-28 PROCEDURE — 4010F ACE/ARB THERAPY RXD/TAKEN: CPT | Mod: CPTII,S$GLB,, | Performed by: INTERNAL MEDICINE

## 2024-10-28 PROCEDURE — G2211 COMPLEX E/M VISIT ADD ON: HCPCS | Mod: S$GLB,,, | Performed by: INTERNAL MEDICINE

## 2024-10-28 PROCEDURE — 25000003 PHARM REV CODE 250: Mod: PN | Performed by: INTERNAL MEDICINE

## 2024-10-28 PROCEDURE — 63600175 PHARM REV CODE 636 W HCPCS: Mod: PN | Performed by: INTERNAL MEDICINE

## 2024-10-28 PROCEDURE — 80053 COMPREHEN METABOLIC PANEL: CPT | Mod: PN | Performed by: INTERNAL MEDICINE

## 2024-10-28 PROCEDURE — 99215 OFFICE O/P EST HI 40 MIN: CPT | Mod: S$GLB,,, | Performed by: INTERNAL MEDICINE

## 2024-10-28 PROCEDURE — 83735 ASSAY OF MAGNESIUM: CPT | Mod: PN | Performed by: INTERNAL MEDICINE

## 2024-10-28 PROCEDURE — 99999 PR PBB SHADOW E&M-EST. PATIENT-LVL IV: CPT | Mod: PBBFAC,,, | Performed by: INTERNAL MEDICINE

## 2024-10-28 PROCEDURE — 85025 COMPLETE CBC W/AUTO DIFF WBC: CPT | Mod: PN | Performed by: INTERNAL MEDICINE

## 2024-10-28 PROCEDURE — 3008F BODY MASS INDEX DOCD: CPT | Mod: CPTII,S$GLB,, | Performed by: INTERNAL MEDICINE

## 2024-10-28 PROCEDURE — 96367 TX/PROPH/DG ADDL SEQ IV INF: CPT | Mod: PN

## 2024-10-28 PROCEDURE — 3288F FALL RISK ASSESSMENT DOCD: CPT | Mod: CPTII,S$GLB,, | Performed by: INTERNAL MEDICINE

## 2024-10-28 PROCEDURE — 96415 CHEMO IV INFUSION ADDL HR: CPT | Mod: PN

## 2024-10-28 PROCEDURE — 3051F HG A1C>EQUAL 7.0%<8.0%: CPT | Mod: CPTII,S$GLB,, | Performed by: INTERNAL MEDICINE

## 2024-10-28 PROCEDURE — 96417 CHEMO IV INFUS EACH ADDL SEQ: CPT | Mod: PN

## 2024-10-28 PROCEDURE — 3074F SYST BP LT 130 MM HG: CPT | Mod: CPTII,S$GLB,, | Performed by: INTERNAL MEDICINE

## 2024-10-28 PROCEDURE — 1101F PT FALLS ASSESS-DOCD LE1/YR: CPT | Mod: CPTII,S$GLB,, | Performed by: INTERNAL MEDICINE

## 2024-10-28 PROCEDURE — 96413 CHEMO IV INFUSION 1 HR: CPT | Mod: PN

## 2024-10-28 PROCEDURE — 1126F AMNT PAIN NOTED NONE PRSNT: CPT | Mod: CPTII,S$GLB,, | Performed by: INTERNAL MEDICINE

## 2024-10-28 PROCEDURE — A4216 STERILE WATER/SALINE, 10 ML: HCPCS | Mod: PN | Performed by: INTERNAL MEDICINE

## 2024-10-28 RX ORDER — DIPHENHYDRAMINE HYDROCHLORIDE 50 MG/ML
50 INJECTION INTRAMUSCULAR; INTRAVENOUS ONCE AS NEEDED
Status: DISCONTINUED | OUTPATIENT
Start: 2024-10-28 | End: 2024-10-28 | Stop reason: HOSPADM

## 2024-10-28 RX ORDER — PROCHLORPERAZINE EDISYLATE 5 MG/ML
5 INJECTION INTRAMUSCULAR; INTRAVENOUS ONCE AS NEEDED
Status: DISCONTINUED | OUTPATIENT
Start: 2024-10-28 | End: 2024-10-28 | Stop reason: HOSPADM

## 2024-10-28 RX ORDER — HEPARIN 100 UNIT/ML
500 SYRINGE INTRAVENOUS
Status: DISCONTINUED | OUTPATIENT
Start: 2024-10-28 | End: 2024-10-28 | Stop reason: HOSPADM

## 2024-10-28 RX ORDER — SODIUM CHLORIDE 0.9 % (FLUSH) 0.9 %
10 SYRINGE (ML) INJECTION
Status: DISCONTINUED | OUTPATIENT
Start: 2024-10-28 | End: 2024-10-28 | Stop reason: HOSPADM

## 2024-10-28 RX ORDER — FLUOROURACIL 50 MG/ML
500 INJECTION, SOLUTION INTRAVENOUS
Status: COMPLETED | OUTPATIENT
Start: 2024-10-28 | End: 2024-10-28

## 2024-10-28 RX ORDER — EPINEPHRINE 0.3 MG/.3ML
0.3 INJECTION SUBCUTANEOUS ONCE AS NEEDED
Status: DISCONTINUED | OUTPATIENT
Start: 2024-10-28 | End: 2024-10-28 | Stop reason: HOSPADM

## 2024-10-28 RX ORDER — ATROPINE SULFATE 0.4 MG/ML
0.4 INJECTION, SOLUTION ENDOTRACHEAL; INTRAMEDULLARY; INTRAMUSCULAR; INTRAVENOUS; SUBCUTANEOUS ONCE AS NEEDED
Status: COMPLETED | OUTPATIENT
Start: 2024-10-28 | End: 2024-10-28

## 2024-10-28 RX ADMIN — SODIUM CHLORIDE 220 MG: 9 INJECTION, SOLUTION INTRAVENOUS at 01:10

## 2024-10-28 RX ADMIN — Medication 10 ML: at 02:10

## 2024-10-28 RX ADMIN — ATROPINE SULFATE 0.4 MG: 0.4 INJECTION, SOLUTION INTRAVENOUS at 12:10

## 2024-10-28 RX ADMIN — OXALIPLATIN 100 MG: 5 INJECTION, SOLUTION INTRAVENOUS at 10:10

## 2024-10-28 RX ADMIN — DEXTROSE MONOHYDRATE: 5 INJECTION, SOLUTION INTRAVENOUS at 10:10

## 2024-10-28 RX ADMIN — SODIUM CHLORIDE: 9 INJECTION, SOLUTION INTRAVENOUS at 10:10

## 2024-10-28 RX ADMIN — DEXAMETHASONE SODIUM PHOSPHATE 0.25 MG: 10 INJECTION, SOLUTION INTRAMUSCULAR; INTRAVENOUS at 10:10

## 2024-10-28 RX ADMIN — FLUOROURACIL 500 MG: 50 INJECTION, SOLUTION INTRAVENOUS at 02:10

## 2024-10-28 RX ADMIN — FLUOROURACIL 3000 MG: 50 INJECTION, SOLUTION INTRAVENOUS at 02:10

## 2024-10-28 RX ADMIN — LEUCOVORIN CALCIUM 500 MG: 350 INJECTION, POWDER, LYOPHILIZED, FOR SUSPENSION INTRAMUSCULAR; INTRAVENOUS at 01:10

## 2024-10-29 ENCOUNTER — DOCUMENTATION ONLY (OUTPATIENT)
Dept: INFUSION THERAPY | Facility: HOSPITAL | Age: 64
End: 2024-10-29
Payer: MEDICARE

## 2024-10-29 DIAGNOSIS — Z00.00 ENCOUNTER FOR MEDICARE ANNUAL WELLNESS EXAM: ICD-10-CM

## 2024-10-30 ENCOUNTER — INFUSION (OUTPATIENT)
Dept: INFUSION THERAPY | Facility: HOSPITAL | Age: 64
End: 2024-10-30
Attending: INTERNAL MEDICINE
Payer: MEDICARE

## 2024-10-30 VITALS
RESPIRATION RATE: 16 BRPM | SYSTOLIC BLOOD PRESSURE: 126 MMHG | DIASTOLIC BLOOD PRESSURE: 81 MMHG | TEMPERATURE: 98 F | OXYGEN SATURATION: 99 % | HEART RATE: 74 BPM

## 2024-10-30 DIAGNOSIS — C25.9 MALIGNANT NEOPLASM OF PANCREAS, UNSPECIFIED LOCATION OF MALIGNANCY: Primary | ICD-10-CM

## 2024-10-30 PROCEDURE — 25000003 PHARM REV CODE 250: Mod: PN | Performed by: INTERNAL MEDICINE

## 2024-10-30 PROCEDURE — A4216 STERILE WATER/SALINE, 10 ML: HCPCS | Mod: PN | Performed by: INTERNAL MEDICINE

## 2024-10-30 RX ORDER — PROCHLORPERAZINE EDISYLATE 5 MG/ML
5 INJECTION INTRAMUSCULAR; INTRAVENOUS ONCE AS NEEDED
Status: DISCONTINUED | OUTPATIENT
Start: 2024-10-30 | End: 2024-10-30 | Stop reason: HOSPADM

## 2024-10-30 RX ORDER — SODIUM CHLORIDE 0.9 % (FLUSH) 0.9 %
10 SYRINGE (ML) INJECTION
Status: DISCONTINUED | OUTPATIENT
Start: 2024-10-30 | End: 2024-10-30 | Stop reason: HOSPADM

## 2024-10-30 RX ORDER — HEPARIN 100 UNIT/ML
500 SYRINGE INTRAVENOUS
Status: DISCONTINUED | OUTPATIENT
Start: 2024-10-30 | End: 2024-10-30 | Stop reason: HOSPADM

## 2024-10-30 RX ADMIN — SODIUM CHLORIDE, PRESERVATIVE FREE 10 ML: 5 INJECTION INTRAVENOUS at 03:10

## 2024-10-31 ENCOUNTER — INFUSION (OUTPATIENT)
Dept: INFUSION THERAPY | Facility: HOSPITAL | Age: 64
End: 2024-10-31
Attending: INTERNAL MEDICINE
Payer: MEDICARE

## 2024-10-31 VITALS
TEMPERATURE: 99 F | HEIGHT: 61 IN | HEART RATE: 81 BPM | WEIGHT: 130.06 LBS | OXYGEN SATURATION: 99 % | DIASTOLIC BLOOD PRESSURE: 70 MMHG | RESPIRATION RATE: 16 BRPM | BODY MASS INDEX: 24.55 KG/M2 | SYSTOLIC BLOOD PRESSURE: 117 MMHG

## 2024-10-31 DIAGNOSIS — C25.9 MALIGNANT NEOPLASM OF PANCREAS, UNSPECIFIED LOCATION OF MALIGNANCY: Primary | ICD-10-CM

## 2024-10-31 PROCEDURE — 63600175 PHARM REV CODE 636 W HCPCS: Mod: JZ,JG,PN | Performed by: INTERNAL MEDICINE

## 2024-10-31 PROCEDURE — 96372 THER/PROPH/DIAG INJ SC/IM: CPT | Mod: PN

## 2024-10-31 RX ADMIN — PEGFILGRASTIM 6 MG: 6 INJECTION SUBCUTANEOUS at 01:10

## 2024-11-06 ENCOUNTER — TUMOR BOARD CONFERENCE (OUTPATIENT)
Dept: HEMATOLOGY/ONCOLOGY | Facility: CLINIC | Age: 64
End: 2024-11-06
Payer: MEDICARE

## 2024-11-07 NOTE — NURSING
[facility]      GI MULTIDISCIPLINARY TUMOR BOARD      Date: 11/6/2024  MRN: [id number]    Site:   Cancer Type: Pancreatic cancer     Cancer Site: pancreas       Cancer Staging Complete: Yes       Presenting Hospital / Clinic: [facility]     Virtual Tumor Board Conference: In person       PRESENTER:   Presenter: Rajat Hunt MD     Specialties Present: Medical Oncology; Hematology; Radiation Oncology; Research; Navigation; Pathology; Surgical Oncology; Palliative Care; Genetics; Integrative Oncology; Gastrointestinal; Radiology       PATIENT SUMMARY:   Ms. Lamb is a 64 y.o. female with Arthritis, DMII, GERD, Hypotyroidism, Obesity, Thyroid disease who presents for pancreatic cancer.  Since the last clinic visit the patient's case was discussed with Dr. Valentin and .  Plan is to proceed with up to 12 cycles FOLFIRINOX prior to consideration of radiation versus surgical resection.  The patient currently endorses intermittent sensation of things crawling on her feet.  The patient denies numbness.  The patient denies CP, cough, SOB, abdominal pain, nausea, vomiting, constipation, diarrhea.  The patient denies fever, chills, night sweats, weight loss, new lumps or bumps, easy bruising or bleeding.     Prior History:   The patient initially underwent a CXR on 11/13/23 fur a URI which showed possible subtle pulmonary nodules.  CT chest 12/01/2023 showed a cluster of ill-defined centrilobular ground-glass opacity he inferior right and left upper lobes as well as a 1.2 cm ground-glass opacity in the superior segment of the left lower lobe; solid 3 mm pulmonary nodule in the right upper lobe; subcentimeter right hepatic lobe hypodensity likely representing a cyst.  The patient underwent surveillance CT chest on 05/21/2024 showing a 3.7 x 2.7 pancreatic body mass in the pancreatic tail atrophy suspicious of  pancreatic malignancy as well as and unchanged benign 3 mm nodule in the right upper lobe.  MRI abdomen on 06/30/2024 showed hypoenhancing mass centered in the proximal pancreatic body measuring 2.9 x 2 cm with abutment and possible encasement of the distal splenic vein.  EUS was performed on 07/05/2024 mass in the pancreatic body stage T4 N0 M0 by endo sonographic criteria.  Pathology from biopsy of the stomach showed mild focally moderate chronic gastritis with no evidence of the H pylori.  Stomach polyp which was removed code hyperplastic polyps with chronic inflammation.  Pancreatic biopsy showed adenocarcinoma.  CT of the chest, abdomen, and pelvis on 07/09/2024 showed a 9 mm lesion in the right hepatic lobe previously characterized as cysts; mass in the pancreatic body measuring 4.4 x 3.7 cm with diffuse pancreatic ductal dilatation measuring 8 mm:  Weaning vein at the portal confluence occluded with reconstitution via collaterals.  The mass abuts the portal vein by less than 180° but does not case the proximal splenic artery remains patent.  Also noted was a pancreatic lymph node measuring 0.7 cm.              Patient's case was discussed at tumor board on 07/17/2024 with recommendation for proceeding with the chemotherapy.  Patient had port placement on 07/18/2024.              The patient is admitted to the hospital from 09/20/2024 until 09/22/2024 for hypokalemia with potassium of 2.3.  The patient was subsequently discharged underwent treatment with FOLFIRINOX on 09/23/2024.  CT of the chest, abdomen, and pelvis on 10/02/2024 showed a new 5 mm ground-glass nodule in the right lower lobe; stable 8 mm hypodensity present within the right hepatic lobe suggestive of a cyst; 37 x 35 mm proximal pancreatic body mass slightly smaller in transverse dimension involving 90 degree area of the celiac artery and 100 degree area of the splenic artery with soft tissue extending along the anterior right lateral wall of  the portal vein; abnormal soft tissue insinuating between the lateral margin of the celiac artery and adjacent splenic vein; invasion and focal occlusion of the proximal most aspect of the splenic vein; concentric wall thickening present within the distal sigmoid colon/rectum with infectious and inflammatory etiologies possible.  The patient met with Dr Valentin on 10/16/24 whom felt the patient could receive a few more cycles of chemo and then transition to perioperative radiation therapy.       Background Information  Patient Status: a current patient  Reason for Consultation: Follow-Up from Prior Tumor Board  Biopsy Date: 07/05/24  Biopsy Results: Cancer  Treatment to Date: Genetic Counseling; Neoadjuvant Chemoradiation; Neoadjuvant Radiation         BOARD RECOMMENDATIONS:   Recommended Plan (General)  Recommended Plan: Radiation; Chemotherapy; Imaging; Surgery  Recommended Plan Note: Continue chemotherapy, restaging scans, perioperative radiation, follow-up with Dr. Flip Valentin for surgerical options.         Consult: Surgery, Hem/Onc, Rad/Onc, GI, and Radiology    Cancer Staging   Malignant neoplasm of pancreas  Staging form: Exocrine Pancreas, AJCC 8th Edition  - Clinical: Stage III (cT4, cN0, cM0) - Signed by Rajat Hunt MD on 7/13/2024           PRESENTATION AT CANCER CONFERENCE:   Presentation at Cancer Conference: Prospective       CLINICAL TRIAL ELIGIBILITY:   Clinical Trial Eligibility  Clinical Trial Eligibility: Enrolled  Clinical Trial Note: Pt is enrolled in the U01; Early Detection of Pancreatic Cancer: Prospective study         Rajat Hunt MD

## 2024-11-11 ENCOUNTER — LAB VISIT (OUTPATIENT)
Dept: LAB | Facility: HOSPITAL | Age: 64
End: 2024-11-11
Attending: INTERNAL MEDICINE
Payer: MEDICARE

## 2024-11-11 ENCOUNTER — DOCUMENTATION ONLY (OUTPATIENT)
Dept: INFUSION THERAPY | Facility: HOSPITAL | Age: 64
End: 2024-11-11
Payer: MEDICARE

## 2024-11-11 ENCOUNTER — DOCUMENTATION ONLY (OUTPATIENT)
Dept: INFUSION THERAPY | Facility: HOSPITAL | Age: 64
End: 2024-11-11

## 2024-11-11 ENCOUNTER — OFFICE VISIT (OUTPATIENT)
Dept: HEMATOLOGY/ONCOLOGY | Facility: CLINIC | Age: 64
End: 2024-11-11
Payer: MEDICARE

## 2024-11-11 ENCOUNTER — INFUSION (OUTPATIENT)
Dept: INFUSION THERAPY | Facility: HOSPITAL | Age: 64
End: 2024-11-11
Attending: INTERNAL MEDICINE
Payer: MEDICARE

## 2024-11-11 VITALS
DIASTOLIC BLOOD PRESSURE: 66 MMHG | RESPIRATION RATE: 18 BRPM | HEART RATE: 66 BPM | SYSTOLIC BLOOD PRESSURE: 112 MMHG | TEMPERATURE: 97 F | OXYGEN SATURATION: 97 % | BODY MASS INDEX: 24.26 KG/M2 | HEIGHT: 61 IN | WEIGHT: 128.5 LBS

## 2024-11-11 VITALS
DIASTOLIC BLOOD PRESSURE: 72 MMHG | HEIGHT: 61 IN | TEMPERATURE: 97 F | BODY MASS INDEX: 24.26 KG/M2 | RESPIRATION RATE: 18 BRPM | WEIGHT: 128.5 LBS | SYSTOLIC BLOOD PRESSURE: 117 MMHG | OXYGEN SATURATION: 97 % | HEART RATE: 70 BPM

## 2024-11-11 DIAGNOSIS — C25.9 PANCREATIC ADENOCARCINOMA: ICD-10-CM

## 2024-11-11 DIAGNOSIS — C25.9 MALIGNANT NEOPLASM OF PANCREAS, UNSPECIFIED LOCATION OF MALIGNANCY: Primary | ICD-10-CM

## 2024-11-11 LAB
ALBUMIN SERPL BCP-MCNC: 3.7 G/DL (ref 3.5–5.2)
ALP SERPL-CCNC: 116 U/L (ref 40–150)
ALT SERPL W/O P-5'-P-CCNC: 12 U/L (ref 10–44)
ANION GAP SERPL CALC-SCNC: 10 MMOL/L (ref 8–16)
AST SERPL-CCNC: 16 U/L (ref 10–40)
BASOPHILS # BLD AUTO: 0.04 K/UL (ref 0–0.2)
BASOPHILS NFR BLD: 0.5 % (ref 0–1.9)
BILIRUB SERPL-MCNC: 0.5 MG/DL (ref 0.1–1)
BUN SERPL-MCNC: 5 MG/DL (ref 8–23)
CALCIUM SERPL-MCNC: 8.8 MG/DL (ref 8.7–10.5)
CANCER AG19-9 SERPL-ACNC: 9.4 U/ML (ref 0–40)
CHLORIDE SERPL-SCNC: 107 MMOL/L (ref 95–110)
CO2 SERPL-SCNC: 24 MMOL/L (ref 23–29)
CREAT SERPL-MCNC: 0.8 MG/DL (ref 0.5–1.4)
DIFFERENTIAL METHOD BLD: ABNORMAL
EOSINOPHIL # BLD AUTO: 0.2 K/UL (ref 0–0.5)
EOSINOPHIL NFR BLD: 2.4 % (ref 0–8)
ERYTHROCYTE [DISTWIDTH] IN BLOOD BY AUTOMATED COUNT: 15.8 % (ref 11.5–14.5)
EST. GFR  (NO RACE VARIABLE): >60 ML/MIN/1.73 M^2
GLUCOSE SERPL-MCNC: 127 MG/DL (ref 70–110)
HCT VFR BLD AUTO: 32 % (ref 37–48.5)
HGB BLD-MCNC: 10.8 G/DL (ref 12–16)
IMM GRANULOCYTES # BLD AUTO: 0.14 K/UL (ref 0–0.04)
IMM GRANULOCYTES NFR BLD AUTO: 1.8 % (ref 0–0.5)
LYMPHOCYTES # BLD AUTO: 2.5 K/UL (ref 1–4.8)
LYMPHOCYTES NFR BLD: 32.8 % (ref 18–48)
MAGNESIUM SERPL-MCNC: 1.8 MG/DL (ref 1.6–2.6)
MCH RBC QN AUTO: 32.8 PG (ref 27–31)
MCHC RBC AUTO-ENTMCNC: 33.8 G/DL (ref 32–36)
MCV RBC AUTO: 97 FL (ref 82–98)
MONOCYTES # BLD AUTO: 0.6 K/UL (ref 0.3–1)
MONOCYTES NFR BLD: 8.1 % (ref 4–15)
NEUTROPHILS # BLD AUTO: 4.1 K/UL (ref 1.8–7.7)
NEUTROPHILS NFR BLD: 54.4 % (ref 38–73)
NRBC BLD-RTO: 0 /100 WBC
PLATELET # BLD AUTO: 103 K/UL (ref 150–450)
PMV BLD AUTO: 9.1 FL (ref 9.2–12.9)
POTASSIUM SERPL-SCNC: 3.9 MMOL/L (ref 3.5–5.1)
PROT SERPL-MCNC: 6.4 G/DL (ref 6–8.4)
RBC # BLD AUTO: 3.29 M/UL (ref 4–5.4)
SODIUM SERPL-SCNC: 141 MMOL/L (ref 136–145)
WBC # BLD AUTO: 7.62 K/UL (ref 3.9–12.7)

## 2024-11-11 PROCEDURE — 99999 PR PBB SHADOW E&M-EST. PATIENT-LVL V: CPT | Mod: PBBFAC,,, | Performed by: NURSE PRACTITIONER

## 2024-11-11 PROCEDURE — 1101F PT FALLS ASSESS-DOCD LE1/YR: CPT | Mod: CPTII,S$GLB,, | Performed by: NURSE PRACTITIONER

## 2024-11-11 PROCEDURE — 96367 TX/PROPH/DG ADDL SEQ IV INF: CPT | Mod: PN

## 2024-11-11 PROCEDURE — 63600175 PHARM REV CODE 636 W HCPCS: Mod: PN | Performed by: NURSE PRACTITIONER

## 2024-11-11 PROCEDURE — 96417 CHEMO IV INFUS EACH ADDL SEQ: CPT | Mod: PN

## 2024-11-11 PROCEDURE — 1159F MED LIST DOCD IN RCRD: CPT | Mod: CPTII,S$GLB,, | Performed by: NURSE PRACTITIONER

## 2024-11-11 PROCEDURE — 96416 CHEMO PROLONG INFUSE W/PUMP: CPT | Mod: PN

## 2024-11-11 PROCEDURE — 85025 COMPLETE CBC W/AUTO DIFF WBC: CPT | Mod: PN | Performed by: INTERNAL MEDICINE

## 2024-11-11 PROCEDURE — 96375 TX/PRO/DX INJ NEW DRUG ADDON: CPT | Mod: PN

## 2024-11-11 PROCEDURE — 25000003 PHARM REV CODE 250: Mod: PN | Performed by: NURSE PRACTITIONER

## 2024-11-11 PROCEDURE — 3008F BODY MASS INDEX DOCD: CPT | Mod: CPTII,S$GLB,, | Performed by: NURSE PRACTITIONER

## 2024-11-11 PROCEDURE — 1126F AMNT PAIN NOTED NONE PRSNT: CPT | Mod: CPTII,S$GLB,, | Performed by: NURSE PRACTITIONER

## 2024-11-11 PROCEDURE — 1160F RVW MEDS BY RX/DR IN RCRD: CPT | Mod: CPTII,S$GLB,, | Performed by: NURSE PRACTITIONER

## 2024-11-11 PROCEDURE — 86301 IMMUNOASSAY TUMOR CA 19-9: CPT | Performed by: INTERNAL MEDICINE

## 2024-11-11 PROCEDURE — 80053 COMPREHEN METABOLIC PANEL: CPT | Mod: PN | Performed by: INTERNAL MEDICINE

## 2024-11-11 PROCEDURE — 3066F NEPHROPATHY DOC TX: CPT | Mod: CPTII,S$GLB,, | Performed by: NURSE PRACTITIONER

## 2024-11-11 PROCEDURE — 3061F NEG MICROALBUMINURIA REV: CPT | Mod: CPTII,S$GLB,, | Performed by: NURSE PRACTITIONER

## 2024-11-11 PROCEDURE — 96413 CHEMO IV INFUSION 1 HR: CPT | Mod: PN

## 2024-11-11 PROCEDURE — 96368 THER/DIAG CONCURRENT INF: CPT | Mod: PN

## 2024-11-11 PROCEDURE — 3074F SYST BP LT 130 MM HG: CPT | Mod: CPTII,S$GLB,, | Performed by: NURSE PRACTITIONER

## 2024-11-11 PROCEDURE — 99213 OFFICE O/P EST LOW 20 MIN: CPT | Mod: S$GLB,,, | Performed by: NURSE PRACTITIONER

## 2024-11-11 PROCEDURE — 4010F ACE/ARB THERAPY RXD/TAKEN: CPT | Mod: CPTII,S$GLB,, | Performed by: NURSE PRACTITIONER

## 2024-11-11 PROCEDURE — 3078F DIAST BP <80 MM HG: CPT | Mod: CPTII,S$GLB,, | Performed by: NURSE PRACTITIONER

## 2024-11-11 PROCEDURE — 36415 COLL VENOUS BLD VENIPUNCTURE: CPT | Mod: PN | Performed by: INTERNAL MEDICINE

## 2024-11-11 PROCEDURE — 96415 CHEMO IV INFUSION ADDL HR: CPT | Mod: PN

## 2024-11-11 PROCEDURE — 3051F HG A1C>EQUAL 7.0%<8.0%: CPT | Mod: CPTII,S$GLB,, | Performed by: NURSE PRACTITIONER

## 2024-11-11 PROCEDURE — 83735 ASSAY OF MAGNESIUM: CPT | Mod: PN | Performed by: INTERNAL MEDICINE

## 2024-11-11 PROCEDURE — 3288F FALL RISK ASSESSMENT DOCD: CPT | Mod: CPTII,S$GLB,, | Performed by: NURSE PRACTITIONER

## 2024-11-11 RX ORDER — DIPHENHYDRAMINE HYDROCHLORIDE 50 MG/ML
50 INJECTION INTRAMUSCULAR; INTRAVENOUS ONCE AS NEEDED
Status: CANCELLED | OUTPATIENT
Start: 2024-11-11

## 2024-11-11 RX ORDER — SODIUM CHLORIDE 0.9 % (FLUSH) 0.9 %
10 SYRINGE (ML) INJECTION
Status: DISCONTINUED | OUTPATIENT
Start: 2024-11-11 | End: 2024-11-11 | Stop reason: HOSPADM

## 2024-11-11 RX ORDER — EPINEPHRINE 0.3 MG/.3ML
0.3 INJECTION SUBCUTANEOUS ONCE AS NEEDED
Status: CANCELLED | OUTPATIENT
Start: 2024-11-11

## 2024-11-11 RX ORDER — HEPARIN 100 UNIT/ML
500 SYRINGE INTRAVENOUS
Status: CANCELLED | OUTPATIENT
Start: 2024-11-11

## 2024-11-11 RX ORDER — PROCHLORPERAZINE EDISYLATE 5 MG/ML
5 INJECTION INTRAMUSCULAR; INTRAVENOUS ONCE AS NEEDED
Status: CANCELLED | OUTPATIENT
Start: 2024-11-11

## 2024-11-11 RX ORDER — FLUOROURACIL 50 MG/ML
320 INJECTION, SOLUTION INTRAVENOUS
Status: CANCELLED | OUTPATIENT
Start: 2024-11-11

## 2024-11-11 RX ORDER — PROCHLORPERAZINE EDISYLATE 5 MG/ML
5 INJECTION INTRAMUSCULAR; INTRAVENOUS ONCE AS NEEDED
Status: CANCELLED | OUTPATIENT
Start: 2024-11-13

## 2024-11-11 RX ORDER — ATROPINE SULFATE 0.4 MG/ML
0.4 INJECTION, SOLUTION ENDOTRACHEAL; INTRAMEDULLARY; INTRAMUSCULAR; INTRAVENOUS; SUBCUTANEOUS ONCE AS NEEDED
Status: COMPLETED | OUTPATIENT
Start: 2024-11-11 | End: 2024-11-11

## 2024-11-11 RX ORDER — ATROPINE SULFATE 0.4 MG/ML
0.4 INJECTION, SOLUTION ENDOTRACHEAL; INTRAMEDULLARY; INTRAMUSCULAR; INTRAVENOUS; SUBCUTANEOUS ONCE AS NEEDED
Status: CANCELLED | OUTPATIENT
Start: 2024-11-11

## 2024-11-11 RX ORDER — FLUOROURACIL 50 MG/ML
500 INJECTION, SOLUTION INTRAVENOUS
Status: COMPLETED | OUTPATIENT
Start: 2024-11-11 | End: 2024-11-11

## 2024-11-11 RX ORDER — HEPARIN 100 UNIT/ML
500 SYRINGE INTRAVENOUS
Status: CANCELLED | OUTPATIENT
Start: 2024-11-13

## 2024-11-11 RX ORDER — SODIUM CHLORIDE 0.9 % (FLUSH) 0.9 %
10 SYRINGE (ML) INJECTION
Status: CANCELLED | OUTPATIENT
Start: 2024-11-11

## 2024-11-11 RX ORDER — EPINEPHRINE 0.3 MG/.3ML
0.3 INJECTION SUBCUTANEOUS ONCE AS NEEDED
Status: DISCONTINUED | OUTPATIENT
Start: 2024-11-11 | End: 2024-11-11 | Stop reason: HOSPADM

## 2024-11-11 RX ORDER — SODIUM CHLORIDE 0.9 % (FLUSH) 0.9 %
10 SYRINGE (ML) INJECTION
Status: CANCELLED | OUTPATIENT
Start: 2024-11-13

## 2024-11-11 RX ORDER — DIPHENHYDRAMINE HYDROCHLORIDE 50 MG/ML
50 INJECTION INTRAMUSCULAR; INTRAVENOUS ONCE AS NEEDED
Status: DISCONTINUED | OUTPATIENT
Start: 2024-11-11 | End: 2024-11-11 | Stop reason: HOSPADM

## 2024-11-11 RX ADMIN — FLUOROURACIL 500 MG: 50 INJECTION, SOLUTION INTRAVENOUS at 01:11

## 2024-11-11 RX ADMIN — OXALIPLATIN 100 MG: 5 INJECTION, SOLUTION INTRAVENOUS at 09:11

## 2024-11-11 RX ADMIN — LEUCOVORIN CALCIUM 505 MG: 350 INJECTION, POWDER, LYOPHILIZED, FOR SUSPENSION INTRAMUSCULAR; INTRAVENOUS at 11:11

## 2024-11-11 RX ADMIN — FLUOROURACIL 3000 MG: 50 INJECTION, SOLUTION INTRAVENOUS at 01:11

## 2024-11-11 RX ADMIN — DEXTROSE MONOHYDRATE: 5 INJECTION, SOLUTION INTRAVENOUS at 08:11

## 2024-11-11 RX ADMIN — DEXAMETHASONE SODIUM PHOSPHATE 0.25 MG: 10 INJECTION, SOLUTION INTRAMUSCULAR; INTRAVENOUS at 09:11

## 2024-11-11 RX ADMIN — ATROPINE SULFATE 0.4 MG: 0.4 INJECTION, SOLUTION INTRAVENOUS at 11:11

## 2024-11-11 RX ADMIN — SODIUM CHLORIDE 220 MG: 9 INJECTION, SOLUTION INTRAVENOUS at 11:11

## 2024-11-11 NOTE — PLAN OF CARE
Problem: Adult Inpatient Plan of Care  Goal: Patient-Specific Goal (Individualized)  Outcome: Progressing  Flowsheets (Taken 11/11/2024 0842)  Individualized Care Needs: recliner, conversation. warm blanket  Anxieties, Fears or Concerns: none today  Patient/Family-Specific Goals (Include Timeframe): no s/s of rx wtih tx today     Problem: Fatigue  Goal: Improved Activity Tolerance  Outcome: Progressing  Intervention: Promote Improved Energy  Flowsheets (Taken 11/11/2024 0842)  Fatigue Management: frequent rest breaks encouraged  Sleep/Rest Enhancement: regular sleep/rest pattern promoted  Activity Management: Ambulated -L4  Environmental Support: rest periods encouraged

## 2024-11-11 NOTE — PLAN OF CARE
Problem: Adult Inpatient Plan of Care  Goal: Plan of Care Review  Outcome: Progressing  Flowsheets (Taken 11/11/2024 5313)  Plan of Care Reviewed With:   patient   spouse   Pt tolerated Folfirinox well. No adverse reaction noted. Pt education reinforced on chemo regimen, side effects, what to expect, and when to call physician. Pt verbalized understanding. I reviewed pt calendar w/ pt and understanding verbalized. PAC remains accessed with 5FU infusing at 2.2cc/hr over 46 hours. Patent upon discharge in NAD.

## 2024-11-11 NOTE — PROGRESS NOTES
SW met with pt briefly and provided a gas card. No other needs identified at this time.     Bharati Fritz, ANA MARIAW  11/11/2024  12:28 PM

## 2024-11-11 NOTE — PROGRESS NOTES
PATIENT: Bessy Lamb  MRN: [id number]  DATE: 11/11/2024    Diagnosis:   1. Malignant neoplasm of pancreas, unspecified location of malignancy      Chief Complaint: Clearance for treatment; C8 FOLFIRINOX      Oncologic History:   Oncology History   Malignant neoplasm of pancreas   7/12/2024 Initial Diagnosis    Pancreatic cancer     7/13/2024 Cancer Staged    Staging form: Exocrine Pancreas, AJCC 8th Edition  - Clinical: Stage III (cT4, cN0, cM0)     7/22/2024 -  Chemotherapy    Treatment Summary   Plan Name: OP GI FOLFIRINOX (oxaliplatin, leucovorin, irinotecan, fluorouracil) Q2W   Treatment Goal: Curative  Status: Active  Start Date: 7/22/2024  End Date: 7/24/2025 (Planned)  Provider: Rajat Hunt MD  Chemotherapy: fluorouraciL injection 500 mg, 515 mg (100 % of original dose 320 mg/m2), Intravenous, Clinic/[facility] 1 time, 7 of 26 cycles  Dose modification: 320 mg/m2 (original dose 320 mg/m2, Cycle 1, Reason: MD Discretion, Comment: Pharamcogenomic), 320 mg/m2 (original dose 400 mg/m2, Cycle 6)  Administration: 500 mg (7/22/2024), 640 mg (8/5/2024), 635 mg (8/19/2024), 635 mg (9/3/2024), 640 mg (9/23/2024), 500 mg (10/14/2024), 500 mg (10/28/2024)  irinotecan (CAMPTOSAR) 260 mg in 0.9% NaCl 578 mL chemo infusion, 266 mg (100 % of original dose 165 mg/m2), Intravenous, Clinic/[facility] 1 time, 7 of 26 cycles  Dose modification: 165 mg/m2 (original dose 165 mg/m2, Cycle 1, Reason: Other (see comments), Comment: Waiting for pharmacogenomic panel), 165 mg/m2 (original dose 165 mg/m2, Cycle 2, Reason: Other (see comments), Comment: UGT Mutation), 140 mg/m2 (original dose 165 mg/m2, Cycle 5, Reason: Other (see comments), Comment: UGT1A1 mutation), 165 mg/m2 (original dose 165 mg/m2, Cycle 5, Reason: MD Discretion), 140 mg/m2 (original dose 165 mg/m2, Cycle 6, Reason: MD Discretion, Comment: Thrombocytopenia)  Administration: 260 mg (7/22/2024), 260 mg (8/5/2024), 260 mg (8/19/2024), 260 mg (9/3/2024), 260 mg (9/23/2024),  "220 mg (10/14/2024), 220 mg (10/28/2024)  oxaliplatin (ELOXATIN) 85 mg/m2 = 137 mg in D5W 592.4 mL chemo infusion, 85 mg/m2 = 137 mg, Intravenous, [facility] 1 time, 7 of 26 cycles  Dose modification: 65 mg/m2 (original dose 85 mg/m2, Cycle 6)  Administration: 137 mg (7/22/2024), 136 mg (8/5/2024), 135 mg (8/19/2024), 135 mg (9/3/2024), 136 mg (9/23/2024), 100 mg (10/14/2024), 100 mg (10/28/2024)  fluorouracil (Adrucil) 3,000 mg in 0.9% NaCl 100 mL chemo infusion, 3,090 mg (100 % of original dose 1,920 mg/m2), Intravenous, Over 46 hours, 7 of 26 cycles  Dose modification: 1,920 mg/m2 (original dose 1,920 mg/m2, Cycle 1, Reason: MD Discretion, Comment: Pharmcogenomic panel pending), 1,920 mg/m2 (original dose 2,400 mg/m2, Cycle 6)  Administration: 3,000 mg (7/22/2024), 3,840 mg (8/5/2024), 3,815 mg (8/19/2024), 3,815 mg (9/3/2024), 3,840 mg (9/23/2024), 3,000 mg (10/14/2024), 3,000 mg (10/28/2024)             Subjective:    Interval History:  Ms. Lamb is a 64 y.o. female with Arthritis, DMII, GERD, Hypotyroidism, Obesity, Thyroid disease who presents for pancreatic cancer.  Since the last clinic visit the patient endorses continued "intermittent sensations of tingling feelings" from feet to ankle; usually after being on her feet then sitting.  Experiences some cold dysthesia post treatment.  Fatigue x 3 days post tx.  No appetite or taste, but forces herself to eat.  Weight stable. Able to control her diarrhea with Creon & antidiarrheal medication. The patient denies CP, cough, SOB, abdominal pain, nausea, vomiting, constipation, diarrhea.  The patient denies fever, chills, night sweats, weight loss, new lumps or bumps, easy bruising or bleeding.    Prior History:   The patient initially underwent a CXR on 11/13/23 fur a URI which showed possible subtle pulmonary nodules.  CT chest 12/01/2023 showed a cluster of ill-defined centrilobular ground-glass opacity he inferior right and left upper lobes as well as a 1.2 " cm ground-glass opacity in the superior segment of the left lower lobe; solid 3 mm pulmonary nodule in the right upper lobe; subcentimeter right hepatic lobe hypodensity likely representing a cyst.  The patient underwent surveillance CT chest on 05/21/2024 showing a 3.7 x 2.7 pancreatic body mass in the pancreatic tail atrophy suspicious of pancreatic malignancy as well as and unchanged benign 3 mm nodule in the right upper lobe.  MRI abdomen on 06/30/2024 showed hypoenhancing mass centered in the proximal pancreatic body measuring 2.9 x 2 cm with abutment and possible encasement of the distal splenic vein.  EUS was performed on 07/05/2024 mass in the pancreatic body stage T4 N0 M0 by endo sonographic criteria.  Pathology from biopsy of the stomach showed mild focally moderate chronic gastritis with no evidence of the H pylori.  Stomach polyp which was removed code hyperplastic polyps with chronic inflammation.  Pancreatic biopsy showed adenocarcinoma.  CT of the chest, abdomen, and pelvis on 07/09/2024 showed a 9 mm lesion in the right hepatic lobe previously characterized as cysts; mass in the pancreatic body measuring 4.4 x 3.7 cm with diffuse pancreatic ductal dilatation measuring 8 mm:  Weaning vein at the portal confluence occluded with reconstitution via collaterals.  The mass abuts the portal vein by less than 180° but does not case the proximal splenic artery remains patent.  Also noted was a pancreatic lymph node measuring 0.7 cm.   Patient's case was discussed at tumor board on 07/17/2024 with recommendation for proceeding with the chemotherapy.  Patient had port placement on 07/18/2024.   The patient is admitted to the hospital from 09/20/2024 until 09/22/2024 for hypokalemia with potassium of 2.3.  The patient was subsequently discharged underwent treatment with FOLFIRINOX on 09/23/2024.  CT of the chest, abdomen, and pelvis on 10/02/2024 showed a new 5 mm ground-glass nodule in the right lower lobe;  stable 8 mm hypodensity present within the right hepatic lobe suggestive of a cyst; 37 x 35 mm proximal pancreatic body mass slightly smaller in transverse dimension involving 90 degree area of the celiac artery and 100 degree area of the splenic artery with soft tissue extending along the anterior right lateral wall of the portal vein; abnormal soft tissue insinuating between the lateral margin of the celiac artery and adjacent splenic vein; invasion and focal occlusion of the proximal most aspect of the splenic vein; concentric wall thickening present within the distal sigmoid colon/rectum with infectious and inflammatory etiologies possible.   The patient met with Dr Valentin on 10/16/24 whom felt the patient could receive a few more cycles of chemo and then transition to perioperative radiation therapy.  patient's case was discussed with Dr. Valentin and .  Plan is to proceed with up to 12 cycles FOLFIRINOX prior to consideration of radiation versus surgical resection.  Past Medical History:   Past Medical History:   Diagnosis Date    Arthritis, lumbar spine     hands    Diabetes mellitus     General anesthetics causing adverse effect in therapeutic use     following breast rediuct, hard time awakening  also had anxiety rxn to lidocain in dental ofc    GERD (gastroesophageal reflux disease)     Hypothyroidism 10/08/2014    Maternal anesthesia complication     epidural for 1st child went up instead of down; required intubation    Normocytic anemia 2024    Obesity (BMI 30-39.9) 10/08/2014    Thyroid disease     Thyroid nodule 10/08/2014       Past Surgical HIstory:   Past Surgical History:   Procedure Laterality Date    BREAST BIOPSY Left     b9    BREAST SURGERY      Reduction cause of a mass in left breast    c-sections x2       SECTION  3/26/81 & 85    Birth of two girls    COLONOSCOPY  2012         COLONOSCOPY N/A 2018    Procedure: COLONOSCOPY;  Surgeon: Francisco Mcclain,  MD;  Location: [facility];  Service: Endoscopy;  Laterality: N/A;    COLONOSCOPY N/A 10/24/2023    Procedure: COLONOSCOPY;  Surgeon: Francisco Mcclain MD;  Location: [facility];  Service: Endoscopy;  Laterality: N/A;    ENDOSCOPIC ULTRASOUND OF UPPER GASTROINTESTINAL TRACT Left 7/5/2024    Procedure: ULTRASOUND, UPPER GI TRACT, ENDOSCOPIC;  Surgeon: Andrew Gordon MD;  Location: [facility] ENDO;  Service: Endoscopy;  Laterality: Left;    ESOPHAGOGASTRODUODENOSCOPY N/A 7/5/2024    Procedure: EGD (ESOPHAGOGASTRODUODENOSCOPY);  Surgeon: Andrew Gordon MD;  Location: [facility];  Service: Endoscopy;  Laterality: N/A;    hysteroscopy with polypectomy      INCISIONAL BREAST BIOPSY Left 1996    benign; done at same time as reduction    INSERTION OF TUNNELED CENTRAL VENOUS CATHETER (CVC) WITH SUBCUTANEOUS PORT N/A 7/18/2024    Procedure: OYMSIJQUI-FTUN-F-CATH;  Surgeon: Blanca Dillard MD;  Location: [facility];  Service: General;  Laterality: N/A;    TOTAL REDUCTION MAMMOPLASTY Bilateral 1996       Family History:   Family History   Problem Relation Name Age of Onset    Brain cancer Mother Ravi Tolbert     Early death Mother Ravi Tolbert 51        Passed at 51    Cancer Mother Ravi Tolbert         Brain tumor    Arthritis Mother Ravi Tolbert     Diabetes Father Ck holbrookent         Type 2    Cirrhosis Father Ck tolbert     Cancer Father Ck nakit tolbert         Bone    COPD Father Ck pham tomasz         Smoker    Early death Father Ck tolbert         Passed at 64    Lung cancer Father Ck pham tomasz     Heart disease Sister Mamta (dianna)wescovich         Congestive heart failure (passed 2/11/18    Hypertension Sister Mamta (dianna)wescovich     Kidney disease Sister Mamta (dianna)wescovich         Doc removed when she was a child    Hypertension Sister Mayank     Depression Sister  Darrenchristoferphyllis (johnna) nicolette Mauricio ( sister)         Due to loss of her 27 year old son    Early death Sister Ravi (johnna) nicolette Mauricio ( sister)         Pulmonary hypertension, cirrhosis of the liver(passed 7/14/2007   Age 57    Heart disease Brother John Choudhury ( passed )         Heart attack    Hypertension Brother John Choudhury ( passed )     Heart disease Brother Juan Francisco Choudhury         Heart  blockage    Heart disease Brother Bhanu Choudhury         Heart attack (passed)    Diabetes Brother Bhanu Choudhury     Macular degeneration Brother Bhanu Choudhury     Breast cancer Maternal Aunt  30    Breast cancer Paternal Aunt  40    Breast cancer Paternal Aunt  40    Ovarian cancer Neg Hx         Social History:  reports that she has never smoked. She has never used smokeless tobacco. She reports that she does not currently use alcohol. She reports that she does not use drugs.    Allergies:  Review of patient's allergies indicates:   Allergen Reactions    Duricef [cefadroxil] Hives       Medications:  Current Outpatient Medications   Medication Sig Dispense Refill    acetaminophen (TYLENOL) 325 MG tablet Take 325 mg by mouth every 6 (six) hours as needed for Pain.      blood-glucose meter Misc Use as directed 1 each prn    duke's soln (benadryl 30 mL, mylanta 30 mL, LIDOcaine 30 mL, nystatin 30 mL) 120mL 10 ml swish & spit/swallow every 4 to 6 hours as needed for mouth pain/ulcers/yeast/throat pain 240 mL 1    fluticasone propionate (FLONASE) 50 mcg/actuation nasal spray 1 spray (50 mcg total) by Each Nostril route once daily. 18.2 mL 0    levothyroxine (SYNTHROID) 50 MCG tablet Take 1 tablet (50 mcg total) by mouth once daily. 90 tablet 1    lipase-protease-amylase 24,000-76,000-120,000 units (CREON) 24,000-76,000 -120,000 unit capsule Take 2 capsules by mouth 3 (three) times daily with meals. 180 capsule 11    loperamide (IMODIUM) 2 mg capsule Take 2 tablets (4mg) by mouth after first loose stool, 1 tablet every 2 hours until  diarrhea free for 12 hours. May take 2 tablets (4mg) by mouth every 4 hours at night. May require more than the package labeling maximum dose of 16mg/day. 30 capsule 11    loratadine (CLARITIN) 10 mg tablet Take 1 tablet (10 mg total) by mouth every morning. 30 tablet 11    meclizine (ANTIVERT) 25 mg tablet Take 1 tablet (25 mg total) by mouth 3 (three) times daily as needed for Dizziness. 30 tablet 0    mupirocin (BACTROBAN) 2 % ointment Apply topically 3 (three) times daily.      OLANZapine (ZYPREXA) 5 MG tablet Take 1 tablet by mouth nightly on days 1-3 of each chemotherapy cycle. 6 tablet 11    potassium chloride SA (K-DUR,KLOR-CON) 20 MEQ tablet Take 20 mEq by mouth once.      prochlorperazine (COMPAZINE) 5 MG tablet Take 1 tablet (5 mg total) by mouth every 6 (six) hours as needed for Nausea. 20 tablet 5    simvastatin (ZOCOR) 10 MG tablet Take 1 tablet (10 mg total) by mouth every evening. 90 tablet 3    SYNJARDY XR 10-1,000 mg TBph TAKE ONE TABLET BY MOUTH ONCE DAILY 30 tablet 5    TRUE METRIX GLUCOSE TEST STRIP Strp USE 1 STRIP ONCE DAILY TO TEST BLOOD GLUCOSE (50 DAY SUPPLY) 100 each 3    TRUEPLUS LANCETS 33 gauge Misc USE AS DIRECTED TO TEST BLOOD SUGAR ONCE DAILY FOR UP  USES 100 each 2    VITAMIN D2 1,250 mcg (50,000 unit) capsule TAKE 1 CAPSULE (50,000 UNITS TOTAL) BY MOUTH TWICE A WEEK. 24 capsule 3    lipase-protease-amylase 24,000-76,000-120,000 units (CREON) 24,000-76,000 -120,000 unit capsule Take 1 capsule by mouth with snacks. (Patient not taking: Reported on 11/11/2024)      losartan (COZAAR) 25 MG tablet Take 1 tablet (25 mg total) by mouth as needed (take for bp equal or greater than 120/70). (Patient not taking: Reported on 11/11/2024)       No current facility-administered medications for this visit.     Facility-Administered Medications Ordered in Other Visits   Medication Dose Route Frequency Provider Last Rate Last Admin    alteplase injection 2 mg  2 mg Intra-Catheter PRN Shila  "Arash DUARTE NP        diphenhydrAMINE injection 50 mg  50 mg Intravenous Once PRN Arash Fuchs NP        EPINEPHrine (EPIPEN) 0.3 mg/0.3 mL pen injection 0.3 mg  0.3 mg Intramuscular Once PRN Arash Fuchs NP        heparin, porcine (PF) 100 unit/mL injection flush 500 Units  500 Units Intravenous PRN Arash Fuchs NP        hydrocortisone sodium succinate injection 100 mg  100 mg Intravenous Once PRN Arash Fuchs NP        prochlorperazine injection Soln 5 mg  5 mg Intravenous Once PRN Arash Fuchs NP        sodium chloride 0.9% flush 10 mL  10 mL Intravenous PRN Arash Fuchs NP           Review of Systems   Constitutional:  Negative for appetite change, chills, fatigue, fever and unexpected weight change.   HENT:  Negative for mouth sores.    Eyes:  Negative for visual disturbance.   Respiratory:  Negative for cough and shortness of breath.    Cardiovascular:  Negative for chest pain and palpitations.   Gastrointestinal:  Negative for abdominal pain, constipation, diarrhea, nausea and vomiting.   Genitourinary:  Negative for frequency.   Musculoskeletal:  Negative for back pain.   Skin:  Negative for rash.   Neurological:  Negative for numbness and headaches.        Sensation of things crawling on feet intermittently.     Hematological:  Negative for adenopathy. Does not bruise/bleed easily.   Psychiatric/Behavioral:  The patient is not nervous/anxious.        ECOG Performance Status: 0   Objective:      Vitals:   Vitals:    11/11/24 0809   BP: 117/72   BP Location: Right arm   Patient Position: Sitting   Pulse: 70   Resp: 18   Temp: 97.3 °F (36.3 °C)   TempSrc: Temporal   SpO2: 97%   Weight: 58.3 kg (128 lb 8.5 oz)   Height: 5' 1" (1.549 m)       Physical Exam  Constitutional:       General: She is not in acute distress.     Appearance: She is well-developed. She is not diaphoretic.   HENT:      Head: Normocephalic and atraumatic.      Comments: Alopecia  Cardiovascular:      Rate and Rhythm: Normal rate " and regular rhythm.      Heart sounds: Normal heart sounds. No murmur heard.     No friction rub. No gallop.   Pulmonary:      Effort: Pulmonary effort is normal. No respiratory distress.      Breath sounds: Normal breath sounds. No wheezing or rales.   Chest:      Chest wall: No tenderness.   Abdominal:      General: Bowel sounds are normal. There is no distension.      Palpations: Abdomen is soft. There is no mass.      Tenderness: There is no abdominal tenderness. There is no guarding or rebound.   Musculoskeletal:      Comments: PORT in right chest   Lymphadenopathy:      Cervical: No cervical adenopathy.      Upper Body:      Right upper body: No supraclavicular or axillary adenopathy.      Left upper body: No supraclavicular or axillary adenopathy.   Skin:     Findings: No erythema or rash.   Neurological:      Mental Status: She is alert and oriented to person, place, and time.   Psychiatric:         Behavior: Behavior normal.         Laboratory Data:  Lab Visit on 11/11/2024   Component Date Value Ref Range Status    WBC 11/11/2024 7.62  3.90 - 12.70 K/uL Final    RBC 11/11/2024 3.29 (L)  4.00 - 5.40 M/uL Final    Hemoglobin 11/11/2024 10.8 (L)  12.0 - 16.0 g/dL Final    Hematocrit 11/11/2024 32.0 (L)  37.0 - 48.5 % Final    MCV 11/11/2024 97  82 - 98 fL Final    MCH 11/11/2024 32.8 (H)  27.0 - 31.0 pg Final    MCHC 11/11/2024 33.8  32.0 - 36.0 g/dL Final    RDW 11/11/2024 15.8 (H)  11.5 - 14.5 % Final    Platelets 11/11/2024 103 (L)  150 - 450 K/uL Final    MPV 11/11/2024 9.1 (L)  9.2 - 12.9 fL Final    Immature Granulocytes 11/11/2024 1.8 (H)  0.0 - 0.5 % Final    Gran # (ANC) 11/11/2024 4.1  1.8 - 7.7 K/uL Final    Immature Grans (Abs) 11/11/2024 0.14 (H)  0.00 - 0.04 K/uL Final    Comment: Mild elevation in immature granulocytes is non specific and   can be seen in a variety of conditions including stress response,   acute inflammation, trauma and pregnancy. Correlation with other   laboratory and  clinical findings is essential.      Lymph # 11/11/2024 2.5  1.0 - 4.8 K/uL Final    Mono # 11/11/2024 0.6  0.3 - 1.0 K/uL Final    Eos # 11/11/2024 0.2  0.0 - 0.5 K/uL Final    Baso # 11/11/2024 0.04  0.00 - 0.20 K/uL Final    nRBC 11/11/2024 0  0 /100 WBC Final    Gran % 11/11/2024 54.4  38.0 - 73.0 % Final    Lymph % 11/11/2024 32.8  18.0 - 48.0 % Final    Mono % 11/11/2024 8.1  4.0 - 15.0 % Final    Eosinophil % 11/11/2024 2.4  0.0 - 8.0 % Final    Basophil % 11/11/2024 0.5  0.0 - 1.9 % Final    Differential Method 11/11/2024 Automated   Final    Sodium 11/11/2024 141  136 - 145 mmol/L Final    Potassium 11/11/2024 3.9  3.5 - 5.1 mmol/L Final    Chloride 11/11/2024 107  95 - 110 mmol/L Final    CO2 11/11/2024 24  23 - 29 mmol/L Final    Glucose 11/11/2024 127 (H)  70 - 110 mg/dL Final    BUN 11/11/2024 5 (L)  8 - 23 mg/dL Final    Creatinine 11/11/2024 0.8  0.5 - 1.4 mg/dL Final    Calcium 11/11/2024 8.8  8.7 - 10.5 mg/dL Final    Total Protein 11/11/2024 6.4  6.0 - 8.4 g/dL Final    Albumin 11/11/2024 3.7  3.5 - 5.2 g/dL Final    Total Bilirubin 11/11/2024 0.5  0.1 - 1.0 mg/dL Final    Comment: For infants and newborns, interpretation of results should be based  on gestational age, weight and in agreement with clinical  observations.    Premature Infant recommended reference ranges:  Up to 24 hours.............<8.0 mg/dL  Up to 48 hours............<12.0 mg/dL  3-5 days..................<15.0 mg/dL  6-29 days.................<15.0 mg/dL      Alkaline Phosphatase 11/11/2024 116  40 - 150 U/L Final    AST 11/11/2024 16  10 - 40 U/L Final    ALT 11/11/2024 12  10 - 44 U/L Final    eGFR 11/11/2024 >60.0  >60 mL/min/1.73 m^2 Final    Anion Gap 11/11/2024 10  8 - 16 mmol/L Final    Magnesium 11/11/2024 1.8  1.6 - 2.6 mg/dL Final         Imaging:     Ct C/A/P 10/02/24  Chest:     There is a right chest chemotherapy port present with the tip of its catheter noted in the SVC. The thyroid gland enhances homogeneously.  There is atherosclerotic calcification present within the thoracic aortic arch and left subclavian artery. No thoracic aortic aneurysm or dissection. No pericardial fluid. No pathologically enlarged lymph nodes in the chest or axilla. The trachea and mainstem bronchi are unremarkable. There is a new 5 mm ground-glass nodule present in the right lower lobe (series 301, image 123). No emphysematous lung architecture or bronchiectasis. There is biapical pleuro-parenchymal scarring.. No dense airspace consolidation, pleural fluid, or pneumothorax.     Abdomen and pelvis:     There is diffuse hepatic steatosis. There is an 8 mm hypodensity present within the right hepatic lobe on series 3, image 128, possibly a cyst but too small to characterize and unchanged when compared to the prior study. No new hyperenhancing hepatic mass observed in the liver on the early arterial phase images. There are calcified gallstones present in the gallbladder lumen. No gallbladder inflammatory change. The spleen is unremarkable. The stomach, duodenal C-loop, and adrenal glands are unremarkable.     The most significant abnormality relates to the presence of an approximately 37 x 35 mm proximal pancreatic body mass, slightly smaller in transverse dimension when compared with the previous examination. The mass involves a 90° area the celiac artery and a 180° area of the splenic artery on series 2 images 76-81. There is also soft tissue extending along the anterior and right lateral wall of the portal vein on series 2, image 78 which may represent tumor infiltrating the retroperitoneal soft tissue planes adjacent to the portal vein/portal confluence. There is also abnormal soft tissue insinuating between the left lateral margin of the celiac artery and adjacent splenic vein on series 2, image 77. As best appreciated on series 2, image 84 and series 601, image 59, there is invasion and focal occlusion of the proximal most aspect of the splenic  vein. There is diffuse dilatation of the main pancreatic duct within the pancreatic body and tail and there is complete atrophy of the mid and distal pancreatic body and tail.     The abdominal aorta is not aneurysmal. There is minimal calcified atherosclerotic plaque deposition noted within the distal infrarenal abdominal aorta. No bulky periaortic or retroperitoneal lymphadenopathy appreciated. The kidneys show no evidence of stones, hydronephrosis, or solid enhancing mass. The ureters are normal in caliber and course without stones. The urinary bladder shows no significant abnormalities. The uterus and ovaries show no significant abnormalities.     The appendix is unremarkable. No evidence of high-grade bowel obstruction. There is concentric wall thickening present within the distal sigmoid colon/rectum on series 3, image 218. Infectious and inflammatory etiologies of proctocolitis are possible. No ascites. No pneumoperitoneum. No inguinal hernia or inguinal lymphadenopathy detected. No mesenteric adenopathy or new peritoneal soft tissue mass.     Bones: No evidence of acute thoracolumbar compression fracture. No imaging evidence of lytic or blastic osseous metastatic disease on the provided images.       Assessment:       1. Malignant neoplasm of pancreas, unspecified location of malignancy         Plan:   Pancreatic Cancer - Patient with Stage III pancreatic cancer with concern for potential encasement of the distal celiac artery per discussion with Dr Valentin  -No sign of mets  - 94.5 on 6/17/24  -Case discussed at tumor board 7/17/24 with recommendation to proceed with chemo  -[facility] genetic testing negative for the genes tested  -[facility] tissue testing showed TP53, KRAS p.G12D, CDKN2A, CDKN2B mutations.  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  -Will need to keep irinotecan dose reduced at 165mg/mm2  -CT C/A/P on 10/02/24 shows stable disease and a possible new  RLL nodule  -Pt completed 6 cycles of FOLFIRINOX  - decreased all the way to 16.7U/mL on 10/17/24   -Pt saw Dr Valentin 10/16/24 whom recommended a few more cycles of chemo and then transition to perioperative radiation therapy  -Will proceed with cycle 7  -Plan to complete up to 12 cycles of FOLFIRINOX  -Will discuss at tumor board tomorrow  11/11/24:  Proceed with C8 FOLFIRINOX today; f/u with Dr. Hunt on 11/22 with labs for clearance on 11/25.  Visit today included increased complexity associated with the care of the episodic problem (chemotherapy) addressed and managing the longitudinal care of the patient due to the serious and/or complex managed problem(s) pancreatic cancer.     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 - pt at increased risk of infection  -No current signs of infection  -Will monitor     Pulmonary Nodule - pt with RLL on CT C/A/P 10/02/24  -5mm and described as ground glass  -Will monitor    Thrombocytopenia - platelets 103k on 11/11/24  -Due to chemo  -Will monitor     Hypokalemia - Stable @ 3.9 on 11/11/24  -PT to continue potassium chloride     Pancreatic Insufficiency - Pt taking Creon  -Diarrhea not from pancreatic insufficiency     Hypothyroidism - pt on synthroid  -Will monitror     DMII - PT currently on Synjardy  Lab Results   Component Value Date    HGBA1C 7.0 (H) 09/20/2024   -Possibly due to pancreatic cancer  -Will monitor    Route Chart for Scheduling    Med Onc Chart Routing      Follow up with physician . F/u as scheduled with Dr. Hunt on 11/22 with labs for Tx on 11/25   Follow up with [facility]    Infusion scheduling note   Proceed with C8 FOLFIRINOX today   Injection scheduling note    Labs    Imaging    Pharmacy appointment    Other referrals              Treatment Plan Information   OP GI FOLFIRINOX (oxaliplatin, leucovorin, irinotecan, fluorouracil) Q2W  Rajat Hunt MD   Associated diagnosis: Malignant neoplasm of pancreas Stage III cT4, cN0, cM0 noted on 7/12/2024   Line of  treatment: Neoadjuvant  Treatment Goal: Curative     Upcoming Treatment Dates - OP GI FOLFIRINOX (oxaliplatin, leucovorin, irinotecan, fluorouracil) Q2W     11/11/2024       Chemotherapy       oxaliplatin (ELOXATIN) 65 mg/m2 = 103 mg in D5W 520.6 mL chemo infusion       leucovorin calcium 320 mg/m2 = 510 mg in D5W 250 mL infusion       irinotecan (CAMPTOSAR) 140 mg/m2 = 222 mg in 0.9% NaCl 511.1 mL chemo infusion       fluorouraciL injection 510 mg       fluorouracil (Adrucil) 1,920 mg/m2 = 3,055 mg in 0.9% NaCl 100 mL chemo infusion       Antiemetics       palonosetron 0.25mg/dexAMETHasone 12mg in NS IVPB 0.25 mg 50 mL  11/25/2024       Chemotherapy       oxaliplatin (ELOXATIN) 65 mg/m2 = 103 mg in D5W 520.6 mL chemo infusion       leucovorin calcium 320 mg/m2 = 510 mg in D5W 250 mL infusion       irinotecan (CAMPTOSAR) 140 mg/m2 = 222 mg in 0.9% NaCl 511.1 mL chemo infusion       fluorouraciL injection 510 mg       fluorouracil (Adrucil) 1,920 mg/m2 = 3,055 mg in 0.9% NaCl 100 mL chemo infusion       Antiemetics       palonosetron 0.25mg/dexAMETHasone 12mg in NS IVPB 0.25 mg 50 mL  12/9/2024       Chemotherapy       oxaliplatin (ELOXATIN) 65 mg/m2 = 103 mg in D5W 520.6 mL chemo infusion       leucovorin calcium 320 mg/m2 = 510 mg in D5W 250 mL infusion       irinotecan (CAMPTOSAR) 140 mg/m2 = 222 mg in 0.9% NaCl 511.1 mL chemo infusion       fluorouraciL injection 510 mg       fluorouracil (Adrucil) 1,920 mg/m2 = 3,055 mg in 0.9% NaCl 100 mL chemo infusion       Antiemetics       palonosetron 0.25mg/dexAMETHasone 12mg in NS IVPB 0.25 mg 50 mL  12/23/2024       Chemotherapy       oxaliplatin (ELOXATIN) 65 mg/m2 = 103 mg in D5W 520.6 mL chemo infusion       leucovorin calcium 320 mg/m2 = 510 mg in D5W 250 mL infusion       irinotecan (CAMPTOSAR) 140 mg/m2 = 222 mg in 0.9% NaCl 511.1 mL chemo infusion       fluorouraciL injection 510 mg       fluorouracil (Adrucil) 1,920 mg/m2 = 3,055 mg in 0.9% NaCl 100 mL chemo  infusion       Antiemetics       palonosetron 0.25mg/dexAMETHasone 12mg in NS IVPB 0.25 mg 50 mL    Therapy Plan Information  PORT FLUSH for Malignant neoplasm of pancreas, noted on 7/12/2024  Flushes  heparin, porcine (PF) 100 unit/mL injection flush 500 Units  500 Units, Intravenous, Every visit  sodium chloride 0.9% flush 10 mL  10 mL, Intravenous, Every visit      No therapy plan of the specified type found.    No therapy plan of the specified type found.    PAU Briceño, FNP-C  [facility]  25 minutes were spent in coordination of patient's care, record review and counseling.

## 2024-11-11 NOTE — PROGRESS NOTES
Oncology Nutrition   Chemotherapy Infusion Visit    Nutrition Follow Up   RD met with patient at chairside during infusion tx. Pt reports continues to do well nutritionally- eating without difficulty, maintaining weight, and denies nutrition related side effects. Pt states diarrhea only occurs when she eats high fat food.     RD offered pt TFP but denies need for full order, just wants a few things. Provided to pt.    Wt Readings from Last 10 Encounters:   11/11/24 58.3 kg (128 lb 8.5 oz)   11/11/24 58.3 kg (128 lb 8.5 oz)   10/31/24 59 kg (130 lb 1.1 oz)   10/28/24 59 kg (130 lb 1.1 oz)   10/28/24 58.5 kg (128 lb 15.5 oz)   10/23/24 57.3 kg (126 lb 5.2 oz)   10/18/24 56.8 kg (125 lb 3.5 oz)   10/17/24 58.2 kg (128 lb 4.9 oz)   10/16/24 58.2 kg (128 lb 4.9 oz)   10/14/24 58.6 kg (129 lb 3 oz)       All other nutrition questions/concerns addressed as appropriate. Will continue to follow and monitor throughout treatment PRN.     Bernie Gurrola MS, RD, LDN  11/11/2024  3:24 PM

## 2024-11-13 ENCOUNTER — INFUSION (OUTPATIENT)
Dept: INFUSION THERAPY | Facility: HOSPITAL | Age: 64
End: 2024-11-13
Attending: INTERNAL MEDICINE
Payer: MEDICARE

## 2024-11-13 VITALS
SYSTOLIC BLOOD PRESSURE: 136 MMHG | DIASTOLIC BLOOD PRESSURE: 64 MMHG | TEMPERATURE: 98 F | BODY MASS INDEX: 24.26 KG/M2 | HEART RATE: 73 BPM | HEIGHT: 61 IN | WEIGHT: 128.5 LBS | OXYGEN SATURATION: 100 % | RESPIRATION RATE: 20 BRPM

## 2024-11-13 DIAGNOSIS — C25.9 MALIGNANT NEOPLASM OF PANCREAS, UNSPECIFIED LOCATION OF MALIGNANCY: Primary | ICD-10-CM

## 2024-11-13 PROCEDURE — 25000003 PHARM REV CODE 250: Mod: PN | Performed by: NURSE PRACTITIONER

## 2024-11-13 PROCEDURE — A4216 STERILE WATER/SALINE, 10 ML: HCPCS | Mod: PN | Performed by: NURSE PRACTITIONER

## 2024-11-13 RX ORDER — PROCHLORPERAZINE EDISYLATE 5 MG/ML
5 INJECTION INTRAMUSCULAR; INTRAVENOUS ONCE AS NEEDED
Status: DISCONTINUED | OUTPATIENT
Start: 2024-11-13 | End: 2024-11-13 | Stop reason: HOSPADM

## 2024-11-13 RX ORDER — SODIUM CHLORIDE 0.9 % (FLUSH) 0.9 %
10 SYRINGE (ML) INJECTION
Status: DISCONTINUED | OUTPATIENT
Start: 2024-11-13 | End: 2024-11-13 | Stop reason: HOSPADM

## 2024-11-13 RX ADMIN — SODIUM CHLORIDE, PRESERVATIVE FREE 10 ML: 5 INJECTION INTRAVENOUS at 12:11

## 2024-11-13 NOTE — PLAN OF CARE
Pt arrived to clinic today for pump d/c and tolerated well. No changes throughout therapy. Pt aware of follow up appointments and side effects of drugs. Discharged to home. NAD.

## 2024-11-14 ENCOUNTER — INFUSION (OUTPATIENT)
Dept: INFUSION THERAPY | Facility: HOSPITAL | Age: 64
End: 2024-11-14
Attending: INTERNAL MEDICINE
Payer: MEDICARE

## 2024-11-14 VITALS
HEART RATE: 85 BPM | SYSTOLIC BLOOD PRESSURE: 111 MMHG | DIASTOLIC BLOOD PRESSURE: 79 MMHG | RESPIRATION RATE: 18 BRPM | TEMPERATURE: 98 F

## 2024-11-14 DIAGNOSIS — C25.9 MALIGNANT NEOPLASM OF PANCREAS, UNSPECIFIED LOCATION OF MALIGNANCY: Primary | ICD-10-CM

## 2024-11-14 PROCEDURE — 63600175 PHARM REV CODE 636 W HCPCS: Mod: JZ,JG,PN | Performed by: NURSE PRACTITIONER

## 2024-11-14 PROCEDURE — 96372 THER/PROPH/DIAG INJ SC/IM: CPT | Mod: PN

## 2024-11-14 RX ADMIN — PEGFILGRASTIM 6 MG: 6 INJECTION SUBCUTANEOUS at 11:11

## 2024-11-14 NOTE — PLAN OF CARE
Problem: Adult Inpatient Plan of Care  Goal: Plan of Care Review  Outcome: Progressing  Flowsheets (Taken 11/14/2024 1154)  Plan of Care Reviewed With:   patient   spouse  Goal: Patient-Specific Goal (Individualized)  Outcome: Progressing  Flowsheets (Taken 11/14/2024 1154)  Individualized Care Needs: conversation  Anxieties, Fears or Concerns: none     Problem: Fatigue  Goal: Improved Activity Tolerance  Outcome: Progressing  Intervention: Promote Improved Energy  Flowsheets (Taken 11/14/2024 1154)  Fatigue Management: frequent rest breaks encouraged  Sleep/Rest Enhancement: natural light exposure provided  Activity Management:   Ambulated -L4   Ambulated to bathroom - L4   Ambulated in [place] - L4  Environmental Support: calm environment promoted   Pt tolerated neulasta injection well.   No adverse reaction noted.  All questions answered.  Pt left clinic in no acute distress.

## 2024-11-21 NOTE — PROGRESS NOTES
PATIENT: Bessy Lamb  MRN: [id number]  DATE: 11/22/2024      Diagnosis:   1. Malignant neoplasm of pancreas, unspecified location of malignancy    2. Pancreatic adenocarcinoma    3. 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therapy    4. Thrombocytopenia    5. Hypokalemia    6. Pancreatic insufficiency    7. Hypothyroidism, unspecified type    8. Type 2 diabetes mellitus without complication, without long-term current use of insulin    9. Pulmonary nodule, right                  Chief Complaint: Pancreatic adenocarcinoma (1 month follow up )      Oncologic History:      Oncologic History     Oncologic Treatment     Pathology           Subjective:    Interval History:  Ms. Lamb is a 64 y.o. female with Arthritis, DMII, GERD, Hypotyroidism, Obesity, Thyroid disease who presents for pancreatic cancer.  Since the last clinic visit the patient states she has been feeling well.  The patient denies CP, cough, SOB, abdominal pain, nausea, vomiting, constipation, diarrhea.  The patient denies fever, chills, night sweats, weight loss, new lumps or bumps, easy bruising or bleeding.  She endorses occasional sensation of things crawling on her feet.      Prior History:   The patient initially underwent a CXR on 11/13/23 fur a URI which showed possible subtle pulmonary nodules.  CT chest 12/01/2023 showed a cluster of ill-defined centrilobular ground-glass opacity he inferior right and left upper lobes as well as a 1.2 cm ground-glass opacity in the superior segment of the left lower lobe; solid 3 mm pulmonary nodule in the right upper lobe; subcentimeter right hepatic lobe hypodensity likely representing a cyst.  The patient underwent surveillance CT chest on 05/21/2024 showing a 3.7 x 2.7 pancreatic body mass in the pancreatic tail atrophy suspicious of pancreatic malignancy as well as and unchanged benign 3 mm nodule in the right upper lobe.  MRI abdomen on 06/30/2024 showed hypoenhancing mass centered in the proximal pancreatic  body measuring 2.9 x 2 cm with abutment and possible encasement of the distal splenic vein.  EUS was performed on 07/05/2024 mass in the pancreatic body stage T4 N0 M0 by endo sonographic criteria.  Pathology from biopsy of the stomach showed mild focally moderate chronic gastritis with no evidence of the H pylori.  Stomach polyp which was removed code hyperplastic polyps with chronic inflammation.  Pancreatic biopsy showed adenocarcinoma.  CT of the chest, abdomen, and pelvis on 07/09/2024 showed a 9 mm lesion in the right hepatic lobe previously characterized as cysts; mass in the pancreatic body measuring 4.4 x 3.7 cm with diffuse pancreatic ductal dilatation measuring 8 mm:  Weaning vein at the portal confluence occluded with reconstitution via collaterals.  The mass abuts the portal vein by less than 180° but does not case the proximal splenic artery remains patent.  Also noted was a pancreatic lymph node measuring 0.7 cm.   Patient's case was discussed at tumor board on 07/17/2024 with recommendation for proceeding with the chemotherapy.  Patient had port placement on 07/18/2024.   The patient is admitted to the hospital from 09/20/2024 until 09/22/2024 for hypokalemia with potassium of 2.3.  The patient was subsequently discharged underwent treatment with FOLFIRINOX on 09/23/2024.  CT of the chest, abdomen, and pelvis on 10/02/2024 showed a new 5 mm ground-glass nodule in the right lower lobe; stable 8 mm hypodensity present within the right hepatic lobe suggestive of a cyst; 37 x 35 mm proximal pancreatic body mass slightly smaller in transverse dimension involving 90 degree area of the celiac artery and 100 degree area of the splenic artery with soft tissue extending along the anterior right lateral wall of the portal vein; abnormal soft tissue insinuating between the lateral margin of the celiac artery and adjacent splenic vein; invasion and focal occlusion of the proximal most aspect of the splenic vein;  concentric wall thickening present within the distal sigmoid colon/rectum with infectious and inflammatory etiologies possible.   The patient met with Dr Valentin on 10/16/24 whom felt the patient could receive a few more cycles of chemo and then transition to perioperative radiation therapy.   The patient's case was discussed with Dr. Valentin and .  Plan is to proceed with up to 12 cycles FOLFIRINOX prior to consideration of radiation versus surgical resection.    Past Medical History:   Past Medical History:   Diagnosis Date    Arthritis, lumbar spine     hands    Diabetes mellitus     General anesthetics causing adverse effect in therapeutic use     following breast rediuct, hard time awakening  also had anxiety rxn to lidocain in dental ofc    GERD (gastroesophageal reflux disease)     Hypothyroidism 10/08/2014    Maternal anesthesia complication     epidural for 1st child went up instead of down; required intubation    Normocytic anemia 2024    Obesity (BMI 30-39.9) 10/08/2014    Thyroid disease     Thyroid nodule 10/08/2014       Past Surgical HIstory:   Past Surgical History:   Procedure Laterality Date    BREAST BIOPSY Left     b9    BREAST SURGERY      Reduction cause of a mass in left breast    c-sections x2       SECTION  3/26/81 & 85    Birth of two girls    COLONOSCOPY  2012         COLONOSCOPY N/A 2018    Procedure: COLONOSCOPY;  Surgeon: Francisco Mcclain MD;  Location: [facility];  Service: Endoscopy;  Laterality: N/A;    COLONOSCOPY N/A 10/24/2023    Procedure: COLONOSCOPY;  Surgeon: Francisco Mcclain MD;  Location: [facility];  Service: Endoscopy;  Laterality: N/A;    ENDOSCOPIC ULTRASOUND OF UPPER GASTROINTESTINAL TRACT Left 2024    Procedure: ULTRASOUND, UPPER GI TRACT, ENDOSCOPIC;  Surgeon: Andrew Gordon MD;  Location: [facility];  Service: Endoscopy;  Laterality: Left;    ESOPHAGOGASTRODUODENOSCOPY N/A 2024    Procedure: EGD  (ESOPHAGOGASTRODUODENOSCOPY);  Surgeon: Andrew Gordon MD;  [facility] ENDO;  Service: Endoscopy;  Laterality: N/A;    hysteroscopy with polypectomy      INCISIONAL BREAST BIOPSY Left 1996    benign; done at same time as reduction    INSERTION OF TUNNELED CENTRAL VENOUS CATHETER (CVC) WITH SUBCUTANEOUS PORT N/A 7/18/2024    Procedure: [id number];  Surgeon: Blanca Dillard MD;  Location: [facility];  Service: General;  Laterality: N/A;    TOTAL REDUCTION MAMMOPLASTY Bilateral 1996       Family History:   Family History   Problem Relation Name Age of Onset    Brain cancer Mother Ravi Tolbert     Early death Mother Ravi Tolbert 51        Passed at 51    Cancer Mother Ravi Tolbert         Brain tumor    Arthritis Mother Ravi Tolbert     Diabetes Father Ck pham tomasz         Type 2    Cirrhosis Father Ck [place]ent     Cancer Father Ck ankit tolbert         Bone    COPD Father Ck ankit tolbert         Smoker    Early death Father Ck tolbert         Passed at 64    Lung cancer Father Ck pham tomasz     Heart disease Sister Mamta (dianna)wescovich         Congestive heart failure (passed 2/11/18    Hypertension Sister Mamta (dianna)wescovich     Kidney disease Sister Mamta (dianna)martincoradha         Doc removed when she was a child    Hypertension Sister Mayank     Depression Sister Ravi (mayank) macey Luly ( sister)         Due to loss of her 27 year old son    Early death Sister Ravi (mayank) macey Luly ( sister)         Pulmonary hypertension, cirrhosis of the liver(passed 7/14/2007   Age 57    Heart disease Brother John Choudhury ( passed )         Heart attack    Hypertension Brother John Choudhury ( passed )     Heart disease Brother Juan Francisco Macey         Heart  blockage    Heart disease Brother Bhanu Macey         Heart attack (passed)    Diabetes Brother  Bhanu Choudhury     Macular degeneration Brother Bhanu Choudhury     Breast cancer Maternal Aunt  30    Breast cancer Paternal Aunt  40    Breast cancer Paternal Aunt  40    Ovarian cancer Neg Hx         Social History:  reports that she has never smoked. She has never used smokeless tobacco. She reports that she does not currently use alcohol. She reports that she does not use drugs.    Allergies:  Review of patient's allergies indicates:   Allergen Reactions    Duricef [cefadroxil] Hives       Medications:  Current Outpatient Medications   Medication Sig Dispense Refill    acetaminophen (TYLENOL) 325 MG tablet Take 325 mg by mouth every 6 (six) hours as needed for Pain.      blood-glucose meter Misc Use as directed 1 each prn    duke's soln (benadryl 30 mL, mylanta 30 mL, LIDOcaine 30 mL, nystatin 30 mL) 120mL 10 ml swish & spit/swallow every 4 to 6 hours as needed for mouth pain/ulcers/yeast/throat pain 240 mL 1    fluticasone propionate (FLONASE) 50 mcg/actuation nasal spray 1 spray (50 mcg total) by Each Nostril route once daily. 18.2 mL 0    levothyroxine (SYNTHROID) 50 MCG tablet Take 1 tablet (50 mcg total) by mouth once daily. 90 tablet 1    lipase-protease-amylase 24,000-76,000-120,000 units (CREON) 24,000-76,000 -120,000 unit capsule Take 2 capsules by mouth 3 (three) times daily with meals. 180 capsule 11    loperamide (IMODIUM) 2 mg capsule Take 2 tablets (4mg) by mouth after first loose stool, 1 tablet every 2 hours until diarrhea free for 12 hours. May take 2 tablets (4mg) by mouth every 4 hours at night. May require more than the package labeling maximum dose of 16mg/day. 30 capsule 11    loratadine (CLARITIN) 10 mg tablet Take 1 tablet (10 mg total) by mouth every morning. 30 tablet 11    meclizine (ANTIVERT) 25 mg tablet Take 1 tablet (25 mg total) by mouth 3 (three) times daily as needed for Dizziness. 30 tablet 0    mupirocin (BACTROBAN) 2 % ointment Apply topically 3 (three) times daily.      OLANZapine  (ZYPREXA) 5 MG tablet Take 1 tablet by mouth nightly on days 1-3 of each chemotherapy cycle. 6 tablet 11    potassium chloride SA (K-DUR,KLOR-CON) 20 MEQ tablet Take 20 mEq by mouth once.      prochlorperazine (COMPAZINE) 5 MG tablet Take 1 tablet (5 mg total) by mouth every 6 (six) hours as needed for Nausea. 20 tablet 5    simvastatin (ZOCOR) 10 MG tablet Take 1 tablet (10 mg total) by mouth every evening. 90 tablet 3    SYNJARDY XR 10-1,000 mg TBph TAKE ONE TABLET BY MOUTH ONCE DAILY 30 tablet 5    TRUE METRIX GLUCOSE TEST STRIP Strp USE 1 STRIP ONCE DAILY TO TEST BLOOD GLUCOSE (50 DAY SUPPLY) 100 each 3    TRUEPLUS LANCETS 33 gauge Misc USE AS DIRECTED TO TEST BLOOD SUGAR ONCE DAILY FOR UP  USES 100 each 2    VITAMIN D2 1,250 mcg (50,000 unit) capsule TAKE 1 CAPSULE (50,000 UNITS TOTAL) BY MOUTH TWICE A WEEK. 24 capsule 3    losartan (COZAAR) 25 MG tablet Take 1 tablet (25 mg total) by mouth as needed (take for bp equal or greater than 120/70).       No current facility-administered medications for this visit.     Facility-Administered Medications Ordered in Other Visits   Medication Dose Route Frequency Provider Last Rate Last Admin    alteplase injection 2 mg  2 mg Intra-Catheter PRN Arash Fuchs, NP        diphenhydrAMINE injection 50 mg  50 mg Intravenous Once PRN Arash Fuchs, ADI        EPINEPHrine (EPIPEN) 0.3 mg/0.3 mL pen injection 0.3 mg  0.3 mg Intramuscular Once PRN Arash Fuchs NP        heparin, porcine (PF) 100 unit/mL injection flush 500 Units  500 Units Intravenous PRN Arash Fuchs, NP        hydrocortisone sodium succinate injection 100 mg  100 mg Intravenous Once PRN Arash Fuchs, NP        prochlorperazine injection Soln 5 mg  5 mg Intravenous Once PRN Arash Fuchs, NP        sodium chloride 0.9% flush 10 mL  10 mL Intravenous PRN Arash Fuchs, NP           Review of Systems   Constitutional:  Negative for appetite change, chills, fatigue, fever and unexpected weight change.  "  HENT:  Negative for mouth sores.    Eyes:  Negative for visual disturbance.   Respiratory:  Negative for cough and shortness of breath.    Cardiovascular:  Negative for chest pain and palpitations.   Gastrointestinal:  Negative for abdominal pain, constipation, diarrhea, nausea and vomiting.   Genitourinary:  Negative for frequency.   Musculoskeletal:  Negative for back pain.   Skin:  Negative for rash.   Neurological:  Negative for numbness and headaches.        Sensation of things crawling on feet intermittently.     Hematological:  Negative for adenopathy. Does not bruise/bleed easily.   Psychiatric/Behavioral:  The patient is not nervous/anxious.        ECOG Performance Status: 0   Objective:      Vitals:   Vitals:    11/22/24 0958   BP: 114/72   BP Location: Left arm   Patient Position: Sitting   Pulse: 83   Resp: 14   Temp: 97.5 °F (36.4 °C)   TempSrc: Temporal   SpO2: 99%   Weight: 57.2 kg (126 lb 1.7 oz)   Height: 5' 1" (1.549 m)                 Physical Exam  Constitutional:       General: She is not in acute distress.     Appearance: She is well-developed. She is not diaphoretic.   HENT:      Head: Normocephalic and atraumatic.      Comments: Alopecia  Cardiovascular:      Rate and Rhythm: Normal rate and regular rhythm.      Heart sounds: Normal heart sounds. No murmur heard.     No friction rub. No gallop.   Pulmonary:      Effort: Pulmonary effort is normal. No respiratory distress.      Breath sounds: Normal breath sounds. No wheezing or rales.   Chest:      Chest wall: No tenderness.   Abdominal:      General: Bowel sounds are normal. There is no distension.      Palpations: Abdomen is soft. There is no mass.      Tenderness: There is no abdominal tenderness. There is no guarding or rebound.   Musculoskeletal:      Comments: PORT in right chest   Lymphadenopathy:      Cervical: No cervical adenopathy.      Upper Body:      Right upper body: No supraclavicular or axillary adenopathy.      Left upper " body: No supraclavicular or axillary adenopathy.   Skin:     Findings: No erythema or rash.   Neurological:      Mental Status: She is alert and oriented to person, place, and time.   Psychiatric:         Behavior: Behavior normal.         Laboratory Data:  Lab Visit on 11/22/2024   Component Date Value Ref Range Status    WBC 11/22/2024 9.46  3.90 - 12.70 K/uL Final    RBC 11/22/2024 3.41 (L)  4.00 - 5.40 M/uL Final    Hemoglobin 11/22/2024 11.2 (L)  12.0 - 16.0 g/dL Final    Hematocrit 11/22/2024 34.0 (L)  37.0 - 48.5 % Final    MCV 11/22/2024 100 (H)  82 - 98 fL Final    MCH 11/22/2024 32.8 (H)  27.0 - 31.0 pg Final    MCHC 11/22/2024 32.9  32.0 - 36.0 g/dL Final    RDW 11/22/2024 15.4 (H)  11.5 - 14.5 % Final    Platelets 11/22/2024 106 (L)  150 - 450 K/uL Final    MPV 11/22/2024 9.5  9.2 - 12.9 fL Final    Immature Granulocytes 11/22/2024 4.3 (H)  0.0 - 0.5 % Final    Gran # (ANC) 11/22/2024 5.1  1.8 - 7.7 K/uL Final    Immature Grans (Abs) 11/22/2024 0.41 (H)  0.00 - 0.04 K/uL Final    Comment: Mild elevation in immature granulocytes is non specific and   can be seen in a variety of conditions including stress response,   acute inflammation, trauma and pregnancy. Correlation with other   laboratory and clinical findings is essential.      Lymph # 11/22/2024 2.9  1.0 - 4.8 K/uL Final    Mono # 11/22/2024 1.0  0.3 - 1.0 K/uL Final    Eos # 11/22/2024 0.0  0.0 - 0.5 K/uL Final    Baso # 11/22/2024 0.06  0.00 - 0.20 K/uL Final    nRBC 11/22/2024 0  0 /100 WBC Final    Gran % 11/22/2024 54.3  38.0 - 73.0 % Final    Lymph % 11/22/2024 30.1  18.0 - 48.0 % Final    Mono % 11/22/2024 10.3  4.0 - 15.0 % Final    Eosinophil % 11/22/2024 0.4  0.0 - 8.0 % Final    Basophil % 11/22/2024 0.6  0.0 - 1.9 % Final    Differential Method 11/22/2024 Automated   Final    Sodium 11/22/2024 142  136 - 145 mmol/L Final    Potassium 11/22/2024 4.2  3.5 - 5.1 mmol/L Final    Chloride 11/22/2024 107  95 - 110 mmol/L Final    CO2  11/22/2024 23  23 - 29 mmol/L Final    Glucose 11/22/2024 122 (H)  70 - 110 mg/dL Final    BUN 11/22/2024 3 (L)  8 - 23 mg/dL Final    Creatinine 11/22/2024 0.8  0.5 - 1.4 mg/dL Final    Calcium 11/22/2024 9.5  8.7 - 10.5 mg/dL Final    Total Protein 11/22/2024 6.9  6.0 - 8.4 g/dL Final    Albumin 11/22/2024 4.1  3.5 - 5.2 g/dL Final    Total Bilirubin 11/22/2024 0.5  0.1 - 1.0 mg/dL Final    Comment: For infants and newborns, interpretation of results should be based  on gestational age, weight and in agreement with clinical  observations.    Premature Infant recommended reference ranges:  Up to 24 hours.............<8.0 mg/dL  Up to 48 hours............<12.0 mg/dL  3-5 days..................<15.0 mg/dL  6-29 days.................<15.0 mg/dL      Alkaline Phosphatase 11/22/2024 137  40 - 150 U/L Final    AST 11/22/2024 24  10 - 40 U/L Final    ALT 11/22/2024 18  10 - 44 U/L Final    eGFR 11/22/2024 >60.0  >60 mL/min/1.73 m^2 Final    Anion Gap 11/22/2024 12  8 - 16 mmol/L Final    Magnesium 11/22/2024 1.9  1.6 - 2.6 mg/dL Final         Imaging:     Ct C/A/P 10/02/24  Chest:     There is a right chest chemotherapy port present with the tip of its catheter noted in the SVC. The thyroid gland enhances homogeneously. There is atherosclerotic calcification present within the thoracic aortic arch and left subclavian artery. No thoracic aortic aneurysm or dissection. No pericardial fluid. No pathologically enlarged lymph nodes in the chest or axilla. The trachea and mainstem bronchi are unremarkable. There is a new 5 mm ground-glass nodule present in the right lower lobe (series 301, image 123). No emphysematous lung architecture or bronchiectasis. There is biapical pleuro-parenchymal scarring.. No dense airspace consolidation, pleural fluid, or pneumothorax.     Abdomen and pelvis:     There is diffuse hepatic steatosis. There is an 8 mm hypodensity present within the right hepatic lobe on series 3, image 128, possibly a  cyst but too small to characterize and unchanged when compared to the prior study. No new hyperenhancing hepatic mass observed in the liver on the early arterial phase images. There are calcified gallstones present in the gallbladder lumen. No gallbladder inflammatory change. The spleen is unremarkable. The stomach, duodenal C-loop, and adrenal glands are unremarkable.     The most significant abnormality relates to the presence of an approximately 37 x 35 mm proximal pancreatic body mass, slightly smaller in transverse dimension when compared with the previous examination. The mass involves a 90° area the celiac artery and a 180° area of the splenic artery on series 2 images 76-81. There is also soft tissue extending along the anterior and right lateral wall of the portal vein on series 2, image 78 which may represent tumor infiltrating the retroperitoneal soft tissue planes adjacent to the portal vein/portal confluence. There is also abnormal soft tissue insinuating between the left lateral margin of the celiac artery and adjacent splenic vein on series 2, image 77. As best appreciated on series 2, image 84 and series 601, image 59, there is invasion and focal occlusion of the proximal most aspect of the splenic vein. There is diffuse dilatation of the main pancreatic duct within the pancreatic body and tail and there is complete atrophy of the mid and distal pancreatic body and tail.     The abdominal aorta is not aneurysmal. There is minimal calcified atherosclerotic plaque deposition noted within the distal infrarenal abdominal aorta. No bulky periaortic or retroperitoneal lymphadenopathy appreciated. The kidneys show no evidence of stones, hydronephrosis, or solid enhancing mass. The ureters are normal in caliber and course without stones. The urinary bladder shows no significant abnormalities. The uterus and ovaries show no significant abnormalities.     The appendix is unremarkable. No evidence of high-grade  bowel obstruction. There is concentric wall thickening present within the distal sigmoid colon/rectum on series 3, image 218. Infectious and inflammatory etiologies of proctocolitis are possible. No ascites. No pneumoperitoneum. No inguinal hernia or inguinal lymphadenopathy detected. No mesenteric adenopathy or new peritoneal soft tissue mass.     Bones: No evidence of acute thoracolumbar compression fracture. No imaging evidence of lytic or blastic osseous metastatic disease on the provided images.       Assessment:       1. Malignant neoplasm of pancreas, unspecified location of malignancy    2. Pancreatic adenocarcinoma    3. 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therapy    4. Thrombocytopenia    5. Hypokalemia    6. Pancreatic insufficiency    7. Hypothyroidism, unspecified type    8. Type 2 diabetes mellitus without complication, without long-term current use of insulin    9. Pulmonary nodule, right                     Plan:   Pancreatic Cancer - Patient with Stage III pancreatic cancer with concern for potential encasement of the distal celiac artery per discussion with Dr Valentin  -No sign of mets  - 94.5 on 6/17/24  -Case discussed at tumor board 7/17/24 with recommendation to proceed with chemo  -[employer] genetic testing negative for the genes tested  -[facility] tissue testing showed TP53, KRAS p.G12D, CDKN2A, CDKN2B mutations.  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  -Will need to keep irinotecan dose reduced at 165mg/mm2  -CT C/A/P on 10/02/24 shows stable disease and a possible new RLL nodule  -Pt saw Dr Valentin 10/16/24 whom recommended a few more cycles of chemo and then transition to perioperative radiation therapy  - decreased all the way to 9.4U/mL on 11/11/24 with level today pending   -Pt to go to [facility] 12/09/24  -Will proceed with cycle 9  -Plan to complete up to 12 cycles of FOLFIRINOX with repeat scans after cycle 12  Visit today included increased  complexity associated with the care of the episodic problem (chemotherapy) addressed and managing the longitudinal care of the patient due to the serious and/or complex managed problem(s) pancreatic cancer.     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 - pt at increased risk of infection  -No current signs of infection  -Will monitor     Pulmonary Nodule - pt with RLL on CT C/A/P 10/02/24  -5mm and described as ground glass  -Will monitor    Thrombocytopenia - platelets 106k on 11/22/24  -Due to chemo  -Will monitor     Hypokalemia - 4.2 on 11/22/24  -PT to continue potassium chloride     Pancreatic Insufficiency - Pt taking Creon  -Diarrhea improved  -Will monitor     Hypothyroidism - pt on synthroid  -Will monitror     DMII - PT currently on [email or web address]  Lab Results   Component Value Date    HGBA1C 7.0 (H) 09/20/2024   -Possibly due to pancreatic cancer  -Will monitor    Route Chart for Scheduling    Med Onc Chart Routing      Follow up with physician Other. PT can proceed with treatment as long as CMP is OK.  Pt needs her appt with Randy on 12/09/24 moved to 12/11/24.  Pt needs a CBC, CMP and Ca19-9 on 12/10/24.  Pt needs a CBC, CMP Ca19-9 on 12/23/24 with appt with NP.  Treatment for cycle 11 needs to be moved to 12/24/25.  Pt then needs a CBC, CMP and  with appt with me on 1/06/24 /25 with treatment on 1/07/25.   Follow up with ANALIA 3 weeks and other.   Infusion scheduling note    Injection scheduling note    Labs    Imaging    Pharmacy appointment    Other referrals              Treatment Plan Information   OP GI FOLFIRINOX (oxaliplatin, leucovorin, irinotecan, fluorouracil) Q2W  Rajat Hunt MD   Associated diagnosis: Malignant neoplasm of pancreas Stage III cT4, cN0, cM0 noted on 7/12/2024   Line of treatment: Neoadjuvant  Treatment Goal: Curative     Upcoming Treatment Dates - OP GI FOLFIRINOX (oxaliplatin, leucovorin, irinotecan, fluorouracil) Q2W     11/25/2024       Chemotherapy       oxaliplatin (ELOXATIN) 65  mg/m2 = 103 mg in D5W 520.6 mL chemo infusion       leucovorin calcium 320 mg/m2 = 505 mg in D5W 250 mL infusion       irinotecan (CAMPTOSAR) 140 mg/m2 = 222 mg in 0.9% NaCl 511.1 mL chemo infusion       fluorouraciL injection 505 mg       fluorouracil (Adrucil) 1,920 mg/m2 = 3,035 mg in 0.9% NaCl 100 mL chemo infusion       Antiemetics       palonosetron 0.25mg/dexAMETHasone 12mg in NS IVPB 0.25 mg 50 mL  12/11/2024       Chemotherapy       oxaliplatin (ELOXATIN) 65 mg/m2 = 103 mg in D5W 520.6 mL chemo infusion       leucovorin calcium 320 mg/m2 = 505 mg in D5W 250 mL infusion       irinotecan (CAMPTOSAR) 140 mg/m2 = 222 mg in 0.9% NaCl 511.1 mL chemo infusion       fluorouraciL injection 505 mg       fluorouracil (Adrucil) 1,920 mg/m2 = 3,035 mg in 0.9% NaCl 100 mL chemo infusion       Antiemetics       palonosetron 0.25mg/dexAMETHasone 12mg in NS IVPB 0.25 mg 50 mL  12/24/2024       Chemotherapy       oxaliplatin (ELOXATIN) 65 mg/m2 = 103 mg in D5W 520.6 mL chemo infusion       leucovorin calcium 320 mg/m2 = 505 mg in D5W 250 mL infusion       irinotecan (CAMPTOSAR) 140 mg/m2 = 222 mg in 0.9% NaCl 511.1 mL chemo infusion       fluorouraciL injection 505 mg       fluorouracil (Adrucil) 1,920 mg/m2 = 3,035 mg in 0.9% NaCl 100 mL chemo infusion       Antiemetics       palonosetron 0.25mg/dexAMETHasone 12mg in NS IVPB 0.25 mg 50 mL  1/7/2025       Chemotherapy       oxaliplatin (ELOXATIN) 65 mg/m2 = 103 mg in D5W 520.6 mL chemo infusion       leucovorin calcium 320 mg/m2 = 505 mg in D5W 250 mL infusion       irinotecan (CAMPTOSAR) 140 mg/m2 = 222 mg in 0.9% NaCl 511.1 mL chemo infusion       fluorouraciL injection 505 mg       fluorouracil (Adrucil) 1,920 mg/m2 = 3,035 mg in 0.9% NaCl 100 mL chemo infusion       Antiemetics       palonosetron 0.25mg/dexAMETHasone 12mg in NS IVPB 0.25 mg 50 mL    Therapy Plan Information  PORT FLUSH for Malignant neoplasm of pancreas, noted on 7/12/2024  Flushes  heparin, porcine (PF)  100 unit/mL injection flush 500 Units  500 Units, Intravenous, Every visit  sodium chloride 0.9% flush 10 mL  10 mL, Intravenous, Every visit      No therapy plan of the specified type found.    No therapy plan of the specified type found.      Rajat Hunt MD  [facility]  Hematology and Oncology  [facility]   [address] [place] [address]   O: [phone]  F: [phone]

## 2024-11-22 ENCOUNTER — LAB VISIT (OUTPATIENT)
Dept: LAB | Facility: HOSPITAL | Age: 64
End: 2024-11-22
Attending: INTERNAL MEDICINE
Payer: MEDICARE

## 2024-11-22 ENCOUNTER — OFFICE VISIT (OUTPATIENT)
Dept: HEMATOLOGY/ONCOLOGY | Facility: CLINIC | Age: 64
End: 2024-11-22
Payer: MEDICARE

## 2024-11-22 VITALS
TEMPERATURE: 98 F | SYSTOLIC BLOOD PRESSURE: 114 MMHG | WEIGHT: 126.13 LBS | BODY MASS INDEX: 23.81 KG/M2 | HEIGHT: 61 IN | OXYGEN SATURATION: 99 % | RESPIRATION RATE: 14 BRPM | DIASTOLIC BLOOD PRESSURE: 72 MMHG | HEART RATE: 83 BPM

## 2024-11-22 DIAGNOSIS — T45.1X5A IMMUNODEFICIENCY DUE TO CHEMOTHERAPY: ICD-10-CM

## 2024-11-22 DIAGNOSIS — E03.9 HYPOTHYROIDISM, UNSPECIFIED TYPE: ICD-10-CM

## 2024-11-22 DIAGNOSIS — E11.9 TYPE 2 DIABETES MELLITUS WITHOUT COMPLICATION, WITHOUT LONG-TERM CURRENT USE OF INSULIN: ICD-10-CM

## 2024-11-22 DIAGNOSIS — C25.9 PANCREATIC ADENOCARCINOMA: ICD-10-CM

## 2024-11-22 DIAGNOSIS — Z79.899 IMMUNODEFICIENCY DUE TO CHEMOTHERAPY: ICD-10-CM

## 2024-11-22 DIAGNOSIS — C25.9 MALIGNANT NEOPLASM OF PANCREAS, UNSPECIFIED LOCATION OF MALIGNANCY: Primary | ICD-10-CM

## 2024-11-22 DIAGNOSIS — K86.89 PANCREATIC INSUFFICIENCY: ICD-10-CM

## 2024-11-22 DIAGNOSIS — D84.821 IMMUNODEFICIENCY DUE TO CHEMOTHERAPY: ICD-10-CM

## 2024-11-22 DIAGNOSIS — E87.6 HYPOKALEMIA: ICD-10-CM

## 2024-11-22 DIAGNOSIS — R91.1 PULMONARY NODULE, RIGHT: ICD-10-CM

## 2024-11-22 DIAGNOSIS — D69.6 THROMBOCYTOPENIA: ICD-10-CM

## 2024-11-22 LAB
ALBUMIN SERPL BCP-MCNC: 4.1 G/DL (ref 3.5–5.2)
ALP SERPL-CCNC: 137 U/L (ref 40–150)
ALT SERPL W/O P-5'-P-CCNC: 18 U/L (ref 10–44)
ANION GAP SERPL CALC-SCNC: 12 MMOL/L (ref 8–16)
AST SERPL-CCNC: 24 U/L (ref 10–40)
BASOPHILS # BLD AUTO: 0.06 K/UL (ref 0–0.2)
BASOPHILS NFR BLD: 0.6 % (ref 0–1.9)
BILIRUB SERPL-MCNC: 0.5 MG/DL (ref 0.1–1)
BUN SERPL-MCNC: 3 MG/DL (ref 8–23)
CALCIUM SERPL-MCNC: 9.5 MG/DL (ref 8.7–10.5)
CANCER AG19-9 SERPL-ACNC: 9.4 U/ML (ref 0–40)
CHLORIDE SERPL-SCNC: 107 MMOL/L (ref 95–110)
CO2 SERPL-SCNC: 23 MMOL/L (ref 23–29)
CREAT SERPL-MCNC: 0.8 MG/DL (ref 0.5–1.4)
DIFFERENTIAL METHOD BLD: ABNORMAL
EOSINOPHIL # BLD AUTO: 0 K/UL (ref 0–0.5)
EOSINOPHIL NFR BLD: 0.4 % (ref 0–8)
ERYTHROCYTE [DISTWIDTH] IN BLOOD BY AUTOMATED COUNT: 15.4 % (ref 11.5–14.5)
EST. GFR  (NO RACE VARIABLE): >60 ML/MIN/1.73 M^2
GLUCOSE SERPL-MCNC: 122 MG/DL (ref 70–110)
HCT VFR BLD AUTO: 34 % (ref 37–48.5)
HGB BLD-MCNC: 11.2 G/DL (ref 12–16)
IMM GRANULOCYTES # BLD AUTO: 0.41 K/UL (ref 0–0.04)
IMM GRANULOCYTES NFR BLD AUTO: 4.3 % (ref 0–0.5)
LYMPHOCYTES # BLD AUTO: 2.9 K/UL (ref 1–4.8)
LYMPHOCYTES NFR BLD: 30.1 % (ref 18–48)
MAGNESIUM SERPL-MCNC: 1.9 MG/DL (ref 1.6–2.6)
MCH RBC QN AUTO: 32.8 PG (ref 27–31)
MCHC RBC AUTO-ENTMCNC: 32.9 G/DL (ref 32–36)
MCV RBC AUTO: 100 FL (ref 82–98)
MONOCYTES # BLD AUTO: 1 K/UL (ref 0.3–1)
MONOCYTES NFR BLD: 10.3 % (ref 4–15)
NEUTROPHILS # BLD AUTO: 5.1 K/UL (ref 1.8–7.7)
NEUTROPHILS NFR BLD: 54.3 % (ref 38–73)
NRBC BLD-RTO: 0 /100 WBC
PLATELET # BLD AUTO: 106 K/UL (ref 150–450)
PMV BLD AUTO: 9.5 FL (ref 9.2–12.9)
POTASSIUM SERPL-SCNC: 4.2 MMOL/L (ref 3.5–5.1)
PROT SERPL-MCNC: 6.9 G/DL (ref 6–8.4)
RBC # BLD AUTO: 3.41 M/UL (ref 4–5.4)
SODIUM SERPL-SCNC: 142 MMOL/L (ref 136–145)
WBC # BLD AUTO: 9.46 K/UL (ref 3.9–12.7)

## 2024-11-22 PROCEDURE — 83735 ASSAY OF MAGNESIUM: CPT | Mod: PN | Performed by: INTERNAL MEDICINE

## 2024-11-22 PROCEDURE — 86301 IMMUNOASSAY TUMOR CA 19-9: CPT | Performed by: INTERNAL MEDICINE

## 2024-11-22 PROCEDURE — 85025 COMPLETE CBC W/AUTO DIFF WBC: CPT | Mod: PN | Performed by: INTERNAL MEDICINE

## 2024-11-22 PROCEDURE — 36415 COLL VENOUS BLD VENIPUNCTURE: CPT | Mod: PN | Performed by: INTERNAL MEDICINE

## 2024-11-22 PROCEDURE — 99999 PR PBB SHADOW E&M-EST. PATIENT-LVL IV: CPT | Mod: PBBFAC,,, | Performed by: INTERNAL MEDICINE

## 2024-11-22 PROCEDURE — 80053 COMPREHEN METABOLIC PANEL: CPT | Mod: PN | Performed by: INTERNAL MEDICINE

## 2024-11-22 RX ORDER — PROCHLORPERAZINE EDISYLATE 5 MG/ML
5 INJECTION INTRAMUSCULAR; INTRAVENOUS ONCE AS NEEDED
OUTPATIENT
Start: 2024-11-27

## 2024-11-22 RX ORDER — SODIUM CHLORIDE 0.9 % (FLUSH) 0.9 %
10 SYRINGE (ML) INJECTION
OUTPATIENT
Start: 2024-11-27

## 2024-11-22 RX ORDER — ATROPINE SULFATE 0.4 MG/ML
0.4 INJECTION, SOLUTION ENDOTRACHEAL; INTRAMEDULLARY; INTRAMUSCULAR; INTRAVENOUS; SUBCUTANEOUS ONCE AS NEEDED
OUTPATIENT
Start: 2024-11-25

## 2024-11-22 RX ORDER — SODIUM CHLORIDE 0.9 % (FLUSH) 0.9 %
10 SYRINGE (ML) INJECTION
OUTPATIENT
Start: 2024-11-25

## 2024-11-22 RX ORDER — FLUOROURACIL 50 MG/ML
320 INJECTION, SOLUTION INTRAVENOUS
OUTPATIENT
Start: 2024-11-25

## 2024-11-22 RX ORDER — HEPARIN 100 UNIT/ML
500 SYRINGE INTRAVENOUS
OUTPATIENT
Start: 2024-11-25

## 2024-11-22 RX ORDER — HEPARIN 100 UNIT/ML
500 SYRINGE INTRAVENOUS
OUTPATIENT
Start: 2024-11-27

## 2024-11-22 RX ORDER — PROCHLORPERAZINE EDISYLATE 5 MG/ML
5 INJECTION INTRAMUSCULAR; INTRAVENOUS ONCE AS NEEDED
OUTPATIENT
Start: 2024-11-25

## 2024-11-22 RX ORDER — EPINEPHRINE 0.3 MG/.3ML
0.3 INJECTION SUBCUTANEOUS ONCE AS NEEDED
OUTPATIENT
Start: 2024-11-25

## 2024-11-22 RX ORDER — DIPHENHYDRAMINE HYDROCHLORIDE 50 MG/ML
50 INJECTION INTRAMUSCULAR; INTRAVENOUS ONCE AS NEEDED
OUTPATIENT
Start: 2024-11-25

## 2024-11-25 ENCOUNTER — DOCUMENTATION ONLY (OUTPATIENT)
Dept: INFUSION THERAPY | Facility: HOSPITAL | Age: 64
End: 2024-11-25
Payer: MEDICARE

## 2024-11-25 ENCOUNTER — INFUSION (OUTPATIENT)
Dept: INFUSION THERAPY | Facility: HOSPITAL | Age: 64
End: 2024-11-25
Attending: INTERNAL MEDICINE
Payer: MEDICARE

## 2024-11-25 ENCOUNTER — LAB VISIT (OUTPATIENT)
Dept: LAB | Facility: HOSPITAL | Age: 64
End: 2024-11-25
Attending: INTERNAL MEDICINE
Payer: MEDICARE

## 2024-11-25 VITALS
RESPIRATION RATE: 16 BRPM | HEIGHT: 61 IN | TEMPERATURE: 98 F | OXYGEN SATURATION: 100 % | SYSTOLIC BLOOD PRESSURE: 145 MMHG | WEIGHT: 127.44 LBS | DIASTOLIC BLOOD PRESSURE: 67 MMHG | BODY MASS INDEX: 24.06 KG/M2 | HEART RATE: 78 BPM

## 2024-11-25 DIAGNOSIS — C25.9 MALIGNANT NEOPLASM OF PANCREAS, UNSPECIFIED LOCATION OF MALIGNANCY: ICD-10-CM

## 2024-11-25 DIAGNOSIS — C25.9 MALIGNANT NEOPLASM OF PANCREAS, UNSPECIFIED LOCATION OF MALIGNANCY: Primary | ICD-10-CM

## 2024-11-25 LAB
ALBUMIN SERPL BCP-MCNC: 4 G/DL (ref 3.5–5.2)
ALP SERPL-CCNC: 154 U/L (ref 40–150)
ALT SERPL W/O P-5'-P-CCNC: 18 U/L (ref 10–44)
ANION GAP SERPL CALC-SCNC: 9 MMOL/L (ref 8–16)
AST SERPL-CCNC: 23 U/L (ref 10–40)
BASOPHILS # BLD AUTO: 0.04 K/UL (ref 0–0.2)
BASOPHILS NFR BLD: 0.4 % (ref 0–1.9)
BILIRUB SERPL-MCNC: 0.7 MG/DL (ref 0.1–1)
BUN SERPL-MCNC: 9 MG/DL (ref 8–23)
CALCIUM SERPL-MCNC: 9.3 MG/DL (ref 8.7–10.5)
CANCER AG19-9 SERPL-ACNC: 12.5 U/ML (ref 0–40)
CHLORIDE SERPL-SCNC: 106 MMOL/L (ref 95–110)
CO2 SERPL-SCNC: 24 MMOL/L (ref 23–29)
CREAT SERPL-MCNC: 0.8 MG/DL (ref 0.5–1.4)
DIFFERENTIAL METHOD BLD: ABNORMAL
EOSINOPHIL # BLD AUTO: 0.1 K/UL (ref 0–0.5)
EOSINOPHIL NFR BLD: 0.6 % (ref 0–8)
ERYTHROCYTE [DISTWIDTH] IN BLOOD BY AUTOMATED COUNT: 15 % (ref 11.5–14.5)
EST. GFR  (NO RACE VARIABLE): >60 ML/MIN/1.73 M^2
GLUCOSE SERPL-MCNC: 131 MG/DL (ref 70–110)
HCT VFR BLD AUTO: 35.1 % (ref 37–48.5)
HGB BLD-MCNC: 11.6 G/DL (ref 12–16)
IMM GRANULOCYTES # BLD AUTO: 0.31 K/UL (ref 0–0.04)
IMM GRANULOCYTES NFR BLD AUTO: 3.2 % (ref 0–0.5)
LYMPHOCYTES # BLD AUTO: 2.6 K/UL (ref 1–4.8)
LYMPHOCYTES NFR BLD: 26.4 % (ref 18–48)
MAGNESIUM SERPL-MCNC: 1.8 MG/DL (ref 1.6–2.6)
MCH RBC QN AUTO: 32.5 PG (ref 27–31)
MCHC RBC AUTO-ENTMCNC: 33 G/DL (ref 32–36)
MCV RBC AUTO: 98 FL (ref 82–98)
MONOCYTES # BLD AUTO: 0.7 K/UL (ref 0.3–1)
MONOCYTES NFR BLD: 7.5 % (ref 4–15)
NEUTROPHILS # BLD AUTO: 6.1 K/UL (ref 1.8–7.7)
NEUTROPHILS NFR BLD: 61.9 % (ref 38–73)
NRBC BLD-RTO: 0 /100 WBC
PLATELET # BLD AUTO: 121 K/UL (ref 150–450)
PMV BLD AUTO: 8.9 FL (ref 9.2–12.9)
POTASSIUM SERPL-SCNC: 4.3 MMOL/L (ref 3.5–5.1)
PROT SERPL-MCNC: 6.8 G/DL (ref 6–8.4)
RBC # BLD AUTO: 3.57 M/UL (ref 4–5.4)
SODIUM SERPL-SCNC: 139 MMOL/L (ref 136–145)
WBC # BLD AUTO: 9.84 K/UL (ref 3.9–12.7)

## 2024-11-25 PROCEDURE — 36415 COLL VENOUS BLD VENIPUNCTURE: CPT | Mod: PN | Performed by: INTERNAL MEDICINE

## 2024-11-25 PROCEDURE — 63600175 PHARM REV CODE 636 W HCPCS: Mod: PN | Performed by: INTERNAL MEDICINE

## 2024-11-25 PROCEDURE — 96416 CHEMO PROLONG INFUSE W/PUMP: CPT | Mod: PN

## 2024-11-25 PROCEDURE — 83735 ASSAY OF MAGNESIUM: CPT | Mod: PN | Performed by: INTERNAL MEDICINE

## 2024-11-25 PROCEDURE — 96368 THER/DIAG CONCURRENT INF: CPT | Mod: PN

## 2024-11-25 PROCEDURE — 96367 TX/PROPH/DG ADDL SEQ IV INF: CPT | Mod: PN

## 2024-11-25 PROCEDURE — 96417 CHEMO IV INFUS EACH ADDL SEQ: CPT | Mod: PN

## 2024-11-25 PROCEDURE — 96415 CHEMO IV INFUSION ADDL HR: CPT | Mod: PN

## 2024-11-25 PROCEDURE — 86301 IMMUNOASSAY TUMOR CA 19-9: CPT | Performed by: INTERNAL MEDICINE

## 2024-11-25 PROCEDURE — 80053 COMPREHEN METABOLIC PANEL: CPT | Mod: PN | Performed by: INTERNAL MEDICINE

## 2024-11-25 PROCEDURE — 85025 COMPLETE CBC W/AUTO DIFF WBC: CPT | Mod: PN | Performed by: INTERNAL MEDICINE

## 2024-11-25 PROCEDURE — 25000003 PHARM REV CODE 250: Mod: PN | Performed by: INTERNAL MEDICINE

## 2024-11-25 PROCEDURE — 96375 TX/PRO/DX INJ NEW DRUG ADDON: CPT | Mod: PN

## 2024-11-25 PROCEDURE — 96413 CHEMO IV INFUSION 1 HR: CPT | Mod: PN

## 2024-11-25 RX ORDER — ATROPINE SULFATE 0.4 MG/ML
0.4 INJECTION, SOLUTION ENDOTRACHEAL; INTRAMEDULLARY; INTRAMUSCULAR; INTRAVENOUS; SUBCUTANEOUS ONCE AS NEEDED
Status: COMPLETED | OUTPATIENT
Start: 2024-11-25 | End: 2024-11-25

## 2024-11-25 RX ORDER — DIPHENHYDRAMINE HYDROCHLORIDE 50 MG/ML
50 INJECTION INTRAMUSCULAR; INTRAVENOUS ONCE AS NEEDED
Status: DISCONTINUED | OUTPATIENT
Start: 2024-11-25 | End: 2024-11-25 | Stop reason: HOSPADM

## 2024-11-25 RX ORDER — FLUOROURACIL 50 MG/ML
500 INJECTION, SOLUTION INTRAVENOUS
Status: COMPLETED | OUTPATIENT
Start: 2024-11-25 | End: 2024-11-25

## 2024-11-25 RX ORDER — SODIUM CHLORIDE 0.9 % (FLUSH) 0.9 %
10 SYRINGE (ML) INJECTION
Status: DISCONTINUED | OUTPATIENT
Start: 2024-11-25 | End: 2024-11-25 | Stop reason: HOSPADM

## 2024-11-25 RX ORDER — EPINEPHRINE 0.3 MG/.3ML
0.3 INJECTION SUBCUTANEOUS ONCE AS NEEDED
Status: DISCONTINUED | OUTPATIENT
Start: 2024-11-25 | End: 2024-11-25 | Stop reason: HOSPADM

## 2024-11-25 RX ADMIN — OXALIPLATIN 100 MG: 5 INJECTION, SOLUTION INTRAVENOUS at 11:11

## 2024-11-25 RX ADMIN — FLUOROURACIL 500 MG: 50 INJECTION, SOLUTION INTRAVENOUS at 02:11

## 2024-11-25 RX ADMIN — DEXAMETHASONE SODIUM PHOSPHATE 0.25 MG: 10 INJECTION, SOLUTION INTRAMUSCULAR; INTRAVENOUS at 10:11

## 2024-11-25 RX ADMIN — FLUOROURACIL 3000 MG: 50 INJECTION, SOLUTION INTRAVENOUS at 03:11

## 2024-11-25 RX ADMIN — LEUCOVORIN CALCIUM 500 MG: 350 INJECTION, POWDER, LYOPHILIZED, FOR SUSPENSION INTRAMUSCULAR; INTRAVENOUS at 01:11

## 2024-11-25 RX ADMIN — DEXTROSE MONOHYDRATE: 5 INJECTION, SOLUTION INTRAVENOUS at 10:11

## 2024-11-25 RX ADMIN — ATROPINE SULFATE 0.4 MG: 0.4 INJECTION, SOLUTION INTRAVENOUS at 01:11

## 2024-11-25 RX ADMIN — SODIUM CHLORIDE 220 MG: 9 INJECTION, SOLUTION INTRAVENOUS at 01:11

## 2024-11-25 NOTE — PLAN OF CARE
Problem: Adult Inpatient Plan of Care  Goal: Patient-Specific Goal (Individualized)  Outcome: Progressing  Flowsheets (Taken 11/25/2024 1035)  Individualized Care Needs: recliner, conversation  Anxieties, Fears or Concerns: none  Patient/Family-Specific Goals (Include Timeframe): no s/s of rx with tx today     Problem: Fatigue  Goal: Improved Activity Tolerance  Outcome: Progressing  Intervention: Promote Improved Energy  Flowsheets (Taken 11/25/2024 1035)  Fatigue Management: frequent rest breaks encouraged  Sleep/Rest Enhancement: regular sleep/rest pattern promoted  Activity Management: Ambulated -L4  Environmental Support: rest periods encouraged

## 2024-11-25 NOTE — PROGRESS NOTES
Oncology Nutrition   Chemotherapy Infusion Visit    Nutrition Follow Up   RD met with patient at chairside during infusion tx. Pt reports continues to do well nutritionally- eating without difficulty, maintaining weight, and denies nutrition related side effects.    Pt eligible for TFP but pt denies need today.    Wt Readings from Last 10 Encounters:   11/25/24 57.8 kg (127 lb 6.8 oz)   11/22/24 57.2 kg (126 lb 1.7 oz)   11/13/24 58.3 kg (128 lb 8.5 oz)   11/11/24 58.3 kg (128 lb 8.5 oz)   11/11/24 58.3 kg (128 lb 8.5 oz)   10/31/24 59 kg (130 lb 1.1 oz)   10/28/24 59 kg (130 lb 1.1 oz)   10/28/24 58.5 kg (128 lb 15.5 oz)   10/23/24 57.3 kg (126 lb 5.2 oz)   10/18/24 56.8 kg (125 lb 3.5 oz)       All other nutrition questions/concerns addressed as appropriate. Will continue to follow and monitor throughout treatment PRN.     Bernie Gurrola, MS, RD, LDN  11/25/2024  2:09 PM

## 2024-11-25 NOTE — PLAN OF CARE
Problem: Adult Inpatient Plan of Care  Goal: Plan of Care Review  Outcome: Progressing  Flowsheets (Taken 11/25/2024 1520)  Plan of Care Reviewed With:   patient   spouse   Pt tolerated Folfirinox well. No adverse reaction noted. Pt education reinforced on chemo regimen, side effects, what to expect, and when to call physician. Pt verbalized understanding. I reviewed pt calendar w/ pt and understanding verbalized. PAC remains accessed with 5FU infusing at 2.2cc/hr over 46 hours. Patent upon discharge in NAD.

## 2024-11-25 NOTE — PROGRESS NOTES
SW met with pt at chairside. She shared she is getting ready to cook for Thanksgiving. Her daughter will help her. She is looking forward to having all the traditional foods her family enjoys.   Pt shared her granddaughter has picked a wedding date, 2/1/25. Pt said she wanted her  to be able attend and enjoy the day. He also has cancer and wife wanted to be sure he will be able to be present for the wedding. SW listened to pt and provided emotional support.     SW also provided pt with a gas card. No other practical needs noted at this time.     Bharati Fritz, ANA MARIAW

## 2024-11-27 ENCOUNTER — INFUSION (OUTPATIENT)
Dept: INFUSION THERAPY | Facility: HOSPITAL | Age: 64
End: 2024-11-27
Attending: INTERNAL MEDICINE
Payer: MEDICARE

## 2024-11-27 VITALS
DIASTOLIC BLOOD PRESSURE: 61 MMHG | HEART RATE: 88 BPM | TEMPERATURE: 98 F | OXYGEN SATURATION: 99 % | RESPIRATION RATE: 16 BRPM | SYSTOLIC BLOOD PRESSURE: 137 MMHG

## 2024-11-27 DIAGNOSIS — C25.9 MALIGNANT NEOPLASM OF PANCREAS, UNSPECIFIED LOCATION OF MALIGNANCY: Primary | ICD-10-CM

## 2024-11-27 PROCEDURE — A4216 STERILE WATER/SALINE, 10 ML: HCPCS | Mod: PN | Performed by: INTERNAL MEDICINE

## 2024-11-27 PROCEDURE — 25000003 PHARM REV CODE 250: Mod: PN | Performed by: INTERNAL MEDICINE

## 2024-11-27 RX ORDER — HEPARIN 100 UNIT/ML
500 SYRINGE INTRAVENOUS
Status: DISCONTINUED | OUTPATIENT
Start: 2024-11-27 | End: 2024-11-27 | Stop reason: HOSPADM

## 2024-11-27 RX ORDER — PROCHLORPERAZINE EDISYLATE 5 MG/ML
5 INJECTION INTRAMUSCULAR; INTRAVENOUS ONCE AS NEEDED
Status: DISCONTINUED | OUTPATIENT
Start: 2024-11-27 | End: 2024-11-27 | Stop reason: HOSPADM

## 2024-11-27 RX ORDER — SODIUM CHLORIDE 0.9 % (FLUSH) 0.9 %
10 SYRINGE (ML) INJECTION
Status: DISCONTINUED | OUTPATIENT
Start: 2024-11-27 | End: 2024-11-27 | Stop reason: HOSPADM

## 2024-11-27 RX ADMIN — SODIUM CHLORIDE, PRESERVATIVE FREE 10 ML: 5 INJECTION INTRAVENOUS at 12:11

## 2024-11-27 NOTE — PLAN OF CARE
Problem: Adult Inpatient Plan of Care  Goal: Plan of Care Review  Outcome: Met     Problem: Fatigue  Goal: Improved Activity Tolerance  Outcome: Progressing  Intervention: Promote Improved Energy  Flowsheets (Taken 11/27/2024 1248)  Fatigue Management:   activity schedule adjusted   fatigue-related activity identified   frequent rest breaks encouraged     Problem: Fatigue  Goal: Improved Activity Tolerance  Intervention: Promote Improved Energy  Flowsheets (Taken 11/27/2024 1248)  Fatigue Management:   activity schedule adjusted   fatigue-related activity identified   frequent rest breaks encouraged   TOLERATED D/C PUMP REMOVAL TODAY ABLE TO BE D/C TO HOME WITH INSTRUCTIONS NAD

## 2024-11-29 ENCOUNTER — INFUSION (OUTPATIENT)
Dept: INFUSION THERAPY | Facility: HOSPITAL | Age: 64
End: 2024-11-29
Attending: INTERNAL MEDICINE
Payer: MEDICARE

## 2024-11-29 VITALS
DIASTOLIC BLOOD PRESSURE: 64 MMHG | HEART RATE: 86 BPM | OXYGEN SATURATION: 99 % | RESPIRATION RATE: 16 BRPM | SYSTOLIC BLOOD PRESSURE: 138 MMHG | TEMPERATURE: 98 F

## 2024-11-29 DIAGNOSIS — C25.9 MALIGNANT NEOPLASM OF PANCREAS, UNSPECIFIED LOCATION OF MALIGNANCY: Primary | ICD-10-CM

## 2024-11-29 PROCEDURE — 63600175 PHARM REV CODE 636 W HCPCS: Mod: JZ,JG,PN | Performed by: INTERNAL MEDICINE

## 2024-11-29 PROCEDURE — 96372 THER/PROPH/DIAG INJ SC/IM: CPT | Mod: PN

## 2024-11-29 RX ADMIN — PEGFILGRASTIM 6 MG: 6 INJECTION SUBCUTANEOUS at 11:11

## 2024-11-29 NOTE — PLAN OF CARE
Problem: Chemotherapy Effects  Goal: Anemia Symptom Improvement  Outcome: Met     Problem: Chemotherapy Effects  Goal: Absence of Infection  Intervention: Prevent Infection and Maximize Resistance  Flowsheets (Taken 11/29/2024 1116)  Infection Prevention:   hand hygiene promoted   visitors restricted/screened   equipment surfaces disinfected   personal protective equipment utilized     Problem: Adult Inpatient Plan of Care  Goal: Plan of Care Review  Outcome: Met   Tolerated injection without diffculty D/C instructions given and pt ambulated to private vehicle per self without difficulty  NAD

## 2024-12-02 ENCOUNTER — PATIENT MESSAGE (OUTPATIENT)
Dept: PRIMARY CARE CLINIC | Facility: CLINIC | Age: 64
End: 2024-12-02
Payer: MEDICARE

## 2024-12-03 RX ORDER — LEVOTHYROXINE SODIUM 50 UG/1
50 TABLET ORAL DAILY
Qty: 90 TABLET | Refills: 3 | Status: SHIPPED | OUTPATIENT
Start: 2024-12-03

## 2024-12-03 NOTE — TELEPHONE ENCOUNTER
Refill Routing Note   Medication(s) are not appropriate for processing by [facility] for the following reason(s):        Non-participating provider    [facility] action(s):  Route               Appointments  past 12m or future 3m with PCP    Date Provider   Last Visit   9/6/2024 Dayan Jimenez NP   Next Visit   12/6/2024 Dayan Jimenez NP   ED visits in past 90 days: 0        Note composed:1:09 AM 12/03/2024

## 2024-12-06 ENCOUNTER — LAB VISIT (OUTPATIENT)
Dept: LAB | Facility: HOSPITAL | Age: 64
End: 2024-12-06
Attending: NURSE PRACTITIONER
Payer: MEDICARE

## 2024-12-06 ENCOUNTER — TELEPHONE (OUTPATIENT)
Dept: PRIMARY CARE CLINIC | Facility: CLINIC | Age: 64
End: 2024-12-06
Payer: MEDICARE

## 2024-12-06 ENCOUNTER — OFFICE VISIT (OUTPATIENT)
Dept: PRIMARY CARE CLINIC | Facility: CLINIC | Age: 64
End: 2024-12-06
Payer: MEDICARE

## 2024-12-06 VITALS
HEART RATE: 87 BPM | OXYGEN SATURATION: 96 % | DIASTOLIC BLOOD PRESSURE: 60 MMHG | WEIGHT: 126.31 LBS | TEMPERATURE: 98 F | BODY MASS INDEX: 23.87 KG/M2 | SYSTOLIC BLOOD PRESSURE: 116 MMHG

## 2024-12-06 DIAGNOSIS — Z79.899 ENCOUNTER FOR LONG-TERM CURRENT USE OF MEDICATION: ICD-10-CM

## 2024-12-06 DIAGNOSIS — E87.6 HYPOKALEMIA: ICD-10-CM

## 2024-12-06 DIAGNOSIS — Z00.00 ENCOUNTER FOR ANNUAL HEALTH EXAMINATION: ICD-10-CM

## 2024-12-06 DIAGNOSIS — E03.9 HYPOTHYROIDISM, UNSPECIFIED TYPE: ICD-10-CM

## 2024-12-06 DIAGNOSIS — E11.29 MICROALBUMINURIA DUE TO TYPE 2 DIABETES MELLITUS: Primary | ICD-10-CM

## 2024-12-06 DIAGNOSIS — R80.9 MICROALBUMINURIA DUE TO TYPE 2 DIABETES MELLITUS: Primary | ICD-10-CM

## 2024-12-06 DIAGNOSIS — I10 PRIMARY HYPERTENSION: ICD-10-CM

## 2024-12-06 DIAGNOSIS — E11.9 TYPE 2 DIABETES MELLITUS WITHOUT COMPLICATION, WITHOUT LONG-TERM CURRENT USE OF INSULIN: ICD-10-CM

## 2024-12-06 DIAGNOSIS — C25.9 MALIGNANT NEOPLASM OF PANCREAS, UNSPECIFIED LOCATION OF MALIGNANCY: ICD-10-CM

## 2024-12-06 LAB
ALBUMIN SERPL BCP-MCNC: 4 G/DL (ref 3.5–5.2)
ALP SERPL-CCNC: 145 U/L (ref 40–150)
ALT SERPL W/O P-5'-P-CCNC: 14 U/L (ref 10–44)
ANION GAP SERPL CALC-SCNC: 8 MMOL/L (ref 8–16)
ANISOCYTOSIS BLD QL SMEAR: SLIGHT
AST SERPL-CCNC: 20 U/L (ref 10–40)
BASOPHILS # BLD AUTO: 0.03 K/UL (ref 0–0.2)
BASOPHILS NFR BLD: 0.4 % (ref 0–1.9)
BILIRUB SERPL-MCNC: 0.6 MG/DL (ref 0.1–1)
BUN SERPL-MCNC: 3 MG/DL (ref 8–23)
CALCIUM SERPL-MCNC: 9.5 MG/DL (ref 8.7–10.5)
CHLORIDE SERPL-SCNC: 107 MMOL/L (ref 95–110)
CHOLEST SERPL-MCNC: 126 MG/DL (ref 120–199)
CHOLEST/HDLC SERPL: 1.8 {RATIO} (ref 2–5)
CO2 SERPL-SCNC: 26 MMOL/L (ref 23–29)
CREAT SERPL-MCNC: 0.7 MG/DL (ref 0.5–1.4)
DIFFERENTIAL METHOD BLD: ABNORMAL
EOSINOPHIL # BLD AUTO: 0 K/UL (ref 0–0.5)
EOSINOPHIL NFR BLD: 0.5 % (ref 0–8)
ERYTHROCYTE [DISTWIDTH] IN BLOOD BY AUTOMATED COUNT: 14.2 % (ref 11.5–14.5)
EST. GFR  (NO RACE VARIABLE): >60 ML/MIN/1.73 M^2
GLUCOSE SERPL-MCNC: 98 MG/DL (ref 70–110)
HCT VFR BLD AUTO: 33.2 % (ref 37–48.5)
HDLC SERPL-MCNC: 70 MG/DL (ref 40–75)
HDLC SERPL: 55.6 % (ref 20–50)
HGB BLD-MCNC: 10.9 G/DL (ref 12–16)
IMM GRANULOCYTES # BLD AUTO: 0.11 K/UL (ref 0–0.04)
IMM GRANULOCYTES NFR BLD AUTO: 1.4 % (ref 0–0.5)
LDLC SERPL CALC-MCNC: 45.2 MG/DL (ref 63–159)
LYMPHOCYTES # BLD AUTO: 2.7 K/UL (ref 1–4.8)
LYMPHOCYTES NFR BLD: 35.7 % (ref 18–48)
MCH RBC QN AUTO: 32.8 PG (ref 27–31)
MCHC RBC AUTO-ENTMCNC: 32.8 G/DL (ref 32–36)
MCV RBC AUTO: 100 FL (ref 82–98)
MONOCYTES # BLD AUTO: 0.8 K/UL (ref 0.3–1)
MONOCYTES NFR BLD: 10.6 % (ref 4–15)
NEUTROPHILS # BLD AUTO: 3.9 K/UL (ref 1.8–7.7)
NEUTROPHILS NFR BLD: 51.4 % (ref 38–73)
NONHDLC SERPL-MCNC: 56 MG/DL
NRBC BLD-RTO: 0 /100 WBC
PLATELET # BLD AUTO: 46 K/UL (ref 150–450)
PLATELET BLD QL SMEAR: ABNORMAL
PMV BLD AUTO: 11 FL (ref 9.2–12.9)
POTASSIUM SERPL-SCNC: 4.3 MMOL/L (ref 3.5–5.1)
PROT SERPL-MCNC: 7.1 G/DL (ref 6–8.4)
RBC # BLD AUTO: 3.32 M/UL (ref 4–5.4)
SODIUM SERPL-SCNC: 141 MMOL/L (ref 136–145)
TOXIC GRANULES BLD QL SMEAR: PRESENT
TRIGL SERPL-MCNC: 54 MG/DL (ref 30–150)
TSH SERPL DL<=0.005 MIU/L-ACNC: 2.12 UIU/ML (ref 0.4–4)
WBC # BLD AUTO: 7.67 K/UL (ref 3.9–12.7)

## 2024-12-06 PROCEDURE — 4010F ACE/ARB THERAPY RXD/TAKEN: CPT | Mod: CPTII,S$GLB,, | Performed by: NURSE PRACTITIONER

## 2024-12-06 PROCEDURE — 99397 PER PM REEVAL EST PAT 65+ YR: CPT | Mod: S$GLB,,, | Performed by: NURSE PRACTITIONER

## 2024-12-06 PROCEDURE — 3074F SYST BP LT 130 MM HG: CPT | Mod: CPTII,S$GLB,, | Performed by: NURSE PRACTITIONER

## 2024-12-06 PROCEDURE — 2023F DILAT RTA XM W/O RTNOPTHY: CPT | Mod: CPTII,S$GLB,, | Performed by: NURSE PRACTITIONER

## 2024-12-06 PROCEDURE — 36415 COLL VENOUS BLD VENIPUNCTURE: CPT | Mod: PO | Performed by: NURSE PRACTITIONER

## 2024-12-06 PROCEDURE — 85025 COMPLETE CBC W/AUTO DIFF WBC: CPT | Performed by: NURSE PRACTITIONER

## 2024-12-06 PROCEDURE — 3078F DIAST BP <80 MM HG: CPT | Mod: CPTII,S$GLB,, | Performed by: NURSE PRACTITIONER

## 2024-12-06 PROCEDURE — 3051F HG A1C>EQUAL 7.0%<8.0%: CPT | Mod: CPTII,S$GLB,, | Performed by: NURSE PRACTITIONER

## 2024-12-06 PROCEDURE — 3061F NEG MICROALBUMINURIA REV: CPT | Mod: CPTII,S$GLB,, | Performed by: NURSE PRACTITIONER

## 2024-12-06 PROCEDURE — 84443 ASSAY THYROID STIM HORMONE: CPT | Performed by: NURSE PRACTITIONER

## 2024-12-06 PROCEDURE — 1126F AMNT PAIN NOTED NONE PRSNT: CPT | Mod: CPTII,S$GLB,, | Performed by: NURSE PRACTITIONER

## 2024-12-06 PROCEDURE — 3288F FALL RISK ASSESSMENT DOCD: CPT | Mod: CPTII,S$GLB,, | Performed by: NURSE PRACTITIONER

## 2024-12-06 PROCEDURE — 80053 COMPREHEN METABOLIC PANEL: CPT | Performed by: NURSE PRACTITIONER

## 2024-12-06 PROCEDURE — 80061 LIPID PANEL: CPT | Performed by: NURSE PRACTITIONER

## 2024-12-06 PROCEDURE — 3008F BODY MASS INDEX DOCD: CPT | Mod: CPTII,S$GLB,, | Performed by: NURSE PRACTITIONER

## 2024-12-06 PROCEDURE — 99999 PR PBB SHADOW E&M-EST. PATIENT-LVL IV: CPT | Mod: PBBFAC,,, | Performed by: NURSE PRACTITIONER

## 2024-12-06 PROCEDURE — 1159F MED LIST DOCD IN RCRD: CPT | Mod: CPTII,S$GLB,, | Performed by: NURSE PRACTITIONER

## 2024-12-06 PROCEDURE — 1101F PT FALLS ASSESS-DOCD LE1/YR: CPT | Mod: CPTII,S$GLB,, | Performed by: NURSE PRACTITIONER

## 2024-12-06 PROCEDURE — 3066F NEPHROPATHY DOC TX: CPT | Mod: CPTII,S$GLB,, | Performed by: NURSE PRACTITIONER

## 2024-12-06 NOTE — TELEPHONE ENCOUNTER
I have attempted to contact this patient by phone with the following results: no answer, left message to return my call on answering machine. In regards to pt requesting medication advice ; PCP stated to continue taking simvastatin due to diabetes.

## 2024-12-09 ENCOUNTER — TELEPHONE (OUTPATIENT)
Dept: HEMATOLOGY/ONCOLOGY | Facility: CLINIC | Age: 64
End: 2024-12-09
Payer: MEDICARE

## 2024-12-09 NOTE — TELEPHONE ENCOUNTER
----- Message from Kailey sent at 12/9/2024  2:23 PM CST -----  Contact: PT  Type: Needs Medical Advice    Who Called: PT  Best Call Back Number: [phone]  Additional  Information: PT requesting a call back to have appt for labs scheduled on  12/10 moved to 12/11 before her  10 am appt.  Please Advise- Thank you

## 2024-12-09 NOTE — PROGRESS NOTES
This note is specifically for wellness visit performed today.     WELLNESS EXAM      Patient ID: Bessy Lamb is a 64 y.o. female.  has a past medical history of Arthritis, lumbar spine, Diabetes mellitus, General anesthetics causing adverse effect in therapeutic use, GERD (gastroesophageal reflux disease), Hypothyroidism (10/08/2014), Maternal anesthesia complication, Normocytic anemia (09/21/2024), Obesity (BMI 30-39.9) (10/08/2014), pancreatic Cancer, Thyroid disease, and Thyroid nodule (10/08/2014).     Chief Complaint:  Encounter for wellness exam    Well Adult Physical: Patient here for a comprehensive physical exam. Patient reports no problems or complaints today in clinic.    Health Maintenance Topics with due status: Not Due       Topic Last Completion Date    DEXA Scan 08/21/2023    Colorectal Cancer Screening 10/24/2023    TETANUS VACCINE 02/29/2024    Diabetes Urine Screening 08/30/2024    Hemoglobin A1c 09/20/2024    Low Dose Statin 12/06/2024    Lipid Panel 12/06/2024        ==============================================    Health Maintenance Due   Topic Date Due    Pneumococcal Vaccines (Age 65+) (1 of 2 - PCV) Never done    Shingles Vaccine (1 of 2) Never done    RSV Vaccine (Age 60+ and Pregnant patients) (1 - Risk 60-74 years 1-dose series) Never done    Mammogram  07/17/2024    Influenza Vaccine (1) 09/01/2024    Foot Exam  09/25/2024    Eye Exam  03/05/2025       Past Medical History:  Past Medical History:   Diagnosis Date    Arthritis, lumbar spine     hands    Diabetes mellitus     General anesthetics causing adverse effect in therapeutic use     following breast rediuct, hard time awakening  also had anxiety rxn to lidocain in dental ofc    GERD (gastroesophageal reflux disease)     Hypothyroidism 10/08/2014    Maternal anesthesia complication     epidural for 1st child went up instead of down; required intubation    Normocytic anemia 09/21/2024    Obesity (BMI 30-39.9) 10/08/2014     pancreatic Cancer         Thyroid disease     Thyroid nodule 10/08/2014     Past Surgical History:   Procedure Laterality Date    BREAST BIOPSY Left     b9    BREAST SURGERY      Reduction cause of a mass in left breast    c-sections x2       SECTION  3/26/81 & 85    Birth of two girls    COLONOSCOPY  2012         COLONOSCOPY N/A 2018    Procedure: COLONOSCOPY;  Surgeon: Francisco Mcclain MD;  Location: [facility];  Service: Endoscopy;  Laterality: N/A;    COLONOSCOPY N/A 10/24/2023    Procedure: COLONOSCOPY;  Surgeon: Francisco Mcclain MD;  Location: [facility];  Service: Endoscopy;  Laterality: N/A;    ENDOSCOPIC ULTRASOUND OF UPPER GASTROINTESTINAL TRACT Left 2024    Procedure: ULTRASOUND, UPPER GI TRACT, ENDOSCOPIC;  Surgeon: Andrew Gordon MD;  Location: [facility];  Service: Endoscopy;  Laterality: Left;    ESOPHAGOGASTRODUODENOSCOPY N/A 2024    Procedure: EGD (ESOPHAGOGASTRODUODENOSCOPY);  Surgeon: Andrew Gordon MD;  Location: [facility];  Service: Endoscopy;  Laterality: N/A;    hysteroscopy with polypectomy      INCISIONAL BREAST BIOPSY Left     benign; done at same time as reduction    INSERTION OF TUNNELED CENTRAL VENOUS CATHETER (CVC) WITH SUBCUTANEOUS PORT N/A 2024    Procedure: UGDSOORZO-NPXR-B-[id number];  Surgeon: Blanca Dillard MD;  Location: [facility];  Service: General;  Laterality: N/A;    TOTAL REDUCTION MAMMOPLASTY Bilateral      Review of patient's allergies indicates:   Allergen Reactions    Duricef [cefadroxil] Hives     Current Outpatient Medications on File Prior to Visit   Medication Sig Dispense Refill    acetaminophen (TYLENOL) 325 MG tablet Take 325 mg by mouth every 6 (six) hours as needed for Pain.      blood-glucose meter Misc Use as directed 1 each prn    duke's soln (benadryl 30 mL, mylanta 30 mL, LIDOcaine 30 mL, nystatin 30 mL) 120mL 10 ml swish & spit/swallow every 4 to 6 hours as needed for mouth  pain/ulcers/yeast/throat pain 240 mL 1    fluticasone propionate (FLONASE) 50 mcg/actuation nasal spray 1 spray (50 mcg total) by Each Nostril route once daily. 18.2 mL 0    levothyroxine (SYNTHROID) 50 MCG tablet Take 1 tablet (50 mcg total) by mouth once daily. 90 tablet 3    lipase-protease-amylase 24,000-76,000-120,000 units (CREON) 24,000-76,000 -120,000 unit capsule Take 2 capsules by mouth 3 (three) times daily with meals. 180 capsule 11    loperamide (IMODIUM) 2 mg capsule Take 2 tablets (4mg) by mouth after first loose stool, 1 tablet every 2 hours until diarrhea free for 12 hours. May take 2 tablets (4mg) by mouth every 4 hours at night. May require more than the package labeling maximum dose of 16mg/day. 30 capsule 11    loratadine (CLARITIN) 10 mg tablet Take 1 tablet (10 mg total) by mouth every morning. 30 tablet 11    losartan (COZAAR) 25 MG tablet Take 1 tablet (25 mg total) by mouth as needed (take for bp equal or greater than 120/70).      meclizine (ANTIVERT) 25 mg tablet Take 1 tablet (25 mg total) by mouth 3 (three) times daily as needed for Dizziness. 30 tablet 0    mupirocin (BACTROBAN) 2 % ointment Apply topically 3 (three) times daily.      OLANZapine (ZYPREXA) 5 MG tablet Take 1 tablet by mouth nightly on days 1-3 of each chemotherapy cycle. 6 tablet 11    potassium chloride SA (K-DUR,KLOR-CON) 20 MEQ tablet Take 20 mEq by mouth once.      prochlorperazine (COMPAZINE) 5 MG tablet Take 1 tablet (5 mg total) by mouth every 6 (six) hours as needed for Nausea. 20 tablet 5    simvastatin (ZOCOR) 10 MG tablet Take 1 tablet (10 mg total) by mouth every evening. 90 tablet 3    SYNJARDY XR 10-1,000 mg TBph TAKE ONE TABLET BY MOUTH ONCE DAILY 30 tablet 5    TRUE METRIX GLUCOSE TEST STRIP Strp USE 1 STRIP ONCE DAILY TO TEST BLOOD GLUCOSE (50 DAY SUPPLY) 100 each 3    TRUEPLUS LANCETS 33 gauge Misc USE AS DIRECTED TO TEST BLOOD SUGAR ONCE DAILY FOR UP  USES 100 each 2    VITAMIN D2 1,250 mcg  (50,000 unit) capsule TAKE 1 CAPSULE (50,000 UNITS TOTAL) BY MOUTH TWICE A WEEK. 24 capsule 3     Current Facility-Administered Medications on File Prior to Visit   Medication Dose Route Frequency Provider Last Rate Last Admin    alteplase injection 2 mg  2 mg Intra-Catheter PRN Arash Fuchs, ADI        diphenhydrAMINE injection 50 mg  50 mg Intravenous Once PRN Arash Fuchs, ADI        EPINEPHrine (EPIPEN) 0.3 mg/0.3 mL pen injection 0.3 mg  0.3 mg Intramuscular Once PRN Arash Fuchs, ADI        heparin, porcine (PF) 100 unit/mL injection flush 500 Units  500 Units Intravenous PRN Arash Fuchs, NP        hydrocortisone sodium succinate injection 100 mg  100 mg Intravenous Once PRN Arash Fuchs, NP        prochlorperazine injection Soln 5 mg  5 mg Intravenous Once PRN Arash Fuchs, NP        sodium chloride 0.9% flush 10 mL  10 mL Intravenous PRN Arash Fuchs, ADI         Social History     Socioeconomic History    Marital status:    Tobacco Use    Smoking status: Never    Smokeless tobacco: Never   Substance and Sexual Activity    Alcohol use: Not Currently     Comment: 2/ month    Drug use: No    Sexual activity: Not Currently     Birth control/protection: Post-menopausal     Comment:  had prostate cancer had it remove     Social Drivers of Health     Financial Resource Strain: Low Risk  (12/5/2023)    Overall Financial Resource Strain (CARDIA)     Difficulty of Paying Living Expenses: Not hard at all   Recent Concern: Financial Resource Strain - Medium Risk (10/6/2023)    Overall Financial Resource Strain (CARDIA)     Difficulty of Paying Living Expenses: Somewhat hard   Food Insecurity: No Food Insecurity (9/22/2024)    Hunger Vital Sign     Worried About Running Out of Food in the Last Year: Never true     Ran Out of Food in the Last Year: Never true   Transportation Needs: No Transportation Needs (9/22/2024)    TRANSPORTATION NEEDS     Transportation : No   Physical Activity: Sufficiently  Active (12/5/2023)    Exercise Vital Sign     Days of Exercise per Week: 6 days     Minutes of Exercise per Session: 150+ min   Stress: Stress Concern Present (12/5/2023)    [employer] - Occupational Stress Questionnaire     Feeling of Stress : To some extent   Housing Stability: High Risk (12/5/2023)    Housing Stability Vital Sign     Unable to Pay for Housing in the Last Year: No     Number of Places Lived in the Last Year: 1     Unstable Housing in the Last Year: Yes     Family History   Problem Relation Name Age of Onset    Brain cancer Mother Ravi Tolbert     Early death Mother Ravi Tolbert 51        Passed at 51    Cancer Mother Ravi Tolbert         Brain tumor    Arthritis Mother Ravi Tolbert     Diabetes Father Ck tolbert         Type 2    Cirrhosis Father Ck tolbert     Cancer Father Ck pham tomasz         Bone    COPD Father Ck pham tomasz         Smoker    Early death Father Ck tolbert         Passed at 64    Lung cancer Father Ck tolbert     Heart disease Sister Mamta (dianna)wescovich         Congestive heart failure (passed 2/11/18    Hypertension Sister Mamta (dianna)wescovich     Kidney disease Sister Mamta (dianna)wescoradha         Doc removed when she was a child    Hypertension Sister Mayank     Depression Sister Ravi (mayank) macey Mauricio ( sister)         Due to loss of her 27 year old son    Early death Sister Ravi (mayank) macey Mauricio ( sister)         Pulmonary hypertension, cirrhosis of the liver(passed 7/14/2007   Age 57    Heart disease Brother John Choudhury ( passed )         Heart attack    Hypertension Brother John Choudhury ( passed )     Heart disease Brother Juan Francisco Choudhury         Heart  blockage    Heart disease Brother Bhanu Macey         Heart attack (passed)    Diabetes Brother Bhanu Macey     Macular  degeneration Brother Bhanu Choudhury     Breast cancer Maternal Aunt  30    Breast cancer Paternal Aunt  40    Breast cancer Paternal Aunt  40    Ovarian cancer Neg Hx         Review of Systems   Constitutional: Negative for chills, fever and unexpected weight change.   HENT: Negative for ear pain and sore throat.    Eyes: Negative for redness and visual disturbance.   Respiratory: Negative for cough and shortness of breath.    Cardiovascular: Negative for chest pain and palpitations.   Gastrointestinal: Negative for nausea and vomiting.   Musculoskeletal: Negative for arthralgias and myalgias.   Skin: Negative for rash and wound.   Neurological: Negative for weakness and headaches.         Objective:      Vitals:    12/06/24 0934   BP: 116/60   Pulse: 87   Temp: 97.8 °F (36.6 °C)     Body mass index is 23.87 kg/m².     Physical Exam   Constitutional: Oriented to person, place, and time. Appears well-developed and well-nourished. No distress.   HENT:   Head: Normocephalic and atraumatic.   Eyes: Pupils are equal, round, and reactive to light. EOM are normal.   Neck: Normal range of motion. Neck supple.   Cardiovascular: Normal rate, regular rhythm, normal heart sounds and intact distal pulses.   No murmur heard.  Pulmonary/Chest: Effort normal and breath sounds normal. No respiratory distress. No wheezes.  Port to right chest wall.   Musculoskeletal: Normal range of motion. Exhibits no edema.   Neurological: Alert and oriented to person, place, and time. No cranial nerve deficit.   Skin: Skin is warm and dry. Capillary refill takes less than 2 seconds.   Psychiatric: Normal mood and affect. Behavior is normal.   Nursing note and vitals reviewed.      All labs, imaging and procedures performed since last visit have been personally reviewed.    Assessment / Plan:      Patient here for annual wellness exam. Health maintenance was reviewed and ordered.    Complete history and physical was completed today.  Complete and  thorough medication reconciliation was performed.  Discussed risks and benefits of medications.  Advised patient on orders and health maintenance.  We discussed old records and old labs if available.  Will request any records not available through epic.  Continue current medications listed on your summary sheet.    All questions were answered. Patient had no further concerns. Advised of diagnoses and plan. Follow up as planned or return sooner if symptoms persist or worsen.     Problem List Items Addressed This Visit       Hypothyroidism    Overview     Chronic.   Continue levothyroxine   Lab Results   Component Value Date    TSH 2.118 12/06/2024              Relevant Orders    TSH    Type 2 diabetes mellitus without complication, without long-term current use of insulin    Overview     Diabetes Medications               SYNJARDY XR 10-1,000 mg TBph TAKE ONE TABLET BY MOUTH ONCE DAILY          -condition is currently controlled  -plan to  check hemoglobin A1c  -see diabetic health maintenance listed below  -on statin: Yes  -on ACE-I/ARB: Yes  -counseling provided on importance of diabetic diet and medication compliance in order to treat diabetes  -discussed diabetes disease course and potential complications          Microalbuminuria due to type 2 diabetes mellitus - Primary    Primary hypertension    Overview     Hypertension Medications               losartan (COZAAR) 25 MG tablet Take 1 tablet (25 mg total) by mouth once daily.        -at goal today  -continue lifestyle modification with low sodium diet and exercise   -discussed hypertension disease course and importance of treating high blood pressure  -patient understood and advised of risk of untreated blood pressure.  ER precautions were given   for symptoms of hypertensive urgency and emergency.    9/6/24  - since starting chemo blood pressures have been marginally low.  Will change losartan from daily to as needed for blood pressure above 120/70.   - after  chemo is complete we will re-evaluate.          Malignant neoplasm of pancreas    Overview     Patient with locally advanced pancreatic cancer currently on treatment FOLFIRINOX   Followed Dr. Rajat Hunt with oncology   Currently undergoing chemo infusions.            Hypokalemia    Overview     She has a history of low potassium levels, which previously resulted in a three-day hospitalization for correction.   Continue potassium supplements.   CMP  Sodium   Date Value Ref Range Status   12/06/2024 141 136 - 145 mmol/L Final     Potassium   Date Value Ref Range Status   12/06/2024 4.3 3.5 - 5.1 mmol/L Final     Chloride   Date Value Ref Range Status   12/06/2024 107 95 - 110 mmol/L Final     CO2   Date Value Ref Range Status   12/06/2024 26 23 - 29 mmol/L Final     Glucose   Date Value Ref Range Status   12/06/2024 98 70 - 110 mg/dL Final     BUN   Date Value Ref Range Status   12/06/2024 3 (L) 8 - 23 mg/dL Final     Creatinine   Date Value Ref Range Status   12/06/2024 0.7 0.5 - 1.4 mg/dL Final     Calcium   Date Value Ref Range Status   12/06/2024 9.5 8.7 - 10.5 mg/dL Final     Total Protein   Date Value Ref Range Status   12/06/2024 7.1 6.0 - 8.4 g/dL Final     Albumin   Date Value Ref Range Status   12/06/2024 4.0 3.5 - 5.2 g/dL Final     Total Bilirubin   Date Value Ref Range Status   12/06/2024 0.6 0.1 - 1.0 mg/dL Final     Comment:     For infants and newborns, interpretation of results should be based  on gestational age, weight and in agreement with clinical  observations.    Premature Infant recommended reference ranges:  Up to 24 hours.............<8.0 mg/dL  Up to 48 hours............<12.0 mg/dL  3-5 days..................<15.0 mg/dL  6-29 days.................<15.0 mg/dL       Alkaline Phosphatase   Date Value Ref Range Status   12/06/2024 145 40 - 150 U/L Final     AST   Date Value Ref Range Status   12/06/2024 20 10 - 40 U/L Final     ALT   Date Value Ref Range Status   12/06/2024 14 10 - 44 U/L  Final     Anion Gap   Date Value Ref Range Status   12/06/2024 8 8 - 16 mmol/L Final     eGFR   Date Value Ref Range Status   12/06/2024 >60.0 >60 mL/min/1.73 m^2 Final              Other Visit Diagnoses       Encounter for annual health examination        Relevant Orders    CBC Auto Differential    Comprehensive Metabolic Panel    Lipid Panel    TSH    Encounter for long-term current use of medication        Relevant Orders    CBC Auto Differential    Comprehensive Metabolic Panel    Lipid Panel    TSH            Follow up in about 3 months (around 3/6/2025).    This note was generated with the assistance of ambient listening technology. Verbal consent was obtained by the patient and accompanying visitor(s) for the recording of patient appointment to facilitate this note. I attest to having reviewed and edited the generated note for accuracy, though some syntax or spelling errors may persist. Please contact the author of this note for any clarification.       Dayan Jimenez NP

## 2024-12-09 NOTE — TELEPHONE ENCOUNTER
Called patient back to let her know I was able to move her lab appointment to 12/11 prior to her appointment with ADI Brannon.  -Jessica

## 2024-12-11 ENCOUNTER — INFUSION (OUTPATIENT)
Dept: INFUSION THERAPY | Facility: HOSPITAL | Age: 64
End: 2024-12-11
Attending: INTERNAL MEDICINE
Payer: MEDICARE

## 2024-12-11 ENCOUNTER — OFFICE VISIT (OUTPATIENT)
Dept: HEMATOLOGY/ONCOLOGY | Facility: CLINIC | Age: 64
End: 2024-12-11
Payer: MEDICARE

## 2024-12-11 ENCOUNTER — LAB VISIT (OUTPATIENT)
Dept: LAB | Facility: HOSPITAL | Age: 64
End: 2024-12-11
Attending: INTERNAL MEDICINE
Payer: MEDICARE

## 2024-12-11 VITALS
DIASTOLIC BLOOD PRESSURE: 79 MMHG | WEIGHT: 126.56 LBS | BODY MASS INDEX: 23.9 KG/M2 | HEART RATE: 92 BPM | RESPIRATION RATE: 17 BRPM | SYSTOLIC BLOOD PRESSURE: 133 MMHG | HEIGHT: 61 IN | TEMPERATURE: 98 F | OXYGEN SATURATION: 98 %

## 2024-12-11 VITALS
OXYGEN SATURATION: 98 % | BODY MASS INDEX: 23.9 KG/M2 | HEIGHT: 61 IN | DIASTOLIC BLOOD PRESSURE: 73 MMHG | TEMPERATURE: 98 F | RESPIRATION RATE: 17 BRPM | SYSTOLIC BLOOD PRESSURE: 122 MMHG | HEART RATE: 83 BPM | WEIGHT: 126.56 LBS

## 2024-12-11 DIAGNOSIS — R91.1 PULMONARY NODULE: ICD-10-CM

## 2024-12-11 DIAGNOSIS — E11.9 TYPE 2 DIABETES MELLITUS WITHOUT COMPLICATION, WITHOUT LONG-TERM CURRENT USE OF INSULIN: ICD-10-CM

## 2024-12-11 DIAGNOSIS — D84.821 IMMUNODEFICIENCY DUE TO CHEMOTHERAPY: ICD-10-CM

## 2024-12-11 DIAGNOSIS — Z79.899 IMMUNODEFICIENCY DUE TO CHEMOTHERAPY: ICD-10-CM

## 2024-12-11 DIAGNOSIS — C25.9 MALIGNANT NEOPLASM OF PANCREAS, UNSPECIFIED LOCATION OF MALIGNANCY: ICD-10-CM

## 2024-12-11 DIAGNOSIS — E03.9 HYPOTHYROIDISM, UNSPECIFIED TYPE: ICD-10-CM

## 2024-12-11 DIAGNOSIS — E87.6 HYPOKALEMIA: ICD-10-CM

## 2024-12-11 DIAGNOSIS — C25.1 MALIGNANT NEOPLASM OF BODY OF PANCREAS: Primary | ICD-10-CM

## 2024-12-11 DIAGNOSIS — K12.30 MUCOSITIS: ICD-10-CM

## 2024-12-11 DIAGNOSIS — K86.89 PANCREATIC INSUFFICIENCY: ICD-10-CM

## 2024-12-11 DIAGNOSIS — D69.6 THROMBOCYTOPENIA: ICD-10-CM

## 2024-12-11 DIAGNOSIS — T45.1X5A IMMUNODEFICIENCY DUE TO CHEMOTHERAPY: ICD-10-CM

## 2024-12-11 DIAGNOSIS — C25.9 MALIGNANT NEOPLASM OF PANCREAS, UNSPECIFIED LOCATION OF MALIGNANCY: Primary | ICD-10-CM

## 2024-12-11 PROBLEM — Z79.69 IMMUNODEFICIENCY DUE TO CHEMOTHERAPY: Status: ACTIVE | Noted: 2024-12-11

## 2024-12-11 LAB
ALBUMIN SERPL BCP-MCNC: 3.8 G/DL (ref 3.5–5.2)
ALP SERPL-CCNC: 148 U/L (ref 40–150)
ALT SERPL W/O P-5'-P-CCNC: 12 U/L (ref 10–44)
ANION GAP SERPL CALC-SCNC: 11 MMOL/L (ref 8–16)
AST SERPL-CCNC: 20 U/L (ref 10–40)
BASOPHILS # BLD AUTO: 0.03 K/UL (ref 0–0.2)
BASOPHILS NFR BLD: 0.4 % (ref 0–1.9)
BILIRUB SERPL-MCNC: 0.5 MG/DL (ref 0.1–1)
BUN SERPL-MCNC: 8 MG/DL (ref 8–23)
CALCIUM SERPL-MCNC: 9.2 MG/DL (ref 8.7–10.5)
CANCER AG19-9 SERPL-ACNC: 8.2 U/ML (ref 0–40)
CHLORIDE SERPL-SCNC: 106 MMOL/L (ref 95–110)
CO2 SERPL-SCNC: 22 MMOL/L (ref 23–29)
CREAT SERPL-MCNC: 0.7 MG/DL (ref 0.5–1.4)
DIFFERENTIAL METHOD BLD: ABNORMAL
EOSINOPHIL # BLD AUTO: 0.1 K/UL (ref 0–0.5)
EOSINOPHIL NFR BLD: 1 % (ref 0–8)
ERYTHROCYTE [DISTWIDTH] IN BLOOD BY AUTOMATED COUNT: 15.2 % (ref 11.5–14.5)
EST. GFR  (NO RACE VARIABLE): >60 ML/MIN/1.73 M^2
GLUCOSE SERPL-MCNC: 121 MG/DL (ref 70–110)
HCT VFR BLD AUTO: 34.3 % (ref 37–48.5)
HGB BLD-MCNC: 11.3 G/DL (ref 12–16)
IMM GRANULOCYTES # BLD AUTO: 0.18 K/UL (ref 0–0.04)
IMM GRANULOCYTES NFR BLD AUTO: 2.2 % (ref 0–0.5)
LYMPHOCYTES # BLD AUTO: 2.3 K/UL (ref 1–4.8)
LYMPHOCYTES NFR BLD: 28.1 % (ref 18–48)
MCH RBC QN AUTO: 32.9 PG (ref 27–31)
MCHC RBC AUTO-ENTMCNC: 32.9 G/DL (ref 32–36)
MCV RBC AUTO: 100 FL (ref 82–98)
MONOCYTES # BLD AUTO: 0.8 K/UL (ref 0.3–1)
MONOCYTES NFR BLD: 9 % (ref 4–15)
NEUTROPHILS # BLD AUTO: 4.9 K/UL (ref 1.8–7.7)
NEUTROPHILS NFR BLD: 59.3 % (ref 38–73)
NRBC BLD-RTO: 0 /100 WBC
PLATELET # BLD AUTO: 103 K/UL (ref 150–450)
PLATELET BLD QL SMEAR: ABNORMAL
PMV BLD AUTO: 9.5 FL (ref 9.2–12.9)
POTASSIUM SERPL-SCNC: 4.7 MMOL/L (ref 3.5–5.1)
PROT SERPL-MCNC: 6.7 G/DL (ref 6–8.4)
RBC # BLD AUTO: 3.43 M/UL (ref 4–5.4)
SODIUM SERPL-SCNC: 139 MMOL/L (ref 136–145)
WBC # BLD AUTO: 8.3 K/UL (ref 3.9–12.7)

## 2024-12-11 PROCEDURE — 96417 CHEMO IV INFUS EACH ADDL SEQ: CPT | Mod: PN

## 2024-12-11 PROCEDURE — 96368 THER/DIAG CONCURRENT INF: CPT | Mod: PN

## 2024-12-11 PROCEDURE — 99215 OFFICE O/P EST HI 40 MIN: CPT | Mod: S$GLB,,, | Performed by: NURSE PRACTITIONER

## 2024-12-11 PROCEDURE — A4216 STERILE WATER/SALINE, 10 ML: HCPCS | Mod: PN | Performed by: NURSE PRACTITIONER

## 2024-12-11 PROCEDURE — 96375 TX/PRO/DX INJ NEW DRUG ADDON: CPT | Mod: PN

## 2024-12-11 PROCEDURE — 3288F FALL RISK ASSESSMENT DOCD: CPT | Mod: CPTII,S$GLB,, | Performed by: NURSE PRACTITIONER

## 2024-12-11 PROCEDURE — 1101F PT FALLS ASSESS-DOCD LE1/YR: CPT | Mod: CPTII,S$GLB,, | Performed by: NURSE PRACTITIONER

## 2024-12-11 PROCEDURE — 99999 PR PBB SHADOW E&M-EST. PATIENT-LVL IV: CPT | Mod: PBBFAC,,, | Performed by: NURSE PRACTITIONER

## 2024-12-11 PROCEDURE — 86301 IMMUNOASSAY TUMOR CA 19-9: CPT | Performed by: INTERNAL MEDICINE

## 2024-12-11 PROCEDURE — 85025 COMPLETE CBC W/AUTO DIFF WBC: CPT | Mod: PN | Performed by: INTERNAL MEDICINE

## 2024-12-11 PROCEDURE — 80053 COMPREHEN METABOLIC PANEL: CPT | Mod: PN | Performed by: INTERNAL MEDICINE

## 2024-12-11 PROCEDURE — 96367 TX/PROPH/DG ADDL SEQ IV INF: CPT | Mod: PN

## 2024-12-11 PROCEDURE — 25000003 PHARM REV CODE 250: Mod: PN | Performed by: NURSE PRACTITIONER

## 2024-12-11 PROCEDURE — G2211 COMPLEX E/M VISIT ADD ON: HCPCS | Mod: S$GLB,,, | Performed by: NURSE PRACTITIONER

## 2024-12-11 PROCEDURE — 3061F NEG MICROALBUMINURIA REV: CPT | Mod: CPTII,S$GLB,, | Performed by: NURSE PRACTITIONER

## 2024-12-11 PROCEDURE — 3066F NEPHROPATHY DOC TX: CPT | Mod: CPTII,S$GLB,, | Performed by: NURSE PRACTITIONER

## 2024-12-11 PROCEDURE — 3051F HG A1C>EQUAL 7.0%<8.0%: CPT | Mod: CPTII,S$GLB,, | Performed by: NURSE PRACTITIONER

## 2024-12-11 PROCEDURE — 96413 CHEMO IV INFUSION 1 HR: CPT | Mod: PN

## 2024-12-11 PROCEDURE — 4010F ACE/ARB THERAPY RXD/TAKEN: CPT | Mod: CPTII,S$GLB,, | Performed by: NURSE PRACTITIONER

## 2024-12-11 PROCEDURE — 63600175 PHARM REV CODE 636 W HCPCS: Mod: PN | Performed by: NURSE PRACTITIONER

## 2024-12-11 PROCEDURE — 96416 CHEMO PROLONG INFUSE W/PUMP: CPT | Mod: PN

## 2024-12-11 PROCEDURE — 96411 CHEMO IV PUSH ADDL DRUG: CPT | Mod: PN

## 2024-12-11 PROCEDURE — 3078F DIAST BP <80 MM HG: CPT | Mod: CPTII,S$GLB,, | Performed by: NURSE PRACTITIONER

## 2024-12-11 PROCEDURE — 3074F SYST BP LT 130 MM HG: CPT | Mod: CPTII,S$GLB,, | Performed by: NURSE PRACTITIONER

## 2024-12-11 PROCEDURE — 96415 CHEMO IV INFUSION ADDL HR: CPT | Mod: PN

## 2024-12-11 PROCEDURE — 3008F BODY MASS INDEX DOCD: CPT | Mod: CPTII,S$GLB,, | Performed by: NURSE PRACTITIONER

## 2024-12-11 PROCEDURE — 36415 COLL VENOUS BLD VENIPUNCTURE: CPT | Mod: PN | Performed by: INTERNAL MEDICINE

## 2024-12-11 RX ORDER — DIPHENHYDRAMINE HYDROCHLORIDE 50 MG/ML
50 INJECTION INTRAMUSCULAR; INTRAVENOUS ONCE AS NEEDED
Status: DISCONTINUED | OUTPATIENT
Start: 2024-12-11 | End: 2024-12-11 | Stop reason: HOSPADM

## 2024-12-11 RX ORDER — ATROPINE SULFATE 0.4 MG/ML
0.4 INJECTION, SOLUTION ENDOTRACHEAL; INTRAMEDULLARY; INTRAMUSCULAR; INTRAVENOUS; SUBCUTANEOUS ONCE AS NEEDED
Status: CANCELLED | OUTPATIENT
Start: 2024-12-11

## 2024-12-11 RX ORDER — SODIUM CHLORIDE 0.9 % (FLUSH) 0.9 %
10 SYRINGE (ML) INJECTION
Status: CANCELLED | OUTPATIENT
Start: 2024-12-13

## 2024-12-11 RX ORDER — HEPARIN 100 UNIT/ML
500 SYRINGE INTRAVENOUS
Status: CANCELLED | OUTPATIENT
Start: 2024-12-13

## 2024-12-11 RX ORDER — EPINEPHRINE 0.3 MG/.3ML
0.3 INJECTION SUBCUTANEOUS ONCE AS NEEDED
Status: CANCELLED | OUTPATIENT
Start: 2024-12-11

## 2024-12-11 RX ORDER — EPINEPHRINE 0.3 MG/.3ML
0.3 INJECTION SUBCUTANEOUS ONCE AS NEEDED
Status: DISCONTINUED | OUTPATIENT
Start: 2024-12-11 | End: 2024-12-11 | Stop reason: HOSPADM

## 2024-12-11 RX ORDER — SODIUM CHLORIDE 0.9 % (FLUSH) 0.9 %
10 SYRINGE (ML) INJECTION
Status: CANCELLED | OUTPATIENT
Start: 2024-12-11

## 2024-12-11 RX ORDER — HEPARIN 100 UNIT/ML
500 SYRINGE INTRAVENOUS
Status: CANCELLED | OUTPATIENT
Start: 2024-12-11

## 2024-12-11 RX ORDER — ATROPINE SULFATE 0.4 MG/ML
0.4 INJECTION, SOLUTION ENDOTRACHEAL; INTRAMEDULLARY; INTRAMUSCULAR; INTRAVENOUS; SUBCUTANEOUS ONCE AS NEEDED
Status: COMPLETED | OUTPATIENT
Start: 2024-12-11 | End: 2024-12-11

## 2024-12-11 RX ORDER — SODIUM CHLORIDE 0.9 % (FLUSH) 0.9 %
10 SYRINGE (ML) INJECTION
Status: DISCONTINUED | OUTPATIENT
Start: 2024-12-11 | End: 2024-12-11 | Stop reason: HOSPADM

## 2024-12-11 RX ORDER — PROCHLORPERAZINE EDISYLATE 5 MG/ML
5 INJECTION INTRAMUSCULAR; INTRAVENOUS ONCE AS NEEDED
Status: CANCELLED | OUTPATIENT
Start: 2024-12-11

## 2024-12-11 RX ORDER — PROCHLORPERAZINE EDISYLATE 5 MG/ML
5 INJECTION INTRAMUSCULAR; INTRAVENOUS ONCE AS NEEDED
Status: DISCONTINUED | OUTPATIENT
Start: 2024-12-11 | End: 2024-12-11 | Stop reason: HOSPADM

## 2024-12-11 RX ORDER — FLUOROURACIL 50 MG/ML
320 INJECTION, SOLUTION INTRAVENOUS
Status: CANCELLED | OUTPATIENT
Start: 2024-12-11

## 2024-12-11 RX ORDER — FLUOROURACIL 50 MG/ML
320 INJECTION, SOLUTION INTRAVENOUS
Status: COMPLETED | OUTPATIENT
Start: 2024-12-11 | End: 2024-12-11

## 2024-12-11 RX ORDER — PROCHLORPERAZINE EDISYLATE 5 MG/ML
5 INJECTION INTRAMUSCULAR; INTRAVENOUS ONCE AS NEEDED
Status: CANCELLED | OUTPATIENT
Start: 2024-12-13

## 2024-12-11 RX ORDER — DIPHENHYDRAMINE HYDROCHLORIDE 50 MG/ML
50 INJECTION INTRAMUSCULAR; INTRAVENOUS ONCE AS NEEDED
Status: CANCELLED | OUTPATIENT
Start: 2024-12-11

## 2024-12-11 RX ADMIN — ATROPINE SULFATE 0.4 MG: 0.4 INJECTION, SOLUTION INTRAMUSCULAR; INTRAVENOUS; SUBCUTANEOUS at 03:12

## 2024-12-11 RX ADMIN — FLUOROURACIL 3000 MG: 50 INJECTION, SOLUTION INTRAVENOUS at 03:12

## 2024-12-11 RX ADMIN — SODIUM CHLORIDE 220 MG: 9 INJECTION, SOLUTION INTRAVENOUS at 01:12

## 2024-12-11 RX ADMIN — LEUCOVORIN CALCIUM 500 MG: 350 INJECTION, POWDER, LYOPHILIZED, FOR SUSPENSION INTRAMUSCULAR; INTRAVENOUS at 01:12

## 2024-12-11 RX ADMIN — DEXTROSE MONOHYDRATE: 5 INJECTION, SOLUTION INTRAVENOUS at 10:12

## 2024-12-11 RX ADMIN — DEXAMETHASONE SODIUM PHOSPHATE 0.25 MG: 10 INJECTION, SOLUTION INTRAMUSCULAR; INTRAVENOUS at 11:12

## 2024-12-11 RX ADMIN — FLUOROURACIL 500 MG: 50 INJECTION, SOLUTION INTRAVENOUS at 03:12

## 2024-12-11 RX ADMIN — OXALIPLATIN 100 MG: 5 INJECTION, SOLUTION INTRAVENOUS at 11:12

## 2024-12-11 RX ADMIN — Medication 10 ML: at 03:12

## 2024-12-11 NOTE — PROGRESS NOTES
Results have been released via my chart. Please verify that these have been reviewed by the pt. If not, please call pt with results.       I have reviewed results.     CBC stable, follow-up with hematology/oncology.   CMP stable.   Lipid panel normal, continue statin.

## 2024-12-11 NOTE — PLAN OF CARE
Problem: Fatigue  Goal: Improved Activity Tolerance  Intervention: Promote Improved Energy  Flowsheets (Taken 12/11/2024 1144)  Fatigue Management:   activity schedule adjusted   frequent rest breaks encouraged   paced activity encouraged   fatigue-related activity identified  Sleep/Rest Enhancement:   regular sleep/rest pattern promoted   relaxation techniques promoted   natural light exposure provided  Activity Management:   Ambulated -L4   Ambulated to bathroom - L4   Ambulated in [place] - L4   Up in stretcher chair - L1  Environmental Support:   calm environment promoted   rest periods encouraged   personal routine supported     Problem: Adult Inpatient Plan of Care  Goal: Patient-Specific Goal (Individualized)  Outcome: Not Progressing  Flowsheets (Taken 12/11/2024 1144)  Individualized Care Needs: recliner, dim lights,  at chairside, conversation  Anxieties, Fears or Concerns:  has pnemonia and his treatment is delayed  Patient/Family-Specific Goals (Include Timeframe): no s/s of reaction with treatment

## 2024-12-11 NOTE — PLAN OF CARE
Problem: Adult Inpatient Plan of Care  Goal: Plan of Care Review  12/11/2024 1706 by Inocencia Woo RN  Outcome: Progressing  Flowsheets (Taken 12/11/2024 8565)  Plan of Care Reviewed With: patient   Pt tolerated her chemo infusions with c/o of abdominal pain with diarrhea, runny eyes and nose at the end of her Irinotecan infusion. Atropine PRN administered as ordered. All symptoms resolved prior to discharge. Pt was discharged with her home CADD pump infusing as ordered, pt is familiar with it's use. Pt given a schedule and reviewed, pt verbalized understanding. Pt ambulated out of the clinic without difficulty accompanied by her .

## 2024-12-11 NOTE — PROGRESS NOTES
PATIENT: Bessy Lamb  MRN: [id number]  DATE: 12/11/2024      Diagnosis:   1. Malignant neoplasm of body of pancreas    2. 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therapy    3. Pulmonary nodule    4. Thrombocytopenia    5. Hypokalemia    6. Pancreatic insufficiency    7. Hypothyroidism, unspecified type    8. Type 2 diabetes mellitus without complication, without long-term current use of insulin    9. Mucositis            Chief Complaint: Clearance C10 FOLFIRINOX          Subjective:    Interval History:  Ms. Lamb is a 64 y.o. female with Arthritis, DMII, GERD, Hypotyroidism, Obesity, Thyroid disease who presents for pancreatic cancer.  Since the last clinic visit the patient states she is overall feeling well. Indicates that she does experience some neuropathy in her lower extremities, mainly in the evening time after 5pm, but resolves when she lies down at night. She also endorses mild mucositis on the inside of her both of her cheeks near her back upper molars, that is resolved with the dukes's solution. This is not hindering her from eating nor causing difficulty swallowing.She endorses a change in color of her stool to a greenish color which she is attributing to her eating a large amount of broccoli soup and forgetting to take Creon with each meal. The patient denies CP, cough, SOB, abdominal pain, nausea, vomiting, constipation, diarrhea.  The patient denies fever, chills, night sweats, weight loss, new lumps or bumps, easy bruising or bleeding.        Prior History:   The patient initially underwent a CXR on 11/13/23 fur a URI which showed possible subtle pulmonary nodules.  CT chest 12/01/2023 showed a cluster of ill-defined centrilobular ground-glass opacity he inferior right and left upper lobes as well as a 1.2 cm ground-glass opacity in the superior segment of the left lower lobe; solid 3 mm pulmonary nodule in the right upper lobe; subcentimeter right hepatic lobe hypodensity likely representing a cyst.   The patient underwent surveillance CT chest on 05/21/2024 showing a 3.7 x 2.7 pancreatic body mass in the pancreatic tail atrophy suspicious of pancreatic malignancy as well as and unchanged benign 3 mm nodule in the right upper lobe.  MRI abdomen on 06/30/2024 showed hypoenhancing mass centered in the proximal pancreatic body measuring 2.9 x 2 cm with abutment and possible encasement of the distal splenic vein.  EUS was performed on 07/05/2024 mass in the pancreatic body stage T4 N0 M0 by endo sonographic criteria.  Pathology from biopsy of the stomach showed mild focally moderate chronic gastritis with no evidence of the H pylori.  Stomach polyp which was removed code hyperplastic polyps with chronic inflammation.  Pancreatic biopsy showed adenocarcinoma.  CT of the chest, abdomen, and pelvis on 07/09/2024 showed a 9 mm lesion in the right hepatic lobe previously characterized as cysts; mass in the pancreatic body measuring 4.4 x 3.7 cm with diffuse pancreatic ductal dilatation measuring 8 mm:  Weaning vein at the portal confluence occluded with reconstitution via collaterals.  The mass abuts the portal vein by less than 180° but does not case the proximal splenic artery remains patent.  Also noted was a pancreatic lymph node measuring 0.7 cm.   Patient's case was discussed at tumor board on 07/17/2024 with recommendation for proceeding with the chemotherapy.  Patient had port placement on 07/18/2024.   The patient is admitted to the hospital from 09/20/2024 until 09/22/2024 for hypokalemia with potassium of 2.3.  The patient was subsequently discharged underwent treatment with FOLFIRINOX on 09/23/2024.  CT of the chest, abdomen, and pelvis on 10/02/2024 showed a new 5 mm ground-glass nodule in the right lower lobe; stable 8 mm hypodensity present within the right hepatic lobe suggestive of a cyst; 37 x 35 mm proximal pancreatic body mass slightly smaller in transverse dimension involving 90 degree area of the  celiac artery and 100 degree area of the splenic artery with soft tissue extending along the anterior right lateral wall of the portal vein; abnormal soft tissue insinuating between the lateral margin of the celiac artery and adjacent splenic vein; invasion and focal occlusion of the proximal most aspect of the splenic vein; concentric wall thickening present within the distal sigmoid colon/rectum with infectious and inflammatory etiologies possible.   The patient met with Dr Valentin on 10/16/24 whom felt the patient could receive a few more cycles of chemo and then transition to perioperative radiation therapy.   The patient's case was discussed with Dr. Valentin and .  Plan is to proceed with up to 12 cycles FOLFIRINOX prior to consideration of radiation versus surgical resection.    Past Medical History:   Past Medical History:   Diagnosis Date    Arthritis, lumbar spine     hands    Diabetes mellitus     General anesthetics causing adverse effect in therapeutic use     following breast rediuct, hard time awakening  also had anxiety rxn to lidocain in dental ofc    GERD (gastroesophageal reflux disease)     Hypothyroidism 10/08/2014    Maternal anesthesia complication     epidural for 1st child went up instead of down; required intubation    Normocytic anemia 2024    Obesity (BMI 30-39.9) 10/08/2014    pancreatic Cancer         Thyroid disease     Thyroid nodule 10/08/2014       Past Surgical HIstory:   Past Surgical History:   Procedure Laterality Date    BREAST BIOPSY Left     b9    BREAST SURGERY      Reduction cause of a mass in left breast    c-sections x2       SECTION  3/26/81 & 85    Birth of two girls    COLONOSCOPY  2012         COLONOSCOPY N/A 2018    Procedure: COLONOSCOPY;  Surgeon: Francisco Mcclain MD;  Location: [facility];  Service: Endoscopy;  Laterality: N/A;    COLONOSCOPY N/A 10/24/2023    Procedure: COLONOSCOPY;  Surgeon: Francisco Mcclain MD;   Location: [facility] ENDO;  Service: Endoscopy;  Laterality: N/A;    ENDOSCOPIC ULTRASOUND OF UPPER GASTROINTESTINAL TRACT Left 7/5/2024    Procedure: ULTRASOUND, UPPER GI TRACT, ENDOSCOPIC;  Surgeon: Andrew Gordon MD;  Location: [facility];  Service: Endoscopy;  Laterality: Left;    ESOPHAGOGASTRODUODENOSCOPY N/A 7/5/2024    Procedure: EGD (ESOPHAGOGASTRODUODENOSCOPY);  Surgeon: Andrew Gordon MD;  Location: [facility];  Service: Endoscopy;  Laterality: N/A;    hysteroscopy with polypectomy      INCISIONAL BREAST BIOPSY Left 1996    benign; done at same time as reduction    INSERTION OF TUNNELED CENTRAL VENOUS CATHETER (CVC) WITH SUBCUTANEOUS PORT N/A 7/18/2024    Procedure: [id number];  Surgeon: Blanca Dillard MD;  Location: [facility];  Service: General;  Laterality: N/A;    TOTAL REDUCTION MAMMOPLASTY Bilateral 1996       Family History:   Family History   Problem Relation Name Age of Onset    Brain cancer Mother Ravi Tolbert     Early death Mother Ravi Gill Macey Tomasz 51        Passed at 51    Cancer Mother Ravi Choudhury Tomasz         Brain tumor    Arthritis Mother Ravi Tolbert     Diabetes Father Ck pham tomasz         Type 2    Cirrhosis Father Ck pham tomasz     Cancer Father Ck ankit tolbert         Bone    COPD Father Ck ankit tolbert         Smoker    Early death Father Ck tolbert         Passed at 64    Lung cancer Father Ck pham tomasz     Heart disease Sister Mamta (dianna)wescovich         Congestive heart failure (passed 2/11/18    Hypertension Sister Mamta (dianna)wescovich     Kidney disease Sister Mamta (dianna)wescovich         Doc removed when she was a child    Hypertension Sister Mayank     Depression Sister Ravi (mayank) macey Mauricio ( sister)         Due to loss of her 27 year old son    Early death Sister Ravi (mayank) macey Mauricio ( sister)          Pulmonary hypertension, cirrhosis of the liver(passed 7/14/2007   Age 57    Heart disease Brother John Choudhury ( passed )         Heart attack    Hypertension Brother John Choudhury ( passed )     Heart disease Brother Juan Francisco Choudhury         Heart  blockage    Heart disease Brother Bhanu Choudhury         Heart attack (passed)    Diabetes Brother Bhanu Choudhury     Macular degeneration Brother Bhanu Choudhury     Breast cancer Maternal Aunt  30    Breast cancer Paternal Aunt  40    Breast cancer Paternal Aunt  40    Ovarian cancer Neg Hx         Social History:  reports that she has never smoked. She has never used smokeless tobacco. She reports that she does not currently use alcohol. She reports that she does not use drugs.    Allergies:  Review of patient's allergies indicates:   Allergen Reactions    Duricef [cefadroxil] Hives       Medications:  Current Outpatient Medications   Medication Sig Dispense Refill    acetaminophen (TYLENOL) 325 MG tablet Take 325 mg by mouth every 6 (six) hours as needed for Pain.      blood-glucose meter Misc Use as directed 1 each prn    duke's soln (benadryl 30 mL, mylanta 30 mL, LIDOcaine 30 mL, nystatin 30 mL) 120mL 10 ml swish & spit/swallow every 4 to 6 hours as needed for mouth pain/ulcers/yeast/throat pain 240 mL 1    fluticasone propionate (FLONASE) 50 mcg/actuation nasal spray 1 spray (50 mcg total) by Each Nostril route once daily. 18.2 mL 0    levothyroxine (SYNTHROID) 50 MCG tablet Take 1 tablet (50 mcg total) by mouth once daily. 90 tablet 3    lipase-protease-amylase 24,000-76,000-120,000 units (CREON) 24,000-76,000 -120,000 unit capsule Take 2 capsules by mouth 3 (three) times daily with meals. 180 capsule 11    loperamide (IMODIUM) 2 mg capsule Take 2 tablets (4mg) by mouth after first loose stool, 1 tablet every 2 hours until diarrhea free for 12 hours. May take 2 tablets (4mg) by mouth every 4 hours at night. May require more than the package labeling maximum dose of  16mg/day. 30 capsule 11    loratadine (CLARITIN) 10 mg tablet Take 1 tablet (10 mg total) by mouth every morning. 30 tablet 11    losartan (COZAAR) 25 MG tablet Take 1 tablet (25 mg total) by mouth as needed (take for bp equal or greater than 120/70).      meclizine (ANTIVERT) 25 mg tablet Take 1 tablet (25 mg total) by mouth 3 (three) times daily as needed for Dizziness. 30 tablet 0    mupirocin (BACTROBAN) 2 % ointment Apply topically 3 (three) times daily.      OLANZapine (ZYPREXA) 5 MG tablet Take 1 tablet by mouth nightly on days 1-3 of each chemotherapy cycle. 6 tablet 11    potassium chloride SA (K-DUR,KLOR-CON) 20 MEQ tablet Take 20 mEq by mouth once.      prochlorperazine (COMPAZINE) 5 MG tablet Take 1 tablet (5 mg total) by mouth every 6 (six) hours as needed for Nausea. 20 tablet 5    simvastatin (ZOCOR) 10 MG tablet Take 1 tablet (10 mg total) by mouth every evening. 90 tablet 3    SYNJARDY XR 10-1,000 mg TBph TAKE ONE TABLET BY MOUTH ONCE DAILY 30 tablet 5    TRUE METRIX GLUCOSE TEST STRIP Strp USE 1 STRIP ONCE DAILY TO TEST BLOOD GLUCOSE (50 DAY SUPPLY) 100 each 3    TRUEPLUS LANCETS 33 gauge Misc USE AS DIRECTED TO TEST BLOOD SUGAR ONCE DAILY FOR UP  USES 100 each 2    VITAMIN D2 1,250 mcg (50,000 unit) capsule TAKE 1 CAPSULE (50,000 UNITS TOTAL) BY MOUTH TWICE A WEEK. 24 capsule 3     No current facility-administered medications for this visit.     Facility-Administered Medications Ordered in Other Visits   Medication Dose Route Frequency Provider Last Rate Last Admin    0.9% NaCl 250 mL flush bag   Intravenous 1 time in Clinic/HOD Salud Martin FNP        alteplase injection 2 mg  2 mg Intra-Catheter PRN Arash Fuchs NP        atropine injection 0.4 mg  0.4 mg Intravenous Once PRN Salud Martin FNP        D5W 250 mL flush bag   Intravenous 1 time in Clinic/HOD Salud Martin FNP        diphenhydrAMINE injection 50 mg  50 mg Intravenous Once PRN Arash Fuchs,  NP        diphenhydrAMINE injection 50 mg  50 mg Intravenous Once PRN Salud Martin FNP        EPINEPHrine (EPIPEN) 0.3 mg/0.3 mL pen injection 0.3 mg  0.3 mg Intramuscular Once PRN Arash Fuchs, ADI        EPINEPHrine (EPIPEN) 0.3 mg/0.3 mL pen injection 0.3 mg  0.3 mg Intramuscular Once PRN Salud Martin FNP        fluorouracil (Adrucil) 1,920 mg/m2 = 3,015 mg in 0.9% NaCl 100 mL chemo infusion  1,920 mg/m2 Intravenous over 46 hr Salud Martin FNP        fluorouraciL injection 500 mg  320 mg/m2 Intravenous 1 time in Clinic/[facility] Salud Martin FNP        heparin, porcine (PF) 100 unit/mL injection flush 500 Units  500 Units Intravenous PRN Arash Fuchs, NP        hydrocortisone sodium succinate injection 100 mg  100 mg Intravenous Once PRN Arash Fuchs, ADI        hydrocortisone sodium succinate injection 100 mg  100 mg Intravenous Once PRN Salud Martin FNP        irinotecan (CAMPTOSAR) 140 mg/m2 = 220 mg in 0.9% NaCl 511 mL chemo infusion  140 mg/m2 Intravenous 1 time in Clinic/[facility] Salud Martin FNP        leucovorin calcium 320 mg/m2 = 500 mg in D5W 250 mL infusion  320 mg/m2 Intravenous 1 time in Clinic/[facility] Salud Martin FNP        oxaliplatin (ELOXATIN) 65 mg/m2 = 102 mg in D5W 520.4 mL chemo infusion  65 mg/m2 Intravenous 1 time in Clinic/[facility] Salud Martin FNP        palonosetron 0.25mg/dexAMETHasone 12mg in NS IVPB 0.25 mg 50 mL  0.25 mg Intravenous 1 time in Clinic/[facility] Salud Martin FNP        prochlorperazine injection Soln 5 mg  5 mg Intravenous Once PRN Arash Fuchs, ADI        prochlorperazine injection Soln 5 mg  5 mg Intravenous Once PRN Salud Martin FNP        sodium chloride 0.9% flush 10 mL  10 mL Intravenous PRN Arash Fuchs, NP        sodium chloride 0.9% flush 10 mL  10 mL Intravenous PRN Martin, Salud Hernán, FNP           Review of Systems   Constitutional:  Negative for appetite  "change, chills, fatigue, fever and unexpected weight change.   HENT:  Positive for mouth sores (mild to bilateral cheeks).    Eyes:  Negative for visual disturbance.   Respiratory:  Negative for cough and shortness of breath.    Cardiovascular:  Negative for chest pain and palpitations.   Gastrointestinal:  Negative for abdominal pain, constipation, diarrhea, nausea and vomiting.        Green colored stool attributing to eating broccoli soup and not taking Creon with each meal.   Genitourinary:  Negative for frequency.   Musculoskeletal:  Negative for back pain.   Skin:  Negative for rash.   Neurological:  Negative for numbness and headaches.        Intermittent neuropathy to bilateral lower extremities   Hematological:  Negative for adenopathy. Does not bruise/bleed easily.   Psychiatric/Behavioral:  The patient is not nervous/anxious.        ECOG Performance Status: 0   Objective:      Vitals:   Vitals:    12/11/24 0909   BP: 122/73   BP Location: Left arm   Patient Position: Sitting   Pulse: 83   Resp: 17   Temp: 98.3 °F (36.8 °C)   TempSrc: Temporal   SpO2: 98%   Weight: 57.4 kg (126 lb 8.7 oz)   Height: 5' 1" (1.549 m)       Wt Readings from Last 3 Encounters:   12/11/24 57.4 kg (126 lb 8.7 oz)   12/11/24 57.4 kg (126 lb 8.7 oz)   12/06/24 57.3 kg (126 lb 5.2 oz)     Physical Exam  Constitutional:       General: She is not in acute distress.     Appearance: She is well-developed. She is not diaphoretic.   HENT:      Head: Normocephalic and atraumatic.      Comments: Alopecia     Mouth/Throat:      Lips: No lesions.      Mouth: Mucous membranes are moist. Oral lesions (small, oral lesion near upper back molars consistent with mucositis) present.      Tongue: No lesions.      Pharynx: Oropharynx is clear.   Cardiovascular:      Rate and Rhythm: Normal rate and regular rhythm.      Heart sounds: Normal heart sounds. No murmur heard.     No friction rub. No gallop.   Pulmonary:      Effort: Pulmonary effort is " normal. No respiratory distress.      Breath sounds: Normal breath sounds. No wheezing or rales.   Chest:      Chest wall: No tenderness.   Abdominal:      General: Bowel sounds are normal. There is no distension.      Palpations: Abdomen is soft. There is no mass.      Tenderness: There is no abdominal tenderness. There is no guarding or rebound.   Musculoskeletal:      Comments: PORT in right chest   Lymphadenopathy:      Cervical: No cervical adenopathy.      Upper Body:      Right upper body: No supraclavicular or axillary adenopathy.      Left upper body: No supraclavicular or axillary adenopathy.   Skin:     Findings: No erythema or rash.   Neurological:      Mental Status: She is alert and oriented to person, place, and time.   Psychiatric:         Behavior: Behavior normal.         Laboratory Data:  Lab Visit on 12/11/2024   Component Date Value Ref Range Status    WBC 12/11/2024 8.30  3.90 - 12.70 K/uL Final    RBC 12/11/2024 3.43 (L)  4.00 - 5.40 M/uL Final    Hemoglobin 12/11/2024 11.3 (L)  12.0 - 16.0 g/dL Final    Hematocrit 12/11/2024 34.3 (L)  37.0 - 48.5 % Final    MCV 12/11/2024 100 (H)  82 - 98 fL Final    MCH 12/11/2024 32.9 (H)  27.0 - 31.0 pg Final    MCHC 12/11/2024 32.9  32.0 - 36.0 g/dL Final    RDW 12/11/2024 15.2 (H)  11.5 - 14.5 % Final    Platelets 12/11/2024 103 (L)  150 - 450 K/uL Final    MPV 12/11/2024 9.5  9.2 - 12.9 fL Final    Immature Granulocytes 12/11/2024 2.2 (H)  0.0 - 0.5 % Final    Gran # (ANC) 12/11/2024 4.9  1.8 - 7.7 K/uL Final    Immature Grans (Abs) 12/11/2024 0.18 (H)  0.00 - 0.04 K/uL Final    Comment: Mild elevation in immature granulocytes is non specific and   can be seen in a variety of conditions including stress response,   acute inflammation, trauma and pregnancy. Correlation with other   laboratory and clinical findings is essential.      Lymph # 12/11/2024 2.3  1.0 - 4.8 K/uL Final    Mono # 12/11/2024 0.8  0.3 - 1.0 K/uL Final    Eos # 12/11/2024 0.1  0.0 -  0.5 K/uL Final    Baso # 12/11/2024 0.03  0.00 - 0.20 K/uL Final    nRBC 12/11/2024 0  0 /100 WBC Final    Gran % 12/11/2024 59.3  38.0 - 73.0 % Final    Lymph % 12/11/2024 28.1  18.0 - 48.0 % Final    Mono % 12/11/2024 9.0  4.0 - 15.0 % Final    Eosinophil % 12/11/2024 1.0  0.0 - 8.0 % Final    Basophil % 12/11/2024 0.4  0.0 - 1.9 % Final    Platelet Estimate 12/11/2024 Decreased (A)   Final    Differential Method 12/11/2024 Automated   Final    Sodium 12/11/2024 139  136 - 145 mmol/L Final    Potassium 12/11/2024 4.7  3.5 - 5.1 mmol/L Final    Chloride 12/11/2024 106  95 - 110 mmol/L Final    CO2 12/11/2024 22 (L)  23 - 29 mmol/L Final    Glucose 12/11/2024 121 (H)  70 - 110 mg/dL Final    BUN 12/11/2024 8  8 - 23 mg/dL Final    Creatinine 12/11/2024 0.7  0.5 - 1.4 mg/dL Final    Calcium 12/11/2024 9.2  8.7 - 10.5 mg/dL Final    Total Protein 12/11/2024 6.7  6.0 - 8.4 g/dL Final    Albumin 12/11/2024 3.8  3.5 - 5.2 g/dL Final    Total Bilirubin 12/11/2024 0.5  0.1 - 1.0 mg/dL Final    Comment: For infants and newborns, interpretation of results should be based  on gestational age, weight and in agreement with clinical  observations.    Premature Infant recommended reference ranges:  Up to 24 hours.............<8.0 mg/dL  Up to 48 hours............<12.0 mg/dL  3-5 days..................<15.0 mg/dL  6-29 days.................<15.0 mg/dL      Alkaline Phosphatase 12/11/2024 148  40 - 150 U/L Final    AST 12/11/2024 20  10 - 40 U/L Final    ALT 12/11/2024 12  10 - 44 U/L Final    eGFR 12/11/2024 >60.0  >60 mL/min/1.73 m^2 Final    Anion Gap 12/11/2024 11  8 - 16 mmol/L Final   Lab Visit on 12/06/2024   Component Date Value Ref Range Status    WBC 12/06/2024 7.67  3.90 - 12.70 K/uL Final    RBC 12/06/2024 3.32 (L)  4.00 - 5.40 M/uL Final    Hemoglobin 12/06/2024 10.9 (L)  12.0 - 16.0 g/dL Final    Hematocrit 12/06/2024 33.2 (L)  37.0 - 48.5 % Final    MCV 12/06/2024 100 (H)  82 - 98 fL Final    MCH 12/06/2024 32.8 (H)   27.0 - 31.0 pg Final    MCHC 12/06/2024 32.8  32.0 - 36.0 g/dL Final    RDW 12/06/2024 14.2  11.5 - 14.5 % Final    Platelets 12/06/2024 46 (L)  150 - 450 K/uL Final    MPV 12/06/2024 11.0  9.2 - 12.9 fL Final    Immature Granulocytes 12/06/2024 1.4 (H)  0.0 - 0.5 % Final    Gran # (ANC) 12/06/2024 3.9  1.8 - 7.7 K/uL Final    Immature Grans (Abs) 12/06/2024 0.11 (H)  0.00 - 0.04 K/uL Final    Comment: Mild elevation in immature granulocytes is non specific and   can be seen in a variety of conditions including stress response,   acute inflammation, trauma and pregnancy. Correlation with other   laboratory and clinical findings is essential.      Lymph # 12/06/2024 2.7  1.0 - 4.8 K/uL Final    Mono # 12/06/2024 0.8  0.3 - 1.0 K/uL Final    Eos # 12/06/2024 0.0  0.0 - 0.5 K/uL Final    Baso # 12/06/2024 0.03  0.00 - 0.20 K/uL Final    nRBC 12/06/2024 0  0 /100 WBC Final    Gran % 12/06/2024 51.4  38.0 - 73.0 % Final    Lymph % 12/06/2024 35.7  18.0 - 48.0 % Final    Mono % 12/06/2024 10.6  4.0 - 15.0 % Final    Eosinophil % 12/06/2024 0.5  0.0 - 8.0 % Final    Basophil % 12/06/2024 0.4  0.0 - 1.9 % Final    Platelet Estimate 12/06/2024 Decreased (A)   Final    Aniso 12/06/2024 Slight   Final    Toxic Granulation 12/06/2024 Present   Final    Differential Method 12/06/2024 Automated   Final    Sodium 12/06/2024 141  136 - 145 mmol/L Final    Potassium 12/06/2024 4.3  3.5 - 5.1 mmol/L Final    Chloride 12/06/2024 107  95 - 110 mmol/L Final    CO2 12/06/2024 26  23 - 29 mmol/L Final    Glucose 12/06/2024 98  70 - 110 mg/dL Final    BUN 12/06/2024 3 (L)  8 - 23 mg/dL Final    Creatinine 12/06/2024 0.7  0.5 - 1.4 mg/dL Final    Calcium 12/06/2024 9.5  8.7 - 10.5 mg/dL Final    Total Protein 12/06/2024 7.1  6.0 - 8.4 g/dL Final    Albumin 12/06/2024 4.0  3.5 - 5.2 g/dL Final    Total Bilirubin 12/06/2024 0.6  0.1 - 1.0 mg/dL Final    Comment: For infants and newborns, interpretation of results should be based  on  gestational age, weight and in agreement with clinical  observations.    Premature Infant recommended reference ranges:  Up to 24 hours.............<8.0 mg/dL  Up to 48 hours............<12.0 mg/dL  3-5 days..................<15.0 mg/dL  6-29 days.................<15.0 mg/dL      Alkaline Phosphatase 12/06/2024 145  40 - 150 U/L Final    AST 12/06/2024 20  10 - 40 U/L Final    ALT 12/06/2024 14  10 - 44 U/L Final    eGFR 12/06/2024 >60.0  >60 mL/min/1.73 m^2 Final    Anion Gap 12/06/2024 8  8 - 16 mmol/L Final    Cholesterol 12/06/2024 126  120 - 199 mg/dL Final    Comment: The National Cholesterol Education Program (NCEP) has set the  following guidelines (reference ranges) for Cholesterol:  Optimal.....................<200 mg/dL  Borderline High.............200-239 mg/dL  High........................> or = 240 mg/dL      Triglycerides 12/06/2024 54  30 - 150 mg/dL Final    Comment: The National Cholesterol Education Program (NCEP) has set the  following guidelines (reference values) for triglycerides:  Normal......................<150 mg/dL  Borderline High.............150-199 mg/dL  High........................200-499 mg/dL      HDL 12/06/2024 70  40 - 75 mg/dL Final    Comment: The National Cholesterol Education Program (NCEP) has set the  following guidelines (reference values) for HDL Cholesterol:  Low...............<40 mg/dL  Optimal...........>60 mg/dL      LDL Cholesterol 12/06/2024 45.2 (L)  63.0 - 159.0 mg/dL Final    Comment: The National Cholesterol Education Program (NCEP) has set the  following guidelines (reference values) for LDL Cholesterol:  Optimal.......................<130 mg/dL  Borderline High...............130-159 mg/dL  High..........................160-189 mg/dL  Very High.....................>190 mg/dL      HDL/Cholesterol Ratio 12/06/2024 55.6 (H)  20.0 - 50.0 % Final    Total Cholesterol/HDL Ratio 12/06/2024 1.8 (L)  2.0 - 5.0 Final    Non-HDL Cholesterol 12/06/2024 56  mg/dL Final     Comment: Risk category and Non-HDL cholesterol goals:  Coronary heart disease (CHD)or equivalent (10-year risk of CHD >20%):  Non-HDL cholesterol goal     <130 mg/dL  Two or more CHD risk factors and 10-year risk of CHD <= 20%:  Non-HDL cholesterol goal     <160 mg/dL  0 to 1 CHD risk factor:  Non-HDL cholesterol goal     <190 mg/dL      TSH 12/06/2024 2.118  0.400 - 4.000 uIU/mL Final     CA 19-9 pending    Imaging:     Ct C/A/P 10/02/24  Chest:     There is a right chest chemotherapy port present with the tip of its catheter noted in the SVC. The thyroid gland enhances homogeneously. There is atherosclerotic calcification present within the thoracic aortic arch and left subclavian artery. No thoracic aortic aneurysm or dissection. No pericardial fluid. No pathologically enlarged lymph nodes in the chest or axilla. The trachea and mainstem bronchi are unremarkable. There is a new 5 mm ground-glass nodule present in the right lower lobe (series 301, image 123). No emphysematous lung architecture or bronchiectasis. There is biapical pleuro-parenchymal scarring.. No dense airspace consolidation, pleural fluid, or pneumothorax.     Abdomen and pelvis:     There is diffuse hepatic steatosis. There is an 8 mm hypodensity present within the right hepatic lobe on series 3, image 128, possibly a cyst but too small to characterize and unchanged when compared to the prior study. No new hyperenhancing hepatic mass observed in the liver on the early arterial phase images. There are calcified gallstones present in the gallbladder lumen. No gallbladder inflammatory change. The spleen is unremarkable. The stomach, duodenal C-loop, and adrenal glands are unremarkable.     The most significant abnormality relates to the presence of an approximately 37 x 35 mm proximal pancreatic body mass, slightly smaller in transverse dimension when compared with the previous examination. The mass involves a 90° area the celiac artery and a 180°  area of the splenic artery on series 2 images 76-81. There is also soft tissue extending along the anterior and right lateral wall of the portal vein on series 2, image 78 which may represent tumor infiltrating the retroperitoneal soft tissue planes adjacent to the portal vein/portal confluence. There is also abnormal soft tissue insinuating between the left lateral margin of the celiac artery and adjacent splenic vein on series 2, image 77. As best appreciated on series 2, image 84 and series 601, image 59, there is invasion and focal occlusion of the proximal most aspect of the splenic vein. There is diffuse dilatation of the main pancreatic duct within the pancreatic body and tail and there is complete atrophy of the mid and distal pancreatic body and tail.     The abdominal aorta is not aneurysmal. There is minimal calcified atherosclerotic plaque deposition noted within the distal infrarenal abdominal aorta. No bulky periaortic or retroperitoneal lymphadenopathy appreciated. The kidneys show no evidence of stones, hydronephrosis, or solid enhancing mass. The ureters are normal in caliber and course without stones. The urinary bladder shows no significant abnormalities. The uterus and ovaries show no significant abnormalities.     The appendix is unremarkable. No evidence of high-grade bowel obstruction. There is concentric wall thickening present within the distal sigmoid colon/rectum on series 3, image 218. Infectious and inflammatory etiologies of proctocolitis are possible. No ascites. No pneumoperitoneum. No inguinal hernia or inguinal lymphadenopathy detected. No mesenteric adenopathy or new peritoneal soft tissue mass.     Bones: No evidence of acute thoracolumbar compression fracture. No imaging evidence of lytic or blastic osseous metastatic disease on the provided images.       Assessment:       1. Malignant neoplasm of body of pancreas    2. 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therapy    3. Pulmonary nodule     4. Thrombocytopenia    5. Hypokalemia    6. Pancreatic insufficiency    7. Hypothyroidism, unspecified type    8. Type 2 diabetes mellitus without complication, without long-term current use of insulin    9. Mucositis         Plan:   Pancreatic Cancer - Patient with Stage III pancreatic cancer with concern for potential encasement of the distal celiac artery per discussion with Dr Valentin  -No sign of mets  - 94.5 on 6/17/24  -Case discussed at tumor board 7/17/24 with recommendation to proceed with chemo  -[employer] genetic testing negative for the genes tested  -[facility] tissue testing showed TP53, KRAS p.G12D, CDKN2A, CDKN2B mutations.  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 Rascon on 1/22/25 for simulation   -Pt to go to [facility] 12/09/24 for 's f/u  -Will proceed with cycle 10 FOLFIRINOX  -Plan to complete up to 12 cycles of FOLFIRINOX with repeat scans after cycle 12       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 - pt at increased risk of infection  -No current signs of infection  -Will monitor    Mucositis - due to chemo  -using duke's solution with effectiveness  -Will monitor     Pulmonary Nodule - pt with RLL on CT C/A/P 10/02/24  -5mm and described as ground glass  -Will monitor on repeat imaging    Thrombocytopenia - platelets 103k on 12/11/24  -Due to chemo  -Will monitor     Hypokalemia - 4.3 on 12/9/24  -Pt to continue potassium chloride     Pancreatic Insufficiency - Pt taking Creon  -Diarrhea improved  -Endorses she does forget to take with each meal  -Will monitor     Hypothyroidism - pt on synthroid  -Will monitror      DMII - Pt currently on Modafirma  Lab Results   Component Value Date    HGBA1C 7.0 (H) 09/20/2024   -Possibly due to pancreatic cancer  -Will monitor    Route Chart for Scheduling    Med Onc Chart Routing      Follow up with physician . Patient will need CBC, CMP, mag and CA 19-9 (standing) on 1/6/26 then to see Dr. Hunt followed by C12 of FOLOFIRINOX as scheduled. D/C pump on day 3 and Neulasta on 1/9/26.   Follow up with [facility] . Proceed with C10 FOLFIRINOX. 12/16/24 - Neulasta. 12/23/24 Patient will need CBC, CMP, mag and  (standing) then see Randy followed by C11 of FOLOFIRINOX same day (as scheduled), d/c pump day3 then Neulasta 12/26/24.   Infusion scheduling note    Injection scheduling note    Labs    Imaging    Pharmacy appointment    Other referrals              Treatment Plan Information   OP GI FOLFIRINOX (oxaliplatin, leucovorin, irinotecan, fluorouracil) Q2W  Rajat Hunt MD   Associated diagnosis: Malignant neoplasm of pancreas Stage III cT4, cN0, cM0 noted on 7/12/2024   Line of treatment: Neoadjuvant  Treatment Goal: Curative     Upcoming Treatment Dates - OP GI FOLFIRINOX (oxaliplatin, leucovorin, irinotecan, fluorouracil) Q2W     12/24/2024       Chemotherapy       oxaliplatin (ELOXATIN) in D5W 520.4 mL chemo infusion       leucovorin calcium in D5W 250 mL infusion       irinotecan (CAMPTOSAR) in 0.9% NaCl 511 mL chemo infusion       fluorouraciL injection       fluorouracil chemo infusion       Antiemetics       palonosetron 0.25mg/dexAMETHasone 12mg in NS IVPB 0.25 mg 50 mL  1/7/2025       Chemotherapy       oxaliplatin (ELOXATIN) in D5W 520.4 mL chemo infusion       leucovorin calcium in D5W 250 mL infusion       irinotecan (CAMPTOSAR) in 0.9% NaCl 511 mL chemo infusion       fluorouraciL injection       fluorouracil chemo infusion       Antiemetics       palonosetron 0.25mg/dexAMETHasone 12mg in NS IVPB 0.25 mg 50 mL  1/21/2025       Chemotherapy       oxaliplatin (ELOXATIN) in D5W  520.4 mL chemo infusion       leucovorin calcium in D5W 250 mL infusion       irinotecan (CAMPTOSAR) in 0.9% NaCl 511 mL chemo infusion       fluorouraciL injection       fluorouracil chemo infusion       Antiemetics       palonosetron 0.25mg/dexAMETHasone 12mg in NS IVPB 0.25 mg 50 mL  2/4/2025       Chemotherapy       oxaliplatin (ELOXATIN) in D5W 520.4 mL chemo infusion       leucovorin calcium in D5W 250 mL infusion       irinotecan (CAMPTOSAR) in 0.9% NaCl 511 mL chemo infusion       fluorouraciL injection       fluorouracil chemo infusion       Antiemetics       palonosetron 0.25mg/dexAMETHasone 12mg in NS IVPB 0.25 mg 50 mL    Therapy Plan Information  PORT FLUSH for Malignant neoplasm of pancreas, noted on 7/12/2024  Flushes  heparin, porcine (PF) 100 unit/mL injection flush 500 Units  500 Units, Intravenous, Every visit  sodium chloride 0.9% flush 10 mL  10 mL, Intravenous, Every visit      No therapy plan of the specified type found.    No therapy plan of the specified type found.      Visit today included increased complexity associated with the care of the episodic problem (chemotherapy) addressed and managing the longitudinal care of the patient due to the serious and/or complex managed problem(s) pancreatic cancer.      KASIE Adair-C  Nurse Practitioner, Hem-Onc  [facility]

## 2024-12-12 ENCOUNTER — DOCUMENTATION ONLY (OUTPATIENT)
Dept: INFUSION THERAPY | Facility: HOSPITAL | Age: 64
End: 2024-12-12
Payer: MEDICARE

## 2024-12-12 NOTE — PROGRESS NOTES
SW met with pt briefly and provided a gas card. No other needs identified at this time.     Bharati Fritz, JOSSUE  12/12/2024  10:58 AM

## 2024-12-13 ENCOUNTER — INFUSION (OUTPATIENT)
Dept: INFUSION THERAPY | Facility: HOSPITAL | Age: 64
End: 2024-12-13
Attending: INTERNAL MEDICINE
Payer: MEDICARE

## 2024-12-13 DIAGNOSIS — C25.9 MALIGNANT NEOPLASM OF PANCREAS, UNSPECIFIED LOCATION OF MALIGNANCY: Primary | ICD-10-CM

## 2024-12-13 PROCEDURE — A4216 STERILE WATER/SALINE, 10 ML: HCPCS | Mod: PN | Performed by: NURSE PRACTITIONER

## 2024-12-13 PROCEDURE — 25000003 PHARM REV CODE 250: Mod: PN | Performed by: NURSE PRACTITIONER

## 2024-12-13 RX ORDER — HEPARIN 100 UNIT/ML
500 SYRINGE INTRAVENOUS
OUTPATIENT
Start: 2024-12-13

## 2024-12-13 RX ORDER — PROCHLORPERAZINE EDISYLATE 5 MG/ML
5 INJECTION INTRAMUSCULAR; INTRAVENOUS ONCE AS NEEDED
Status: DISCONTINUED | OUTPATIENT
Start: 2024-12-13 | End: 2024-12-13 | Stop reason: HOSPADM

## 2024-12-13 RX ORDER — SODIUM CHLORIDE 0.9 % (FLUSH) 0.9 %
10 SYRINGE (ML) INJECTION
Status: DISCONTINUED | OUTPATIENT
Start: 2024-12-13 | End: 2024-12-13 | Stop reason: HOSPADM

## 2024-12-13 RX ORDER — SODIUM CHLORIDE 0.9 % (FLUSH) 0.9 %
10 SYRINGE (ML) INJECTION
OUTPATIENT
Start: 2024-12-13

## 2024-12-13 RX ORDER — HEPARIN 100 UNIT/ML
500 SYRINGE INTRAVENOUS
Status: DISCONTINUED | OUTPATIENT
Start: 2024-12-13 | End: 2024-12-13 | Stop reason: HOSPADM

## 2024-12-13 RX ADMIN — SODIUM CHLORIDE, PRESERVATIVE FREE 10 ML: 5 INJECTION INTRAVENOUS at 02:12

## 2024-12-16 ENCOUNTER — INFUSION (OUTPATIENT)
Dept: INFUSION THERAPY | Facility: HOSPITAL | Age: 64
End: 2024-12-16
Attending: INTERNAL MEDICINE
Payer: MEDICARE

## 2024-12-16 ENCOUNTER — PATIENT MESSAGE (OUTPATIENT)
Dept: HEMATOLOGY/ONCOLOGY | Facility: CLINIC | Age: 64
End: 2024-12-16
Payer: MEDICARE

## 2024-12-16 VITALS
OXYGEN SATURATION: 99 % | SYSTOLIC BLOOD PRESSURE: 132 MMHG | HEART RATE: 92 BPM | RESPIRATION RATE: 18 BRPM | TEMPERATURE: 98 F | DIASTOLIC BLOOD PRESSURE: 79 MMHG

## 2024-12-16 DIAGNOSIS — C25.9 MALIGNANT NEOPLASM OF PANCREAS, UNSPECIFIED LOCATION OF MALIGNANCY: Primary | ICD-10-CM

## 2024-12-16 PROCEDURE — 63600175 PHARM REV CODE 636 W HCPCS: Mod: JZ,JG,PN | Performed by: NURSE PRACTITIONER

## 2024-12-16 PROCEDURE — 96372 THER/PROPH/DIAG INJ SC/IM: CPT | Mod: PN

## 2024-12-16 RX ADMIN — PEGFILGRASTIM 6 MG: 6 INJECTION SUBCUTANEOUS at 01:12

## 2024-12-18 ENCOUNTER — PATIENT MESSAGE (OUTPATIENT)
Dept: PRIMARY CARE CLINIC | Facility: CLINIC | Age: 64
End: 2024-12-18
Payer: MEDICARE

## 2024-12-18 NOTE — TELEPHONE ENCOUNTER
Refill Routing Note   Medication(s) are not appropriate for processing by [facility] for the following reason(s):        Non-participating provider  Outside of protocol    [facility] action(s):  Route               Appointments  past 12m or future 3m with PCP    Date Provider   Last Visit   12/6/2024 Dayan Jimenez NP   Next Visit   3/10/2025 Dayan Jimenez NP   ED visits in past 90 days: 0        Note composed:2:52 PM 12/18/2024

## 2024-12-19 RX ORDER — ERGOCALCIFEROL 1.25 MG/1
50000 CAPSULE ORAL
Qty: 24 CAPSULE | Refills: 3 | Status: SHIPPED | OUTPATIENT
Start: 2024-12-19

## 2024-12-24 ENCOUNTER — TELEPHONE (OUTPATIENT)
Dept: FAMILY MEDICINE | Facility: CLINIC | Age: 64
End: 2024-12-24
Payer: MEDICARE

## 2024-12-28 NOTE — PROGRESS NOTES
PATIENT: Bessy Lamb  MRN: [id number]  DATE: 12/30/2024      Diagnosis:   1. Malignant neoplasm of body of pancreas    2. Pancreatic adenocarcinoma    3. 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therapy    4. Thrombocytopenia    5. Pulmonary nodule    6. Hypokalemia    7. Pancreatic insufficiency    8. Hypothyroidism, unspecified type    9. Type 2 diabetes mellitus without complication, without long-term current use of insulin                    Chief Complaint: Malignant neoplasm of pancreas, unspecified location of mal (2 week follow up )      Oncologic History:      Oncologic History     Oncologic Treatment     Pathology           Subjective:    Interval History:  Ms. Lamb is a 64 y.o. female with Arthritis, DMII, GERD, Hypotyroidism, Obesity, Thyroid disease who presents for pancreatic cancer.  Since the last clinic visit the patient endorses numbness in her toes which is worse at night.  The patient denies CP, cough, SOB, abdominal pain, nausea, vomiting, constipation, diarrhea.  The patient denies fever, chills, night sweats, weight loss, new lumps or bumps, easy bruising or bleeding.    Prior History:   The patient initially underwent a CXR on 11/13/23 fur a URI which showed possible subtle pulmonary nodules.  CT chest 12/01/2023 showed a cluster of ill-defined centrilobular ground-glass opacity he inferior right and left upper lobes as well as a 1.2 cm ground-glass opacity in the superior segment of the left lower lobe; solid 3 mm pulmonary nodule in the right upper lobe; subcentimeter right hepatic lobe hypodensity likely representing a cyst.  The patient underwent surveillance CT chest on 05/21/2024 showing a 3.7 x 2.7 pancreatic body mass in the pancreatic tail atrophy suspicious of pancreatic malignancy as well as and unchanged benign 3 mm nodule in the right upper lobe.  MRI abdomen on 06/30/2024 showed hypoenhancing mass centered in the proximal pancreatic body measuring 2.9 x 2 cm with abutment and  possible encasement of the distal splenic vein.  EUS was performed on 07/05/2024 mass in the pancreatic body stage T4 N0 M0 by endo sonographic criteria.  Pathology from biopsy of the stomach showed mild focally moderate chronic gastritis with no evidence of the H pylori.  Stomach polyp which was removed code hyperplastic polyps with chronic inflammation.  Pancreatic biopsy showed adenocarcinoma.  CT of the chest, abdomen, and pelvis on 07/09/2024 showed a 9 mm lesion in the right hepatic lobe previously characterized as cysts; mass in the pancreatic body measuring 4.4 x 3.7 cm with diffuse pancreatic ductal dilatation measuring 8 mm:  Weaning vein at the portal confluence occluded with reconstitution via collaterals.  The mass abuts the portal vein by less than 180° but does not case the proximal splenic artery remains patent.  Also noted was a pancreatic lymph node measuring 0.7 cm.   Patient's case was discussed at tumor board on 07/17/2024 with recommendation for proceeding with the chemotherapy.  Patient had port placement on 07/18/2024.   The patient is admitted to the hospital from 09/20/2024 until 09/22/2024 for hypokalemia with potassium of 2.3.  The patient was subsequently discharged underwent treatment with FOLFIRINOX on 09/23/2024.  CT of the chest, abdomen, and pelvis on 10/02/2024 showed a new 5 mm ground-glass nodule in the right lower lobe; stable 8 mm hypodensity present within the right hepatic lobe suggestive of a cyst; 37 x 35 mm proximal pancreatic body mass slightly smaller in transverse dimension involving 90 degree area of the celiac artery and 100 degree area of the splenic artery with soft tissue extending along the anterior right lateral wall of the portal vein; abnormal soft tissue insinuating between the lateral margin of the celiac artery and adjacent splenic vein; invasion and focal occlusion of the proximal most aspect of the splenic vein; concentric wall thickening present within  the distal sigmoid colon/rectum with infectious and inflammatory etiologies possible.   The patient met with Dr Valentin on 10/16/24 whom felt the patient could receive a few more cycles of chemo and then transition to perioperative radiation therapy.   The patient's case was discussed with Dr. Valentin and .  Plan is to proceed with up to 12 cycles FOLFIRINOX prior to consideration of radiation versus surgical resection.    Past Medical History:   Past Medical History:   Diagnosis Date    Arthritis, lumbar spine     hands    Diabetes mellitus     General anesthetics causing adverse effect in therapeutic use     following breast rediuct, hard time awakening  also had anxiety rxn to lidocain in dental ofc    GERD (gastroesophageal reflux disease)     Hypothyroidism 10/08/2014    Maternal anesthesia complication     epidural for 1st child went up instead of down; required intubation    Normocytic anemia 2024    Obesity (BMI 30-39.9) 10/08/2014    pancreatic Cancer         Thyroid disease     Thyroid nodule 10/08/2014       Past Surgical HIstory:   Past Surgical History:   Procedure Laterality Date    BREAST BIOPSY Left     b9    BREAST SURGERY      Reduction cause of a mass in left breast    c-sections x2       SECTION  3/26/81 & 85    Birth of two girls    COLONOSCOPY  2012         COLONOSCOPY N/A 2018    Procedure: COLONOSCOPY;  Surgeon: Francisco Mcclain MD;  Location: [facility];  Service: Endoscopy;  Laterality: N/A;    COLONOSCOPY N/A 10/24/2023    Procedure: COLONOSCOPY;  Surgeon: Francisco Mcclain MD;  Location: [facility];  Service: Endoscopy;  Laterality: N/A;    ENDOSCOPIC ULTRASOUND OF UPPER GASTROINTESTINAL TRACT Left 2024    Procedure: ULTRASOUND, UPPER GI TRACT, ENDOSCOPIC;  Surgeon: Andrew Gordon MD;  Location: [facility];  Service: Endoscopy;  Laterality: Left;    ESOPHAGOGASTRODUODENOSCOPY N/A 2024    Procedure: EGD (ESOPHAGOGASTRODUODENOSCOPY);   Surgeon: Andrew Gordon MD;  Location: [facility] ENDO;  Service: Endoscopy;  Laterality: N/A;    hysteroscopy with polypectomy      INCISIONAL BREAST BIOPSY Left 1996    benign; done at same time as reduction    INSERTION OF TUNNELED CENTRAL VENOUS CATHETER (CVC) WITH SUBCUTANEOUS PORT N/A 7/18/2024    Procedure: [id number];  Surgeon: Blanca Dillard MD;  Location: [facility];  Service: General;  Laterality: N/A;    TOTAL REDUCTION MAMMOPLASTY Bilateral 1996       Family History:   Family History   Problem Relation Name Age of Onset    Brain cancer Mother Ravi Tolbert     Early death Mother Ravi Tolbert 51        Passed at 51    Cancer Mother Ravi Tolbert         Brain tumor    Arthritis Mother Ravi Tolbert     Diabetes Father Ck pham tomasz         Type 2    Cirrhosis Father Ck [place]ent     Cancer Father Ck ankit tolbert         Bone    COPD Father Ck ankit tolbert         Smoker    Early death Father Ck tolbert         Passed at 64    Lung cancer Father Ck pham tomasz     Heart disease Sister Mamta (dianna)wescovich         Congestive heart failure (passed 2/11/18    Hypertension Sister Mamta (dianna)wescovich     Kidney disease Sister Mamta (dianna)wescovich         Doc removed when she was a child    Hypertension Sister Mayank     Depression Sister Ravi (mayank) macey Luly ( sister)         Due to loss of her 27 year old son    Early death Sister Ravi (mayank) macey Luly ( sister)         Pulmonary hypertension, cirrhosis of the liver(passed 7/14/2007   Age 57    Heart disease Brother John Choudhury ( passed )         Heart attack    Hypertension Brother John Choudhury ( passed )     Heart disease Brother Juan Francisco Macey         Heart  blockage    Heart disease Brother Bhanu Macey         Heart attack (passed)    Diabetes Brother Bhanu Macey     Macular  degeneration Brother Bhanu Choudhury     Breast cancer Maternal Aunt  30    Breast cancer Paternal Aunt  40    Breast cancer Paternal Aunt  40    Ovarian cancer Neg Hx         Social History:  reports that she has never smoked. She has never used smokeless tobacco. She reports that she does not currently use alcohol. She reports that she does not use drugs.    Allergies:  Review of patient's allergies indicates:   Allergen Reactions    Duricef [cefadroxil] Hives       Medications:  Current Outpatient Medications   Medication Sig Dispense Refill    acetaminophen (TYLENOL) 325 MG tablet Take 325 mg by mouth every 6 (six) hours as needed for Pain.      blood-glucose meter Misc Use as directed 1 each prn    duke's soln (benadryl 30 mL, mylanta 30 mL, LIDOcaine 30 mL, nystatin 30 mL) 120mL 10 ml swish & spit/swallow every 4 to 6 hours as needed for mouth pain/ulcers/yeast/throat pain 240 mL 1    ergocalciferol (VITAMIN D2) 50,000 unit Cap Take 1 capsule (50,000 Units total) by mouth twice a week. 24 capsule 3    fluticasone propionate (FLONASE) 50 mcg/actuation nasal spray 1 spray (50 mcg total) by Each Nostril route once daily. 18.2 mL 0    levothyroxine (SYNTHROID) 50 MCG tablet Take 1 tablet (50 mcg total) by mouth once daily. 90 tablet 3    lipase-protease-amylase 24,000-76,000-120,000 units (CREON) 24,000-76,000 -120,000 unit capsule Take 2 capsules by mouth 3 (three) times daily with meals. 180 capsule 11    loperamide (IMODIUM) 2 mg capsule Take 2 tablets (4mg) by mouth after first loose stool, 1 tablet every 2 hours until diarrhea free for 12 hours. May take 2 tablets (4mg) by mouth every 4 hours at night. May require more than the package labeling maximum dose of 16mg/day. 30 capsule 11    loratadine (CLARITIN) 10 mg tablet Take 1 tablet (10 mg total) by mouth every morning. 30 tablet 11    meclizine (ANTIVERT) 25 mg tablet Take 1 tablet (25 mg total) by mouth 3 (three) times daily as needed for Dizziness. 30 tablet  0    mupirocin (BACTROBAN) 2 % ointment Apply topically 3 (three) times daily.      OLANZapine (ZYPREXA) 5 MG tablet Take 1 tablet by mouth nightly on days 1-3 of each chemotherapy cycle. 6 tablet 11    potassium chloride SA (K-DUR,KLOR-CON) 20 MEQ tablet Take 20 mEq by mouth once.      prochlorperazine (COMPAZINE) 5 MG tablet Take 1 tablet (5 mg total) by mouth every 6 (six) hours as needed for Nausea. 20 tablet 5    simvastatin (ZOCOR) 10 MG tablet Take 1 tablet (10 mg total) by mouth every evening. 90 tablet 3    SYNJARDY XR 10-1,000 mg TBph TAKE ONE TABLET BY MOUTH ONCE DAILY 30 tablet 5    TRUE METRIX GLUCOSE TEST STRIP Strp USE 1 STRIP ONCE DAILY TO TEST BLOOD GLUCOSE (50 DAY SUPPLY) 100 each 3    TRUEPLUS LANCETS 33 gauge Misc USE AS DIRECTED TO TEST BLOOD SUGAR ONCE DAILY FOR UP  USES 100 each 2    losartan (COZAAR) 25 MG tablet Take 1 tablet (25 mg total) by mouth as needed (take for bp equal or greater than 120/70).       No current facility-administered medications for this visit.     Facility-Administered Medications Ordered in Other Visits   Medication Dose Route Frequency Provider Last Rate Last Admin    alteplase injection 2 mg  2 mg Intra-Catheter PRN Arash Fuchs NP        diphenhydrAMINE injection 50 mg  50 mg Intravenous Once PRN Arash Fuchs, ADI        EPINEPHrine (EPIPEN) 0.3 mg/0.3 mL pen injection 0.3 mg  0.3 mg Intramuscular Once PRN Arash Fuchs NP        heparin, porcine (PF) 100 unit/mL injection flush 500 Units  500 Units Intravenous PRN Arash Fuchs NP        hydrocortisone sodium succinate injection 100 mg  100 mg Intravenous Once PRN Arash Fuchs, NP        prochlorperazine injection Soln 5 mg  5 mg Intravenous Once PRN Arash Fuchs NP        sodium chloride 0.9% flush 10 mL  10 mL Intravenous PRN Arash Fuchs NP           Review of Systems   Constitutional:  Negative for appetite change, chills, fatigue, fever and unexpected weight change.   HENT:  Negative for mouth  "sores.    Eyes:  Negative for visual disturbance.   Respiratory:  Negative for cough and shortness of breath.    Cardiovascular:  Negative for chest pain and palpitations.   Gastrointestinal:  Negative for abdominal pain, constipation, diarrhea, nausea and vomiting.   Genitourinary:  Negative for frequency.   Musculoskeletal:  Negative for back pain.   Skin:  Negative for rash.   Neurological:  Positive for numbness (intermittetn in toes.). Negative for headaches.   Hematological:  Negative for adenopathy. Does not bruise/bleed easily.   Psychiatric/Behavioral:  The patient is not nervous/anxious.        ECOG Performance Status: 0   Objective:      Vitals:   Vitals:    12/30/24 0901   BP: 112/66   BP Location: Right arm   Patient Position: Sitting   Pulse: 80   Resp: 12   Temp: 97.9 °F (36.6 °C)   TempSrc: Temporal   SpO2: 96%   Weight: 56.9 kg (125 lb 7.1 oz)   Height: 5' 1" (1.549 m)                   Physical Exam  Constitutional:       General: She is not in acute distress.     Appearance: She is well-developed. She is not diaphoretic.   HENT:      Head: Normocephalic and atraumatic.      Comments: Alopecia  Cardiovascular:      Rate and Rhythm: Normal rate and regular rhythm.      Heart sounds: Normal heart sounds. No murmur heard.     No friction rub. No gallop.   Pulmonary:      Effort: Pulmonary effort is normal. No respiratory distress.      Breath sounds: Normal breath sounds. No wheezing or rales.   Chest:      Chest wall: No tenderness.   Abdominal:      General: Bowel sounds are normal. There is no distension.      Palpations: Abdomen is soft. There is no mass.      Tenderness: There is no abdominal tenderness. There is no guarding or rebound.   Musculoskeletal:      Comments: PORT in right chest   Lymphadenopathy:      Cervical: No cervical adenopathy.      Upper Body:      Right upper body: No supraclavicular or axillary adenopathy.      Left upper body: No supraclavicular or axillary adenopathy. "   Skin:     Findings: No erythema or rash.   Neurological:      Mental Status: She is alert and oriented to person, place, and time.   Psychiatric:         Behavior: Behavior normal.         Laboratory Data:  Lab Visit on 12/30/2024   Component Date Value Ref Range Status    Magnesium 12/30/2024 1.9  1.6 - 2.6 mg/dL Final    WBC 12/30/2024 6.25  3.90 - 12.70 K/uL Final    RBC 12/30/2024 3.48 (L)  4.00 - 5.40 M/uL Final    Hemoglobin 12/30/2024 11.5 (L)  12.0 - 16.0 g/dL Final    Hematocrit 12/30/2024 34.9 (L)  37.0 - 48.5 % Final    MCV 12/30/2024 100 (H)  82 - 98 fL Final    MCH 12/30/2024 33.0 (H)  27.0 - 31.0 pg Final    MCHC 12/30/2024 33.0  32.0 - 36.0 g/dL Final    RDW 12/30/2024 15.5 (H)  11.5 - 14.5 % Final    Platelets 12/30/2024 126 (L)  150 - 450 K/uL Final    MPV 12/30/2024 8.9 (L)  9.2 - 12.9 fL Final    Immature Granulocytes 12/30/2024 0.6 (H)  0.0 - 0.5 % Final    Gran # (ANC) 12/30/2024 3.1  1.8 - 7.7 K/uL Final    Immature Grans (Abs) 12/30/2024 0.04  0.00 - 0.04 K/uL Final    Comment: Mild elevation in immature granulocytes is non specific and   can be seen in a variety of conditions including stress response,   acute inflammation, trauma and pregnancy. Correlation with other   laboratory and clinical findings is essential.      Lymph # 12/30/2024 2.4  1.0 - 4.8 K/uL Final    Mono # 12/30/2024 0.6  0.3 - 1.0 K/uL Final    Eos # 12/30/2024 0.1  0.0 - 0.5 K/uL Final    Baso # 12/30/2024 0.02  0.00 - 0.20 K/uL Final    nRBC 12/30/2024 0  0 /100 WBC Final    Gran % 12/30/2024 50.2  38.0 - 73.0 % Final    Lymph % 12/30/2024 38.4  18.0 - 48.0 % Final    Mono % 12/30/2024 9.4  4.0 - 15.0 % Final    Eosinophil % 12/30/2024 1.1  0.0 - 8.0 % Final    Basophil % 12/30/2024 0.3  0.0 - 1.9 % Final    Differential Method 12/30/2024 Automated   Final    Sodium 12/30/2024 140  136 - 145 mmol/L Final    Potassium 12/30/2024 4.5  3.5 - 5.1 mmol/L Final    Chloride 12/30/2024 105  95 - 110 mmol/L Final    CO2  12/30/2024 24  23 - 29 mmol/L Final    Glucose 12/30/2024 122 (H)  70 - 110 mg/dL Final    BUN 12/30/2024 8  8 - 23 mg/dL Final    Creatinine 12/30/2024 0.8  0.5 - 1.4 mg/dL Final    Calcium 12/30/2024 9.3  8.7 - 10.5 mg/dL Final    Total Protein 12/30/2024 6.6  6.0 - 8.4 g/dL Final    Albumin 12/30/2024 3.9  3.5 - 5.2 g/dL Final    Total Bilirubin 12/30/2024 0.4  0.1 - 1.0 mg/dL Final    Comment: For infants and newborns, interpretation of results should be based  on gestational age, weight and in agreement with clinical  observations.    Premature Infant recommended reference ranges:  Up to 24 hours.............<8.0 mg/dL  Up to 48 hours............<12.0 mg/dL  3-5 days..................<15.0 mg/dL  6-29 days.................<15.0 mg/dL      Alkaline Phosphatase 12/30/2024 145  40 - 150 U/L Final    AST 12/30/2024 20  10 - 40 U/L Final    ALT 12/30/2024 14  10 - 44 U/L Final    eGFR 12/30/2024 >60.0  >60 mL/min/1.73 m^2 Final    Anion Gap 12/30/2024 11  8 - 16 mmol/L Final         Imaging:     Ct C/A/P 10/02/24  Chest:     There is a right chest chemotherapy port present with the tip of its catheter noted in the SVC. The thyroid gland enhances homogeneously. There is atherosclerotic calcification present within the thoracic aortic arch and left subclavian artery. No thoracic aortic aneurysm or dissection. No pericardial fluid. No pathologically enlarged lymph nodes in the chest or axilla. The trachea and mainstem bronchi are unremarkable. There is a new 5 mm ground-glass nodule present in the right lower lobe (series 301, image 123). No emphysematous lung architecture or bronchiectasis. There is biapical pleuro-parenchymal scarring.. No dense airspace consolidation, pleural fluid, or pneumothorax.     Abdomen and pelvis:     There is diffuse hepatic steatosis. There is an 8 mm hypodensity present within the right hepatic lobe on series 3, image 128, possibly a cyst but too small to characterize and unchanged when  compared to the prior study. No new hyperenhancing hepatic mass observed in the liver on the early arterial phase images. There are calcified gallstones present in the gallbladder lumen. No gallbladder inflammatory change. The spleen is unremarkable. The stomach, duodenal C-loop, and adrenal glands are unremarkable.     The most significant abnormality relates to the presence of an approximately 37 x 35 mm proximal pancreatic body mass, slightly smaller in transverse dimension when compared with the previous examination. The mass involves a 90° area the celiac artery and a 180° area of the splenic artery on series 2 images 76-81. There is also soft tissue extending along the anterior and right lateral wall of the portal vein on series 2, image 78 which may represent tumor infiltrating the retroperitoneal soft tissue planes adjacent to the portal vein/portal confluence. There is also abnormal soft tissue insinuating between the left lateral margin of the celiac artery and adjacent splenic vein on series 2, image 77. As best appreciated on series 2, image 84 and series 601, image 59, there is invasion and focal occlusion of the proximal most aspect of the splenic vein. There is diffuse dilatation of the main pancreatic duct within the pancreatic body and tail and there is complete atrophy of the mid and distal pancreatic body and tail.     The abdominal aorta is not aneurysmal. There is minimal calcified atherosclerotic plaque deposition noted within the distal infrarenal abdominal aorta. No bulky periaortic or retroperitoneal lymphadenopathy appreciated. The kidneys show no evidence of stones, hydronephrosis, or solid enhancing mass. The ureters are normal in caliber and course without stones. The urinary bladder shows no significant abnormalities. The uterus and ovaries show no significant abnormalities.     The appendix is unremarkable. No evidence of high-grade bowel obstruction. There is concentric wall  thickening present within the distal sigmoid colon/rectum on series 3, image 218. Infectious and inflammatory etiologies of proctocolitis are possible. No ascites. No pneumoperitoneum. No inguinal hernia or inguinal lymphadenopathy detected. No mesenteric adenopathy or new peritoneal soft tissue mass.     Bones: No evidence of acute thoracolumbar compression fracture. No imaging evidence of lytic or blastic osseous metastatic disease on the provided images.       Assessment:       1. Malignant neoplasm of body of pancreas    2. Pancreatic adenocarcinoma    3. 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therapy    4. Thrombocytopenia    5. Pulmonary nodule    6. Hypokalemia    7. Pancreatic insufficiency    8. Hypothyroidism, unspecified type    9. Type 2 diabetes mellitus without complication, without long-term current use of insulin                       Plan:   Pancreatic Cancer - Patient with Stage III pancreatic cancer with concern for potential encasement of the distal celiac artery per discussion with Dr Valentin  -No sign of mets  - 94.5 on 6/17/24  -Case discussed at tumor board 7/17/24 with recommendation to proceed with chemo  -[facility] genetic testing negative for the genes tested  -[facility] tissue testing showed TP53, KRAS p.G12D, CDKN2A, CDKN2B mutations.  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  -Will need to keep irinotecan dose reduced at 165mg/mm2  -CT C/A/P on 10/02/24 shows stable disease and a possible new RLL nodule  -Pt saw Dr Valentin 10/16/24 whom recommended a few more cycles of chemo and then transition to perioperative radiation therapy  - decreased all the way to 9.4U/mL on 11/11/24 with level today pending   -Pt to go to [facility] 12/09/24  -Will proceed with cycle 11  -Plan to complete up to 12 cycles of FOLFIRINOX with repeat scans after cycle 12  -Will scheduled scan with follow up appt with Dr Valentin  Visit today included increased complexity  associated with the care of the episodic problem (chemotherapy) addressed and managing the longitudinal care of the patient due to the serious and/or complex managed problem(s) pancreatic cancer.     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 - pt at increased risk of infection  -No current signs of infection  -Will monitor     Pulmonary Nodule - pt with RLL on CT C/A/P 10/02/24  -5mm and described as ground glass  -Will monitor    Thrombocytopenia - platelets 126k on 12/30/24  -Due to chemo  -Will monitor     Hypokalemia - 4.5 on 12/30/24  -PT to continue potassium chloride     Pancreatic Insufficiency - Pt not taking Creon as she states it give her diarrhea when taking  -Will monitor     Hypothyroidism - pt on synthroid  -Will monitror     DMII - PT currently on Synjardy  Lab Results   Component Value Date    HGBA1C 7.0 (H) 09/20/2024   -Possibly due to pancreatic cancer  -Will monitor    Route Chart for Scheduling    Med Onc Chart Routing      Follow up with physician 4 weeks. Pt can proceeed with treatment.  Pt needs a CBC, CMP, , Mg on 1/13/25 with appt with NP on 1/14/25 with treatment.  Pt needs a Ct C/A/P the week of 1/20/25 with an appt with me and Dr Valentin the following week.   Follow up with [facility] 2 weeks.   Infusion scheduling note    Injection scheduling note    Labs    Imaging    Pharmacy appointment    Other referrals              Treatment Plan Information   OP GI FOLFIRINOX (oxaliplatin, leucovorin, irinotecan, fluorouracil) Q2W  Rajat Hunt MD   Associated diagnosis: Malignant neoplasm of pancreas Stage III cT4, cN0, cM0 noted on 7/12/2024   Line of treatment: Neoadjuvant  Treatment Goal: Curative     Upcoming Treatment Dates - OP GI FOLFIRINOX (oxaliplatin, leucovorin, irinotecan, fluorouracil) Q2W     12/24/2024       Chemotherapy       oxaliplatin (ELOXATIN) in D5W 520.2 mL chemo infusion       leucovorin calcium in D5W 250 mL infusion       irinotecan (CAMPTOSAR) in 0.9% NaCl 510.9 mL chemo  infusion       fluorouraciL injection       fluorouracil chemo infusion       Antiemetics       palonosetron 0.25mg/dexAMETHasone 12mg in NS IVPB 0.25 mg 50 mL  12/26/2024       Growth Factor       pegfilgrastim (NEULASTA (ON BODY INJECTOR)) injection 6 mg  1/7/2025       Chemotherapy       oxaliplatin (ELOXATIN) in D5W 520.2 mL chemo infusion       leucovorin calcium in D5W 250 mL infusion       irinotecan (CAMPTOSAR) in 0.9% NaCl 510.9 mL chemo infusion       fluorouraciL injection       fluorouracil chemo infusion       Antiemetics       palonosetron 0.25mg/dexAMETHasone 12mg in NS IVPB 0.25 mg 50 mL  1/9/2025       Growth Factor       pegfilgrastim (NEULASTA (ON BODY INJECTOR)) injection 6 mg    Therapy Plan Information  PORT FLUSH for Malignant neoplasm of pancreas, noted on 7/12/2024  Flushes  heparin, porcine (PF) 100 unit/mL injection flush 500 Units  500 Units, Intravenous, Every visit  sodium chloride 0.9% flush 10 mL  10 mL, Intravenous, Every visit      No therapy plan of the specified type found.    No therapy plan of the specified type found.      Rajat Hunt MD  [facility]  Hematology and Oncology  [facility]   [address] [place] [address]   O: [phone]  F: [phone]

## 2024-12-30 ENCOUNTER — LAB VISIT (OUTPATIENT)
Dept: LAB | Facility: HOSPITAL | Age: 64
End: 2024-12-30
Attending: INTERNAL MEDICINE
Payer: MEDICARE

## 2024-12-30 ENCOUNTER — OFFICE VISIT (OUTPATIENT)
Dept: HEMATOLOGY/ONCOLOGY | Facility: CLINIC | Age: 64
End: 2024-12-30
Payer: MEDICARE

## 2024-12-30 ENCOUNTER — TELEPHONE (OUTPATIENT)
Dept: HEMATOLOGY/ONCOLOGY | Facility: CLINIC | Age: 64
End: 2024-12-30
Payer: MEDICARE

## 2024-12-30 VITALS
SYSTOLIC BLOOD PRESSURE: 112 MMHG | TEMPERATURE: 98 F | OXYGEN SATURATION: 96 % | HEART RATE: 80 BPM | RESPIRATION RATE: 12 BRPM | WEIGHT: 125.44 LBS | DIASTOLIC BLOOD PRESSURE: 66 MMHG | BODY MASS INDEX: 23.68 KG/M2 | HEIGHT: 61 IN

## 2024-12-30 DIAGNOSIS — D84.821 IMMUNODEFICIENCY DUE TO CHEMOTHERAPY: ICD-10-CM

## 2024-12-30 DIAGNOSIS — E87.6 HYPOKALEMIA: ICD-10-CM

## 2024-12-30 DIAGNOSIS — T45.1X5A IMMUNODEFICIENCY DUE TO CHEMOTHERAPY: ICD-10-CM

## 2024-12-30 DIAGNOSIS — C25.9 MALIGNANT NEOPLASM OF PANCREAS, UNSPECIFIED LOCATION OF MALIGNANCY: ICD-10-CM

## 2024-12-30 DIAGNOSIS — E11.9 TYPE 2 DIABETES MELLITUS WITHOUT COMPLICATION, WITHOUT LONG-TERM CURRENT USE OF INSULIN: ICD-10-CM

## 2024-12-30 DIAGNOSIS — R91.1 PULMONARY NODULE: ICD-10-CM

## 2024-12-30 DIAGNOSIS — Z79.899 IMMUNODEFICIENCY DUE TO CHEMOTHERAPY: ICD-10-CM

## 2024-12-30 DIAGNOSIS — K86.89 PANCREATIC INSUFFICIENCY: ICD-10-CM

## 2024-12-30 DIAGNOSIS — C25.1 MALIGNANT NEOPLASM OF BODY OF PANCREAS: Primary | ICD-10-CM

## 2024-12-30 DIAGNOSIS — D69.6 THROMBOCYTOPENIA: ICD-10-CM

## 2024-12-30 DIAGNOSIS — E03.9 HYPOTHYROIDISM, UNSPECIFIED TYPE: ICD-10-CM

## 2024-12-30 DIAGNOSIS — C25.9 PANCREATIC ADENOCARCINOMA: ICD-10-CM

## 2024-12-30 LAB
ALBUMIN SERPL BCP-MCNC: 3.9 G/DL (ref 3.5–5.2)
ALP SERPL-CCNC: 145 U/L (ref 40–150)
ALT SERPL W/O P-5'-P-CCNC: 14 U/L (ref 10–44)
ANION GAP SERPL CALC-SCNC: 11 MMOL/L (ref 8–16)
AST SERPL-CCNC: 20 U/L (ref 10–40)
BASOPHILS # BLD AUTO: 0.02 K/UL (ref 0–0.2)
BASOPHILS NFR BLD: 0.3 % (ref 0–1.9)
BILIRUB SERPL-MCNC: 0.4 MG/DL (ref 0.1–1)
BUN SERPL-MCNC: 8 MG/DL (ref 8–23)
CALCIUM SERPL-MCNC: 9.3 MG/DL (ref 8.7–10.5)
CANCER AG19-9 SERPL-ACNC: 7.5 U/ML (ref 0–40)
CHLORIDE SERPL-SCNC: 105 MMOL/L (ref 95–110)
CO2 SERPL-SCNC: 24 MMOL/L (ref 23–29)
CREAT SERPL-MCNC: 0.8 MG/DL (ref 0.5–1.4)
DIFFERENTIAL METHOD BLD: ABNORMAL
EOSINOPHIL # BLD AUTO: 0.1 K/UL (ref 0–0.5)
EOSINOPHIL NFR BLD: 1.1 % (ref 0–8)
ERYTHROCYTE [DISTWIDTH] IN BLOOD BY AUTOMATED COUNT: 15.5 % (ref 11.5–14.5)
EST. GFR  (NO RACE VARIABLE): >60 ML/MIN/1.73 M^2
GLUCOSE SERPL-MCNC: 122 MG/DL (ref 70–110)
HCT VFR BLD AUTO: 34.9 % (ref 37–48.5)
HGB BLD-MCNC: 11.5 G/DL (ref 12–16)
IMM GRANULOCYTES # BLD AUTO: 0.04 K/UL (ref 0–0.04)
IMM GRANULOCYTES NFR BLD AUTO: 0.6 % (ref 0–0.5)
LYMPHOCYTES # BLD AUTO: 2.4 K/UL (ref 1–4.8)
LYMPHOCYTES NFR BLD: 38.4 % (ref 18–48)
MAGNESIUM SERPL-MCNC: 1.9 MG/DL (ref 1.6–2.6)
MCH RBC QN AUTO: 33 PG (ref 27–31)
MCHC RBC AUTO-ENTMCNC: 33 G/DL (ref 32–36)
MCV RBC AUTO: 100 FL (ref 82–98)
MONOCYTES # BLD AUTO: 0.6 K/UL (ref 0.3–1)
MONOCYTES NFR BLD: 9.4 % (ref 4–15)
NEUTROPHILS # BLD AUTO: 3.1 K/UL (ref 1.8–7.7)
NEUTROPHILS NFR BLD: 50.2 % (ref 38–73)
NRBC BLD-RTO: 0 /100 WBC
PLATELET # BLD AUTO: 126 K/UL (ref 150–450)
PMV BLD AUTO: 8.9 FL (ref 9.2–12.9)
POTASSIUM SERPL-SCNC: 4.5 MMOL/L (ref 3.5–5.1)
PROT SERPL-MCNC: 6.6 G/DL (ref 6–8.4)
RBC # BLD AUTO: 3.48 M/UL (ref 4–5.4)
SODIUM SERPL-SCNC: 140 MMOL/L (ref 136–145)
WBC # BLD AUTO: 6.25 K/UL (ref 3.9–12.7)

## 2024-12-30 PROCEDURE — 1101F PT FALLS ASSESS-DOCD LE1/YR: CPT | Mod: CPTII,S$GLB,, | Performed by: INTERNAL MEDICINE

## 2024-12-30 PROCEDURE — 3078F DIAST BP <80 MM HG: CPT | Mod: CPTII,S$GLB,, | Performed by: INTERNAL MEDICINE

## 2024-12-30 PROCEDURE — 3008F BODY MASS INDEX DOCD: CPT | Mod: CPTII,S$GLB,, | Performed by: INTERNAL MEDICINE

## 2024-12-30 PROCEDURE — G2211 COMPLEX E/M VISIT ADD ON: HCPCS | Mod: S$GLB,,, | Performed by: INTERNAL MEDICINE

## 2024-12-30 PROCEDURE — 36415 COLL VENOUS BLD VENIPUNCTURE: CPT | Mod: PN | Performed by: INTERNAL MEDICINE

## 2024-12-30 PROCEDURE — 85025 COMPLETE CBC W/AUTO DIFF WBC: CPT | Mod: PN | Performed by: INTERNAL MEDICINE

## 2024-12-30 PROCEDURE — 3074F SYST BP LT 130 MM HG: CPT | Mod: CPTII,S$GLB,, | Performed by: INTERNAL MEDICINE

## 2024-12-30 PROCEDURE — 83735 ASSAY OF MAGNESIUM: CPT | Mod: PN | Performed by: INTERNAL MEDICINE

## 2024-12-30 PROCEDURE — 3288F FALL RISK ASSESSMENT DOCD: CPT | Mod: CPTII,S$GLB,, | Performed by: INTERNAL MEDICINE

## 2024-12-30 PROCEDURE — 1159F MED LIST DOCD IN RCRD: CPT | Mod: CPTII,S$GLB,, | Performed by: INTERNAL MEDICINE

## 2024-12-30 PROCEDURE — 80053 COMPREHEN METABOLIC PANEL: CPT | Mod: PN | Performed by: INTERNAL MEDICINE

## 2024-12-30 PROCEDURE — 3066F NEPHROPATHY DOC TX: CPT | Mod: CPTII,S$GLB,, | Performed by: INTERNAL MEDICINE

## 2024-12-30 PROCEDURE — 1126F AMNT PAIN NOTED NONE PRSNT: CPT | Mod: CPTII,S$GLB,, | Performed by: INTERNAL MEDICINE

## 2024-12-30 PROCEDURE — 4010F ACE/ARB THERAPY RXD/TAKEN: CPT | Mod: CPTII,S$GLB,, | Performed by: INTERNAL MEDICINE

## 2024-12-30 PROCEDURE — 3051F HG A1C>EQUAL 7.0%<8.0%: CPT | Mod: CPTII,S$GLB,, | Performed by: INTERNAL MEDICINE

## 2024-12-30 PROCEDURE — 3061F NEG MICROALBUMINURIA REV: CPT | Mod: CPTII,S$GLB,, | Performed by: INTERNAL MEDICINE

## 2024-12-30 PROCEDURE — 86301 IMMUNOASSAY TUMOR CA 19-9: CPT | Performed by: INTERNAL MEDICINE

## 2024-12-30 PROCEDURE — 99215 OFFICE O/P EST HI 40 MIN: CPT | Mod: S$GLB,,, | Performed by: INTERNAL MEDICINE

## 2024-12-30 PROCEDURE — 99999 PR PBB SHADOW E&M-EST. PATIENT-LVL V: CPT | Mod: PBBFAC,,, | Performed by: INTERNAL MEDICINE

## 2024-12-30 RX ORDER — EPINEPHRINE 0.3 MG/.3ML
0.3 INJECTION SUBCUTANEOUS ONCE AS NEEDED
Status: CANCELLED | OUTPATIENT
Start: 2024-12-31

## 2024-12-30 RX ORDER — DIPHENHYDRAMINE HYDROCHLORIDE 50 MG/ML
50 INJECTION INTRAMUSCULAR; INTRAVENOUS ONCE AS NEEDED
Status: CANCELLED | OUTPATIENT
Start: 2024-12-31

## 2024-12-30 RX ORDER — FLUOROURACIL 50 MG/ML
320 INJECTION, SOLUTION INTRAVENOUS
Status: CANCELLED | OUTPATIENT
Start: 2024-12-31

## 2024-12-30 RX ORDER — PROCHLORPERAZINE EDISYLATE 5 MG/ML
5 INJECTION INTRAMUSCULAR; INTRAVENOUS ONCE AS NEEDED
Status: CANCELLED | OUTPATIENT
Start: 2025-01-02

## 2024-12-30 RX ORDER — SODIUM CHLORIDE 0.9 % (FLUSH) 0.9 %
10 SYRINGE (ML) INJECTION
Status: CANCELLED | OUTPATIENT
Start: 2024-12-31

## 2024-12-30 RX ORDER — SODIUM CHLORIDE 0.9 % (FLUSH) 0.9 %
10 SYRINGE (ML) INJECTION
Status: CANCELLED | OUTPATIENT
Start: 2025-01-02

## 2024-12-30 RX ORDER — HEPARIN 100 UNIT/ML
500 SYRINGE INTRAVENOUS
Status: CANCELLED | OUTPATIENT
Start: 2025-01-02

## 2024-12-30 RX ORDER — HEPARIN 100 UNIT/ML
500 SYRINGE INTRAVENOUS
Status: CANCELLED | OUTPATIENT
Start: 2024-12-31

## 2024-12-30 RX ORDER — ATROPINE SULFATE 0.4 MG/ML
0.4 INJECTION, SOLUTION ENDOTRACHEAL; INTRAMEDULLARY; INTRAMUSCULAR; INTRAVENOUS; SUBCUTANEOUS ONCE AS NEEDED
Status: CANCELLED | OUTPATIENT
Start: 2024-12-31

## 2024-12-30 RX ORDER — PROCHLORPERAZINE EDISYLATE 5 MG/ML
5 INJECTION INTRAMUSCULAR; INTRAVENOUS ONCE AS NEEDED
Status: CANCELLED | OUTPATIENT
Start: 2024-12-31

## 2024-12-31 ENCOUNTER — DOCUMENTATION ONLY (OUTPATIENT)
Dept: INFUSION THERAPY | Facility: HOSPITAL | Age: 64
End: 2024-12-31

## 2024-12-31 ENCOUNTER — INFUSION (OUTPATIENT)
Dept: INFUSION THERAPY | Facility: HOSPITAL | Age: 64
End: 2024-12-31
Attending: INTERNAL MEDICINE
Payer: MEDICARE

## 2024-12-31 VITALS
DIASTOLIC BLOOD PRESSURE: 72 MMHG | RESPIRATION RATE: 16 BRPM | BODY MASS INDEX: 23.68 KG/M2 | OXYGEN SATURATION: 99 % | SYSTOLIC BLOOD PRESSURE: 123 MMHG | TEMPERATURE: 98 F | HEART RATE: 78 BPM | WEIGHT: 125.44 LBS | HEIGHT: 61 IN

## 2024-12-31 DIAGNOSIS — C25.9 MALIGNANT NEOPLASM OF PANCREAS, UNSPECIFIED LOCATION OF MALIGNANCY: Primary | ICD-10-CM

## 2024-12-31 PROCEDURE — 96375 TX/PRO/DX INJ NEW DRUG ADDON: CPT | Mod: PN

## 2024-12-31 PROCEDURE — 96367 TX/PROPH/DG ADDL SEQ IV INF: CPT | Mod: PN

## 2024-12-31 PROCEDURE — 96411 CHEMO IV PUSH ADDL DRUG: CPT | Mod: PN

## 2024-12-31 PROCEDURE — 96413 CHEMO IV INFUSION 1 HR: CPT | Mod: PN

## 2024-12-31 PROCEDURE — 96415 CHEMO IV INFUSION ADDL HR: CPT | Mod: PN

## 2024-12-31 PROCEDURE — 63600175 PHARM REV CODE 636 W HCPCS: Mod: PN | Performed by: INTERNAL MEDICINE

## 2024-12-31 PROCEDURE — 96416 CHEMO PROLONG INFUSE W/PUMP: CPT | Mod: PN

## 2024-12-31 PROCEDURE — 96368 THER/DIAG CONCURRENT INF: CPT | Mod: PN

## 2024-12-31 PROCEDURE — 96417 CHEMO IV INFUS EACH ADDL SEQ: CPT | Mod: PN

## 2024-12-31 PROCEDURE — 25000003 PHARM REV CODE 250: Mod: PN | Performed by: INTERNAL MEDICINE

## 2024-12-31 RX ORDER — EPINEPHRINE 0.3 MG/.3ML
0.3 INJECTION SUBCUTANEOUS ONCE AS NEEDED
Status: DISCONTINUED | OUTPATIENT
Start: 2024-12-31 | End: 2024-12-31 | Stop reason: HOSPADM

## 2024-12-31 RX ORDER — FLUOROURACIL 50 MG/ML
320 INJECTION, SOLUTION INTRAVENOUS
Status: COMPLETED | OUTPATIENT
Start: 2024-12-31 | End: 2024-12-31

## 2024-12-31 RX ORDER — ATROPINE SULFATE 0.4 MG/ML
0.4 INJECTION, SOLUTION ENDOTRACHEAL; INTRAMEDULLARY; INTRAMUSCULAR; INTRAVENOUS; SUBCUTANEOUS ONCE AS NEEDED
Status: COMPLETED | OUTPATIENT
Start: 2024-12-31 | End: 2024-12-31

## 2024-12-31 RX ORDER — DIPHENHYDRAMINE HYDROCHLORIDE 50 MG/ML
50 INJECTION INTRAMUSCULAR; INTRAVENOUS ONCE AS NEEDED
Status: DISCONTINUED | OUTPATIENT
Start: 2024-12-31 | End: 2024-12-31 | Stop reason: HOSPADM

## 2024-12-31 RX ORDER — SODIUM CHLORIDE 0.9 % (FLUSH) 0.9 %
10 SYRINGE (ML) INJECTION
Status: DISCONTINUED | OUTPATIENT
Start: 2024-12-31 | End: 2024-12-31 | Stop reason: HOSPADM

## 2024-12-31 RX ADMIN — SODIUM CHLORIDE 218 MG: 9 INJECTION, SOLUTION INTRAVENOUS at 10:12

## 2024-12-31 RX ADMIN — OXALIPLATIN 100 MG: 5 INJECTION, SOLUTION INTRAVENOUS at 08:12

## 2024-12-31 RX ADMIN — DEXAMETHASONE SODIUM PHOSPHATE 0.25 MG: 10 INJECTION, SOLUTION INTRAMUSCULAR; INTRAVENOUS at 07:12

## 2024-12-31 RX ADMIN — ATROPINE SULFATE 0.4 MG: 0.4 INJECTION, SOLUTION INTRAVENOUS at 10:12

## 2024-12-31 RX ADMIN — DEXTROSE MONOHYDRATE: 5 INJECTION, SOLUTION INTRAVENOUS at 07:12

## 2024-12-31 RX ADMIN — FLUOROURACIL 2995 MG: 50 INJECTION, SOLUTION INTRAVENOUS at 11:12

## 2024-12-31 RX ADMIN — LEUCOVORIN CALCIUM 500 MG: 350 INJECTION, POWDER, LYOPHILIZED, FOR SUSPENSION INTRAMUSCULAR; INTRAVENOUS at 10:12

## 2024-12-31 RX ADMIN — FLUOROURACIL 500 MG: 50 INJECTION, SOLUTION INTRAVENOUS at 11:12

## 2024-12-31 NOTE — PLAN OF CARE
Problem: Adult Inpatient Plan of Care  Goal: Plan of Care Review  Outcome: Progressing  Flowsheets (Taken 12/31/2024 1202)  Plan of Care Reviewed With:   patient   spouse  Pt tolerated Folfirinox C11D1 well. No adverse reaction noted. Pt education reinforced on chemo regimen, side effects, what to expect, and when to call physician. Pt verbalized understanding. I reviewed pt calendar w/ pt and understanding verbalized. PAC remains accessed with 5FU infusing at 2.2cc/hr over 46 hours. Patent upon discharge in NAD.

## 2024-12-31 NOTE — PLAN OF CARE
Problem: Adult Inpatient Plan of Care  Goal: Patient-Specific Goal (Individualized)  Outcome: Progressing  Flowsheets (Taken 12/31/2024 0731)  Individualized Care Needs: recliner,  chairside  Anxieties, Fears or Concerns: none today  Patient/Family-Specific Goals (Include Timeframe): no s/s of rx with tx     Problem: Fatigue  Goal: Improved Activity Tolerance  Outcome: Progressing  Intervention: Promote Improved Energy  Flowsheets (Taken 12/31/2024 0731)  Fatigue Management: frequent rest breaks encouraged  Sleep/Rest Enhancement: regular sleep/rest pattern promoted  Activity Management: Ambulated -L4  Environmental Support: rest periods encouraged

## 2024-12-31 NOTE — PROGRESS NOTES
SW met with pt at chairside. Pt reports she has one more chemo left and then will have surgery in March. She is making preparations by getting her adult children prepared to take over house hold chores etc. SW and pt processed how it has been for her to let go and allow others to help. SW provided support and validation of feelings.     SW provided pt with a gas card. No other needs noted at this time.     Bharati Fritz, ANA MARIAW

## 2025-01-02 ENCOUNTER — INFUSION (OUTPATIENT)
Dept: INFUSION THERAPY | Facility: HOSPITAL | Age: 65
End: 2025-01-02
Attending: INTERNAL MEDICINE
Payer: MEDICARE

## 2025-01-02 VITALS
HEART RATE: 84 BPM | SYSTOLIC BLOOD PRESSURE: 122 MMHG | TEMPERATURE: 98 F | DIASTOLIC BLOOD PRESSURE: 68 MMHG | RESPIRATION RATE: 16 BRPM

## 2025-01-02 DIAGNOSIS — C25.9 MALIGNANT NEOPLASM OF PANCREAS, UNSPECIFIED LOCATION OF MALIGNANCY: Primary | ICD-10-CM

## 2025-01-02 PROCEDURE — 63600175 PHARM REV CODE 636 W HCPCS: Mod: JZ,TB,PN | Performed by: INTERNAL MEDICINE

## 2025-01-02 PROCEDURE — 96377 APPLICATON ON-BODY INJECTOR: CPT | Mod: PN

## 2025-01-02 RX ORDER — HEPARIN 100 UNIT/ML
500 SYRINGE INTRAVENOUS
Status: DISCONTINUED | OUTPATIENT
Start: 2025-01-02 | End: 2025-01-02 | Stop reason: HOSPADM

## 2025-01-02 RX ORDER — PROCHLORPERAZINE EDISYLATE 5 MG/ML
5 INJECTION INTRAMUSCULAR; INTRAVENOUS ONCE AS NEEDED
Status: DISCONTINUED | OUTPATIENT
Start: 2025-01-02 | End: 2025-01-02 | Stop reason: HOSPADM

## 2025-01-02 RX ORDER — SODIUM CHLORIDE 0.9 % (FLUSH) 0.9 %
10 SYRINGE (ML) INJECTION
Status: DISCONTINUED | OUTPATIENT
Start: 2025-01-02 | End: 2025-01-02 | Stop reason: HOSPADM

## 2025-01-02 RX ADMIN — PEGFILGRASTIM 6 MG: KIT SUBCUTANEOUS at 04:01

## 2025-01-13 NOTE — PROGRESS NOTES
PATIENT: Bessy Lamb  MRN: [id number]  DATE: 1/14/2025      Diagnosis:   1. 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therapy    2. Malignant neoplasm of body of pancreas    3. Pulmonary nodule    4. Thrombocytopenia    5. Pancreatic insufficiency    6. Hypothyroidism, unspecified type    7. Type 2 diabetes mellitus without complication, without long-term current use of insulin        Chief Complaint: Malignant neoplasm of body of pancreas (Treatment clearance)      Subjective:    Interval History:  Ms. Lamb is a 64 y.o. female with Arthritis, DMII, GERD, Hypotyroidism, Obesity, Thyroid disease who presents for treatment clearance for pancreatic cancer.  Since the last clinic visit the patient endorses numbness in her toes and palmar surface of feet which has worsened since last treatment. She also notes numbness to bilateral fingertips. Patient endorses her  is currently in the hospital, he is currently going through active chemo treatment as well. The patient denies CP, cough, SOB, abdominal pain, nausea, vomiting, constipation, diarrhea.  The patient denies fever, chills, night sweats, weight loss, new lumps or bumps, easy bruising or bleeding.    Prior History:   The patient initially underwent a CXR on 11/13/23 fur a URI which showed possible subtle pulmonary nodules.  CT chest 12/01/2023 showed a cluster of ill-defined centrilobular ground-glass opacity he inferior right and left upper lobes as well as a 1.2 cm ground-glass opacity in the superior segment of the left lower lobe; solid 3 mm pulmonary nodule in the right upper lobe; subcentimeter right hepatic lobe hypodensity likely representing a cyst.  The patient underwent surveillance CT chest on 05/21/2024 showing a 3.7 x 2.7 pancreatic body mass in the pancreatic tail atrophy suspicious of pancreatic malignancy as well as and unchanged benign 3 mm nodule in the right upper lobe.  MRI abdomen on 06/30/2024 showed hypoenhancing mass centered in the  proximal pancreatic body measuring 2.9 x 2 cm with abutment and possible encasement of the distal splenic vein.  EUS was performed on 07/05/2024 mass in the pancreatic body stage T4 N0 M0 by endo sonographic criteria.  Pathology from biopsy of the stomach showed mild focally moderate chronic gastritis with no evidence of the H pylori.  Stomach polyp which was removed code hyperplastic polyps with chronic inflammation.  Pancreatic biopsy showed adenocarcinoma.  CT of the chest, abdomen, and pelvis on 07/09/2024 showed a 9 mm lesion in the right hepatic lobe previously characterized as cysts; mass in the pancreatic body measuring 4.4 x 3.7 cm with diffuse pancreatic ductal dilatation measuring 8 mm:  Weaning vein at the portal confluence occluded with reconstitution via collaterals.  The mass abuts the portal vein by less than 180° but does not case the proximal splenic artery remains patent.  Also noted was a pancreatic lymph node measuring 0.7 cm.   Patient's case was discussed at tumor board on 07/17/2024 with recommendation for proceeding with the chemotherapy.  Patient had port placement on 07/18/2024.   The patient is admitted to the hospital from 09/20/2024 until 09/22/2024 for hypokalemia with potassium of 2.3.  The patient was subsequently discharged underwent treatment with FOLFIRINOX on 09/23/2024.  CT of the chest, abdomen, and pelvis on 10/02/2024 showed a new 5 mm ground-glass nodule in the right lower lobe; stable 8 mm hypodensity present within the right hepatic lobe suggestive of a cyst; 37 x 35 mm proximal pancreatic body mass slightly smaller in transverse dimension involving 90 degree area of the celiac artery and 100 degree area of the splenic artery with soft tissue extending along the anterior right lateral wall of the portal vein; abnormal soft tissue insinuating between the lateral margin of the celiac artery and adjacent splenic vein; invasion and focal occlusion of the proximal most aspect  of the splenic vein; concentric wall thickening present within the distal sigmoid colon/rectum with infectious and inflammatory etiologies possible.   The patient met with Dr Valentin on 10/16/24 whom felt the patient could receive a few more cycles of chemo and then transition to perioperative radiation therapy.   The patient's case was discussed with Dr. Valentin and .  Plan is to proceed with up to 12 cycles FOLFIRINOX prior to consideration of radiation versus surgical resection.    Past Medical History:   Past Medical History:   Diagnosis Date    Arthritis, lumbar spine     hands    Diabetes mellitus     General anesthetics causing adverse effect in therapeutic use     following breast rediuct, hard time awakening  also had anxiety rxn to lidocain in dental ofc    GERD (gastroesophageal reflux disease)     Hypothyroidism 10/08/2014    Maternal anesthesia complication     epidural for 1st child went up instead of down; required intubation    Normocytic anemia 2024    Obesity (BMI 30-39.9) 10/08/2014    pancreatic Cancer         Thyroid disease     Thyroid nodule 10/08/2014       Past Surgical HIstory:   Past Surgical History:   Procedure Laterality Date    BREAST BIOPSY Left     b9    BREAST SURGERY      Reduction cause of a mass in left breast    c-sections x2       SECTION  3/26/81 & 85    Birth of two girls    COLONOSCOPY  2012         COLONOSCOPY N/A 2018    Procedure: COLONOSCOPY;  Surgeon: Francisco Mcclain MD;  Location: [facility];  Service: Endoscopy;  Laterality: N/A;    COLONOSCOPY N/A 10/24/2023    Procedure: COLONOSCOPY;  Surgeon: Francisco Mcclain MD;  Location: [facility];  Service: Endoscopy;  Laterality: N/A;    ENDOSCOPIC ULTRASOUND OF UPPER GASTROINTESTINAL TRACT Left 2024    Procedure: ULTRASOUND, UPPER GI TRACT, ENDOSCOPIC;  Surgeon: Andrew Gordon MD;  Location: [facility];  Service: Endoscopy;  Laterality: Left;     ESOPHAGOGASTRODUODENOSCOPY N/A 7/5/2024    Procedure: EGD (ESOPHAGOGASTRODUODENOSCOPY);  Surgeon: Andrew Gordon MD;  Location: [facility];  Service: Endoscopy;  Laterality: N/A;    hysteroscopy with polypectomy      INCISIONAL BREAST BIOPSY Left 1996    benign; done at same time as reduction    INSERTION OF TUNNELED CENTRAL VENOUS CATHETER (CVC) WITH SUBCUTANEOUS PORT N/A 7/18/2024    Procedure: RUXYHOCRU-LMTG-C-CATH;  Surgeon: Blanca Dillard MD;  Location: [facility];  Service: General;  Laterality: N/A;    TOTAL REDUCTION MAMMOPLASTY Bilateral 1996       Family History:   Family History   Problem Relation Name Age of Onset    Brain cancer Mother Ravi Tolbert     Early death Mother Ravi Tolbert 51        Passed at 51    Cancer Mother Ravi Tolbert         Brain tumor    Arthritis Mother Ravi Tolbert     Diabetes Father Ck pham tomasz         Type 2    Cirrhosis Father Ck pham tomasz     Cancer Father Ck ankit tolbert         Bone    COPD Father Ck pham tomasz         Smoker    Early death Father Ck tolbert         Passed at 64    Lung cancer Father Ck tolbert     Heart disease Sister Mamta (dinana)wescovich         Congestive heart failure (passed 2/11/18    Hypertension Sister Mamta (dianna)wescovich     Kidney disease Sister Mamta (dianna)wescovich         Doc removed when she was a child    Hypertension Sister Mayank     Depression Sister Ravi (mayank) nicolette Mauricio ( sister)         Due to loss of her 27 year old son    Early death Sister Ravi (mayank) nicolette Mauricio ( sister)         Pulmonary hypertension, cirrhosis of the liver(passed 7/14/2007   Age 57    Heart disease Brother John Choudhury ( passed )         Heart attack    Hypertension Brother John Choudhury ( passed )     Heart disease Brother Juan Francisco Choudhury         Heart  blockage    Heart disease Brother  Bhanu Choudhury         Heart attack (passed)    Diabetes Brother Bhanu Choudhury     Macular degeneration Brother Bhanu Choudhury     Breast cancer Maternal Aunt  30    Breast cancer Paternal Aunt  40    Breast cancer Paternal Aunt  40    Ovarian cancer Neg Hx         Social History:  reports that she has never smoked. She has never used smokeless tobacco. She reports that she does not currently use alcohol. She reports that she does not use drugs.    Allergies:  Review of patient's allergies indicates:   Allergen Reactions    Duricef [cefadroxil] Hives       Medications:  Current Outpatient Medications   Medication Sig Dispense Refill    acetaminophen (TYLENOL) 325 MG tablet Take 325 mg by mouth every 6 (six) hours as needed for Pain.      blood-glucose meter Misc Use as directed 1 each prn    duke's soln (benadryl 30 mL, mylanta 30 mL, LIDOcaine 30 mL, nystatin 30 mL) 120mL 10 ml swish & spit/swallow every 4 to 6 hours as needed for mouth pain/ulcers/yeast/throat pain 240 mL 1    ergocalciferol (VITAMIN D2) 50,000 unit Cap Take 1 capsule (50,000 Units total) by mouth twice a week. 24 capsule 3    fluticasone propionate (FLONASE) 50 mcg/actuation nasal spray 1 spray (50 mcg total) by Each Nostril route once daily. 18.2 mL 0    levothyroxine (SYNTHROID) 50 MCG tablet Take 1 tablet (50 mcg total) by mouth once daily. 90 tablet 3    lipase-protease-amylase 24,000-76,000-120,000 units (CREON) 24,000-76,000 -120,000 unit capsule Take 2 capsules by mouth 3 (three) times daily with meals. 180 capsule 11    loperamide (IMODIUM) 2 mg capsule Take 2 tablets (4mg) by mouth after first loose stool, 1 tablet every 2 hours until diarrhea free for 12 hours. May take 2 tablets (4mg) by mouth every 4 hours at night. May require more than the package labeling maximum dose of 16mg/day. 30 capsule 11    loratadine (CLARITIN) 10 mg tablet Take 1 tablet (10 mg total) by mouth every morning. 30 tablet 11    losartan (COZAAR) 25 MG tablet Take 1 tablet  (25 mg total) by mouth as needed (take for bp equal or greater than 120/70).      meclizine (ANTIVERT) 25 mg tablet Take 1 tablet (25 mg total) by mouth 3 (three) times daily as needed for Dizziness. 30 tablet 0    mupirocin (BACTROBAN) 2 % ointment Apply topically 3 (three) times daily.      OLANZapine (ZYPREXA) 5 MG tablet Take 1 tablet by mouth nightly on days 1-3 of each chemotherapy cycle. 6 tablet 11    potassium chloride SA (K-DUR,KLOR-CON) 20 MEQ tablet Take 20 mEq by mouth once.      prochlorperazine (COMPAZINE) 5 MG tablet Take 1 tablet (5 mg total) by mouth every 6 (six) hours as needed for Nausea. 20 tablet 5    simvastatin (ZOCOR) 10 MG tablet Take 1 tablet (10 mg total) by mouth every evening. 90 tablet 3    SYNJARDY XR 10-1,000 mg TBph TAKE ONE TABLET BY MOUTH ONCE DAILY 30 tablet 5    TRUE METRIX GLUCOSE TEST STRIP Strp USE 1 STRIP ONCE DAILY TO TEST BLOOD GLUCOSE (50 DAY SUPPLY) 100 each 3    TRUEPLUS LANCETS 33 gauge Misc USE AS DIRECTED TO TEST BLOOD SUGAR ONCE DAILY FOR UP  USES 100 each 2     No current facility-administered medications for this visit.     Facility-Administered Medications Ordered in Other Visits   Medication Dose Route Frequency Provider Last Rate Last Admin    alteplase injection 2 mg  2 mg Intra-Catheter PRN Arash Fuchs, NP        diphenhydrAMINE injection 50 mg  50 mg Intravenous Once PRN Arash Fuchs, ADI        EPINEPHrine (EPIPEN) 0.3 mg/0.3 mL pen injection 0.3 mg  0.3 mg Intramuscular Once PRN Arash Fuchs, NP        heparin, porcine (PF) 100 unit/mL injection flush 500 Units  500 Units Intravenous PRN Arash Fuchs, NP        hydrocortisone sodium succinate injection 100 mg  100 mg Intravenous Once PRN Arash Fuchs, NP        prochlorperazine injection Soln 5 mg  5 mg Intravenous Once PRN Arash Fuchs, NP        sodium chloride 0.9% flush 10 mL  10 mL Intravenous PRN Arash Fuchs, NP           Review of Systems   Constitutional:  Negative for appetite  "change, chills, fatigue, fever and unexpected weight change.   HENT:  Negative for mouth sores.    Eyes:  Negative for visual disturbance.   Respiratory:  Negative for cough and shortness of breath.    Cardiovascular:  Negative for chest pain and palpitations.   Gastrointestinal:  Negative for abdominal pain, constipation, diarrhea, nausea and vomiting.   Genitourinary:  Negative for frequency.   Musculoskeletal:  Negative for back pain.   Skin:  Negative for rash.   Neurological:  Positive for numbness (bilateral feet/toes and bilat fingertips). Negative for headaches.   Hematological:  Negative for adenopathy. Does not bruise/bleed easily.   Psychiatric/Behavioral:  The patient is not nervous/anxious.        ECOG Performance Status: 0   Objective:      Vitals:   Vitals:    01/14/25 0857   BP: 115/70   BP Location: Right forearm   Patient Position: Sitting   Pulse: 104   Resp: 16   Temp: 97.5 °F (36.4 °C)   TempSrc: Temporal   SpO2: 99%   Weight: 57 kg (125 lb 10.6 oz)   Height: 5' 1" (1.549 m)     Wt Readings from Last 3 Encounters:   01/14/25 57 kg (125 lb 10.6 oz)   01/14/25 57 kg (125 lb 10.6 oz)   12/31/24 56.9 kg (125 lb 7.1 oz)       Physical Exam  Constitutional:       General: She is not in acute distress.     Appearance: She is well-developed. She is not diaphoretic.   HENT:      Head: Normocephalic and atraumatic.      Comments: Alopecia     Mouth/Throat:      Mouth: Mucous membranes are moist.   Cardiovascular:      Rate and Rhythm: Normal rate and regular rhythm.      Heart sounds: Normal heart sounds. No murmur heard.     No friction rub. No gallop.   Pulmonary:      Effort: Pulmonary effort is normal. No respiratory distress.      Breath sounds: No wheezing.   Abdominal:      General: Bowel sounds are normal. There is no distension.      Palpations: Abdomen is soft. There is no mass.      Tenderness: There is no abdominal tenderness. There is no guarding or rebound.   Musculoskeletal:      Comments: " PORT in right chest   Lymphadenopathy:      Cervical: No cervical adenopathy.      Upper Body:      Right upper body: No supraclavicular or axillary adenopathy.      Left upper body: No supraclavicular or axillary adenopathy.   Skin:     Findings: No rash.   Neurological:      Mental Status: She is alert and oriented to person, place, and time.   Psychiatric:         Behavior: Behavior normal.         Laboratory Data:  Lab Visit on 01/14/2025   Component Date Value Ref Range Status    WBC 01/14/2025 7.93  3.90 - 12.70 K/uL Final    RBC 01/14/2025 3.38 (L)  4.00 - 5.40 M/uL Final    Hemoglobin 01/14/2025 11.3 (L)  12.0 - 16.0 g/dL Final    Hematocrit 01/14/2025 33.5 (L)  37.0 - 48.5 % Final    MCV 01/14/2025 99 (H)  82 - 98 fL Final    MCH 01/14/2025 33.4 (H)  27.0 - 31.0 pg Final    MCHC 01/14/2025 33.7  32.0 - 36.0 g/dL Final    RDW 01/14/2025 15.5 (H)  11.5 - 14.5 % Final    Platelets 01/14/2025 110 (L)  150 - 450 K/uL Final    MPV 01/14/2025 9.6  9.2 - 12.9 fL Final    Immature Granulocytes 01/14/2025 1.4 (H)  0.0 - 0.5 % Final    Gran # (ANC) 01/14/2025 4.7  1.8 - 7.7 K/uL Final    Immature Grans (Abs) 01/14/2025 0.11 (H)  0.00 - 0.04 K/uL Final    Comment: Mild elevation in immature granulocytes is non specific and   can be seen in a variety of conditions including stress response,   acute inflammation, trauma and pregnancy. Correlation with other   laboratory and clinical findings is essential.      Lymph # 01/14/2025 2.4  1.0 - 4.8 K/uL Final    Mono # 01/14/2025 0.6  0.3 - 1.0 K/uL Final    Eos # 01/14/2025 0.0  0.0 - 0.5 K/uL Final    Baso # 01/14/2025 0.03  0.00 - 0.20 K/uL Final    nRBC 01/14/2025 0  0 /100 WBC Final    Gran % 01/14/2025 59.7  38.0 - 73.0 % Final    Lymph % 01/14/2025 30.3  18.0 - 48.0 % Final    Mono % 01/14/2025 7.8  4.0 - 15.0 % Final    Eosinophil % 01/14/2025 0.4  0.0 - 8.0 % Final    Basophil % 01/14/2025 0.4  0.0 - 1.9 % Final    Differential Method 01/14/2025 Automated   Final     Sodium 01/14/2025 142  136 - 145 mmol/L Final    Potassium 01/14/2025 3.8  3.5 - 5.1 mmol/L Final    Chloride 01/14/2025 107  95 - 110 mmol/L Final    CO2 01/14/2025 24  23 - 29 mmol/L Final    Glucose 01/14/2025 117 (H)  70 - 110 mg/dL Final    BUN 01/14/2025 8  8 - 23 mg/dL Final    Creatinine 01/14/2025 0.8  0.5 - 1.4 mg/dL Final    Calcium 01/14/2025 9.2  8.7 - 10.5 mg/dL Final    Total Protein 01/14/2025 6.9  6.0 - 8.4 g/dL Final    Albumin 01/14/2025 4.0  3.5 - 5.2 g/dL Final    Total Bilirubin 01/14/2025 0.5  0.1 - 1.0 mg/dL Final    Comment: For infants and newborns, interpretation of results should be based  on gestational age, weight and in agreement with clinical  observations.    Premature Infant recommended reference ranges:  Up to 24 hours.............<8.0 mg/dL  Up to 48 hours............<12.0 mg/dL  3-5 days..................<15.0 mg/dL  6-29 days.................<15.0 mg/dL      Alkaline Phosphatase 01/14/2025 135  40 - 150 U/L Final    AST 01/14/2025 21  10 - 40 U/L Final    ALT 01/14/2025 21  10 - 44 U/L Final    eGFR 01/14/2025 >60.0  >60 mL/min/1.73 m^2 Final    Anion Gap 01/14/2025 11  8 - 16 mmol/L Final    Magnesium 01/14/2025 1.6  1.6 - 2.6 mg/dL Final         Imaging:     Ct C/A/P 10/02/24  Chest:     There is a right chest chemotherapy port present with the tip of its catheter noted in the SVC. The thyroid gland enhances homogeneously. There is atherosclerotic calcification present within the thoracic aortic arch and left subclavian artery. No thoracic aortic aneurysm or dissection. No pericardial fluid. No pathologically enlarged lymph nodes in the chest or axilla. The trachea and mainstem bronchi are unremarkable. There is a new 5 mm ground-glass nodule present in the right lower lobe (series 301, image 123). No emphysematous lung architecture or bronchiectasis. There is biapical pleuro-parenchymal scarring.. No dense airspace consolidation, pleural fluid, or pneumothorax.     Abdomen  and pelvis:     There is diffuse hepatic steatosis. There is an 8 mm hypodensity present within the right hepatic lobe on series 3, image 128, possibly a cyst but too small to characterize and unchanged when compared to the prior study. No new hyperenhancing hepatic mass observed in the liver on the early arterial phase images. There are calcified gallstones present in the gallbladder lumen. No gallbladder inflammatory change. The spleen is unremarkable. The stomach, duodenal C-loop, and adrenal glands are unremarkable.     The most significant abnormality relates to the presence of an approximately 37 x 35 mm proximal pancreatic body mass, slightly smaller in transverse dimension when compared with the previous examination. The mass involves a 90° area the celiac artery and a 180° area of the splenic artery on series 2 images 76-81. There is also soft tissue extending along the anterior and right lateral wall of the portal vein on series 2, image 78 which may represent tumor infiltrating the retroperitoneal soft tissue planes adjacent to the portal vein/portal confluence. There is also abnormal soft tissue insinuating between the left lateral margin of the celiac artery and adjacent splenic vein on series 2, image 77. As best appreciated on series 2, image 84 and series 601, image 59, there is invasion and focal occlusion of the proximal most aspect of the splenic vein. There is diffuse dilatation of the main pancreatic duct within the pancreatic body and tail and there is complete atrophy of the mid and distal pancreatic body and tail.     The abdominal aorta is not aneurysmal. There is minimal calcified atherosclerotic plaque deposition noted within the distal infrarenal abdominal aorta. No bulky periaortic or retroperitoneal lymphadenopathy appreciated. The kidneys show no evidence of stones, hydronephrosis, or solid enhancing mass. The ureters are normal in caliber and course without stones. The urinary bladder  shows no significant abnormalities. The uterus and ovaries show no significant abnormalities.     The appendix is unremarkable. No evidence of high-grade bowel obstruction. There is concentric wall thickening present within the distal sigmoid colon/rectum on series 3, image 218. Infectious and inflammatory etiologies of proctocolitis are possible. No ascites. No pneumoperitoneum. No inguinal hernia or inguinal lymphadenopathy detected. No mesenteric adenopathy or new peritoneal soft tissue mass.     Bones: No evidence of acute thoracolumbar compression fracture. No imaging evidence of lytic or blastic osseous metastatic disease on the provided images.       Assessment:       1. 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therapy    2. Malignant neoplasm of body of pancreas    3. Pulmonary nodule    4. Thrombocytopenia    5. Pancreatic insufficiency    6. Hypothyroidism, unspecified type    7. Type 2 diabetes mellitus without complication, without long-term current use of insulin         Plan:   Pancreatic Cancer - Patient with Stage III pancreatic cancer with concern for potential encasement of the distal celiac artery per discussion with Dr Valentin  -No sign of mets  - 94.5 on 6/17/24  -Case discussed at tumor board 7/17/24 with recommendation to proceed with chemo  -[employer] genetic testing negative for the genes tested  -[facility] tissue testing showed TP53, KRAS p.G12D, CDKN2A, CDKN2B mutations.  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 Rascon same day, will f/u with Dr. Hunt on 1/27/25 and see Dr. Valentin on 2/5/25.     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 - pt at increased risk of infection  -No current signs of infection  -Will monitor     Pulmonary Nodule - pt with RLL on CT C/A/P 10/02/24  -5mm and described as ground glass  -Will monitor    Thrombocytopenia - platelets 110k on 1/14/25  -Due to chemo  -Will monitor     Hypokalemia - 3.8 on 1/14/25  -Pt to continue potassium chloride     Pancreatic Insufficiency - Pt not taking Creon as she states it gives her diarrhea when taking  -Will monitor     Hypothyroidism - pt on synthroid  -Will monitror     DMII - PT currently on Synjardy  Lab Results   Component Value Date    HGBA1C 7.0 (H) 09/20/2024   -Possibly due to pancreatic cancer  -Will monitor    Visit today included increased complexity associated with the care of the episodic problem (chemotherapy) addressed and managing the longitudinal care of the patient due to the serious and/or complex managed problem(s) pancreatic cancer.    ERWIN Adair  Hematology and Medical Oncology  [facility]  [facility]    60 minutes of total time spent on the encounter, which includes face to face time and non-face to face time preparing to see the patient (eg, review of tests), Obtaining and/or reviewing separately obtained history, documenting clinical information in the electronic or other health record, independently interpreting results if documented above (not separately reported) and communicating results to the patient/family/caregiver, or Care coordination (not separately reported).     Route Chart for Scheduling    Med Onc Chart Routing      Follow up with physician . Patient will need CBC, CMP, mag and CA 19-9 on 1/26/25 and see Dr. Hunt on 1/27/25 for follow up.   Follow up with ANALIA . Proceed with cycle 12 of  FOLFIRINOX, d/c pump and OBI 1/16/25   Infusion scheduling note    Injection scheduling note    Labs    Imaging    Pharmacy appointment    Other referrals              Treatment Plan Information   OP GI FOLFIRINOX (oxaliplatin, leucovorin, irinotecan, fluorouracil) Q2W  Rajat Hunt MD   Associated diagnosis: Malignant neoplasm of pancreas Stage III cT4, cN0, cM0 noted on 7/12/2024   Line of treatment: Neoadjuvant  Treatment Goal: Curative     Upcoming Treatment Dates - OP GI FOLFIRINOX (oxaliplatin, leucovorin, irinotecan, fluorouracil) Q2W     1/14/2025       Chemotherapy       oxaliplatin (ELOXATIN) 65 mg/m2 = 102 mg in D5W 520.4 mL chemo infusion       leucovorin calcium 320 mg/m2 = 500 mg in D5W 250 mL infusion       irinotecan (CAMPTOSAR) 140 mg/m2 = 220 mg in 0.9% NaCl 511 mL chemo infusion       fluorouraciL injection 500 mg       fluorouracil (Adrucil) 1,920 mg/m2 = 3,015 mg in 0.9% NaCl 100 mL chemo infusion       Antiemetics       palonosetron 0.25mg/dexAMETHasone 12mg in NS IVPB 0.25 mg 50 mL  1/16/2025       Growth Factor       pegfilgrastim (NEULASTA (ON BODY INJECTOR)) injection 6 mg  1/28/2025       Chemotherapy       oxaliplatin (ELOXATIN) 65 mg/m2 = 102 mg in D5W 520.4 mL chemo infusion       leucovorin calcium 320 mg/m2 = 500 mg in D5W 250 mL infusion       irinotecan (CAMPTOSAR) 140 mg/m2 = 220 mg in 0.9% NaCl 511 mL chemo infusion       fluorouraciL injection 500 mg       fluorouracil (Adrucil) 1,920 mg/m2 = 3,015 mg in 0.9% NaCl 100 mL chemo infusion       Antiemetics       palonosetron 0.25mg/dexAMETHasone 12mg in NS IVPB 0.25 mg 50 mL  1/30/2025       Growth Factor       pegfilgrastim (NEULASTA (ON BODY INJECTOR)) injection 6 mg    Therapy Plan Information  PORT FLUSH for Malignant neoplasm of pancreas, noted on 7/12/2024  Flushes  heparin, porcine (PF) 100 unit/mL injection flush 500 Units  500 Units, Intravenous, Every visit  sodium chloride 0.9% flush 10 mL  10 mL, Intravenous, Every  visit      No therapy plan of the specified type found.    No therapy plan of the specified type found.

## 2025-01-14 ENCOUNTER — DOCUMENTATION ONLY (OUTPATIENT)
Dept: INFUSION THERAPY | Facility: HOSPITAL | Age: 65
End: 2025-01-14
Payer: MEDICARE

## 2025-01-14 ENCOUNTER — INFUSION (OUTPATIENT)
Dept: INFUSION THERAPY | Facility: HOSPITAL | Age: 65
End: 2025-01-14
Attending: INTERNAL MEDICINE
Payer: MEDICARE

## 2025-01-14 ENCOUNTER — OFFICE VISIT (OUTPATIENT)
Dept: HEMATOLOGY/ONCOLOGY | Facility: CLINIC | Age: 65
End: 2025-01-14
Payer: MEDICARE

## 2025-01-14 VITALS
HEART RATE: 104 BPM | TEMPERATURE: 98 F | RESPIRATION RATE: 16 BRPM | DIASTOLIC BLOOD PRESSURE: 70 MMHG | OXYGEN SATURATION: 99 % | BODY MASS INDEX: 23.73 KG/M2 | WEIGHT: 125.69 LBS | SYSTOLIC BLOOD PRESSURE: 115 MMHG | HEIGHT: 61 IN

## 2025-01-14 VITALS
HEART RATE: 89 BPM | HEIGHT: 61 IN | RESPIRATION RATE: 16 BRPM | WEIGHT: 125.69 LBS | SYSTOLIC BLOOD PRESSURE: 127 MMHG | BODY MASS INDEX: 23.73 KG/M2 | DIASTOLIC BLOOD PRESSURE: 73 MMHG | OXYGEN SATURATION: 99 % | TEMPERATURE: 98 F

## 2025-01-14 DIAGNOSIS — C25.1 MALIGNANT NEOPLASM OF BODY OF PANCREAS: ICD-10-CM

## 2025-01-14 DIAGNOSIS — D69.6 THROMBOCYTOPENIA: ICD-10-CM

## 2025-01-14 DIAGNOSIS — C25.9 MALIGNANT NEOPLASM OF PANCREAS, UNSPECIFIED LOCATION OF MALIGNANCY: Primary | ICD-10-CM

## 2025-01-14 DIAGNOSIS — Z79.899 IMMUNODEFICIENCY DUE TO CHEMOTHERAPY: Primary | ICD-10-CM

## 2025-01-14 DIAGNOSIS — T45.1X5A IMMUNODEFICIENCY DUE TO CHEMOTHERAPY: Primary | ICD-10-CM

## 2025-01-14 DIAGNOSIS — E03.9 HYPOTHYROIDISM, UNSPECIFIED TYPE: ICD-10-CM

## 2025-01-14 DIAGNOSIS — K86.89 PANCREATIC INSUFFICIENCY: ICD-10-CM

## 2025-01-14 DIAGNOSIS — D84.821 IMMUNODEFICIENCY DUE TO CHEMOTHERAPY: Primary | ICD-10-CM

## 2025-01-14 DIAGNOSIS — E11.9 TYPE 2 DIABETES MELLITUS WITHOUT COMPLICATION, WITHOUT LONG-TERM CURRENT USE OF INSULIN: ICD-10-CM

## 2025-01-14 DIAGNOSIS — R91.1 PULMONARY NODULE: ICD-10-CM

## 2025-01-14 PROCEDURE — 96416 CHEMO PROLONG INFUSE W/PUMP: CPT | Mod: PN

## 2025-01-14 PROCEDURE — 96368 THER/DIAG CONCURRENT INF: CPT | Mod: PN

## 2025-01-14 PROCEDURE — 96417 CHEMO IV INFUS EACH ADDL SEQ: CPT | Mod: PN

## 2025-01-14 PROCEDURE — 96413 CHEMO IV INFUSION 1 HR: CPT | Mod: PN

## 2025-01-14 PROCEDURE — 63600175 PHARM REV CODE 636 W HCPCS: Mod: PN | Performed by: NURSE PRACTITIONER

## 2025-01-14 PROCEDURE — 3008F BODY MASS INDEX DOCD: CPT | Mod: CPTII,S$GLB,, | Performed by: NURSE PRACTITIONER

## 2025-01-14 PROCEDURE — 96411 CHEMO IV PUSH ADDL DRUG: CPT | Mod: PN

## 2025-01-14 PROCEDURE — 3074F SYST BP LT 130 MM HG: CPT | Mod: CPTII,S$GLB,, | Performed by: NURSE PRACTITIONER

## 2025-01-14 PROCEDURE — 25000003 PHARM REV CODE 250: Mod: PN | Performed by: NURSE PRACTITIONER

## 2025-01-14 PROCEDURE — 96415 CHEMO IV INFUSION ADDL HR: CPT | Mod: PN

## 2025-01-14 PROCEDURE — 3288F FALL RISK ASSESSMENT DOCD: CPT | Mod: CPTII,S$GLB,, | Performed by: NURSE PRACTITIONER

## 2025-01-14 PROCEDURE — 96375 TX/PRO/DX INJ NEW DRUG ADDON: CPT | Mod: PN

## 2025-01-14 PROCEDURE — 3078F DIAST BP <80 MM HG: CPT | Mod: CPTII,S$GLB,, | Performed by: NURSE PRACTITIONER

## 2025-01-14 PROCEDURE — 99215 OFFICE O/P EST HI 40 MIN: CPT | Mod: S$GLB,,, | Performed by: NURSE PRACTITIONER

## 2025-01-14 PROCEDURE — 1126F AMNT PAIN NOTED NONE PRSNT: CPT | Mod: CPTII,S$GLB,, | Performed by: NURSE PRACTITIONER

## 2025-01-14 PROCEDURE — 99999 PR PBB SHADOW E&M-EST. PATIENT-LVL V: CPT | Mod: PBBFAC,,, | Performed by: NURSE PRACTITIONER

## 2025-01-14 PROCEDURE — G2211 COMPLEX E/M VISIT ADD ON: HCPCS | Mod: S$GLB,,, | Performed by: NURSE PRACTITIONER

## 2025-01-14 PROCEDURE — 96367 TX/PROPH/DG ADDL SEQ IV INF: CPT | Mod: PN

## 2025-01-14 PROCEDURE — 1101F PT FALLS ASSESS-DOCD LE1/YR: CPT | Mod: CPTII,S$GLB,, | Performed by: NURSE PRACTITIONER

## 2025-01-14 RX ORDER — PROCHLORPERAZINE EDISYLATE 5 MG/ML
5 INJECTION INTRAMUSCULAR; INTRAVENOUS ONCE AS NEEDED
Status: CANCELLED | OUTPATIENT
Start: 2025-01-16

## 2025-01-14 RX ORDER — SODIUM CHLORIDE 0.9 % (FLUSH) 0.9 %
10 SYRINGE (ML) INJECTION
Status: CANCELLED | OUTPATIENT
Start: 2025-01-16

## 2025-01-14 RX ORDER — EPINEPHRINE 0.3 MG/.3ML
0.3 INJECTION SUBCUTANEOUS ONCE AS NEEDED
Status: DISCONTINUED | OUTPATIENT
Start: 2025-01-14 | End: 2025-01-14 | Stop reason: HOSPADM

## 2025-01-14 RX ORDER — SODIUM CHLORIDE 0.9 % (FLUSH) 0.9 %
10 SYRINGE (ML) INJECTION
Status: CANCELLED | OUTPATIENT
Start: 2025-01-14

## 2025-01-14 RX ORDER — EPINEPHRINE 0.3 MG/.3ML
0.3 INJECTION SUBCUTANEOUS ONCE AS NEEDED
Status: CANCELLED | OUTPATIENT
Start: 2025-01-14

## 2025-01-14 RX ORDER — FLUOROURACIL 50 MG/ML
320 INJECTION, SOLUTION INTRAVENOUS
Status: CANCELLED | OUTPATIENT
Start: 2025-01-14

## 2025-01-14 RX ORDER — ATROPINE SULFATE 0.4 MG/ML
0.4 INJECTION, SOLUTION ENDOTRACHEAL; INTRAMEDULLARY; INTRAMUSCULAR; INTRAVENOUS; SUBCUTANEOUS ONCE AS NEEDED
Status: CANCELLED | OUTPATIENT
Start: 2025-01-14

## 2025-01-14 RX ORDER — SODIUM CHLORIDE 0.9 % (FLUSH) 0.9 %
10 SYRINGE (ML) INJECTION
Status: DISCONTINUED | OUTPATIENT
Start: 2025-01-14 | End: 2025-01-14 | Stop reason: HOSPADM

## 2025-01-14 RX ORDER — DIPHENHYDRAMINE HYDROCHLORIDE 50 MG/ML
50 INJECTION INTRAMUSCULAR; INTRAVENOUS ONCE AS NEEDED
Status: CANCELLED | OUTPATIENT
Start: 2025-01-14

## 2025-01-14 RX ORDER — DIPHENHYDRAMINE HYDROCHLORIDE 50 MG/ML
50 INJECTION INTRAMUSCULAR; INTRAVENOUS ONCE AS NEEDED
Status: DISCONTINUED | OUTPATIENT
Start: 2025-01-14 | End: 2025-01-14 | Stop reason: HOSPADM

## 2025-01-14 RX ORDER — HEPARIN 100 UNIT/ML
500 SYRINGE INTRAVENOUS
Status: CANCELLED | OUTPATIENT
Start: 2025-01-14

## 2025-01-14 RX ORDER — ATROPINE SULFATE 0.4 MG/ML
0.4 INJECTION, SOLUTION ENDOTRACHEAL; INTRAMEDULLARY; INTRAMUSCULAR; INTRAVENOUS; SUBCUTANEOUS ONCE AS NEEDED
Status: COMPLETED | OUTPATIENT
Start: 2025-01-14 | End: 2025-01-14

## 2025-01-14 RX ORDER — HEPARIN 100 UNIT/ML
500 SYRINGE INTRAVENOUS
Status: CANCELLED | OUTPATIENT
Start: 2025-01-16

## 2025-01-14 RX ORDER — PROCHLORPERAZINE EDISYLATE 5 MG/ML
5 INJECTION INTRAMUSCULAR; INTRAVENOUS ONCE AS NEEDED
Status: CANCELLED | OUTPATIENT
Start: 2025-01-14

## 2025-01-14 RX ORDER — FLUOROURACIL 50 MG/ML
320 INJECTION, SOLUTION INTRAVENOUS
Status: COMPLETED | OUTPATIENT
Start: 2025-01-14 | End: 2025-01-14

## 2025-01-14 RX ADMIN — DEXAMETHASONE SODIUM PHOSPHATE 0.25 MG: 10 INJECTION, SOLUTION INTRAMUSCULAR; INTRAVENOUS at 10:01

## 2025-01-14 RX ADMIN — SODIUM CHLORIDE 220 MG: 9 INJECTION, SOLUTION INTRAVENOUS at 01:01

## 2025-01-14 RX ADMIN — OXALIPLATIN 100 MG: 5 INJECTION, SOLUTION INTRAVENOUS at 10:01

## 2025-01-14 RX ADMIN — FLUOROURACIL 500 MG: 50 INJECTION, SOLUTION INTRAVENOUS at 02:01

## 2025-01-14 RX ADMIN — DEXTROSE MONOHYDRATE: 5 INJECTION, SOLUTION INTRAVENOUS at 10:01

## 2025-01-14 RX ADMIN — FLUOROURACIL 3000 MG: 50 INJECTION, SOLUTION INTRAVENOUS at 02:01

## 2025-01-14 RX ADMIN — LEUCOVORIN CALCIUM 500 MG: 350 INJECTION, POWDER, LYOPHILIZED, FOR SUSPENSION INTRAMUSCULAR; INTRAVENOUS at 01:01

## 2025-01-14 RX ADMIN — ATROPINE SULFATE 0.4 MG: 0.4 INJECTION, SOLUTION INTRAVENOUS at 12:01

## 2025-01-14 NOTE — PROGRESS NOTES
Today is pt's last chemo. She is going to wait to ring the bell because her  is currently hospitalized. She wants to wait until her  and daughters can be here with her. She is planning to return to ring the bell at a later date.     SW provided support and provided a gas card today.     Bharati Fritz LCSW

## 2025-01-14 NOTE — PLAN OF CARE
Problem: Adult Inpatient Plan of Care  Goal: Patient-Specific Goal (Individualized)  Outcome: Progressing  Flowsheets (Taken 1/14/2025 1000)  Individualized Care Needs: recliner,conversation, spouse chairside  Anxieties, Fears or Concerns: none today  Patient/Family-Specific Goals (Include Timeframe): no s/s of rx with tx today     Problem: Fatigue  Goal: Improved Activity Tolerance  Outcome: Progressing  Intervention: Promote Improved Energy  Flowsheets (Taken 1/14/2025 1000)  Fatigue Management: frequent rest breaks encouraged  Sleep/Rest Enhancement: regular sleep/rest pattern promoted  Activity Management: Ambulated -L4  Environmental Support: rest periods encouraged

## 2025-01-14 NOTE — PLAN OF CARE
Problem: Adult Inpatient Plan of Care  Goal: Plan of Care Review  Outcome: Progressing  Flowsheets (Taken 1/14/2025 6680)  Plan of Care Reviewed With:   patient   child   Pt tolerated Folfirinox well. No adverse reaction noted. Pt education reinforced on chemo regimen, side effects, what to expect, and when to call physician. Pt verbalized understanding. I reviewed pt calendar w/ pt and understanding verbalized. PAC remains accessed with 5FU infusing at 2.2cc/hr over 46 hours.  All connections checked and secure. Patent upon discharge in NAD.

## 2025-01-16 ENCOUNTER — INFUSION (OUTPATIENT)
Dept: INFUSION THERAPY | Facility: HOSPITAL | Age: 65
End: 2025-01-16
Attending: INTERNAL MEDICINE
Payer: MEDICARE

## 2025-01-16 VITALS
HEIGHT: 61 IN | RESPIRATION RATE: 16 BRPM | SYSTOLIC BLOOD PRESSURE: 128 MMHG | TEMPERATURE: 98 F | OXYGEN SATURATION: 99 % | HEART RATE: 92 BPM | BODY MASS INDEX: 23.73 KG/M2 | WEIGHT: 125.69 LBS | DIASTOLIC BLOOD PRESSURE: 63 MMHG

## 2025-01-16 DIAGNOSIS — C25.9 MALIGNANT NEOPLASM OF PANCREAS, UNSPECIFIED LOCATION OF MALIGNANCY: Primary | ICD-10-CM

## 2025-01-16 PROCEDURE — 63600175 PHARM REV CODE 636 W HCPCS: Mod: JZ,TB,PN | Performed by: NURSE PRACTITIONER

## 2025-01-16 PROCEDURE — 96377 APPLICATON ON-BODY INJECTOR: CPT | Mod: PN

## 2025-01-16 RX ORDER — SODIUM CHLORIDE 0.9 % (FLUSH) 0.9 %
10 SYRINGE (ML) INJECTION
Status: DISCONTINUED | OUTPATIENT
Start: 2025-01-16 | End: 2025-01-16 | Stop reason: HOSPADM

## 2025-01-16 RX ORDER — HEPARIN 100 UNIT/ML
500 SYRINGE INTRAVENOUS
Status: DISCONTINUED | OUTPATIENT
Start: 2025-01-16 | End: 2025-01-16 | Stop reason: HOSPADM

## 2025-01-16 RX ADMIN — PEGFILGRASTIM 6 MG: KIT SUBCUTANEOUS at 02:01

## 2025-01-16 NOTE — PLAN OF CARE
Problem: Adult Inpatient Plan of Care  Goal: Plan of Care Review  Outcome: Progressing  Flowsheets (Taken 1/16/2025 1437)  Plan of Care Reviewed With:   patient   family  Goal: Patient-Specific Goal (Individualized)  Outcome: Progressing     Problem: Fatigue  Goal: Improved Activity Tolerance  Outcome: Progressing  Intervention: Promote Improved Energy  Flowsheets (Taken 1/16/2025 1437)  Fatigue Management: frequent rest breaks encouraged  Activity Management:   Ambulated -L4   Ambulated to bathroom - L4   Ambulated in [place] - L4  Environmental Support: calm environment promoted    Pt tolerated pump d/c well.   No adverse reaction noted.  PAC flushed with NS and de-accessed per protocol.   OBI placed, flashing green when left.   Pt left clinic in no acute distress.

## 2025-01-20 ENCOUNTER — PATIENT MESSAGE (OUTPATIENT)
Dept: DIABETES | Facility: CLINIC | Age: 65
End: 2025-01-20
Payer: MEDICARE

## 2025-01-21 ENCOUNTER — TELEPHONE (OUTPATIENT)
Dept: RADIATION ONCOLOGY | Facility: CLINIC | Age: 65
End: 2025-01-21
Payer: MEDICARE

## 2025-01-22 ENCOUNTER — TELEPHONE (OUTPATIENT)
Dept: RADIATION ONCOLOGY | Facility: CLINIC | Age: 65
End: 2025-01-22
Payer: MEDICARE

## 2025-01-23 ENCOUNTER — TELEPHONE (OUTPATIENT)
Dept: HEMATOLOGY/ONCOLOGY | Facility: CLINIC | Age: 65
End: 2025-01-23
Payer: MEDICARE

## 2025-01-23 NOTE — TELEPHONE ENCOUNTER
Patient unable to get scan done on 1/21 due to weather. I rescheduled to 1/27 along with labs and moved Dr Davis appt to 1/31 with Brandee. Left message for patient to call me back for details

## 2025-01-27 ENCOUNTER — HOSPITAL ENCOUNTER (OUTPATIENT)
Dept: RADIOLOGY | Facility: HOSPITAL | Age: 65
Discharge: HOME | End: 2025-01-27
Attending: INTERNAL MEDICINE
Payer: MEDICARE

## 2025-01-27 DIAGNOSIS — C25.1 MALIGNANT NEOPLASM OF BODY OF PANCREAS: ICD-10-CM

## 2025-01-27 PROCEDURE — 74177 CT ABD & PELVIS W/CONTRAST: CPT | Mod: 26,,, | Performed by: RADIOLOGY

## 2025-01-27 PROCEDURE — 71260 CT THORAX DX C+: CPT | Mod: 26,,, | Performed by: RADIOLOGY

## 2025-01-27 PROCEDURE — 71260 CT THORAX DX C+: CPT | Mod: TC,PO

## 2025-01-27 PROCEDURE — 25500020 PHARM REV CODE 255: Mod: PO | Performed by: INTERNAL MEDICINE

## 2025-01-27 RX ADMIN — IOHEXOL 75 ML: 350 INJECTION, SOLUTION INTRAVENOUS at 04:01

## 2025-01-30 NOTE — PROGRESS NOTES
PATIENT: Bessy Lamb  MRN: [id number]  DATE: 2/2/2025      Diagnosis:   1. Malignant neoplasm of body of pancreas    2. 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therapy    3. Pulmonary nodule    4. Thrombocytopenia    5. Pancreatic insufficiency    6. Hypothyroidism, unspecified type                      Chief Complaint: Malignant neoplasm of body of pancreas      Oncologic History:      Oncologic History     Oncologic Treatment     Pathology           Subjective:    Interval History:  Ms. Lamb is a 64 y.o. female with Arthritis, DMII, GERD, Hypotyroidism, Obesity, Thyroid disease who presents for pancreatic cancer.  Since the last clinic visit the patient on went CT chest, abdomen, and pelvis on 01/27/2025 showing apical pleural thickening bilaterally stable since prior exam; 5 mm nodule of the right lung base; 3 possibly slightly smaller nodules at 4 mm; 1-2 mm nodule in the left lung apex; 8 mm hypodensity at the tip of the right lobe of the liver favored to be a cyst; significant improvement in pancreatic mass now measuring 2.9 x 1.7 cm appearing surround the celiac artery and to wrap partially around the portal vein likely greater than 180°.  The patient denies CP, cough, SOB, abdominal pain, nausea, vomiting, constipation, diarrhea.  The patient denies fever, chills, night sweats, weight loss, new lumps or bumps, easy bruising or bleeding.    Prior History:   The patient initially underwent a CXR on 11/13/23 fur a URI which showed possible subtle pulmonary nodules.  CT chest 12/01/2023 showed a cluster of ill-defined centrilobular ground-glass opacity he inferior right and left upper lobes as well as a 1.2 cm ground-glass opacity in the superior segment of the left lower lobe; solid 3 mm pulmonary nodule in the right upper lobe; subcentimeter right hepatic lobe hypodensity likely representing a cyst.  The patient underwent surveillance CT chest on 05/21/2024 showing a 3.7 x 2.7 pancreatic body mass in the  pancreatic tail atrophy suspicious of pancreatic malignancy as well as and unchanged benign 3 mm nodule in the right upper lobe.  MRI abdomen on 06/30/2024 showed hypoenhancing mass centered in the proximal pancreatic body measuring 2.9 x 2 cm with abutment and possible encasement of the distal splenic vein.  EUS was performed on 07/05/2024 mass in the pancreatic body stage T4 N0 M0 by endo sonographic criteria.  Pathology from biopsy of the stomach showed mild focally moderate chronic gastritis with no evidence of the H pylori.  Stomach polyp which was removed code hyperplastic polyps with chronic inflammation.  Pancreatic biopsy showed adenocarcinoma.  CT of the chest, abdomen, and pelvis on 07/09/2024 showed a 9 mm lesion in the right hepatic lobe previously characterized as cysts; mass in the pancreatic body measuring 4.4 x 3.7 cm with diffuse pancreatic ductal dilatation measuring 8 mm:  Weaning vein at the portal confluence occluded with reconstitution via collaterals.  The mass abuts the portal vein by less than 180° but does not case the proximal splenic artery remains patent.  Also noted was a pancreatic lymph node measuring 0.7 cm.   Patient's case was discussed at tumor board on 07/17/2024 with recommendation for proceeding with the chemotherapy.  Patient had port placement on 07/18/2024.   The patient is admitted to the hospital from 09/20/2024 until 09/22/2024 for hypokalemia with potassium of 2.3.  The patient was subsequently discharged underwent treatment with FOLFIRINOX on 09/23/2024.  CT of the chest, abdomen, and pelvis on 10/02/2024 showed a new 5 mm ground-glass nodule in the right lower lobe; stable 8 mm hypodensity present within the right hepatic lobe suggestive of a cyst; 37 x 35 mm proximal pancreatic body mass slightly smaller in transverse dimension involving 90 degree area of the celiac artery and 100 degree area of the splenic artery with soft tissue extending along the anterior right  lateral wall of the portal vein; abnormal soft tissue insinuating between the lateral margin of the celiac artery and adjacent splenic vein; invasion and focal occlusion of the proximal most aspect of the splenic vein; concentric wall thickening present within the distal sigmoid colon/rectum with infectious and inflammatory etiologies possible.   The patient met with Dr Valentin on 10/16/24 whom felt the patient could receive a few more cycles of chemo and then transition to perioperative radiation therapy.   The patient's case was discussed with Dr. Valentin and .  Plan is to proceed with up to 12 cycles FOLFIRINOX prior to consideration of radiation versus surgical resection.    Past Medical History:   Past Medical History:   Diagnosis Date    Arthritis, lumbar spine     hands    Diabetes mellitus     General anesthetics causing adverse effect in therapeutic use     following breast rediuct, hard time awakening  also had anxiety rxn to lidocain in dental ofc    GERD (gastroesophageal reflux disease)     Hypothyroidism 10/08/2014    Maternal anesthesia complication     epidural for 1st child went up instead of down; required intubation    Normocytic anemia 2024    Obesity (BMI 30-39.9) 10/08/2014    pancreatic Cancer         Thyroid disease     Thyroid nodule 10/08/2014       Past Surgical HIstory:   Past Surgical History:   Procedure Laterality Date    BREAST BIOPSY Left     b9    BREAST SURGERY      Reduction cause of a mass in left breast    c-sections x2       SECTION  3/26/81 & 85    Birth of two girls    COLONOSCOPY  2012         COLONOSCOPY N/A 2018    Procedure: COLONOSCOPY;  Surgeon: Francisco Mcclain MD;  Location: [facility];  Service: Endoscopy;  Laterality: N/A;    COLONOSCOPY N/A 10/24/2023    Procedure: COLONOSCOPY;  Surgeon: Francisco Mcclain MD;  Location: [facility];  Service: Endoscopy;  Laterality: N/A;    ENDOSCOPIC ULTRASOUND OF UPPER GASTROINTESTINAL  TRACT Left 7/5/2024    Procedure: ULTRASOUND, UPPER GI TRACT, ENDOSCOPIC;  Surgeon: Andrew Gordon MD;  Location: [facility] ENDO;  Service: Endoscopy;  Laterality: Left;    ESOPHAGOGASTRODUODENOSCOPY N/A 7/5/2024    Procedure: EGD (ESOPHAGOGASTRODUODENOSCOPY);  Surgeon: Andrew Gordon MD;  Location: [facility] ENDO;  Service: Endoscopy;  Laterality: N/A;    hysteroscopy with polypectomy      INCISIONAL BREAST BIOPSY Left 1996    benign; done at same time as reduction    INSERTION OF TUNNELED CENTRAL VENOUS CATHETER (CVC) WITH SUBCUTANEOUS PORT N/A 7/18/2024    Procedure: [id number];  Surgeon: Blanca Dillard MD;  Location: [facility];  Service: General;  Laterality: N/A;    TOTAL REDUCTION MAMMOPLASTY Bilateral 1996       Family History:   Family History   Problem Relation Name Age of Onset    Brain cancer Mother Ravi Gill Maceymacario Tolbert     Early death Mother Ravi Gill Macey Tolbert 51        Passed at 51    Cancer Mother Ravi Gill Macey Tolbert         Brain tumor    Arthritis Mother Ravi Gill Macey Tomasz     Diabetes Father Ck pham tomasz         Type 2    Cirrhosis Father Ck pham tomasz     Cancer Father Ck ankit tolbert         Bone    COPD Father Ck ankit tolbert         Smoker    Early death Father Ck tolbert         Passed at 64    Lung cancer Father Ck pham tomasz     Heart disease Sister Mamta (dianna)wescovicsalima         Congestive heart failure (passed 2/11/18    Hypertension Sister Mamta (dianna)wescovich     Kidney disease Sister Mamta (dianna)martincoradha         Doc removed when she was a child    Hypertension Sister Mayank     Depression Sister Ravi (amyank) macey Mauricio ( sister)         Due to loss of her 27 year old son    Early death Sister Ravi (mayank) macey Mauricio ( sister)         Pulmonary hypertension, cirrhosis of the liver(passed 7/14/2007   Age 57    Heart disease Brother John Choudhury  ( passed )         Heart attack    Hypertension Brother John Choudhury ( passed )     Heart disease Brother Juan Francisco Choudhury         Heart  blockage    Heart disease Brother Bhanu Choudhury         Heart attack (passed)    Diabetes Brother Bhanu Choudhury     Macular degeneration Brother Bhanu Choudhury     Breast cancer Maternal Aunt  30    Breast cancer Paternal Aunt  40    Breast cancer Paternal Aunt  40    Ovarian cancer Neg Hx         Social History:  reports that she has never smoked. She has never used smokeless tobacco. She reports that she does not currently use alcohol. She reports that she does not use drugs.    Allergies:  Review of patient's allergies indicates:   Allergen Reactions    Duricef [cefadroxil] Hives       Medications:  Current Outpatient Medications   Medication Sig Dispense Refill    acetaminophen (TYLENOL) 325 MG tablet Take 325 mg by mouth every 6 (six) hours as needed for Pain.      blood-glucose meter Misc Use as directed 1 each prn    duke's soln (benadryl 30 mL, mylanta 30 mL, LIDOcaine 30 mL, nystatin 30 mL) 120mL 10 ml swish & spit/swallow every 4 to 6 hours as needed for mouth pain/ulcers/yeast/throat pain 240 mL 1    ergocalciferol (VITAMIN D2) 50,000 unit Cap Take 1 capsule (50,000 Units total) by mouth twice a week. 24 capsule 3    fluticasone propionate (FLONASE) 50 mcg/actuation nasal spray 1 spray (50 mcg total) by Each Nostril route once daily. 18.2 mL 0    levothyroxine (SYNTHROID) 50 MCG tablet Take 1 tablet (50 mcg total) by mouth once daily. 90 tablet 3    lipase-protease-amylase 24,000-76,000-120,000 units (CREON) 24,000-76,000 -120,000 unit capsule Take 2 capsules by mouth 3 (three) times daily with meals. 180 capsule 11    loperamide (IMODIUM) 2 mg capsule Take 2 tablets (4mg) by mouth after first loose stool, 1 tablet every 2 hours until diarrhea free for 12 hours. May take 2 tablets (4mg) by mouth every 4 hours at night. May require more than the package labeling maximum dose of  16mg/day. 30 capsule 11    loratadine (CLARITIN) 10 mg tablet Take 1 tablet (10 mg total) by mouth every morning. 30 tablet 11    losartan (COZAAR) 25 MG tablet Take 1 tablet (25 mg total) by mouth as needed (take for bp equal or greater than 120/70).      meclizine (ANTIVERT) 25 mg tablet Take 1 tablet (25 mg total) by mouth 3 (three) times daily as needed for Dizziness. 30 tablet 0    mupirocin (BACTROBAN) 2 % ointment Apply topically 3 (three) times daily.      OLANZapine (ZYPREXA) 5 MG tablet Take 1 tablet by mouth nightly on days 1-3 of each chemotherapy cycle. 6 tablet 11    potassium chloride SA (K-DUR,KLOR-CON) 20 MEQ tablet Take 20 mEq by mouth once.      prochlorperazine (COMPAZINE) 5 MG tablet Take 1 tablet (5 mg total) by mouth every 6 (six) hours as needed for Nausea. 20 tablet 5    simvastatin (ZOCOR) 10 MG tablet Take 1 tablet (10 mg total) by mouth every evening. 90 tablet 3    SYNJARDY XR 10-1,000 mg TBph TAKE ONE TABLET BY MOUTH ONCE DAILY 30 tablet 5    TRUE METRIX GLUCOSE TEST STRIP Strp USE 1 STRIP ONCE DAILY TO TEST BLOOD GLUCOSE (50 DAY SUPPLY) 100 each 3    TRUEPLUS LANCETS 33 gauge Misc USE AS DIRECTED TO TEST BLOOD SUGAR ONCE DAILY FOR UP  USES 100 each 2     No current facility-administered medications for this visit.     Facility-Administered Medications Ordered in Other Visits   Medication Dose Route Frequency Provider Last Rate Last Admin    alteplase injection 2 mg  2 mg Intra-Catheter PRN Arash Fuchs, NP        diphenhydrAMINE injection 50 mg  50 mg Intravenous Once PRN Arash Fuchs, NP        EPINEPHrine (EPIPEN) 0.3 mg/0.3 mL pen injection 0.3 mg  0.3 mg Intramuscular Once PRN Arash Fuchs, NP        heparin, porcine (PF) 100 unit/mL injection flush 500 Units  500 Units Intravenous PRN Arash Fuchs, NP        hydrocortisone sodium succinate injection 100 mg  100 mg Intravenous Once PRN Arash Fuchs, NP        prochlorperazine injection Soln 5 mg  5 mg Intravenous Once  "PRN Arash Fuchs, NP        sodium chloride 0.9% flush 10 mL  10 mL Intravenous PRN Arash Fuchs, NP           Review of Systems   Constitutional:  Negative for chills, fatigue, fever and unexpected weight change.   Respiratory:  Negative for cough and shortness of breath.    Cardiovascular:  Negative for chest pain and palpitations.   Gastrointestinal:  Negative for abdominal pain, constipation, diarrhea, nausea and vomiting.   Skin:  Negative for rash.   Neurological:  Negative for headaches.   Hematological:  Negative for adenopathy. Does not bruise/bleed easily.       ECOG Performance Status: 0   Objective:      Vitals:   Vitals:    01/31/25 1326   BP: 118/70   BP Location: Right arm   Patient Position: Sitting   Pulse: 85   Resp: 16   Temp: 98 °F (36.7 °C)   TempSrc: Temporal   SpO2: 99%   Weight: 55.1 kg (121 lb 7.6 oz)   Height: 5' 1" (1.549 m)       Physical Exam  Constitutional:       General: She is not in acute distress.     Appearance: She is well-developed. She is not diaphoretic.   HENT:      Head: Normocephalic and atraumatic.      Comments: Alopecia  Cardiovascular:      Rate and Rhythm: Normal rate and regular rhythm.      Heart sounds: Normal heart sounds. No murmur heard.     No friction rub. No gallop.   Pulmonary:      Effort: Pulmonary effort is normal. No respiratory distress.      Breath sounds: Normal breath sounds. No wheezing or rales.   Chest:      Chest wall: No tenderness.   Abdominal:      General: Bowel sounds are normal. There is no distension.      Palpations: Abdomen is soft. There is no mass.      Tenderness: There is no abdominal tenderness. There is no guarding or rebound.   Musculoskeletal:      Comments: PORT in right chest   Lymphadenopathy:      Cervical: No cervical adenopathy.      Upper Body:      Right upper body: No supraclavicular or axillary adenopathy.      Left upper body: No supraclavicular or axillary adenopathy.   Skin:     Findings: No erythema or rash. "   Neurological:      Mental Status: She is alert and oriented to person, place, and time.   Psychiatric:         Behavior: Behavior normal.         Laboratory Data:  Lab Visit on 01/27/2025   Component Date Value Ref Range Status    WBC 01/27/2025 5.69  3.90 - 12.70 K/uL Final    RBC 01/27/2025 2.97 (L)  4.00 - 5.40 M/uL Final    Hemoglobin 01/27/2025 9.9 (L)  12.0 - 16.0 g/dL Final    Hematocrit 01/27/2025 28.7 (L)  37.0 - 48.5 % Final    MCV 01/27/2025 97  82 - 98 fL Final    MCH 01/27/2025 33.3 (H)  27.0 - 31.0 pg Final    MCHC 01/27/2025 34.5  32.0 - 36.0 g/dL Final    RDW 01/27/2025 15.3 (H)  11.5 - 14.5 % Final    Platelets 01/27/2025 65 (L)  150 - 450 K/uL Final    MPV 01/27/2025 9.5  9.2 - 12.9 fL Final    Immature Granulocytes 01/27/2025 1.2 (H)  0.0 - 0.5 % Final    Gran # (ANC) 01/27/2025 3.0  1.8 - 7.7 K/uL Final    Immature Grans (Abs) 01/27/2025 0.07 (H)  0.00 - 0.04 K/uL Final    Comment: Mild elevation in immature granulocytes is non specific and   can be seen in a variety of conditions including stress response,   acute inflammation, trauma and pregnancy. Correlation with other   laboratory and clinical findings is essential.      Lymph # 01/27/2025 1.9  1.0 - 4.8 K/uL Final    Mono # 01/27/2025 0.6  0.3 - 1.0 K/uL Final    Eos # 01/27/2025 0.0  0.0 - 0.5 K/uL Final    Baso # 01/27/2025 0.03  0.00 - 0.20 K/uL Final    nRBC 01/27/2025 0  0 /100 WBC Final    Gran % 01/27/2025 52.6  38.0 - 73.0 % Final    Lymph % 01/27/2025 34.1  18.0 - 48.0 % Final    Mono % 01/27/2025 11.2  4.0 - 15.0 % Final    Eosinophil % 01/27/2025 0.4  0.0 - 8.0 % Final    Basophil % 01/27/2025 0.5  0.0 - 1.9 % Final    Differential Method 01/27/2025 Automated   Final    Sodium 01/27/2025 142  136 - 145 mmol/L Final    Potassium 01/27/2025 3.8  3.5 - 5.1 mmol/L Final    Chloride 01/27/2025 106  95 - 110 mmol/L Final    CO2 01/27/2025 25  23 - 29 mmol/L Final    Glucose 01/27/2025 101  70 - 110 mg/dL Final    BUN 01/27/2025 4 (L)   8 - 23 mg/dL Final    Creatinine 01/27/2025 0.8  0.5 - 1.4 mg/dL Final    Calcium 01/27/2025 9.0  8.7 - 10.5 mg/dL Final    Total Protein 01/27/2025 6.7  6.0 - 8.4 g/dL Final    Albumin 01/27/2025 3.9  3.5 - 5.2 g/dL Final    Total Bilirubin 01/27/2025 0.7  0.1 - 1.0 mg/dL Final    Comment: For infants and newborns, interpretation of results should be based  on gestational age, weight and in agreement with clinical  observations.    Premature Infant recommended reference ranges:  Up to 24 hours.............<8.0 mg/dL  Up to 48 hours............<12.0 mg/dL  3-5 days..................<15.0 mg/dL  6-29 days.................<15.0 mg/dL      Alkaline Phosphatase 01/27/2025 130  40 - 150 U/L Final    AST 01/27/2025 23  10 - 40 U/L Final    ALT 01/27/2025 14  10 - 44 U/L Final    eGFR 01/27/2025 >60.0  >60 mL/min/1.73 m^2 Final    Anion Gap 01/27/2025 11  8 - 16 mmol/L Final    Magnesium 01/27/2025 1.6  1.6 - 2.6 mg/dL Final    CA 19-9 01/27/2025 4.9  0.0 - 40.0 U/mL Final    Comment: The testing method is a chemiluminescent microparticle immunoassay   manufactured by [employer] and performed on the Mentegram   or   Spotsi system. Values obtained with different assay manufacturers   for   methods may be different and cannot be used interchangeably.           Imaging:     CT C/A/P 1/27/25  Thorax:     A port is noted overlying the right hemithorax with its tip favored to be within the right atrium near the superior vena cava right atrial junction.     Atherosclerotic calcifications are noted at the aortic arch. Atherosclerotic calcifications are noted at the origin of the left subclavian.     No obvious mediastinal lymphadenopathy is noted.     Apical pleural thickening is noted bilaterally stable since the prior along with a 4-5 mm nodule in right upper lobe image 17 sequence 3 that may relate to apical pleural thickening.     The nodule that was seen to be new on 10/02/2024 of 5 mm at the right lung base  appears decreased in density and is only faintly visualized today image 62 sequence 3 possibly slightly smaller in size as well at 4 mm. Continued follow-up for total resolution is suggested.     A 1-2 mm nodule is noted at the left lung apex image 18 sequence 105 not definitively seen on the prior. In this high-risk patient long-term follow-up size stability would be suggested. A similar such nodule is noted image 40 sequence 3 and image 22 sequence 105.     Abdomen and pelvis:     Decreased liver density is noted as can be seen with fatty infiltration of the liver.     Cholelithiasis is noted with a stone of 6 mm.     At the tip of the right lobe of the liver an 8 mm hypodensity is noted stable since the prior image 126 sequence 3 without worrisome characteristics too small to ideally categorize but statistically favored relate to a cyst.     The adrenal glands and kidneys demonstrate no worrisome abnormality. The spleen demonstrates no worrisome focal lesion. The stomach is partially distended and demonstrates no worrisome abnormality.     Significant improvement is noted since the prior in the region of the pancreas and the known pancreatic mass. Atrophy of the tail and body of the pancreas is noted similar to on the prior to the level of the mass. The mass appears less contiguous than on the prior measuring 2.9 by 1.7 cm in the location at priorly measured 3.8 x 3.6 cm. Soft tissue appears to surround the celiac artery however worrisome for neoplastic disease as can be seen image 117 sequence 3 and image 116 sequence 3. Density is noted as on the prior that appears to wrap partially around the portal vein likely greater than 180° unchanged. Abnormal density appears to surround the splenic artery at its origin and common hepatic artery at its origin. Splenic vein takes a very tortuous course to the SM V raising the question of whether this relates to collateral but no obvious thrombosed more direct splenic vein  is noted. If the native splenic vein is thrombosed that is chronic and atrophied. Pancreatic duct appears less dilated than on the prior     Apparent bladder wall thickening is noted as can be seen with urinary tract infection, postobstructive change, neurogenic bladder, and radiation change. This can be secondary.     Some wall thickening of the rectum is again noted but also appears improved since the prior.     Intervertebral disc height loss is noted at multiple lumbar levels suggesting degenerative change. If necessary dedicated imaging could be performed. Only mild central canal narrowing is suspected.       Assessment:       1. Malignant neoplasm of body of pancreas    2. 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therapy    3. Pulmonary nodule    4. Thrombocytopenia    5. Pancreatic insufficiency    6. Hypothyroidism, unspecified type                         Plan:   Pancreatic Cancer - Patient with Stage III pancreatic cancer with concern for potential encasement of the distal celiac artery per discussion with Dr Valentin  -No sign of mets  - 94.5 on 6/17/24  -Case discussed at tumor board 7/17/24 with recommendation to proceed with chemo  -[employer] genetic testing negative for the genes tested  -[facility] tissue testing showed TP53, KRAS p.G12D, CDKN2A, CDKN2B mutations.  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  -Will need to keep irinotecan dose reduced at 165mg/mm2  -CT C/A/P on 10/02/24 shows stable disease and a possible new RLL nodule  -Pt saw Dr Valentin 10/16/24 whom recommended a few more cycles of chemo and then transition to perioperative radiation therapy  - decreased all the way to 9.4U/mL on 11/11/24 with level today pending   -Pt to go to [facility] 12/09/24  -PT completed 12 cycles  -Repeat scans 1/27/25 show decrease in size of pancreatic mass appearing to surround the celiac artery and to wrap partially around the portal vein likely greater than 180°  -Pt to  see Dr Valentin next week   -If surgery is not an option treatment with chemo/XRT discussed  -Will discuss pt at tumor board next week  Visit today included increased complexity associated with the care of the episodic problem (chemotherapy) addressed and managing the longitudinal care of the patient due to the serious and/or complex managed problem(s) pancreatic cancer.     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 - pt at increased risk of infection  -No current signs of infection  -Will monitor     Pulmonary Nodules - Seen on CT C/A/P 1/27/25  -Stable  -Will monitor    Thrombocytopenia - platelets 65k on 1/27/25  -Due to chemo  -Will monitor     Hypokalemia - 3.8 on 1/27/25  -PT to continue potassium chloride     Pancreatic Insufficiency - Stools more formed pt patient  -Not taking CREON  -Will monitor     Hypothyroidism - pt on synthroid  -Will monitor    Route Chart for Scheduling    Med Onc Chart Routing      Follow up with physician Other. Pt's follow up appt will be absed on tumor board discussion next week.   Follow up with [facility]    Infusion scheduling note    Injection scheduling note    Labs    Imaging    Pharmacy appointment    Other referrals              Treatment Plan Information   OP GI FOLFIRINOX (oxaliplatin, leucovorin, irinotecan, fluorouracil) Q2W  Rajat Hunt MD   Associated diagnosis: Malignant neoplasm of pancreas Stage III cT4, cN0, cM0 noted on 7/12/2024   Line of treatment: Neoadjuvant  Treatment Goal: Curative     Upcoming Treatment Dates - OP GI FOLFIRINOX (oxaliplatin, leucovorin, irinotecan, fluorouracil) Q2W     1/28/2025       Chemotherapy       oxaliplatin (ELOXATIN) 65 mg/m2 = 102 mg in D5W 520.4 mL chemo infusion       leucovorin calcium 320 mg/m2 = 500 mg in D5W 250 mL infusion       irinotecan (CAMPTOSAR) 140 mg/m2 = 220 mg in 0.9% NaCl 511 mL chemo infusion       fluorouraciL injection 500 mg       fluorouracil (Adrucil) 1,920 mg/m2 = 3,015 mg in 0.9% NaCl 100 mL chemo infusion        Antiemetics       palonosetron 0.25mg/dexAMETHasone 12mg in NS IVPB 0.25 mg 50 mL  1/30/2025       Growth Factor       pegfilgrastim (NEULASTA (ON BODY INJECTOR)) injection 6 mg  2/11/2025       Chemotherapy       oxaliplatin (ELOXATIN) 65 mg/m2 = 102 mg in D5W 520.4 mL chemo infusion       leucovorin calcium 320 mg/m2 = 500 mg in D5W 250 mL infusion       irinotecan (CAMPTOSAR) 140 mg/m2 = 220 mg in 0.9% NaCl 511 mL chemo infusion       fluorouraciL injection 500 mg       fluorouracil (Adrucil) 1,920 mg/m2 = 3,015 mg in 0.9% NaCl 100 mL chemo infusion       Antiemetics       palonosetron 0.25mg/dexAMETHasone 12mg in NS IVPB 0.25 mg 50 mL  2/13/2025       Growth Factor       pegfilgrastim (NEULASTA (ON BODY INJECTOR)) injection 6 mg    Therapy Plan Information  PORT FLUSH for Malignant neoplasm of pancreas, noted on 7/12/2024  Flushes  heparin, porcine (PF) 100 unit/mL injection flush 500 Units  500 Units, Intravenous, Every visit  sodium chloride 0.9% flush 10 mL  10 mL, Intravenous, Every visit      No therapy plan of the specified type found.    No therapy plan of the specified type found.      Rajat Hunt MD  [facility]  Hematology and Oncology  [facility]   [address] [place] [address]   O: [phone]  F: [phone]

## 2025-01-31 ENCOUNTER — PATIENT MESSAGE (OUTPATIENT)
Dept: RADIATION ONCOLOGY | Facility: CLINIC | Age: 65
End: 2025-01-31

## 2025-01-31 ENCOUNTER — OFFICE VISIT (OUTPATIENT)
Dept: HEMATOLOGY/ONCOLOGY | Facility: CLINIC | Age: 65
End: 2025-01-31
Payer: MEDICARE

## 2025-01-31 ENCOUNTER — OFFICE VISIT (OUTPATIENT)
Dept: RADIATION ONCOLOGY | Facility: CLINIC | Age: 65
End: 2025-01-31
Payer: MEDICARE

## 2025-01-31 ENCOUNTER — PATIENT MESSAGE (OUTPATIENT)
Dept: HEMATOLOGY/ONCOLOGY | Facility: CLINIC | Age: 65
End: 2025-01-31

## 2025-01-31 VITALS
BODY MASS INDEX: 22.94 KG/M2 | WEIGHT: 121.5 LBS | DIASTOLIC BLOOD PRESSURE: 70 MMHG | HEART RATE: 85 BPM | SYSTOLIC BLOOD PRESSURE: 118 MMHG | OXYGEN SATURATION: 99 % | RESPIRATION RATE: 16 BRPM | HEIGHT: 61 IN | TEMPERATURE: 98 F

## 2025-01-31 DIAGNOSIS — D69.6 THROMBOCYTOPENIA: ICD-10-CM

## 2025-01-31 DIAGNOSIS — K86.89 PANCREATIC INSUFFICIENCY: ICD-10-CM

## 2025-01-31 DIAGNOSIS — D84.821 IMMUNODEFICIENCY DUE TO CHEMOTHERAPY: ICD-10-CM

## 2025-01-31 DIAGNOSIS — E03.9 HYPOTHYROIDISM, UNSPECIFIED TYPE: ICD-10-CM

## 2025-01-31 DIAGNOSIS — Z79.899 IMMUNODEFICIENCY DUE TO CHEMOTHERAPY: ICD-10-CM

## 2025-01-31 DIAGNOSIS — T45.1X5A IMMUNODEFICIENCY DUE TO CHEMOTHERAPY: ICD-10-CM

## 2025-01-31 DIAGNOSIS — R91.1 PULMONARY NODULE: ICD-10-CM

## 2025-01-31 DIAGNOSIS — C25.9 PANCREATIC ADENOCARCINOMA: Primary | ICD-10-CM

## 2025-01-31 DIAGNOSIS — C25.1 MALIGNANT NEOPLASM OF BODY OF PANCREAS: Primary | ICD-10-CM

## 2025-01-31 PROCEDURE — 99213 OFFICE O/P EST LOW 20 MIN: CPT | Mod: S$GLB,,, | Performed by: INTERNAL MEDICINE

## 2025-01-31 PROCEDURE — 3008F BODY MASS INDEX DOCD: CPT | Mod: CPTII,S$GLB,, | Performed by: INTERNAL MEDICINE

## 2025-01-31 PROCEDURE — 1159F MED LIST DOCD IN RCRD: CPT | Mod: CPTII,S$GLB,, | Performed by: INTERNAL MEDICINE

## 2025-01-31 PROCEDURE — 99999 PR PBB SHADOW E&M-EST. PATIENT-LVL IV: CPT | Mod: PBBFAC,,, | Performed by: INTERNAL MEDICINE

## 2025-01-31 PROCEDURE — 3078F DIAST BP <80 MM HG: CPT | Mod: CPTII,S$GLB,, | Performed by: INTERNAL MEDICINE

## 2025-01-31 PROCEDURE — G2211 COMPLEX E/M VISIT ADD ON: HCPCS | Mod: S$GLB,,, | Performed by: INTERNAL MEDICINE

## 2025-01-31 PROCEDURE — 99213 OFFICE O/P EST LOW 20 MIN: CPT | Mod: S$GLB,,, | Performed by: RADIOLOGY

## 2025-01-31 PROCEDURE — 3288F FALL RISK ASSESSMENT DOCD: CPT | Mod: CPTII,S$GLB,, | Performed by: INTERNAL MEDICINE

## 2025-01-31 PROCEDURE — 1101F PT FALLS ASSESS-DOCD LE1/YR: CPT | Mod: CPTII,S$GLB,, | Performed by: INTERNAL MEDICINE

## 2025-01-31 PROCEDURE — 3074F SYST BP LT 130 MM HG: CPT | Mod: CPTII,S$GLB,, | Performed by: INTERNAL MEDICINE

## 2025-01-31 PROCEDURE — 99999 PR PBB SHADOW E&M-EST. PATIENT-LVL I: CPT | Mod: PBBFAC,,, | Performed by: RADIOLOGY

## 2025-01-31 PROCEDURE — 1126F AMNT PAIN NOTED NONE PRSNT: CPT | Mod: CPTII,S$GLB,, | Performed by: INTERNAL MEDICINE

## 2025-01-31 NOTE — PROGRESS NOTES
[facility] Department of Radiation Oncology  Follow Up Visit Note    Diagnosis:  Bessy Lamb is a 64 y.o. 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 SMA, ZULEYMA, and GDA are not involved. There is almost certainly posterior gastric wall involvement. She has completed 12 cycles of FOLFIRINOX.       Oncologic History:  Oncology History   Malignant neoplasm of pancreas   7/12/2024 Initial Diagnosis    Pancreatic cancer     7/13/2024 Cancer Staged    Staging form: Exocrine Pancreas, AJCC 8th Edition  - Clinical: Stage III (cT4, cN0, cM0)     7/22/2024 -  Chemotherapy    Treatment Summary   Plan Name: OP GI FOLFIRINOX (oxaliplatin, leucovorin, irinotecan, fluorouracil) Q2W   Treatment Goal: Curative  Status: Active  Start Date: 7/22/2024  End Date: 7/31/2025 (Planned)  Provider: Rajat Hunt MD  Chemotherapy: fluorouraciL injection 500 mg, 515 mg (100 % of original dose 320 mg/m2), Intravenous, Clinic/[facility] 1 time, 12 of 26 cycles  Dose modification: 320 mg/m2 (original dose 320 mg/m2, Cycle 1, Reason: MD Discretion, Comment: Pharamcogenomic), 320 mg/m2 (original dose 400 mg/m2, Cycle 6)  Administration: 500 mg (7/22/2024), 640 mg (8/5/2024), 635 mg (8/19/2024), 635 mg (9/3/2024), 640 mg (9/23/2024), 500 mg (10/14/2024), 500 mg (10/28/2024), 500 mg (11/11/2024), 500 mg (11/25/2024), 500 mg (12/11/2024), 500 mg (12/31/2024), 500 mg (1/14/2025)  irinotecan (CAMPTOSAR) 260 mg in 0.9% NaCl 578 mL chemo infusion, 266 mg (100 % of original dose 165 mg/m2), Intravenous, Clinic/[facility] 1 time, 12 of 26 cycles  Dose modification: 165 mg/m2 (original dose 165 mg/m2, Cycle 1, Reason: Other (see comments), Comment: Waiting for pharmacogenomic panel), 165 mg/m2 (original dose 165 mg/m2, Cycle 2, Reason: Other (see comments), Comment: UGT Mutation), 140 mg/m2  (original dose 165 mg/m2, Cycle 5, Reason: Other (see comments), Comment: UGT1A1 mutation), 165 mg/m2 (original dose 165 mg/m2, Cycle 5, Reason: MD Discretion), 140 mg/m2 (original dose 165 mg/m2, Cycle 6, Reason: MD Discretion, Comment: Thrombocytopenia)  Administration: 260 mg (7/22/2024), 260 mg (8/5/2024), 260 mg (8/19/2024), 260 mg (9/3/2024), 260 mg (9/23/2024), 220 mg (10/14/2024), 220 mg (10/28/2024), 220 mg (11/11/2024), 220 mg (11/25/2024), 220 mg (12/11/2024), 218 mg (12/31/2024), 220 mg (1/14/2025)  oxaliplatin (ELOXATIN) 85 mg/m2 = 137 mg in D5W 592.4 mL chemo infusion, 85 mg/m2 = 137 mg, Intravenous, Clinic/[facility] 1 time, 12 of 26 cycles  Dose modification: 65 mg/m2 (original dose 85 mg/m2, Cycle 6)  Administration: 137 mg (7/22/2024), 136 mg (8/5/2024), 135 mg (8/19/2024), 135 mg (9/3/2024), 136 mg (9/23/2024), 100 mg (10/14/2024), 100 mg (10/28/2024), 100 mg (11/11/2024), 100 mg (11/25/2024), 100 mg (12/11/2024), 100 mg (12/31/2024), 100 mg (1/14/2025)  fluorouracil (Adrucil) 3,000 mg in 0.9% NaCl 100 mL chemo infusion, 3,090 mg (100 % of original dose 1,920 mg/m2), Intravenous, Over 46 hours, 12 of 26 cycles  Dose modification: 1,920 mg/m2 (original dose 1,920 mg/m2, Cycle 1, Reason: MD Discretion, Comment: Pharmcogenomic panel pending), 1,920 mg/m2 (original dose 2,400 mg/m2, Cycle 6)  Administration: 3,000 mg (7/22/2024), 3,840 mg (8/5/2024), 3,815 mg (8/19/2024), 3,815 mg (9/3/2024), 3,840 mg (9/23/2024), 3,000 mg (10/14/2024), 3,000 mg (10/28/2024), 3,000 mg (11/11/2024), 3,000 mg (11/25/2024), 3,000 mg (12/11/2024), 2,995 mg (12/31/2024), 3,000 mg (1/14/2025)          Interval History  The patient presents today for a regularly scheduled follow up visit.  She was last seen in our clinic on 10/24/24.   Since that time, she continued on FOLFIRINOX and has tolerated treatment well.    CT c/a/p 1/27/25 revealed:  1. Interval significant improvement in the right lower lobe nodule seen on prior.  Two 2  mm nodules are noted that were not well displayed on the prior of uncertain significance.  Long-term follow-up for size stability would be reasonable  2. Significant decrease in the size of the known pancreatic neoplastic mass.  3. Wall thickening of the rectum is still noted of uncertain etiology that appears less thick than on the prior.  Please clinically correlate.  Infectious inflammatory and neoplastic etiologies as well as postprocedural or post radiation changes are on the differential.  4. Apparent bladder wall thickening as can be seen with urinary tract infection, postobstructive change, neurogenic bladder, radiation changes or postprocedural or inflammatory changes.  5. Decreased liver density is noted as can be seen with fatty infiltration of liver  6. Cholelithiasis      Having intermittent diarrhea and urgency. Seems better when not taking creon.    Review of Systems:   Constistutional: Denies fever, chills. No recent weight changes. Denies nights sweats.  Eyes: No redness or dryness.  ENT: Denies dry mouth. No ulcers.  Card: Denies chest pain. No PND, or orthopnea.   Resp: Denies cough. Denies shortness of breath.  Gastro: Denies nausea or vomiting, constipation, diarrhea.  Genito: No kidney stones. Denies dysuria.  Skin: No rash, psoriasis, or alopecia.  Musculoskeletal:No myalgia. No muscle weakness.  Neuro: No numbness or tingling. No history of seizures or psychosis.  Psych: Denies depression. Denies anxiety.  Endo: Denies history of diabetes. No thyroid disease.  Heme: Denies anemia. No blood clots or bleeding diathesis.  Allergic: Denies seasonal allergies.   All other systems negative    Social History:  Social History     Tobacco Use    Smoking status: Never    Smokeless tobacco: Never   Substance Use Topics    Alcohol use: Not Currently     Comment: 2/ month    Drug use: No       Family History:  Cancer-related family history includes Brain cancer in her mother; Breast cancer (age of onset:  30) in her maternal aunt; Breast cancer (age of onset: 40) in her paternal aunt and paternal aunt; Cancer in her father and mother; Lung cancer in her father. There is no history of Ovarian cancer.    Exam:  There were no vitals filed for this visit.  Constitutional: Pleasant 64 y.o. female in no acute distress.  Well nourished. Well groomed.   HEENT: Normocephalic and atraumatic   Lungs: No audible wheezing.  Normal effort.   Musculoskeletal: No gross MSK deformities. Ambulates  Skin: No rashes appreciated.   Psych: Alert and oriented with appropriate mood and affect.  Neuro:   Grossly normal.    Data Review:    Independent Interpretation of Test(s): CT c/a/p from 1/27/25 was personally reviewed as detailed above    Assessment:  Mrs. Lamb is a 64 year old with locally advanced pancreatic cancer with concern for celiac involvement. She has tolerated FOLFIRINOX remarkably well.  ECOG: (0) Fully active, able to carry on all predisease performance without restriction    Plan:  I discussed the rationale, logistics, and side effects of chemoradiation with Mrs. Lamb and her  in clinic. Ultimately her case will be reviewed by Dr. Valentin and will be presented at GI tumor board 2/5/25. If decision is to proceed with chemoRT I'll have her return for simulation ASAP. Goal will be to target the pancreatic tumor with 50-54 Gy in 25-27 fractions. She'll need 4D sim on empty stomach, with oral contrast immediately prior to sim.  She was given our contact information, and she was told that she could call our clinic at anytime if she has any questions or concerns.  Follow up with other providers as directed

## 2025-02-04 ENCOUNTER — TELEPHONE (OUTPATIENT)
Dept: DIABETES | Facility: CLINIC | Age: 65
End: 2025-02-04
Payer: MEDICARE

## 2025-02-04 NOTE — TELEPHONE ENCOUNTER
I attempted to call to assist  with rescheduling her virtual visit with  on tomorrow 2/5/25 but received no answer. I left a detailed voicemail explaining to  her virtual visit wouldn't be able to be completed at that time. I also said my name and the [facility] callback number.

## 2025-02-05 ENCOUNTER — PATIENT MESSAGE (OUTPATIENT)
Dept: PRIMARY CARE CLINIC | Facility: CLINIC | Age: 65
End: 2025-02-05
Payer: MEDICARE

## 2025-02-05 ENCOUNTER — OFFICE VISIT (OUTPATIENT)
Dept: HEMATOLOGY/ONCOLOGY | Facility: CLINIC | Age: 65
End: 2025-02-05
Payer: MEDICARE

## 2025-02-05 ENCOUNTER — TUMOR BOARD CONFERENCE (OUTPATIENT)
Dept: HEMATOLOGY/ONCOLOGY | Facility: CLINIC | Age: 65
End: 2025-02-05
Payer: MEDICARE

## 2025-02-05 VITALS
HEART RATE: 81 BPM | WEIGHT: 120.81 LBS | OXYGEN SATURATION: 98 % | HEIGHT: 61 IN | BODY MASS INDEX: 22.81 KG/M2 | DIASTOLIC BLOOD PRESSURE: 72 MMHG | SYSTOLIC BLOOD PRESSURE: 126 MMHG

## 2025-02-05 DIAGNOSIS — C25.1 MALIGNANT NEOPLASM OF BODY OF PANCREAS: Primary | ICD-10-CM

## 2025-02-05 PROCEDURE — 1125F AMNT PAIN NOTED PAIN PRSNT: CPT | Mod: CPTII,S$GLB,, | Performed by: SURGERY

## 2025-02-05 PROCEDURE — 3074F SYST BP LT 130 MM HG: CPT | Mod: CPTII,S$GLB,, | Performed by: SURGERY

## 2025-02-05 PROCEDURE — 3008F BODY MASS INDEX DOCD: CPT | Mod: CPTII,S$GLB,, | Performed by: SURGERY

## 2025-02-05 PROCEDURE — 3078F DIAST BP <80 MM HG: CPT | Mod: CPTII,S$GLB,, | Performed by: SURGERY

## 2025-02-05 PROCEDURE — 3288F FALL RISK ASSESSMENT DOCD: CPT | Mod: CPTII,S$GLB,, | Performed by: SURGERY

## 2025-02-05 PROCEDURE — 1101F PT FALLS ASSESS-DOCD LE1/YR: CPT | Mod: CPTII,S$GLB,, | Performed by: SURGERY

## 2025-02-05 PROCEDURE — 99214 OFFICE O/P EST MOD 30 MIN: CPT | Mod: S$GLB,,, | Performed by: SURGERY

## 2025-02-05 PROCEDURE — 99999 PR PBB SHADOW E&M-EST. PATIENT-LVL III: CPT | Mod: PBBFAC,,, | Performed by: SURGERY

## 2025-02-05 NOTE — PROGRESS NOTES
[facility]      GI MULTIDISCIPLINARY TUMOR BOARD      Date: 02/05/2025  MRN: [id number]    Site:   Cancer Type: Pancreatic cancer     Cancer Staging Complete: Yes     Presenting Hospital / Clinic: [facility]     Virtual Tumor Board Conference: In person     PRESENTER:   Presenter: Dr. Rajat Hunt     Specialties Present: Medical Oncology; Hematology; Radiation Oncology; Surgical Oncology; Pathology; Navigation; Research; Genetics; Integrative Oncology; Radiology; Gastrointestinal     PATIENT SUMMARY:   64 y.o. female with Arthritis, DMII, GERD, Hypotyroidism, Obesity, Thyroid disease who presents for pancreatic cancer.  Since the last clinic visit the patient on went CT chest, abdomen, and pelvis on 01/27/2025 showing apical pleural thickening bilaterally stable since prior exam; 5 mm nodule of the right lung base; 3 possibly slightly smaller nodules at 4 mm; 1-2 mm nodule in the left lung apex; 8 mm hypodensity at the tip of the right lobe of the liver favored to be a cyst; significant improvement in pancreatic mass now measuring 2.9 x 1.7 cm appearing surround the celiac artery and to wrap partially around the portal vein likely greater than 180°.      The patient met with Dr Valentin on 10/16/24 whom felt the patient could receive a few more cycles of chemo and then transition to perioperative radiation therapy. The patient has now completed 12 cycles FOLFIRINOX prior to consideration of radiation versus surgical resection.    Background Information  Patient Status: a current patient  Reason for Consultation: Follow-Up from Prior Tumor Board  Biopsy Date: 07/08/24  Biopsy Results: Cancer  Treatment to Date: Genetic Counseling; Neoadjuvant Chemoradiation       BOARD RECOMMENDATIONS:   Recommended Plan (General)  Recommended Plan: Radiation; Surgery  Recommended Plan Note: Radiation therapy followed by re-staging scans.  F/u with Dr. Valentin 4 weeks after completion of radiation for surgical consult.       Cancer Staging   Malignant neoplasm of pancreas  Staging form: Exocrine Pancreas, AJCC 8th Edition  - Clinical: Stage III (cT4, cN0, cM0) - Signed by Rajat Hunt MD on 7/13/2024       Leticia'l Treatment Guidelines reviewed and care planned is consistent with guidelines.   (i.e., NCCN, NCI, PD, ACO, AUA, etc.)    PRESENTATION AT CANCER CONFERENCE:   Presentation at Cancer Conference: Prospective     CLINICAL TRIAL ELIGIBILITY:   Clinical Trial Eligibility  Clinical Trial Eligibility: Enrolled       Lilibeth Lobo

## 2025-02-05 NOTE — PROGRESS NOTES
Ms. Lamb is a 63 y.o. female with Arthritis, DMII, GERD, Hypotyroidism, Obesity, Thyroid disease who presents for pancreatic cancer. This was incidentally found on CT chest for a URI.     EUS 7/5/2024:  Impression:            - Normal esophagus.                          - Normal mucosa was found in the entire stomach.                          Biopsied.                          - A single gastric polyp. Resected and retrieved.                          - Normal examined duodenum.                          - A mass was identified in the pancreatic body.                          This was staged T4 N0 M0 by endosonographic                          criteria. The staging applies if malignancy is                          confirmed. Fine needle biopsy performed.                          The common bile duct was traced from the papilla                          to the intra-hepatics and appeared normal. It                          measured 3 mm in diameter at the level of the mid                          CBD. There was no evidence of intra-ductal stones                          and sludge.                          There were a few small stones in the gallbladder,                          measuring 3-4mm in size. Otherwise the gallbladder                          appeared normal.                          Limited views of the left lobe of the liver                          appeared normal.                          Limited views of the abdominal aorta, portal vein,                          and splenic vessels appeared normal.                          There was no intra-abdominal lymphadenopathy.                          The mediastinum was unremarkable. There was no                          mediastinal lymphadenopathy. Vascular structures                          all appeared normal.                          The esophagus, stomach, and duodenal stations were                          all viewed.      Path:  PANCREAS, FURTHER  "DESIGNATED "BODY MASS," FINE NEEDLE ASPIRATION:     --POSITIVE FOR ADENOCARCINOMA.      CT 7/9/2024:  Impression:     Proximal pancreatic body 4.4 cm mass consistent known adenocarcinoma.  There is focal occlusion of the splenic vein at the level of the portal confluence and encasement of the splenic artery which remains patent.  The mass also focally abuts the distal lesser curvature of the stomach without evidence of invasion.  Nonspecific peripancreatic lymph node measuring 0.7 cm in short axis.  Recommend attention on follow-up.  Otherwise, no evidence of metastatic disease in the chest, abdomen or pelvis.  Cholelithiasis.  Circumferential bladder wall thickening which could be due to underdistention.  Consider further evaluation with urinalysis to exclude cystitis.             CA 19-9: 281     A/P     Bessy is a 62yo F with locally advanced pancreatic cancer involving the distal celiac artery, portosplenic venous junction with a large splenic-IMV collateral. Her SMA has a preserved fat plane and I suspect her ZULEYMA and GDA are not involved. There is almost certainly posterior gastric wall involvement.     She has seen Dr. Hunt, had port placed and is starting FOLFIRINOX. We discussed the treatment for locally advanced pancreatic cancer, including systemic therapy and possible radiation therapy. Given her locally advanced vascular involvement, I would favor a total neoadjuvant approach if she tolerates therapy. Will plan to follow along with her restaging.  ---------------------------------------------------------  Bessy returns after 5 cycles FOLFIRINOX with some delays due to s/e and hospitalization.     Her CA 19-9 has normalized from a high of 280.     CT 10/2024:  Impression:     1. 37 x 35 mm infiltrating/obstruct proximal pancreatic body mass, slightly smaller in transverse dimension on today's study, which involves the celiac artery origin, splenic artery origin, and proximal splenic vein as detailed above.  " There is diffuse dilatation of the main pancreatic duct within the pancreatic body and tail and there is atrophy of the pancreatic body and tail.  No definitive imaging evidence of new metastatic disease in the chest, abdomen, or pelvis.  2. 5 mm possible new ground-glass nodule in the right lower lobe, nonspecific.  Short-term follow-up is recommended to ensure stability.  3. Long segment concentric wall thickening involving the distal sigmoid colon/rectum.  Infectious and inflammatory etiologies for proctocolitis are possible.        All in all a good response. I think we have all the options in front of us. We could argue here she is closing in on maximum systemic response and has already had one chemotherapy related admission, although in talking to Dr. Hunt this was for potassium and she was never very ill. There is a fat plane on the SMA, and the vein is reconstructable. Her GDA looks uninvolved. We discussed what this operation will entail, including subtotal pancreatectomy, resection of the celiac artery, potential hepatic artery reconstruction with saphenous vain graft, and potential venous reconstruction. I think her biggest risk here is PNI along her celiac at its base:       I think its worthwhile to consider preop RT to maximize our shot at an R0 margin here. Will re-present her at tumor board for that consideration, but plan to give her a couple more cycles, reassess and transition to perioperative RT.  --------------  2/5/2025:    Bessy returns after 12 cycles of FOLFIRINOX which she has tolerated well. She has already seen Dr. Rascon to discuss RT, and was presented today at [facility]. Her CA 19-9 which peaked at 282 pre-treatment normalized on systemic therapy in October after about 4 cycles and has stayed wnl.    She has had clear radiographic response, with still some soft tissue along the celiac artery. Her CT is poorly timed, but I continue to expect that her GDA and ZULEYMA at that level are not  involved. Her portal vein is open but abutted and almost certainly will require some reconstruction, though likely no circumferential. Ultimately I think we have a surgical option here for subtotal pancreatectomy with celiac resection, possible subtotal gastrectomy, with PV reconstruction but I suspect without ZULEYMA reconstruction, although we should of course be prepared for that. I continue to think that maximizing her margins with preoperative RT makes sense for this locally advanced cancer. We discussed the potential for distant disease progression during RT, and its implications for her prognosis and surgical options. Will plan to see her back about 3-4 weeks after finishing RT, with plans to offer her surgery 6-10 post completion.    I spent 30 minutes with Ms. Lamb, with >50% of time spent face to face and additional time preparing to see the patient (eg, review of tests), obtaining and/or reviewing separately obtained history, documenting clinical information in the electronic or other health record, independently interpreting results (not separately reported) and communicating results to the patient/family/caregiver, or Care coordination (not separately reported).         Flip Valentin MD  Upper GI / Hepatobiliary Surgical Oncology  [facility] [place], [place]  Office: [phone]  Fax: [phone]

## 2025-02-06 ENCOUNTER — HOSPITAL ENCOUNTER (OUTPATIENT)
Dept: RADIOLOGY | Facility: HOSPITAL | Age: 65
Discharge: HOME | End: 2025-02-06
Attending: PHYSICIAN ASSISTANT
Payer: MEDICARE

## 2025-02-06 ENCOUNTER — OFFICE VISIT (OUTPATIENT)
Dept: FAMILY MEDICINE | Facility: CLINIC | Age: 65
End: 2025-02-06
Payer: MEDICARE

## 2025-02-06 VITALS
HEART RATE: 76 BPM | RESPIRATION RATE: 16 BRPM | WEIGHT: 120.63 LBS | BODY MASS INDEX: 22.78 KG/M2 | HEIGHT: 61 IN | DIASTOLIC BLOOD PRESSURE: 76 MMHG | SYSTOLIC BLOOD PRESSURE: 122 MMHG | OXYGEN SATURATION: 96 %

## 2025-02-06 DIAGNOSIS — S61.451A CAT BITE OF RIGHT HAND, INITIAL ENCOUNTER: ICD-10-CM
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
## 2025-02-06 DIAGNOSIS — S61.451A CAT BITE OF RIGHT HAND, INITIAL ENCOUNTER: Primary | ICD-10-CM

## 2025-02-06 PROCEDURE — 1160F RVW MEDS BY RX/DR IN RCRD: CPT | Mod: CPTII,S$GLB,, | Performed by: PHYSICIAN ASSISTANT

## 2025-02-06 PROCEDURE — 3288F FALL RISK ASSESSMENT DOCD: CPT | Mod: CPTII,S$GLB,, | Performed by: PHYSICIAN ASSISTANT

## 2025-02-06 PROCEDURE — 99213 OFFICE O/P EST LOW 20 MIN: CPT | Mod: S$GLB,,, | Performed by: PHYSICIAN ASSISTANT

## 2025-02-06 PROCEDURE — 1101F PT FALLS ASSESS-DOCD LE1/YR: CPT | Mod: CPTII,S$GLB,, | Performed by: PHYSICIAN ASSISTANT

## 2025-02-06 PROCEDURE — 73130 X-RAY EXAM OF HAND: CPT | Mod: TC,PO,RT

## 2025-02-06 PROCEDURE — 3008F BODY MASS INDEX DOCD: CPT | Mod: CPTII,S$GLB,, | Performed by: PHYSICIAN ASSISTANT

## 2025-02-06 PROCEDURE — 1125F AMNT PAIN NOTED PAIN PRSNT: CPT | Mod: CPTII,S$GLB,, | Performed by: PHYSICIAN ASSISTANT

## 2025-02-06 PROCEDURE — 3078F DIAST BP <80 MM HG: CPT | Mod: CPTII,S$GLB,, | Performed by: PHYSICIAN ASSISTANT

## 2025-02-06 PROCEDURE — 73130 X-RAY EXAM OF HAND: CPT | Mod: 26,RT,, | Performed by: RADIOLOGY

## 2025-02-06 PROCEDURE — 99999 PR PBB SHADOW E&M-EST. PATIENT-LVL IV: CPT | Mod: PBBFAC,,, | Performed by: PHYSICIAN ASSISTANT

## 2025-02-06 PROCEDURE — 1159F MED LIST DOCD IN RCRD: CPT | Mod: CPTII,S$GLB,, | Performed by: PHYSICIAN ASSISTANT

## 2025-02-06 PROCEDURE — 3074F SYST BP LT 130 MM HG: CPT | Mod: CPTII,S$GLB,, | Performed by: PHYSICIAN ASSISTANT

## 2025-02-06 RX ORDER — MUPIROCIN 20 MG/G
OINTMENT TOPICAL 2 TIMES DAILY
Qty: 30 G | Refills: 2 | Status: SHIPPED | OUTPATIENT
Start: 2025-02-06 | End: 2025-02-11

## 2025-02-06 RX ORDER — AMOXICILLIN AND CLAVULANATE POTASSIUM 875; 125 MG/1; MG/1
1 TABLET, FILM COATED ORAL EVERY 12 HOURS
Qty: 20 TABLET | Refills: 0 | Status: SHIPPED | OUTPATIENT
Start: 2025-02-06 | End: 2025-02-16

## 2025-02-06 NOTE — PROGRESS NOTES
Results have been released via [employer]. Please verify that these have been viewed by patient. If not, please call patient with results.     I have sent a message to them with the following interpretation (see below).    I have reviewed your recent R hand XR which showed no acute findings or significant abnormalities.      Please do not hesitate to call or message with any additional questions or concerns.    Paulina Warner PA-C

## 2025-02-06 NOTE — PATIENT INSTRUCTIONS
Hi Ebssy,     If you are due for any health screening(s) below please notify me so we can arrange them to be ordered and scheduled. Most healthy patients at your age complete them, but you are free to accept or refuse.     If you can't do it, I'll definitely understand. If you can, I'd certainly appreciate it!    Tests to Keep You Healthy    Mammogram: ORDERED BUT NOT SCHEDULED  Eye Exam: Met on 3/5/2024  Colon Cancer Screening: Met on 10/24/2023  Last Blood Pressure <= 139/89 (12/6/2024): Yes  Last HbA1c < 8 (09/20/2024): Yes      Schedule your breast cancer screening today     Breast cancer is the second most common cancer in women,  and the second leading cause of death from cancer. Mammograms can detect breast cancer early, which significantly increases the chances of curing the cancer.       Our records indicate that you may be overdue for breast cancer screening. Cancer screenings save lives, so schedule yours today to stay healthy.     If you recently had a mammogram performed outside of [facility], please let your Health care team know so that they can update your health record.

## 2025-02-06 NOTE — PROGRESS NOTES
Assessment/Plan:    1. Cat bite of right hand, initial encounter  -     amoxicillin-clavulanate 875-125mg (AUGMENTIN) 875-125 mg per tablet; Take 1 tablet by mouth every 12 (twelve) hours. for 10 days  Dispense: 20 tablet; Refill: 0  -     mupirocin (BACTROBAN) 2 % ointment; Apply topically 2 (two) times daily.  Dispense: 30 g; Refill: 2  -     X-Ray Hand 3 view Right; Future; Expected date: 02/06/2025    -cat bite to R hand >3 days ago  -no alarm symptoms or signs present  -will obtain a R hand XR today  -start oral and topical antibiotics as prescribed  -UTD on tetanus immunization  -wound care instructions provided  -ER precautions for severe or worsening of symptoms    Follow up if symptoms worsen or fail to improve.    Paulina Warner PA-C  _____________________________________________________________________________________________________________________________________________________    CC: cat bite to right hand    HPI: Patient is in clinic today as an established patient here for cat bite to the right hand.    HISTORY OF PRESENT ILLNESS:  She sustained a cat bite to the right hand on Monday night (3 days ago) at 10 PM from her pet cat who is up to date on immunizations. The incident occurred when she was sleeping with the cat on her lap, and the cat was startled by either a dog jumping on the sofa or a phone falling. She reports pain, swelling and limited range of motion in the affected finger joint (right index finger), which is also the site of maximum pain. She denies fever. She has been self-treating with Neosporin, Tylenol, and Ibuprofen for pain management, with Ibuprofen providing significant relief. She is uptodate on her tetanus immunization.     No other complaints today.     Past Medical History:   Diagnosis Date    Arthritis, lumbar spine     hands    Diabetes mellitus     General anesthetics causing adverse effect in therapeutic use     following breast rediuct, hard time awakening  also had  anxiety rxn to lidocain in dental ofc    GERD (gastroesophageal reflux disease)     Hypothyroidism 10/08/2014    Maternal anesthesia complication     epidural for 1st child went up instead of down; required intubation    Normocytic anemia 2024    Obesity (BMI 30-39.9) 10/08/2014    pancreatic Cancer         Thyroid disease     Thyroid nodule 10/08/2014     Past Surgical History:   Procedure Laterality Date    BREAST BIOPSY Left     b9    BREAST SURGERY      Reduction cause of a mass in left breast    c-sections x2       SECTION  3/26/81 & 85    Birth of two girls    COLONOSCOPY  2012         COLONOSCOPY N/A 2018    Procedure: COLONOSCOPY;  Surgeon: Francisco Mcclain MD;  Location: [facility];  Service: Endoscopy;  Laterality: N/A;    COLONOSCOPY N/A 10/24/2023    Procedure: COLONOSCOPY;  Surgeon: Francisco Mcclain MD;  Location: [facility];  Service: Endoscopy;  Laterality: N/A;    ENDOSCOPIC ULTRASOUND OF UPPER GASTROINTESTINAL TRACT Left 2024    Procedure: ULTRASOUND, UPPER GI TRACT, ENDOSCOPIC;  Surgeon: Andrew Gordon MD;  Location: [facility];  Service: Endoscopy;  Laterality: Left;    ESOPHAGOGASTRODUODENOSCOPY N/A 2024    Procedure: EGD (ESOPHAGOGASTRODUODENOSCOPY);  Surgeon: Andrew Gordon MD;  Location: [facility];  Service: Endoscopy;  Laterality: N/A;    hysteroscopy with polypectomy      INCISIONAL BREAST BIOPSY Left     benign; done at same time as reduction    INSERTION OF TUNNELED CENTRAL VENOUS CATHETER (CVC) WITH SUBCUTANEOUS PORT N/A 2024    Procedure: [id number];  Surgeon: Blanca Dillard MD;  Location: [facility];  Service: General;  Laterality: N/A;    TOTAL REDUCTION MAMMOPLASTY Bilateral      Review of patient's allergies indicates:   Allergen Reactions    Duricef [cefadroxil] Hives     Social History     Tobacco Use    Smoking status: Never    Smokeless tobacco: Never   Substance Use Topics    Alcohol use: Not  Currently     Comment: 2/ month    Drug use: No     Family History   Problem Relation Name Age of Onset    Brain cancer Mother Ravi Seaman     Early death Mother Ravi Seaman 51        Passed at 51    Cancer Mother Ravi Seaman         Brain tumor    Arthritis Mother Ravi Seaman     Diabetes Father Ck seaman         Type 2    Cirrhosis Father Ck seaman     Cancer Father Ck seaman         Bone    COPD Father Ck seaman         Smoker    Early death Father Ck seaman         Passed at 64    Lung cancer Father Ck seaman     Heart disease Sister Mamta (dianna)wescovich         Congestive heart failure (passed 2/11/18    Hypertension Sister Mamta (dianna)wescovich     Kidney disease Sister Mamta (dianna)wescovich         Doc removed when she was a child    Hypertension Sister Mayank     Depression Sister Ravi (mayank) nicolette Mauricio ( sister)         Due to loss of her 27 year old son    Early death Sister Ravi (mayank) nicolette Mauricio ( sister)         Pulmonary hypertension, cirrhosis of the liver(passed 7/14/2007   Age 57    Heart disease Brother John Choudhury ( passed )         Heart attack    Hypertension Brother John Choudhury ( passed )     Heart disease Brother Juan Francisco Choudhury         Heart  blockage    Heart disease Brother Bhanu Choudhury         Heart attack (passed)    Diabetes Brother Bhanu Choudhury     Macular degeneration Brother Bhanu Choudhury     Breast cancer Maternal Aunt  30    Breast cancer Paternal Aunt  40    Breast cancer Paternal Aunt  40    Ovarian cancer Neg Hx       Current Outpatient Medications on File Prior to Visit   Medication Sig Dispense Refill    acetaminophen (TYLENOL) 325 MG tablet Take 325 mg by mouth every 6 (six) hours as needed for Pain.      blood-glucose meter Misc Use as directed 1 each prn    ergocalciferol (VITAMIN D2) 50,000 unit  Cap Take 1 capsule (50,000 Units total) by mouth twice a week. 24 capsule 3    fluticasone propionate (FLONASE) 50 mcg/actuation nasal spray 1 spray (50 mcg total) by Each Nostril route once daily. 18.2 mL 0    levothyroxine (SYNTHROID) 50 MCG tablet Take 1 tablet (50 mcg total) by mouth once daily. 90 tablet 3    lipase-protease-amylase 24,000-76,000-120,000 units (CREON) 24,000-76,000 -120,000 unit capsule Take 2 capsules by mouth 3 (three) times daily with meals. 180 capsule 11    loperamide (IMODIUM) 2 mg capsule Take 2 tablets (4mg) by mouth after first loose stool, 1 tablet every 2 hours until diarrhea free for 12 hours. May take 2 tablets (4mg) by mouth every 4 hours at night. May require more than the package labeling maximum dose of 16mg/day. 30 capsule 11    loratadine (CLARITIN) 10 mg tablet Take 1 tablet (10 mg total) by mouth every morning. 30 tablet 11    losartan (COZAAR) 25 MG tablet Take 1 tablet (25 mg total) by mouth as needed (take for bp equal or greater than 120/70).      meclizine (ANTIVERT) 25 mg tablet Take 1 tablet (25 mg total) by mouth 3 (three) times daily as needed for Dizziness. 30 tablet 0    mupirocin (BACTROBAN) 2 % ointment Apply topically 3 (three) times daily.      OLANZapine (ZYPREXA) 5 MG tablet Take 1 tablet by mouth nightly on days 1-3 of each chemotherapy cycle. 6 tablet 11    potassium chloride SA (K-DUR,KLOR-CON) 20 MEQ tablet Take 20 mEq by mouth once.      prochlorperazine (COMPAZINE) 5 MG tablet Take 1 tablet (5 mg total) by mouth every 6 (six) hours as needed for Nausea. 20 tablet 5    simvastatin (ZOCOR) 10 MG tablet Take 1 tablet (10 mg total) by mouth every evening. 90 tablet 3    SYNJARDY XR 10-1,000 mg TBph TAKE ONE TABLET BY MOUTH ONCE DAILY 30 tablet 5    TRUE METRIX GLUCOSE TEST STRIP Strp USE 1 STRIP ONCE DAILY TO TEST BLOOD GLUCOSE (50 DAY SUPPLY) 100 each 3    TRUEPLUS LANCETS 33 gauge Misc USE AS DIRECTED TO TEST BLOOD SUGAR ONCE DAILY FOR UP  USES  "100 each 2    duke's soln (benadryl 30 mL, mylanta 30 mL, LIDOcaine 30 mL, nystatin 30 mL) 120mL 10 ml swish & spit/swallow every 4 to 6 hours as needed for mouth pain/ulcers/yeast/throat pain (Patient not taking: Reported on 2/6/2025) 240 mL 1     Current Facility-Administered Medications on File Prior to Visit   Medication Dose Route Frequency Provider Last Rate Last Admin    alteplase injection 2 mg  2 mg Intra-Catheter PRN Arash Fuchs NP        diphenhydrAMINE injection 50 mg  50 mg Intravenous Once PRN Arash Fuchs NP        EPINEPHrine (EPIPEN) 0.3 mg/0.3 mL pen injection 0.3 mg  0.3 mg Intramuscular Once PRN Arash Fuchs NP        heparin, porcine (PF) 100 unit/mL injection flush 500 Units  500 Units Intravenous PRN Arash Fuchs NP        hydrocortisone sodium succinate injection 100 mg  100 mg Intravenous Once PRN Arash Fuchs NP        prochlorperazine injection Soln 5 mg  5 mg Intravenous Once PRN Arash Fuchs NP        sodium chloride 0.9% flush 10 mL  10 mL Intravenous PRN Arash Fuchs NP           Review of Systems   Constitutional:  Negative for chills, diaphoresis, fatigue and fever.   HENT:  Negative for congestion, ear pain, postnasal drip, sinus pain and sore throat.    Eyes:  Negative for pain and redness.   Respiratory:  Negative for cough, chest tightness and shortness of breath.    Cardiovascular:  Negative for chest pain and leg swelling.   Gastrointestinal:  Negative for abdominal pain, constipation, diarrhea, nausea and vomiting.   Genitourinary:  Negative for dysuria and hematuria.   Musculoskeletal:  Positive for arthralgias and joint swelling.   Skin:  Positive for wound. Negative for rash.   Neurological:  Negative for dizziness, syncope and headaches.   Psychiatric/Behavioral:  Negative for dysphoric mood. The patient is not nervous/anxious.        Vitals:    02/06/25 0944   BP: 122/76   Pulse: 76   Resp: 16   SpO2: 96%   Weight: 54.7 kg (120 lb 9.6 oz)   Height: 5' 1" " (1.549 m)       Wt Readings from Last 3 Encounters:   02/06/25 54.7 kg (120 lb 9.6 oz)   02/05/25 54.8 kg (120 lb 13 oz)   01/31/25 55.1 kg (121 lb 7.6 oz)       Physical Exam  Constitutional:       General: She is not in acute distress.     Appearance: Normal appearance. She is well-developed.   HENT:      Head: Normocephalic and atraumatic.   Eyes:      Conjunctiva/sclera: Conjunctivae normal.   Cardiovascular:      Rate and Rhythm: Normal rate and regular rhythm.      Pulses: Normal pulses.      Heart sounds: Normal heart sounds. No murmur heard.  Pulmonary:      Effort: Pulmonary effort is normal. No respiratory distress.      Breath sounds: Normal breath sounds.   Abdominal:      General: Bowel sounds are normal. There is no distension.      Palpations: Abdomen is soft.      Tenderness: There is no abdominal tenderness.   Musculoskeletal:         General: Normal range of motion.      Right hand: Swelling and tenderness present. Normal range of motion.      Cervical back: Normal range of motion and neck supple.   Skin:     General: Skin is warm and dry.      Findings: Wound (R hand) present. No rash.   Neurological:      General: No focal deficit present.      Mental Status: She is alert and oriented to person, place, and time.   Psychiatric:         Mood and Affect: Mood normal.         Behavior: Behavior normal.             Health Maintenance   Topic Date Due    Shingles Vaccine (1 of 2) Never done    Pneumococcal Vaccines (Age 50+) (1 of 2 - PCV) Never done    RSV Vaccine (Age 60+ and Pregnant patients) (1 - Risk 60-74 years 1-dose series) Never done    Mammogram  07/17/2024    Influenza Vaccine (1) 09/01/2024    Foot Exam  09/25/2024    Diabetic Eye Exam  03/05/2025    Hemoglobin A1c  03/20/2025    Diabetes Urine Screening  08/30/2025    Lipid Panel  12/06/2025    Low Dose Statin  02/06/2026    DEXA Scan  08/21/2026    Colorectal Cancer Screening  10/24/2033    TETANUS VACCINE  02/28/2034    Hepatitis C  Screening  Completed    COVID-19 Vaccine  Discontinued     DISCLAIMER: This note was compiled by using a speech recognition dictation system and therefore please be aware that typographical / speech recognition errors can and do occur.  Please contact me if you see any errors specifically.  Consent was obtained for [employer] recording system prior to the visit.

## 2025-02-07 ENCOUNTER — TELEPHONE (OUTPATIENT)
Dept: HEMATOLOGY/ONCOLOGY | Facility: CLINIC | Age: 65
End: 2025-02-07
Payer: MEDICARE

## 2025-02-07 NOTE — TELEPHONE ENCOUNTER
----- Message from Rajat Hunt MD sent at 2/6/2025  4:31 PM CST -----  Pt needs an appt with me next week to discuss chemo to be given with radiation.

## 2025-02-08 NOTE — PROGRESS NOTES
PATIENT: Bessy Lamb  MRN: [id number]  DATE: 2/10/2025      Diagnosis:   1. Malignant neoplasm of pancreas, unspecified location of malignancy    2. 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therapy    3. Pulmonary nodule    4. Thrombocytopenia    5. Pancreatic insufficiency    6. Hypothyroidism, unspecified type    7. Swelling of right index finger    8. Cat bite, subsequent encounter        Chief Complaint: Malignant neoplasm of body of pancreas (2 week follow up)      Oncologic History:      Oncologic History     Oncologic Treatment     Pathology           Subjective:    Interval History:  Ms. Lamb is a 64 y.o. female with Arthritis, DMII, GERD, Hypotyroidism, Obesity, Thyroid disease who presents for pancreatic cancer.  Since the last clinic visit the patient was discussed at tumor board on 2/05/25 with recommendation for chemo/XRT followed by potential surgery.  Pt states she was bitten by her cat recently and has developed swelling of the right index finger.  Pt is unable to bend her right index finger.  The patient denies CP, cough, SOB, abdominal pain, nausea, vomiting, constipation, diarrhea.  The patient denies fever, chills, night sweats, weight loss, new lumps or bumps, easy bruising or bleeding.    Prior History:   The patient initially underwent a CXR on 11/13/23 fur a URI which showed possible subtle pulmonary nodules.  CT chest 12/01/2023 showed a cluster of ill-defined centrilobular ground-glass opacity he inferior right and left upper lobes as well as a 1.2 cm ground-glass opacity in the superior segment of the left lower lobe; solid 3 mm pulmonary nodule in the right upper lobe; subcentimeter right hepatic lobe hypodensity likely representing a cyst.  The patient underwent surveillance CT chest on 05/21/2024 showing a 3.7 x 2.7 pancreatic body mass in the pancreatic tail atrophy suspicious of pancreatic malignancy as well as and unchanged benign 3 mm nodule in the right upper lobe.  MRI abdomen on  06/30/2024 showed hypoenhancing mass centered in the proximal pancreatic body measuring 2.9 x 2 cm with abutment and possible encasement of the distal splenic vein.  EUS was performed on 07/05/2024 mass in the pancreatic body stage T4 N0 M0 by endo sonographic criteria.  Pathology from biopsy of the stomach showed mild focally moderate chronic gastritis with no evidence of the H pylori.  Stomach polyp which was removed code hyperplastic polyps with chronic inflammation.  Pancreatic biopsy showed adenocarcinoma.  CT of the chest, abdomen, and pelvis on 07/09/2024 showed a 9 mm lesion in the right hepatic lobe previously characterized as cysts; mass in the pancreatic body measuring 4.4 x 3.7 cm with diffuse pancreatic ductal dilatation measuring 8 mm:  Weaning vein at the portal confluence occluded with reconstitution via collaterals.  The mass abuts the portal vein by less than 180° but does not case the proximal splenic artery remains patent.  Also noted was a pancreatic lymph node measuring 0.7 cm.   Patient's case was discussed at tumor board on 07/17/2024 with recommendation for proceeding with the chemotherapy.  Patient had port placement on 07/18/2024.   The patient is admitted to the hospital from 09/20/2024 until 09/22/2024 for hypokalemia with potassium of 2.3.  The patient was subsequently discharged underwent treatment with FOLFIRINOX on 09/23/2024.  CT of the chest, abdomen, and pelvis on 10/02/2024 showed a new 5 mm ground-glass nodule in the right lower lobe; stable 8 mm hypodensity present within the right hepatic lobe suggestive of a cyst; 37 x 35 mm proximal pancreatic body mass slightly smaller in transverse dimension involving 90 degree area of the celiac artery and 100 degree area of the splenic artery with soft tissue extending along the anterior right lateral wall of the portal vein; abnormal soft tissue insinuating between the lateral margin of the celiac artery and adjacent splenic vein;  invasion and focal occlusion of the proximal most aspect of the splenic vein; concentric wall thickening present within the distal sigmoid colon/rectum with infectious and inflammatory etiologies possible.   The patient met with Dr Valentin on 10/16/24 whom felt the patient could receive a few more cycles of chemo and then transition to perioperative radiation therapy.   The patient's case was discussed with Dr. Valentin and .  Plan is to proceed with up to 12 cycles FOLFIRINOX prior to consideration of radiation versus surgical resection.   CT chest, abdomen, and pelvis on 2025 showing apical pleural thickening bilaterally stable since prior exam; 5 mm nodule of the right lung base; 3 possibly slightly smaller nodules at 4 mm; 1-2 mm nodule in the left lung apex; 8 mm hypodensity at the tip of the right lobe of the liver favored to be a cyst; significant improvement in pancreatic mass now measuring 2.9 x 1.7 cm appearing surround the celiac artery and to wrap partially around the portal vein likely greater than 180°.    Past Medical History:   Past Medical History:   Diagnosis Date    Arthritis, lumbar spine     hands    Diabetes mellitus     General anesthetics causing adverse effect in therapeutic use     following breast rediuct, hard time awakening  also had anxiety rxn to lidocain in dental ofc    GERD (gastroesophageal reflux disease)     Hypothyroidism 10/08/2014    Maternal anesthesia complication     epidural for 1st child went up instead of down; required intubation    Normocytic anemia 2024    Obesity (BMI 30-39.9) 10/08/2014    pancreatic Cancer         Thyroid disease     Thyroid nodule 10/08/2014       Past Surgical HIstory:   Past Surgical History:   Procedure Laterality Date    BREAST BIOPSY Left     b9    BREAST SURGERY      Reduction cause of a mass in left breast    c-sections x2       SECTION  3/26/81 & 85    Birth of two girls    COLONOSCOPY  2012          COLONOSCOPY N/A 8/30/2018    Procedure: COLONOSCOPY;  Surgeon: Francisco Mcclain MD;  Location: [facility] ENDO;  Service: Endoscopy;  Laterality: N/A;    COLONOSCOPY N/A 10/24/2023    Procedure: COLONOSCOPY;  Surgeon: Francisco Mcclain MD;  Location: [facility];  Service: Endoscopy;  Laterality: N/A;    ENDOSCOPIC ULTRASOUND OF UPPER GASTROINTESTINAL TRACT Left 7/5/2024    Procedure: ULTRASOUND, UPPER GI TRACT, ENDOSCOPIC;  Surgeon: Andrew Gordon MD;  Location: [facility];  Service: Endoscopy;  Laterality: Left;    ESOPHAGOGASTRODUODENOSCOPY N/A 7/5/2024    Procedure: EGD (ESOPHAGOGASTRODUODENOSCOPY);  Surgeon: Andrew Gordon MD;  Location: [facility];  Service: Endoscopy;  Laterality: N/A;    hysteroscopy with polypectomy      INCISIONAL BREAST BIOPSY Left 1996    benign; done at same time as reduction    INSERTION OF TUNNELED CENTRAL VENOUS CATHETER (CVC) WITH SUBCUTANEOUS PORT N/A 7/18/2024    Procedure: TLAYHUZAT-KZRY-B-CATH;  Surgeon: Blanca Dillard MD;  Location: [facility];  Service: General;  Laterality: N/A;    TOTAL REDUCTION MAMMOPLASTY Bilateral 1996       Family History:   Family History   Problem Relation Name Age of Onset    Brain cancer Mother Ravi Tolbert     Early death Mother Ravi Tolbert 51        Passed at 51    Cancer Mother Ravi Tolbert         Brain tumor    Arthritis Mother Ravi Tolbert     Diabetes Father Ck ankit tolbert         Type 2    Cirrhosis Father Ck pham tomasz     Cancer Father Ck ankit tolbert         Bone    COPD Father Ck ankit tolbert         Smoker    Early death Father Ck ankit tolbert         Passed at 64    Lung cancer Father Ck ankit tolbert     Heart disease Sister Mamta (dianna)martincovic         Congestive heart failure (passed 2/11/18    Hypertension Sister Mamta (dianna)wescovic     Kidney disease Sister Mamta (dianna)Mortonkarime          Doc removed when she was a child    Hypertension Sister Mayank     Depression Sister Ravi (mayank) nicolette Mauricio ( sister)         Due to loss of her 27 year old son    Early death Sister Ravi (mayank) nicolette Mauricio ( sister)         Pulmonary hypertension, cirrhosis of the liver(passed 7/14/2007   Age 57    Heart disease Brother John Choudhury ( passed )         Heart attack    Hypertension Brother John Choudhury ( passed )     Heart disease Brother Juan Francisco Choudhury         Heart  blockage    Heart disease Brother Bhanu Choudhury         Heart attack (passed)    Diabetes Brother Bhanu Choudhury     Macular degeneration Brother Bhanu Choudhury     Breast cancer Maternal Aunt  30    Breast cancer Paternal Aunt  40    Breast cancer Paternal Aunt  40    Ovarian cancer Neg Hx         Social History:  reports that she has never smoked. She has never used smokeless tobacco. She reports that she does not currently use alcohol. She reports that she does not use drugs.    Allergies:  Review of patient's allergies indicates:   Allergen Reactions    Duricef [cefadroxil] Hives       Medications:  Current Outpatient Medications   Medication Sig Dispense Refill    acetaminophen (TYLENOL) 325 MG tablet Take 325 mg by mouth every 6 (six) hours as needed for Pain.      amoxicillin-clavulanate 875-125mg (AUGMENTIN) 875-125 mg per tablet Take 1 tablet by mouth every 12 (twelve) hours. for 10 days 20 tablet 0    blood-glucose meter Misc Use as directed 1 each prn    duke's soln (benadryl 30 mL, mylanta 30 mL, LIDOcaine 30 mL, nystatin 30 mL) 120mL 10 ml swish & spit/swallow every 4 to 6 hours as needed for mouth pain/ulcers/yeast/throat pain 240 mL 1    ergocalciferol (VITAMIN D2) 50,000 unit Cap Take 1 capsule (50,000 Units total) by mouth twice a week. 24 capsule 3    fluticasone propionate (FLONASE) 50 mcg/actuation nasal spray 1 spray (50 mcg total) by Each Nostril route once daily. 18.2 mL 0    levothyroxine (SYNTHROID) 50 MCG tablet Take 1  tablet (50 mcg total) by mouth once daily. 90 tablet 3    lipase-protease-amylase 24,000-76,000-120,000 units (CREON) 24,000-76,000 -120,000 unit capsule Take 2 capsules by mouth 3 (three) times daily with meals. 180 capsule 11    loratadine (CLARITIN) 10 mg tablet Take 1 tablet (10 mg total) by mouth every morning. 30 tablet 11    losartan (COZAAR) 25 MG tablet Take 1 tablet (25 mg total) by mouth as needed (take for bp equal or greater than 120/70).      meclizine (ANTIVERT) 25 mg tablet Take 1 tablet (25 mg total) by mouth 3 (three) times daily as needed for Dizziness. 30 tablet 0    mupirocin (BACTROBAN) 2 % ointment Apply topically 2 (two) times daily. 30 g 2    potassium chloride SA (K-DUR,KLOR-CON) 20 MEQ tablet Take 20 mEq by mouth once.      simvastatin (ZOCOR) 10 MG tablet Take 1 tablet (10 mg total) by mouth every evening. 90 tablet 3    SYNJARDY XR 10-1,000 mg TBph TAKE ONE TABLET BY MOUTH ONCE DAILY 30 tablet 5    TRUE METRIX GLUCOSE TEST STRIP Strp USE 1 STRIP ONCE DAILY TO TEST BLOOD GLUCOSE (50 DAY SUPPLY) 100 each 3    TRUEPLUS LANCETS 33 gauge Misc USE AS DIRECTED TO TEST BLOOD SUGAR ONCE DAILY FOR UP  USES 100 each 2    [START ON 2/24/2025] capecitabine (XELODA) 150 MG tablet Take 2 tablets (300 mg total) by mouth 2 (two) times daily Take as directed Monday, Tuesday, Wednesday, Thursday, and Friday for the duration of radiation therapy. Take with food. with 1 other capecitabine prescription for 1,300 mg total. 20 tablet 4    [START ON 2/24/2025] capecitabine (XELODA) 500 MG Tab Take 2 tablets (1,000 mg total) by mouth 2 (two) times daily Take as directed Monday, Tuesday, Wednesday, Thursday, and Friday for the duration of radiation therapy. Take with food. with 1 other capecitabine prescription for 1,300 mg total. 20 tablet 4    mupirocin (BACTROBAN) 2 % ointment Apply topically 3 (three) times daily. (Patient not taking: Reported on 2/10/2025)       No current facility-administered  "medications for this visit.       Review of Systems   Constitutional:  Negative for appetite change, chills, fatigue, fever and unexpected weight change.   HENT:  Negative for mouth sores.    Eyes:  Negative for visual disturbance.   Respiratory:  Negative for cough and shortness of breath.    Cardiovascular:  Negative for chest pain and palpitations.   Gastrointestinal:  Negative for abdominal pain, constipation, diarrhea, nausea and vomiting.   Genitourinary:  Negative for frequency.   Musculoskeletal:  Negative for back pain.        Swelling right index finger   Skin:  Negative for rash.   Neurological:  Negative for headaches.   Hematological:  Negative for adenopathy. Does not bruise/bleed easily.   Psychiatric/Behavioral:  The patient is not nervous/anxious.        ECOG Performance Status: 0   Objective:      Vitals:   Vitals:    02/10/25 1534   BP: 110/70   BP Location: Right arm   Patient Position: Sitting   Pulse: 73   Resp: 16   Temp: 97.7 °F (36.5 °C)   TempSrc: Temporal   SpO2: 99%   Weight: 54.2 kg (119 lb 7.8 oz)   Height: 5' 1" (1.549 m)         Physical Exam  Constitutional:       General: She is not in acute distress.     Appearance: She is well-developed. She is not diaphoretic.   HENT:      Head: Normocephalic and atraumatic.      Comments: Alopecia  Cardiovascular:      Rate and Rhythm: Normal rate and regular rhythm.      Heart sounds: Normal heart sounds. No murmur heard.     No friction rub. No gallop.   Pulmonary:      Effort: Pulmonary effort is normal. No respiratory distress.      Breath sounds: Normal breath sounds. No wheezing or rales.   Chest:      Chest wall: No tenderness.   Abdominal:      General: Bowel sounds are normal. There is no distension.      Palpations: Abdomen is soft. There is no mass.      Tenderness: There is no abdominal tenderness. There is no guarding or rebound.   Musculoskeletal:      Comments: PORT in right chest  Pt with swelling of the right index finger "   Lymphadenopathy:      Cervical: No cervical adenopathy.      Upper Body:      Right upper body: No supraclavicular or axillary adenopathy.      Left upper body: No supraclavicular or axillary adenopathy.   Skin:     Findings: No erythema or rash.   Neurological:      Mental Status: She is alert and oriented to person, place, and time.   Psychiatric:         Behavior: Behavior normal.         Laboratory Data:  No visits with results within 1 Week(s) from this visit.   Latest known visit with results is:   Lab Visit on 01/27/2025   Component Date Value Ref Range Status    WBC 01/27/2025 5.69  3.90 - 12.70 K/uL Final    RBC 01/27/2025 2.97 (L)  4.00 - 5.40 M/uL Final    Hemoglobin 01/27/2025 9.9 (L)  12.0 - 16.0 g/dL Final    Hematocrit 01/27/2025 28.7 (L)  37.0 - 48.5 % Final    MCV 01/27/2025 97  82 - 98 fL Final    MCH 01/27/2025 33.3 (H)  27.0 - 31.0 pg Final    MCHC 01/27/2025 34.5  32.0 - 36.0 g/dL Final    RDW 01/27/2025 15.3 (H)  11.5 - 14.5 % Final    Platelets 01/27/2025 65 (L)  150 - 450 K/uL Final    MPV 01/27/2025 9.5  9.2 - 12.9 fL Final    Immature Granulocytes 01/27/2025 1.2 (H)  0.0 - 0.5 % Final    Gran # (ANC) 01/27/2025 3.0  1.8 - 7.7 K/uL Final    Immature Grans (Abs) 01/27/2025 0.07 (H)  0.00 - 0.04 K/uL Final    Comment: Mild elevation in immature granulocytes is non specific and   can be seen in a variety of conditions including stress response,   acute inflammation, trauma and pregnancy. Correlation with other   laboratory and clinical findings is essential.      Lymph # 01/27/2025 1.9  1.0 - 4.8 K/uL Final    Mono # 01/27/2025 0.6  0.3 - 1.0 K/uL Final    Eos # 01/27/2025 0.0  0.0 - 0.5 K/uL Final    Baso # 01/27/2025 0.03  0.00 - 0.20 K/uL Final    nRBC 01/27/2025 0  0 /100 WBC Final    Gran % 01/27/2025 52.6  38.0 - 73.0 % Final    Lymph % 01/27/2025 34.1  18.0 - 48.0 % Final    Mono % 01/27/2025 11.2  4.0 - 15.0 % Final    Eosinophil % 01/27/2025 0.4  0.0 - 8.0 % Final    Basophil %  01/27/2025 0.5  0.0 - 1.9 % Final    Differential Method 01/27/2025 Automated   Final    Sodium 01/27/2025 142  136 - 145 mmol/L Final    Potassium 01/27/2025 3.8  3.5 - 5.1 mmol/L Final    Chloride 01/27/2025 106  95 - 110 mmol/L Final    CO2 01/27/2025 25  23 - 29 mmol/L Final    Glucose 01/27/2025 101  70 - 110 mg/dL Final    BUN 01/27/2025 4 (L)  8 - 23 mg/dL Final    Creatinine 01/27/2025 0.8  0.5 - 1.4 mg/dL Final    Calcium 01/27/2025 9.0  8.7 - 10.5 mg/dL Final    Total Protein 01/27/2025 6.7  6.0 - 8.4 g/dL Final    Albumin 01/27/2025 3.9  3.5 - 5.2 g/dL Final    Total Bilirubin 01/27/2025 0.7  0.1 - 1.0 mg/dL Final    Comment: For infants and newborns, interpretation of results should be based  on gestational age, weight and in agreement with clinical  observations.    Premature Infant recommended reference ranges:  Up to 24 hours.............<8.0 mg/dL  Up to 48 hours............<12.0 mg/dL  3-5 days..................<15.0 mg/dL  6-29 days.................<15.0 mg/dL      Alkaline Phosphatase 01/27/2025 130  40 - 150 U/L Final    AST 01/27/2025 23  10 - 40 U/L Final    ALT 01/27/2025 14  10 - 44 U/L Final    eGFR 01/27/2025 >60.0  >60 mL/min/1.73 m^2 Final    Anion Gap 01/27/2025 11  8 - 16 mmol/L Final    Magnesium 01/27/2025 1.6  1.6 - 2.6 mg/dL Final    CA 19-9 01/27/2025 4.9  0.0 - 40.0 U/mL Final    Comment: The testing method is a chemiluminescent microparticle immunoassay   manufactured by [employer] and performed on the    or   [employer] system. Values obtained with different assay manufacturers   for   methods may be different and cannot be used interchangeably.           Imaging:     CT C/A/P 1/27/25  Thorax:     A port is noted overlying the right hemithorax with its tip favored to be within the right atrium near the superior vena cava right atrial junction.     Atherosclerotic calcifications are noted at the aortic arch. Atherosclerotic calcifications are noted at the origin  of the left subclavian.     No obvious mediastinal lymphadenopathy is noted.     Apical pleural thickening is noted bilaterally stable since the prior along with a 4-5 mm nodule in right upper lobe image 17 sequence 3 that may relate to apical pleural thickening.     The nodule that was seen to be new on 10/02/2024 of 5 mm at the right lung base appears decreased in density and is only faintly visualized today image 62 sequence 3 possibly slightly smaller in size as well at 4 mm. Continued follow-up for total resolution is suggested.     A 1-2 mm nodule is noted at the left lung apex image 18 sequence 105 not definitively seen on the prior. In this high-risk patient long-term follow-up size stability would be suggested. A similar such nodule is noted image 40 sequence 3 and image 22 sequence 105.     Abdomen and pelvis:     Decreased liver density is noted as can be seen with fatty infiltration of the liver.     Cholelithiasis is noted with a stone of 6 mm.     At the tip of the right lobe of the liver an 8 mm hypodensity is noted stable since the prior image 126 sequence 3 without worrisome characteristics too small to ideally categorize but statistically favored relate to a cyst.     The adrenal glands and kidneys demonstrate no worrisome abnormality. The spleen demonstrates no worrisome focal lesion. The stomach is partially distended and demonstrates no worrisome abnormality.     Significant improvement is noted since the prior in the region of the pancreas and the known pancreatic mass. Atrophy of the tail and body of the pancreas is noted similar to on the prior to the level of the mass. The mass appears less contiguous than on the prior measuring 2.9 by 1.7 cm in the location at priorly measured 3.8 x 3.6 cm. Soft tissue appears to surround the celiac artery however worrisome for neoplastic disease as can be seen image 117 sequence 3 and image 116 sequence 3. Density is noted as on the prior that appears to  wrap partially around the portal vein likely greater than 180° unchanged. Abnormal density appears to surround the splenic artery at its origin and common hepatic artery at its origin. Splenic vein takes a very tortuous course to the SM V raising the question of whether this relates to collateral but no obvious thrombosed more direct splenic vein is noted. If the native splenic vein is thrombosed that is chronic and atrophied. Pancreatic duct appears less dilated than on the prior     Apparent bladder wall thickening is noted as can be seen with urinary tract infection, postobstructive change, neurogenic bladder, and radiation change. This can be secondary.     Some wall thickening of the rectum is again noted but also appears improved since the prior.     Intervertebral disc height loss is noted at multiple lumbar levels suggesting degenerative change. If necessary dedicated imaging could be performed. Only mild central canal narrowing is suspected.       Assessment:       1. Malignant neoplasm of pancreas, unspecified location of malignancy    2. 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therapy    3. Pulmonary nodule    4. Thrombocytopenia    5. Pancreatic insufficiency    6. Hypothyroidism, unspecified type    7. Swelling of right index finger    8. Cat bite, subsequent encounter                           Plan:   Pancreatic Cancer - Patient with Stage III pancreatic cancer with concern for potential encasement of the distal celiac artery per discussion with Dr Valentin  -No sign of mets  - 94.5 on 6/17/24  -Case discussed at tumor board 7/17/24 with recommendation to proceed with chemo  -[employer] genetic testing negative for the genes tested  -[facility] tissue testing showed TP53, KRAS p.G12D, CDKN2A, CDKN2B mutations.  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  -Will need to keep irinotecan dose reduced at 165mg/mm2  -CT C/A/P on 10/02/24 shows stable disease and a possible new RLL  nodule  -Pt saw Dr Valentin 10/16/24 whom recommended a few more cycles of chemo and then transition to perioperative radiation therapy  - decreased all the way to 9.4U/mL on 11/11/24 with level today pending   -Pt to go to [facility] 12/09/24  -PT completed 12 cycles  -Repeat scans 1/27/25 showeddecrease in size of pancreatic mass appearing to surround the celiac artery and to wrap partially around the portal vein likely greater than 180°  -Case discussed at tumor board on 2/05/25 with recommendation for radiation  -Pt to start on Cepecitabein and radiation  -Patient was consented for chemotherapy today 2/10/2025 Capecitabine.   An extensive discussion was had which included a thorough discussion of the risk and benefits of treatment and alternatives.  Risks, including but not limited to, possible hair loss, bone marrow damage (anemia, thrombocytopenia, immune suppression, neutropenia), damage to body organs (brain, heart, liver, kidney, lungs, nervous system, skin, and others), allergic reactions, sterility, nausea/vomiting, constipation/diarrhea, sores in the mouth, secondary cancers, and rarely death were all discussed.  The patient agrees with the plan, and all questions have been answered to their satisfaction.  Consent was signed the patient, provider, and a third party witness.    -Consented the patient to the treatment plan and the patient was educated on the planned duration of the treatment and schedule of the treatment administration.  Visit today included increased complexity associated with the care of the episodic problem (chemotherapy) addressed and managing the longitudinal care of the patient due to the serious and/or complex managed problem(s) pancreatic cancer.     Pulmonary Nodules - Seen on CT C/A/P 1/27/25  -Stable  -Will monitor    Thrombocytopenia - platelets 65k on 1/27/25  -Due to chemo  -Will monitor     Hypokalemia - 3.8 on 1/27/25  -PT to continue potassium chloride     Pancreatic  Insufficiency - Stools more formed pt patient  -Not taking CREON  -Will monitor     Hypothyroidism - pt on synthroid  -Will monitor    Swelling of Right Index Finger - pt bitten by a cat on the right hand now with swelling of the right index finger.  -Pt to see orthopedics urgently for potential incision and drainage  -Pt taking Augmentin    Route Chart for Scheduling    Med Onc Chart Routing      Follow up with physician 2 weeks. PT needs apoproval for Xeloda.  Pt needs STAT appt with Dr Gumaro Chou in orthopedics for hand infection on the right. PT needs a CBC, CMP with appt with me the day she starts radiaiton 2/24/25.   Follow up with [facility]    Infusion scheduling note    Injection scheduling note    Labs    Imaging    Pharmacy appointment    Other referrals              Treatment Plan Information   OP CAPECITABINE 5 DAYS + RADIOTHERAPY Rajat Hunt MD   Associated diagnosis: Malignant neoplasm of pancreas Stage III cT4, cN0, cM0 noted on 7/12/2024   Line of treatment: Neoadjuvant  Treatment Goal: Curative     Upcoming Treatment Dates - OP CAPECITABINE 5 DAYS + RADIOTHERAPY    2/24/2025       Antiemetics       Physician communication order       Take Home Chemotherapy       capecitabine (XELODA) tablet 1,300 mg    Therapy Plan Information  PORT FLUSH for Malignant neoplasm of pancreas, noted on 7/12/2024  Flushes  heparin, porcine (PF) 100 unit/mL injection flush 500 Units  500 Units, Intravenous, Every visit  sodium chloride 0.9% flush 10 mL  10 mL, Intravenous, Every visit      No therapy plan of the specified type found.    No therapy plan of the specified type found.      Rajat Hunt MD  [facility]  Hematology and Oncology  [facility] [address] [place] [address]   O: [phone]  F: [phone]

## 2025-02-10 ENCOUNTER — HOSPITAL ENCOUNTER (OUTPATIENT)
Dept: RADIATION THERAPY | Facility: HOSPITAL | Age: 65
Discharge: HOME | End: 2025-02-10
Attending: INTERNAL MEDICINE
Payer: MEDICARE

## 2025-02-10 ENCOUNTER — OFFICE VISIT (OUTPATIENT)
Dept: HEMATOLOGY/ONCOLOGY | Facility: CLINIC | Age: 65
End: 2025-02-10
Payer: MEDICARE

## 2025-02-10 VITALS
RESPIRATION RATE: 16 BRPM | BODY MASS INDEX: 22.56 KG/M2 | DIASTOLIC BLOOD PRESSURE: 70 MMHG | SYSTOLIC BLOOD PRESSURE: 110 MMHG | WEIGHT: 119.5 LBS | TEMPERATURE: 98 F | OXYGEN SATURATION: 99 % | HEART RATE: 73 BPM | HEIGHT: 61 IN

## 2025-02-10 DIAGNOSIS — R91.1 PULMONARY NODULE: ICD-10-CM
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
## 2025-02-10 DIAGNOSIS — K86.89 PANCREATIC INSUFFICIENCY: ICD-10-CM

## 2025-02-10 DIAGNOSIS — D69.6 THROMBOCYTOPENIA: ICD-10-CM

## 2025-02-10 DIAGNOSIS — D84.821 IMMUNODEFICIENCY DUE TO CHEMOTHERAPY: ICD-10-CM

## 2025-02-10 DIAGNOSIS — M79.89 SWELLING OF RIGHT INDEX FINGER: ICD-10-CM

## 2025-02-10 DIAGNOSIS — E03.9 HYPOTHYROIDISM, UNSPECIFIED TYPE: ICD-10-CM

## 2025-02-10 DIAGNOSIS — Z79.899 IMMUNODEFICIENCY DUE TO CHEMOTHERAPY: ICD-10-CM

## 2025-02-10 DIAGNOSIS — T45.1X5A IMMUNODEFICIENCY DUE TO CHEMOTHERAPY: ICD-10-CM

## 2025-02-10 DIAGNOSIS — C25.9 MALIGNANT NEOPLASM OF PANCREAS, UNSPECIFIED LOCATION OF MALIGNANCY: Primary | ICD-10-CM

## 2025-02-10 PROCEDURE — 3074F SYST BP LT 130 MM HG: CPT | Mod: CPTII,S$GLB,, | Performed by: INTERNAL MEDICINE

## 2025-02-10 PROCEDURE — G2211 COMPLEX E/M VISIT ADD ON: HCPCS | Mod: S$GLB,,, | Performed by: INTERNAL MEDICINE

## 2025-02-10 PROCEDURE — 99215 OFFICE O/P EST HI 40 MIN: CPT | Mod: S$GLB,,, | Performed by: INTERNAL MEDICINE

## 2025-02-10 PROCEDURE — 77334 RADIATION TREATMENT AID(S): CPT | Mod: 26,,, | Performed by: RADIOLOGY

## 2025-02-10 PROCEDURE — 3008F BODY MASS INDEX DOCD: CPT | Mod: CPTII,S$GLB,, | Performed by: INTERNAL MEDICINE

## 2025-02-10 PROCEDURE — 77334 RADIATION TREATMENT AID(S): CPT | Mod: TC,PN | Performed by: RADIOLOGY

## 2025-02-10 PROCEDURE — 77263 THER RADIOLOGY TX PLNG CPLX: CPT | Mod: ,,, | Performed by: RADIOLOGY

## 2025-02-10 PROCEDURE — 1159F MED LIST DOCD IN RCRD: CPT | Mod: CPTII,S$GLB,, | Performed by: INTERNAL MEDICINE

## 2025-02-10 PROCEDURE — 1125F AMNT PAIN NOTED PAIN PRSNT: CPT | Mod: CPTII,S$GLB,, | Performed by: INTERNAL MEDICINE

## 2025-02-10 PROCEDURE — 3078F DIAST BP <80 MM HG: CPT | Mod: CPTII,S$GLB,, | Performed by: INTERNAL MEDICINE

## 2025-02-10 PROCEDURE — 77014 PR  CT GUIDANCE PLACEMENT RAD THERAPY FIELDS: CPT | Mod: 26,,, | Performed by: RADIOLOGY

## 2025-02-10 PROCEDURE — 77014 HC CT GUIDANCE RADIATION THERAPY FLDS PLACEMENT: CPT | Mod: TC,PN | Performed by: RADIOLOGY

## 2025-02-10 PROCEDURE — 99999 PR PBB SHADOW E&M-EST. PATIENT-LVL IV: CPT | Mod: PBBFAC,,, | Performed by: INTERNAL MEDICINE

## 2025-02-10 RX ORDER — CAPECITABINE 150 MG/1
300 TABLET, FILM COATED ORAL 2 TIMES DAILY
Qty: 20 TABLET | Refills: 4 | Status: ACTIVE | OUTPATIENT
Start: 2025-02-24

## 2025-02-10 RX ORDER — CAPECITABINE 500 MG/1
1000 TABLET, FILM COATED ORAL 2 TIMES DAILY
Qty: 20 TABLET | Refills: 4 | Status: ACTIVE | OUTPATIENT
Start: 2025-02-24

## 2025-02-10 NOTE — PLAN OF CARE
DISCONTINUE ON PATHWAY REGIMEN - Pancreatic Adenocarcinoma    [id number]        Irinotecan (Camptosar)       Oxaliplatin       Leucovorin       Fluorouracil           Additional Orders: Regimen is based on the PRODIGE 24 trial by Darin et   al., 2018. The use of growth factor was mandated in some, but not all   mFOLFIRINOX studies; please follow institutional protocol/physician discretion   regarding use of growth factor.    **Always confirm dose/schedule in your pharmacy ordering system**    REASON: Other Reason  PRIOR TREATMENT: [id number]  TREATMENT RESPONSE: Partial Response (SD)    START ON PATHWAY REGIMEN - Pancreatic Adenocarcinoma    [id number]        Capecitabine (Xeloda)           Additional Orders: Capecitabine is given concurrently with radiation   therapy.    **Always confirm dose/schedule in your pharmacy ordering system**    Patient Characteristics:  Preoperative, M0 (Clinical Staging), Borderline Resectable, PS = 0,1, BRCA1/2   and PALB2 Mutation Absent/Unknown  Therapeutic Status: Preoperative, M0 (Clinical Staging)  AJCC T Category: cT4  AJCC N Category: cN0  Resectability Status: Borderline Resectable  AJCC M Category: cM0  AJCC 8 Stage Grouping: III  ECOG Performance Status: 0  BRCA1/2 Mutation Status: Awaiting Test Results  PALB2 Mutation Status: Awaiting Test Results    Intent of Therapy:  Curative Intent, Discussed with Patient

## 2025-02-14 ENCOUNTER — PATIENT MESSAGE (OUTPATIENT)
Dept: FAMILY MEDICINE | Facility: CLINIC | Age: 65
End: 2025-02-14
Payer: MEDICARE

## 2025-02-14 ENCOUNTER — PATIENT MESSAGE (OUTPATIENT)
Dept: HEMATOLOGY/ONCOLOGY | Facility: CLINIC | Age: 65
End: 2025-02-14
Payer: MEDICARE

## 2025-02-14 DIAGNOSIS — S61.451A CAT BITE OF RIGHT HAND, INITIAL ENCOUNTER: ICD-10-CM
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
## 2025-02-14 RX ORDER — DOXYCYCLINE 100 MG/1
100 CAPSULE ORAL 2 TIMES DAILY
Qty: 20 CAPSULE | Refills: 0 | Status: SHIPPED | OUTPATIENT
Start: 2025-02-14 | End: 2025-02-24

## 2025-02-14 NOTE — TELEPHONE ENCOUNTER
I have signed for the following orders AND/OR meds.  Please call the patient and ask the patient to schedule the testing AND/OR inform about any medications that were sent. Medications have been sent to pharmacy listed below      No orders of the defined types were placed in this encounter.      Medications Ordered This Encounter   Medications    doxycycline (VIBRAMYCIN) 100 MG Cap     Sig: Take 1 capsule (100 mg total) by mouth 2 (two) times daily. for 10 days     Dispense:  20 capsule     Refill:  0         [facility] - [address] [address]  Phone: [phone] Fax: [phone]    [facility] - [facility] [address] [address]  Phone: [phone] Fax: [phone]

## 2025-02-14 NOTE — TELEPHONE ENCOUNTER
"I spoke with pt over the phone, pt was asking for more antibiotics , she said " pt recommend for me to get more antibiotics because my finger is still very swollen. " I advised pt to contact her oncology doctor , because she will be having more treatments with them . Pt will be completing antibiotics today    "

## 2025-02-15 NOTE — TELEPHONE ENCOUNTER
I called the patient and informed her that she could go ahead and complete her antibiotics tomorrow evening.  I instructed her that I would defer any additional antibiotics to Dr. Chou with orthopedics.  I instructed the patient that she can reach back out to us if her symptoms of swelling or redness in her hand get worse.  The patient expressed understanding.  All questions were answered to her satisfaction.    Rajat Hunt MD  [facility]  Hematology and Oncology  [facility]   [address] [place] [address]   O: [phone]  F: [phone]

## 2025-02-19 PROBLEM — R80.9 MICROALBUMINURIA DUE TO TYPE 2 DIABETES MELLITUS: Status: RESOLVED | Noted: 2023-02-03 | Resolved: 2025-02-19

## 2025-02-19 PROBLEM — Z63.79 STRESS DUE TO ILLNESS OF FAMILY MEMBER: Status: RESOLVED | Noted: 2023-10-06 | Resolved: 2025-02-19

## 2025-02-19 PROBLEM — T78.3XXA ANGIOEDEMA: Status: RESOLVED | Noted: 2020-09-01 | Resolved: 2025-02-19

## 2025-02-19 PROBLEM — K86.89 PANCREATIC INSUFFICIENCY: Status: RESOLVED | Noted: 2024-09-21 | Resolved: 2025-02-19

## 2025-02-19 PROBLEM — R91.1 PULMONARY NODULE: Status: RESOLVED | Noted: 2023-12-06 | Resolved: 2025-02-19

## 2025-02-19 PROBLEM — K86.89 PANCREATIC MASS: Status: RESOLVED | Noted: 2024-06-20 | Resolved: 2025-02-19

## 2025-02-19 PROBLEM — M86.19 OTHER ACUTE OSTEOMYELITIS, MULTIPLE SITES: Status: ACTIVE | Noted: 2025-02-19

## 2025-02-19 PROBLEM — M00.9 PYOGENIC ARTHRITIS OF RIGHT HAND: Status: ACTIVE | Noted: 2025-02-19

## 2025-02-19 PROBLEM — K12.30 MUCOSITIS: Status: RESOLVED | Noted: 2024-09-13 | Resolved: 2025-02-19

## 2025-02-19 PROBLEM — Z71.89 ACP (ADVANCE CARE PLANNING): Status: ACTIVE | Noted: 2025-02-19

## 2025-02-19 PROBLEM — Z12.11 COLON CANCER SCREENING: Status: RESOLVED | Noted: 2018-08-30 | Resolved: 2025-02-19

## 2025-02-19 PROBLEM — N84.1 POLYP OF CERVIX: Status: RESOLVED | Noted: 2017-07-05 | Resolved: 2025-02-19

## 2025-02-19 PROBLEM — E11.29 MICROALBUMINURIA DUE TO TYPE 2 DIABETES MELLITUS: Status: RESOLVED | Noted: 2023-02-03 | Resolved: 2025-02-19

## 2025-02-19 PROBLEM — Z86.0100 HISTORY OF COLON POLYPS: Status: RESOLVED | Noted: 2018-08-30 | Resolved: 2025-02-19

## 2025-02-19 PROBLEM — M24.561 CONTRACTURE, RIGHT KNEE: Status: RESOLVED | Noted: 2017-10-19 | Resolved: 2025-02-19

## 2025-02-19 PROBLEM — M00.9 PYOGENIC ARTHRITIS OF RIGHT HAND: Status: RESOLVED | Noted: 2025-02-19 | Resolved: 2025-02-19

## 2025-02-19 PROBLEM — R42 DIZZINESS: Status: RESOLVED | Noted: 2022-05-18 | Resolved: 2025-02-19

## 2025-02-19 PROBLEM — E55.9 VITAMIN D INSUFFICIENCY: Status: RESOLVED | Noted: 2020-06-30 | Resolved: 2025-02-19

## 2025-02-19 PROBLEM — E87.6 HYPOKALEMIA: Status: RESOLVED | Noted: 2024-09-20 | Resolved: 2025-02-19

## 2025-02-19 PROBLEM — K31.7 GASTRIC POLYP: Status: RESOLVED | Noted: 2024-07-05 | Resolved: 2025-02-19

## 2025-02-21 PROBLEM — Z71.89 ACP (ADVANCE CARE PLANNING): Status: RESOLVED | Noted: 2025-02-19 | Resolved: 2025-02-21

## 2025-02-22 PROBLEM — F32.9 MAJOR DEPRESSIVE DISORDER: Status: ACTIVE | Noted: 2025-02-22

## 2025-02-24 ENCOUNTER — LAB VISIT (OUTPATIENT)
Dept: LAB | Facility: HOSPITAL | Age: 65
End: 2025-02-24
Attending: INTERNAL MEDICINE
Payer: MEDICARE

## 2025-02-24 ENCOUNTER — OFFICE VISIT (OUTPATIENT)
Dept: HEMATOLOGY/ONCOLOGY | Facility: CLINIC | Age: 65
End: 2025-02-24
Payer: MEDICARE

## 2025-02-24 VITALS
RESPIRATION RATE: 16 BRPM | WEIGHT: 119.69 LBS | HEART RATE: 77 BPM | TEMPERATURE: 97 F | HEIGHT: 61 IN | DIASTOLIC BLOOD PRESSURE: 70 MMHG | SYSTOLIC BLOOD PRESSURE: 108 MMHG | BODY MASS INDEX: 22.6 KG/M2 | OXYGEN SATURATION: 97 %

## 2025-02-24 DIAGNOSIS — E03.9 HYPOTHYROIDISM, UNSPECIFIED TYPE: ICD-10-CM

## 2025-02-24 DIAGNOSIS — C25.9 MALIGNANT NEOPLASM OF PANCREAS, UNSPECIFIED LOCATION OF MALIGNANCY: ICD-10-CM

## 2025-02-24 DIAGNOSIS — C25.9 MALIGNANT NEOPLASM OF PANCREAS, UNSPECIFIED LOCATION OF MALIGNANCY: Primary | ICD-10-CM

## 2025-02-24 DIAGNOSIS — R91.1 PULMONARY NODULE: ICD-10-CM

## 2025-02-24 DIAGNOSIS — E87.6 HYPOKALEMIA: ICD-10-CM

## 2025-02-24 DIAGNOSIS — M86.241 SUBACUTE OSTEOMYELITIS OF RIGHT HAND: ICD-10-CM

## 2025-02-24 LAB
ALBUMIN SERPL BCP-MCNC: 3.7 G/DL (ref 3.5–5.2)
ALP SERPL-CCNC: 128 U/L (ref 40–150)
ALT SERPL W/O P-5'-P-CCNC: 10 U/L (ref 10–44)
ANION GAP SERPL CALC-SCNC: 11 MMOL/L (ref 8–16)
AST SERPL-CCNC: 21 U/L (ref 10–40)
BASOPHILS # BLD AUTO: 0.03 K/UL (ref 0–0.2)
BASOPHILS NFR BLD: 0.4 % (ref 0–1.9)
BILIRUB SERPL-MCNC: 0.7 MG/DL (ref 0.1–1)
BUN SERPL-MCNC: 9 MG/DL (ref 8–23)
CALCIUM SERPL-MCNC: 9.2 MG/DL (ref 8.7–10.5)
CHLORIDE SERPL-SCNC: 100 MMOL/L (ref 95–110)
CO2 SERPL-SCNC: 25 MMOL/L (ref 23–29)
CREAT SERPL-MCNC: 0.8 MG/DL (ref 0.5–1.4)
DIFFERENTIAL METHOD BLD: ABNORMAL
EOSINOPHIL # BLD AUTO: 0.1 K/UL (ref 0–0.5)
EOSINOPHIL NFR BLD: 1.1 % (ref 0–8)
ERYTHROCYTE [DISTWIDTH] IN BLOOD BY AUTOMATED COUNT: 13.4 % (ref 11.5–14.5)
EST. GFR  (NO RACE VARIABLE): >60 ML/MIN/1.73 M^2
GLUCOSE SERPL-MCNC: 156 MG/DL (ref 70–110)
HCT VFR BLD AUTO: 36.5 % (ref 37–48.5)
HGB BLD-MCNC: 12.2 G/DL (ref 12–16)
IMM GRANULOCYTES # BLD AUTO: 0.01 K/UL (ref 0–0.04)
IMM GRANULOCYTES NFR BLD AUTO: 0.1 % (ref 0–0.5)
LYMPHOCYTES # BLD AUTO: 1.5 K/UL (ref 1–4.8)
LYMPHOCYTES NFR BLD: 19.6 % (ref 18–48)
MCH RBC QN AUTO: 32.2 PG (ref 27–31)
MCHC RBC AUTO-ENTMCNC: 33.4 G/DL (ref 32–36)
MCV RBC AUTO: 96 FL (ref 82–98)
MONOCYTES # BLD AUTO: 0.4 K/UL (ref 0.3–1)
MONOCYTES NFR BLD: 5.8 % (ref 4–15)
NEUTROPHILS # BLD AUTO: 5.5 K/UL (ref 1.8–7.7)
NEUTROPHILS NFR BLD: 73 % (ref 38–73)
NRBC BLD-RTO: 0 /100 WBC
PLATELET # BLD AUTO: 156 K/UL (ref 150–450)
PMV BLD AUTO: 8.8 FL (ref 9.2–12.9)
POTASSIUM SERPL-SCNC: 4.1 MMOL/L (ref 3.5–5.1)
PROT SERPL-MCNC: 7.4 G/DL (ref 6–8.4)
RBC # BLD AUTO: 3.79 M/UL (ref 4–5.4)
SODIUM SERPL-SCNC: 136 MMOL/L (ref 136–145)
WBC # BLD AUTO: 7.59 K/UL (ref 3.9–12.7)

## 2025-02-24 PROCEDURE — 3074F SYST BP LT 130 MM HG: CPT | Mod: CPTII,S$GLB,, | Performed by: INTERNAL MEDICINE

## 2025-02-24 PROCEDURE — 1125F AMNT PAIN NOTED PAIN PRSNT: CPT | Mod: CPTII,S$GLB,, | Performed by: INTERNAL MEDICINE

## 2025-02-24 PROCEDURE — 99999 PR PBB SHADOW E&M-EST. PATIENT-LVL IV: CPT | Mod: PBBFAC,,, | Performed by: INTERNAL MEDICINE

## 2025-02-24 PROCEDURE — 80053 COMPREHEN METABOLIC PANEL: CPT | Mod: PN | Performed by: INTERNAL MEDICINE

## 2025-02-24 PROCEDURE — 36415 COLL VENOUS BLD VENIPUNCTURE: CPT | Mod: PN | Performed by: INTERNAL MEDICINE

## 2025-02-24 PROCEDURE — 3288F FALL RISK ASSESSMENT DOCD: CPT | Mod: CPTII,S$GLB,, | Performed by: INTERNAL MEDICINE

## 2025-02-24 PROCEDURE — 1159F MED LIST DOCD IN RCRD: CPT | Mod: CPTII,S$GLB,, | Performed by: INTERNAL MEDICINE

## 2025-02-24 PROCEDURE — 85025 COMPLETE CBC W/AUTO DIFF WBC: CPT | Mod: PN | Performed by: INTERNAL MEDICINE

## 2025-02-24 PROCEDURE — 3078F DIAST BP <80 MM HG: CPT | Mod: CPTII,S$GLB,, | Performed by: INTERNAL MEDICINE

## 2025-02-24 PROCEDURE — G2211 COMPLEX E/M VISIT ADD ON: HCPCS | Mod: S$GLB,,, | Performed by: INTERNAL MEDICINE

## 2025-02-24 PROCEDURE — 1101F PT FALLS ASSESS-DOCD LE1/YR: CPT | Mod: CPTII,S$GLB,, | Performed by: INTERNAL MEDICINE

## 2025-02-24 PROCEDURE — 99215 OFFICE O/P EST HI 40 MIN: CPT | Mod: S$GLB,,, | Performed by: INTERNAL MEDICINE

## 2025-02-24 PROCEDURE — 1111F DSCHRG MED/CURRENT MED MERGE: CPT | Mod: CPTII,S$GLB,, | Performed by: INTERNAL MEDICINE

## 2025-02-24 PROCEDURE — 3008F BODY MASS INDEX DOCD: CPT | Mod: CPTII,S$GLB,, | Performed by: INTERNAL MEDICINE

## 2025-02-24 NOTE — PROGRESS NOTES
PATIENT: Bessy Lamb  MRN: [id number]  DATE: 2/24/2025      Diagnosis:   1. Malignant neoplasm of pancreas, unspecified location of malignancy    2. Subacute osteomyelitis of right hand    3. Pulmonary nodule    4. Hypothyroidism, unspecified type    5. Hypokalemia          Chief Complaint: Malignant neoplasm of pancreas, unspecified location of mal (1 week follow up )      Oncologic History:      Oncologic History     Oncologic Treatment     Pathology           Subjective:    Interval History:  Ms. Lamb is a 64 y.o. female with Arthritis, DMII, GERD, Hypotyroidism, Obesity, Thyroid disease who presents for pancreatic cancer.  Since the last clinic visit the patient was admitted to the hospital from 2/19/25 until 2/22/25 for osteomyelitis.   Pt presented to the hospital with septic arthritis from a cat bite.  MRI hand and fingers on 2/17/25 showed findings concerning for septic arthritis and early changes of osteomyelitis, and cellulitis in the 2nd MCP on the right hand as well as tenosynovitis and cellulitis about dorsal aspect of the hand/wrist.  Pt underwent debridement of soft tissue and tendon, arthrotomy of the 2nd MCP joint with irrigation and excision of infected bone about the 2nd metacarpal and index finger proximal phalanx.   Cultures failed to grow any bacteria.  Pt was discharged on doxycycline and ertapenem to complete 6 weeks of abx.  Currently the patient endorses pain in the right hand with associated swelling.  The patient denies CP, cough, SOB, abdominal pain, nausea, vomiting, constipation, diarrhea.  The patient denies fever, chills, night sweats, weight loss, new lumps or bumps, easy bruising or bleeding.    Prior History:   The patient initially underwent a CXR on 11/13/23 fur a URI which showed possible subtle pulmonary nodules.  CT chest 12/01/2023 showed a cluster of ill-defined centrilobular ground-glass opacity he inferior right and left upper lobes as well as a 1.2 cm ground-glass  opacity in the superior segment of the left lower lobe; solid 3 mm pulmonary nodule in the right upper lobe; subcentimeter right hepatic lobe hypodensity likely representing a cyst.  The patient underwent surveillance CT chest on 05/21/2024 showing a 3.7 x 2.7 pancreatic body mass in the pancreatic tail atrophy suspicious of pancreatic malignancy as well as and unchanged benign 3 mm nodule in the right upper lobe.  MRI abdomen on 06/30/2024 showed hypoenhancing mass centered in the proximal pancreatic body measuring 2.9 x 2 cm with abutment and possible encasement of the distal splenic vein.  EUS was performed on 07/05/2024 mass in the pancreatic body stage T4 N0 M0 by endo sonographic criteria.  Pathology from biopsy of the stomach showed mild focally moderate chronic gastritis with no evidence of the H pylori.  Stomach polyp which was removed code hyperplastic polyps with chronic inflammation.  Pancreatic biopsy showed adenocarcinoma.  CT of the chest, abdomen, and pelvis on 07/09/2024 showed a 9 mm lesion in the right hepatic lobe previously characterized as cysts; mass in the pancreatic body measuring 4.4 x 3.7 cm with diffuse pancreatic ductal dilatation measuring 8 mm:  Weaning vein at the portal confluence occluded with reconstitution via collaterals.  The mass abuts the portal vein by less than 180° but does not case the proximal splenic artery remains patent.  Also noted was a pancreatic lymph node measuring 0.7 cm.   Patient's case was discussed at tumor board on 07/17/2024 with recommendation for proceeding with the chemotherapy.  Patient had port placement on 07/18/2024.   The patient is admitted to the hospital from 09/20/2024 until 09/22/2024 for hypokalemia with potassium of 2.3.  The patient was subsequently discharged underwent treatment with FOLFIRINOX on 09/23/2024.  CT of the chest, abdomen, and pelvis on 10/02/2024 showed a new 5 mm ground-glass nodule in the right lower lobe; stable 8 mm  hypodensity present within the right hepatic lobe suggestive of a cyst; 37 x 35 mm proximal pancreatic body mass slightly smaller in transverse dimension involving 90 degree area of the celiac artery and 100 degree area of the splenic artery with soft tissue extending along the anterior right lateral wall of the portal vein; abnormal soft tissue insinuating between the lateral margin of the celiac artery and adjacent splenic vein; invasion and focal occlusion of the proximal most aspect of the splenic vein; concentric wall thickening present within the distal sigmoid colon/rectum with infectious and inflammatory etiologies possible.   The patient met with Dr Valentin on 10/16/24 whom felt the patient could receive a few more cycles of chemo and then transition to perioperative radiation therapy.   The patient's case was discussed with Dr. Valentin and .  Plan is to proceed with up to 12 cycles FOLFIRINOX prior to consideration of radiation versus surgical resection.   CT chest, abdomen, and pelvis on 01/27/2025 showing apical pleural thickening bilaterally stable since prior exam; 5 mm nodule of the right lung base; 3 possibly slightly smaller nodules at 4 mm; 1-2 mm nodule in the left lung apex; 8 mm hypodensity at the tip of the right lobe of the liver favored to be a cyst; significant improvement in pancreatic mass now measuring 2.9 x 1.7 cm appearing surround the celiac artery and to wrap partially around the portal vein likely greater than 180°.    Past Medical History:   Past Medical History:   Diagnosis Date    Arthritis, lumbar spine     hands    Diabetes mellitus     General anesthetics causing adverse effect in therapeutic use     following breast rediuct, hard time awakening  also had anxiety rxn to lidocain in dental ofc    GERD (gastroesophageal reflux disease)     Hypothyroidism 10/08/2014    Major depressive disorder 2/22/2025    Maternal anesthesia complication     epidural for 1st child went  up instead of down; required intubation    Normocytic anemia 2024    Obesity (BMI 30-39.9) 10/08/2014    pancreatic Cancer         Thyroid disease     Thyroid nodule 10/08/2014       Past Surgical HIstory:   Past Surgical History:   Procedure Laterality Date    BREAST BIOPSY Left     b9    BREAST SURGERY      Reduction cause of a mass in left breast    c-sections x2       SECTION  3/26/81 & 85    Birth of two girls    COLONOSCOPY  2012         COLONOSCOPY N/A 2018    Procedure: COLONOSCOPY;  Surgeon: Francisco Mcclain MD;  Location: [facility];  Service: Endoscopy;  Laterality: N/A;    COLONOSCOPY N/A 10/24/2023    Procedure: COLONOSCOPY;  Surgeon: Francisco Mcclain MD;  Location: [facility];  Service: Endoscopy;  Laterality: N/A;    ENDOSCOPIC ULTRASOUND OF UPPER GASTROINTESTINAL TRACT Left 2024    Procedure: ULTRASOUND, UPPER GI TRACT, ENDOSCOPIC;  Surgeon: Andrew Gordon MD;  Location: [facility];  Service: Endoscopy;  Laterality: Left;    ESOPHAGOGASTRODUODENOSCOPY N/A 2024    Procedure: EGD (ESOPHAGOGASTRODUODENOSCOPY);  Surgeon: Andrew Gordon MD;  Location: [facility];  Service: Endoscopy;  Laterality: N/A;    hysteroscopy with polypectomy      INCISION AND DRAINAGE Right 2025    Procedure: Incision and Drainage, RIGHT HAND EXCISION OF INFECTED BONE RIGHT INDEX FINGER;  Surgeon: SALVATORE Chou MD;  Location: [facility];  Service: General;  Laterality: Right;    INCISIONAL BREAST BIOPSY Left     benign; done at same time as reduction    INSERTION OF TUNNELED CENTRAL VENOUS CATHETER (CVC) WITH SUBCUTANEOUS PORT N/A 2024    Procedure: [id number];  Surgeon: Blanca Dillard MD;  Location: [facility];  Service: General;  Laterality: N/A;    TOTAL REDUCTION MAMMOPLASTY Bilateral        Family History:   Family History   Problem Relation Name Age of Onset    Brain cancer Mother Ravi Seaman     Early death Mother  Ravi Tolbert 51        Passed at 51    Cancer Mother Ravi Tolbert         Brain tumor    Arthritis Mother Ravi Tolbert     Diabetes Father Ck tolbert         Type 2    Cirrhosis Father Ck tolbert     Cancer Father Ck tolbert         Bone    COPD Father Ck tolbert         Smoker    Early death Father Ck tolbert         Passed at 64    Lung cancer Father Ck tolbert     Heart disease Sister Mamta (dianna)wescovich         Congestive heart failure (passed 2/11/18    Hypertension Sister Mamta (dianna)wescovich     Kidney disease Sister Mamta (dianna)wescovich         Doc removed when she was a child    Hypertension Sister Mayank     Depression Sister Ravi (mayank) macey Mauricio ( sister)         Due to loss of her 27 year old son    Early death Sister Ravi (mayank) macey Mauricio ( sister)         Pulmonary hypertension, cirrhosis of the liver(passed 7/14/2007   Age 57    Heart disease Brother John Mariela ( passed )         Heart attack    Hypertension Brother John Choudhury ( passed )     Heart disease Brother Juan Francisco Choudhury         Heart  blockage    Heart disease Brother Bhanu Choudhury         Heart attack (passed)    Diabetes Brother Bhanu Choudhury     Macular degeneration Brother Bhanu Choudhury     Breast cancer Maternal Aunt  30    Breast cancer Paternal Aunt  40    Breast cancer Paternal Aunt  40    Ovarian cancer Neg Hx         Social History:  reports that she has never smoked. She has never used smokeless tobacco. She reports that she does not currently use alcohol. She reports that she does not use drugs.    Allergies:  Review of patient's allergies indicates:   Allergen Reactions    Duricef [cefadroxil] Hives       Medications:  Current Outpatient Medications   Medication Sig Dispense Refill    0.9% NaCl PgBk 100 mL with ertapenem 1 gram SolR 1 g Inject 1 g into the vein once daily.       acetaminophen (TYLENOL) 325 MG tablet Take 650 mg by mouth daily as needed for Pain.      capecitabine (XELODA) 150 MG tablet Take 2 tablets (300 mg total) by mouth 2 (two) times daily Take as directed Monday, Tuesday, Wednesday, Thursday, and Friday for the duration of radiation therapy. Take with food and with 1 other capecitabine prescription for 1,300 mg total. 20 tablet 4    capecitabine (XELODA) 500 MG Tab Take 2 tablets (1,000 mg total) by mouth 2 (two) times daily Take as directed Monday, Tuesday, Wednesday, Thursday, and Friday for the duration of radiation therapy. Take with food and with 1 other capecitabine prescription for 1,300 mg total. 20 tablet 4    doxycycline (VIBRA-TABS) 100 MG tablet Take 1 tablet (100 mg total) by mouth every 12 (twelve) hours. 80 tablet 0    duke's soln (benadryl 30 mL, mylanta 30 mL, LIDOcaine 30 mL, nystatin 30 mL) 120mL 10 ml swish & spit/swallow every 4 to 6 hours as needed for mouth pain/ulcers/yeast/throat pain (Patient taking differently: Take 10 mLs by mouth every 4 to 6 hours as needed (for mouth pain/ulcers/yeast/throat pain).) 240 mL 1    EScitalopram oxalate (LEXAPRO) 10 MG tablet Take 1 tablet (10 mg total) by mouth once daily. 30 tablet 2    fluticasone propionate (FLONASE) 50 mcg/actuation nasal spray 1 spray (50 mcg total) by Each Nostril route once daily. (Patient taking differently: 1 spray by Each Nostril route daily as needed for Allergies.) 18.2 mL 0    HYDROcodone-acetaminophen (NORCO)  mg per tablet Take 1 tablet by mouth every 4 (four) hours as needed for Pain. 42 tablet 0    levothyroxine (SYNTHROID) 50 MCG tablet Take 1 tablet (50 mcg total) by mouth once daily. (Patient taking differently: Take 50 mcg by mouth before breakfast.) 90 tablet 3    loratadine (CLARITIN) 10 mg tablet Take 1 tablet (10 mg total) by mouth every morning. (Patient taking differently: Take 10 mg by mouth once daily.) 30 tablet 11    potassium chloride SA  "(K-DUR,KLOR-CON) 20 MEQ tablet Take 40 mEq by mouth 2 (two) times daily.      senna-docusate 8.6-50 mg (PERICOLACE) 8.6-50 mg per tablet Take 2 tablets by mouth once daily. 60 tablet 1    simvastatin (ZOCOR) 10 MG tablet Take 1 tablet (10 mg total) by mouth every evening. 90 tablet 3    SYNJARDY XR 10-1,000 mg TBph TAKE ONE TABLET BY MOUTH ONCE DAILY (Patient taking differently: Take 1 tablet by mouth Daily.) 30 tablet 5     No current facility-administered medications for this visit.       Review of Systems   Constitutional:  Negative for appetite change, chills, fatigue, fever and unexpected weight change.   HENT:  Negative for mouth sores.    Eyes:  Negative for visual disturbance.   Respiratory:  Negative for cough and shortness of breath.    Cardiovascular:  Negative for chest pain and palpitations.   Gastrointestinal:  Negative for abdominal pain, constipation, diarrhea, nausea and vomiting.   Genitourinary:  Negative for frequency.   Musculoskeletal:  Negative for back pain.        Swelling in the right hand with pain   Skin:  Negative for rash.   Neurological:  Negative for headaches.   Hematological:  Negative for adenopathy. Does not bruise/bleed easily.   Psychiatric/Behavioral:  The patient is not nervous/anxious.        ECOG Performance Status: 0   Objective:      Vitals:   Vitals:    02/24/25 1249   BP: 108/70   BP Location: Left arm   Patient Position: Sitting   Pulse: 77   Resp: 16   Temp: 97.3 °F (36.3 °C)   TempSrc: Temporal   SpO2: 97%   Weight: 54.3 kg (119 lb 11.4 oz)   Height: 5' 1" (1.549 m)           Physical Exam  Constitutional:       General: She is not in acute distress.     Appearance: She is well-developed. She is not diaphoretic.   HENT:      Head: Normocephalic and atraumatic.      Comments: Alopecia  Cardiovascular:      Rate and Rhythm: Normal rate and regular rhythm.      Heart sounds: Normal heart sounds. No murmur heard.     No friction rub. No gallop.   Pulmonary:      Effort: " Pulmonary effort is normal. No respiratory distress.      Breath sounds: Normal breath sounds. No wheezing or rales.   Chest:      Chest wall: No tenderness.   Abdominal:      General: Bowel sounds are normal. There is no distension.      Palpations: Abdomen is soft. There is no mass.      Tenderness: There is no abdominal tenderness. There is no guarding or rebound.   Musculoskeletal:      Comments: PORT in right chest  Pt with swelling of the fingers in the right sandra with associated bruising.  Incision C/D/I   Lymphadenopathy:      Cervical: No cervical adenopathy.      Upper Body:      Right upper body: No supraclavicular or axillary adenopathy.      Left upper body: No supraclavicular or axillary adenopathy.   Skin:     Findings: No erythema or rash.   Neurological:      Mental Status: She is alert and oriented to person, place, and time.   Psychiatric:         Behavior: Behavior normal.         Laboratory Data:  Lab Visit on 02/24/2025   Component Date Value Ref Range Status    WBC 02/24/2025 7.59  3.90 - 12.70 K/uL Final    RBC 02/24/2025 3.79 (L)  4.00 - 5.40 M/uL Final    Hemoglobin 02/24/2025 12.2  12.0 - 16.0 g/dL Final    Hematocrit 02/24/2025 36.5 (L)  37.0 - 48.5 % Final    MCV 02/24/2025 96  82 - 98 fL Final    MCH 02/24/2025 32.2 (H)  27.0 - 31.0 pg Final    MCHC 02/24/2025 33.4  32.0 - 36.0 g/dL Final    RDW 02/24/2025 13.4  11.5 - 14.5 % Final    Platelets 02/24/2025 156  150 - 450 K/uL Final    MPV 02/24/2025 8.8 (L)  9.2 - 12.9 fL Final    Immature Granulocytes 02/24/2025 0.1  0.0 - 0.5 % Final    Gran # (ANC) 02/24/2025 5.5  1.8 - 7.7 K/uL Final    Immature Grans (Abs) 02/24/2025 0.01  0.00 - 0.04 K/uL Final    Comment: Mild elevation in immature granulocytes is non specific and   can be seen in a variety of conditions including stress response,   acute inflammation, trauma and pregnancy. Correlation with other   laboratory and clinical findings is essential.      Lymph # 02/24/2025 1.5  1.0 -  4.8 K/uL Final    Mono # 02/24/2025 0.4  0.3 - 1.0 K/uL Final    Eos # 02/24/2025 0.1  0.0 - 0.5 K/uL Final    Baso # 02/24/2025 0.03  0.00 - 0.20 K/uL Final    nRBC 02/24/2025 0  0 /100 WBC Final    Gran % 02/24/2025 73.0  38.0 - 73.0 % Final    Lymph % 02/24/2025 19.6  18.0 - 48.0 % Final    Mono % 02/24/2025 5.8  4.0 - 15.0 % Final    Eosinophil % 02/24/2025 1.1  0.0 - 8.0 % Final    Basophil % 02/24/2025 0.4  0.0 - 1.9 % Final    Differential Method 02/24/2025 Automated   Final    Sodium 02/24/2025 136  136 - 145 mmol/L Final    Potassium 02/24/2025 4.1  3.5 - 5.1 mmol/L Final    Chloride 02/24/2025 100  95 - 110 mmol/L Final    CO2 02/24/2025 25  23 - 29 mmol/L Final    Glucose 02/24/2025 156 (H)  70 - 110 mg/dL Final    BUN 02/24/2025 9  8 - 23 mg/dL Final    Creatinine 02/24/2025 0.8  0.5 - 1.4 mg/dL Final    Calcium 02/24/2025 9.2  8.7 - 10.5 mg/dL Final    Total Protein 02/24/2025 7.4  6.0 - 8.4 g/dL Final    Albumin 02/24/2025 3.7  3.5 - 5.2 g/dL Final    Total Bilirubin 02/24/2025 0.7  0.1 - 1.0 mg/dL Final    Comment: For infants and newborns, interpretation of results should be based  on gestational age, weight and in agreement with clinical  observations.    Premature Infant recommended reference ranges:  Up to 24 hours.............<8.0 mg/dL  Up to 48 hours............<12.0 mg/dL  3-5 days..................<15.0 mg/dL  6-29 days.................<15.0 mg/dL      Alkaline Phosphatase 02/24/2025 128  40 - 150 U/L Final    AST 02/24/2025 21  10 - 40 U/L Final    ALT 02/24/2025 10  10 - 44 U/L Final    eGFR 02/24/2025 >60.0  >60 mL/min/1.73 m^2 Final    Anion Gap 02/24/2025 11  8 - 16 mmol/L Final   Admission on 02/19/2025, Discharged on 02/22/2025   Component Date Value Ref Range Status    WBC 02/19/2025 5.27  3.90 - 12.70 K/uL Final    RBC 02/19/2025 3.89 (L)  4.00 - 5.40 M/uL Final    Hemoglobin 02/19/2025 12.4  12.0 - 16.0 g/dL Final    Hematocrit 02/19/2025 38.4  37.0 - 48.5 % Final    MCV 02/19/2025  99 (H)  82 - 98 fL Final    MCH 02/19/2025 31.9 (H)  27.0 - 31.0 pg Final    MCHC 02/19/2025 32.3  32.0 - 36.0 g/dL Final    RDW 02/19/2025 14.1  11.5 - 14.5 % Final    Platelets 02/19/2025 194  150 - 450 K/uL Final    MPV 02/19/2025 9.4  9.2 - 12.9 fL Final    Immature Granulocytes 02/19/2025 0.2  0.0 - 0.5 % Final    Gran # (ANC) 02/19/2025 2.8  1.8 - 7.7 K/uL Final    Immature Grans (Abs) 02/19/2025 0.01  0.00 - 0.04 K/uL Final    Comment: Mild elevation in immature granulocytes is non specific and   can be seen in a variety of conditions including stress response,   acute inflammation, trauma and pregnancy. Correlation with other   laboratory and clinical findings is essential.      Lymph # 02/19/2025 2.0  1.0 - 4.8 K/uL Final    Mono # 02/19/2025 0.4  0.3 - 1.0 K/uL Final    Eos # 02/19/2025 0.0  0.0 - 0.5 K/uL Final    Baso # 02/19/2025 0.03  0.00 - 0.20 K/uL Final    nRBC 02/19/2025 0  0 /100 WBC Final    Gran % 02/19/2025 53.4  38.0 - 73.0 % Final    Lymph % 02/19/2025 37.0  18.0 - 48.0 % Final    Mono % 02/19/2025 8.2  4.0 - 15.0 % Final    Eosinophil % 02/19/2025 0.6  0.0 - 8.0 % Final    Basophil % 02/19/2025 0.6  0.0 - 1.9 % Final    Differential Method 02/19/2025 Automated   Final    Sodium 02/19/2025 138  136 - 145 mmol/L Final    Potassium 02/19/2025 4.2  3.5 - 5.1 mmol/L Final    Anion Gap reference range revised on 4/28/2023    Chloride 02/19/2025 103  95 - 110 mmol/L Final    CO2 02/19/2025 28  23 - 29 mmol/L Final    Glucose 02/19/2025 114 (H)  70 - 110 mg/dL Final    Comment: The ADA recommends the following guidelines for fasting glucose:    Normal:       less than 100 mg/dL    Prediabetes:  100 mg/dL to 125 mg/dL    Diabetes:     126 mg/dL or higher      BUN 02/19/2025 9  8 - 23 mg/dL Final    Creatinine 02/19/2025 0.64  0.50 - 1.40 mg/dL Final    Calcium 02/19/2025 9.6  8.7 - 10.5 mg/dL Final    Total Protein 02/19/2025 7.8  6.0 - 8.4 g/dL Final    Albumin 02/19/2025 4.3  3.5 - 5.2 g/dL Final     Total Bilirubin 02/19/2025 0.5  0.2 - 1.0 mg/dL Final    Alkaline Phosphatase 02/19/2025 165 (H)  40 - 150 U/L Final    AST 02/19/2025 35  10 - 40 U/L Final    ALT 02/19/2025 22  10 - 44 U/L Final    Anion Gap 02/19/2025 7 (L)  8 - 16 mmol/L Final    Anion Gap reference range revised on 4/28/2023    eGFR 02/19/2025 >60  >60 mL/min/1.73 m^2 Final    QRS Duration 02/19/2025 84  ms Final    [facility] QTC Calculation 02/19/2025 431  ms Final    Anaerobic Culture 02/19/2025 No anaerobes isolated   Final    Aerobic Bacterial Culture 02/19/2025 No growth   Final    AFB Culture & Smear 02/19/2025 Culture in progress   Preliminary    AFB CULTURE STAIN 02/19/2025 No acid fast bacilli seen.   Final    Gram Stain Result 02/19/2025 Few WBCs   Final    Gram Stain Result 02/19/2025 No organisms seen   Final    Fungus (Mycology) Culture 02/19/2025 Culture in progress   Preliminary    Anaerobic Culture 02/19/2025 No anaerobes isolated   Final    AFB Culture & Smear 02/19/2025 Culture in progress   Preliminary    AFB CULTURE STAIN 02/19/2025 No acid fast bacilli seen.   Final    Fungus (Mycology) Culture 02/19/2025 Culture in progress   Preliminary    POCT Glucose 02/19/2025 120 (H)  70 - 110 mg/dL Final    POCT Glucose 02/19/2025 112 (H)  70 - 110 mg/dL Final    Aerobic Culture - Tissue 02/19/2025 No growth   Final    Gram Stain Result 02/19/2025 Few WBCs   Final    Gram Stain Result 02/19/2025 No organisms seen   Final    POCT Glucose 02/19/2025 129 (H)  70 - 110 mg/dL Final    POCT Glucose 02/20/2025 128 (H)  70 - 110 mg/dL Final    POCT Glucose 02/20/2025 161 (H)  70 - 110 mg/dL Final    MRSA SCREEN BY PCR 02/20/2025 Negative  Negative Final    POCT Glucose 02/20/2025 151 (H)  70 - 110 mg/dL Final    POCT Glucose 02/20/2025 182 (H)  70 - 110 mg/dL Final    WBC 02/21/2025 6.61  3.90 - 12.70 K/uL Final    RBC 02/21/2025 3.38 (L)  4.00 - 5.40 M/uL Final    Hemoglobin 02/21/2025 11.1 (L)  12.0 - 16.0 g/dL Final    Hematocrit 02/21/2025  33.2 (L)  37.0 - 48.5 % Final    MCV 02/21/2025 98  82 - 98 fL Final    MCH 02/21/2025 32.8 (H)  27.0 - 31.0 pg Final    MCHC 02/21/2025 33.4  32.0 - 36.0 g/dL Final    RDW 02/21/2025 14.1  11.5 - 14.5 % Final    Platelets 02/21/2025 155  150 - 450 K/uL Final    MPV 02/21/2025 9.5  9.2 - 12.9 fL Final    Immature Granulocytes 02/21/2025 0.3  0.0 - 0.5 % Final    Gran # (ANC) 02/21/2025 3.8  1.8 - 7.7 K/uL Final    Immature Grans (Abs) 02/21/2025 0.02  0.00 - 0.04 K/uL Final    Comment: Mild elevation in immature granulocytes is non specific and   can be seen in a variety of conditions including stress response,   acute inflammation, trauma and pregnancy. Correlation with other   laboratory and clinical findings is essential.      Lymph # 02/21/2025 2.2  1.0 - 4.8 K/uL Final    Mono # 02/21/2025 0.6  0.3 - 1.0 K/uL Final    Eos # 02/21/2025 0.1  0.0 - 0.5 K/uL Final    Baso # 02/21/2025 0.02  0.00 - 0.20 K/uL Final    nRBC 02/21/2025 0  0 /100 WBC Final    Gran % 02/21/2025 57.5  38.0 - 73.0 % Final    Lymph % 02/21/2025 32.5  18.0 - 48.0 % Final    Mono % 02/21/2025 8.3  4.0 - 15.0 % Final    Eosinophil % 02/21/2025 1.1  0.0 - 8.0 % Final    Basophil % 02/21/2025 0.3  0.0 - 1.9 % Final    Differential Method 02/21/2025 Automated   Final    Sodium 02/21/2025 138  136 - 145 mmol/L Final    Potassium 02/21/2025 4.1  3.5 - 5.1 mmol/L Final    Anion Gap reference range revised on 4/28/2023    Chloride 02/21/2025 104  95 - 110 mmol/L Final    CO2 02/21/2025 27  23 - 29 mmol/L Final    Glucose 02/21/2025 117 (H)  70 - 110 mg/dL Final    Comment: The ADA recommends the following guidelines for fasting glucose:    Normal:       less than 100 mg/dL    Prediabetes:  100 mg/dL to 125 mg/dL    Diabetes:     126 mg/dL or higher      BUN 02/21/2025 8  8 - 23 mg/dL Final    Creatinine 02/21/2025 0.57  0.50 - 1.40 mg/dL Final    Calcium 02/21/2025 8.8  8.7 - 10.5 mg/dL Final    Anion Gap 02/21/2025 7 (L)  8 - 16 mmol/L Final     Anion Gap reference range revised on 4/28/2023    eGFR 02/21/2025 >60  >60 mL/min/1.73 m^2 Final    Magnesium 02/21/2025 1.8  1.6 - 2.6 mg/dL Final    Phosphorus 02/21/2025 4.6 (H)  2.7 - 4.5 mg/dL Final    POCT Glucose 02/21/2025 120 (H)  70 - 110 mg/dL Final    POCT Glucose 02/21/2025 147 (H)  70 - 110 mg/dL Final    POCT Glucose 02/21/2025 169 (H)  70 - 110 mg/dL Final    WBC 02/22/2025 5.56  3.90 - 12.70 K/uL Final    RBC 02/22/2025 3.36 (L)  4.00 - 5.40 M/uL Final    Hemoglobin 02/22/2025 10.8 (L)  12.0 - 16.0 g/dL Final    Hematocrit 02/22/2025 32.7 (L)  37.0 - 48.5 % Final    MCV 02/22/2025 97  82 - 98 fL Final    MCH 02/22/2025 32.1 (H)  27.0 - 31.0 pg Final    MCHC 02/22/2025 33.0  32.0 - 36.0 g/dL Final    RDW 02/22/2025 13.6  11.5 - 14.5 % Final    Platelets 02/22/2025 165  150 - 450 K/uL Final    MPV 02/22/2025 9.5  9.2 - 12.9 fL Final    Immature Granulocytes 02/22/2025 0.2  0.0 - 0.5 % Final    Gran # (ANC) 02/22/2025 2.4  1.8 - 7.7 K/uL Final    Immature Grans (Abs) 02/22/2025 0.01  0.00 - 0.04 K/uL Final    Comment: Mild elevation in immature granulocytes is non specific and   can be seen in a variety of conditions including stress response,   acute inflammation, trauma and pregnancy. Correlation with other   laboratory and clinical findings is essential.      Lymph # 02/22/2025 2.5  1.0 - 4.8 K/uL Final    Mono # 02/22/2025 0.5  0.3 - 1.0 K/uL Final    Eos # 02/22/2025 0.1  0.0 - 0.5 K/uL Final    Baso # 02/22/2025 0.02  0.00 - 0.20 K/uL Final    nRBC 02/22/2025 0  0 /100 WBC Final    Gran % 02/22/2025 43.1  38.0 - 73.0 % Final    Lymph % 02/22/2025 45.7  18.0 - 48.0 % Final    Mono % 02/22/2025 8.6  4.0 - 15.0 % Final    Eosinophil % 02/22/2025 2.0  0.0 - 8.0 % Final    Basophil % 02/22/2025 0.4  0.0 - 1.9 % Final    Differential Method 02/22/2025 Automated   Final    Sodium 02/22/2025 137  136 - 145 mmol/L Final    Potassium 02/22/2025 4.0  3.5 - 5.1 mmol/L Final    Anion Gap reference range  revised on 4/28/2023    Chloride 02/22/2025 102  95 - 110 mmol/L Final    CO2 02/22/2025 29  23 - 29 mmol/L Final    Glucose 02/22/2025 112 (H)  70 - 110 mg/dL Final    Comment: The ADA recommends the following guidelines for fasting glucose:    Normal:       less than 100 mg/dL    Prediabetes:  100 mg/dL to 125 mg/dL    Diabetes:     126 mg/dL or higher      BUN 02/22/2025 9  8 - 23 mg/dL Final    Creatinine 02/22/2025 0.63  0.50 - 1.40 mg/dL Final    Calcium 02/22/2025 8.9  8.7 - 10.5 mg/dL Final    Anion Gap 02/22/2025 6 (L)  8 - 16 mmol/L Final    Anion Gap reference range revised on 4/28/2023    eGFR 02/22/2025 >60  >60 mL/min/1.73 m^2 Final    Magnesium 02/22/2025 1.8  1.6 - 2.6 mg/dL Final    Phosphorus 02/22/2025 5.1 (H)  2.7 - 4.5 mg/dL Final    POCT Glucose 02/22/2025 107  70 - 110 mg/dL Final         Imaging:     CT C/A/P 1/27/25  Thorax:     A port is noted overlying the right hemithorax with its tip favored to be within the right atrium near the superior vena cava right atrial junction.     Atherosclerotic calcifications are noted at the aortic arch. Atherosclerotic calcifications are noted at the origin of the left subclavian.     No obvious mediastinal lymphadenopathy is noted.     Apical pleural thickening is noted bilaterally stable since the prior along with a 4-5 mm nodule in right upper lobe image 17 sequence 3 that may relate to apical pleural thickening.     The nodule that was seen to be new on 10/02/2024 of 5 mm at the right lung base appears decreased in density and is only faintly visualized today image 62 sequence 3 possibly slightly smaller in size as well at 4 mm. Continued follow-up for total resolution is suggested.     A 1-2 mm nodule is noted at the left lung apex image 18 sequence 105 not definitively seen on the prior. In this high-risk patient long-term follow-up size stability would be suggested. A similar such nodule is noted image 40 sequence 3 and image 22 sequence 105.      Abdomen and pelvis:     Decreased liver density is noted as can be seen with fatty infiltration of the liver.     Cholelithiasis is noted with a stone of 6 mm.     At the tip of the right lobe of the liver an 8 mm hypodensity is noted stable since the prior image 126 sequence 3 without worrisome characteristics too small to ideally categorize but statistically favored relate to a cyst.     The adrenal glands and kidneys demonstrate no worrisome abnormality. The spleen demonstrates no worrisome focal lesion. The stomach is partially distended and demonstrates no worrisome abnormality.     Significant improvement is noted since the prior in the region of the pancreas and the known pancreatic mass. Atrophy of the tail and body of the pancreas is noted similar to on the prior to the level of the mass. The mass appears less contiguous than on the prior measuring 2.9 by 1.7 cm in the location at priorly measured 3.8 x 3.6 cm. Soft tissue appears to surround the celiac artery however worrisome for neoplastic disease as can be seen image 117 sequence 3 and image 116 sequence 3. Density is noted as on the prior that appears to wrap partially around the portal vein likely greater than 180° unchanged. Abnormal density appears to surround the splenic artery at its origin and common hepatic artery at its origin. Splenic vein takes a very tortuous course to the SM V raising the question of whether this relates to collateral but no obvious thrombosed more direct splenic vein is noted. If the native splenic vein is thrombosed that is chronic and atrophied. Pancreatic duct appears less dilated than on the prior     Apparent bladder wall thickening is noted as can be seen with urinary tract infection, postobstructive change, neurogenic bladder, and radiation change. This can be secondary.     Some wall thickening of the rectum is again noted but also appears improved since the prior.     Intervertebral disc height loss is noted at  multiple lumbar levels suggesting degenerative change. If necessary dedicated imaging could be performed. Only mild central canal narrowing is suspected.       Assessment:       1. Malignant neoplasm of pancreas, unspecified location of malignancy    2. Subacute osteomyelitis of right hand    3. Pulmonary nodule    4. Hypothyroidism, unspecified type    5. Hypokalemia                             Plan:   Pancreatic Cancer - Patient with Stage III pancreatic cancer with concern for potential encasement of the distal celiac artery per discussion with Dr Valentin  -No sign of mets  - 94.5 on 6/17/24  -Case discussed at tumor board 7/17/24 with recommendation to proceed with chemo  -[employer] genetic testing negative for the genes tested  -[facility] tissue testing showed TP53, KRAS p.G12D, CDKN2A, CDKN2B mutations.  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  -Will need to keep irinotecan dose reduced at 165mg/mm2  -CT C/A/P on 10/02/24 shows stable disease and a possible new RLL nodule  -Pt saw Dr Valentin 10/16/24 whom recommended a few more cycles of chemo and then transition to perioperative radiation therapy  - decreased all the way to 9.4U/mL on 11/11/24 with level today pending   -Pt to go to [facility] 12/09/24  -PT completed 12 cycles  -Repeat scans 1/27/25 showeddecrease in size of pancreatic mass appearing to surround the celiac artery and to wrap partially around the portal vein likely greater than 180°  -Case discussed at tumor board on 2/05/25 with recommendation for radiation  -Pt was to start capecitabine and radiation but will need to hold until she has had continued time to heal from recently diagnosed osteomyelitis of the 2nd digit of the right hand  -Will repeat scans in 2 weeks  Visit today included increased complexity associated with the care of the episodic problem (chemotherapy) addressed and managing the longitudinal care of the patient due to the  serious and/or complex managed problem(s) pancreatic cancer.     Osteomyelitis - Pt s/p debridement of soft tissue and tendon, arthrotomy of the 2nd MCP joint with irrigation and excision of infected bone about the 2nd metacarpal and index finger proximal phalanx on 2/19/25  -Pt on ertapenem and doxycycline until 4/02/25  -Management per ID  -PT to see Dr. Chou this week    Pulmonary Nodules - Seen on CT C/A/P 1/27/25  -Stable  -Will monitor    Hypokalemia - 4.1 on 2/24/25  -PT to continue potassium chloride     Hypothyroidism - pt on synthroid  -Will monitor    Route Chart for Scheduling    Med Onc Chart Routing      Follow up with physician 2 weeks. Pt needs a CBC, CMP,  and CT C/A/P in 2 weeks with return appt with me.  Pt needs a follow up appt with Dr Oakes in [facility] outpatient in 2 weeks.   Follow up with [facility]    Infusion scheduling note    Injection scheduling note    Labs    Imaging    Pharmacy appointment    Other referrals              Treatment Plan Information   OP CAPECITABINE 5 DAYS + RADIOTHERAPY Rajat Hunt MD   Associated diagnosis: Malignant neoplasm of pancreas Stage III cT4, cN0, cM0 noted on 7/12/2024   Line of treatment: Neoadjuvant  Treatment Goal: Curative     Upcoming Treatment Dates - OP CAPECITABINE 5 DAYS + RADIOTHERAPY    2/24/2025       Antiemetics       Physician communication order       Take Home Chemotherapy       capecitabine (XELODA) tablet 1,300 mg    Therapy Plan Information  PORT FLUSH for Malignant neoplasm of pancreas, noted on 7/12/2024  Flushes  heparin, porcine (PF) 100 unit/mL injection flush 500 Units  500 Units, Intravenous, Every visit  sodium chloride 0.9% flush 10 mL  10 mL, Intravenous, Every visit      No therapy plan of the specified type found.    No therapy plan of the specified type found.      Rajat Hunt MD  [facility]  Hematology and Oncology  [facility] [address]   [place] [address]   O: [phone]   F: [phone]

## 2025-02-25 ENCOUNTER — DOCUMENTATION ONLY (OUTPATIENT)
Dept: HEMATOLOGY/ONCOLOGY | Facility: CLINIC | Age: 65
End: 2025-02-25
Payer: MEDICARE

## 2025-02-25 NOTE — NURSING
Per Dr. Davis inpatient hospital note we are holding treatment until the patient able to tolerate due to osteomyelitis in right hand. Will continue to follow for updates, pt remains inpatient at [facility].

## 2025-02-27 ENCOUNTER — LAB VISIT (OUTPATIENT)
Dept: LAB | Facility: HOSPITAL | Age: 65
End: 2025-02-27
Attending: INTERNAL MEDICINE
Payer: MEDICARE

## 2025-02-27 DIAGNOSIS — C15.9 ESOPHAGEAL CANCER: Primary | ICD-10-CM

## 2025-02-27 DIAGNOSIS — J90 EXUDATIVE PLEURISY: ICD-10-CM

## 2025-02-27 LAB
ALBUMIN SERPL BCP-MCNC: 3.7 G/DL (ref 3.5–5.2)
ALP SERPL-CCNC: 128 U/L (ref 40–150)
ALT SERPL W/O P-5'-P-CCNC: 9 U/L (ref 10–44)
ANION GAP SERPL CALC-SCNC: 9 MMOL/L (ref 8–16)
AST SERPL-CCNC: 30 U/L (ref 10–40)
BASOPHILS # BLD AUTO: 0.03 K/UL (ref 0–0.2)
BASOPHILS NFR BLD: 0.6 % (ref 0–1.9)
BILIRUB SERPL-MCNC: 0.5 MG/DL (ref 0.1–1)
BUN SERPL-MCNC: 9 MG/DL (ref 8–23)
CALCIUM SERPL-MCNC: 9.2 MG/DL (ref 8.7–10.5)
CHLORIDE SERPL-SCNC: 103 MMOL/L (ref 95–110)
CO2 SERPL-SCNC: 24 MMOL/L (ref 23–29)
CREAT SERPL-MCNC: 0.7 MG/DL (ref 0.5–1.4)
CRP SERPL-MCNC: 0.9 MG/L (ref 0–8.2)
DIFFERENTIAL METHOD BLD: ABNORMAL
EOSINOPHIL # BLD AUTO: 0.2 K/UL (ref 0–0.5)
EOSINOPHIL NFR BLD: 3 % (ref 0–8)
ERYTHROCYTE [DISTWIDTH] IN BLOOD BY AUTOMATED COUNT: 13.1 % (ref 11.5–14.5)
EST. GFR  (NO RACE VARIABLE): >60 ML/MIN/1.73 M^2
GLUCOSE SERPL-MCNC: 88 MG/DL (ref 70–110)
HCT VFR BLD AUTO: 36.3 % (ref 37–48.5)
HGB BLD-MCNC: 11.7 G/DL (ref 12–16)
IMM GRANULOCYTES # BLD AUTO: 0.01 K/UL (ref 0–0.04)
IMM GRANULOCYTES NFR BLD AUTO: 0.2 % (ref 0–0.5)
LYMPHOCYTES # BLD AUTO: 2.4 K/UL (ref 1–4.8)
LYMPHOCYTES NFR BLD: 47.7 % (ref 18–48)
MCH RBC QN AUTO: 32 PG (ref 27–31)
MCHC RBC AUTO-ENTMCNC: 32.2 G/DL (ref 32–36)
MCV RBC AUTO: 99 FL (ref 82–98)
MONOCYTES # BLD AUTO: 0.5 K/UL (ref 0.3–1)
MONOCYTES NFR BLD: 9.3 % (ref 4–15)
NEUTROPHILS # BLD AUTO: 1.9 K/UL (ref 1.8–7.7)
NEUTROPHILS NFR BLD: 39.2 % (ref 38–73)
NRBC BLD-RTO: 0 /100 WBC
PLATELET # BLD AUTO: 163 K/UL (ref 150–450)
PMV BLD AUTO: 10.5 FL (ref 9.2–12.9)
POTASSIUM SERPL-SCNC: 3.9 MMOL/L (ref 3.5–5.1)
PROT SERPL-MCNC: 6.9 G/DL (ref 6–8.4)
RBC # BLD AUTO: 3.66 M/UL (ref 4–5.4)
SODIUM SERPL-SCNC: 136 MMOL/L (ref 136–145)
WBC # BLD AUTO: 4.95 K/UL (ref 3.9–12.7)

## 2025-02-27 PROCEDURE — 80053 COMPREHEN METABOLIC PANEL: CPT | Performed by: INTERNAL MEDICINE

## 2025-02-27 PROCEDURE — 86140 C-REACTIVE PROTEIN: CPT | Performed by: INTERNAL MEDICINE

## 2025-02-27 PROCEDURE — 85025 COMPLETE CBC W/AUTO DIFF WBC: CPT | Performed by: INTERNAL MEDICINE

## 2025-02-27 PROCEDURE — 36415 COLL VENOUS BLD VENIPUNCTURE: CPT | Mod: PO | Performed by: INTERNAL MEDICINE
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
## 2025-03-02 PROBLEM — M86.149: Status: ACTIVE | Noted: 2025-03-02

## 2025-03-02 PROBLEM — S61.459A CAT BITE OF HAND, INITIAL ENCOUNTER: Status: ACTIVE | Noted: 2025-03-02

## 2025-03-03 ENCOUNTER — HOSPITAL ENCOUNTER (OUTPATIENT)
Dept: RADIATION THERAPY | Facility: HOSPITAL | Age: 65
Discharge: HOME | End: 2025-03-03
Attending: RADIOLOGY
Payer: MEDICARE

## 2025-03-07 ENCOUNTER — PATIENT MESSAGE (OUTPATIENT)
Dept: PRIMARY CARE CLINIC | Facility: CLINIC | Age: 65
End: 2025-03-07
Payer: MEDICARE

## 2025-03-07 ENCOUNTER — PATIENT MESSAGE (OUTPATIENT)
Dept: HEMATOLOGY/ONCOLOGY | Facility: CLINIC | Age: 65
End: 2025-03-07
Payer: MEDICARE

## 2025-03-12 ENCOUNTER — LAB VISIT (OUTPATIENT)
Dept: LAB | Facility: HOSPITAL | Age: 65
End: 2025-03-12
Attending: INTERNAL MEDICINE
Payer: MEDICARE

## 2025-03-12 ENCOUNTER — OFFICE VISIT (OUTPATIENT)
Dept: PRIMARY CARE CLINIC | Facility: CLINIC | Age: 65
End: 2025-03-12
Payer: MEDICARE

## 2025-03-12 ENCOUNTER — TELEPHONE (OUTPATIENT)
Dept: FAMILY MEDICINE | Facility: CLINIC | Age: 65
End: 2025-03-12
Payer: MEDICARE

## 2025-03-12 VITALS
HEART RATE: 64 BPM | DIASTOLIC BLOOD PRESSURE: 68 MMHG | WEIGHT: 119.19 LBS | BODY MASS INDEX: 22.5 KG/M2 | HEIGHT: 61 IN | OXYGEN SATURATION: 99 % | TEMPERATURE: 98 F | SYSTOLIC BLOOD PRESSURE: 102 MMHG
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
## 2025-03-12 DIAGNOSIS — C25.9 MALIGNANT NEOPLASM OF PANCREAS, UNSPECIFIED LOCATION OF MALIGNANCY: ICD-10-CM

## 2025-03-12 DIAGNOSIS — Z09 HOSPITAL DISCHARGE FOLLOW-UP: Primary | ICD-10-CM

## 2025-03-12 LAB
ALBUMIN SERPL BCP-MCNC: 3.5 G/DL (ref 3.5–5.2)
ALP SERPL-CCNC: 124 U/L (ref 40–150)
ALT SERPL W/O P-5'-P-CCNC: 18 U/L (ref 10–44)
ANION GAP SERPL CALC-SCNC: 10 MMOL/L (ref 8–16)
AST SERPL-CCNC: 24 U/L (ref 10–40)
BASOPHILS # BLD AUTO: 0.02 K/UL (ref 0–0.2)
BASOPHILS NFR BLD: 0.5 % (ref 0–1.9)
BILIRUB SERPL-MCNC: 0.5 MG/DL (ref 0.1–1)
BUN SERPL-MCNC: 8 MG/DL (ref 8–23)
CALCIUM SERPL-MCNC: 8.9 MG/DL (ref 8.7–10.5)
CANCER AG19-9 SERPL-ACNC: 7.1 U/ML (ref 0–40)
CHLORIDE SERPL-SCNC: 104 MMOL/L (ref 95–110)
CO2 SERPL-SCNC: 25 MMOL/L (ref 23–29)
CREAT SERPL-MCNC: 0.8 MG/DL (ref 0.5–1.4)
DIFFERENTIAL METHOD BLD: ABNORMAL
EOSINOPHIL # BLD AUTO: 0.1 K/UL (ref 0–0.5)
EOSINOPHIL NFR BLD: 2.9 % (ref 0–8)
ERYTHROCYTE [DISTWIDTH] IN BLOOD BY AUTOMATED COUNT: 12.7 % (ref 11.5–14.5)
EST. GFR  (NO RACE VARIABLE): >60 ML/MIN/1.73 M^2
GLUCOSE SERPL-MCNC: 145 MG/DL (ref 70–110)
HCT VFR BLD AUTO: 36.1 % (ref 37–48.5)
HGB BLD-MCNC: 12.1 G/DL (ref 12–16)
IMM GRANULOCYTES # BLD AUTO: 0.01 K/UL (ref 0–0.04)
IMM GRANULOCYTES NFR BLD AUTO: 0.2 % (ref 0–0.5)
LYMPHOCYTES # BLD AUTO: 2 K/UL (ref 1–4.8)
LYMPHOCYTES NFR BLD: 45.9 % (ref 18–48)
MCH RBC QN AUTO: 32 PG (ref 27–31)
MCHC RBC AUTO-ENTMCNC: 33.5 G/DL (ref 32–36)
MCV RBC AUTO: 96 FL (ref 82–98)
MONOCYTES # BLD AUTO: 0.3 K/UL (ref 0.3–1)
MONOCYTES NFR BLD: 6.1 % (ref 4–15)
NEUTROPHILS # BLD AUTO: 2 K/UL (ref 1.8–7.7)
NEUTROPHILS NFR BLD: 44.4 % (ref 38–73)
NRBC BLD-RTO: 0 /100 WBC
PLATELET # BLD AUTO: 113 K/UL (ref 150–450)
PMV BLD AUTO: 9.1 FL (ref 9.2–12.9)
POTASSIUM SERPL-SCNC: 4.3 MMOL/L (ref 3.5–5.1)
PROT SERPL-MCNC: 6.7 G/DL (ref 6–8.4)
RBC # BLD AUTO: 3.78 M/UL (ref 4–5.4)
SODIUM SERPL-SCNC: 139 MMOL/L (ref 136–145)
WBC # BLD AUTO: 4.42 K/UL (ref 3.9–12.7)

## 2025-03-12 PROCEDURE — 36415 COLL VENOUS BLD VENIPUNCTURE: CPT | Mod: PN | Performed by: INTERNAL MEDICINE

## 2025-03-12 PROCEDURE — 80053 COMPREHEN METABOLIC PANEL: CPT | Mod: PN | Performed by: INTERNAL MEDICINE

## 2025-03-12 PROCEDURE — 99999 PR PBB SHADOW E&M-EST. PATIENT-LVL IV: CPT | Mod: PBBFAC,,, | Performed by: NURSE PRACTITIONER

## 2025-03-12 PROCEDURE — 86301 IMMUNOASSAY TUMOR CA 19-9: CPT | Performed by: INTERNAL MEDICINE

## 2025-03-12 PROCEDURE — 85025 COMPLETE CBC W/AUTO DIFF WBC: CPT | Mod: PN | Performed by: INTERNAL MEDICINE

## 2025-03-12 NOTE — PROGRESS NOTES
Assessment/Plan:    Problem List Items Addressed This Visit    None  Visit Diagnoses         Hospital discharge follow-up    -  Primary      Cat bite, sequela            -Continue antibiotic therapy.         Follow up in about 3 months (around 6/12/2025).    Dayan Jimenez NP  _____________________________________________________________________________________________________________________________________________________    History of Present Illness    CHIEF COMPLAINT:  Ms. Lamb presents today for follow-up after cat bite injury    Hospital Course:   Patient admitted for right 2nd MCP joint septic arthritis, osteomyelitis of the head of the 2nd metacarpal and proximal phalanx of the index finger, and extensor tendon injury secondary to a cat bite.  Patient taken to the OR 2/19 by hand surgery, Dr. Chou for joint arthrotomy with irrigation, irrigation and debridement of the subcutaneous tissue and extensor tendon, debridement of infected bone, extensor tendon repair.  Cultures pending but so far without growth.  Initiated on doxycycline initiated and IV Unasyn initiated by ID 2/20.  MRSA screen negative.  Additional consult to patient's established oncologist for recent diagnosis of pancreatic cancer placed who states will need to delay chemotherapy by 3 weeks.  Patient seen by hand surgery 2/21 and cleared for discharge home.  Plan of care discussed with ID Dr. Oakes who recommends 6 weeks of antibiotics until 04/02/2025.  Oral doxycycline for atypical coverage and ertapenem 1 g IV daily recommended.  Patient referred to [facility] infusion and home health for discharge.  ID discussed with Oncology, jessica to use Port-A-Cath for home infusion of antibiotic.  Patient clinically improved.  Patient was discharged home with home health on 2/22/25.    HISTORY OF PRESENT ILLNESS:  She sustained a cat bite to right index finger while sleeping when startled by a dog, resulting in two small puncture wounds on  "opposite sides of the finger. She subsequently developed cellulitis requiring a 4-day hospitalization for IV antibiotics and surgical intervention. She currently experiences redness, swelling, and decreased range of motion of the affected finger.    SURGICAL HISTORY:  She underwent Incision and Drainage, RIGHT HAND EXCISION OF INFECTED BONE RIGHT INDEX FINGER February 28th.     CANCER:  Her cancer treatment was delayed due to the infection 2 weeks ago. She is scheduled to restart chemotherapy and radiation on Monday. Prior treatment course has shown substantial tumor shrinkage.          No other new complaints today.  Remaining chronic conditions have been reviewed and remain stable. Further detail as stated above.     HM reviewed.     No recent changes to medical/surgical history.    Medications Ordered Prior to Encounter[1]    Review of Systems   Constitutional: Negative.    HENT: Negative.     Eyes: Negative.    Respiratory: Negative.     Cardiovascular: Negative.    Gastrointestinal: Negative.    Endocrine: Negative.    Genitourinary: Negative.    Musculoskeletal: Negative.    Skin:  Positive for wound.   Allergic/Immunologic: Negative.    Neurological: Negative.    Hematological: Negative.    Psychiatric/Behavioral: Negative.         Vitals:    03/12/25 0723   BP: 102/68   Pulse: 64   Temp: 97.7 °F (36.5 °C)   TempSrc: Temporal   SpO2: 99%   Weight: 54.1 kg (119 lb 2.5 oz)   Height: 5' 1" (1.549 m)       Wt Readings from Last 3 Encounters:   03/12/25 54.1 kg (119 lb 2.5 oz)   02/28/25 52.2 kg (115 lb)   02/24/25 54.3 kg (119 lb 11.4 oz)       Physical Exam  Vitals and nursing note reviewed.   Constitutional:       Appearance: She is well-developed.   HENT:      Head: Normocephalic and atraumatic.   Eyes:      Conjunctiva/sclera: Conjunctivae normal.   Cardiovascular:      Rate and Rhythm: Normal rate and regular rhythm.      Heart sounds: Normal heart sounds.   Pulmonary:      Effort: Pulmonary effort is " normal.      Breath sounds: Normal breath sounds.   Chest:       Musculoskeletal:         General: Normal range of motion.      Cervical back: Normal range of motion and neck supple.      Comments: Noted well-approximated skin edges with minimal erythema, with mild swelling to Left index finger.    Skin:     General: Skin is warm and dry.      Comments:     Neurological:      Mental Status: She is alert and oriented to person, place, and time.   Psychiatric:         Behavior: Behavior normal.         Thought Content: Thought content normal.         Judgment: Judgment normal.       Health Maintenance   Topic Date Due    Shingles Vaccine (1 of 2) Never done    Pneumococcal Vaccines (Age 50+) (1 of 2 - PCV) Never done    RSV Vaccine (Age 60+ and Pregnant patients) (1 - Risk 60-74 years 1-dose series) Never done    Mammogram  07/17/2024    Influenza Vaccine (1) 09/01/2024    Foot Exam  09/25/2024    Diabetic Eye Exam  03/05/2025    Hemoglobin A1c  03/20/2025    Diabetes Urine Screening  08/30/2025    Lipid Panel  12/06/2025    Low Dose Statin  03/12/2026    DEXA Scan  08/21/2026    Colorectal Cancer Screening  10/24/2033    TETANUS VACCINE  02/28/2034    Hepatitis C Screening  Completed    COVID-19 Vaccine  Discontinued       This note was generated with the assistance of ambient listening technology. Verbal consent was obtained by the patient and accompanying visitor(s) for the recording of patient appointment to facilitate this note. I attest to having reviewed and edited the generated note for accuracy, though some syntax or spelling errors may persist. Please contact the author of this note for any clarification.              [1]  Current Outpatient Medications on File Prior to Visit   Medication Sig Dispense Refill    0.9% NaCl PgBk 100 mL with ertapenem 1 gram SolR 1 g Inject 1 g into the vein once daily.      acetaminophen (TYLENOL) 325 MG tablet Take 650 mg by mouth daily as needed for Pain.       doxycycline (VIBRA-TABS) 100 MG tablet Take 1 tablet (100 mg total) by mouth every 12 (twelve) hours. 80 tablet 0    duke's soln (benadryl 30 mL, mylanta 30 mL, LIDOcaine 30 mL, nystatin 30 mL) 120mL 10 ml swish & spit/swallow every 4 to 6 hours as needed for mouth pain/ulcers/yeast/throat pain 240 mL 1    EScitalopram oxalate (LEXAPRO) 10 MG tablet Take 1 tablet (10 mg total) by mouth once daily. 30 tablet 2    fluticasone propionate (FLONASE) 50 mcg/actuation nasal spray 1 spray (50 mcg total) by Each Nostril route once daily. 18.2 mL 0    HYDROcodone-acetaminophen (NORCO) 5-325 mg per tablet Take 1 tablet by mouth every 6 (six) hours as needed for Pain. 28 tablet 0    ibuprofen (ADVIL,MOTRIN) 800 MG tablet Take 1 tablet (800 mg total) by mouth every 8 (eight) hours as needed for Pain. 42 tablet 1    levothyroxine (SYNTHROID) 50 MCG tablet Take 1 tablet (50 mcg total) by mouth once daily. 90 tablet 3    loratadine (CLARITIN) 10 mg tablet Take 1 tablet (10 mg total) by mouth every morning. 30 tablet 11    potassium chloride SA (K-DUR,KLOR-CON) 20 MEQ tablet Take 40 mEq by mouth 2 (two) times daily.      senna-docusate 8.6-50 mg (PERICOLACE) 8.6-50 mg per tablet Take 2 tablets by mouth once daily. 60 tablet 1    simvastatin (ZOCOR) 10 MG tablet Take 1 tablet (10 mg total) by mouth every evening. 90 tablet 3    SYNJARDY XR 10-1,000 mg TBph TAKE ONE TABLET BY MOUTH ONCE DAILY 30 tablet 5    VITAMIN D2 1,250 mcg (50,000 unit) capsule Take 50,000 Units by mouth twice a week.      capecitabine (XELODA) 150 MG tablet Take 2 tablets (300 mg total) by mouth 2 (two) times daily Take as directed Monday, Tuesday, Wednesday, Thursday, and Friday for the duration of radiation therapy. Take with food and with 1 other capecitabine prescription for 1,300 mg total. (Patient not taking: Reported on 3/12/2025.) 20 tablet 4    capecitabine (XELODA) 500 MG Tab Take 2 tablets (1,000 mg total) by mouth 2 (two) times  daily Take as directed Monday, Tuesday, Wednesday, Thursday, and Friday for the duration of radiation therapy. Take with food and with 1 other capecitabine prescription for 1,300 mg total. (Patient not taking: Reported on 3/12/2025.) 20 tablet 4     No current facility-administered medications on file prior to visit.

## 2025-03-12 NOTE — PROGRESS NOTES
PATIENT: Bessy Lamb  MRN: [id number]  DATE: 3/13/2025      Diagnosis:   1. Malignant neoplasm of pancreas, unspecified location of malignancy    2. Subacute osteomyelitis of right hand    3. Pulmonary nodule    4. Hypothyroidism, unspecified type    5. Hypokalemia            Chief Complaint: Malignant neoplasm of pancreas, unspecified location of mal (2 week follow up )      Oncologic History:      Oncologic History     Oncologic Treatment     Pathology           Subjective:    Interval History:  Ms. Lamb is a 64 y.o. female with Arthritis, DMII, GERD, Hypotyroidism, Obesity, Thyroid disease who presents for pancreatic cancer.  Since the last clinic visit the patient underwent CT C/A/P on 3/12/25 showing multiple new centrilobular ground-glass nodules in the left lower lobe with the largest measuring 12 mm; ground-glass nodularity extending to basal segments of the left lower lobe; 10 mm ground-glass nodule in the posteromedial right lower lobe; unchanged right apical nodular septal thickening; resolution of previously described 4 mm ground-glass nodule in the right apex.    Currently the patient denies any cough, shortness breath, fever, chest pain.  The patient continues to have difficulty flexing her right pointer finger.  The patient denies abdominal pain, nausea, vomiting, constipation, diarrhea.  The patient denies chills, night sweats, weight loss, new lumps or bumps, easy bruising or bleeding.      Prior History:   The patient initially underwent a CXR on 11/13/23 fur a URI which showed possible subtle pulmonary nodules.  CT chest 12/01/2023 showed a cluster of ill-defined centrilobular ground-glass opacity he inferior right and left upper lobes as well as a 1.2 cm ground-glass opacity in the superior segment of the left lower lobe; solid 3 mm pulmonary nodule in the right upper lobe; subcentimeter right hepatic lobe hypodensity likely representing a cyst.  The patient underwent surveillance CT chest  on 05/21/2024 showing a 3.7 x 2.7 pancreatic body mass in the pancreatic tail atrophy suspicious of pancreatic malignancy as well as and unchanged benign 3 mm nodule in the right upper lobe.  MRI abdomen on 06/30/2024 showed hypoenhancing mass centered in the proximal pancreatic body measuring 2.9 x 2 cm with abutment and possible encasement of the distal splenic vein.  EUS was performed on 07/05/2024 mass in the pancreatic body stage T4 N0 M0 by endo sonographic criteria.  Pathology from biopsy of the stomach showed mild focally moderate chronic gastritis with no evidence of the H pylori.  Stomach polyp which was removed code hyperplastic polyps with chronic inflammation.  Pancreatic biopsy showed adenocarcinoma.  CT of the chest, abdomen, and pelvis on 07/09/2024 showed a 9 mm lesion in the right hepatic lobe previously characterized as cysts; mass in the pancreatic body measuring 4.4 x 3.7 cm with diffuse pancreatic ductal dilatation measuring 8 mm:  Weaning vein at the portal confluence occluded with reconstitution via collaterals.  The mass abuts the portal vein by less than 180° but does not case the proximal splenic artery remains patent.  Also noted was a pancreatic lymph node measuring 0.7 cm.   Patient's case was discussed at tumor board on 07/17/2024 with recommendation for proceeding with the chemotherapy.  Patient had port placement on 07/18/2024.   The patient is admitted to the hospital from 09/20/2024 until 09/22/2024 for hypokalemia with potassium of 2.3.  The patient was subsequently discharged underwent treatment with FOLFIRINOX on 09/23/2024.  CT of the chest, abdomen, and pelvis on 10/02/2024 showed a new 5 mm ground-glass nodule in the right lower lobe; stable 8 mm hypodensity present within the right hepatic lobe suggestive of a cyst; 37 x 35 mm proximal pancreatic body mass slightly smaller in transverse dimension involving 90 degree area of the celiac artery and 100 degree area of the  splenic artery with soft tissue extending along the anterior right lateral wall of the portal vein; abnormal soft tissue insinuating between the lateral margin of the celiac artery and adjacent splenic vein; invasion and focal occlusion of the proximal most aspect of the splenic vein; concentric wall thickening present within the distal sigmoid colon/rectum with infectious and inflammatory etiologies possible.   The patient met with Dr Valentin on 10/16/24 whom felt the patient could receive a few more cycles of chemo and then transition to perioperative radiation therapy.   The patient's case was discussed with Dr. Valentin and .  Plan is to proceed with up to 12 cycles FOLFIRINOX prior to consideration of radiation versus surgical resection.   CT chest, abdomen, and pelvis on 01/27/2025 showing apical pleural thickening bilaterally stable since prior exam; 5 mm nodule of the right lung base; 3 possibly slightly smaller nodules at 4 mm; 1-2 mm nodule in the left lung apex; 8 mm hypodensity at the tip of the right lobe of the liver favored to be a cyst; significant improvement in pancreatic mass now measuring 2.9 x 1.7 cm appearing surround the celiac artery and to wrap partially around the portal vein likely greater than 180°.   The patient was admitted to the hospital from 2/19/25 until 2/22/25 for osteomyelitis.   Pt presented to the hospital with septic arthritis from a cat bite.  MRI hand and fingers on 2/17/25 showed findings concerning for septic arthritis and early changes of osteomyelitis, and cellulitis in the 2nd MCP on the right hand as well as tenosynovitis and cellulitis about dorsal aspect of the hand/wrist.  Pt underwent debridement of soft tissue and tendon, arthrotomy of the 2nd MCP joint with irrigation and excision of infected bone about the 2nd metacarpal and index finger proximal phalanx.  Cultures failed to grow any bacteria.  Pt was discharged on doxycycline and ertapenem to complete 6  weeks of abx.     Past Medical History:   Past Medical History:   Diagnosis Date    Arthritis, lumbar spine     hands    Diabetes mellitus     General anesthetics causing adverse effect in therapeutic use     following breast rediuct, hard time awakening  also had anxiety rxn to lidocain in dental ofc    GERD (gastroesophageal reflux disease)     Hypothyroidism 10/08/2014    Major depressive disorder 2025    Maternal anesthesia complication     epidural for 1st child went up instead of down; required intubation    Normocytic anemia 2024    Obesity (BMI 30-39.9) 10/08/2014    pancreatic Cancer         Thyroid disease     Thyroid nodule 10/08/2014       Past Surgical HIstory:   Past Surgical History:   Procedure Laterality Date    BREAST BIOPSY Left     b9    BREAST SURGERY      Reduction cause of a mass in left breast    c-sections x2       SECTION  3/26/81 & 85    Birth of two girls    COLONOSCOPY  2012         COLONOSCOPY N/A 2018    Procedure: COLONOSCOPY;  Surgeon: Francisco Mcclain MD;  Location: [facility];  Service: Endoscopy;  Laterality: N/A;    COLONOSCOPY N/A 10/24/2023    Procedure: COLONOSCOPY;  Surgeon: Francisco Mcclain MD;  Location: [facility];  Service: Endoscopy;  Laterality: N/A;    ENDOSCOPIC ULTRASOUND OF UPPER GASTROINTESTINAL TRACT Left 2024    Procedure: ULTRASOUND, UPPER GI TRACT, ENDOSCOPIC;  Surgeon: Andrew Gordon MD;  Location: [facility];  Service: Endoscopy;  Laterality: Left;    ESOPHAGOGASTRODUODENOSCOPY N/A 2024    Procedure: EGD (ESOPHAGOGASTRODUODENOSCOPY);  Surgeon: Andrew Gordon MD;  Location: [facility];  Service: Endoscopy;  Laterality: N/A;    hysteroscopy with polypectomy      INCISION AND DRAINAGE Right 2025    Procedure: Incision and Drainage, RIGHT HAND EXCISION OF INFECTED BONE RIGHT INDEX FINGER;  Surgeon: SALVATORE Chou MD;  Location: [facility];  Service: General;  Laterality: Right;    INCISIONAL  BREAST BIOPSY Left 1996    benign; done at same time as reduction    INSERTION OF TUNNELED CENTRAL VENOUS CATHETER (CVC) WITH SUBCUTANEOUS PORT N/A 7/18/2024    Procedure: YREEVVOMD-ICON-B-CATH;  Surgeon: Blanca Dillard MD;  Location: [facility];  Service: General;  Laterality: N/A;    TOTAL REDUCTION MAMMOPLASTY Bilateral 1996       Family History:   Family History   Problem Relation Name Age of Onset    Brain cancer Mother Ravi Tolbert     Early death Mother Ravi Tolbert 51        Passed at 51    Cancer Mother Ravi Tolbetr         Brain tumor    Arthritis Mother Ravi Tolbert     Diabetes Father Ck pham tomasz         Type 2    Cirrhosis Father Ck holbrookent     Cancer Father Ck aknit tolbert         Bone    COPD Father Ck ankit tolbert         Smoker    Early death Father Ck tolbert         Passed at 64    Lung cancer Father Ck tolbert     Heart disease Sister Mamta (dianna)wescovich         Congestive heart failure (passed 2/11/18    Hypertension Sister Mamta (dianna)wescovich     Kidney disease Sister Mamta (dianna)wescoradha         Doc removed when she was a child    Hypertension Sister Mayank     Depression Sister Ravi (mayank) macey Luly ( sister)         Due to loss of her 27 year old son    Early death Sister Ravi (mayank) macey Mauricio ( sister)         Pulmonary hypertension, cirrhosis of the liver(passed 7/14/2007   Age 57    Heart disease Brother John Michi Choudhury ( passed )         Heart attack    Hypertension Brother John Michi Choudhury ( passed )     Heart disease Brother Juan Francisco Macey         Heart  blockage    Heart disease Brother Bhanu Macey         Heart attack (passed)    Diabetes Brother Bhanu Macey     Macular degeneration Brother Bhanu Macey     Breast cancer Maternal Aunt  30    Breast cancer Paternal Aunt  40    Breast cancer Paternal Aunt  40    Ovarian  cancer Neg Hx         Social History:  reports that she has never smoked. She has never used smokeless tobacco. She reports that she does not currently use alcohol. She reports that she does not use drugs.    Allergies:  Review of patient's allergies indicates:   Allergen Reactions    Duricef [cefadroxil] Hives       Medications:  Current Outpatient Medications   Medication Sig Dispense Refill    0.9% NaCl PgBk 100 mL with ertapenem 1 gram SolR 1 g Inject 1 g into the vein once daily.      acetaminophen (TYLENOL) 325 MG tablet Take 650 mg by mouth daily as needed for Pain.      doxycycline (VIBRA-TABS) 100 MG tablet Take 1 tablet (100 mg total) by mouth every 12 (twelve) hours. 80 tablet 0    duke's soln (benadryl 30 mL, mylanta 30 mL, LIDOcaine 30 mL, nystatin 30 mL) 120mL 10 ml swish & spit/swallow every 4 to 6 hours as needed for mouth pain/ulcers/yeast/throat pain 240 mL 1    EScitalopram oxalate (LEXAPRO) 10 MG tablet Take 1 tablet (10 mg total) by mouth once daily. 30 tablet 2    fluticasone propionate (FLONASE) 50 mcg/actuation nasal spray 1 spray (50 mcg total) by Each Nostril route once daily. 18.2 mL 0    HYDROcodone-acetaminophen (NORCO) 5-325 mg per tablet Take 1 tablet by mouth every 6 (six) hours as needed for Pain. 28 tablet 0    ibuprofen (ADVIL,MOTRIN) 800 MG tablet Take 1 tablet (800 mg total) by mouth every 8 (eight) hours as needed for Pain. 42 tablet 1    levothyroxine (SYNTHROID) 50 MCG tablet Take 1 tablet (50 mcg total) by mouth once daily. 90 tablet 3    loratadine (CLARITIN) 10 mg tablet Take 1 tablet (10 mg total) by mouth every morning. 30 tablet 11    potassium chloride SA (K-DUR,KLOR-CON) 20 MEQ tablet Take 40 mEq by mouth 2 (two) times daily.      senna-docusate 8.6-50 mg (PERICOLACE) 8.6-50 mg per tablet Take 2 tablets by mouth once daily. 60 tablet 1    simvastatin (ZOCOR) 10 MG tablet Take 1 tablet (10 mg total) by mouth every evening. 90 tablet 3    SYNJARDY XR 10-1,000 mg TBph  "TAKE ONE TABLET BY MOUTH ONCE DAILY 30 tablet 5    VITAMIN D2 1,250 mcg (50,000 unit) capsule Take 50,000 Units by mouth twice a week.      capecitabine (XELODA) 150 MG tablet Take 2 tablets (300 mg total) by mouth 2 (two) times daily Take as directed Monday, Tuesday, Wednesday, Thursday, and Friday for the duration of radiation therapy. Take with food and with 1 other capecitabine prescription for 1,300 mg total. 20 tablet 4    capecitabine (XELODA) 500 MG Tab Take 2 tablets (1,000 mg total) by mouth 2 (two) times daily Take as directed Monday, Tuesday, Wednesday, Thursday, and Friday for the duration of radiation therapy. Take with food and with 1 other capecitabine prescription for 1,300 mg total. 20 tablet 4     No current facility-administered medications for this visit.       Review of Systems   Constitutional:  Negative for appetite change, chills, fatigue, fever and unexpected weight change.   HENT:  Negative for mouth sores.    Eyes:  Negative for visual disturbance.   Respiratory:  Negative for cough and shortness of breath.    Cardiovascular:  Negative for chest pain and palpitations.   Gastrointestinal:  Negative for abdominal pain, constipation, diarrhea, nausea and vomiting.   Genitourinary:  Negative for frequency.   Musculoskeletal:  Negative for back pain.        Difficulty flexing right index finger   Skin:  Negative for rash.   Neurological:  Negative for headaches.   Hematological:  Negative for adenopathy. Does not bruise/bleed easily.   Psychiatric/Behavioral:  The patient is not nervous/anxious.        ECOG Performance Status: 0   Objective:      Vitals:   Vitals:    03/13/25 1033   BP: 106/80   BP Location: Right arm   Patient Position: Sitting   Pulse: 70   Resp: 12   Temp: 98 °F (36.7 °C)   TempSrc: Temporal   SpO2: 98%   Weight: 53.8 kg (118 lb 9.7 oz)   Height: 5' 1" (1.549 m)             Physical Exam  Constitutional:       General: She is not in acute distress.     Appearance: She is " well-developed. She is not diaphoretic.   HENT:      Head: Normocephalic and atraumatic.      Comments: Alopecia  Cardiovascular:      Rate and Rhythm: Normal rate and regular rhythm.      Heart sounds: Normal heart sounds. No murmur heard.     No friction rub. No gallop.   Pulmonary:      Effort: Pulmonary effort is normal. No respiratory distress.      Breath sounds: Normal breath sounds. No wheezing or rales.   Chest:      Chest wall: No tenderness.   Abdominal:      General: Bowel sounds are normal. There is no distension.      Palpations: Abdomen is soft. There is no mass.      Tenderness: There is no abdominal tenderness. There is no guarding or rebound.   Musculoskeletal:      Comments: PORT in right chest  Right finger with minimal redness about prior incision   Lymphadenopathy:      Cervical: No cervical adenopathy.      Upper Body:      Right upper body: No supraclavicular or axillary adenopathy.      Left upper body: No supraclavicular or axillary adenopathy.   Skin:     Findings: No erythema or rash.   Neurological:      Mental Status: She is alert and oriented to person, place, and time.   Psychiatric:         Behavior: Behavior normal.         Laboratory Data:  Lab Visit on 03/12/2025   Component Date Value Ref Range Status    WBC 03/12/2025 4.42  3.90 - 12.70 K/uL Final    RBC 03/12/2025 3.78 (L)  4.00 - 5.40 M/uL Final    Hemoglobin 03/12/2025 12.1  12.0 - 16.0 g/dL Final    Hematocrit 03/12/2025 36.1 (L)  37.0 - 48.5 % Final    MCV 03/12/2025 96  82 - 98 fL Final    MCH 03/12/2025 32.0 (H)  27.0 - 31.0 pg Final    MCHC 03/12/2025 33.5  32.0 - 36.0 g/dL Final    RDW 03/12/2025 12.7  11.5 - 14.5 % Final    Platelets 03/12/2025 113 (L)  150 - 450 K/uL Final    MPV 03/12/2025 9.1 (L)  9.2 - 12.9 fL Final    Immature Granulocytes 03/12/2025 0.2  0.0 - 0.5 % Final    Gran # (ANC) 03/12/2025 2.0  1.8 - 7.7 K/uL Final    Immature Grans (Abs) 03/12/2025 0.01  0.00 - 0.04 K/uL Final    Comment: Mild elevation  in immature granulocytes is non specific and   can be seen in a variety of conditions including stress response,   acute inflammation, trauma and pregnancy. Correlation with other   laboratory and clinical findings is essential.      Lymph # 03/12/2025 2.0  1.0 - 4.8 K/uL Final    Mono # 03/12/2025 0.3  0.3 - 1.0 K/uL Final    Eos # 03/12/2025 0.1  0.0 - 0.5 K/uL Final    Baso # 03/12/2025 0.02  0.00 - 0.20 K/uL Final    nRBC 03/12/2025 0  0 /100 WBC Final    Gran % 03/12/2025 44.4  38.0 - 73.0 % Final    Lymph % 03/12/2025 45.9  18.0 - 48.0 % Final    Mono % 03/12/2025 6.1  4.0 - 15.0 % Final    Eosinophil % 03/12/2025 2.9  0.0 - 8.0 % Final    Basophil % 03/12/2025 0.5  0.0 - 1.9 % Final    Differential Method 03/12/2025 Automated   Final    Sodium 03/12/2025 139  136 - 145 mmol/L Final    Potassium 03/12/2025 4.3  3.5 - 5.1 mmol/L Final    Chloride 03/12/2025 104  95 - 110 mmol/L Final    CO2 03/12/2025 25  23 - 29 mmol/L Final    Glucose 03/12/2025 145 (H)  70 - 110 mg/dL Final    BUN 03/12/2025 8  8 - 23 mg/dL Final    Creatinine 03/12/2025 0.8  0.5 - 1.4 mg/dL Final    Calcium 03/12/2025 8.9  8.7 - 10.5 mg/dL Final    Total Protein 03/12/2025 6.7  6.0 - 8.4 g/dL Final    Albumin 03/12/2025 3.5  3.5 - 5.2 g/dL Final    Total Bilirubin 03/12/2025 0.5  0.1 - 1.0 mg/dL Final    Comment: For infants and newborns, interpretation of results should be based  on gestational age, weight and in agreement with clinical  observations.    Premature Infant recommended reference ranges:  Up to 24 hours.............<8.0 mg/dL  Up to 48 hours............<12.0 mg/dL  3-5 days..................<15.0 mg/dL  6-29 days.................<15.0 mg/dL      Alkaline Phosphatase 03/12/2025 124  40 - 150 U/L Final    AST 03/12/2025 24  10 - 40 U/L Final    ALT 03/12/2025 18  10 - 44 U/L Final    eGFR 03/12/2025 >60.0  >60 mL/min/1.73 m^2 Final    Anion Gap 03/12/2025 10  8 - 16 mmol/L Final    CA 19-9 03/12/2025 7.1  0.0 - 40.0 U/mL Final     Comment: The testing method is a chemiluminescent microparticle immunoassay   manufactured by [employer] and performed on the    or   [employer] system. Values obtained with different assay manufacturers   for   methods may be different and cannot be used interchangeably.           Imaging:     CT C/A/P 3/12/25    LUNGS:     There are multiple new centrilobular ground-glass nodules in the left lower lobe. The largest of these is in the lingular segment and has a maximum diameter of 12 mm (series 19, image 165). Ground-glass nodularity also extends into the basal segments of the left lower lobe. There is a similar 10 mm ground-glass nodule in the posteromedial right lower lobe (series 19, image 186).     Right apical nodular septal thickening (series 19, image 39) is unchanged. The previously described 4 mm ground-glass nodule on series 3, image 62 of the prior study is not clearly reproduced on the current study. Apical septal thickening is likely postinflammatory and is unchanged.     MEDIASTINUM and PLEURA:     A right-sided Port-A-Cath is again noted. There is no pleural effusion or pneumothorax. Cardiac chambers are within normal limits. There is no acute body wall finding.     Liver, pancreas, spleen, gallbladder:     The soft tissue density in the region of the body of the pancreas continues to decrease in size. On the current study it measures 20 x 11 mm in the axial plane. Again noted is atrophy of the pancreatic tail and a portion of the pancreatic body. Advanced hepatic steatosis is noted. The presume cyst in the caudal tip of the posterior segment of the right hepatic lobe (series 23, image 83) has decreased in size since the prior study. A gallstone is again noted. There is no acute abnormality of the liver, pancreas or spleen. There are no inflammatory changes in the gallbladder fossa.     Kidneys:     There is no obstructing urinary tract calculus, hydronephrosis or hydroureter.      Bowel:     The lack of oral contrast limits evaluation of the bowel. There is no acute abnormality of the stomach. There is no small bowel dilatation. There is no acute abnormality of the colon. The rectum is unchanged. Apparent mural thickening is probably secondary to under distension. The appendix appears normal.     Other:     The IVC is within normal limits. No free fluid. There is no free air or pathologic lymph node enlargement. There is no aneurysmal dilatation of the abdominal aorta.     Pelvis:     The uterus and adnexa are within normal limits. The urinary bladder is unchanged. There is no significant free fluid in the pelvis.     Body wall:     There is no acute fracture or other acute body wall finding.       Assessment:       1. Malignant neoplasm of pancreas, unspecified location of malignancy    2. Subacute osteomyelitis of right hand    3. Pulmonary nodule    4. Hypothyroidism, unspecified type    5. Hypokalemia           Plan:   Pancreatic Cancer - Patient with Stage III pancreatic cancer with concern for potential encasement of the distal celiac artery per discussion with Dr Valentin  -No sign of mets  - 94.5 on 6/17/24  -Case discussed at tumor board 7/17/24 with recommendation to proceed with chemo  -[facility] genetic testing negative for the genes tested  -[facility] tissue testing showed TP53, KRAS p.G12D, CDKN2A, CDKN2B mutations.  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  -Will need to keep irinotecan dose reduced at 165mg/mm2  -CT C/A/P on 10/02/24 shows stable disease and a possible new RLL nodule  -Pt saw Dr Valentin 10/16/24 whom recommended a few more cycles of chemo and then transition to perioperative radiation therapy  - decreased all the way to 9.4U/mL on 11/11/24 with level today pending   -Pt to go to [facility] 12/09/24  -PT completed 12 cycles  -Repeat scans 1/27/25 showeddecrease in size of pancreatic mass appearing to surround the  celiac artery and to wrap partially around the portal vein likely greater than 180°  -Case discussed at tumor board on 2/05/25 with recommendation for radiation  -Pt was to start capecitabine and radiation but will need to hold until she has had continued time to heal from recently diagnosed osteomyelitis of the 2nd digit of the right hand  -Repeat scans 3/12/25 showed decreased pancreatic mass and new groundglass opacities in the lung  -Will proceed with chemo/XRT next week  -Pt to call pharmacy to recieve Xeloda  Visit today included increased complexity associated with the care of the episodic problem (chemotherapy) addressed and managing the longitudinal care of the patient due to the serious and/or complex managed problem(s) pancreatic cancer.     Osteomyelitis - Pt s/p debridement of soft tissue and tendon, arthrotomy of the 2nd MCP joint with irrigation and excision of infected bone about the 2nd metacarpal and index finger proximal phalanx on 2/19/25  -Pt on ertapenem and doxycycline until 4/02/25  -Management per ID  -PT to see Dr. Chou tomorrow    Pulmonary Nodules - Seen on CT C/A/P 1/27/25  -Stable  -Pt with some new ground glass nodules with others resolving  -Will monitor    Hypokalemia - 4.3 on 3/12/25  -PT to continue potassium chloride     Hypothyroidism - pt on synthroid  -Will monitor    Route Chart for Scheduling    Med Onc Chart Routing      Follow up with physician 3 weeks. PT to proceed with chemo/XRT next week.  Pt needs a CBC and CMP on 3/24/25 with appt with NP.  Pt needs keira same labs and an appt with me on 3/31/25.   Follow up with ANALIA 2 weeks.   Infusion scheduling note    Injection scheduling note    Labs    Imaging    Pharmacy appointment    Other referrals              Treatment Plan Information   OP CAPECITABINE 5 DAYS + RADIOTHERAPY Rajat Hunt MD   Associated diagnosis: Malignant neoplasm of pancreas Stage III cT4, cN0, cM0 noted on 7/12/2024   Line of treatment:  Neoadjuvant  Treatment Goal: Curative     Upcoming Treatment Dates - OP CAPECITABINE 5 DAYS + RADIOTHERAPY    2/24/2025       Antiemetics       Physician communication order       Take Home Chemotherapy       capecitabine (XELODA) tablet 1,300 mg    Therapy Plan Information  PORT FLUSH for Malignant neoplasm of pancreas, noted on 7/12/2024  Flushes  heparin, porcine (PF) 100 unit/mL injection flush 500 Units  500 Units, Intravenous, Every visit  sodium chloride 0.9% flush 10 mL  10 mL, Intravenous, Every visit      No therapy plan of the specified type found.    No therapy plan of the specified type found.      Rajat Hunt MD  [facility]  Hematology and Oncology  [facility]   [address] [place] [address]   O: [phone]  F: [phone]

## 2025-03-13 ENCOUNTER — PATIENT MESSAGE (OUTPATIENT)
Dept: RHEUMATOLOGY | Facility: CLINIC | Age: 65
End: 2025-03-13
Payer: MEDICARE

## 2025-03-13 ENCOUNTER — OFFICE VISIT (OUTPATIENT)
Dept: HEMATOLOGY/ONCOLOGY | Facility: CLINIC | Age: 65
End: 2025-03-13
Payer: MEDICARE

## 2025-03-13 ENCOUNTER — LAB VISIT (OUTPATIENT)
Dept: LAB | Facility: HOSPITAL | Age: 65
End: 2025-03-13
Attending: INTERNAL MEDICINE
Payer: MEDICARE

## 2025-03-13 VITALS
TEMPERATURE: 98 F | SYSTOLIC BLOOD PRESSURE: 106 MMHG | WEIGHT: 118.63 LBS | HEIGHT: 61 IN | BODY MASS INDEX: 22.4 KG/M2 | RESPIRATION RATE: 12 BRPM | DIASTOLIC BLOOD PRESSURE: 80 MMHG | HEART RATE: 70 BPM | OXYGEN SATURATION: 98 %

## 2025-03-13 DIAGNOSIS — R91.1 PULMONARY NODULE: ICD-10-CM

## 2025-03-13 DIAGNOSIS — M86.241 SUBACUTE OSTEOMYELITIS OF RIGHT HAND: ICD-10-CM

## 2025-03-13 DIAGNOSIS — M00.841 PYOGENIC BACTERIAL ARTHRITIS OF HAND, RIGHT: ICD-10-CM

## 2025-03-13 DIAGNOSIS — C25.9 MALIGNANT NEOPLASM OF PANCREAS, UNSPECIFIED LOCATION OF MALIGNANCY: Primary | ICD-10-CM

## 2025-03-13 DIAGNOSIS — M00.841 PYOGENIC BACTERIAL ARTHRITIS OF HAND, RIGHT: Primary | ICD-10-CM

## 2025-03-13 DIAGNOSIS — E03.9 HYPOTHYROIDISM, UNSPECIFIED TYPE: ICD-10-CM

## 2025-03-13 DIAGNOSIS — E87.6 HYPOKALEMIA: ICD-10-CM

## 2025-03-13 LAB — CRP SERPL-MCNC: <0.3 MG/L (ref 0–8.2)

## 2025-03-13 PROCEDURE — 1125F AMNT PAIN NOTED PAIN PRSNT: CPT | Mod: CPTII,S$GLB,, | Performed by: INTERNAL MEDICINE

## 2025-03-13 PROCEDURE — G2211 COMPLEX E/M VISIT ADD ON: HCPCS | Mod: S$GLB,,, | Performed by: INTERNAL MEDICINE

## 2025-03-13 PROCEDURE — 86140 C-REACTIVE PROTEIN: CPT | Performed by: INTERNAL MEDICINE

## 2025-03-13 PROCEDURE — 1111F DSCHRG MED/CURRENT MED MERGE: CPT | Mod: CPTII,S$GLB,, | Performed by: INTERNAL MEDICINE

## 2025-03-13 PROCEDURE — 3008F BODY MASS INDEX DOCD: CPT | Mod: CPTII,S$GLB,, | Performed by: INTERNAL MEDICINE

## 2025-03-13 PROCEDURE — 99999 PR PBB SHADOW E&M-EST. PATIENT-LVL IV: CPT | Mod: PBBFAC,,, | Performed by: INTERNAL MEDICINE

## 2025-03-13 PROCEDURE — 3074F SYST BP LT 130 MM HG: CPT | Mod: CPTII,S$GLB,, | Performed by: INTERNAL MEDICINE

## 2025-03-13 PROCEDURE — 99215 OFFICE O/P EST HI 40 MIN: CPT | Mod: S$GLB,,, | Performed by: INTERNAL MEDICINE

## 2025-03-13 PROCEDURE — 3288F FALL RISK ASSESSMENT DOCD: CPT | Mod: CPTII,S$GLB,, | Performed by: INTERNAL MEDICINE

## 2025-03-13 PROCEDURE — 1101F PT FALLS ASSESS-DOCD LE1/YR: CPT | Mod: CPTII,S$GLB,, | Performed by: INTERNAL MEDICINE

## 2025-03-13 PROCEDURE — 3079F DIAST BP 80-89 MM HG: CPT | Mod: CPTII,S$GLB,, | Performed by: INTERNAL MEDICINE

## 2025-03-14 ENCOUNTER — PATIENT MESSAGE (OUTPATIENT)
Dept: HEMATOLOGY/ONCOLOGY | Facility: CLINIC | Age: 65
End: 2025-03-14
Payer: MEDICARE

## 2025-03-14 ENCOUNTER — PATIENT MESSAGE (OUTPATIENT)
Dept: RADIATION ONCOLOGY | Facility: CLINIC | Age: 65
End: 2025-03-14
Payer: MEDICARE

## 2025-03-18 PROCEDURE — 77014 PR  CT GUIDANCE PLACEMENT RAD THERAPY FIELDS: CPT | Mod: 26,,, | Performed by: RADIOLOGY

## 2025-03-18 PROCEDURE — 77386 HC IMRT, COMPLEX: CPT | Mod: PN | Performed by: RADIOLOGY

## 2025-03-19 ENCOUNTER — DOCUMENTATION ONLY (OUTPATIENT)
Dept: RADIATION ONCOLOGY | Facility: CLINIC | Age: 65
End: 2025-03-19
Payer: MEDICARE

## 2025-03-19 ENCOUNTER — DOCUMENTATION ONLY (OUTPATIENT)
Dept: INFUSION THERAPY | Facility: HOSPITAL | Age: 65
End: 2025-03-19
Payer: MEDICARE

## 2025-03-19 PROCEDURE — 77386 HC IMRT, COMPLEX: CPT | Mod: PN | Performed by: RADIOLOGY

## 2025-03-19 PROCEDURE — 77014 PR  CT GUIDANCE PLACEMENT RAD THERAPY FIELDS: CPT | Mod: 26,,, | Performed by: RADIOLOGY

## 2025-03-19 NOTE — PROGRESS NOTES
SW met with pt briefly and provided a gas card. Pt said she has started [facility]. SW reminded her she can now receive gas cards twice a week while in [place].      Bharati Fritz, JOSSUE  03/19/2025  5:11 PM

## 2025-03-21 ENCOUNTER — DOCUMENTATION ONLY (OUTPATIENT)
Dept: INFUSION THERAPY | Facility: HOSPITAL | Age: 65
End: 2025-03-21
Payer: MEDICARE

## 2025-03-21 NOTE — PROGRESS NOTES
SW met with pt briefly and provided a gas card. No other needs identified at this time.     Bharati Fritz, JOSSUE  03/21/2025  11:57 AM

## 2025-03-21 NOTE — PROGRESS NOTES
PATIENT: Bessy Lamb  MRN: [id number]  DATE: 3/24/2025      Diagnosis:   1. Malignant neoplasm of pancreas, unspecified location of malignancy    2. Subacute osteomyelitis of right hand    3. Pulmonary nodule    4. Hypothyroidism, unspecified type    5. Hypokalemia    6. CINV (chemotherapy-induced nausea and vomiting)        Chief Complaint: Follow-up (2wks f/u with labs/)          Subjective:    Interval History: Ms. Lamb is a 64 y.o. female who returns for follow up. She reports some nausea and diarrhea with Xeloda. Denies any peeling, dryness, pain to palms or soles of feet. Denies fever, chills, sob, cp, palpitations, swelling, fatigue, constipation, abdominal pain, new bumps, lumps, bleeding, bruising. Wound to Left hand healing well. Continues on IV antibiotics.     Oncologic History:   Per Record:    64 y.o. female with Arthritis, DMII, GERD, Hypotyroidism, Obesity, Thyroid disease who presents for pancreatic cancer.  Since the last clinic visit the patient underwent CT C/A/P on 3/12/25 showing multiple new centrilobular ground-glass nodules in the left lower lobe with the largest measuring 12 mm; ground-glass nodularity extending to basal segments of the left lower lobe; 10 mm ground-glass nodule in the posteromedial right lower lobe; unchanged right apical nodular septal thickening; resolution of previously described 4 mm ground-glass nodule in the right apex.     Prior History:   The patient initially underwent a CXR on 11/13/23 fur a URI which showed possible subtle pulmonary nodules.  CT chest 12/01/2023 showed a cluster of ill-defined centrilobular ground-glass opacity he inferior right and left upper lobes as well as a 1.2 cm ground-glass opacity in the superior segment of the left lower lobe; solid 3 mm pulmonary nodule in the right upper lobe; subcentimeter right hepatic lobe hypodensity likely representing a cyst.  The patient underwent surveillance CT chest on 05/21/2024 showing a 3.7 x 2.7  pancreatic body mass in the pancreatic tail atrophy suspicious of pancreatic malignancy as well as and unchanged benign 3 mm nodule in the right upper lobe.  MRI abdomen on 06/30/2024 showed hypoenhancing mass centered in the proximal pancreatic body measuring 2.9 x 2 cm with abutment and possible encasement of the distal splenic vein.  EUS was performed on 07/05/2024 mass in the pancreatic body stage T4 N0 M0 by endo sonographic criteria.  Pathology from biopsy of the stomach showed mild focally moderate chronic gastritis with no evidence of the H pylori.  Stomach polyp which was removed code hyperplastic polyps with chronic inflammation.  Pancreatic biopsy showed adenocarcinoma.  CT of the chest, abdomen, and pelvis on 07/09/2024 showed a 9 mm lesion in the right hepatic lobe previously characterized as cysts; mass in the pancreatic body measuring 4.4 x 3.7 cm with diffuse pancreatic ductal dilatation measuring 8 mm:  Weaning vein at the portal confluence occluded with reconstitution via collaterals.  The mass abuts the portal vein by less than 180° but does not case the proximal splenic artery remains patent.  Also noted was a pancreatic lymph node measuring 0.7 cm.              Patient's case was discussed at tumor board on 07/17/2024 with recommendation for proceeding with the chemotherapy.  Patient had port placement on 07/18/2024.              The patient is admitted to the hospital from 09/20/2024 until 09/22/2024 for hypokalemia with potassium of 2.3.  The patient was subsequently discharged underwent treatment with FOLFIRINOX on 09/23/2024.  CT of the chest, abdomen, and pelvis on 10/02/2024 showed a new 5 mm ground-glass nodule in the right lower lobe; stable 8 mm hypodensity present within the right hepatic lobe suggestive of a cyst; 37 x 35 mm proximal pancreatic body mass slightly smaller in transverse dimension involving 90 degree area of the celiac artery and 100 degree area of the splenic artery  with soft tissue extending along the anterior right lateral wall of the portal vein; abnormal soft tissue insinuating between the lateral margin of the celiac artery and adjacent splenic vein; invasion and focal occlusion of the proximal most aspect of the splenic vein; concentric wall thickening present within the distal sigmoid colon/rectum with infectious and inflammatory etiologies possible.              The patient met with Dr Valentin on 10/16/24 whom felt the patient could receive a few more cycles of chemo and then transition to perioperative radiation therapy.              The patient's case was discussed with Dr. Valentin and .  Plan is to proceed with up to 12 cycles FOLFIRINOX prior to consideration of radiation versus surgical resection.              CT chest, abdomen, and pelvis on 01/27/2025 showing apical pleural thickening bilaterally stable since prior exam; 5 mm nodule of the right lung base; 3 possibly slightly smaller nodules at 4 mm; 1-2 mm nodule in the left lung apex; 8 mm hypodensity at the tip of the right lobe of the liver favored to be a cyst; significant improvement in pancreatic mass now measuring 2.9 x 1.7 cm appearing surround the celiac artery and to wrap partially around the portal vein likely greater than 180°.              The patient was admitted to the hospital from 2/19/25 until 2/22/25 for osteomyelitis.   Pt presented to the hospital with septic arthritis from a cat bite.  MRI hand and fingers on 2/17/25 showed findings concerning for septic arthritis and early changes of osteomyelitis, and cellulitis in the 2nd MCP on the right hand as well as tenosynovitis and cellulitis about dorsal aspect of the hand/wrist.  Pt underwent debridement of soft tissue and tendon, arthrotomy of the 2nd MCP joint with irrigation and excision of infected bone about the 2nd metacarpal and index finger proximal phalanx.  Cultures failed to grow any bacteria.  Pt was discharged on  doxycycline and ertapenem to complete 6 weeks of abx.        Oncology History   Malignant neoplasm of pancreas   7/12/2024 Initial Diagnosis    Pancreatic cancer     7/13/2024 Cancer Staged    Staging form: Exocrine Pancreas, AJCC 8th Edition  - Clinical: Stage III (cT4, cN0, cM0)     7/22/2024 - 1/16/2025 Chemotherapy    Treatment Summary   Plan Name: OP GI FOLFIRINOX (oxaliplatin, leucovorin, irinotecan, fluorouracil) Q2W   Treatment Goal: Curative  Status: Inactive  Start Date: 7/22/2024  End Date: 1/16/2025  Provider: Rajat Hunt MD  Chemotherapy: fluorouraciL injection 500 mg, 515 mg (100 % of original dose 320 mg/m2), Intravenous, Clinic/[facility] 1 time, 12 of 26 cycles  Dose modification: 320 mg/m2 (original dose 320 mg/m2, Cycle 1, Reason: MD Discretion, Comment: Pharamcogenomic), 320 mg/m2 (original dose 400 mg/m2, Cycle 6)  Administration: 500 mg (7/22/2024), 640 mg (8/5/2024), 635 mg (8/19/2024), 635 mg (9/3/2024), 640 mg (9/23/2024), 500 mg (10/14/2024), 500 mg (10/28/2024), 500 mg (11/11/2024), 500 mg (11/25/2024), 500 mg (12/11/2024), 500 mg (12/31/2024), 500 mg (1/14/2025)  irinotecan (CAMPTOSAR) 260 mg in 0.9% NaCl 578 mL chemo infusion, 266 mg (100 % of original dose 165 mg/m2), Intravenous, Clinic/[facility] 1 time, 12 of 26 cycles  Dose modification: 165 mg/m2 (original dose 165 mg/m2, Cycle 1, Reason: Other (see comments), Comment: Waiting for pharmacogenomic panel), 165 mg/m2 (original dose 165 mg/m2, Cycle 2, Reason: Other (see comments), Comment: UGT Mutation), 140 mg/m2 (original dose 165 mg/m2, Cycle 5, Reason: Other (see comments), Comment: UGT1A1 mutation), 165 mg/m2 (original dose 165 mg/m2, Cycle 5, Reason: MD Discretion), 140 mg/m2 (original dose 165 mg/m2, Cycle 6, Reason: MD Discretion, Comment: Thrombocytopenia)  Administration: 260 mg (7/22/2024), 260 mg (8/5/2024), 260 mg (8/19/2024), 260 mg (9/3/2024), 260 mg (9/23/2024), 220 mg (10/14/2024), 220 mg (10/28/2024), 220 mg (11/11/2024), 220  mg (11/25/2024), 220 mg (12/11/2024), 218 mg (12/31/2024), 220 mg (1/14/2025)  oxaliplatin (ELOXATIN) 85 mg/m2 = 137 mg in D5W 592.4 mL chemo infusion, 85 mg/m2 = 137 mg, Intravenous, Clinic/[facility] 1 time, 12 of 26 cycles  Dose modification: 65 mg/m2 (original dose 85 mg/m2, Cycle 6)  Administration: 137 mg (7/22/2024), 136 mg (8/5/2024), 135 mg (8/19/2024), 135 mg (9/3/2024), 136 mg (9/23/2024), 100 mg (10/14/2024), 100 mg (10/28/2024), 100 mg (11/11/2024), 100 mg (11/25/2024), 100 mg (12/11/2024), 100 mg (12/31/2024), 100 mg (1/14/2025)  fluorouracil (Adrucil) 3,000 mg in 0.9% NaCl 100 mL chemo infusion, 3,090 mg (100 % of original dose 1,920 mg/m2), Intravenous, Over 46 hours, 12 of 26 cycles  Dose modification: 1,920 mg/m2 (original dose 1,920 mg/m2, Cycle 1, Reason: MD Discretion, Comment: Pharmcogenomic panel pending), 1,920 mg/m2 (original dose 2,400 mg/m2, Cycle 6)  Administration: 3,000 mg (7/22/2024), 3,840 mg (8/5/2024), 3,815 mg (8/19/2024), 3,815 mg (9/3/2024), 3,840 mg (9/23/2024), 3,000 mg (10/14/2024), 3,000 mg (10/28/2024), 3,000 mg (11/11/2024), 3,000 mg (11/25/2024), 3,000 mg (12/11/2024), 2,995 mg (12/31/2024), 3,000 mg (1/14/2025)     2/24/2025 -  Chemotherapy    Treatment Summary   Plan Name: OP CAPECITABINE 5 DAYS + RADIOTHERAPY  Treatment Goal: Curative  Status: Active  Start Date: 2/24/2025 (Planned)  End Date: 2/24/2025 (Planned)  Provider: Rajat Hunt MD  Chemotherapy: capecitabine (XELODA) tablet 1,300 mg, 1,300 mg (100 % of original dose 1,300 mg), Oral, 2 times daily, 0 of 1 cycle, Start date: 2/24/2025, End date: --  Dose modification: 1,300 mg (original dose 1,300 mg, Cycle 1)         Past Medical History:   Past Medical History:   Diagnosis Date    Arthritis, lumbar spine     hands    Diabetes mellitus     General anesthetics causing adverse effect in therapeutic use     following breast rediuct, hard time awakening  also had anxiety rxn to lidocain in dental ofc    GERD  (gastroesophageal reflux disease)     Hypothyroidism 10/08/2014    Major depressive disorder 2025    Maternal anesthesia complication     epidural for 1st child went up instead of down; required intubation    Normocytic anemia 2024    Obesity (BMI 30-39.9) 10/08/2014    pancreatic Cancer         Thyroid disease     Thyroid nodule 10/08/2014       Past Surgical HIstory:   Past Surgical History:   Procedure Laterality Date    BREAST BIOPSY Left     b9    BREAST SURGERY      Reduction cause of a mass in left breast    c-sections x2       SECTION  3/26/81 & 85    Birth of two girls    COLONOSCOPY  2012         COLONOSCOPY N/A 2018    Procedure: COLONOSCOPY;  Surgeon: Francisco Mcclain MD;  Location: [facility];  Service: Endoscopy;  Laterality: N/A;    COLONOSCOPY N/A 10/24/2023    Procedure: COLONOSCOPY;  Surgeon: Francisco Mcclain MD;  Location: [facility];  Service: Endoscopy;  Laterality: N/A;    ENDOSCOPIC ULTRASOUND OF UPPER GASTROINTESTINAL TRACT Left 2024    Procedure: ULTRASOUND, UPPER GI TRACT, ENDOSCOPIC;  Surgeon: Andrew Gordon MD;  Location: [facility];  Service: Endoscopy;  Laterality: Left;    ESOPHAGOGASTRODUODENOSCOPY N/A 2024    Procedure: EGD (ESOPHAGOGASTRODUODENOSCOPY);  Surgeon: Andrew Gordon MD;  Location: [facility];  Service: Endoscopy;  Laterality: N/A;    hysteroscopy with polypectomy      INCISION AND DRAINAGE Right 2025    Procedure: Incision and Drainage, RIGHT HAND EXCISION OF INFECTED BONE RIGHT INDEX FINGER;  Surgeon: SALVATORE Chou MD;  Location: [facility] OR;  Service: General;  Laterality: Right;    INCISIONAL BREAST BIOPSY Left     benign; done at same time as reduction    INSERTION OF TUNNELED CENTRAL VENOUS CATHETER (CVC) WITH SUBCUTANEOUS PORT N/A 2024    Procedure: [id number]-H-CATH;  Surgeon: Blanca Dillard MD;  Location: [facility];  Service: General;  Laterality: N/A;    TOTAL REDUCTION  MAMMOPLASTY Bilateral 1996       Family History:   Family History   Problem Relation Name Age of Onset    Brain cancer Mother Ravi Seaman     Early death Mother Ravi Seaman 51        Passed at 51    Cancer Mother Ravi Seaman         Brain tumor    Arthritis Mother Ravi Seaman     Diabetes Father Ck seaman         Type 2    Cirrhosis Father Ck seaman     Cancer Father Ck seaman         Bone    COPD Father Ck seaman         Smoker    Early death Father Ck seaman         Passed at 64    Lung cancer Father Ck seaman     Heart disease Sister Mamta (dianna)wescovich         Congestive heart failure (passed 2/11/18    Hypertension Sister Mamta (dianna)wescovich     Kidney disease Sister Mamta (dianna)martincoradha         Doc removed when she was a child    Hypertension Sister Mayank     Depression Sister Ravi (mayank) nicolette Mauricio ( sister)         Due to loss of her 27 year old son    Early death Sister Ravi (mayank) nicolette Mauricio ( sister)         Pulmonary hypertension, cirrhosis of the liver(passed 7/14/2007   Age 57    Heart disease Brother John Choudhury ( passed )         Heart attack    Hypertension Brother John Choudhury ( passed )     Heart disease Brother Juan Francisco Choudhury         Heart  blockage    Heart disease Brother Bhanu Choudhury         Heart attack (passed)    Diabetes Brother Bhanu Choudhury     Macular degeneration Brother Bhanu Choudhury     Breast cancer Maternal Aunt  30    Breast cancer Paternal Aunt  40    Breast cancer Paternal Aunt  40    Ovarian cancer Neg Hx         Social History:  reports that she has never smoked. She has never used smokeless tobacco. She reports that she does not currently use alcohol. She reports that she does not use drugs.    Allergies:  Review of patient's allergies indicates:   Allergen Reactions    Duricef [cefadroxil]  "Hives       Medications:  Current Medications[1]    Review of Systems   Constitutional:  Negative for appetite change and unexpected weight change.   HENT:  Negative for mouth sores.    Eyes:  Negative for visual disturbance.   Respiratory:  Negative for cough and shortness of breath.    Cardiovascular:  Negative for chest pain.   Gastrointestinal:  Positive for diarrhea and nausea. Negative for abdominal pain.   Genitourinary:  Negative for frequency.   Musculoskeletal:  Negative for back pain.   Skin:  Negative for rash.   Neurological:  Positive for numbness. Negative for headaches.   Hematological:  Negative for adenopathy.   Psychiatric/Behavioral:  The patient is not nervous/anxious.        ECOG Performance Status:   ECOG SCORE             Objective:      Vitals:   Vitals:    03/24/25 1350   BP: 112/71   BP Location: Left arm   Patient Position: Sitting   Pulse: 66   Resp: 18   Temp: 97.9 °F (36.6 °C)   TempSrc: Temporal   SpO2: (!) 93%   Weight: 52.9 kg (116 lb 10 oz)   Height: 5' 1" (1.549 m)     BMI: Body mass index is 22.04 kg/m².    Physical Exam  HENT:      Head: Normocephalic.      Nose: Nose normal.      Mouth/Throat:      Mouth: Mucous membranes are moist.      Pharynx: Oropharynx is clear.   Eyes:      Pupils: Pupils are equal, round, and reactive to light.   Cardiovascular:      Rate and Rhythm: Normal rate and regular rhythm.      Heart sounds: Normal heart sounds.   Pulmonary:      Breath sounds: Normal breath sounds.   Abdominal:      General: Bowel sounds are normal.      Tenderness: There is no abdominal tenderness.   Musculoskeletal:         General: Normal range of motion.      Cervical back: Normal range of motion.   Skin:     General: Skin is dry.   Neurological:      Mental Status: She is alert and oriented to person, place, and time.   Psychiatric:         Mood and Affect: Mood normal.         Behavior: Behavior normal.         Laboratory Data:  Recent Results (from the past 24 hours) "   CMP    Collection Time: 03/24/25  1:35 PM   Result Value Ref Range    Sodium 140 136 - 145 mmol/L    Potassium 4.3 3.5 - 5.1 mmol/L    Chloride 104 95 - 110 mmol/L    CO2 27 23 - 29 mmol/L    Glucose 107 70 - 110 mg/dL    BUN 9 8 - 23 mg/dL    Creatinine 0.7 0.5 - 1.4 mg/dL    Calcium 9.1 8.7 - 10.5 mg/dL    Protein Total 7.1 6.0 - 8.4 gm/dL    Albumin 3.7 3.5 - 5.2 g/dL    Bilirubin Total 0.6 0.1 - 1.0 mg/dL     40 - 150 unit/L    AST 27 11 - 45 unit/L    ALT 15 10 - 44 unit/L    Anion Gap 9 8 - 16 mmol/L    eGFR >60 >60 mL/min/1.73/m2   CBC with Differential    Collection Time: 03/24/25  1:35 PM   Result Value Ref Range    WBC 2.76 (L) 3.90 - 12.70 K/uL    RBC 4.05 4.00 - 5.40 M/uL    HGB 12.7 12.0 - 16.0 gm/dL    HCT 37.5 37.0 - 48.5 %    MCV 93 82 - 98 fL    MCH 31.4 27.0 - 50.0 pg    MCHC 33.9 32.0 - 36.0 g/dL    RDW 12.8 11.5 - 14.5 %    Platelet Count 95 (L) 150 - 450 K/uL    MPV 9.4 9.2 - 12.9 fL    Nucleated RBC 0 <=0 /100 WBC    Neut % 51.1 38 - 73 %    Lymph % 38.8 18 - 48 %    Mono % 7.6 4 - 15 %    Eos % 2.5 <=8 %    Basophil % 0.0 <=1.9 %    Imm Grans % 0.0 0.0 - 0.5 %    Neut # 1.41 (L) 1.8 - 7.7 K/uL    Lymph # 1.07 1 - 4.8 K/uL    Mono # 0.21 (L) 0.3 - 1 K/uL    Eos # 0.07 <=0.5 K/uL    Baso # 0.00 <=0.2 K/uL    Imm Grans # 0.00 0.00 - 0.04 K/uL          Imaging: CT C/A/P 3/12/25     LUNGS:     There are multiple new centrilobular ground-glass nodules in the left lower lobe. The largest of these is in the lingular segment and has a maximum diameter of 12 mm (series 19, image 165). Ground-glass nodularity also extends into the basal segments of the left lower lobe. There is a similar 10 mm ground-glass nodule in the posteromedial right lower lobe (series 19, image 186).     Right apical nodular septal thickening (series 19, image 39) is unchanged. The previously described 4 mm ground-glass nodule on series 3, image 62 of the prior study is not clearly reproduced on the current study. Apical  septal thickening is likely postinflammatory and is unchanged.     MEDIASTINUM and PLEURA:     A right-sided Port-A-Cath is again noted. There is no pleural effusion or pneumothorax. Cardiac chambers are within normal limits. There is no acute body wall finding.     Liver, pancreas, spleen, gallbladder:     The soft tissue density in the region of the body of the pancreas continues to decrease in size. On the current study it measures 20 x 11 mm in the axial plane. Again noted is atrophy of the pancreatic tail and a portion of the pancreatic body. Advanced hepatic steatosis is noted. The presume cyst in the caudal tip of the posterior segment of the right hepatic lobe (series 23, image 83) has decreased in size since the prior study. A gallstone is again noted. There is no acute abnormality of the liver, pancreas or spleen. There are no inflammatory changes in the gallbladder fossa.     Kidneys:     There is no obstructing urinary tract calculus, hydronephrosis or hydroureter.     Bowel:     The lack of oral contrast limits evaluation of the bowel. There is no acute abnormality of the stomach. There is no small bowel dilatation. There is no acute abnormality of the colon. The rectum is unchanged. Apparent mural thickening is probably secondary to under distension. The appendix appears normal.     Other:     The IVC is within normal limits. No free fluid. There is no free air or pathologic lymph node enlargement. There is no aneurysmal dilatation of the abdominal aorta.     Pelvis:     The uterus and adnexa are within normal limits. The urinary bladder is unchanged. There is no significant free fluid in the pelvis.     Body wall:     There is no acute fracture or other acute body wall finding.   Assessment:       1. Malignant neoplasm of pancreas, unspecified location of malignancy    2. Subacute osteomyelitis of right hand    3. Pulmonary nodule    4. Hypothyroidism, unspecified type    5. Hypokalemia    6. CINV  (chemotherapy-induced nausea and vomiting)           Plan:     Pancreatic Cancer - Patient with Stage III pancreatic cancer with concern for potential encasement of the distal celiac artery per discussion with Dr Valentin  -No sign of mets  - 94.5 on 6/17/24  -Case discussed at tumor board 7/17/24 with recommendation to proceed with chemo  -[employer] genetic testing negative for the genes tested  -[facility] tissue testing showed TP53, KRAS p.G12D, CDKN2A, CDKN2B mutations.  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  -Will need to keep irinotecan dose reduced at 165mg/mm2  -CT C/A/P on 10/02/24 shows stable disease and a possible new RLL nodule  -Pt saw Dr Valentin 10/16/24 whom recommended a few more cycles of chemo and then transition to perioperative radiation therapy  - decreased all the way to 9.4U/mL on 11/11/24 with level today pending   -Pt to go to [facility] 12/09/24  -PT completed 12 cycles  -Repeat scans 1/27/25 showeddecrease in size of pancreatic mass appearing to surround the celiac artery and to wrap partially around the portal vein likely greater than 180°  -Case discussed at tumor board on 2/05/25 with recommendation for radiation  -Pt was to start capecitabine and radiation but will need to hold until she has had continued time to heal from recently diagnosed osteomyelitis of the 2nd digit of the right hand  -Repeat scans 3/12/25 showed decreased pancreatic mass and new groundglass opacities in the lung  -Will proceed with chemo/XRT next week  -Pt to call pharmacy to recieve Xeloda    3/24/25  Continue with Xeloda/XRT   Follow up with Dr Hunt with labs on 3/31/25     Visit today included increased complexity associated with the care of the episodic problem (chemotherapy) addressed and managing the longitudinal care of the patient due to the serious and/or complex managed problem(s) pancreatic cancer.     Osteomyelitis - Pt s/p debridement of soft tissue  and tendon, arthrotomy of the 2nd MCP joint with irrigation and excision of infected bone about the 2nd metacarpal and index finger proximal phalanx on 2/19/25  -Pt on ertapenem and doxycycline until 4/02/25  -Management per ID  -Cleared for treatment by Dr. Chou 3/14/25     Pulmonary Nodules - Seen on CT C/A/P 1/27/25  -Stable  -Pt with some new ground glass nodules with others resolving  -Will monitor     Hypokalemia - 4.3 on 3/24/25  -PT to continue potassium chloride     Hypothyroidism - pt on synthroid  -Will monitor    CINV  -Compazine 5mg po prn q6 hours    Diarrhea  -Imodium 2mg q2 hrs as needed until diarrhea free for 12 hours.         Med Onc Chart Routing      Follow up with physician . As scheduled with Dr Hunt and labs on 3/31/25   Follow up with [facility]    Infusion scheduling note    Injection scheduling note    Labs    Imaging    Pharmacy appointment    Other referrals                  Plan was discussed with the patient at length, and she verbalized understanding. Bessy was given an opportunity to ask questions that were answered to her satisfaction, and she was advised to call in the interval if any problems or questions arise.    Assessment/Plan reviewed and approved by Dr Hunt    30  minutes were spent in coordination of patient's care, record review and counseling.    PAU Ramirez, FNP-C  Hematology & Oncology         [1]   Current Outpatient Medications   Medication Sig Dispense Refill    0.9% NaCl PgBk 100 mL with ertapenem 1 gram SolR 1 g Inject 1 g into the vein once daily.      acetaminophen (TYLENOL) 325 MG tablet Take 650 mg by mouth daily as needed for Pain.      doxycycline (VIBRA-TABS) 100 MG tablet Take 1 tablet (100 mg total) by mouth every 12 (twelve) hours. 80 tablet 0    duke's soln (benadryl 30 mL, mylanta 30 mL, LIDOcaine 30 mL, nystatin 30 mL) 120mL 10 ml swish & spit/swallow every 4 to 6 hours as needed for mouth pain/ulcers/yeast/throat pain 240 mL 1    EScitalopram oxalate  (LEXAPRO) 10 MG tablet Take 1 tablet (10 mg total) by mouth once daily. 30 tablet 2    fluticasone propionate (FLONASE) 50 mcg/actuation nasal spray 1 spray (50 mcg total) by Each Nostril route once daily. 18.2 mL 0    HYDROcodone-acetaminophen (NORCO) 5-325 mg per tablet Take 1 tablet by mouth every 6 (six) hours as needed for Pain. 28 tablet 0    ibuprofen (ADVIL,MOTRIN) 800 MG tablet Take 1 tablet (800 mg total) by mouth every 8 (eight) hours as needed for Pain. 42 tablet 1    levothyroxine (SYNTHROID) 50 MCG tablet Take 1 tablet (50 mcg total) by mouth once daily. 90 tablet 3    loratadine (CLARITIN) 10 mg tablet Take 1 tablet (10 mg total) by mouth every morning. 30 tablet 11    potassium chloride SA (K-DUR,KLOR-CON) 20 MEQ tablet Take 40 mEq by mouth 2 (two) times daily.      senna-docusate 8.6-50 mg (PERICOLACE) 8.6-50 mg per tablet Take 2 tablets by mouth once daily. 60 tablet 1    simvastatin (ZOCOR) 10 MG tablet Take 1 tablet (10 mg total) by mouth every evening. 90 tablet 3    SYNJARDY XR 10-1,000 mg TBph TAKE ONE TABLET BY MOUTH ONCE DAILY 30 tablet 5    VITAMIN D2 1,250 mcg (50,000 unit) capsule Take 50,000 Units by mouth twice a week.      capecitabine (XELODA) 150 MG tablet Take 2 tablets (300 mg total) by mouth 2 (two) times daily Take as directed Monday, Tuesday, Wednesday, Thursday, and Friday for the duration of radiation therapy. Take with food and with 1 other capecitabine prescription for 1,300 mg total. (Patient not taking: Reported on 3/14/2025.) 20 tablet 4    capecitabine (XELODA) 500 MG Tab Take 2 tablets (1,000 mg total) by mouth 2 (two) times daily Take as directed Monday, Tuesday, Wednesday, Thursday, and Friday for the duration of radiation therapy. Take with food and with 1 other capecitabine prescription for 1,300 mg total. (Patient not taking: Reported on 3/14/2025.) 20 tablet 4    prochlorperazine (COMPAZINE) 5 MG tablet Take 1 tablet (5 mg total) by mouth every 6 (six) hours as  needed for Nausea. 30 tablet 1     No current facility-administered medications for this visit.

## 2025-03-24 ENCOUNTER — OFFICE VISIT (OUTPATIENT)
Dept: HEMATOLOGY/ONCOLOGY | Facility: CLINIC | Age: 65
End: 2025-03-24
Payer: MEDICARE

## 2025-03-24 ENCOUNTER — LAB VISIT (OUTPATIENT)
Dept: LAB | Facility: HOSPITAL | Age: 65
End: 2025-03-24
Payer: MEDICARE

## 2025-03-24 ENCOUNTER — PATIENT MESSAGE (OUTPATIENT)
Dept: PRIMARY CARE CLINIC | Facility: CLINIC | Age: 65
End: 2025-03-24
Payer: MEDICARE

## 2025-03-24 VITALS
TEMPERATURE: 98 F | HEIGHT: 61 IN | RESPIRATION RATE: 18 BRPM | WEIGHT: 116.63 LBS | BODY MASS INDEX: 22.02 KG/M2 | DIASTOLIC BLOOD PRESSURE: 71 MMHG | OXYGEN SATURATION: 93 % | SYSTOLIC BLOOD PRESSURE: 112 MMHG | HEART RATE: 66 BPM

## 2025-03-24 DIAGNOSIS — C25.9 MALIGNANT NEOPLASM OF PANCREAS, UNSPECIFIED LOCATION OF MALIGNANCY: ICD-10-CM

## 2025-03-24 DIAGNOSIS — R91.1 PULMONARY NODULE: ICD-10-CM

## 2025-03-24 DIAGNOSIS — T45.1X5A CINV (CHEMOTHERAPY-INDUCED NAUSEA AND VOMITING): ICD-10-CM

## 2025-03-24 DIAGNOSIS — M86.241 SUBACUTE OSTEOMYELITIS OF RIGHT HAND: ICD-10-CM

## 2025-03-24 DIAGNOSIS — R11.2 CINV (CHEMOTHERAPY-INDUCED NAUSEA AND VOMITING): ICD-10-CM

## 2025-03-24 DIAGNOSIS — E87.6 HYPOKALEMIA: ICD-10-CM

## 2025-03-24 DIAGNOSIS — E03.9 HYPOTHYROIDISM, UNSPECIFIED TYPE: ICD-10-CM

## 2025-03-24 DIAGNOSIS — E55.9 VITAMIN D INSUFFICIENCY: Primary | ICD-10-CM

## 2025-03-24 DIAGNOSIS — C25.9 MALIGNANT NEOPLASM OF PANCREAS, UNSPECIFIED LOCATION OF MALIGNANCY: Primary | ICD-10-CM

## 2025-03-24 LAB
ABSOLUTE EOSINOPHIL (OHS): 0.07 K/UL
ABSOLUTE MONOCYTE (OHS): 0.21 K/UL (ref 0.3–1)
ABSOLUTE NEUTROPHIL COUNT (OHS): 1.41 K/UL (ref 1.8–7.7)
ALBUMIN SERPL BCP-MCNC: 3.7 G/DL (ref 3.5–5.2)
ALP SERPL-CCNC: 100 UNIT/L (ref 40–150)
ALT SERPL W/O P-5'-P-CCNC: 15 UNIT/L (ref 10–44)
ANION GAP (OHS): 9 MMOL/L (ref 8–16)
AST SERPL-CCNC: 27 UNIT/L (ref 11–45)
BASOPHILS # BLD AUTO: 0 K/UL
BASOPHILS NFR BLD AUTO: 0 %
BILIRUB SERPL-MCNC: 0.6 MG/DL (ref 0.1–1)
BUN SERPL-MCNC: 9 MG/DL (ref 8–23)
CALCIUM SERPL-MCNC: 9.1 MG/DL (ref 8.7–10.5)
CHLORIDE SERPL-SCNC: 104 MMOL/L (ref 95–110)
CO2 SERPL-SCNC: 27 MMOL/L (ref 23–29)
CREAT SERPL-MCNC: 0.7 MG/DL (ref 0.5–1.4)
ERYTHROCYTE [DISTWIDTH] IN BLOOD BY AUTOMATED COUNT: 12.8 % (ref 11.5–14.5)
GFR SERPLBLD CREATININE-BSD FMLA CKD-EPI: >60 ML/MIN/1.73/M2
GLUCOSE SERPL-MCNC: 107 MG/DL (ref 70–110)
HCT VFR BLD AUTO: 37.5 % (ref 37–48.5)
HGB BLD-MCNC: 12.7 GM/DL (ref 12–16)
IMM GRANULOCYTES # BLD AUTO: 0 K/UL (ref 0–0.04)
IMM GRANULOCYTES NFR BLD AUTO: 0 % (ref 0–0.5)
LYMPHOCYTES # BLD AUTO: 1.07 K/UL (ref 1–4.8)
MCH RBC QN AUTO: 31.4 PG (ref 27–50)
MCHC RBC AUTO-ENTMCNC: 33.9 G/DL (ref 32–36)
MCV RBC AUTO: 93 FL (ref 82–98)
NUCLEATED RBC (/100WBC) (OHS): 0 /100 WBC
PLATELET # BLD AUTO: 95 K/UL (ref 150–450)
PMV BLD AUTO: 9.4 FL (ref 9.2–12.9)
POTASSIUM SERPL-SCNC: 4.3 MMOL/L (ref 3.5–5.1)
PROT SERPL-MCNC: 7.1 GM/DL (ref 6–8.4)
RBC # BLD AUTO: 4.05 M/UL (ref 4–5.4)
RELATIVE EOSINOPHIL (OHS): 2.5 %
RELATIVE LYMPHOCYTE (OHS): 38.8 % (ref 18–48)
RELATIVE MONOCYTE (OHS): 7.6 % (ref 4–15)
RELATIVE NEUTROPHIL (OHS): 51.1 % (ref 38–73)
SODIUM SERPL-SCNC: 140 MMOL/L (ref 136–145)
WBC # BLD AUTO: 2.76 K/UL (ref 3.9–12.7)

## 2025-03-24 PROCEDURE — 85025 COMPLETE CBC W/AUTO DIFF WBC: CPT | Mod: PN

## 2025-03-24 PROCEDURE — 99214 OFFICE O/P EST MOD 30 MIN: CPT | Mod: S$GLB,,,

## 2025-03-24 PROCEDURE — 80053 COMPREHEN METABOLIC PANEL: CPT | Mod: PN

## 2025-03-24 PROCEDURE — 3008F BODY MASS INDEX DOCD: CPT | Mod: CPTII,S$GLB,,

## 2025-03-24 PROCEDURE — 3078F DIAST BP <80 MM HG: CPT | Mod: CPTII,S$GLB,,

## 2025-03-24 PROCEDURE — 3288F FALL RISK ASSESSMENT DOCD: CPT | Mod: CPTII,S$GLB,,

## 2025-03-24 PROCEDURE — 3074F SYST BP LT 130 MM HG: CPT | Mod: CPTII,S$GLB,,

## 2025-03-24 PROCEDURE — 1126F AMNT PAIN NOTED NONE PRSNT: CPT | Mod: CPTII,S$GLB,,

## 2025-03-24 PROCEDURE — 1111F DSCHRG MED/CURRENT MED MERGE: CPT | Mod: CPTII,S$GLB,,

## 2025-03-24 PROCEDURE — 1159F MED LIST DOCD IN RCRD: CPT | Mod: CPTII,S$GLB,,

## 2025-03-24 PROCEDURE — 99999 PR PBB SHADOW E&M-EST. PATIENT-LVL V: CPT | Mod: PBBFAC,,,

## 2025-03-24 PROCEDURE — G2211 COMPLEX E/M VISIT ADD ON: HCPCS | Mod: S$GLB,,,

## 2025-03-24 PROCEDURE — 1160F RVW MEDS BY RX/DR IN RCRD: CPT | Mod: CPTII,S$GLB,,

## 2025-03-24 PROCEDURE — 36415 COLL VENOUS BLD VENIPUNCTURE: CPT | Mod: PN

## 2025-03-24 PROCEDURE — 1101F PT FALLS ASSESS-DOCD LE1/YR: CPT | Mod: CPTII,S$GLB,,

## 2025-03-24 RX ORDER — PROCHLORPERAZINE MALEATE 5 MG
5 TABLET ORAL EVERY 6 HOURS PRN
Qty: 30 TABLET | Refills: 1 | Status: SHIPPED | OUTPATIENT
Start: 2025-03-24 | End: 2026-03-24

## 2025-03-24 RX ORDER — PROCHLORPERAZINE MALEATE 5 MG
5 TABLET ORAL EVERY 6 HOURS PRN
Qty: 30 TABLET | Refills: 1 | Status: SHIPPED | OUTPATIENT
Start: 2025-03-24 | End: 2025-03-24 | Stop reason: SDUPTHER

## 2025-03-25 NOTE — TELEPHONE ENCOUNTER
I have signed for the following orders AND/OR meds.  Please call the patient and ask the patient to schedule the testing AND/OR inform about any medications that were sent. Medications have been sent to pharmacy listed below      Orders Placed This Encounter   Procedures    Vitamin D     Standing Status:   Future     Expected Date:   3/25/2025     Expiration Date:   5/24/2026     Send normal result to authorizing provider's In Basket if patient is active on MyChart::   No              [facility] - [address] [address]  Phone: [phone] Fax: [phone]    [facility] - [address] [address]  Phone: [phone] Fax: [phone]

## 2025-03-27 ENCOUNTER — DOCUMENTATION ONLY (OUTPATIENT)
Dept: RADIATION ONCOLOGY | Facility: CLINIC | Age: 65
End: 2025-03-27
Payer: MEDICARE

## 2025-03-27 ENCOUNTER — DOCUMENTATION ONLY (OUTPATIENT)
Dept: INFUSION THERAPY | Facility: HOSPITAL | Age: 65
End: 2025-03-27
Payer: MEDICARE

## 2025-03-27 NOTE — PLAN OF CARE
Completed 8 of 25 outpatient radiation treatments to the abdomen without difficulty.  No new problems verbalized.  All questions and concerns addressed by MD this visit.

## 2025-03-27 NOTE — PROGRESS NOTES
FOLLOW UP PATIENT VISIT    Reason for visit: right elbow radial head fracture x 9 weeks    INTERIM HISTORY:  Flexion contracture elbow  Improving some in therapy  Less pain, stiffness and weakness  C/o persistent bursal edema    EXAMINATION:   olecranon bursal enlargement  Flexion contracture elbow  Some crepitance pronation and supination of the forearm over lateral elbow      IMPRESSION:   right elbow radial head fracture  Flexion contracture  Old injury elbow by history    PLAN:  Continue therapy for motion, stretching, strengthening, modalities  Increase use right elbow  RTC 3 weeks    Romero Lyn PA-C  9/16/2019   SW met with patient briefly and provided a gas card. No other needs were identified at this time. The patient was encouraged to reach out if any other needs arise.

## 2025-03-28 ENCOUNTER — LAB REQUISITION (OUTPATIENT)
Dept: LAB | Facility: HOSPITAL | Age: 65
End: 2025-03-28
Payer: MEDICARE

## 2025-03-28 DIAGNOSIS — M00.9 PYOGENIC ARTHRITIS, UNSPECIFIED: ICD-10-CM

## 2025-03-28 LAB
ALBUMIN SERPL BCP-MCNC: 3.5 G/DL (ref 3.5–5.2)
ALP SERPL-CCNC: 113 UNIT/L (ref 40–150)
ALT SERPL W/O P-5'-P-CCNC: 13 UNIT/L (ref 10–44)
ANION GAP (OHS): 11 MMOL/L (ref 8–16)
AST SERPL-CCNC: 25 UNIT/L (ref 11–45)
BILIRUB SERPL-MCNC: 0.4 MG/DL (ref 0.1–1)
BUN SERPL-MCNC: 7 MG/DL (ref 8–23)
CALCIUM SERPL-MCNC: 9.1 MG/DL (ref 8.7–10.5)
CHLORIDE SERPL-SCNC: 102 MMOL/L (ref 95–110)
CO2 SERPL-SCNC: 24 MMOL/L (ref 23–29)
CREAT SERPL-MCNC: 0.7 MG/DL (ref 0.5–1.4)
CRP SERPL-MCNC: <0.3 MG/L
ERYTHROCYTE [DISTWIDTH] IN BLOOD BY AUTOMATED COUNT: 13.1 % (ref 11.5–14.5)
GFR SERPLBLD CREATININE-BSD FMLA CKD-EPI: >60 ML/MIN/1.73/M2
GLUCOSE SERPL-MCNC: 84 MG/DL (ref 70–110)
HCT VFR BLD AUTO: 37.6 % (ref 37–48.5)
HGB BLD-MCNC: 12.1 GM/DL (ref 12–16)
MCH RBC QN AUTO: 31 PG (ref 27–50)
MCHC RBC AUTO-ENTMCNC: 32.2 G/DL (ref 32–36)
MCV RBC AUTO: 96 FL (ref 82–98)
PLATELET # BLD AUTO: 126 K/UL (ref 150–450)
PMV BLD AUTO: 10.6 FL (ref 9.2–12.9)
POTASSIUM SERPL-SCNC: 4 MMOL/L (ref 3.5–5.1)
PROT SERPL-MCNC: 6.6 GM/DL (ref 6–8.4)
RBC # BLD AUTO: 3.9 M/UL (ref 4–5.4)
SODIUM SERPL-SCNC: 137 MMOL/L (ref 136–145)
WBC # BLD AUTO: 2.28 K/UL (ref 3.9–12.7)

## 2025-03-28 PROCEDURE — 80053 COMPREHEN METABOLIC PANEL: CPT | Mod: PO | Performed by: INTERNAL MEDICINE

## 2025-03-28 PROCEDURE — 86140 C-REACTIVE PROTEIN: CPT | Mod: PO | Performed by: INTERNAL MEDICINE

## 2025-03-28 PROCEDURE — 85027 COMPLETE CBC AUTOMATED: CPT | Mod: PO | Performed by: INTERNAL MEDICINE

## 2025-03-30 NOTE — PROGRESS NOTES
PATIENT: Bessy Lamb  MRN: [id number]  DATE: 3/31/2025      Diagnosis:   1. Malignant neoplasm of pancreas, unspecified location of malignancy    2. Subacute osteomyelitis of right hand    3. Pulmonary nodule    4. Hypothyroidism, unspecified type    5. Hypokalemia    6. Diarrhea, unspecified type    7. CINV (chemotherapy-induced nausea and vomiting)              Chief Complaint: Follow-up (Pancreatic Cancer)      Oncologic History:      Oncologic History     Oncologic Treatment     Pathology           Subjective:    Interval History:  Ms. Lamb is a 64 y.o. female with Arthritis, DMII, GERD, Hypotyroidism, Obesity, Thyroid disease who presents for pancreatic cancer.  Since the last clinic visit the patient states she has persistent nausea before every meal.  The patient states Compazine helps when taken prior to eating.  The patient also endorses diarrhea which she states has been worse since on antibiotics.  The patient denies CP, cough, SOB, abdominal pain, vomiting, constipation.  The patient denies fever, chills, night sweats, weight loss, new lumps or bumps, easy bruising or bleeding.    Prior History:   The patient initially underwent a CXR on 11/13/23 fur a URI which showed possible subtle pulmonary nodules.  CT chest 12/01/2023 showed a cluster of ill-defined centrilobular ground-glass opacity he inferior right and left upper lobes as well as a 1.2 cm ground-glass opacity in the superior segment of the left lower lobe; solid 3 mm pulmonary nodule in the right upper lobe; subcentimeter right hepatic lobe hypodensity likely representing a cyst.  The patient underwent surveillance CT chest on 05/21/2024 showing a 3.7 x 2.7 pancreatic body mass in the pancreatic tail atrophy suspicious of pancreatic malignancy as well as and unchanged benign 3 mm nodule in the right upper lobe.  MRI abdomen on 06/30/2024 showed hypoenhancing mass centered in the proximal pancreatic body measuring 2.9 x 2 cm with  abutment and possible encasement of the distal splenic vein.  EUS was performed on 07/05/2024 mass in the pancreatic body stage T4 N0 M0 by endo sonographic criteria.  Pathology from biopsy of the stomach showed mild focally moderate chronic gastritis with no evidence of the H pylori.  Stomach polyp which was removed code hyperplastic polyps with chronic inflammation.  Pancreatic biopsy showed adenocarcinoma.  CT of the chest, abdomen, and pelvis on 07/09/2024 showed a 9 mm lesion in the right hepatic lobe previously characterized as cysts; mass in the pancreatic body measuring 4.4 x 3.7 cm with diffuse pancreatic ductal dilatation measuring 8 mm:  Weaning vein at the portal confluence occluded with reconstitution via collaterals.  The mass abuts the portal vein by less than 180° but does not case the proximal splenic artery remains patent.  Also noted was a pancreatic lymph node measuring 0.7 cm.   Patient's case was discussed at tumor board on 07/17/2024 with recommendation for proceeding with the chemotherapy.  Patient had port placement on 07/18/2024.   The patient is admitted to the hospital from 09/20/2024 until 09/22/2024 for hypokalemia with potassium of 2.3.  The patient was subsequently discharged underwent treatment with FOLFIRINOX on 09/23/2024.  CT of the chest, abdomen, and pelvis on 10/02/2024 showed a new 5 mm ground-glass nodule in the right lower lobe; stable 8 mm hypodensity present within the right hepatic lobe suggestive of a cyst; 37 x 35 mm proximal pancreatic body mass slightly smaller in transverse dimension involving 90 degree area of the celiac artery and 100 degree area of the splenic artery with soft tissue extending along the anterior right lateral wall of the portal vein; abnormal soft tissue insinuating between the lateral margin of the celiac artery and adjacent splenic vein; invasion and focal occlusion of the proximal most aspect of the splenic vein; concentric wall thickening  present within the distal sigmoid colon/rectum with infectious and inflammatory etiologies possible.   The patient met with Dr Valentin on 10/16/24 whom felt the patient could receive a few more cycles of chemo and then transition to perioperative radiation therapy.   The patient's case was discussed with Dr. Valentin and .  Plan is to proceed with up to 12 cycles FOLFIRINOX prior to consideration of radiation versus surgical resection.   CT chest, abdomen, and pelvis on 01/27/2025 showing apical pleural thickening bilaterally stable since prior exam; 5 mm nodule of the right lung base; 3 possibly slightly smaller nodules at 4 mm; 1-2 mm nodule in the left lung apex; 8 mm hypodensity at the tip of the right lobe of the liver favored to be a cyst; significant improvement in pancreatic mass now measuring 2.9 x 1.7 cm appearing surround the celiac artery and to wrap partially around the portal vein likely greater than 180°.   The patient was admitted to the hospital from 2/19/25 until 2/22/25 for osteomyelitis.   Pt presented to the hospital with septic arthritis from a cat bite.  MRI hand and fingers on 2/17/25 showed findings concerning for septic arthritis and early changes of osteomyelitis, and cellulitis in the 2nd MCP on the right hand as well as tenosynovitis and cellulitis about dorsal aspect of the hand/wrist.  Pt underwent debridement of soft tissue and tendon, arthrotomy of the 2nd MCP joint with irrigation and excision of infected bone about the 2nd metacarpal and index finger proximal phalanx.  Cultures failed to grow any bacteria.  Pt was discharged on doxycycline and ertapenem to complete 6 weeks of abx.    The patient underwent CT C/A/P on 3/12/25 showing multiple new centrilobular ground-glass nodules in the left lower lobe with the largest measuring 12 mm; ground-glass nodularity extending to basal segments of the left lower lobe; 10 mm ground-glass nodule in the posteromedial right lower lobe;  unchanged right apical nodular septal thickening; resolution of previously described 4 mm ground-glass nodule in the right apex.    Past Medical History:   Past Medical History:   Diagnosis Date    Arthritis, lumbar spine     hands    Diabetes mellitus     General anesthetics causing adverse effect in therapeutic use     following breast rediuct, hard time awakening  also had anxiety rxn to lidocain in dental ofc    GERD (gastroesophageal reflux disease)     Hypothyroidism 10/08/2014    Major depressive disorder 2025    Maternal anesthesia complication     epidural for 1st child went up instead of down; required intubation    Normocytic anemia 2024    Obesity (BMI 30-39.9) 10/08/2014    pancreatic Cancer         Thyroid disease     Thyroid nodule 10/08/2014       Past Surgical HIstory:   Past Surgical History:   Procedure Laterality Date    BREAST BIOPSY Left     b9    BREAST SURGERY      Reduction cause of a mass in left breast    c-sections x2       SECTION  3/26/81 & 85    Birth of two girls    COLONOSCOPY  2012         COLONOSCOPY N/A 2018    Procedure: COLONOSCOPY;  Surgeon: Francisco Mcclain MD;  [facility];  Service: Endoscopy;  Laterality: N/A;    COLONOSCOPY N/A 10/24/2023    Procedure: COLONOSCOPY;  Surgeon: Francisco Mcclain MD;  Location: [facility];  Service: Endoscopy;  Laterality: N/A;    ENDOSCOPIC ULTRASOUND OF UPPER GASTROINTESTINAL TRACT Left 2024    Procedure: ULTRASOUND, UPPER GI TRACT, ENDOSCOPIC;  Surgeon: Andrew Gordon MD;  Location: [facility];  Service: Endoscopy;  Laterality: Left;    ESOPHAGOGASTRODUODENOSCOPY N/A 2024    Procedure: EGD (ESOPHAGOGASTRODUODENOSCOPY);  Surgeon: Andrew Gordon MD;  Location: [facility];  Service: Endoscopy;  Laterality: N/A;    hysteroscopy with polypectomy      INCISION AND DRAINAGE Right 2025    Procedure: Incision and Drainage, RIGHT HAND EXCISION OF INFECTED  BONE RIGHT INDEX FINGER;  Surgeon: SALVATORE Chou MD;  Location: [facility];  Service: General;  Laterality: Right;    INCISIONAL BREAST BIOPSY Left 1996    benign; done at same time as reduction    INSERTION OF TUNNELED CENTRAL VENOUS CATHETER (CVC) WITH SUBCUTANEOUS PORT N/A 7/18/2024    Procedure: [id number];  Surgeon: Blanca Dillard MD;  Location: [facility];  Service: General;  Laterality: N/A;    TOTAL REDUCTION MAMMOPLASTY Bilateral 1996       Family History:   Family History   Problem Relation Name Age of Onset    Brain cancer Mother Ravi Tolbert     Early death Mother Ravi Tolbert 51        Passed at 51    Cancer Mother Ravi Tolbert         Brain tumor    Arthritis Mother Ravi Tolbert     Diabetes Father Ck pham tomasz         Type 2    Cirrhosis Father Ck tolbert     Cancer Father Ck pham tomasz         Bone    COPD Father Ck pham tomasz         Smoker    Early death Father Ck tolbert         Passed at 64    Lung cancer Father Ck pham tomasz     Heart disease Sister Mamta (dianna)wescovich         Congestive heart failure (passed 2/11/18    Hypertension Sister Mamta (dianna)wescovich     Kidney disease Sister Mamta (dianna)wescovich         Doc removed when she was a child    Hypertension Sister Mayank     Depression Sister Ravi (mayank) macey Mauricio ( sister)         Due to loss of her 27 year old son    Early death Sister Ravi (mayank) macey Luly ( sister)         Pulmonary hypertension, cirrhosis of the liver(passed 7/14/2007   Age 57    Heart disease Brother John Choudhury ( passed )         Heart attack    Hypertension Brother John Choudhury ( passed )     Heart disease Brother Juan Francisco Macey         Heart  blockage    Heart disease Brother Bhanu Macey         Heart attack (passed)    Diabetes Brother Bhanu Macey      Macular degeneration Brother Bhanu Choudhury     Breast cancer Maternal Aunt  30    Breast cancer Paternal Aunt  40    Breast cancer Paternal Aunt  40    Ovarian cancer Neg Hx         Social History:  reports that she has never smoked. She has never used smokeless tobacco. She reports that she does not currently use alcohol. She reports that she does not use drugs.    Allergies:  Review of patient's allergies indicates:   Allergen Reactions    Duricef [cefadroxil] Hives       Medications:  Current Outpatient Medications   Medication Sig Dispense Refill    0.9% NaCl PgBk 100 mL with ertapenem 1 gram SolR 1 g Inject 1 g into the vein once daily.      acetaminophen (TYLENOL) 325 MG tablet Take 650 mg by mouth daily as needed for Pain.      capecitabine (XELODA) 150 MG tablet Take 2 tablets (300 mg total) by mouth 2 (two) times daily Take as directed Monday, Tuesday, Wednesday, Thursday, and Friday for the duration of radiation therapy. Take with food and with 1 other capecitabine prescription for 1,300 mg total. 20 tablet 4    capecitabine (XELODA) 500 MG Tab Take 2 tablets (1,000 mg total) by mouth 2 (two) times daily Take as directed Monday, Tuesday, Wednesday, Thursday, and Friday for the duration of radiation therapy. Take with food and with 1 other capecitabine prescription for 1,300 mg total. 20 tablet 4    doxycycline (VIBRA-TABS) 100 MG tablet Take 1 tablet (100 mg total) by mouth every 12 (twelve) hours. 80 tablet 0    duke's soln (benadryl 30 mL, mylanta 30 mL, LIDOcaine 30 mL, nystatin 30 mL) 120mL 10 ml swish & spit/swallow every 4 to 6 hours as needed for mouth pain/ulcers/yeast/throat pain 240 mL 1    ertapenem (INVANZ) 1 gram injection Inject 1 g into the muscle once daily.      EScitalopram oxalate (LEXAPRO) 10 MG tablet Take 1 tablet (10 mg total) by mouth once daily. 30 tablet 2    fluticasone propionate (FLONASE) 50 mcg/actuation nasal spray 1 spray (50 mcg total) by Each Nostril route once  daily. 18.2 mL 0    HYDROcodone-acetaminophen (NORCO) 5-325 mg per tablet Take 1 tablet by mouth every 6 (six) hours as needed for Pain. 28 tablet 0    ibuprofen (ADVIL,MOTRIN) 800 MG tablet Take 1 tablet (800 mg total) by mouth every 8 (eight) hours as needed for Pain. 42 tablet 1    levothyroxine (SYNTHROID) 50 MCG tablet Take 1 tablet (50 mcg total) by mouth once daily. 90 tablet 3    loratadine (CLARITIN) 10 mg tablet Take 1 tablet (10 mg total) by mouth every morning. 30 tablet 11    potassium chloride SA (K-DUR,KLOR-CON) 20 MEQ tablet Take 40 mEq by mouth 2 (two) times daily.      senna-docusate 8.6-50 mg (PERICOLACE) 8.6-50 mg per tablet Take 2 tablets by mouth once daily. 60 tablet 1    simvastatin (ZOCOR) 10 MG tablet Take 1 tablet (10 mg total) by mouth every evening. 90 tablet 3    SYNJARDY XR 10-1,000 mg TBph TAKE ONE TABLET BY MOUTH ONCE DAILY 30 tablet 5    VITAMIN D2 1,250 mcg (50,000 unit) capsule Take 50,000 Units by mouth twice a week.      prochlorperazine (COMPAZINE) 5 MG tablet Take 1 tablet (5 mg total) by mouth every 6 (six) hours as needed for Nausea. 90 tablet 2     No current facility-administered medications for this visit.       Review of Systems   Constitutional:  Negative for appetite change, chills, fatigue, fever and unexpected weight change.   HENT:  Negative for mouth sores.    Eyes:  Negative for visual disturbance.   Respiratory:  Negative for cough and shortness of breath.    Cardiovascular:  Negative for chest pain and palpitations.   Gastrointestinal:  Positive for diarrhea and nausea. Negative for abdominal pain, constipation and vomiting.   Genitourinary:  Negative for frequency.   Musculoskeletal:  Negative for back pain.        Difficulty flexing right index finger   Skin:  Negative for rash.   Neurological:  Negative for headaches.   Hematological:  Negative for adenopathy. Does not bruise/bleed easily.   Psychiatric/Behavioral:  The patient is not  nervous/anxious.        ECOG Performance Status: 0   Objective:      Vitals:   Vitals:    03/31/25 1342   BP: 122/71   BP Location: Left arm   Patient Position: Sitting   Pulse: 80   Temp: 98 °F (36.7 °C)   TempSrc: Temporal   SpO2: 98%   Weight: 53.3 kg (117 lb 8.1 oz)         Physical Exam  Constitutional:       General: She is not in acute distress.     Appearance: She is well-developed. She is not diaphoretic.   HENT:      Head: Normocephalic and atraumatic.      Comments: Alopecia  Cardiovascular:      Rate and Rhythm: Normal rate and regular rhythm.      Heart sounds: Normal heart sounds. No murmur heard.     No friction rub. No gallop.   Pulmonary:      Effort: Pulmonary effort is normal. No respiratory distress.      Breath sounds: Normal breath sounds. No wheezing or rales.   Chest:      Chest wall: No tenderness.   Abdominal:      General: Bowel sounds are normal. There is no distension.      Palpations: Abdomen is soft. There is no mass.      Tenderness: There is no abdominal tenderness. There is no guarding or rebound.   Musculoskeletal:      Comments: PORT in right chest     Lymphadenopathy:      Cervical: No cervical adenopathy.      Upper Body:      Right upper body: No supraclavicular or axillary adenopathy.      Left upper body: No supraclavicular or axillary adenopathy.   Skin:     Findings: No erythema or rash.   Neurological:      Mental Status: She is alert and oriented to person, place, and time.   Psychiatric:         Behavior: Behavior normal.     Laboratory Data:  Lab Visit on 03/31/2025   Component Date Value Ref Range Status    Sodium 03/31/2025 136  136 - 145 mmol/L Final    Potassium 03/31/2025 3.8  3.5 - 5.1 mmol/L Final    Chloride 03/31/2025 100  95 - 110 mmol/L Final    CO2 03/31/2025 23  23 - 29 mmol/L Final    Glucose 03/31/2025 169 (H)  70 - 110 mg/dL Final    BUN 03/31/2025 10  8 - 23 mg/dL Final    Creatinine 03/31/2025 0.8  0.5 - 1.4 mg/dL Final    Calcium 03/31/2025  9.1  8.7 - 10.5 mg/dL Final    Protein Total 03/31/2025 6.9  6.0 - 8.4 gm/dL Final    Albumin 03/31/2025 3.7  3.5 - 5.2 g/dL Final    Bilirubin Total 03/31/2025 0.5  0.1 - 1.0 mg/dL Final    For infants and newborns, interpretation of results should be based   on gestational age, weight and in agreement with clinical   observations.    Premature Infant recommended reference ranges:   0-24 hours:  <8.0 mg/dL   24-48 hours: <12.0 mg/dL   3-5 days:    <15.0 mg/dL   6-29 days:   <15.0 mg/dL    ALP 03/31/2025 107  40 - 150 unit/L Final    AST 03/31/2025 22  11 - 45 unit/L Final    ALT 03/31/2025 15  10 - 44 unit/L Final    Anion Gap 03/31/2025 13  8 - 16 mmol/L Final    eGFR 03/31/2025 >60  >60 mL/min/1.73/m2 Final    Estimated GFR calculated using the CKD-EPI creatinine (2021) equation.    WBC 03/31/2025 3.13 (L)  3.90 - 12.70 K/uL Final    RBC 03/31/2025 3.87 (L)  4.00 - 5.40 M/uL Final    HGB 03/31/2025 12.3  12.0 - 16.0 gm/dL Final    HCT 03/31/2025 35.9 (L)  37.0 - 48.5 % Final    MCV 03/31/2025 93  82 - 98 fL Final    MCH 03/31/2025 31.8 (H)  27.0 - 31.0 pg Final    MCHC 03/31/2025 34.3  32.0 - 36.0 g/dL Final    RDW 03/31/2025 13.2  11.5 - 14.5 % Final    Platelet Count 03/31/2025 126 (L)  150 - 450 K/uL Final    MPV 03/31/2025 9.2  9.2 - 12.9 fL Final    Nucleated RBC 03/31/2025 0  <=0 /100 WBC Final    Neut % 03/31/2025 65.3  38 - 73 % Final    Lymph % 03/31/2025 23.6  18 - 48 % Final    Mono % 03/31/2025 8.3  4 - 15 % Final    Eos % 03/31/2025 2.2  <=8 % Final    Basophil % 03/31/2025 0.3  <=1.9 % Final    Imm Grans % 03/31/2025 0.3  0.0 - 0.5 % Final    Neut # 03/31/2025 2.04  1.8 - 7.7 K/uL Final    Lymph # 03/31/2025 0.74 (L)  1 - 4.8 K/uL Final    Mono # 03/31/2025 0.26 (L)  0.3 - 1 K/uL Final    Eos # 03/31/2025 0.07  <=0.5 K/uL Final    Baso # 03/31/2025 0.01  <=0.2 K/uL Final    Imm Grans # 03/31/2025 0.01  0.00 - 0.04 K/uL Final    Mild elevation in immature granulocytes  is non specific and can be seen in a variety of conditions including stress response, acute inflammation, trauma and pregnancy. Correlation with other laboratory and clinical findings is essential.   Lab Requisition on 03/28/2025   Component Date Value Ref Range Status    WBC 03/28/2025 2.28 (L)  3.90 - 12.70 K/uL Final    RBC 03/28/2025 3.90 (L)  4.00 - 5.40 M/uL Final    HGB 03/28/2025 12.1  12.0 - 16.0 gm/dL Final    HCT 03/28/2025 37.6  37.0 - 48.5 % Final    MCV 03/28/2025 96  82 - 98 fL Final    MCHC 03/28/2025 32.2  32.0 - 36.0 g/dL Final    RDW 03/28/2025 13.1  11.5 - 14.5 % Final    Platelet Count 03/28/2025 126 (L)  150 - 450 K/uL Final    MCH 03/28/2025 31.0  27.0 - 50.0 pg Final    MPV 03/28/2025 10.6  9.2 - 12.9 fL Final    Sodium 03/28/2025 137  136 - 145 mmol/L Final    Potassium 03/28/2025 4.0  3.5 - 5.1 mmol/L Final    Chloride 03/28/2025 102  95 - 110 mmol/L Final    CO2 03/28/2025 24  23 - 29 mmol/L Final    Glucose 03/28/2025 84  70 - 110 mg/dL Final    BUN 03/28/2025 7 (L)  8 - 23 mg/dL Final    Creatinine 03/28/2025 0.7  0.5 - 1.4 mg/dL Final    Calcium 03/28/2025 9.1  8.7 - 10.5 mg/dL Final    Protein Total 03/28/2025 6.6  6.0 - 8.4 gm/dL Final    Albumin 03/28/2025 3.5  3.5 - 5.2 g/dL Final    Bilirubin Total 03/28/2025 0.4  0.1 - 1.0 mg/dL Final    For infants and newborns, interpretation of results should be based   on gestational age, weight and in agreement with clinical   observations.    Premature Infant recommended reference ranges:   0-24 hours:  <8.0 mg/dL   24-48 hours: <12.0 mg/dL   3-5 days:    <15.0 mg/dL   6-29 days:   <15.0 mg/dL    ALP 03/28/2025 113  40 - 150 unit/L Final    AST 03/28/2025 25  11 - 45 unit/L Final    ALT 03/28/2025 13  10 - 44 unit/L Final    Anion Gap 03/28/2025 11  8 - 16 mmol/L Final    eGFR 03/28/2025 >60  >60 mL/min/1.73/m2 Final    Estimated GFR calculated using the CKD-EPI creatinine (2021) equation.    CRP 03/28/2025 <0.3   <=8.2 mg/L Final         Imaging:     CT C/A/P 3/12/25    LUNGS:     There are multiple new centrilobular ground-glass nodules in the left lower lobe. The largest of these is in the lingular segment and has a maximum diameter of 12 mm (series 19, image 165). Ground-glass nodularity also extends into the basal segments of the left lower lobe. There is a similar 10 mm ground-glass nodule in the posteromedial right lower lobe (series 19, image 186).     Right apical nodular septal thickening (series 19, image 39) is unchanged. The previously described 4 mm ground-glass nodule on series 3, image 62 of the prior study is not clearly reproduced on the current study. Apical septal thickening is likely postinflammatory and is unchanged.     MEDIASTINUM and PLEURA:     A right-sided Port-A-Cath is again noted. There is no pleural effusion or pneumothorax. Cardiac chambers are within normal limits. There is no acute body wall finding.     Liver, pancreas, spleen, gallbladder:     The soft tissue density in the region of the body of the pancreas continues to decrease in size. On the current study it measures 20 x 11 mm in the axial plane. Again noted is atrophy of the pancreatic tail and a portion of the pancreatic body. Advanced hepatic steatosis is noted. The presume cyst in the caudal tip of the posterior segment of the right hepatic lobe (series 23, image 83) has decreased in size since the prior study. A gallstone is again noted. There is no acute abnormality of the liver, pancreas or spleen. There are no inflammatory changes in the gallbladder fossa.     Kidneys:     There is no obstructing urinary tract calculus, hydronephrosis or hydroureter.     Bowel:     The lack of oral contrast limits evaluation of the bowel. There is no acute abnormality of the stomach. There is no small bowel dilatation. There is no acute abnormality of the colon. The rectum is unchanged. Apparent mural thickening is probably secondary to under  distension. The appendix appears normal.     Other:     The IVC is within normal limits. No free fluid. There is no free air or pathologic lymph node enlargement. There is no aneurysmal dilatation of the abdominal aorta.     Pelvis:     The uterus and adnexa are within normal limits. The urinary bladder is unchanged. There is no significant free fluid in the pelvis.     Body wall:     There is no acute fracture or other acute body wall finding.       Assessment:       1. Malignant neoplasm of pancreas, unspecified location of malignancy    2. Subacute osteomyelitis of right hand    3. Pulmonary nodule    4. Hypothyroidism, unspecified type    5. Hypokalemia    6. Diarrhea, unspecified type    7. CINV (chemotherapy-induced nausea and vomiting)             Plan:   Pancreatic Cancer - Patient with Stage III pancreatic cancer with concern for potential encasement of the distal celiac artery per discussion with Dr Valentin  -No sign of mets  - 94.5 on 6/17/24  -Case discussed at tumor board 7/17/24 with recommendation to proceed with chemo  -[facility] genetic testing negative for the genes tested  -[facility] tissue testing showed TP53, KRAS p.G12D, CDKN2A, CDKN2B mutations.  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  -Will need to keep irinotecan dose reduced at 165mg/mm2  -CT C/A/P on 10/02/24 shows stable disease and a possible new RLL nodule  -Pt saw Dr Valentin 10/16/24 whom recommended a few more cycles of chemo and then transition to perioperative radiation therapy  - decreased all the way to 9.4U/mL on 11/11/24 with level today pending   -Pt to go to [facility] 12/09/24  -PT completed 12 cycles  -Repeat scans 1/27/25 showeddecrease in size of pancreatic mass appearing to surround the celiac artery and to wrap partially around the portal vein likely greater than 180°  -Case discussed at tumor board on 2/05/25 with recommendation for radiation  -Pt was to start capecitabine  and radiation but will need to hold until she has had continued time to heal from recently diagnosed osteomyelitis of the 2nd digit of the right hand  -Repeat scans 3/12/25 showed decreased pancreatic mass and new groundglass opacities in the lung  -Will proceed with chemo/XRT next week  -Pt to continue Xeloda this week  Visit today included increased complexity associated with the care of the episodic problem (chemotherapy) addressed and managing the longitudinal care of the patient due to the serious and/or complex managed problem(s) pancreatic cancer.     Osteomyelitis - Pt s/p debridement of soft tissue and tendon, arthrotomy of the 2nd MCP joint with irrigation and excision of infected bone about the 2nd metacarpal and index finger proximal phalanx on 2/19/25  -Pt on ertapenem and doxycycline until 4/01/25  -Management per ID  -PT to see Dr. Chou tomorrow    Diarrhea - will check for C diff  -May be due to abx    Pulmonary Nodules - Seen on CT C/A/P 1/27/25  -Stable  -Pt with some new ground glass nodules with others resolving  -Will monitor    Hypokalemia - 3.8 on 3/31/25  -PT to continue potassium chloride     Hypothyroidism - pt on synthroid  -Will monitor    Nausea - controlled with compazine  -Will monitor    Route Chart for Scheduling    Med Onc Chart Routing      Follow up with physician 2 weeks. Pt needs stool sample for C diff.  Pt needs a CBC, CMP with appt with NP monday of next week.  Pt needs same labs and appt with me in 2 weeks on monday.   Follow up with ANALIA 1 week.   Infusion scheduling note    Injection scheduling note    Labs    Imaging    Pharmacy appointment    Other referrals          Treatment Plan Information   OP CAPECITABINE 5 DAYS + RADIOTHERAPY Rajat Hunt MD   Associated diagnosis: Malignant neoplasm of pancreas Stage III cT4, cN0, cM0 noted on 7/12/2024   Line of treatment: Neoadjuvant  Treatment Goal: Curative     Upcoming Treatment Dates - OP CAPECITABINE 5 DAYS +  RADIOTHERAPY    2/24/2025       Antiemetics       Physician communication order       Take Home Chemotherapy       capecitabine (XELODA) tablet 1,300 mg    Therapy Plan Information  PORT FLUSH for Malignant neoplasm of pancreas, noted on 7/12/2024  Flushes  heparin, porcine (PF) 100 unit/mL injection flush 500 Units  500 Units, Intravenous, Every visit  sodium chloride 0.9% flush 10 mL  10 mL, Intravenous, Every visit      No therapy plan of the specified type found.    No therapy plan of the specified type found.      Rajat Hunt MD  [facility]  Hematology and Oncology  [facility]   [address] [place] [address]   O: [phone]  F: [phone]

## 2025-03-31 ENCOUNTER — LAB VISIT (OUTPATIENT)
Dept: LAB | Facility: HOSPITAL | Age: 65
End: 2025-03-31
Attending: INTERNAL MEDICINE
Payer: MEDICARE

## 2025-03-31 ENCOUNTER — DOCUMENTATION ONLY (OUTPATIENT)
Dept: INFUSION THERAPY | Facility: HOSPITAL | Age: 65
End: 2025-03-31
Payer: MEDICARE

## 2025-03-31 ENCOUNTER — OFFICE VISIT (OUTPATIENT)
Dept: HEMATOLOGY/ONCOLOGY | Facility: CLINIC | Age: 65
End: 2025-03-31
Payer: MEDICARE

## 2025-03-31 VITALS
HEART RATE: 80 BPM | WEIGHT: 117.5 LBS | OXYGEN SATURATION: 98 % | TEMPERATURE: 98 F | BODY MASS INDEX: 22.2 KG/M2 | DIASTOLIC BLOOD PRESSURE: 71 MMHG | SYSTOLIC BLOOD PRESSURE: 122 MMHG

## 2025-03-31 DIAGNOSIS — C25.9 MALIGNANT NEOPLASM OF PANCREAS, UNSPECIFIED LOCATION OF MALIGNANCY: ICD-10-CM

## 2025-03-31 DIAGNOSIS — T45.1X5A CINV (CHEMOTHERAPY-INDUCED NAUSEA AND VOMITING): ICD-10-CM

## 2025-03-31 DIAGNOSIS — M86.241 SUBACUTE OSTEOMYELITIS OF RIGHT HAND: ICD-10-CM

## 2025-03-31 DIAGNOSIS — R19.7 DIARRHEA, UNSPECIFIED TYPE: ICD-10-CM

## 2025-03-31 DIAGNOSIS — Z79.899 ENCOUNTER FOR LONG-TERM CURRENT USE OF MEDICATION: ICD-10-CM

## 2025-03-31 DIAGNOSIS — E03.9 HYPOTHYROIDISM, UNSPECIFIED TYPE: ICD-10-CM

## 2025-03-31 DIAGNOSIS — E55.9 VITAMIN D INSUFFICIENCY: ICD-10-CM

## 2025-03-31 DIAGNOSIS — Z00.00 ENCOUNTER FOR ANNUAL HEALTH EXAMINATION: ICD-10-CM

## 2025-03-31 DIAGNOSIS — R11.2 CINV (CHEMOTHERAPY-INDUCED NAUSEA AND VOMITING): ICD-10-CM

## 2025-03-31 DIAGNOSIS — C25.9 MALIGNANT NEOPLASM OF PANCREAS, UNSPECIFIED LOCATION OF MALIGNANCY: Primary | ICD-10-CM

## 2025-03-31 DIAGNOSIS — E87.6 HYPOKALEMIA: ICD-10-CM

## 2025-03-31 DIAGNOSIS — R91.1 PULMONARY NODULE: ICD-10-CM

## 2025-03-31 DIAGNOSIS — E11.9 TYPE 2 DIABETES MELLITUS WITHOUT COMPLICATION, WITHOUT LONG-TERM CURRENT USE OF INSULIN: ICD-10-CM

## 2025-03-31 LAB
25(OH)D3+25(OH)D2 SERPL-MCNC: 29 NG/ML (ref 30–96)
ABSOLUTE EOSINOPHIL (OHS): 0.07 K/UL
ABSOLUTE MONOCYTE (OHS): 0.26 K/UL (ref 0.3–1)
ABSOLUTE NEUTROPHIL COUNT (OHS): 2.04 K/UL (ref 1.8–7.7)
ALBUMIN SERPL BCP-MCNC: 3.7 G/DL (ref 3.5–5.2)
ALP SERPL-CCNC: 107 UNIT/L (ref 40–150)
ALT SERPL W/O P-5'-P-CCNC: 15 UNIT/L (ref 10–44)
ANION GAP (OHS): 13 MMOL/L (ref 8–16)
AST SERPL-CCNC: 22 UNIT/L (ref 11–45)
BASOPHILS # BLD AUTO: 0.01 K/UL
BASOPHILS NFR BLD AUTO: 0.3 %
BILIRUB SERPL-MCNC: 0.5 MG/DL (ref 0.1–1)
BUN SERPL-MCNC: 10 MG/DL (ref 8–23)
CALCIUM SERPL-MCNC: 9.1 MG/DL (ref 8.7–10.5)
CHLORIDE SERPL-SCNC: 100 MMOL/L (ref 95–110)
CO2 SERPL-SCNC: 23 MMOL/L (ref 23–29)
CREAT SERPL-MCNC: 0.8 MG/DL (ref 0.5–1.4)
EAG (OHS): 108 MG/DL (ref 68–131)
ERYTHROCYTE [DISTWIDTH] IN BLOOD BY AUTOMATED COUNT: 13.2 % (ref 11.5–14.5)
GFR SERPLBLD CREATININE-BSD FMLA CKD-EPI: >60 ML/MIN/1.73/M2
GLUCOSE SERPL-MCNC: 169 MG/DL (ref 70–110)
HBA1C MFR BLD: 5.4 % (ref 4–5.6)
HCT VFR BLD AUTO: 35.9 % (ref 37–48.5)
HGB BLD-MCNC: 12.3 GM/DL (ref 12–16)
IMM GRANULOCYTES # BLD AUTO: 0.01 K/UL (ref 0–0.04)
IMM GRANULOCYTES NFR BLD AUTO: 0.3 % (ref 0–0.5)
LYMPHOCYTES # BLD AUTO: 0.74 K/UL (ref 1–4.8)
MCH RBC QN AUTO: 31.8 PG (ref 27–31)
MCHC RBC AUTO-ENTMCNC: 34.3 G/DL (ref 32–36)
MCV RBC AUTO: 93 FL (ref 82–98)
NUCLEATED RBC (/100WBC) (OHS): 0 /100 WBC
PLATELET # BLD AUTO: 126 K/UL (ref 150–450)
PMV BLD AUTO: 9.2 FL (ref 9.2–12.9)
POTASSIUM SERPL-SCNC: 3.8 MMOL/L (ref 3.5–5.1)
PROT SERPL-MCNC: 6.9 GM/DL (ref 6–8.4)
RBC # BLD AUTO: 3.87 M/UL (ref 4–5.4)
RELATIVE EOSINOPHIL (OHS): 2.2 %
RELATIVE LYMPHOCYTE (OHS): 23.6 % (ref 18–48)
RELATIVE MONOCYTE (OHS): 8.3 % (ref 4–15)
RELATIVE NEUTROPHIL (OHS): 65.3 % (ref 38–73)
SODIUM SERPL-SCNC: 136 MMOL/L (ref 136–145)
TSH SERPL-ACNC: 1.57 UIU/ML (ref 0.4–4)
WBC # BLD AUTO: 3.13 K/UL (ref 3.9–12.7)

## 2025-03-31 PROCEDURE — 1126F AMNT PAIN NOTED NONE PRSNT: CPT | Mod: CPTII,S$GLB,, | Performed by: INTERNAL MEDICINE

## 2025-03-31 PROCEDURE — 3078F DIAST BP <80 MM HG: CPT | Mod: CPTII,S$GLB,, | Performed by: INTERNAL MEDICINE

## 2025-03-31 PROCEDURE — 3288F FALL RISK ASSESSMENT DOCD: CPT | Mod: CPTII,S$GLB,, | Performed by: INTERNAL MEDICINE

## 2025-03-31 PROCEDURE — G2211 COMPLEX E/M VISIT ADD ON: HCPCS | Mod: S$GLB,,, | Performed by: INTERNAL MEDICINE

## 2025-03-31 PROCEDURE — 36415 COLL VENOUS BLD VENIPUNCTURE: CPT | Mod: PN

## 2025-03-31 PROCEDURE — 83036 HEMOGLOBIN GLYCOSYLATED A1C: CPT

## 2025-03-31 PROCEDURE — 3008F BODY MASS INDEX DOCD: CPT | Mod: CPTII,S$GLB,, | Performed by: INTERNAL MEDICINE

## 2025-03-31 PROCEDURE — 1159F MED LIST DOCD IN RCRD: CPT | Mod: CPTII,S$GLB,, | Performed by: INTERNAL MEDICINE

## 2025-03-31 PROCEDURE — 99999 PR PBB SHADOW E&M-EST. PATIENT-LVL III: CPT | Mod: PBBFAC,,, | Performed by: INTERNAL MEDICINE

## 2025-03-31 PROCEDURE — 80053 COMPREHEN METABOLIC PANEL: CPT | Mod: PN

## 2025-03-31 PROCEDURE — 85025 COMPLETE CBC W/AUTO DIFF WBC: CPT | Mod: PN

## 2025-03-31 PROCEDURE — 1101F PT FALLS ASSESS-DOCD LE1/YR: CPT | Mod: CPTII,S$GLB,, | Performed by: INTERNAL MEDICINE

## 2025-03-31 PROCEDURE — 99215 OFFICE O/P EST HI 40 MIN: CPT | Mod: S$GLB,,, | Performed by: INTERNAL MEDICINE

## 2025-03-31 PROCEDURE — 3074F SYST BP LT 130 MM HG: CPT | Mod: CPTII,S$GLB,, | Performed by: INTERNAL MEDICINE

## 2025-03-31 PROCEDURE — 82306 VITAMIN D 25 HYDROXY: CPT

## 2025-03-31 PROCEDURE — 84443 ASSAY THYROID STIM HORMONE: CPT

## 2025-03-31 RX ORDER — PROCHLORPERAZINE MALEATE 5 MG
5 TABLET ORAL EVERY 6 HOURS PRN
Qty: 90 TABLET | Refills: 2 | Status: SHIPPED | OUTPATIENT
Start: 2025-03-31 | End: 2026-03-31

## 2025-03-31 RX ORDER — ERTAPENEM 1 G/1
1 INJECTION, POWDER, LYOPHILIZED, FOR SOLUTION INTRAMUSCULAR; INTRAVENOUS DAILY
COMMUNITY
Start: 2025-03-25

## 2025-03-31 NOTE — PROGRESS NOTES
CELIO met with patient briefly and provided a gas card. No other needs were identified at this time. Mrs. Lamb shared about her recent surgery due to a cat bite on her hand. She is recovering from that, but she reported feeling frustrated because she is limited in using her hand. The patient was encouraged to reach out if any other needs arise.

## 2025-04-01 ENCOUNTER — LAB VISIT (OUTPATIENT)
Dept: LAB | Facility: HOSPITAL | Age: 65
End: 2025-04-01
Attending: INTERNAL MEDICINE
Payer: MEDICARE

## 2025-04-01 ENCOUNTER — DOCUMENTATION ONLY (OUTPATIENT)
Dept: INFUSION THERAPY | Facility: HOSPITAL | Age: 65
End: 2025-04-01
Payer: MEDICARE

## 2025-04-01 ENCOUNTER — HOSPITAL ENCOUNTER (OUTPATIENT)
Dept: RADIATION THERAPY | Facility: HOSPITAL | Age: 65
Discharge: HOME | End: 2025-04-01
Attending: RADIOLOGY
Payer: MEDICARE

## 2025-04-01 DIAGNOSIS — R19.7 DIARRHEA, UNSPECIFIED TYPE: ICD-10-CM

## 2025-04-01 PROCEDURE — 87324 CLOSTRIDIUM AG IA: CPT

## 2025-04-01 NOTE — PROGRESS NOTES
SW met with pt and provided her with the second gas card for the week. SW also provided to with donated beanies today per her request. Pt updated SW on her worries surrounding her  and his cancer treatments. They have been together for years and she shared how difficult it is both be in treatment and different stages they are in. She is 1/2 way through XRT and then will have surgery. He husbands treatment is palliative. SW provided support and validated her feelings and challenges. SW provided support and will follow.     Bharati Fritz, ANA MARIAW

## 2025-04-02 ENCOUNTER — RESULTS FOLLOW-UP (OUTPATIENT)
Dept: PRIMARY CARE CLINIC | Facility: CLINIC | Age: 65
End: 2025-04-02

## 2025-04-02 DIAGNOSIS — E55.9 VITAMIN D INSUFFICIENCY: Primary | ICD-10-CM

## 2025-04-02 LAB
C DIFF GDH STL QL: NEGATIVE
C DIFF TOX A+B STL QL IA: NEGATIVE

## 2025-04-02 NOTE — TELEPHONE ENCOUNTER
"Spoke with pt over the phone, pt aware of test results . Understanding was verbalized and pt said " I do take 50,000 units of vitamin d , once a week " please advise   "

## 2025-04-02 NOTE — TELEPHONE ENCOUNTER
----- Message from Dayan Jimenez NP sent at 4/2/2025 10:35 AM CDT -----  Results have been released via my chart. Please verify that these have been reviewed by the pt. If not, please call pt with results.       I have reviewed results.     VITAMIN D-ABNORMAL-The Vitamin D level is low. Are you taking your vitamin D supplements?    Please to do not hesitate to reach out for any additional questions or concerns.     Dayan Jimenez NP    ----- Message -----  From: Lab, Background User  Sent: 3/31/2025   6:41 PM CDT  To: Dayan Jimenez NP

## 2025-04-03 ENCOUNTER — DOCUMENTATION ONLY (OUTPATIENT)
Dept: INFUSION THERAPY | Facility: HOSPITAL | Age: 65
End: 2025-04-03
Payer: MEDICARE

## 2025-04-03 ENCOUNTER — TELEPHONE (OUTPATIENT)
Dept: HEMATOLOGY/ONCOLOGY | Facility: CLINIC | Age: 65
End: 2025-04-03
Payer: MEDICARE

## 2025-04-03 ENCOUNTER — DOCUMENTATION ONLY (OUTPATIENT)
Dept: RADIATION ONCOLOGY | Facility: CLINIC | Age: 65
End: 2025-04-03
Payer: MEDICARE

## 2025-04-03 NOTE — PLAN OF CARE
Completed 13 of 25 outpatient radiation treatments to the pancreas without difficulty.  Patient has noticed some red spots on legs, MD to assess.  All questions and concerns addressed by MD this visit.

## 2025-04-03 NOTE — TELEPHONE ENCOUNTER
I called the patient and let her know that C diff was negative.  Pt states her last day of abx was yesterday and that she would see if things improve off of abx.  I instructed her to contact us if her diarrhea continues.  The patient expressed understanding.  All questions were answered to her satisfaction.    Rajat Hunt MD  [facility]  Hematology and Oncology  [facility]   [address] [place] [address]   O: [phone]  F: [phone]

## 2025-04-03 NOTE — PROGRESS NOTES
SW met with patient briefly and provided a gas card. No other needs were identified at this time. The patient was encouraged to reach out if any other needs arise.

## 2025-04-04 DIAGNOSIS — C25.9 MALIGNANT NEOPLASM OF PANCREAS, UNSPECIFIED LOCATION OF MALIGNANCY: ICD-10-CM

## 2025-04-04 RX ORDER — CAPECITABINE 150 MG/1
300 TABLET, FILM COATED ORAL 2 TIMES DAILY
Qty: 20 TABLET | Refills: 4 | Status: SHIPPED | OUTPATIENT
Start: 2025-04-04

## 2025-04-04 RX ORDER — CAPECITABINE 500 MG/1
1000 TABLET, FILM COATED ORAL 2 TIMES DAILY
Qty: 20 TABLET | Refills: 4 | Status: SHIPPED | OUTPATIENT
Start: 2025-04-04

## 2025-04-04 RX ORDER — ERGOCALCIFEROL 1.25 MG/1
50000 CAPSULE ORAL
Qty: 8 CAPSULE | Refills: 11 | Status: SHIPPED | OUTPATIENT
Start: 2025-04-07

## 2025-04-07 ENCOUNTER — DOCUMENTATION ONLY (OUTPATIENT)
Dept: INFUSION THERAPY | Facility: HOSPITAL | Age: 65
End: 2025-04-07
Payer: MEDICARE

## 2025-04-07 ENCOUNTER — LAB VISIT (OUTPATIENT)
Dept: LAB | Facility: HOSPITAL | Age: 65
End: 2025-04-07
Attending: INTERNAL MEDICINE
Payer: MEDICARE

## 2025-04-07 ENCOUNTER — OFFICE VISIT (OUTPATIENT)
Dept: HEMATOLOGY/ONCOLOGY | Facility: CLINIC | Age: 65
End: 2025-04-07
Payer: MEDICARE

## 2025-04-07 VITALS
SYSTOLIC BLOOD PRESSURE: 120 MMHG | TEMPERATURE: 98 F | RESPIRATION RATE: 16 BRPM | HEIGHT: 61 IN | OXYGEN SATURATION: 98 % | HEART RATE: 80 BPM | WEIGHT: 118.63 LBS | DIASTOLIC BLOOD PRESSURE: 70 MMHG | BODY MASS INDEX: 22.4 KG/M2

## 2025-04-07 DIAGNOSIS — R21 MACULAR RASH: ICD-10-CM

## 2025-04-07 DIAGNOSIS — D84.821 IMMUNODEFICIENCY DUE TO CHEMOTHERAPY: ICD-10-CM

## 2025-04-07 DIAGNOSIS — T45.1X5A IMMUNODEFICIENCY DUE TO CHEMOTHERAPY: ICD-10-CM

## 2025-04-07 DIAGNOSIS — D70.1 CHEMOTHERAPY-INDUCED NEUTROPENIA: ICD-10-CM

## 2025-04-07 DIAGNOSIS — Z79.69 IMMUNODEFICIENCY DUE TO CHEMOTHERAPY: ICD-10-CM

## 2025-04-07 DIAGNOSIS — M86.149: ICD-10-CM

## 2025-04-07 DIAGNOSIS — C25.1 MALIGNANT NEOPLASM OF BODY OF PANCREAS: Primary | ICD-10-CM

## 2025-04-07 DIAGNOSIS — T45.1X5A CHEMOTHERAPY-INDUCED THROMBOCYTOPENIA: ICD-10-CM

## 2025-04-07 DIAGNOSIS — C25.9 MALIGNANT NEOPLASM OF PANCREAS, UNSPECIFIED LOCATION OF MALIGNANCY: ICD-10-CM

## 2025-04-07 DIAGNOSIS — D69.59 CHEMOTHERAPY-INDUCED THROMBOCYTOPENIA: ICD-10-CM

## 2025-04-07 DIAGNOSIS — T45.1X5A CHEMOTHERAPY-INDUCED NEUTROPENIA: ICD-10-CM

## 2025-04-07 LAB
ABSOLUTE EOSINOPHIL (OHS): 0.1 K/UL
ABSOLUTE MONOCYTE (OHS): 0.31 K/UL (ref 0.3–1)
ABSOLUTE NEUTROPHIL COUNT (OHS): 1.78 K/UL (ref 1.8–7.7)
ALBUMIN SERPL BCP-MCNC: 3.6 G/DL (ref 3.5–5.2)
ALP SERPL-CCNC: 95 UNIT/L (ref 40–150)
ALT SERPL W/O P-5'-P-CCNC: 13 UNIT/L (ref 10–44)
ANION GAP (OHS): 8 MMOL/L (ref 8–16)
AST SERPL-CCNC: 24 UNIT/L (ref 11–45)
BASOPHILS # BLD AUTO: 0.01 K/UL
BASOPHILS NFR BLD AUTO: 0.4 %
BILIRUB SERPL-MCNC: 0.5 MG/DL (ref 0.1–1)
BUN SERPL-MCNC: 8 MG/DL (ref 8–23)
CALCIUM SERPL-MCNC: 9.1 MG/DL (ref 8.7–10.5)
CHLORIDE SERPL-SCNC: 103 MMOL/L (ref 95–110)
CO2 SERPL-SCNC: 26 MMOL/L (ref 23–29)
CREAT SERPL-MCNC: 0.7 MG/DL (ref 0.5–1.4)
ERYTHROCYTE [DISTWIDTH] IN BLOOD BY AUTOMATED COUNT: 13.5 % (ref 11.5–14.5)
GFR SERPLBLD CREATININE-BSD FMLA CKD-EPI: >60 ML/MIN/1.73/M2
GLUCOSE SERPL-MCNC: 114 MG/DL (ref 70–110)
HCT VFR BLD AUTO: 33 % (ref 37–48.5)
HGB BLD-MCNC: 11.3 GM/DL (ref 12–16)
IMM GRANULOCYTES # BLD AUTO: 0.01 K/UL (ref 0–0.04)
IMM GRANULOCYTES NFR BLD AUTO: 0.4 % (ref 0–0.5)
LYMPHOCYTES # BLD AUTO: 0.49 K/UL (ref 1–4.8)
MCH RBC QN AUTO: 31.7 PG (ref 27–31)
MCHC RBC AUTO-ENTMCNC: 34.2 G/DL (ref 32–36)
MCV RBC AUTO: 93 FL (ref 82–98)
NUCLEATED RBC (/100WBC) (OHS): 0 /100 WBC
PLATELET # BLD AUTO: 101 K/UL (ref 150–450)
PMV BLD AUTO: 9.5 FL (ref 9.2–12.9)
POTASSIUM SERPL-SCNC: 4.2 MMOL/L (ref 3.5–5.1)
PROT SERPL-MCNC: 6.6 GM/DL (ref 6–8.4)
RBC # BLD AUTO: 3.56 M/UL (ref 4–5.4)
RELATIVE EOSINOPHIL (OHS): 3.7 %
RELATIVE LYMPHOCYTE (OHS): 18.1 % (ref 18–48)
RELATIVE MONOCYTE (OHS): 11.5 % (ref 4–15)
RELATIVE NEUTROPHIL (OHS): 65.9 % (ref 38–73)
SODIUM SERPL-SCNC: 137 MMOL/L (ref 136–145)
WBC # BLD AUTO: 2.7 K/UL (ref 3.9–12.7)

## 2025-04-07 PROCEDURE — 36415 COLL VENOUS BLD VENIPUNCTURE: CPT | Mod: PN

## 2025-04-07 PROCEDURE — 1101F PT FALLS ASSESS-DOCD LE1/YR: CPT | Mod: CPTII,S$GLB,, | Performed by: NURSE PRACTITIONER

## 2025-04-07 PROCEDURE — 3044F HG A1C LEVEL LT 7.0%: CPT | Mod: CPTII,S$GLB,, | Performed by: NURSE PRACTITIONER

## 2025-04-07 PROCEDURE — 99999 PR PBB SHADOW E&M-EST. PATIENT-LVL V: CPT | Mod: PBBFAC,,, | Performed by: NURSE PRACTITIONER

## 2025-04-07 PROCEDURE — 82040 ASSAY OF SERUM ALBUMIN: CPT | Mod: PN

## 2025-04-07 PROCEDURE — 3288F FALL RISK ASSESSMENT DOCD: CPT | Mod: CPTII,S$GLB,, | Performed by: NURSE PRACTITIONER

## 2025-04-07 PROCEDURE — 1126F AMNT PAIN NOTED NONE PRSNT: CPT | Mod: CPTII,S$GLB,, | Performed by: NURSE PRACTITIONER

## 2025-04-07 PROCEDURE — 3008F BODY MASS INDEX DOCD: CPT | Mod: CPTII,S$GLB,, | Performed by: NURSE PRACTITIONER

## 2025-04-07 PROCEDURE — 3078F DIAST BP <80 MM HG: CPT | Mod: CPTII,S$GLB,, | Performed by: NURSE PRACTITIONER

## 2025-04-07 PROCEDURE — 85025 COMPLETE CBC W/AUTO DIFF WBC: CPT | Mod: PN

## 2025-04-07 PROCEDURE — 3074F SYST BP LT 130 MM HG: CPT | Mod: CPTII,S$GLB,, | Performed by: NURSE PRACTITIONER

## 2025-04-07 PROCEDURE — G2211 COMPLEX E/M VISIT ADD ON: HCPCS | Mod: S$GLB,,, | Performed by: NURSE PRACTITIONER

## 2025-04-07 PROCEDURE — 99214 OFFICE O/P EST MOD 30 MIN: CPT | Mod: S$GLB,,, | Performed by: NURSE PRACTITIONER

## 2025-04-07 PROCEDURE — 1159F MED LIST DOCD IN RCRD: CPT | Mod: CPTII,S$GLB,, | Performed by: NURSE PRACTITIONER

## 2025-04-07 PROCEDURE — 1160F RVW MEDS BY RX/DR IN RCRD: CPT | Mod: CPTII,S$GLB,, | Performed by: NURSE PRACTITIONER

## 2025-04-07 NOTE — PROGRESS NOTES
PATIENT: Bessy Lamb  MRN: [id number]  DATE: 4/7/2025      Diagnosis:   1. Malignant neoplasm of body of pancreas    2. 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therapy    3. Acute osteomyelitis of hand including fingers, unspecified laterality    4. Macular rash    5. Chemotherapy-induced neutropenia        Chief Complaint: Pancreatic Cancer (Treatment clearance)      Subjective:    Interval History:  Ms. Lamb is a 64 y.o. female with Arthritis, DMII, GERD, Hypotyroidism, Obesity, Thyroid disease who presents for pancreatic cancer.  Since the last clinic visit the patient states her nausea and diarrhea have subsided since she has been off of antibiotics. She has been off antibiotics since 4/2/25. She also endorses a macular rash to bilateral lower extremities that showed up about a week ago. She denies itching or burning to the areas. The patient denies CP, cough, SOB, abdominal pain, vomiting, constipation.  The patient denies fever, chills, night sweats, weight loss, new lumps or bumps, easy bruising or bleeding.    Prior History:   The patient initially underwent a CXR on 11/13/23 fur a URI which showed possible subtle pulmonary nodules.  CT chest 12/01/2023 showed a cluster of ill-defined centrilobular ground-glass opacity he inferior right and left upper lobes as well as a 1.2 cm ground-glass opacity in the superior segment of the left lower lobe; solid 3 mm pulmonary nodule in the right upper lobe; subcentimeter right hepatic lobe hypodensity likely representing a cyst.  The patient underwent surveillance CT chest on 05/21/2024 showing a 3.7 x 2.7 pancreatic body mass in the pancreatic tail atrophy suspicious of pancreatic malignancy as well as and unchanged benign 3 mm nodule in the right upper lobe.  MRI abdomen on 06/30/2024 showed hypoenhancing mass centered in the proximal pancreatic body measuring 2.9 x 2 cm with abutment and possible encasement of the distal splenic vein.  EUS was performed on 07/05/2024  mass in the pancreatic body stage T4 N0 M0 by endo sonographic criteria.  Pathology from biopsy of the stomach showed mild focally moderate chronic gastritis with no evidence of the H pylori.  Stomach polyp which was removed code hyperplastic polyps with chronic inflammation.  Pancreatic biopsy showed adenocarcinoma.  CT of the chest, abdomen, and pelvis on 07/09/2024 showed a 9 mm lesion in the right hepatic lobe previously characterized as cysts; mass in the pancreatic body measuring 4.4 x 3.7 cm with diffuse pancreatic ductal dilatation measuring 8 mm:  Weaning vein at the portal confluence occluded with reconstitution via collaterals.  The mass abuts the portal vein by less than 180° but does not case the proximal splenic artery remains patent.  Also noted was a pancreatic lymph node measuring 0.7 cm.   Patient's case was discussed at tumor board on 07/17/2024 with recommendation for proceeding with the chemotherapy.  Patient had port placement on 07/18/2024.   The patient is admitted to the hospital from 09/20/2024 until 09/22/2024 for hypokalemia with potassium of 2.3.  The patient was subsequently discharged underwent treatment with FOLFIRINOX on 09/23/2024.  CT of the chest, abdomen, and pelvis on 10/02/2024 showed a new 5 mm ground-glass nodule in the right lower lobe; stable 8 mm hypodensity present within the right hepatic lobe suggestive of a cyst; 37 x 35 mm proximal pancreatic body mass slightly smaller in transverse dimension involving 90 degree area of the celiac artery and 100 degree area of the splenic artery with soft tissue extending along the anterior right lateral wall of the portal vein; abnormal soft tissue insinuating between the lateral margin of the celiac artery and adjacent splenic vein; invasion and focal occlusion of the proximal most aspect of the splenic vein; concentric wall thickening present within the distal sigmoid colon/rectum with infectious and inflammatory etiologies  possible.   The patient met with Dr Valentin on 10/16/24 whom felt the patient could receive a few more cycles of chemo and then transition to perioperative radiation therapy.   The patient's case was discussed with Dr. Valentin and .  Plan is to proceed with up to 12 cycles FOLFIRINOX prior to consideration of radiation versus surgical resection.   CT chest, abdomen, and pelvis on 01/27/2025 showing apical pleural thickening bilaterally stable since prior exam; 5 mm nodule of the right lung base; 3 possibly slightly smaller nodules at 4 mm; 1-2 mm nodule in the left lung apex; 8 mm hypodensity at the tip of the right lobe of the liver favored to be a cyst; significant improvement in pancreatic mass now measuring 2.9 x 1.7 cm appearing surround the celiac artery and to wrap partially around the portal vein likely greater than 180°.   The patient was admitted to the hospital from 2/19/25 until 2/22/25 for osteomyelitis.   Pt presented to the hospital with septic arthritis from a cat bite.  MRI hand and fingers on 2/17/25 showed findings concerning for septic arthritis and early changes of osteomyelitis, and cellulitis in the 2nd MCP on the right hand as well as tenosynovitis and cellulitis about dorsal aspect of the hand/wrist.  Pt underwent debridement of soft tissue and tendon, arthrotomy of the 2nd MCP joint with irrigation and excision of infected bone about the 2nd metacarpal and index finger proximal phalanx.  Cultures failed to grow any bacteria.  Pt was discharged on doxycycline and ertapenem to complete 6 weeks of abx.    The patient underwent CT C/A/P on 3/12/25 showing multiple new centrilobular ground-glass nodules in the left lower lobe with the largest measuring 12 mm; ground-glass nodularity extending to basal segments of the left lower lobe; 10 mm ground-glass nodule in the posteromedial right lower lobe; unchanged right apical nodular septal thickening; resolution of previously described 4 mm  ground-glass nodule in the right apex.    Past Medical History:   Past Medical History:   Diagnosis Date    Arthritis, lumbar spine     hands    Diabetes mellitus     General anesthetics causing adverse effect in therapeutic use     following breast rediuct, hard time awakening  also had anxiety rxn to lidocain in dental ofc    GERD (gastroesophageal reflux disease)     Hypothyroidism 10/08/2014    Major depressive disorder 2025    Maternal anesthesia complication     epidural for 1st child went up instead of down; required intubation    Normocytic anemia 2024    Obesity (BMI 30-39.9) 10/08/2014    pancreatic Cancer         Thyroid disease     Thyroid nodule 10/08/2014       Past Surgical HIstory:   Past Surgical History:   Procedure Laterality Date    BREAST BIOPSY Left     b9    BREAST SURGERY      Reduction cause of a mass in left breast    c-sections x2       SECTION  3/26/81 & 85    Birth of two girls    COLONOSCOPY  2012         COLONOSCOPY N/A 2018    Procedure: COLONOSCOPY;  Surgeon: Francisco Mcclain MD;  Location: [facility];  Service: Endoscopy;  Laterality: N/A;    COLONOSCOPY N/A 10/24/2023    Procedure: COLONOSCOPY;  Surgeon: Francisco Mcclain MD;  Location: [facility];  Service: Endoscopy;  Laterality: N/A;    ENDOSCOPIC ULTRASOUND OF UPPER GASTROINTESTINAL TRACT Left 2024    Procedure: ULTRASOUND, UPPER GI TRACT, ENDOSCOPIC;  Surgeon: Andrew Gordon MD;  Location: [facility];  Service: Endoscopy;  Laterality: Left;    ESOPHAGOGASTRODUODENOSCOPY N/A 2024    Procedure: EGD (ESOPHAGOGASTRODUODENOSCOPY);  Surgeon: Andrew Gordon MD;  Location: [facility];  Service: Endoscopy;  Laterality: N/A;    hysteroscopy with polypectomy      INCISION AND DRAINAGE Right 2025    Procedure: Incision and Drainage, RIGHT HAND EXCISION OF INFECTED BONE RIGHT INDEX FINGER;  Surgeon: SALVATORE Chou MD;  [facility];  Service: General;  Laterality:  Right;    INCISIONAL BREAST BIOPSY Left 1996    benign; done at same time as reduction    INSERTION OF TUNNELED CENTRAL VENOUS CATHETER (CVC) WITH SUBCUTANEOUS PORT N/A 7/18/2024    Procedure: [id number];  Surgeon: Blanca Dillard MD;  Location: [facility];  Service: General;  Laterality: N/A;    TOTAL REDUCTION MAMMOPLASTY Bilateral 1996       Family History:   Family History   Problem Relation Name Age of Onset    Brain cancer Mother Ravi Tolbert     Early death Mother Ravi Tolbert 51        Passed at 51    Cancer Mother Ravi Tolbert         Brain tumor    Arthritis Mother Ravi Tolbert     Diabetes Father Ck pham tomasz         Type 2    Cirrhosis Father Ck pham tomasz     Cancer Father Ck ankit tolbert         Bone    COPD Father Ck ankit tolbert         Smoker    Early death Father Ck tolbert         Passed at 64    Lung cancer Father Ck pham tomasz     Heart disease Sister Mamta (dianna)wescovich         Congestive heart failure (passed 2/11/18    Hypertension Sister Mamta (dianna)wescovich     Kidney disease Sister Mamta (dianna)wescoradha         Doc removed when she was a child    Hypertension Sister Mayank     Depression Sister Ravi (mayank) macey Luly ( sister)         Due to loss of her 27 year old son    Early death Sister Ravi (mayank) macey Mauricio ( sister)         Pulmonary hypertension, cirrhosis of the liver(passed 7/14/2007   Age 57    Heart disease Brother John Michi Choudhury ( passed )         Heart attack    Hypertension Brother John Choudhury ( passed )     Heart disease Brother Juan Francisco Macey         Heart  blockage    Heart disease Brother Bhanu Macey         Heart attack (passed)    Diabetes Brother Bhanu Macey     Macular degeneration Brother Bhanu Macey     Breast cancer Maternal Aunt  30    Breast cancer Paternal Aunt  40    Breast cancer Paternal  Aunt  40    Ovarian cancer Neg Hx         Social History:  reports that she has never smoked. She has never used smokeless tobacco. She reports that she does not currently use alcohol. She reports that she does not use drugs.    Allergies:  Review of patient's allergies indicates:   Allergen Reactions    Duricef [cefadroxil] Hives       Medications:  Current Outpatient Medications   Medication Sig Dispense Refill    acetaminophen (TYLENOL) 325 MG tablet Take 650 mg by mouth daily as needed for Pain.      capecitabine (XELODA) 150 MG tablet Take 2 tablets (300 mg total) by mouth 2 (two) times daily Take as directed Monday, Tuesday, Wednesday, Thursday, and Friday for the duration of radiation therapy. Take with food and with 1 other capecitabine prescription for 1,300 mg total. 20 tablet 4    capecitabine (XELODA) 500 MG Tab Take 2 tablets (1,000 mg total) by mouth 2 (two) times daily Take as directed Monday, Tuesday, Wednesday, Thursday, and Friday for the duration of radiation therapy. Take with food and with 1 other capecitabine prescription for 1,300 mg total. 20 tablet 4    duke's soln (benadryl 30 mL, mylanta 30 mL, LIDOcaine 30 mL, nystatin 30 mL) 120mL 10 ml swish & spit/swallow every 4 to 6 hours as needed for mouth pain/ulcers/yeast/throat pain 240 mL 1    ertapenem (INVANZ) 1 gram injection Inject 1 g into the muscle once daily.      EScitalopram oxalate (LEXAPRO) 10 MG tablet Take 1 tablet (10 mg total) by mouth once daily. 30 tablet 2    fluticasone propionate (FLONASE) 50 mcg/actuation nasal spray 1 spray (50 mcg total) by Each Nostril route once daily. 18.2 mL 0    HYDROcodone-acetaminophen (NORCO) 5-325 mg per tablet Take 1 tablet by mouth every 6 (six) hours as needed for Pain. 28 tablet 0    ibuprofen (ADVIL,MOTRIN) 800 MG tablet Take 1 tablet (800 mg total) by mouth every 8 (eight) hours as needed for Pain. 42 tablet 1    levothyroxine (SYNTHROID) 50 MCG tablet Take 1 tablet (50 mcg total) by mouth  "once daily. 90 tablet 3    loratadine (CLARITIN) 10 mg tablet Take 1 tablet (10 mg total) by mouth every morning. 30 tablet 11    potassium chloride SA (K-DUR,KLOR-CON) 20 MEQ tablet Take 40 mEq by mouth 2 (two) times daily.      prochlorperazine (COMPAZINE) 5 MG tablet Take 1 tablet (5 mg total) by mouth every 6 (six) hours as needed for Nausea. 90 tablet 2    senna-docusate 8.6-50 mg (PERICOLACE) 8.6-50 mg per tablet Take 2 tablets by mouth once daily. 60 tablet 1    simvastatin (ZOCOR) 10 MG tablet Take 1 tablet (10 mg total) by mouth every evening. 90 tablet 3    SYNJARDY XR 10-1,000 mg TBph TAKE ONE TABLET BY MOUTH ONCE DAILY 30 tablet 5    VITAMIN D2 1,250 mcg (50,000 unit) capsule Take 1 capsule (50,000 Units total) by mouth twice a week. 8 capsule 11     No current facility-administered medications for this visit.       Review of Systems   Constitutional:  Negative for appetite change, chills, fatigue, fever and unexpected weight change.   HENT:  Negative for mouth sores.    Eyes:  Negative for visual disturbance.   Respiratory:  Negative for cough and shortness of breath.    Cardiovascular:  Negative for chest pain and palpitations.   Gastrointestinal:  Negative for abdominal pain, constipation, diarrhea, nausea and vomiting.   Genitourinary:  Negative for frequency.   Musculoskeletal:  Negative for back pain.        Difficulty flexing right index finger   Skin:  Positive for rash (macular rash to bilateral LE).   Neurological:  Negative for headaches.   Hematological:  Negative for adenopathy. Does not bruise/bleed easily.   Psychiatric/Behavioral:  The patient is not nervous/anxious.        ECOG Performance Status: 0   Objective:      Vitals:   Vitals:    04/07/25 1349   BP: 120/70   BP Location: Right arm   Patient Position: Sitting   Pulse: 80   Resp: 16   Temp: 98 °F (36.7 °C)   TempSrc: Temporal   SpO2: 98%   Weight: 53.8 kg (118 lb 9.7 oz)   Height: 5' 1" (1.549 m)     Wt Readings from Last 3 " Encounters:   04/07/25 53.8 kg (118 lb 9.7 oz)   03/31/25 53.3 kg (117 lb 8.1 oz)   03/24/25 52.9 kg (116 lb 10 oz)     Physical Exam  Constitutional:       General: She is not in acute distress.     Appearance: She is well-developed. She is not diaphoretic.   HENT:      Head: Normocephalic and atraumatic.      Comments: Alopecia  Eyes:      General: No scleral icterus.  Cardiovascular:      Rate and Rhythm: Normal rate and regular rhythm.      Heart sounds: Normal heart sounds. No murmur heard.  Pulmonary:      Effort: Pulmonary effort is normal. No respiratory distress.      Breath sounds: Normal breath sounds.   Abdominal:      General: Bowel sounds are normal. There is no distension.      Palpations: Abdomen is soft.      Tenderness: There is no abdominal tenderness.   Musculoskeletal:      Right lower leg: No edema.      Left lower leg: No edema.      Comments: PORT in right chest     Lymphadenopathy:      Cervical: No cervical adenopathy.      Upper Body:      Right upper body: No supraclavicular or axillary adenopathy.      Left upper body: No supraclavicular or axillary adenopathy.   Skin:     Comments: Small macular lesions (petechiae?) noted to bilateral lower extremities   Neurological:      General: No focal deficit present.      Mental Status: She is alert and oriented to person, place, and time.   Psychiatric:         Behavior: Behavior normal.         Thought Content: Thought content normal.       Laboratory Data:  Lab Visit on 04/07/2025   Component Date Value Ref Range Status    Sodium 04/07/2025 137  136 - 145 mmol/L Final    Potassium 04/07/2025 4.2  3.5 - 5.1 mmol/L Final    Chloride 04/07/2025 103  95 - 110 mmol/L Final    CO2 04/07/2025 26  23 - 29 mmol/L Final    Glucose 04/07/2025 114 (H)  70 - 110 mg/dL Final    BUN 04/07/2025 8  8 - 23 mg/dL Final    Creatinine 04/07/2025 0.7  0.5 - 1.4 mg/dL Final    Calcium 04/07/2025 9.1  8.7 - 10.5 mg/dL Final    Protein Total 04/07/2025 6.6  6.0 - 8.4  gm/dL Final    Albumin 04/07/2025 3.6  3.5 - 5.2 g/dL Final    Bilirubin Total 04/07/2025 0.5  0.1 - 1.0 mg/dL Final    For infants and newborns, interpretation of results should be based   on gestational age, weight and in agreement with clinical   observations.    Premature Infant recommended reference ranges:   0-24 hours:  <8.0 mg/dL   24-48 hours: <12.0 mg/dL   3-5 days:    <15.0 mg/dL   6-29 days:   <15.0 mg/dL    ALP 04/07/2025 95  40 - 150 unit/L Final    AST 04/07/2025 24  11 - 45 unit/L Final    ALT 04/07/2025 13  10 - 44 unit/L Final    Anion Gap 04/07/2025 8  8 - 16 mmol/L Final    eGFR 04/07/2025 >60  >60 mL/min/1.73/m2 Final    Estimated GFR calculated using the CKD-EPI creatinine (2021) equation.    WBC 04/07/2025 2.70 (L)  3.90 - 12.70 K/uL Final    RBC 04/07/2025 3.56 (L)  4.00 - 5.40 M/uL Final    HGB 04/07/2025 11.3 (L)  12.0 - 16.0 gm/dL Final    HCT 04/07/2025 33.0 (L)  37.0 - 48.5 % Final    MCV 04/07/2025 93  82 - 98 fL Final    MCH 04/07/2025 31.7 (H)  27.0 - 31.0 pg Final    MCHC 04/07/2025 34.2  32.0 - 36.0 g/dL Final    RDW 04/07/2025 13.5  11.5 - 14.5 % Final    Platelet Count 04/07/2025 101 (L)  150 - 450 K/uL Final    MPV 04/07/2025 9.5  9.2 - 12.9 fL Final    Nucleated RBC 04/07/2025 0  <=0 /100 WBC Final    Neut % 04/07/2025 65.9  38 - 73 % Final    Lymph % 04/07/2025 18.1  18 - 48 % Final    Mono % 04/07/2025 11.5  4 - 15 % Final    Eos % 04/07/2025 3.7  <=8 % Final    Basophil % 04/07/2025 0.4  <=1.9 % Final    Imm Grans % 04/07/2025 0.4  0.0 - 0.5 % Final    Neut # 04/07/2025 1.78 (L)  1.8 - 7.7 K/uL Final    Lymph # 04/07/2025 0.49 (L)  1 - 4.8 K/uL Final    Mono # 04/07/2025 0.31  0.3 - 1 K/uL Final    Eos # 04/07/2025 0.10  <=0.5 K/uL Final    Baso # 04/07/2025 0.01  <=0.2 K/uL Final    Imm Grans # 04/07/2025 0.01  0.00 - 0.04 K/uL Final    Mild elevation in immature granulocytes is non specific and can be seen in a variety of conditions including stress response, acute  inflammation, trauma and pregnancy. Correlation with other laboratory and clinical findings is essential.   Lab Visit on 04/01/2025   Component Date Value Ref Range Status    C. DIFFICILE GDH AG 04/01/2025 Negative  Negative Final    Clostridium Difficile Toxin A/B 04/01/2025 Negative  Negative Final         Imaging:     CT C/A/P 3/12/25    LUNGS:     There are multiple new centrilobular ground-glass nodules in the left lower lobe. The largest of these is in the lingular segment and has a maximum diameter of 12 mm (series 19, image 165). Ground-glass nodularity also extends into the basal segments of the left lower lobe. There is a similar 10 mm ground-glass nodule in the posteromedial right lower lobe (series 19, image 186).     Right apical nodular septal thickening (series 19, image 39) is unchanged. The previously described 4 mm ground-glass nodule on series 3, image 62 of the prior study is not clearly reproduced on the current study. Apical septal thickening is likely postinflammatory and is unchanged.     MEDIASTINUM and PLEURA:     A right-sided Port-A-Cath is again noted. There is no pleural effusion or pneumothorax. Cardiac chambers are within normal limits. There is no acute body wall finding.     Liver, pancreas, spleen, gallbladder:     The soft tissue density in the region of the body of the pancreas continues to decrease in size. On the current study it measures 20 x 11 mm in the axial plane. Again noted is atrophy of the pancreatic tail and a portion of the pancreatic body. Advanced hepatic steatosis is noted. The presume cyst in the caudal tip of the posterior segment of the right hepatic lobe (series 23, image 83) has decreased in size since the prior study. A gallstone is again noted. There is no acute abnormality of the liver, pancreas or spleen. There are no inflammatory changes in the gallbladder fossa.     Kidneys:     There is no obstructing urinary tract calculus, hydronephrosis or  hydroureter.     Bowel:     The lack of oral contrast limits evaluation of the bowel. There is no acute abnormality of the stomach. There is no small bowel dilatation. There is no acute abnormality of the colon. The rectum is unchanged. Apparent mural thickening is probably secondary to under distension. The appendix appears normal.     Other:     The IVC is within normal limits. No free fluid. There is no free air or pathologic lymph node enlargement. There is no aneurysmal dilatation of the abdominal aorta.     Pelvis:     The uterus and adnexa are within normal limits. The urinary bladder is unchanged. There is no significant free fluid in the pelvis.     Body wall:     There is no acute fracture or other acute body wall finding.       Assessment:       1. Malignant neoplasm of body of pancreas    2. 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therapy    3. Acute osteomyelitis of hand including fingers, unspecified laterality    4. Macular rash    5. Chemotherapy-induced neutropenia         Plan:   Pancreatic Cancer - Patient with Stage III pancreatic cancer with concern for potential encasement of the distal celiac artery per discussion with Dr Valentin  -No sign of mets  - 94.5 on 6/17/24  -Case discussed at tumor board 7/17/24 with recommendation to proceed with chemo  -[facility] genetic testing negative for the genes tested  -[facility] tissue testing showed TP53, KRAS p.G12D, CDKN2A, CDKN2B mutations.  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  -Will need to keep irinotecan dose reduced at 165mg/mm2  -CT C/A/P on 10/02/24 shows stable disease and a possible new RLL nodule  -Pt saw Dr Valentin 10/16/24 whom recommended a few more cycles of chemo and then transition to perioperative radiation therapy  - decreased all the way to 9.4U/mL on 11/11/24 with level today pending   -Pt to go to [facility] 12/09/24  -PT completed 12 cycles  -Repeat scans 1/27/25 showeddecrease in size of  pancreatic mass appearing to surround the celiac artery and to wrap partially around the portal vein likely greater than 180°  -Case discussed at tumor board on 2/05/25 with recommendation for radiation  -Pt was to start capecitabine and radiation but will need to hold until she has had continued time to heal from recently diagnosed osteomyelitis of the 2nd digit of the right hand  -Repeat scans 3/12/25 showed decreased pancreatic mass and new groundglass opacities in the lung  -Pt to continue Xeloda 4/7/25     Osteomyelitis - Pt s/p debridement of soft tissue and tendon, arthrotomy of the 2nd MCP joint with irrigation and excision of infected bone about the 2nd metacarpal and index finger proximal phalanx on 2/19/25  -Pt completed ertapenem and doxycycline on 4/01/25  -Management per ID  -Follow up with Dr. Chou 4/11/25    Neutropenia - due to chemotherapy  -WBC 2.70 with ANC 1800  -Adequate for treatment  -Will monitor    Macular rash  -Bilateral lower extremities  -Has not worsened  -Discussed possibility of petechiae d/t thrombocytopenia  -Will send to dermatology    Thrombocytopenia - 2/2 chemotherapy  -platelets 101k 4/7/25  -No signs of bleeding  -Adequate for chemo    Diarrhea - resolved since completing antibiotics  -Negative for C diff    Pulmonary Nodules - Seen on CT C/A/P 1/27/25  -Stable  -Pt with some new ground glass nodules with others resolving  -Will monitor    Hypokalemia - 4.2 on 4/7/25  -Pt to continue potassium chloride     Hypothyroidism - pt on synthroid  -Will monitor    Nausea - resolved  -Will monitor    Route Chart for Scheduling    Med Onc Chart Routing      Follow up with physician . Patient needs to be scheduled with dermatology. As scheduled for CBC, CMP on 4/15/25 prior to seeing Dr. Hunt.   Follow up with [facility]    Infusion scheduling note    Injection scheduling note    Labs    Imaging    Pharmacy appointment    Other referrals              Treatment Plan Information   OP  CAPECITABINE 5 DAYS + RADIOTHERAPY Rajat Hunt MD   Associated diagnosis: Malignant neoplasm of pancreas Stage III cT4, cN0, cM0 noted on 7/12/2024   Line of treatment: Neoadjuvant  Treatment Goal: Curative     Upcoming Treatment Dates - OP CAPECITABINE 5 DAYS + RADIOTHERAPY    2/24/2025       Antiemetics       Physician communication order       Take Home Chemotherapy       capecitabine (XELODA) tablet 1,300 mg    Therapy Plan Information  PORT FLUSH for Malignant neoplasm of pancreas, noted on 7/12/2024  Flushes  heparin, porcine (PF) 100 unit/mL injection flush 500 Units  500 Units, Intravenous, Every visit  sodium chloride 0.9% flush 10 mL  10 mL, Intravenous, Every visit      No therapy plan of the specified type found.    No therapy plan of the specified type found.    Visit today included increased complexity associated with the care of the episodic problem (chemotherapy) addressed and managing the longitudinal care of the patient due to the serious and/or complex managed problem(s) pancreatic cancer.    ERWIN Adair  Hematology and Medical Oncology  [facility]  [facility] [place] [facility]    37 minutes of total time spent on the encounter, which includes face to face time and non-face to face time preparing to see the patient (eg, review of tests), Obtaining and/or reviewing separately obtained history, documenting clinical information in the electronic or other health record, independently interpreting results if documented above (not separately reported) and communicating results to the patient/family/caregiver, or Care coordination (not separately reported).

## 2025-04-08 DIAGNOSIS — E11.9 TYPE 2 DIABETES MELLITUS WITHOUT COMPLICATION, WITHOUT LONG-TERM CURRENT USE OF INSULIN: ICD-10-CM

## 2025-04-08 RX ORDER — EMPAGLIFLOZIN, METFORMIN HYDROCHLORIDE 10; 1000 MG/1; MG/1
1 TABLET, EXTENDED RELEASE ORAL
Qty: 30 TABLET | Refills: 5 | OUTPATIENT
Start: 2025-04-08

## 2025-04-11 ENCOUNTER — DOCUMENTATION ONLY (OUTPATIENT)
Dept: INFUSION THERAPY | Facility: HOSPITAL | Age: 65
End: 2025-04-11
Payer: MEDICARE

## 2025-04-14 NOTE — PROGRESS NOTES
PATIENT: Bessy Lamb  MRN: [id number]  DATE: 4/15/2025      Diagnosis:   1. Malignant neoplasm of body of pancreas    2. 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therapy    3. Pulmonary nodule    4. Hypothyroidism, unspecified type    5. Hypokalemia                Chief Complaint: Pancreatic Cancer      Oncologic History:      Oncologic History     Oncologic Treatment     Pathology           Subjective:    Interval History:  Ms. Lamb is a 64 y.o. female with Arthritis, DMII, GERD, Hypotyroidism, Obesity, Thyroid disease who presents for pancreatic cancer.  Since the last clinic visit the patient states her energy has been better. The patient denies CP, cough, SOB, abdominal pain, nausea, vomiting, constipation, diarrhea.  The patient denies fever, chills, night sweats, weight loss, new lumps or bumps, easy bruising or bleeding.    Prior History:   The patient initially underwent a CXR on 11/13/23 fur a URI which showed possible subtle pulmonary nodules.  CT chest 12/01/2023 showed a cluster of ill-defined centrilobular ground-glass opacity he inferior right and left upper lobes as well as a 1.2 cm ground-glass opacity in the superior segment of the left lower lobe; solid 3 mm pulmonary nodule in the right upper lobe; subcentimeter right hepatic lobe hypodensity likely representing a cyst.  The patient underwent surveillance CT chest on 05/21/2024 showing a 3.7 x 2.7 pancreatic body mass in the pancreatic tail atrophy suspicious of pancreatic malignancy as well as and unchanged benign 3 mm nodule in the right upper lobe.  MRI abdomen on 06/30/2024 showed hypoenhancing mass centered in the proximal pancreatic body measuring 2.9 x 2 cm with abutment and possible encasement of the distal splenic vein.  EUS was performed on 07/05/2024 mass in the pancreatic body stage T4 N0 M0 by endo sonographic criteria.  Pathology from biopsy of the stomach showed mild focally moderate chronic gastritis with no evidence of the H pylori.   Stomach polyp which was removed code hyperplastic polyps with chronic inflammation.  Pancreatic biopsy showed adenocarcinoma.  CT of the chest, abdomen, and pelvis on 07/09/2024 showed a 9 mm lesion in the right hepatic lobe previously characterized as cysts; mass in the pancreatic body measuring 4.4 x 3.7 cm with diffuse pancreatic ductal dilatation measuring 8 mm:  Weaning vein at the portal confluence occluded with reconstitution via collaterals.  The mass abuts the portal vein by less than 180° but does not case the proximal splenic artery remains patent.  Also noted was a pancreatic lymph node measuring 0.7 cm.   Patient's case was discussed at tumor board on 07/17/2024 with recommendation for proceeding with the chemotherapy.  Patient had port placement on 07/18/2024.   The patient is admitted to the hospital from 09/20/2024 until 09/22/2024 for hypokalemia with potassium of 2.3.  The patient was subsequently discharged underwent treatment with FOLFIRINOX on 09/23/2024.  CT of the chest, abdomen, and pelvis on 10/02/2024 showed a new 5 mm ground-glass nodule in the right lower lobe; stable 8 mm hypodensity present within the right hepatic lobe suggestive of a cyst; 37 x 35 mm proximal pancreatic body mass slightly smaller in transverse dimension involving 90 degree area of the celiac artery and 100 degree area of the splenic artery with soft tissue extending along the anterior right lateral wall of the portal vein; abnormal soft tissue insinuating between the lateral margin of the celiac artery and adjacent splenic vein; invasion and focal occlusion of the proximal most aspect of the splenic vein; concentric wall thickening present within the distal sigmoid colon/rectum with infectious and inflammatory etiologies possible.   The patient met with Dr Valentin on 10/16/24 whom felt the patient could receive a few more cycles of chemo and then transition to perioperative radiation therapy.   The patient's case was  discussed with Dr. Valentin and .  Plan is to proceed with up to 12 cycles FOLFIRINOX prior to consideration of radiation versus surgical resection.   CT chest, abdomen, and pelvis on 01/27/2025 showing apical pleural thickening bilaterally stable since prior exam; 5 mm nodule of the right lung base; 3 possibly slightly smaller nodules at 4 mm; 1-2 mm nodule in the left lung apex; 8 mm hypodensity at the tip of the right lobe of the liver favored to be a cyst; significant improvement in pancreatic mass now measuring 2.9 x 1.7 cm appearing surround the celiac artery and to wrap partially around the portal vein likely greater than 180°.   The patient was admitted to the hospital from 2/19/25 until 2/22/25 for osteomyelitis.   Pt presented to the hospital with septic arthritis from a cat bite.  MRI hand and fingers on 2/17/25 showed findings concerning for septic arthritis and early changes of osteomyelitis, and cellulitis in the 2nd MCP on the right hand as well as tenosynovitis and cellulitis about dorsal aspect of the hand/wrist.  Pt underwent debridement of soft tissue and tendon, arthrotomy of the 2nd MCP joint with irrigation and excision of infected bone about the 2nd metacarpal and index finger proximal phalanx.  Cultures failed to grow any bacteria.  Pt was discharged on doxycycline and ertapenem to complete 6 weeks of abx.    The patient underwent CT C/A/P on 3/12/25 showing multiple new centrilobular ground-glass nodules in the left lower lobe with the largest measuring 12 mm; ground-glass nodularity extending to basal segments of the left lower lobe; 10 mm ground-glass nodule in the posteromedial right lower lobe; unchanged right apical nodular septal thickening; resolution of previously described 4 mm ground-glass nodule in the right apex.    Past Medical History:   Past Medical History:   Diagnosis Date    Arthritis, lumbar spine     hands    Diabetes mellitus     General anesthetics causing  adverse effect in therapeutic use     following breast rediuct, hard time awakening  also had anxiety rxn to lidocain in dental ofc    GERD (gastroesophageal reflux disease)     Hypothyroidism 10/08/2014    Major depressive disorder 2025    Maternal anesthesia complication     epidural for 1st child went up instead of down; required intubation    Normocytic anemia 2024    Obesity (BMI 30-39.9) 10/08/2014    pancreatic Cancer         Thyroid disease     Thyroid nodule 10/08/2014       Past Surgical HIstory:   Past Surgical History:   Procedure Laterality Date    BREAST BIOPSY Left     b9    BREAST SURGERY      Reduction cause of a mass in left breast    c-sections x2       SECTION  3/26/81 & 85    Birth of two girls    COLONOSCOPY  2012         COLONOSCOPY N/A 2018    Procedure: COLONOSCOPY;  Surgeon: Francisco Mcclain MD;  Location: [facility];  Service: Endoscopy;  Laterality: N/A;    COLONOSCOPY N/A 10/24/2023    Procedure: COLONOSCOPY;  Surgeon: Francisco Mcclain MD;  Location: [facility];  Service: Endoscopy;  Laterality: N/A;    ENDOSCOPIC ULTRASOUND OF UPPER GASTROINTESTINAL TRACT Left 2024    Procedure: ULTRASOUND, UPPER GI TRACT, ENDOSCOPIC;  Surgeon: Andrew Gordon MD;  Location: [facility];  Service: Endoscopy;  Laterality: Left;    ESOPHAGOGASTRODUODENOSCOPY N/A 2024    Procedure: EGD (ESOPHAGOGASTRODUODENOSCOPY);  Surgeon: Andrew Gordon MD;  Location: [facility];  Service: Endoscopy;  Laterality: N/A;    hysteroscopy with polypectomy      INCISION AND DRAINAGE Right 2025    Procedure: Incision and Drainage, RIGHT HAND EXCISION OF INFECTED BONE RIGHT INDEX FINGER;  Surgeon: SALVATORE Chou MD;  Location[facility];  Service: General;  Laterality: Right;    INCISIONAL BREAST BIOPSY Left     benign; done at same time as reduction    INSERTION OF TUNNELED CENTRAL VENOUS CATHETER (CVC) WITH SUBCUTANEOUS PORT N/A  7/18/2024    Procedure: [id number];  Surgeon: Blanca Dillard MD;  Location: [facility];  Service: General;  Laterality: N/A;    TOTAL REDUCTION MAMMOPLASTY Bilateral 1996       Family History:   Family History   Problem Relation Name Age of Onset    Brain cancer Mother Ravi Seaman     Early death Mother Ravi Seaman 51        Passed at 51    Cancer Mother Ravi Seaman         Brain tumor    Arthritis Mother Ravi Seaman     Diabetes Father Ck seaman         Type 2    Cirrhosis Father Ck seaman     Cancer Father Ck seaman         Bone    COPD Father Ck seaman         Smoker    Early death Father Ck seaman         Passed at 64    Lung cancer Father Ck seaman     Heart disease Sister Mamta (dianna)wescovich         Congestive heart failure (passed 2/11/18    Hypertension Sister Mamta (dianna)wescovich     Kidney disease Sister Mamta (dianna)wescoradha         Doc removed when she was a child    Hypertension Sister Mayank     Depression Sister Ravi (mayank) macey Luly ( sister)         Due to loss of her 27 year old son    Early death Sister Ravi (mayank) macey Luly ( sister)         Pulmonary hypertension, cirrhosis of the liver(passed 7/14/2007   Age 57    Heart disease Brother John Borden Macey ( passed )         Heart attack    Hypertension Brother John Borden Macey ( passed )     Heart disease Brother Juan Francisco Macey         Heart  blockage    Heart disease Brother Bhanu Macey         Heart attack (passed)    Diabetes Brother Bhanu Macey     Macular degeneration Brother Bhanu Macey     Breast cancer Maternal Aunt  30    Breast cancer Paternal Aunt  40    Breast cancer Paternal Aunt  40    Ovarian cancer Neg Hx         Social History:  reports that she has never smoked. She has never used smokeless tobacco. She reports  that she does not currently use alcohol. She reports that she does not use drugs.    Allergies:  Review of patient's allergies indicates:   Allergen Reactions    Duricef [cefadroxil] Hives       Medications:  Current Outpatient Medications   Medication Sig Dispense Refill    acetaminophen (TYLENOL) 325 MG tablet Take 650 mg by mouth daily as needed for Pain.      capecitabine (XELODA) 150 MG tablet Take 2 tablets (300 mg total) by mouth 2 (two) times daily Take as directed Monday, Tuesday, Wednesday, Thursday, and Friday for the duration of radiation therapy. Take with food and with 1 other capecitabine prescription for 1,300 mg total. 20 tablet 4    capecitabine (XELODA) 500 MG Tab Take 2 tablets (1,000 mg total) by mouth 2 (two) times daily Take as directed Monday, Tuesday, Wednesday, Thursday, and Friday for the duration of radiation therapy. Take with food and with 1 other capecitabine prescription for 1,300 mg total. 20 tablet 4    duke's soln (benadryl 30 mL, mylanta 30 mL, LIDOcaine 30 mL, nystatin 30 mL) 120mL 10 ml swish & spit/swallow every 4 to 6 hours as needed for mouth pain/ulcers/yeast/throat pain 240 mL 1    ertapenem (INVANZ) 1 gram injection Inject 1 g into the muscle once daily.      EScitalopram oxalate (LEXAPRO) 10 MG tablet Take 1 tablet (10 mg total) by mouth once daily. 30 tablet 2    fluticasone propionate (FLONASE) 50 mcg/actuation nasal spray 1 spray (50 mcg total) by Each Nostril route once daily. 18.2 mL 0    HYDROcodone-acetaminophen (NORCO) 5-325 mg per tablet Take 1 tablet by mouth every 6 (six) hours as needed for Pain. 28 tablet 0    ibuprofen (ADVIL,MOTRIN) 800 MG tablet Take 1 tablet (800 mg total) by mouth every 8 (eight) hours as needed for Pain. 42 tablet 1    levothyroxine (SYNTHROID) 50 MCG tablet Take 1 tablet (50 mcg total) by mouth once daily. 90 tablet 3    loratadine (CLARITIN) 10 mg tablet Take 1 tablet (10 mg total) by mouth every morning. 30 tablet 11     "potassium chloride SA (K-DUR,KLOR-CON) 20 MEQ tablet Take 40 mEq by mouth 2 (two) times daily.      prochlorperazine (COMPAZINE) 5 MG tablet Take 1 tablet (5 mg total) by mouth every 6 (six) hours as needed for Nausea. 90 tablet 2    senna-docusate 8.6-50 mg (PERICOLACE) 8.6-50 mg per tablet Take 2 tablets by mouth once daily. 60 tablet 1    simvastatin (ZOCOR) 10 MG tablet Take 1 tablet (10 mg total) by mouth every evening. 90 tablet 3    SYNJARDY XR 10-1,000 mg TBph TAKE ONE TABLET BY MOUTH ONCE DAILY 30 tablet 5    VITAMIN D2 1,250 mcg (50,000 unit) capsule Take 1 capsule (50,000 Units total) by mouth twice a week. 8 capsule 11     No current facility-administered medications for this visit.       Review of Systems   Constitutional:  Negative for chills, fatigue, fever and unexpected weight change.   Respiratory:  Negative for cough and shortness of breath.    Cardiovascular:  Negative for chest pain and palpitations.   Gastrointestinal:  Negative for abdominal pain, constipation, diarrhea, nausea and vomiting.   Skin:  Negative for rash.   Neurological:  Negative for headaches.   Hematological:  Negative for adenopathy. Does not bruise/bleed easily.       ECOG Performance Status: 0   Objective:      Vitals:   Vitals:    04/15/25 0839   BP: 117/70   BP Location: Left arm   Patient Position: Sitting   Pulse: 76   Temp: 97.3 °F (36.3 °C)   TempSrc: Temporal   SpO2: 97%   Weight: 53.8 kg (118 lb 9.7 oz)   Height: 5' 1" (1.549 m)           Physical Exam  Constitutional:       General: She is not in acute distress.     Appearance: She is well-developed. She is not diaphoretic.   HENT:      Head: Normocephalic and atraumatic.      Comments: Alopecia  Cardiovascular:      Rate and Rhythm: Normal rate and regular rhythm.      Heart sounds: Normal heart sounds. No murmur heard.     No friction rub. No gallop.   Pulmonary:      Effort: Pulmonary effort is normal. No respiratory distress.      Breath sounds: Normal " breath sounds. No wheezing or rales.   Chest:      Chest wall: No tenderness.   Abdominal:      General: Bowel sounds are normal. There is no distension.      Palpations: Abdomen is soft. There is no mass.      Tenderness: There is no abdominal tenderness. There is no guarding or rebound.   Musculoskeletal:      Comments: PORT in right chest     Lymphadenopathy:      Cervical: No cervical adenopathy.      Upper Body:      Right upper body: No supraclavicular or axillary adenopathy.      Left upper body: No supraclavicular or axillary adenopathy.   Skin:     Findings: No erythema or rash.   Neurological:      Mental Status: She is alert and oriented to person, place, and time.   Psychiatric:         Behavior: Behavior normal.     Laboratory Data:  Lab Visit on 04/15/2025   Component Date Value Ref Range Status    Sodium 04/15/2025 138  136 - 145 mmol/L Final    Potassium 04/15/2025 4.3  3.5 - 5.1 mmol/L Final    Chloride 04/15/2025 105  95 - 110 mmol/L Final    CO2 04/15/2025 23  23 - 29 mmol/L Final    Glucose 04/15/2025 112 (H)  70 - 110 mg/dL Final    BUN 04/15/2025 7 (L)  8 - 23 mg/dL Final    Creatinine 04/15/2025 0.8  0.5 - 1.4 mg/dL Final    Calcium 04/15/2025 9.2  8.7 - 10.5 mg/dL Final    Protein Total 04/15/2025 7.0  6.0 - 8.4 gm/dL Final    Albumin 04/15/2025 3.8  3.5 - 5.2 g/dL Final    Bilirubin Total 04/15/2025 0.6  0.1 - 1.0 mg/dL Final    For infants and newborns, interpretation of results should be based   on gestational age, weight and in agreement with clinical   observations.    Premature Infant recommended reference ranges:   0-24 hours:  <8.0 mg/dL   24-48 hours: <12.0 mg/dL   3-5 days:    <15.0 mg/dL   6-29 days:   <15.0 mg/dL    ALP 04/15/2025 84  40 - 150 unit/L Final    AST 04/15/2025 18  11 - 45 unit/L Final    ALT 04/15/2025 11  10 - 44 unit/L Final    Anion Gap 04/15/2025 10  8 - 16 mmol/L Final    eGFR 04/15/2025 >60  >60 mL/min/1.73/m2 Final    Estimated GFR calculated  using the CKD-EPI creatinine (2021) equation.    WBC 04/15/2025 2.42 (L)  3.90 - 12.70 K/uL Final    RBC 04/15/2025 3.66 (L)  4.00 - 5.40 M/uL Final    HGB 04/15/2025 11.7 (L)  12.0 - 16.0 gm/dL Final    HCT 04/15/2025 33.8 (L)  37.0 - 48.5 % Final    MCV 04/15/2025 92  82 - 98 fL Final    MCH 04/15/2025 32.0 (H)  27.0 - 31.0 pg Final    MCHC 04/15/2025 34.6  32.0 - 36.0 g/dL Final    RDW 04/15/2025 14.7 (H)  11.5 - 14.5 % Final    Platelet Count 04/15/2025 116 (L)  150 - 450 K/uL Final    MPV 04/15/2025 9.2  9.2 - 12.9 fL Final    Nucleated RBC 04/15/2025 0  <=0 /100 WBC Final    Neut % 04/15/2025 61.2  38 - 73 % Final    Lymph % 04/15/2025 16.5 (L)  18 - 48 % Final    Mono % 04/15/2025 18.2 (H)  4 - 15 % Final    Eos % 04/15/2025 3.7  <=8 % Final    Basophil % 04/15/2025 0.4  <=1.9 % Final    Imm Grans % 04/15/2025 0.0  0.0 - 0.5 % Final    Neut # 04/15/2025 1.48 (L)  1.8 - 7.7 K/uL Final    Lymph # 04/15/2025 0.40 (L)  1 - 4.8 K/uL Final    Mono # 04/15/2025 0.44  0.3 - 1 K/uL Final    Eos # 04/15/2025 0.09  <=0.5 K/uL Final    Baso # 04/15/2025 0.01  <=0.2 K/uL Final    Imm Grans # 04/15/2025 0.00  0.00 - 0.04 K/uL Final         Imaging:     CT C/A/P 3/12/25    LUNGS:     There are multiple new centrilobular ground-glass nodules in the left lower lobe. The largest of these is in the lingular segment and has a maximum diameter of 12 mm (series 19, image 165). Ground-glass nodularity also extends into the basal segments of the left lower lobe. There is a similar 10 mm ground-glass nodule in the posteromedial right lower lobe (series 19, image 186).     Right apical nodular septal thickening (series 19, image 39) is unchanged. The previously described 4 mm ground-glass nodule on series 3, image 62 of the prior study is not clearly reproduced on the current study. Apical septal thickening is likely postinflammatory and is unchanged.     MEDIASTINUM and PLEURA:     A right-sided Port-A-Cath is  again noted. There is no pleural effusion or pneumothorax. Cardiac chambers are within normal limits. There is no acute body wall finding.     Liver, pancreas, spleen, gallbladder:     The soft tissue density in the region of the body of the pancreas continues to decrease in size. On the current study it measures 20 x 11 mm in the axial plane. Again noted is atrophy of the pancreatic tail and a portion of the pancreatic body. Advanced hepatic steatosis is noted. The presume cyst in the caudal tip of the posterior segment of the right hepatic lobe (series 23, image 83) has decreased in size since the prior study. A gallstone is again noted. There is no acute abnormality of the liver, pancreas or spleen. There are no inflammatory changes in the gallbladder fossa.     Kidneys:     There is no obstructing urinary tract calculus, hydronephrosis or hydroureter.     Bowel:     The lack of oral contrast limits evaluation of the bowel. There is no acute abnormality of the stomach. There is no small bowel dilatation. There is no acute abnormality of the colon. The rectum is unchanged. Apparent mural thickening is probably secondary to under distension. The appendix appears normal.     Other:     The IVC is within normal limits. No free fluid. There is no free air or pathologic lymph node enlargement. There is no aneurysmal dilatation of the abdominal aorta.     Pelvis:     The uterus and adnexa are within normal limits. The urinary bladder is unchanged. There is no significant free fluid in the pelvis.     Body wall:     There is no acute fracture or other acute body wall finding.       Assessment:       1. Malignant neoplasm of body of pancreas    2. 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therapy    3. Pulmonary nodule    4. Hypothyroidism, unspecified type    5. Hypokalemia               Plan:   Pancreatic Cancer - Patient with Stage III pancreatic cancer with concern for potential encasement of the distal celiac artery per  discussion with Dr Valentin  -No sign of mets  - 94.5 on 6/17/24  -Case discussed at tumor board 7/17/24 with recommendation to proceed with chemo  -[facility] genetic testing negative for the genes tested  -[facility] tissue testing showed TP53, KRAS p.G12D, CDKN2A, CDKN2B mutations.  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  -Will need to keep irinotecan dose reduced at 165mg/mm2  -CT C/A/P on 10/02/24 shows stable disease and a possible new RLL nodule  -Pt saw Dr Valentin 10/16/24 whom recommended a few more cycles of chemo and then transition to perioperative radiation therapy  - decreased all the way to 9.4U/mL on 11/11/24 with level today pending   -Pt to go to [facility] 12/09/24  -PT completed 12 cycles  -Repeat scans 1/27/25 showeddecrease in size of pancreatic mass appearing to surround the celiac artery and to wrap partially around the portal vein likely greater than 180°  -Case discussed at tumor board on 2/05/25 with recommendation for radiation  -Pt was to start capecitabine and radiation but will need to hold until she has had continued time to heal from recently diagnosed osteomyelitis of the 2nd digit of the right hand  -Repeat scans 3/12/25 showed decreased pancreatic mass and new groundglass opacities in the lung  -Will proceed with chemo/XRT  -Radiation to complete next week  -Will obtain repeat scans next month with return appt  Visit today included increased complexity associated with the care of the episodic problem (chemotherapy) addressed and managing the longitudinal care of the patient due to the serious and/or complex managed problem(s) pancreatic cancer.    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 - pt at increased risk of infection  -No current signs of infection  -Will monitor    Pulmonary Nodules - Seen on CT C/A/P 1/27/25  -Stable  -Pt with some new ground glass nodules with others resolving  -Will monitor    Hypokalemia -   Lab Results   Component  Value Date    K 4.3 04/15/2025   -Stable  -PT to continue potassium chloride  -Will monitor     Hypothyroidism - pt on synthroid  -Will monitor    Route Chart for Scheduling    Med Onc Chart Routing      Follow up with physician Other. Pt needs a CBC, CMP and Ca19-9 on Monday of next week with appt with NP.  Pt needs keira same labs the week of 5/19/25 along with CT C/A/P with an appt with me and Dr Valentin after the scans.   Follow up with ANALIA 1 week.   Infusion scheduling note    Injection scheduling note    Labs    Imaging    Pharmacy appointment    Other referrals          Treatment Plan Information   OP CAPECITABINE 5 DAYS + RADIOTHERAPY Rajat Hunt MD   Associated diagnosis: Malignant neoplasm of pancreas Stage III cT4, cN0, cM0 noted on 7/12/2024   Line of treatment: Neoadjuvant  Treatment Goal: Curative     Upcoming Treatment Dates - OP CAPECITABINE 5 DAYS + RADIOTHERAPY    2/24/2025       Antiemetics       Physician communication order       Take Home Chemotherapy       capecitabine (XELODA) tablet 1,300 mg    Therapy Plan Information  PORT FLUSH for Malignant neoplasm of pancreas, noted on 7/12/2024  Flushes  heparin, porcine (PF) 100 unit/mL injection flush 500 Units  500 Units, Intravenous, Every visit  sodium chloride 0.9% flush 10 mL  10 mL, Intravenous, Every visit      No therapy plan of the specified type found.    No therapy plan of the specified type found.      Rajat Hunt MD  [facility]  Hematology and Oncology  [facility]   [address] [place] [address]   O: [phone]  F: [phone]

## 2025-04-15 ENCOUNTER — OFFICE VISIT (OUTPATIENT)
Dept: HEMATOLOGY/ONCOLOGY | Facility: CLINIC | Age: 65
End: 2025-04-15
Payer: MEDICARE

## 2025-04-15 ENCOUNTER — LAB VISIT (OUTPATIENT)
Dept: LAB | Facility: HOSPITAL | Age: 65
End: 2025-04-15
Attending: INTERNAL MEDICINE
Payer: MEDICARE

## 2025-04-15 ENCOUNTER — DOCUMENTATION ONLY (OUTPATIENT)
Dept: INFUSION THERAPY | Facility: HOSPITAL | Age: 65
End: 2025-04-15
Payer: MEDICARE

## 2025-04-15 ENCOUNTER — TELEPHONE (OUTPATIENT)
Dept: HEMATOLOGY/ONCOLOGY | Facility: CLINIC | Age: 65
End: 2025-04-15

## 2025-04-15 VITALS
SYSTOLIC BLOOD PRESSURE: 117 MMHG | TEMPERATURE: 97 F | HEIGHT: 61 IN | HEART RATE: 76 BPM | OXYGEN SATURATION: 97 % | DIASTOLIC BLOOD PRESSURE: 70 MMHG | BODY MASS INDEX: 22.4 KG/M2 | WEIGHT: 118.63 LBS

## 2025-04-15 DIAGNOSIS — R91.1 PULMONARY NODULE: ICD-10-CM

## 2025-04-15 DIAGNOSIS — T45.1X5A IMMUNODEFICIENCY DUE TO CHEMOTHERAPY: ICD-10-CM

## 2025-04-15 DIAGNOSIS — D84.821 IMMUNODEFICIENCY DUE TO CHEMOTHERAPY: ICD-10-CM

## 2025-04-15 DIAGNOSIS — E87.6 HYPOKALEMIA: ICD-10-CM

## 2025-04-15 DIAGNOSIS — Z79.69 IMMUNODEFICIENCY DUE TO CHEMOTHERAPY: ICD-10-CM

## 2025-04-15 DIAGNOSIS — E03.9 HYPOTHYROIDISM, UNSPECIFIED TYPE: ICD-10-CM

## 2025-04-15 DIAGNOSIS — C25.9 MALIGNANT NEOPLASM OF PANCREAS, UNSPECIFIED LOCATION OF MALIGNANCY: ICD-10-CM

## 2025-04-15 DIAGNOSIS — C25.1 MALIGNANT NEOPLASM OF BODY OF PANCREAS: Primary | ICD-10-CM

## 2025-04-15 LAB
ABSOLUTE EOSINOPHIL (OHS): 0.09 K/UL
ABSOLUTE MONOCYTE (OHS): 0.44 K/UL (ref 0.3–1)
ABSOLUTE NEUTROPHIL COUNT (OHS): 1.48 K/UL (ref 1.8–7.7)
ALBUMIN SERPL BCP-MCNC: 3.8 G/DL (ref 3.5–5.2)
ALP SERPL-CCNC: 84 UNIT/L (ref 40–150)
ALT SERPL W/O P-5'-P-CCNC: 11 UNIT/L (ref 10–44)
ANION GAP (OHS): 10 MMOL/L (ref 8–16)
AST SERPL-CCNC: 18 UNIT/L (ref 11–45)
BASOPHILS # BLD AUTO: 0.01 K/UL
BASOPHILS NFR BLD AUTO: 0.4 %
BILIRUB SERPL-MCNC: 0.6 MG/DL (ref 0.1–1)
BUN SERPL-MCNC: 7 MG/DL (ref 8–23)
CALCIUM SERPL-MCNC: 9.2 MG/DL (ref 8.7–10.5)
CHLORIDE SERPL-SCNC: 105 MMOL/L (ref 95–110)
CO2 SERPL-SCNC: 23 MMOL/L (ref 23–29)
CREAT SERPL-MCNC: 0.8 MG/DL (ref 0.5–1.4)
ERYTHROCYTE [DISTWIDTH] IN BLOOD BY AUTOMATED COUNT: 14.7 % (ref 11.5–14.5)
GFR SERPLBLD CREATININE-BSD FMLA CKD-EPI: >60 ML/MIN/1.73/M2
GLUCOSE SERPL-MCNC: 112 MG/DL (ref 70–110)
HCT VFR BLD AUTO: 33.8 % (ref 37–48.5)
HGB BLD-MCNC: 11.7 GM/DL (ref 12–16)
IMM GRANULOCYTES # BLD AUTO: 0 K/UL (ref 0–0.04)
IMM GRANULOCYTES NFR BLD AUTO: 0 % (ref 0–0.5)
LYMPHOCYTES # BLD AUTO: 0.4 K/UL (ref 1–4.8)
MCH RBC QN AUTO: 32 PG (ref 27–31)
MCHC RBC AUTO-ENTMCNC: 34.6 G/DL (ref 32–36)
MCV RBC AUTO: 92 FL (ref 82–98)
NUCLEATED RBC (/100WBC) (OHS): 0 /100 WBC
PLATELET # BLD AUTO: 116 K/UL (ref 150–450)
PMV BLD AUTO: 9.2 FL (ref 9.2–12.9)
POTASSIUM SERPL-SCNC: 4.3 MMOL/L (ref 3.5–5.1)
PROT SERPL-MCNC: 7 GM/DL (ref 6–8.4)
RBC # BLD AUTO: 3.66 M/UL (ref 4–5.4)
RELATIVE EOSINOPHIL (OHS): 3.7 %
RELATIVE LYMPHOCYTE (OHS): 16.5 % (ref 18–48)
RELATIVE MONOCYTE (OHS): 18.2 % (ref 4–15)
RELATIVE NEUTROPHIL (OHS): 61.2 % (ref 38–73)
SODIUM SERPL-SCNC: 138 MMOL/L (ref 136–145)
WBC # BLD AUTO: 2.42 K/UL (ref 3.9–12.7)

## 2025-04-15 PROCEDURE — 84460 ALANINE AMINO (ALT) (SGPT): CPT | Mod: PN

## 2025-04-15 PROCEDURE — 1159F MED LIST DOCD IN RCRD: CPT | Mod: CPTII,S$GLB,, | Performed by: INTERNAL MEDICINE

## 2025-04-15 PROCEDURE — 99215 OFFICE O/P EST HI 40 MIN: CPT | Mod: S$GLB,,, | Performed by: INTERNAL MEDICINE

## 2025-04-15 PROCEDURE — 85025 COMPLETE CBC W/AUTO DIFF WBC: CPT | Mod: PN

## 2025-04-15 PROCEDURE — 3074F SYST BP LT 130 MM HG: CPT | Mod: CPTII,S$GLB,, | Performed by: INTERNAL MEDICINE

## 2025-04-15 PROCEDURE — 1101F PT FALLS ASSESS-DOCD LE1/YR: CPT | Mod: CPTII,S$GLB,, | Performed by: INTERNAL MEDICINE

## 2025-04-15 PROCEDURE — 3288F FALL RISK ASSESSMENT DOCD: CPT | Mod: CPTII,S$GLB,, | Performed by: INTERNAL MEDICINE

## 2025-04-15 PROCEDURE — 3078F DIAST BP <80 MM HG: CPT | Mod: CPTII,S$GLB,, | Performed by: INTERNAL MEDICINE

## 2025-04-15 PROCEDURE — 1126F AMNT PAIN NOTED NONE PRSNT: CPT | Mod: CPTII,S$GLB,, | Performed by: INTERNAL MEDICINE

## 2025-04-15 PROCEDURE — 36415 COLL VENOUS BLD VENIPUNCTURE: CPT | Mod: PN

## 2025-04-15 PROCEDURE — 3044F HG A1C LEVEL LT 7.0%: CPT | Mod: CPTII,S$GLB,, | Performed by: INTERNAL MEDICINE

## 2025-04-15 PROCEDURE — G2211 COMPLEX E/M VISIT ADD ON: HCPCS | Mod: S$GLB,,, | Performed by: INTERNAL MEDICINE

## 2025-04-15 PROCEDURE — 3008F BODY MASS INDEX DOCD: CPT | Mod: CPTII,S$GLB,, | Performed by: INTERNAL MEDICINE

## 2025-04-15 PROCEDURE — 99999 PR PBB SHADOW E&M-EST. PATIENT-LVL IV: CPT | Mod: PBBFAC,,, | Performed by: INTERNAL MEDICINE

## 2025-04-15 NOTE — TELEPHONE ENCOUNTER
Called and spoke with pt to move her CT scan from 5/27/25 to 5/19/25. Pt voiced understanding, but stated she would like to keep her lab appt [facility], because she likes our lab facility better. I voiced understanding. Pt read back new appt times and is ok with her scheduling.

## 2025-04-15 NOTE — TELEPHONE ENCOUNTER
----- Message from Rajat Hunt MD sent at 4/15/2025  4:43 PM CDT -----  Pt's CT scans need to be moved to 5/19/25 from 5/27/25.

## 2025-04-16 ENCOUNTER — DOCUMENTATION ONLY (OUTPATIENT)
Dept: INFUSION THERAPY | Facility: HOSPITAL | Age: 65
End: 2025-04-16
Payer: MEDICARE

## 2025-04-16 NOTE — PROGRESS NOTES
SW met with pt in XRT and provided a gas card. Pt will be completing XRT next week. Pt shared concerns about her 's increased pain, he also has cancer. SW and pt spoke about her worries, thoughts and feelings. She said he wants to be present for her when she has surgery in June. SW and pt discussed the possibility of [facility] as a place he maybe able to stay. SW will explore this further when pt has surgery date. Pt thanked SW and not other needs noted at this time.     Bharati Fritz, ANA MARIAW

## 2025-04-21 ENCOUNTER — LAB VISIT (OUTPATIENT)
Dept: LAB | Facility: HOSPITAL | Age: 65
End: 2025-04-21
Attending: INTERNAL MEDICINE
Payer: MEDICARE

## 2025-04-21 ENCOUNTER — DOCUMENTATION ONLY (OUTPATIENT)
Dept: INFUSION THERAPY | Facility: HOSPITAL | Age: 65
End: 2025-04-21
Payer: MEDICARE

## 2025-04-21 DIAGNOSIS — C25.1 MALIGNANT NEOPLASM OF BODY OF PANCREAS: ICD-10-CM

## 2025-04-21 LAB
ABSOLUTE EOSINOPHIL (OHS): 0.17 K/UL
ABSOLUTE MONOCYTE (OHS): 0.57 K/UL (ref 0.3–1)
ABSOLUTE NEUTROPHIL COUNT (OHS): 2.69 K/UL (ref 1.8–7.7)
ALBUMIN SERPL BCP-MCNC: 3.8 G/DL (ref 3.5–5.2)
ALP SERPL-CCNC: 87 UNIT/L (ref 40–150)
ALT SERPL W/O P-5'-P-CCNC: 12 UNIT/L (ref 10–44)
ANION GAP (OHS): 8 MMOL/L (ref 8–16)
AST SERPL-CCNC: 22 UNIT/L (ref 11–45)
BASOPHILS # BLD AUTO: 0.02 K/UL
BASOPHILS NFR BLD AUTO: 0.5 %
BILIRUB SERPL-MCNC: 0.8 MG/DL (ref 0.1–1)
BUN SERPL-MCNC: 6 MG/DL (ref 8–23)
CALCIUM SERPL-MCNC: 9.2 MG/DL (ref 8.7–10.5)
CANCER AG19-9 SERPL-ACNC: 8.3 U/ML
CHLORIDE SERPL-SCNC: 102 MMOL/L (ref 95–110)
CO2 SERPL-SCNC: 27 MMOL/L (ref 23–29)
CREAT SERPL-MCNC: 1.1 MG/DL (ref 0.5–1.4)
ERYTHROCYTE [DISTWIDTH] IN BLOOD BY AUTOMATED COUNT: 16.1 % (ref 11.5–14.5)
GFR SERPLBLD CREATININE-BSD FMLA CKD-EPI: 56 ML/MIN/1.73/M2
GLUCOSE SERPL-MCNC: 103 MG/DL (ref 70–110)
HCT VFR BLD AUTO: 33.5 % (ref 37–48.5)
HGB BLD-MCNC: 11.7 GM/DL (ref 12–16)
IMM GRANULOCYTES # BLD AUTO: 0.01 K/UL (ref 0–0.04)
IMM GRANULOCYTES NFR BLD AUTO: 0.3 % (ref 0–0.5)
LYMPHOCYTES # BLD AUTO: 0.33 K/UL (ref 1–4.8)
MCH RBC QN AUTO: 32.1 PG (ref 27–31)
MCHC RBC AUTO-ENTMCNC: 34.9 G/DL (ref 32–36)
MCV RBC AUTO: 92 FL (ref 82–98)
NUCLEATED RBC (/100WBC) (OHS): 0 /100 WBC
PLATELET # BLD AUTO: 137 K/UL (ref 150–450)
PMV BLD AUTO: 9 FL (ref 9.2–12.9)
POTASSIUM SERPL-SCNC: 3.8 MMOL/L (ref 3.5–5.1)
PROT SERPL-MCNC: 7.1 GM/DL (ref 6–8.4)
RBC # BLD AUTO: 3.65 M/UL (ref 4–5.4)
RELATIVE EOSINOPHIL (OHS): 4.5 %
RELATIVE LYMPHOCYTE (OHS): 8.7 % (ref 18–48)
RELATIVE MONOCYTE (OHS): 15 % (ref 4–15)
RELATIVE NEUTROPHIL (OHS): 71 % (ref 38–73)
SODIUM SERPL-SCNC: 137 MMOL/L (ref 136–145)
WBC # BLD AUTO: 3.79 K/UL (ref 3.9–12.7)

## 2025-04-21 PROCEDURE — 36415 COLL VENOUS BLD VENIPUNCTURE: CPT | Mod: PN

## 2025-04-21 PROCEDURE — 86301 IMMUNOASSAY TUMOR CA 19-9: CPT

## 2025-04-21 PROCEDURE — 85025 COMPLETE CBC W/AUTO DIFF WBC: CPT | Mod: PN

## 2025-04-21 PROCEDURE — 82040 ASSAY OF SERUM ALBUMIN: CPT | Mod: PN

## 2025-04-21 NOTE — PROGRESS NOTES
SW met with pt briefly and provided a gas card. No other needs identified at this time.     Bharati Fritz, ANA MARIAW  04/21/2025  3:50 PM

## 2025-04-22 ENCOUNTER — DOCUMENTATION ONLY (OUTPATIENT)
Dept: RADIATION ONCOLOGY | Facility: CLINIC | Age: 65
End: 2025-04-22
Payer: MEDICARE

## 2025-04-22 NOTE — PLAN OF CARE
Patient completed all outpatient radiation treatments this visit.  Follow-up information and appointment provided and patient verbalizes understanding.

## 2025-04-23 ENCOUNTER — OFFICE VISIT (OUTPATIENT)
Dept: HEMATOLOGY/ONCOLOGY | Facility: CLINIC | Age: 65
End: 2025-04-23
Payer: MEDICARE

## 2025-04-23 VITALS
SYSTOLIC BLOOD PRESSURE: 110 MMHG | HEART RATE: 67 BPM | DIASTOLIC BLOOD PRESSURE: 64 MMHG | HEIGHT: 61 IN | OXYGEN SATURATION: 96 % | RESPIRATION RATE: 17 BRPM | TEMPERATURE: 98 F | WEIGHT: 117.5 LBS | BODY MASS INDEX: 22.19 KG/M2

## 2025-04-23 DIAGNOSIS — T45.1X5A CHEMOTHERAPY-INDUCED NEUTROPENIA: ICD-10-CM

## 2025-04-23 DIAGNOSIS — T45.1X5A CHEMOTHERAPY-INDUCED THROMBOCYTOPENIA: ICD-10-CM

## 2025-04-23 DIAGNOSIS — R91.8 PULMONARY NODULES: ICD-10-CM

## 2025-04-23 DIAGNOSIS — C25.1 MALIGNANT NEOPLASM OF BODY OF PANCREAS: Primary | ICD-10-CM

## 2025-04-23 DIAGNOSIS — E03.9 HYPOTHYROIDISM, UNSPECIFIED TYPE: ICD-10-CM

## 2025-04-23 DIAGNOSIS — R10.32 LEFT LOWER QUADRANT ABDOMINAL PAIN: ICD-10-CM

## 2025-04-23 DIAGNOSIS — D64.9 NORMOCYTIC ANEMIA: ICD-10-CM

## 2025-04-23 DIAGNOSIS — D69.59 CHEMOTHERAPY-INDUCED THROMBOCYTOPENIA: ICD-10-CM

## 2025-04-23 DIAGNOSIS — D70.1 CHEMOTHERAPY-INDUCED NEUTROPENIA: ICD-10-CM

## 2025-04-23 PROCEDURE — 99214 OFFICE O/P EST MOD 30 MIN: CPT | Mod: S$GLB,,, | Performed by: NURSE PRACTITIONER

## 2025-04-23 PROCEDURE — 3288F FALL RISK ASSESSMENT DOCD: CPT | Mod: CPTII,S$GLB,, | Performed by: NURSE PRACTITIONER

## 2025-04-23 PROCEDURE — 3044F HG A1C LEVEL LT 7.0%: CPT | Mod: CPTII,S$GLB,, | Performed by: NURSE PRACTITIONER

## 2025-04-23 PROCEDURE — 99999 PR PBB SHADOW E&M-EST. PATIENT-LVL IV: CPT | Mod: PBBFAC,,, | Performed by: NURSE PRACTITIONER

## 2025-04-23 PROCEDURE — 1126F AMNT PAIN NOTED NONE PRSNT: CPT | Mod: CPTII,S$GLB,, | Performed by: NURSE PRACTITIONER

## 2025-04-23 PROCEDURE — 1101F PT FALLS ASSESS-DOCD LE1/YR: CPT | Mod: CPTII,S$GLB,, | Performed by: NURSE PRACTITIONER

## 2025-04-23 PROCEDURE — G2211 COMPLEX E/M VISIT ADD ON: HCPCS | Mod: S$GLB,,, | Performed by: NURSE PRACTITIONER

## 2025-04-23 PROCEDURE — 1160F RVW MEDS BY RX/DR IN RCRD: CPT | Mod: CPTII,S$GLB,, | Performed by: NURSE PRACTITIONER

## 2025-04-23 PROCEDURE — 3008F BODY MASS INDEX DOCD: CPT | Mod: CPTII,S$GLB,, | Performed by: NURSE PRACTITIONER

## 2025-04-23 PROCEDURE — 3078F DIAST BP <80 MM HG: CPT | Mod: CPTII,S$GLB,, | Performed by: NURSE PRACTITIONER

## 2025-04-23 PROCEDURE — 1159F MED LIST DOCD IN RCRD: CPT | Mod: CPTII,S$GLB,, | Performed by: NURSE PRACTITIONER

## 2025-04-23 PROCEDURE — 3074F SYST BP LT 130 MM HG: CPT | Mod: CPTII,S$GLB,, | Performed by: NURSE PRACTITIONER

## 2025-04-23 NOTE — PROGRESS NOTES
PATIENT: Bessy Lamb  MRN: [id number]  DATE: 4/23/2025      Diagnosis:   1. Malignant neoplasm of body of pancreas    2. Chemotherapy-induced thrombocytopenia    3. Chemotherapy-induced neutropenia    4. Normocytic anemia    5. Hypothyroidism, unspecified type    6. Pulmonary nodules            Chief Complaint: Follow-up (Malignant neoplasm of body of pancreas)        Subjective:    Interval History:  Ms. Lamb is a 64 y.o. female with Arthritis, DMII, GERD, Hypotyroidism, Obesity, Thyroid disease who presents for pancreatic cancer.  Since the last clinic visit the patient completed XRT. States she has been having some intermittent lower abdominal pain and indicates a pink tinge noted to urine that started 2 days ago. The patient denies CP, cough, SOB, abdominal pain, nausea, vomiting, constipation, diarrhea.  The patient denies fever, chills, night sweats, weight loss, new lumps or bumps, easy bruising or bleeding.    Prior History:   The patient initially underwent a CXR on 11/13/23 fur a URI which showed possible subtle pulmonary nodules.  CT chest 12/01/2023 showed a cluster of ill-defined centrilobular ground-glass opacity he inferior right and left upper lobes as well as a 1.2 cm ground-glass opacity in the superior segment of the left lower lobe; solid 3 mm pulmonary nodule in the right upper lobe; subcentimeter right hepatic lobe hypodensity likely representing a cyst.  The patient underwent surveillance CT chest on 05/21/2024 showing a 3.7 x 2.7 pancreatic body mass in the pancreatic tail atrophy suspicious of pancreatic malignancy as well as and unchanged benign 3 mm nodule in the right upper lobe.  MRI abdomen on 06/30/2024 showed hypoenhancing mass centered in the proximal pancreatic body measuring 2.9 x 2 cm with abutment and possible encasement of the distal splenic vein.  EUS was performed on 07/05/2024 mass in the pancreatic body stage T4 N0 M0 by endo sonographic criteria.  Pathology from  biopsy of the stomach showed mild focally moderate chronic gastritis with no evidence of the H pylori.  Stomach polyp which was removed code hyperplastic polyps with chronic inflammation.  Pancreatic biopsy showed adenocarcinoma.  CT of the chest, abdomen, and pelvis on 07/09/2024 showed a 9 mm lesion in the right hepatic lobe previously characterized as cysts; mass in the pancreatic body measuring 4.4 x 3.7 cm with diffuse pancreatic ductal dilatation measuring 8 mm:  Weaning vein at the portal confluence occluded with reconstitution via collaterals.  The mass abuts the portal vein by less than 180° but does not case the proximal splenic artery remains patent.  Also noted was a pancreatic lymph node measuring 0.7 cm.   Patient's case was discussed at tumor board on 07/17/2024 with recommendation for proceeding with the chemotherapy.  Patient had port placement on 07/18/2024.   The patient is admitted to the hospital from 09/20/2024 until 09/22/2024 for hypokalemia with potassium of 2.3.  The patient was subsequently discharged underwent treatment with FOLFIRINOX on 09/23/2024.  CT of the chest, abdomen, and pelvis on 10/02/2024 showed a new 5 mm ground-glass nodule in the right lower lobe; stable 8 mm hypodensity present within the right hepatic lobe suggestive of a cyst; 37 x 35 mm proximal pancreatic body mass slightly smaller in transverse dimension involving 90 degree area of the celiac artery and 100 degree area of the splenic artery with soft tissue extending along the anterior right lateral wall of the portal vein; abnormal soft tissue insinuating between the lateral margin of the celiac artery and adjacent splenic vein; invasion and focal occlusion of the proximal most aspect of the splenic vein; concentric wall thickening present within the distal sigmoid colon/rectum with infectious and inflammatory etiologies possible.   The patient met with Dr Valentin on 10/16/24 whom felt the patient could receive a few  more cycles of chemo and then transition to perioperative radiation therapy.   The patient's case was discussed with Dr. Valentin and .  Plan is to proceed with up to 12 cycles FOLFIRINOX prior to consideration of radiation versus surgical resection.   CT chest, abdomen, and pelvis on 01/27/2025 showing apical pleural thickening bilaterally stable since prior exam; 5 mm nodule of the right lung base; 3 possibly slightly smaller nodules at 4 mm; 1-2 mm nodule in the left lung apex; 8 mm hypodensity at the tip of the right lobe of the liver favored to be a cyst; significant improvement in pancreatic mass now measuring 2.9 x 1.7 cm appearing surround the celiac artery and to wrap partially around the portal vein likely greater than 180°.   The patient was admitted to the hospital from 2/19/25 until 2/22/25 for osteomyelitis.   Pt presented to the hospital with septic arthritis from a cat bite.  MRI hand and fingers on 2/17/25 showed findings concerning for septic arthritis and early changes of osteomyelitis, and cellulitis in the 2nd MCP on the right hand as well as tenosynovitis and cellulitis about dorsal aspect of the hand/wrist.  Pt underwent debridement of soft tissue and tendon, arthrotomy of the 2nd MCP joint with irrigation and excision of infected bone about the 2nd metacarpal and index finger proximal phalanx.  Cultures failed to grow any bacteria.  Pt was discharged on doxycycline and ertapenem to complete 6 weeks of abx.    The patient underwent CT C/A/P on 3/12/25 showing multiple new centrilobular ground-glass nodules in the left lower lobe with the largest measuring 12 mm; ground-glass nodularity extending to basal segments of the left lower lobe; 10 mm ground-glass nodule in the posteromedial right lower lobe; unchanged right apical nodular septal thickening; resolution of previously described 4 mm ground-glass nodule in the right apex.    Past Medical History:   Past Medical History:    Diagnosis Date    Arthritis, lumbar spine     hands    Diabetes mellitus     General anesthetics causing adverse effect in therapeutic use     following breast rediuct, hard time awakening  also had anxiety rxn to lidocain in dental ofc    GERD (gastroesophageal reflux disease)     Hypothyroidism 10/08/2014    Major depressive disorder 2025    Maternal anesthesia complication     epidural for 1st child went up instead of down; required intubation    Normocytic anemia 2024    Obesity (BMI 30-39.9) 10/08/2014    pancreatic Cancer         Thyroid disease     Thyroid nodule 10/08/2014       Past Surgical HIstory:   Past Surgical History:   Procedure Laterality Date    BREAST BIOPSY Left     b9    BREAST SURGERY      Reduction cause of a mass in left breast    c-sections x2       SECTION  3/26/81 & 85    Birth of two girls    COLONOSCOPY  2012         COLONOSCOPY N/A 2018    Procedure: COLONOSCOPY;  Surgeon: Francisco Mcclain MD;  Location: [facility];  Service: Endoscopy;  Laterality: N/A;    COLONOSCOPY N/A 10/24/2023    Procedure: COLONOSCOPY;  Surgeon: Francisco Mcclain MD;  Location: [facility];  Service: Endoscopy;  Laterality: N/A;    ENDOSCOPIC ULTRASOUND OF UPPER GASTROINTESTINAL TRACT Left 2024    Procedure: ULTRASOUND, UPPER GI TRACT, ENDOSCOPIC;  Surgeon: Andrew Gordon MD;  Location: [facility];  Service: Endoscopy;  Laterality: Left;    ESOPHAGOGASTRODUODENOSCOPY N/A 2024    Procedure: EGD (ESOPHAGOGASTRODUODENOSCOPY);  Surgeon: Andrew Gordon MD;  Location: [facility];  Service: Endoscopy;  Laterality: N/A;    hysteroscopy with polypectomy      INCISION AND DRAINAGE Right 2025    Procedure: Incision and Drainage, RIGHT HAND EXCISION OF INFECTED BONE RIGHT INDEX FINGER;  Surgeon: SALVATORE Chou MD;  Location: [facility];  Service: General;  Laterality: Right;    INCISIONAL BREAST BIOPSY Left     benign; done at same time as reduction     INSERTION OF TUNNELED CENTRAL VENOUS CATHETER (CVC) WITH SUBCUTANEOUS PORT N/A 7/18/2024    Procedure: ERDEQNODT-LFFN-A-CATH;  Surgeon: Blanca Dillard MD;  Location: [facility];  Service: General;  Laterality: N/A;    TOTAL REDUCTION MAMMOPLASTY Bilateral 1996       Family History:   Family History   Problem Relation Name Age of Onset    Brain cancer Mother Ravi Tolbert     Early death Mother Ravi Tolbert 51        Passed at 51    Cancer Mother Ravi Tolbert         Brain tumor    Arthritis Mother Ravi Tolbert     Diabetes Father Ck tolbert         Type 2    Cirrhosis Father Ck holbrookent     Cancer Father Ck pham tomasz         Bone    COPD Father Ck pham tomasz         Smoker    Early death Father Ck tolbert         Passed at 64    Lung cancer Father Ck tolbert     Heart disease Sister Mamta (dianna)wescovich         Congestive heart failure (passed 2/11/18    Hypertension Sister Mamta (dianna)wescovich     Kidney disease Sister Mamta (dianna)wescoradha         Doc removed when she was a child    Hypertension Sister Mayank     Depression Sister Ravi (mayank) macey Mauricio ( sister)         Due to loss of her 27 year old son    Early death Sister Ravi (mayank) macey Mauricio ( sister)         Pulmonary hypertension, cirrhosis of the liver(passed 7/14/2007   Age 57    Heart disease Brother John Choudhury ( passed )         Heart attack    Hypertension Brother John Choudhury ( passed )     Heart disease Brother Juan Francisco Choudhury         Heart  blockage    Heart disease Brother Bhanu Macey         Heart attack (passed)    Diabetes Brother Bhanu Macey     Macular degeneration Brother Bhanu Macey     Breast cancer Maternal Aunt  30    Breast cancer Paternal Aunt  40    Breast cancer Paternal Aunt  40    Ovarian cancer Neg Hx         Social History:  reports that she has never smoked.  She has never used smokeless tobacco. She reports that she does not currently use alcohol. She reports that she does not use drugs.    Allergies:  Review of patient's allergies indicates:   Allergen Reactions    Duricef [cefadroxil] Hives       Medications:  Current Outpatient Medications   Medication Sig Dispense Refill    acetaminophen (TYLENOL) 325 MG tablet Take 650 mg by mouth daily as needed for Pain.      capecitabine (XELODA) 150 MG tablet Take 2 tablets (300 mg total) by mouth 2 (two) times daily Take as directed Monday, Tuesday, Wednesday, Thursday, and Friday for the duration of radiation therapy. Take with food and with 1 other capecitabine prescription for 1,300 mg total. 20 tablet 4    capecitabine (XELODA) 500 MG Tab Take 2 tablets (1,000 mg total) by mouth 2 (two) times daily Take as directed Monday, Tuesday, Wednesday, Thursday, and Friday for the duration of radiation therapy. Take with food and with 1 other capecitabine prescription for 1,300 mg total. 20 tablet 4    duke's soln (benadryl 30 mL, mylanta 30 mL, LIDOcaine 30 mL, nystatin 30 mL) 120mL 10 ml swish & spit/swallow every 4 to 6 hours as needed for mouth pain/ulcers/yeast/throat pain 240 mL 1    ertapenem (INVANZ) 1 gram injection Inject 1 g into the muscle once daily.      EScitalopram oxalate (LEXAPRO) 10 MG tablet Take 1 tablet (10 mg total) by mouth once daily. 30 tablet 2    fluticasone propionate (FLONASE) 50 mcg/actuation nasal spray 1 spray (50 mcg total) by Each Nostril route once daily. 18.2 mL 0    HYDROcodone-acetaminophen (NORCO) 5-325 mg per tablet Take 1 tablet by mouth every 6 (six) hours as needed for Pain. 28 tablet 0    ibuprofen (ADVIL,MOTRIN) 800 MG tablet Take 1 tablet (800 mg total) by mouth every 8 (eight) hours as needed for Pain. 42 tablet 1    levothyroxine (SYNTHROID) 50 MCG tablet Take 1 tablet (50 mcg total) by mouth once daily. 90 tablet 3    loratadine (CLARITIN) 10 mg tablet Take 1 tablet (10 mg total) by  "mouth every morning. 30 tablet 11    potassium chloride SA (K-DUR,KLOR-CON) 20 MEQ tablet Take 40 mEq by mouth 2 (two) times daily.      prochlorperazine (COMPAZINE) 5 MG tablet Take 1 tablet (5 mg total) by mouth every 6 (six) hours as needed for Nausea. 90 tablet 2    senna-docusate 8.6-50 mg (PERICOLACE) 8.6-50 mg per tablet Take 2 tablets by mouth once daily. 60 tablet 1    simvastatin (ZOCOR) 10 MG tablet Take 1 tablet (10 mg total) by mouth every evening. 90 tablet 3    SYNJARDY XR 10-1,000 mg TBph TAKE ONE TABLET BY MOUTH ONCE DAILY 30 tablet 5    VITAMIN D2 1,250 mcg (50,000 unit) capsule Take 1 capsule (50,000 Units total) by mouth twice a week. 8 capsule 11     No current facility-administered medications for this visit.       Review of Systems   Constitutional:  Negative for chills, fatigue, fever and unexpected weight change.   Respiratory:  Negative for cough and shortness of breath.    Cardiovascular:  Negative for chest pain and palpitations.   Gastrointestinal:  Positive for abdominal pain (left lower quad - intermittent). Negative for constipation, diarrhea, nausea and vomiting.   Genitourinary:  Positive for hematuria (pink tinge urine).   Skin:  Negative for rash.   Neurological:  Negative for headaches.   Hematological:  Negative for adenopathy. Does not bruise/bleed easily.       ECOG Performance Status: 0   Objective:      Vitals:   Vitals:    04/23/25 0951   BP: 110/64   BP Location: Left arm   Patient Position: Sitting   Pulse: 67   Resp: 17   Temp: 98.2 °F (36.8 °C)   TempSrc: Temporal   SpO2: 96%   Weight: 53.3 kg (117 lb 8.1 oz)   Height: 5' 1" (1.549 m)     Wt Readings from Last 3 Encounters:   04/23/25 53.3 kg (117 lb 8.1 oz)   04/15/25 53.8 kg (118 lb 9.7 oz)   04/11/25 53.8 kg (118 lb 9.7 oz)       Physical Exam  Constitutional:       General: She is not in acute distress.     Appearance: She is well-developed. She is not diaphoretic.   HENT:      Head: Normocephalic and atraumatic.    "   Comments: Alopecia  Cardiovascular:      Rate and Rhythm: Normal rate and regular rhythm.      Heart sounds: Normal heart sounds. No murmur heard.  Pulmonary:      Effort: Pulmonary effort is normal. No respiratory distress.      Breath sounds: Normal breath sounds. No wheezing.   Abdominal:      General: Bowel sounds are normal. There is no distension.      Palpations: Abdomen is soft.      Tenderness: There is no abdominal tenderness.   Musculoskeletal:      Comments: PORT in right chest     Lymphadenopathy:      Upper Body:      Right upper body: No supraclavicular or axillary adenopathy.      Left upper body: No supraclavicular or axillary adenopathy.   Skin:     Findings: No rash.   Neurological:      Mental Status: She is alert and oriented to person, place, and time.   Psychiatric:         Behavior: Behavior normal.       Laboratory Data:  Lab Visit on 04/21/2025   Component Date Value Ref Range Status    Sodium 04/21/2025 137  136 - 145 mmol/L Final    Potassium 04/21/2025 3.8  3.5 - 5.1 mmol/L Final    Chloride 04/21/2025 102  95 - 110 mmol/L Final    CO2 04/21/2025 27  23 - 29 mmol/L Final    Glucose 04/21/2025 103  70 - 110 mg/dL Final    BUN 04/21/2025 6 (L)  8 - 23 mg/dL Final    Creatinine 04/21/2025 1.1  0.5 - 1.4 mg/dL Final    Calcium 04/21/2025 9.2  8.7 - 10.5 mg/dL Final    Protein Total 04/21/2025 7.1  6.0 - 8.4 gm/dL Final    Albumin 04/21/2025 3.8  3.5 - 5.2 g/dL Final    Bilirubin Total 04/21/2025 0.8  0.1 - 1.0 mg/dL Final    For infants and newborns, interpretation of results should be based   on gestational age, weight and in agreement with clinical   observations.    Premature Infant recommended reference ranges:   0-24 hours:  <8.0 mg/dL   24-48 hours: <12.0 mg/dL   3-5 days:    <15.0 mg/dL   6-29 days:   <15.0 mg/dL    ALP 04/21/2025 87  40 - 150 unit/L Final    AST 04/21/2025 22  11 - 45 unit/L Final    ALT 04/21/2025 12  10 - 44 unit/L Final    Anion Gap 04/21/2025 8  8 - 16 mmol/L  Final    eGFR 04/21/2025 56 (L)  >60 mL/min/1.73/m2 Final    Estimated GFR calculated using the CKD-EPI creatinine (2021) equation.    CA 19-9 04/21/2025 8.3  <=40.0 U/mL Final    The testing method is a chemiluminescent microparticle immunoassay manufactured by [employer] and performed on the [employer] or [employer] system. Values obtained with different assay manufacturers for methods may be different and cannot be used interchangeably.      WBC 04/21/2025 3.79 (L)  3.90 - 12.70 K/uL Final    RBC 04/21/2025 3.65 (L)  4.00 - 5.40 M/uL Final    HGB 04/21/2025 11.7 (L)  12.0 - 16.0 gm/dL Final    HCT 04/21/2025 33.5 (L)  37.0 - 48.5 % Final    MCV 04/21/2025 92  82 - 98 fL Final    MCH 04/21/2025 32.1 (H)  27.0 - 31.0 pg Final    MCHC 04/21/2025 34.9  32.0 - 36.0 g/dL Final    RDW 04/21/2025 16.1 (H)  11.5 - 14.5 % Final    Platelet Count 04/21/2025 137 (L)  150 - 450 K/uL Final    MPV 04/21/2025 9.0 (L)  9.2 - 12.9 fL Final    Nucleated RBC 04/21/2025 0  <=0 /100 WBC Final    Neut % 04/21/2025 71.0  38 - 73 % Final    Lymph % 04/21/2025 8.7 (L)  18 - 48 % Final    Mono % 04/21/2025 15.0  4 - 15 % Final    Eos % 04/21/2025 4.5  <=8 % Final    Basophil % 04/21/2025 0.5  <=1.9 % Final    Imm Grans % 04/21/2025 0.3  0.0 - 0.5 % Final    Neut # 04/21/2025 2.69  1.8 - 7.7 K/uL Final    Lymph # 04/21/2025 0.33 (L)  1 - 4.8 K/uL Final    Mono # 04/21/2025 0.57  0.3 - 1 K/uL Final    Eos # 04/21/2025 0.17  <=0.5 K/uL Final    Baso # 04/21/2025 0.02  <=0.2 K/uL Final    Imm Grans # 04/21/2025 0.01  0.00 - 0.04 K/uL Final    Mild elevation in immature granulocytes is non specific and can be seen in a variety of conditions including stress response, acute inflammation, trauma and pregnancy. Correlation with other laboratory and clinical findings is essential.         Imaging:     CT C/A/P 3/12/25    LUNGS:     There are multiple new centrilobular ground-glass nodules in the left lower lobe. The largest of these is in  the lingular segment and has a maximum diameter of 12 mm (series 19, image 165). Ground-glass nodularity also extends into the basal segments of the left lower lobe. There is a similar 10 mm ground-glass nodule in the posteromedial right lower lobe (series 19, image 186).     Right apical nodular septal thickening (series 19, image 39) is unchanged. The previously described 4 mm ground-glass nodule on series 3, image 62 of the prior study is not clearly reproduced on the current study. Apical septal thickening is likely postinflammatory and is unchanged.     MEDIASTINUM and PLEURA:     A right-sided Port-A-Cath is again noted. There is no pleural effusion or pneumothorax. Cardiac chambers are within normal limits. There is no acute body wall finding.     Liver, pancreas, spleen, gallbladder:     The soft tissue density in the region of the body of the pancreas continues to decrease in size. On the current study it measures 20 x 11 mm in the axial plane. Again noted is atrophy of the pancreatic tail and a portion of the pancreatic body. Advanced hepatic steatosis is noted. The presume cyst in the caudal tip of the posterior segment of the right hepatic lobe (series 23, image 83) has decreased in size since the prior study. A gallstone is again noted. There is no acute abnormality of the liver, pancreas or spleen. There are no inflammatory changes in the gallbladder fossa.     Kidneys:     There is no obstructing urinary tract calculus, hydronephrosis or hydroureter.     Bowel:     The lack of oral contrast limits evaluation of the bowel. There is no acute abnormality of the stomach. There is no small bowel dilatation. There is no acute abnormality of the colon. The rectum is unchanged. Apparent mural thickening is probably secondary to under distension. The appendix appears normal.     Other:     The IVC is within normal limits. No free fluid. There is no free air or pathologic lymph node enlargement. There is no  aneurysmal dilatation of the abdominal aorta.     Pelvis:     The uterus and adnexa are within normal limits. The urinary bladder is unchanged. There is no significant free fluid in the pelvis.     Body wall:     There is no acute fracture or other acute body wall finding.       Assessment:       1. Malignant neoplasm of body of pancreas    2. Chemotherapy-induced thrombocytopenia    3. Chemotherapy-induced neutropenia    4. Normocytic anemia    5. Hypothyroidism, unspecified type    6. Pulmonary nodules           Plan:   Pancreatic Cancer - Patient with Stage III pancreatic cancer with concern for potential encasement of the distal celiac artery per discussion with Dr Valentin  -No sign of mets  - 94.5 on 6/17/24  -Case discussed at tumor board 7/17/24 with recommendation to proceed with chemo  -[facility] genetic testing negative for the genes tested  -[facility] tissue testing showed TP53, KRAS p.G12D, CDKN2A, CDKN2B mutations.  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  -Will need to keep irinotecan dose reduced at 165mg/mm2  -CT C/A/P on 10/02/24 shows stable disease and a possible new RLL nodule  -Pt saw Dr Valentin 10/16/24 whom recommended a few more cycles of chemo and then transition to perioperative radiation therapy  - decreased all the way to 9.4U/mL on 11/11/24 with level today pending   -Pt to go to [facility] 12/09/24  -PT completed 12 cycles  -Repeat scans 1/27/25 showeddecrease in size of pancreatic mass appearing to surround the celiac artery and to wrap partially around the portal vein likely greater than 180°  -Case discussed at tumor board on 2/05/25 with recommendation for radiation  -Pt was to start capecitabine and radiation but will need to hold until she has had continued time to heal from recently diagnosed osteomyelitis of the 2nd digit of the right hand  -Repeat scans 3/12/25 showed decreased pancreatic mass and new groundglass opacities in the  lung  -Completed chemo/XRT  -Radiation completed 4/22/2025  -CT scans scheduled for 5/19/2025    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 As scheduled for CT scans along with CBC, CMP and CA 19.9 on 5/19/25 and f/u with Dr. Hunt on 5/20/25   Follow up with [facility]    Infusion scheduling note    Injection scheduling note    Labs    Imaging    Pharmacy appointment    Other referrals              Treatment Plan Information   OP CAPECITABINE 5 DAYS + RADIOTHERAPY Rajat Hunt MD   Associated diagnosis: Malignant neoplasm of pancreas Stage III cT4, cN0, cM0 noted on 7/12/2024   Line of treatment: Neoadjuvant  Treatment Goal: Curative     Upcoming Treatment Dates - OP CAPECITABINE 5 DAYS + RADIOTHERAPY    2/24/2025       Antiemetics       Physician communication order       Take Home Chemotherapy       capecitabine (XELODA) tablet 1,300 mg    Therapy Plan Information  PORT FLUSH for Malignant neoplasm of pancreas, noted on 7/12/2024  Flushes  heparin, porcine (PF) 100 unit/mL injection flush 500 Units  500 Units, Intravenous, Every visit  sodium chloride 0.9% flush 10 mL  10 mL, Intravenous, Every visit      No therapy plan of the specified type found.    No therapy plan of the specified type found.    Visit today included increased complexity associated with the care of the episodic problem (chemotherapy) addressed and managing the longitudinal care of the patient due to the serious and/or complex managed problem(s) pancreatic cancer.    Salud  ERWIN Martin  Hematology and Medical Oncology  [facility]  [facility]    31 minutes of total time spent on the encounter, which includes face to face time and non-face to face time preparing to see the patient (eg, review of tests), Obtaining and/or reviewing separately obtained history, documenting clinical information in the electronic or other health record, independently interpreting results if documented above (not separately reported) and communicating results to the patient/family/caregiver, or Care coordination (not separately reported).

## 2025-05-01 ENCOUNTER — DOCUMENTATION ONLY (OUTPATIENT)
Dept: INFUSION THERAPY | Facility: HOSPITAL | Age: 65
End: 2025-05-01
Payer: MEDICARE

## 2025-05-01 ENCOUNTER — PATIENT OUTREACH (OUTPATIENT)
Dept: ADMINISTRATIVE | Facility: HOSPITAL | Age: 65
End: 2025-05-01
Payer: MEDICARE

## 2025-05-01 NOTE — PROGRESS NOTES
Pt said she saw the Hope Draper today when she was at [facility]. She hopes to be able to stay there and have her  stay there for her surgery. Pt still does not have a surgery date.     SW to follow.     Bharati Fritz

## 2025-05-08 ENCOUNTER — TELEPHONE (OUTPATIENT)
Dept: SURGERY | Facility: CLINIC | Age: 65
End: 2025-05-08
Payer: MEDICARE

## 2025-05-13 ENCOUNTER — TELEPHONE (OUTPATIENT)
Dept: SURGERY | Facility: CLINIC | Age: 65
End: 2025-05-13
Payer: MEDICARE

## 2025-05-19 ENCOUNTER — HOSPITAL ENCOUNTER (OUTPATIENT)
Dept: RADIOLOGY | Facility: HOSPITAL | Age: 65
Discharge: HOME | End: 2025-05-19
Attending: INTERNAL MEDICINE
Payer: MEDICARE

## 2025-05-19 DIAGNOSIS — C25.1 MALIGNANT NEOPLASM OF BODY OF PANCREAS: ICD-10-CM

## 2025-05-19 PROCEDURE — 74177 CT ABD & PELVIS W/CONTRAST: CPT | Mod: 26,,, | Performed by: STUDENT IN AN ORGANIZED HEALTH CARE EDUCATION/TRAINING PROGRAM

## 2025-05-19 PROCEDURE — 71260 CT THORAX DX C+: CPT | Mod: TC,PO

## 2025-05-19 PROCEDURE — 71260 CT THORAX DX C+: CPT | Mod: 26,,, | Performed by: STUDENT IN AN ORGANIZED HEALTH CARE EDUCATION/TRAINING PROGRAM

## 2025-05-19 PROCEDURE — 25500020 PHARM REV CODE 255: Mod: PO | Performed by: INTERNAL MEDICINE

## 2025-05-19 RX ADMIN — IOHEXOL 75 ML: 350 INJECTION, SOLUTION INTRAVENOUS at 10:05

## 2025-05-19 NOTE — PROGRESS NOTES
PATIENT: Bessy Lamb  MRN: [id number]  DATE: 5/20/2025      Diagnosis:   1. Malignant neoplasm of body of pancreas    2. 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therapy    3. Pulmonary nodule    4. Hypothyroidism, unspecified type    5. Hypokalemia                  Chief Complaint: Pancreatic Cancer      Oncologic History:      Oncologic History     Oncologic Treatment     Pathology           Subjective:    Interval History:  Ms. Lamb is a 64 y.o. female with Arthritis, DMII, GERD, Hypotyroidism, Obesity, Thyroid disease who presents for pancreatic cancer.  The patient underwent CT chest, abdomen, and pelvis on 05/19/2025 showing stable ill-defined mass in the pancreatic body measuring 1.5 cm with similar attenuation of the fat surrounding the celiac axis and proximal splenic vein of the portal confluence.  The patient endorses itching on her back, behind her neck, and between her breasts.  The patient denies any associated rash.  The patient denies CP, cough, SOB, abdominal pain, nausea, vomiting, constipation, diarrhea.  The patient denies fever, chills, night sweats, weight loss, new lumps or bumps, easy bruising or bleeding.  The patient endorses continued numbness in her toes in the hands.      Prior History:   The patient initially underwent a CXR on 11/13/23 fur a URI which showed possible subtle pulmonary nodules.  CT chest 12/01/2023 showed a cluster of ill-defined centrilobular ground-glass opacity he inferior right and left upper lobes as well as a 1.2 cm ground-glass opacity in the superior segment of the left lower lobe; solid 3 mm pulmonary nodule in the right upper lobe; subcentimeter right hepatic lobe hypodensity likely representing a cyst.  The patient underwent surveillance CT chest on 05/21/2024 showing a 3.7 x 2.7 pancreatic body mass in the pancreatic tail atrophy suspicious of pancreatic malignancy as well as and unchanged benign 3 mm nodule in the right upper lobe.  MRI abdomen on 06/30/2024  showed hypoenhancing mass centered in the proximal pancreatic body measuring 2.9 x 2 cm with abutment and possible encasement of the distal splenic vein.  EUS was performed on 07/05/2024 mass in the pancreatic body stage T4 N0 M0 by endo sonographic criteria.  Pathology from biopsy of the stomach showed mild focally moderate chronic gastritis with no evidence of the H pylori.  Stomach polyp which was removed code hyperplastic polyps with chronic inflammation.  Pancreatic biopsy showed adenocarcinoma.  CT of the chest, abdomen, and pelvis on 07/09/2024 showed a 9 mm lesion in the right hepatic lobe previously characterized as cysts; mass in the pancreatic body measuring 4.4 x 3.7 cm with diffuse pancreatic ductal dilatation measuring 8 mm:  Weaning vein at the portal confluence occluded with reconstitution via collaterals.  The mass abuts the portal vein by less than 180° but does not case the proximal splenic artery remains patent.  Also noted was a pancreatic lymph node measuring 0.7 cm.   Patient's case was discussed at tumor board on 07/17/2024 with recommendation for proceeding with the chemotherapy.  Patient had port placement on 07/18/2024.   The patient is admitted to the hospital from 09/20/2024 until 09/22/2024 for hypokalemia with potassium of 2.3.  The patient was subsequently discharged underwent treatment with FOLFIRINOX on 09/23/2024.  CT of the chest, abdomen, and pelvis on 10/02/2024 showed a new 5 mm ground-glass nodule in the right lower lobe; stable 8 mm hypodensity present within the right hepatic lobe suggestive of a cyst; 37 x 35 mm proximal pancreatic body mass slightly smaller in transverse dimension involving 90 degree area of the celiac artery and 100 degree area of the splenic artery with soft tissue extending along the anterior right lateral wall of the portal vein; abnormal soft tissue insinuating between the lateral margin of the celiac artery and adjacent splenic vein; invasion and  focal occlusion of the proximal most aspect of the splenic vein; concentric wall thickening present within the distal sigmoid colon/rectum with infectious and inflammatory etiologies possible.   The patient met with Dr Valentin on 10/16/24 whom felt the patient could receive a few more cycles of chemo and then transition to perioperative radiation therapy.   The patient's case was discussed with Dr. Valentin and .  Plan is to proceed with up to 12 cycles FOLFIRINOX prior to consideration of radiation versus surgical resection.   CT chest, abdomen, and pelvis on 01/27/2025 showing apical pleural thickening bilaterally stable since prior exam; 5 mm nodule of the right lung base; 3 possibly slightly smaller nodules at 4 mm; 1-2 mm nodule in the left lung apex; 8 mm hypodensity at the tip of the right lobe of the liver favored to be a cyst; significant improvement in pancreatic mass now measuring 2.9 x 1.7 cm appearing surround the celiac artery and to wrap partially around the portal vein likely greater than 180°.   The patient was admitted to the hospital from 2/19/25 until 2/22/25 for osteomyelitis.   Pt presented to the hospital with septic arthritis from a cat bite.  MRI hand and fingers on 2/17/25 showed findings concerning for septic arthritis and early changes of osteomyelitis, and cellulitis in the 2nd MCP on the right hand as well as tenosynovitis and cellulitis about dorsal aspect of the hand/wrist.  Pt underwent debridement of soft tissue and tendon, arthrotomy of the 2nd MCP joint with irrigation and excision of infected bone about the 2nd metacarpal and index finger proximal phalanx.  Cultures failed to grow any bacteria.  Pt was discharged on doxycycline and ertapenem to complete 6 weeks of abx.    The patient underwent CT C/A/P on 3/12/25 showing multiple new centrilobular ground-glass nodules in the left lower lobe with the largest measuring 12 mm; ground-glass nodularity extending to basal  segments of the left lower lobe; 10 mm ground-glass nodule in the posteromedial right lower lobe; unchanged right apical nodular septal thickening; resolution of previously described 4 mm ground-glass nodule in the right apex.    Past Medical History:   Past Medical History:   Diagnosis Date    Arthritis, lumbar spine     hands    Diabetes mellitus     General anesthetics causing adverse effect in therapeutic use     following breast rediuct, hard time awakening  also had anxiety rxn to lidocain in dental ofc    GERD (gastroesophageal reflux disease)     Hypothyroidism 10/08/2014    Major depressive disorder 2025    Maternal anesthesia complication     epidural for 1st child went up instead of down; required intubation    Normocytic anemia 2024    Obesity (BMI 30-39.9) 10/08/2014    pancreatic Cancer         Thyroid disease     Thyroid nodule 10/08/2014       Past Surgical HIstory:   Past Surgical History:   Procedure Laterality Date    BREAST BIOPSY Left     b9    BREAST SURGERY      Reduction cause of a mass in left breast    c-sections x2       SECTION  3/26/81 & 85    Birth of two girls    COLONOSCOPY  2012         COLONOSCOPY N/A 2018    Procedure: COLONOSCOPY;  Surgeon: Francisco Mcclain MD;  Location: [facility];  Service: Endoscopy;  Laterality: N/A;    COLONOSCOPY N/A 10/24/2023    Procedure: COLONOSCOPY;  Surgeon: Francisco Mcclain MD;  Location: [facility];  Service: Endoscopy;  Laterality: N/A;    ENDOSCOPIC ULTRASOUND OF UPPER GASTROINTESTINAL TRACT Left 2024    Procedure: ULTRASOUND, UPPER GI TRACT, ENDOSCOPIC;  Surgeon: Andrew Gordon MD;  Location: [facility];  Service: Endoscopy;  Laterality: Left;    ESOPHAGOGASTRODUODENOSCOPY N/A 2024    Procedure: EGD (ESOPHAGOGASTRODUODENOSCOPY);  Surgeon: Andrew Gordon MD;  Location: [facility];  Service: Endoscopy;  Laterality: N/A;    hysteroscopy with polypectomy      INCISION AND DRAINAGE Right  2/19/2025    Procedure: Incision and Drainage, RIGHT HAND EXCISION OF INFECTED BONE RIGHT INDEX FINGER;  Surgeon: SALVATORE Chou MD;  Location: [facility] OR;  Service: General;  Laterality: Right;    INCISIONAL BREAST BIOPSY Left 1996    benign; done at same time as reduction    INSERTION OF TUNNELED CENTRAL VENOUS CATHETER (CVC) WITH SUBCUTANEOUS PORT N/A 7/18/2024    Procedure: [id number];  Surgeon: Blanca Dillard MD;  Location: [facility];  Service: General;  Laterality: N/A;    TOTAL REDUCTION MAMMOPLASTY Bilateral 1996       Family History:   Family History   Problem Relation Name Age of Onset    Brain cancer Mother Ravi Mariela Tomasz     Early death Mother Ravi Tolbert 51        Passed at 51    Cancer Mother Ravi Tolbert         Brain tumor    Arthritis Mother Ravi Tolbert     Diabetes Father Ck pham tomasz         Type 2    Cirrhosis Father Ck ankit tolbert     Cancer Father Ck tolbert         Bone    COPD Father Ck ankit tolbert         Smoker    Early death Father Ck pham tomasz         Passed at 64    Lung cancer Father Ck pham tomasz     Heart disease Sister Mamta (dianna)wescovich         Congestive heart failure (passed 2/11/18    Hypertension Sister Mamta (dianna)wescovich     Kidney disease Sister Mamta (dianna)wescovicsalima         Doc removed when she was a child    Hypertension Sister Mayank     Depression Sister Ravi (mayank) macey Mauricio ( sister)         Due to loss of her 27 year old son    Early death Sister Ravi (mayank) macey Mauricio ( sister)         Pulmonary hypertension, cirrhosis of the liver(passed 7/14/2007   Age 57    Heart disease Brother John Choudhury ( passed )         Heart attack    Hypertension Brother John Choudhury ( passed )     Heart disease Brother Juan Francisco Macey         Heart  blockage    Heart disease Brother Bhanu Macey         Heart  attack (passed)    Diabetes Brother Bhanu Choudhury     Macular degeneration Brother Bhanu Choudhury     Breast cancer Maternal Aunt  30    Breast cancer Paternal Aunt  40    Breast cancer Paternal Aunt  40    Ovarian cancer Neg Hx         Social History:  reports that she has never smoked. She has never used smokeless tobacco. She reports that she does not currently use alcohol. She reports that she does not use drugs.    Allergies:  Review of patient's allergies indicates:   Allergen Reactions    Duricef [cefadroxil] Hives       Medications:  Current Outpatient Medications   Medication Sig Dispense Refill    acetaminophen (TYLENOL) 325 MG tablet Take 650 mg by mouth daily as needed for Pain.      duke's soln (benadryl 30 mL, mylanta 30 mL, LIDOcaine 30 mL, nystatin 30 mL) 120mL 10 ml swish & spit/swallow every 4 to 6 hours as needed for mouth pain/ulcers/yeast/throat pain 240 mL 1    ertapenem (INVANZ) 1 gram injection Inject 1 g into the muscle once daily.      EScitalopram oxalate (LEXAPRO) 10 MG tablet Take 1 tablet (10 mg total) by mouth once daily. 30 tablet 2    fluticasone propionate (FLONASE) 50 mcg/actuation nasal spray 1 spray (50 mcg total) by Each Nostril route once daily. 18.2 mL 0    HYDROcodone-acetaminophen (NORCO) 5-325 mg per tablet Take 1 tablet by mouth every 6 (six) hours as needed for Pain. 28 tablet 0    ibuprofen (ADVIL,MOTRIN) 800 MG tablet Take 1 tablet (800 mg total) by mouth every 8 (eight) hours as needed for Pain. 42 tablet 1    levothyroxine (SYNTHROID) 50 MCG tablet Take 1 tablet (50 mcg total) by mouth once daily. 90 tablet 3    loratadine (CLARITIN) 10 mg tablet Take 1 tablet (10 mg total) by mouth every morning. 30 tablet 11    potassium chloride SA (K-DUR,KLOR-CON) 20 MEQ tablet Take 40 mEq by mouth 2 (two) times daily.      prochlorperazine (COMPAZINE) 5 MG tablet Take 1 tablet (5 mg total) by mouth every 6 (six) hours as needed for Nausea. 90 tablet 2    senna-docusate 8.6-50 mg  "(PERICOLACE) 8.6-50 mg per tablet Take 2 tablets by mouth once daily. 60 tablet 1    simvastatin (ZOCOR) 10 MG tablet Take 1 tablet (10 mg total) by mouth every evening. 90 tablet 3    SYNJARDY XR 10-1,000 mg TBph TAKE ONE TABLET BY MOUTH ONCE DAILY 30 tablet 5    VITAMIN D2 1,250 mcg (50,000 unit) capsule Take 1 capsule (50,000 Units total) by mouth twice a week. 8 capsule 11     No current facility-administered medications for this visit.       Review of Systems   Constitutional:  Negative for chills, fatigue, fever and unexpected weight change.   Respiratory:  Negative for cough and shortness of breath.    Cardiovascular:  Negative for chest pain and palpitations.   Gastrointestinal:  Negative for abdominal pain, constipation, diarrhea, nausea and vomiting.   Skin:  Negative for rash.        itching   Neurological:  Positive for numbness. Negative for headaches.   Hematological:  Negative for adenopathy. Does not bruise/bleed easily.       ECOG Performance Status: 0   Objective:      Vitals:   Vitals:    05/20/25 1325   BP: (!) 141/65   BP Location: Right arm   Patient Position: Sitting   Pulse: 66   Resp: 18   Temp: 97.7 °F (36.5 °C)   TempSrc: Temporal   SpO2: 100%   Weight: 53.8 kg (118 lb 9.7 oz)   Height: 5' 1" (1.549 m)             Physical Exam  Constitutional:       General: She is not in acute distress.     Appearance: She is well-developed. She is not diaphoretic.   HENT:      Head: Normocephalic and atraumatic.   Cardiovascular:      Rate and Rhythm: Normal rate and regular rhythm.      Heart sounds: Normal heart sounds. No murmur heard.     No friction rub. No gallop.   Pulmonary:      Effort: Pulmonary effort is normal. No respiratory distress.      Breath sounds: Normal breath sounds. No wheezing or rales.   Chest:      Chest wall: No tenderness.   Abdominal:      General: Bowel sounds are normal. There is no distension.      Palpations: Abdomen is soft. There is no mass.      Tenderness: There is " no abdominal tenderness. There is no guarding or rebound.   Musculoskeletal:      Comments: PORT in right chest     Lymphadenopathy:      Cervical: No cervical adenopathy.      Upper Body:      Right upper body: No supraclavicular or axillary adenopathy.      Left upper body: No supraclavicular or axillary adenopathy.   Skin:     Findings: No erythema or rash.   Neurological:      Mental Status: She is alert and oriented to person, place, and time.   Psychiatric:         Behavior: Behavior normal.       Laboratory Data:  No visits with results within 1 Week(s) from this visit.   Latest known visit with results is:   Lab Visit on 04/21/2025   Component Date Value Ref Range Status    Sodium 05/19/2025 140  136 - 145 mmol/L Final    Potassium 05/19/2025 4.1  3.5 - 5.1 mmol/L Final    Chloride 05/19/2025 107  95 - 110 mmol/L Final    CO2 05/19/2025 23  23 - 29 mmol/L Final    Glucose 05/19/2025 109  70 - 110 mg/dL Final    BUN 05/19/2025 7 (L)  8 - 23 mg/dL Final    Creatinine 05/19/2025 0.8  0.5 - 1.4 mg/dL Final    Calcium 05/19/2025 9.3  8.7 - 10.5 mg/dL Final    Protein Total 05/19/2025 7.2  6.0 - 8.4 gm/dL Final    Albumin 05/19/2025 4.0  3.5 - 5.2 g/dL Final    Bilirubin Total 05/19/2025 1.0  0.1 - 1.0 mg/dL Final    For infants and newborns, interpretation of results should be based   on gestational age, weight and in agreement with clinical   observations.    Premature Infant recommended reference ranges:   0-24 hours:  <8.0 mg/dL   24-48 hours: <12.0 mg/dL   3-5 days:    <15.0 mg/dL   6-29 days:   <15.0 mg/dL    ALP 05/19/2025 81  40 - 150 unit/L Final    AST 05/19/2025 22  11 - 45 unit/L Final    ALT 05/19/2025 12  10 - 44 unit/L Final    Anion Gap 05/19/2025 10  8 - 16 mmol/L Final    eGFR 05/19/2025 >60  >60 mL/min/1.73/m2 Final    Estimated GFR calculated using the CKD-EPI creatinine (2021) equation.    CA 19-9 05/19/2025 7.1  <=40.0 U/mL Final    The testing method is a chemiluminescent microparticle  immunoassay manufactured by [employer] and performed on the  or [employer] system. Values obtained with different assay manufacturers for methods may be different and cannot be used interchangeably.      WBC 05/19/2025 2.62 (L)  3.90 - 12.70 K/uL Final    RBC 05/19/2025 3.74 (L)  4.00 - 5.40 M/uL Final    HGB 05/19/2025 12.0  12.0 - 16.0 gm/dL Final    HCT 05/19/2025 34.3 (L)  37.0 - 48.5 % Final    MCV 05/19/2025 92  82 - 98 fL Final    MCH 05/19/2025 32.1 (H)  27.0 - 31.0 pg Final    MCHC 05/19/2025 35.0  32.0 - 36.0 g/dL Final    RDW 05/19/2025 16.2 (H)  11.5 - 14.5 % Final    Platelet Count 05/19/2025 126 (L)  150 - 450 K/uL Final    MPV 05/19/2025 8.9 (L)  9.2 - 12.9 fL Final    Nucleated RBC 05/19/2025 0  <=0 /100 WBC Final    Neut % 05/19/2025 62.2  38 - 73 % Final    Lymph % 05/19/2025 17.2 (L)  18 - 48 % Final    Mono % 05/19/2025 11.8  4 - 15 % Final    Eos % 05/19/2025 7.6  <=8 % Final    Basophil % 05/19/2025 0.8  <=1.9 % Final    Imm Grans % 05/19/2025 0.4  0.0 - 0.5 % Final    Neut # 05/19/2025 1.63 (L)  1.8 - 7.7 K/uL Final    Lymph # 05/19/2025 0.45 (L)  1 - 4.8 K/uL Final    Mono # 05/19/2025 0.31  0.3 - 1 K/uL Final    Eos # 05/19/2025 0.20  <=0.5 K/uL Final    Baso # 05/19/2025 0.02  <=0.2 K/uL Final    Imm Grans # 05/19/2025 0.01  0.00 - 0.04 K/uL Final    Mild elevation in immature granulocytes is non specific and can be seen in a variety of conditions including stress response, acute inflammation, trauma and pregnancy. Correlation with other laboratory and clinical findings is essential.    Sodium 04/21/2025 137  136 - 145 mmol/L Final    Potassium 04/21/2025 3.8  3.5 - 5.1 mmol/L Final    Chloride 04/21/2025 102  95 - 110 mmol/L Final    CO2 04/21/2025 27  23 - 29 mmol/L Final    Glucose 04/21/2025 103  70 - 110 mg/dL Final    BUN 04/21/2025 6 (L)  8 - 23 mg/dL Final    Creatinine 04/21/2025 1.1  0.5 - 1.4 mg/dL Final    Calcium 04/21/2025 9.2  8.7 - 10.5 mg/dL Final     Protein Total 04/21/2025 7.1  6.0 - 8.4 gm/dL Final    Albumin 04/21/2025 3.8  3.5 - 5.2 g/dL Final    Bilirubin Total 04/21/2025 0.8  0.1 - 1.0 mg/dL Final    For infants and newborns, interpretation of results should be based   on gestational age, weight and in agreement with clinical   observations.    Premature Infant recommended reference ranges:   0-24 hours:  <8.0 mg/dL   24-48 hours: <12.0 mg/dL   3-5 days:    <15.0 mg/dL   6-29 days:   <15.0 mg/dL    ALP 04/21/2025 87  40 - 150 unit/L Final    AST 04/21/2025 22  11 - 45 unit/L Final    ALT 04/21/2025 12  10 - 44 unit/L Final    Anion Gap 04/21/2025 8  8 - 16 mmol/L Final    eGFR 04/21/2025 56 (L)  >60 mL/min/1.73/m2 Final    Estimated GFR calculated using the CKD-EPI creatinine (2021) equation.    CA 19-9 04/21/2025 8.3  <=40.0 U/mL Final    The testing method is a chemiluminescent microparticle immunoassay manufactured by [employer] and performed on the [employer] or [employer] system. Values obtained with different assay manufacturers for methods may be different and cannot be used interchangeably.      WBC 04/21/2025 3.79 (L)  3.90 - 12.70 K/uL Final    RBC 04/21/2025 3.65 (L)  4.00 - 5.40 M/uL Final    HGB 04/21/2025 11.7 (L)  12.0 - 16.0 gm/dL Final    HCT 04/21/2025 33.5 (L)  37.0 - 48.5 % Final    MCV 04/21/2025 92  82 - 98 fL Final    MCH 04/21/2025 32.1 (H)  27.0 - 31.0 pg Final    MCHC 04/21/2025 34.9  32.0 - 36.0 g/dL Final    RDW 04/21/2025 16.1 (H)  11.5 - 14.5 % Final    Platelet Count 04/21/2025 137 (L)  150 - 450 K/uL Final    MPV 04/21/2025 9.0 (L)  9.2 - 12.9 fL Final    Nucleated RBC 04/21/2025 0  <=0 /100 WBC Final    Neut % 04/21/2025 71.0  38 - 73 % Final    Lymph % 04/21/2025 8.7 (L)  18 - 48 % Final    Mono % 04/21/2025 15.0  4 - 15 % Final    Eos % 04/21/2025 4.5  <=8 % Final    Basophil % 04/21/2025 0.5  <=1.9 % Final    Imm Grans % 04/21/2025 0.3  0.0 - 0.5 % Final    Neut # 04/21/2025 2.69  1.8 - 7.7 K/uL Final    Lymph #  04/21/2025 0.33 (L)  1 - 4.8 K/uL Final    Mono # 04/21/2025 0.57  0.3 - 1 K/uL Final    Eos # 04/21/2025 0.17  <=0.5 K/uL Final    Baso # 04/21/2025 0.02  <=0.2 K/uL Final    Imm Grans # 04/21/2025 0.01  0.00 - 0.04 K/uL Final    Mild elevation in immature granulocytes is non specific and can be seen in a variety of conditions including stress response, acute inflammation, trauma and pregnancy. Correlation with other laboratory and clinical findings is essential.         Imaging:     CT C/A/P 5/19/25  Right chest port in place. Thoracic aorta is normal caliber contains atherosclerosis. Heart is normal size. No pericardial effusion.     No pathologically enlarged thoracic lymph nodes.     Mild scarring in the lung apices. Resolution of prior ground-glass opacities in the lower lobes. No concerning pulmonary nodule. No consolidation, pleural effusion, or pneumothorax.     Small hypodensity in the inferior right hepatic lobe corresponding with the cyst seen on prior MRI. No concerning liver lesion. Cholelithiasis. No biliary ductal dilatation.     Spleen and adrenal glands are unremarkable.     Grossly stable ill-defined mass in the pancreatic body measuring approximately 1.5 cm (series 601, image 43). Similar attenuation of the fat surrounding the celiac axis and proximal splenic vein at the portal confluence. Diffuse atrophy of the pancreatic parenchyma. No significant ductal dilatation.     No hydronephrosis or ureteral dilatation. Bladder and reproductive structures are unremarkable.     No evidence of bowel obstruction or inflammation.     No free intraperitoneal fluid or air. No pathologically enlarged abdominopelvic lymph nodes.     Abdominal aorta is normal caliber and contains mild atherosclerosis.     No significant abnormality of the body wall soft tissues.     Degenerative changes of the osseous structures. No acute or aggressive bony abnormality.       Assessment:       1. Malignant neoplasm of body of  pancreas    2. 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therapy    3. Pulmonary nodule    4. Hypothyroidism, unspecified type    5. Hypokalemia                 Plan:   Pancreatic Cancer - Patient with Stage III pancreatic cancer with concern for potential encasement of the distal celiac artery per discussion with Dr Valentin  -No sign of mets  - 94.5 on 6/17/24  -Case discussed at tumor board 7/17/24 with recommendation to proceed with chemo  -[facility] genetic testing negative for the genes tested  -[facility] tissue testing showed TP53, KRAS p.G12D, CDKN2A, CDKN2B mutations.  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  -Will need to keep irinotecan dose reduced at 165mg/mm2  -CT C/A/P on 10/02/24 shows stable disease and a possible new RLL nodule  -Pt saw Dr Valentin 10/16/24 whom recommended a few more cycles of chemo and then transition to perioperative radiation therapy  - decreased all the way to 9.4U/mL on 11/11/24 with level today pending   -Pt to go to [facility] 12/09/24  -PT completed 12 cycles  -Repeat scans 1/27/25 showeddecrease in size of pancreatic mass appearing to surround the celiac artery and to wrap partially around the portal vein likely greater than 180°  -Case discussed at tumor board on 2/05/25 with recommendation for radiation  -Pt was to start capecitabine and radiation but will need to hold until she has had continued time to heal from recently diagnosed osteomyelitis of the 2nd digit of the right hand  -Repeat scans 3/12/25 showed decreased pancreatic mass and new groundglass opacities in the lung  -Radiation completed 04/22/2025 with 50 Gy given in 25 fractions.  PT was on concomitant Capecitabine on days of radiation    -PT to see Dr Valentin 5/21/25 for consideration of surgery  -If pt not a surgical candidate, will monitor off chemo    Pulmonary Nodules - resolved on recent CT  -Will monitor    Hypokalemia -   Lab Results   Component Value Date    K 4.1  05/19/2025   -Stable  -PT to continue potassium chloride  -Will monitor     Hypothyroidism - pt on synthroid  -Will monitor    Route Chart for Scheduling    Med Onc Chart Routing      Follow up with physician 4 weeks. Pt needs a CBC, CMP and  with return appt with me in 4 weeks.   Follow up with [facility]    Infusion scheduling note    Injection scheduling note    Labs    Imaging    Pharmacy appointment    Other referrals            Treatment Plan Information   OP CAPECITABINE 5 DAYS + RADIOTHERAPY Rajat Hunt MD   Associated diagnosis: Malignant neoplasm of pancreas Stage III cT4, cN0, cM0 noted on 7/12/2024   Line of treatment: Neoadjuvant  Treatment Goal: Curative     Upcoming Treatment Dates - OP CAPECITABINE 5 DAYS + RADIOTHERAPY    2/24/2025       Antiemetics       Physician communication order       Take Home Chemotherapy       capecitabine (XELODA) tablet 1,300 mg    Therapy Plan Information  PORT FLUSH for Malignant neoplasm of pancreas, noted on 7/12/2024  Flushes  heparin, porcine (PF) 100 unit/mL injection flush 500 Units  500 Units, Intravenous, Every visit  sodium chloride 0.9% flush 10 mL  10 mL, Intravenous, Every visit      No therapy plan of the specified type found.    No therapy plan of the specified type found.      Rajat Hunt MD  [facility]  Hematology and Oncology  [facility]   [address] [facility] [place] [address]   O: [phone]  F: [phone]

## 2025-05-20 ENCOUNTER — OFFICE VISIT (OUTPATIENT)
Dept: RADIATION ONCOLOGY | Facility: CLINIC | Age: 65
End: 2025-05-20
Payer: MEDICARE

## 2025-05-20 ENCOUNTER — OFFICE VISIT (OUTPATIENT)
Dept: HEMATOLOGY/ONCOLOGY | Facility: CLINIC | Age: 65
End: 2025-05-20
Attending: INTERNAL MEDICINE
Payer: MEDICARE

## 2025-05-20 VITALS
BODY MASS INDEX: 22.27 KG/M2 | OXYGEN SATURATION: 100 % | SYSTOLIC BLOOD PRESSURE: 153 MMHG | DIASTOLIC BLOOD PRESSURE: 65 MMHG | HEIGHT: 61 IN | RESPIRATION RATE: 16 BRPM | HEART RATE: 66 BPM | WEIGHT: 118.63 LBS | SYSTOLIC BLOOD PRESSURE: 141 MMHG | OXYGEN SATURATION: 98 % | TEMPERATURE: 98 F | HEIGHT: 61 IN | WEIGHT: 117.94 LBS | DIASTOLIC BLOOD PRESSURE: 67 MMHG | BODY MASS INDEX: 22.4 KG/M2 | TEMPERATURE: 98 F | RESPIRATION RATE: 18 BRPM

## 2025-05-20 DIAGNOSIS — R91.1 PULMONARY NODULE: ICD-10-CM

## 2025-05-20 DIAGNOSIS — E87.6 HYPOKALEMIA: ICD-10-CM

## 2025-05-20 DIAGNOSIS — C25.9 PANCREATIC ADENOCARCINOMA: Primary | ICD-10-CM

## 2025-05-20 DIAGNOSIS — E03.9 HYPOTHYROIDISM, UNSPECIFIED TYPE: ICD-10-CM

## 2025-05-20 DIAGNOSIS — Z79.69 IMMUNODEFICIENCY DUE TO CHEMOTHERAPY: ICD-10-CM

## 2025-05-20 DIAGNOSIS — T45.1X5A IMMUNODEFICIENCY DUE TO CHEMOTHERAPY: ICD-10-CM

## 2025-05-20 DIAGNOSIS — D84.821 IMMUNODEFICIENCY DUE TO CHEMOTHERAPY: ICD-10-CM

## 2025-05-20 DIAGNOSIS — C25.1 MALIGNANT NEOPLASM OF BODY OF PANCREAS: Primary | ICD-10-CM

## 2025-05-20 PROCEDURE — 1126F AMNT PAIN NOTED NONE PRSNT: CPT | Mod: CPTII,S$GLB,, | Performed by: RADIOLOGY

## 2025-05-20 PROCEDURE — 3288F FALL RISK ASSESSMENT DOCD: CPT | Mod: CPTII,S$GLB,, | Performed by: RADIOLOGY

## 2025-05-20 PROCEDURE — 99213 OFFICE O/P EST LOW 20 MIN: CPT | Mod: S$GLB,,, | Performed by: INTERNAL MEDICINE

## 2025-05-20 PROCEDURE — 1159F MED LIST DOCD IN RCRD: CPT | Mod: CPTII,S$GLB,, | Performed by: INTERNAL MEDICINE

## 2025-05-20 PROCEDURE — 3078F DIAST BP <80 MM HG: CPT | Mod: CPTII,S$GLB,, | Performed by: RADIOLOGY

## 2025-05-20 PROCEDURE — 3044F HG A1C LEVEL LT 7.0%: CPT | Mod: CPTII,S$GLB,, | Performed by: RADIOLOGY

## 2025-05-20 PROCEDURE — 1101F PT FALLS ASSESS-DOCD LE1/YR: CPT | Mod: CPTII,S$GLB,, | Performed by: INTERNAL MEDICINE

## 2025-05-20 PROCEDURE — 99999 PR PBB SHADOW E&M-EST. PATIENT-LVL III: CPT | Mod: PBBFAC,,, | Performed by: INTERNAL MEDICINE

## 2025-05-20 PROCEDURE — 1101F PT FALLS ASSESS-DOCD LE1/YR: CPT | Mod: CPTII,S$GLB,, | Performed by: RADIOLOGY

## 2025-05-20 PROCEDURE — 99999 PR PBB SHADOW E&M-EST. PATIENT-LVL III: CPT | Mod: PBBFAC,,, | Performed by: RADIOLOGY

## 2025-05-20 PROCEDURE — 3077F SYST BP >= 140 MM HG: CPT | Mod: CPTII,S$GLB,, | Performed by: INTERNAL MEDICINE

## 2025-05-20 PROCEDURE — 3288F FALL RISK ASSESSMENT DOCD: CPT | Mod: CPTII,S$GLB,, | Performed by: INTERNAL MEDICINE

## 2025-05-20 PROCEDURE — 3078F DIAST BP <80 MM HG: CPT | Mod: CPTII,S$GLB,, | Performed by: INTERNAL MEDICINE

## 2025-05-20 PROCEDURE — 3008F BODY MASS INDEX DOCD: CPT | Mod: CPTII,S$GLB,, | Performed by: INTERNAL MEDICINE

## 2025-05-20 PROCEDURE — 3077F SYST BP >= 140 MM HG: CPT | Mod: CPTII,S$GLB,, | Performed by: RADIOLOGY

## 2025-05-20 PROCEDURE — 1125F AMNT PAIN NOTED PAIN PRSNT: CPT | Mod: CPTII,S$GLB,, | Performed by: INTERNAL MEDICINE

## 2025-05-20 PROCEDURE — 3044F HG A1C LEVEL LT 7.0%: CPT | Mod: CPTII,S$GLB,, | Performed by: INTERNAL MEDICINE

## 2025-05-20 PROCEDURE — 99024 POSTOP FOLLOW-UP VISIT: CPT | Mod: S$GLB,,, | Performed by: RADIOLOGY

## 2025-05-20 NOTE — PROGRESS NOTES
[facility] Department of Radiation Oncology  Follow Up Visit Note    Diagnosis:  Bessy Lamb is a 64 y.o. 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 SMA, ZULEYMA, and GDA are not involved. There is almost certainly posterior gastric wall involvement. She has completed 12 cycles of FOLFIRINOX. More recently she completed chemoradiation 4/22/25.      Oncologic History:  Oncology History   Malignant neoplasm of pancreas   7/12/2024 Initial Diagnosis    Pancreatic cancer     7/13/2024 Cancer Staged    Staging form: Exocrine Pancreas, AJCC 8th Edition  - Clinical: Stage III (cT4, cN0, cM0)     7/22/2024 - 1/16/2025 Chemotherapy    Treatment Summary   Plan Name: OP GI FOLFIRINOX (oxaliplatin, leucovorin, irinotecan, fluorouracil) Q2W   Treatment Goal: Curative  Status: Inactive  Start Date: 7/22/2024  End Date: 1/16/2025  Provider: Rajat Hunt MD  Chemotherapy: fluorouraciL injection 500 mg, 515 mg (100 % of original dose 320 mg/m2), Intravenous, Clinic/HOD 1 time, 12 of 26 cycles  Dose modification: 320 mg/m2 (original dose 320 mg/m2, Cycle 1, Reason: MD Discretion, Comment: Pharamcogenomic), 320 mg/m2 (original dose 400 mg/m2, Cycle 6)  Administration: 500 mg (7/22/2024), 640 mg (8/5/2024), 635 mg (8/19/2024), 635 mg (9/3/2024), 640 mg (9/23/2024), 500 mg (10/14/2024), 500 mg (10/28/2024), 500 mg (11/11/2024), 500 mg (11/25/2024), 500 mg (12/11/2024), 500 mg (12/31/2024), 500 mg (1/14/2025)  irinotecan (CAMPTOSAR) 260 mg in 0.9% NaCl 578 mL chemo infusion, 266 mg (100 % of original dose 165 mg/m2), Intravenous, Clinic/HOD 1 time, 12 of 26 cycles  Dose modification: 165 mg/m2 (original dose 165 mg/m2, Cycle 1, Reason: Other (see comments), Comment: Waiting for pharmacogenomic panel), 165 mg/m2 (original dose 165 mg/m2, Cycle 2, Reason: Other  (see comments), Comment: UGT Mutation), 140 mg/m2 (original dose 165 mg/m2, Cycle 5, Reason: Other (see comments), Comment: UGT1A1 mutation), 165 mg/m2 (original dose 165 mg/m2, Cycle 5, Reason: MD Discretion), 140 mg/m2 (original dose 165 mg/m2, Cycle 6, Reason: MD Discretion, Comment: Thrombocytopenia)  Administration: 260 mg (7/22/2024), 260 mg (8/5/2024), 260 mg (8/19/2024), 260 mg (9/3/2024), 260 mg (9/23/2024), 220 mg (10/14/2024), 220 mg (10/28/2024), 220 mg (11/11/2024), 220 mg (11/25/2024), 220 mg (12/11/2024), 218 mg (12/31/2024), 220 mg (1/14/2025)  oxaliplatin (ELOXATIN) 85 mg/m2 = 137 mg in D5W 592.4 mL chemo infusion, 85 mg/m2 = 137 mg, Intravenous, Clinic/HOD 1 time, 12 of 26 cycles  Dose modification: 65 mg/m2 (original dose 85 mg/m2, Cycle 6)  Administration: 137 mg (7/22/2024), 136 mg (8/5/2024), 135 mg (8/19/2024), 135 mg (9/3/2024), 136 mg (9/23/2024), 100 mg (10/14/2024), 100 mg (10/28/2024), 100 mg (11/11/2024), 100 mg (11/25/2024), 100 mg (12/11/2024), 100 mg (12/31/2024), 100 mg (1/14/2025)  fluorouracil (Adrucil) 3,000 mg in 0.9% NaCl 100 mL chemo infusion, 3,090 mg (100 % of original dose 1,920 mg/m2), Intravenous, Over 46 hours, 12 of 26 cycles  Dose modification: 1,920 mg/m2 (original dose 1,920 mg/m2, Cycle 1, Reason: MD Discretion, Comment: Pharmcogenomic panel pending), 1,920 mg/m2 (original dose 2,400 mg/m2, Cycle 6)  Administration: 3,000 mg (7/22/2024), 3,840 mg (8/5/2024), 3,815 mg (8/19/2024), 3,815 mg (9/3/2024), 3,840 mg (9/23/2024), 3,000 mg (10/14/2024), 3,000 mg (10/28/2024), 3,000 mg (11/11/2024), 3,000 mg (11/25/2024), 3,000 mg (12/11/2024), 2,995 mg (12/31/2024), 3,000 mg (1/14/2025)     2/24/2025 -  Chemotherapy    Treatment Summary   Plan Name: OP CAPECITABINE 5 DAYS + RADIOTHERAPY  Treatment Goal: Curative  Status: Active  Start Date: 2/24/2025 (Planned)  End Date: 2/24/2025 (Planned)  Provider: Rajat Hunt MD  Chemotherapy: capecitabine (XELODA) tablet 1,300 mg,  1,300 mg (100 % of original dose 1,300 mg), Oral, 2 times daily, 0 of 1 cycle, Start date: 2/24/2025, End date: --  Dose modification: 1,300 mg (original dose 1,300 mg, Cycle 1)     3/18/2025 - 4/22/2025 Radiation Therapy    Treatment Site Ref. ID Energy Dose/Fx (Gy) #Fx Dose Correction (Gy) Total Dose (Gy) Start Date End Date Elapsed Days   IM Pancreas PTV 6X 2 25 / 25 0 50 3/18/2025 4/22/2025 35     Treating physician: Abiodun         Interval History  The patient presents today for a regularly scheduled follow up visit.  She was last seen in our clinic on 10/24/24.   Since that time, she continued on FOLFIRINOX and has tolerated treatment well.    CT c/a/p 1/27/25 revealed:  1. Interval significant improvement in the right lower lobe nodule seen on prior.  Two 2 mm nodules are noted that were not well displayed on the prior of uncertain significance.  Long-term follow-up for size stability would be reasonable  2. Significant decrease in the size of the known pancreatic neoplastic mass.  3. Wall thickening of the rectum is still noted of uncertain etiology that appears less thick than on the prior.  Please clinically correlate.  Infectious inflammatory and neoplastic etiologies as well as postprocedural or post radiation changes are on the differential.  4. Apparent bladder wall thickening as can be seen with urinary tract infection, postobstructive change, neurogenic bladder, radiation changes or postprocedural or inflammatory changes.  5. Decreased liver density is noted as can be seen with fatty infiltration of liver  6. Cholelithiasis      Having intermittent diarrhea and urgency. Seems better when not taking creon.    Review of Systems:   Constistutional: Denies fever, chills. No recent weight changes. Denies nights sweats.  Eyes: No redness or dryness.  ENT: Denies dry mouth. No ulcers.  Card: Denies chest pain. No PND, or orthopnea.   Resp: Denies cough. Denies shortness of breath.  Gastro: Denies nausea  or vomiting, constipation, diarrhea.  Genito: No kidney stones. Denies dysuria.  Skin: No rash, psoriasis, or alopecia.  Musculoskeletal:No myalgia. No muscle weakness.  Neuro: No numbness or tingling. No history of seizures or psychosis.  Psych: Denies depression. Denies anxiety.  Endo: Denies history of diabetes. No thyroid disease.  Heme: Denies anemia. No blood clots or bleeding diathesis.  Allergic: Denies seasonal allergies.   All other systems negative    Social History:  Social History     Tobacco Use    Smoking status: Never    Smokeless tobacco: Never   Substance Use Topics    Alcohol use: Not Currently     Comment: 2/ month    Drug use: No       Family History:  Cancer-related family history includes Brain cancer in her mother; Breast cancer (age of onset: 30) in her maternal aunt; Breast cancer (age of onset: 40) in her paternal aunt and paternal aunt; Cancer in her father and mother; Lung cancer in her father. There is no history of Ovarian cancer.    Exam:  There were no vitals filed for this visit.  Constitutional: Pleasant 64 y.o. female in no acute distress.  Well nourished. Well groomed.   HEENT: Normocephalic and atraumatic   Lungs: No audible wheezing.  Normal effort.   Musculoskeletal: No gross MSK deformities. Ambulates  Skin: No rashes appreciated.   Psych: Alert and oriented with appropriate mood and affect.  Neuro:   Grossly normal.    Data Review:    Independent Interpretation of Test(s): CT c/a/p from 1/27/25 was personally reviewed as detailed above    Assessment:  Mrs. Lamb is a 64 year old with locally advanced pancreatic cancer with concern for celiac involvement. She has tolerated FOLFIRINOX remarkably well. Completed chemoradiation one month ago.  ECOG: (0) Fully active, able to carry on all predisease performance without restriction    Plan:  Patient to follow up with Dr. Valentin tomorrow. I'll follow up with her in 4 months.  She was given our contact information, and she was  told that she could call our clinic at anytime if she has any questions or concerns.  Follow up with other providers as directed

## 2025-05-21 ENCOUNTER — PATIENT MESSAGE (OUTPATIENT)
Dept: HEMATOLOGY/ONCOLOGY | Facility: CLINIC | Age: 65
End: 2025-05-21
Payer: MEDICARE

## 2025-05-21 ENCOUNTER — DOCUMENTATION ONLY (OUTPATIENT)
Dept: INFUSION THERAPY | Facility: HOSPITAL | Age: 65
End: 2025-05-21
Payer: MEDICARE

## 2025-05-21 ENCOUNTER — OFFICE VISIT (OUTPATIENT)
Dept: HEMATOLOGY/ONCOLOGY | Facility: CLINIC | Age: 65
End: 2025-05-21
Payer: MEDICARE

## 2025-05-21 VITALS
DIASTOLIC BLOOD PRESSURE: 78 MMHG | SYSTOLIC BLOOD PRESSURE: 138 MMHG | BODY MASS INDEX: 22.58 KG/M2 | WEIGHT: 119.5 LBS | OXYGEN SATURATION: 99 % | HEART RATE: 67 BPM

## 2025-05-21 DIAGNOSIS — F41.9 ANXIETY: Primary | ICD-10-CM

## 2025-05-21 DIAGNOSIS — C25.1 MALIGNANT NEOPLASM OF BODY OF PANCREAS: Primary | ICD-10-CM

## 2025-05-21 PROCEDURE — 3044F HG A1C LEVEL LT 7.0%: CPT | Mod: CPTII,S$GLB,, | Performed by: SURGERY

## 2025-05-21 PROCEDURE — 1126F AMNT PAIN NOTED NONE PRSNT: CPT | Mod: CPTII,S$GLB,, | Performed by: SURGERY

## 2025-05-21 PROCEDURE — 3078F DIAST BP <80 MM HG: CPT | Mod: CPTII,S$GLB,, | Performed by: SURGERY

## 2025-05-21 PROCEDURE — G2211 COMPLEX E/M VISIT ADD ON: HCPCS | Mod: S$GLB,,, | Performed by: SURGERY

## 2025-05-21 PROCEDURE — 3288F FALL RISK ASSESSMENT DOCD: CPT | Mod: CPTII,S$GLB,, | Performed by: SURGERY

## 2025-05-21 PROCEDURE — 1101F PT FALLS ASSESS-DOCD LE1/YR: CPT | Mod: CPTII,S$GLB,, | Performed by: SURGERY

## 2025-05-21 PROCEDURE — 3008F BODY MASS INDEX DOCD: CPT | Mod: CPTII,S$GLB,, | Performed by: SURGERY

## 2025-05-21 PROCEDURE — 99999 PR PBB SHADOW E&M-EST. PATIENT-LVL IV: CPT | Mod: PBBFAC,,, | Performed by: SURGERY

## 2025-05-21 PROCEDURE — 3075F SYST BP GE 130 - 139MM HG: CPT | Mod: CPTII,S$GLB,, | Performed by: SURGERY

## 2025-05-21 PROCEDURE — 1159F MED LIST DOCD IN RCRD: CPT | Mod: CPTII,S$GLB,, | Performed by: SURGERY

## 2025-05-21 PROCEDURE — 99215 OFFICE O/P EST HI 40 MIN: CPT | Mod: S$GLB,,, | Performed by: SURGERY

## 2025-05-21 RX ORDER — LORAZEPAM 0.5 MG/1
0.5 TABLET ORAL EVERY 6 HOURS PRN
Qty: 30 TABLET | Refills: 0 | Status: SHIPPED | OUTPATIENT
Start: 2025-05-21 | End: 2026-05-21

## 2025-05-21 NOTE — PROGRESS NOTES
SW met with pt, her  and her daughter. Pt said she met with Dr Valentin regarding her surgery. It has been scheduled fro 6/26 with her arrival on 6/25. Pt said the surgery is risky and she has to think about if she will go through with the surgery or not. Pt said she will discuss her options and risk with her family before she sees the doctor again. SW encouraged her to take her time to discuss her thoughts and feelings with family and doctors to help her make a decision she is comfortable with. SW provided support.     Pt is interested in staying at [facility] [facility] for her self and family if possible. SW will start lodging request for [place] [facility].     SW to follow.     Bharati Fritz, ANA MARIAW

## 2025-05-21 NOTE — PROGRESS NOTES
Met with pt, spouse and daughter. Plan of care reviewed to include tentative surgery date 6/26/2025, RTC 2 weeks, and spleen vaccines appt after clinic appt. Clinic appt scheduled for 6/4/2025. Details provided. Encouraged family to write all questions down to discuss with Dr. Valentin at follow up appointment. Discussed lodging at [facility] and [facility]. Family reports they are interested in [facility]. Instructed them to discuss further with their  as they can assist with applying for accommodations. Pt discussed her concerns regarding her diagnosis and hardships family is going through. Emotional support provided. Encouraged to reach out wit any questions prior to appt on 6/4. Pt and family verbalized understanding and thanked this RN for the time spent with them.      Staff message sent to [facility]  pool with request to contact pt for lodging assistance for surgery 6/26

## 2025-05-21 NOTE — PROGRESS NOTES
Ms. Lamb is a 63 y.o. female with Arthritis, DMII, GERD, Hypotyroidism, Obesity, Thyroid disease who presents for pancreatic cancer. This was incidentally found on CT chest for a URI.     EUS 7/5/2024:  Impression:            - Normal esophagus.                          - Normal mucosa was found in the entire stomach.                          Biopsied.                          - A single gastric polyp. Resected and retrieved.                          - Normal examined duodenum.                          - A mass was identified in the pancreatic body.                          This was staged T4 N0 M0 by endosonographic                          criteria. The staging applies if malignancy is                          confirmed. Fine needle biopsy performed.                          The common bile duct was traced from the papilla                          to the intra-hepatics and appeared normal. It                          measured 3 mm in diameter at the level of the mid                          CBD. There was no evidence of intra-ductal stones                          and sludge.                          There were a few small stones in the gallbladder,                          measuring 3-4mm in size. Otherwise the gallbladder                          appeared normal.                          Limited views of the left lobe of the liver                          appeared normal.                          Limited views of the abdominal aorta, portal vein,                          and splenic vessels appeared normal.                          There was no intra-abdominal lymphadenopathy.                          The mediastinum was unremarkable. There was no                          mediastinal lymphadenopathy. Vascular structures                          all appeared normal.                          The esophagus, stomach, and duodenal stations were                          all viewed.      Path:  PANCREAS, FURTHER  "DESIGNATED "BODY MASS," FINE NEEDLE ASPIRATION:     --POSITIVE FOR ADENOCARCINOMA.      CT 7/9/2024:  Impression:     Proximal pancreatic body 4.4 cm mass consistent known adenocarcinoma.  There is focal occlusion of the splenic vein at the level of the portal confluence and encasement of the splenic artery which remains patent.  The mass also focally abuts the distal lesser curvature of the stomach without evidence of invasion.  Nonspecific peripancreatic lymph node measuring 0.7 cm in short axis.  Recommend attention on follow-up.  Otherwise, no evidence of metastatic disease in the chest, abdomen or pelvis.  Cholelithiasis.  Circumferential bladder wall thickening which could be due to underdistention.  Consider further evaluation with urinalysis to exclude cystitis.             CA 19-9: 281     A/P     Bessy is a 64yo F with locally advanced pancreatic cancer involving the distal celiac artery, portosplenic venous junction with a large splenic-IMV collateral. Her SMA has a preserved fat plane and I suspect her ZULEYMA and GDA are not involved. There is almost certainly posterior gastric wall involvement.     She has seen Dr. Hunt, had port placed and is starting FOLFIRINOX. We discussed the treatment for locally advanced pancreatic cancer, including systemic therapy and possible radiation therapy. Given her locally advanced vascular involvement, I would favor a total neoadjuvant approach if she tolerates therapy. Will plan to follow along with her restaging.  ---------------------------------------------------------  Bessy returns after 5 cycles FOLFIRINOX with some delays due to s/e and hospitalization.     Her CA 19-9 has normalized from a high of 280.     CT 10/2024:  Impression:     1. 37 x 35 mm infiltrating/obstruct proximal pancreatic body mass, slightly smaller in transverse dimension on today's study, which involves the celiac artery origin, splenic artery origin, and proximal splenic vein as detailed above.  " There is diffuse dilatation of the main pancreatic duct within the pancreatic body and tail and there is atrophy of the pancreatic body and tail.  No definitive imaging evidence of new metastatic disease in the chest, abdomen, or pelvis.  2. 5 mm possible new ground-glass nodule in the right lower lobe, nonspecific.  Short-term follow-up is recommended to ensure stability.  3. Long segment concentric wall thickening involving the distal sigmoid colon/rectum.  Infectious and inflammatory etiologies for proctocolitis are possible.        All in all a good response. I think we have all the options in front of us. We could argue here she is closing in on maximum systemic response and has already had one chemotherapy related admission, although in talking to Dr. Hunt this was for potassium and she was never very ill. There is a fat plane on the SMA, and the vein is reconstructable. Her GDA looks uninvolved. We discussed what this operation will entail, including subtotal pancreatectomy, resection of the celiac artery, potential hepatic artery reconstruction with saphenous vain graft, and potential venous reconstruction. I think her biggest risk here is PNI along her celiac at its base:       I think its worthwhile to consider preop RT to maximize our shot at an R0 margin here. Will re-present her at tumor board for that consideration, but plan to give her a couple more cycles, reassess and transition to perioperative RT.  --------------  2/5/2025:     Bessy returns after 12 cycles of FOLFIRINOX which she has tolerated well. She has already seen Dr. Rascon to discuss RT, and [place] [facility] Her CA 19-9 which peaked at 282 pre-treatment normalized on systemic therapy in October after about 4 cycles and has stayed wnl.     She has had clear radiographic response, with still some soft tissue along the celiac artery. Her CT is poorly timed, but I continue to expect that her GDA and ZULEYMA at that level are not  involved. Her portal vein is open but abutted and almost certainly will require some reconstruction, though likely no circumferential. Ultimately I think we have a surgical option here for subtotal pancreatectomy with celiac resection, possible subtotal gastrectomy, with PV reconstruction but I suspect without ZULEYMA reconstruction, although we should of course be prepared for that. I continue to think that maximizing her margins with preoperative RT makes sense for this locally advanced cancer. We discussed the potential for distant disease progression during RT, and its implications for her prognosis and surgical options. Will plan to see her back about 3-4 weeks after finishing RT, with plans to offer her surgery 6-10 post completion.  ---------------------  5/21/2025:    Ms. Lamb returns after a total neoadjuvant course for her locally advanced pancreatic cancer. She underwent 12 cycles of folfirinox followed by chemoradiation completed about 3 weeks ago. CA 19-9 remains wnl from an initial high of 282.    5/19/2025:  Impression:     1. Grossly stable ill-defined pancreatic mass with similar attenuation surrounding the celiac axis and proximal splenic vein.  No evidence of disease progression or new metastasis.  2. Additional findings as above.    We discussed again a complex surgical plan that includes an Anthony resection with subtotal pancreatectomy and subtotal gastrectomy, resection of the celiac and evaluation of her hepatic artery collateral through the GDA with consideration for hepatic artery reconstruction.  This is a fairly heroic operation and we spent a good deal of time discussing the significant risks including but not limited to pancreatic leak, vascular reconstruction complication, liver injury or failure, and anastomotic complication.    Bessy I suspect will move forward but this is a tough decision and we spent time discussing alternatives here including active surveillance and resuming  maintenance systemic therapy.  She would like to pick a date for surgery but I would like to regroup with her and some additional family members in the coming weeks to make sure that they do not have any unanswered questions.    I spent 40 minutes with Ms. Lamb, with >50% of time spent face to face and additional time preparing to see the patient (eg, review of tests), obtaining and/or reviewing separately obtained history, documenting clinical information in the electronic or other health record, independently interpreting results (not separately reported) and communicating results to the patient/family/caregiver, or Care coordination (not separately reported).     - code applied: patient requires or will require a continuous, longitudinal, and active collaborative plan of care related to this patient's health condition, pancreatic adenocarcinoma --the management of which requires the direction of a practitioner with specialized clinical knowledge, skill, and expertise.         Flip Valentin MD  Upper GI / Hepatobiliary Surgical Oncology  [facility] [place], [place]  Office: [phone]  Fax: [phone]

## 2025-05-22 ENCOUNTER — DOCUMENTATION ONLY (OUTPATIENT)
Dept: INFUSION THERAPY | Facility: HOSPITAL | Age: 65
End: 2025-05-22
Payer: MEDICARE

## 2025-05-22 ENCOUNTER — TELEPHONE (OUTPATIENT)
Dept: INFECTIOUS DISEASES | Facility: CLINIC | Age: 65
End: 2025-05-22
Payer: MEDICARE

## 2025-05-22 DIAGNOSIS — C25.9 MALIGNANT NEOPLASM OF PANCREAS, UNSPECIFIED LOCATION OF MALIGNANCY: Primary | ICD-10-CM

## 2025-05-22 NOTE — TELEPHONE ENCOUNTER
----- Message from OLIMPIA Jauregui sent at 5/22/2025 11:15 AM CDT -----  Good afternoon,The pt is scheduled for splenectomy on 6/26 with Dr. Valentin. She will finalize her decision for surgery on 6/4 in [place]. I have placed spleen vaccine orders. Can you please schedule her appt for vaccines. We would prefer to have her scheduled after 6/4 in the event she decides not to have surgery.ThanksShania

## 2025-05-22 NOTE — PROGRESS NOTES
JOSSUE completed [facility] access hospital Request Form for June 25, 2025-July 9, 2025. Franca, manager at Critical access hospital confirmed that Mrs. Lamb's  will be able to stay with her and that her daughter will be able to stay with Mr. Mujica while the patient is admitted. She instructed the  to identify Gerri as caregiver and Mr. Mujica as emergency contact on the form. CELIO will follow up with Mrs. Lamb tomorrow 5/23/2025.

## 2025-05-23 ENCOUNTER — PATIENT MESSAGE (OUTPATIENT)
Dept: PRIMARY CARE CLINIC | Facility: CLINIC | Age: 65
End: 2025-05-23
Payer: MEDICARE

## 2025-05-23 DIAGNOSIS — F32.A DEPRESSION, UNSPECIFIED DEPRESSION TYPE: Primary | ICD-10-CM

## 2025-05-23 RX ORDER — ESCITALOPRAM OXALATE 10 MG/1
10 TABLET ORAL DAILY
Qty: 30 TABLET | Refills: 2 | Status: SHIPPED | OUTPATIENT
Start: 2025-05-23 | End: 2026-05-23

## 2025-05-23 NOTE — TELEPHONE ENCOUNTER
I have signed for the following orders AND/OR meds.  Please call the patient and ask the patient to schedule the testing AND/OR inform about any medications that were sent. Medications have been sent to pharmacy listed below      No orders of the defined types were placed in this encounter.      Medications Ordered This Encounter   Medications    EScitalopram oxalate (LEXAPRO) 10 MG tablet     Sig: Take 1 tablet (10 mg total) by mouth once daily.     Dispense:  30 tablet     Refill:  2         [facility] - [address] [address]  Phone: [phone] Fax: [phone]    [facility]. - [address] [address]  Phone: [phone] Fax: [phone]

## 2025-05-26 ENCOUNTER — DOCUMENTATION ONLY (OUTPATIENT)
Dept: INFUSION THERAPY | Facility: HOSPITAL | Age: 65
End: 2025-05-26
Payer: MEDICARE

## 2025-05-26 ENCOUNTER — TELEPHONE (OUTPATIENT)
Dept: SURGERY | Facility: CLINIC | Age: 65
End: 2025-05-26
Payer: MEDICARE

## 2025-05-26 NOTE — PROGRESS NOTES
LCSW received telephone message from Mrs. Lamb stating that her upcoming surgery had to be re-scheduled from June 26th to July 1st. She received a phone call from [facility] to confirm the referral and was told they would need an updated referral with the new dates needed. SW completed new referral for the dated June 30th- July 14th and emailed to [facility].

## 2025-05-26 NOTE — TELEPHONE ENCOUNTER
----- Message from Heather sent at 5/26/2025 10:54 AM CDT -----  Regarding: Returning a Missed Call  Caller:    [place] call to:   Shania Johnson Caller can be reached @:   [phone] Nature of the call:    Pt would like to change their surgery date.

## 2025-05-26 NOTE — TELEPHONE ENCOUNTER
Returned call. Spoke to pt. Informed of need to reschedule surgery per Dr. Valentin. Pt agreeable to July 1st. Instructed pt to follow up with  so lodging reservations can be updated to coordinate with new surgery date. Pt verbalized understanding. OR notified to change surgery date to July 1st.

## 2025-05-26 NOTE — TELEPHONE ENCOUNTER
Call placed to pt regarding the need to reschedule surgery to July 1st. Left message with call back number.

## 2025-05-26 NOTE — TELEPHONE ENCOUNTER
Received request per secure chat from Dr. Valentin to reschedule pt's surgery to 7/1/2025. Call placed to pt. Left message with request for call back. Call back number provided.

## 2025-06-03 NOTE — PROGRESS NOTES
Chance to regroup with Bessy and her family and answer any remaining questions. Discussed again the significant risk of morbidity and mortality here associated with pursuing curative intent therapy, as well as the potential for finding disease that would cause us to abort her operation.    See previous notes for that discussion. She would like to proceed.      Flip Valentin MD  Upper GI / Hepatobiliary Surgical Oncology  [facility] [place], [place]  Office: [phone]  Fax: [phone]

## 2025-06-04 ENCOUNTER — OFFICE VISIT (OUTPATIENT)
Dept: HEMATOLOGY/ONCOLOGY | Facility: CLINIC | Age: 65
End: 2025-06-04
Payer: MEDICARE

## 2025-06-04 VITALS
DIASTOLIC BLOOD PRESSURE: 61 MMHG | BODY MASS INDEX: 22.89 KG/M2 | SYSTOLIC BLOOD PRESSURE: 124 MMHG | HEIGHT: 61 IN | WEIGHT: 121.25 LBS | HEART RATE: 75 BPM

## 2025-06-04 DIAGNOSIS — C25.1 MALIGNANT NEOPLASM OF BODY OF PANCREAS: Primary | ICD-10-CM

## 2025-06-04 PROCEDURE — 99999 PR PBB SHADOW E&M-EST. PATIENT-LVL IV: CPT | Mod: PBBFAC,,, | Performed by: SURGERY

## 2025-06-04 PROCEDURE — 99499 UNLISTED E&M SERVICE: CPT | Mod: S$GLB,,, | Performed by: SURGERY

## 2025-06-05 ENCOUNTER — DOCUMENTATION ONLY (OUTPATIENT)
Dept: SURGERY | Facility: CLINIC | Age: 65
End: 2025-06-05
Payer: MEDICARE

## 2025-06-05 ENCOUNTER — CLINICAL SUPPORT (OUTPATIENT)
Dept: INFECTIOUS DISEASES | Facility: CLINIC | Age: 65
End: 2025-06-05
Payer: MEDICARE

## 2025-06-05 DIAGNOSIS — Z00.00 HEALTHCARE MAINTENANCE: Primary | ICD-10-CM

## 2025-06-05 PROCEDURE — 99999 PR PBB SHADOW E&M-EST. PATIENT-LVL I: CPT | Mod: PBBFAC,,,

## 2025-06-05 NOTE — PROGRESS NOTES
Spoke to patient, spouse, daughters x2 and multiple other relatives. . Confirmed procedure for 7/1/2025 with Dr. Valentin.   Informed to [facility] on the 2nd floor [address] [facility] for 0500  and surgery time is 0700. RN will call  day before to confirm surgery and arrival time. Surgery Instructions provided as follows:  instructed to remain NPO solids 8 hours prior to surgery, clear liquids until 2 hours prior to surgery, to shower with hibiclens/antibacterial soap the night before surgery and morning of surgery before arrival, not to apply any lotions, powders or deodorant, remove all metal and jewelry, to wear comfortable clothes, and leave all valuables at home. Medications reviewed and  instructed to bring medications on DOS. Pre op department will call to review medications and advise if medications need to be taken day of surgery. Confirmed pt  will have transportation home and family will accompany pt on DOS. Post operative instructions reviewed. Tentative post op details reviewed. Pt confirmed she has spoke to  regarding lodging for surgery. Reviewed appt details for spleen vaccines. Pt provided surgery instructions including eating and drinking instructions and map , [facility] care map for distal pancreatectomy and subtotal gastrectomy,  disability instructions,  and  instructed to bring surgery folder  on DOS. Pt  and family members verbalized understanding to all of the above and instructed to contact us for any questions.

## 2025-06-06 ENCOUNTER — PATIENT MESSAGE (OUTPATIENT)
Dept: DIABETES | Facility: CLINIC | Age: 65
End: 2025-06-06
Payer: MEDICARE

## 2025-06-06 DIAGNOSIS — E11.9 TYPE 2 DIABETES MELLITUS WITHOUT COMPLICATION, WITHOUT LONG-TERM CURRENT USE OF INSULIN: ICD-10-CM

## 2025-06-06 RX ORDER — EMPAGLIFLOZIN, METFORMIN HYDROCHLORIDE 10; 1000 MG/1; MG/1
1 TABLET, EXTENDED RELEASE ORAL DAILY
Qty: 30 TABLET | Refills: 0 | Status: SHIPPED | OUTPATIENT
Start: 2025-06-06

## 2025-06-06 NOTE — PROGRESS NOTES
Results addressed in separate encounter. Well-controlled  Continue amlodipine 5 mg in the morning, 10 mg in the evening  Spironolactone 25 mg daily  Toprol- mg daily

## 2025-06-13 ENCOUNTER — OFFICE VISIT (OUTPATIENT)
Dept: PRIMARY CARE CLINIC | Facility: CLINIC | Age: 65
End: 2025-06-13
Payer: MEDICARE

## 2025-06-13 VITALS
BODY MASS INDEX: 24.12 KG/M2 | DIASTOLIC BLOOD PRESSURE: 62 MMHG | SYSTOLIC BLOOD PRESSURE: 110 MMHG | TEMPERATURE: 98 F | WEIGHT: 127.75 LBS | HEART RATE: 99 BPM | OXYGEN SATURATION: 98 % | HEIGHT: 61 IN

## 2025-06-13 DIAGNOSIS — C25.1 MALIGNANT NEOPLASM OF BODY OF PANCREAS: ICD-10-CM

## 2025-06-13 DIAGNOSIS — E11.9 TYPE 2 DIABETES MELLITUS WITHOUT COMPLICATION, WITHOUT LONG-TERM CURRENT USE OF INSULIN: Primary | ICD-10-CM

## 2025-06-13 PROCEDURE — 3044F HG A1C LEVEL LT 7.0%: CPT | Mod: CPTII,S$GLB,, | Performed by: NURSE PRACTITIONER

## 2025-06-13 PROCEDURE — 99214 OFFICE O/P EST MOD 30 MIN: CPT | Mod: S$GLB,,, | Performed by: NURSE PRACTITIONER

## 2025-06-13 PROCEDURE — 1160F RVW MEDS BY RX/DR IN RCRD: CPT | Mod: CPTII,S$GLB,, | Performed by: NURSE PRACTITIONER

## 2025-06-13 PROCEDURE — 3078F DIAST BP <80 MM HG: CPT | Mod: CPTII,S$GLB,, | Performed by: NURSE PRACTITIONER

## 2025-06-13 PROCEDURE — G2211 COMPLEX E/M VISIT ADD ON: HCPCS | Mod: S$GLB,,, | Performed by: NURSE PRACTITIONER

## 2025-06-13 PROCEDURE — 3008F BODY MASS INDEX DOCD: CPT | Mod: CPTII,S$GLB,, | Performed by: NURSE PRACTITIONER

## 2025-06-13 PROCEDURE — 1101F PT FALLS ASSESS-DOCD LE1/YR: CPT | Mod: CPTII,S$GLB,, | Performed by: NURSE PRACTITIONER

## 2025-06-13 PROCEDURE — 3074F SYST BP LT 130 MM HG: CPT | Mod: CPTII,S$GLB,, | Performed by: NURSE PRACTITIONER

## 2025-06-13 PROCEDURE — 3288F FALL RISK ASSESSMENT DOCD: CPT | Mod: CPTII,S$GLB,, | Performed by: NURSE PRACTITIONER

## 2025-06-13 PROCEDURE — 1126F AMNT PAIN NOTED NONE PRSNT: CPT | Mod: CPTII,S$GLB,, | Performed by: NURSE PRACTITIONER

## 2025-06-13 PROCEDURE — 1159F MED LIST DOCD IN RCRD: CPT | Mod: CPTII,S$GLB,, | Performed by: NURSE PRACTITIONER

## 2025-06-13 PROCEDURE — 99999 PR PBB SHADOW E&M-EST. PATIENT-LVL III: CPT | Mod: PBBFAC,,, | Performed by: NURSE PRACTITIONER

## 2025-06-13 RX ORDER — EMPAGLIFLOZIN, METFORMIN HYDROCHLORIDE 10; 1000 MG/1; MG/1
1 TABLET, EXTENDED RELEASE ORAL DAILY
Qty: 30 TABLET | Refills: 0 | Status: SHIPPED | OUTPATIENT
Start: 2025-06-13 | End: 2025-06-19 | Stop reason: SDUPTHER

## 2025-06-14 NOTE — PROGRESS NOTES
Assessment/Plan:    Problem List Items Addressed This Visit       Type 2 diabetes mellitus without complication, without long-term current use of insulin - Primary    Overview   Diabetes Medications              empagliflozin-metformin (SYNJARDY XR) 10-1,000 mg TBph Take 1 tablet by mouth once daily.          -condition is currently controlled   -plan to refill medication  -see diabetic health maintenance listed below  -on statin: Yes  -on ACE-I/ARB: No  -counseling provided on importance of diabetic diet and medication compliance in order to treat diabetes  -discussed diabetes disease course and potential complications          Relevant Medications    empagliflozin-metformin (SYNJARDY XR) 10-1,000 mg TBph    Other Relevant Orders     DIABETES FOOT EXAM (Completed)    Malignant neoplasm of pancreas    Overview   Followed by Dr. Valentin.   Plans for Anthony resection with subtotal pancreatectomy and subtotal gastrectomy, resection of the celiac and evaluation of her hepatic artery collateral through the GDA with consideration for hepatic artery reconstruction scheduled on July 1st.             Follow up in about 3 months (around 9/13/2025).    Dayan Jimenez NP  _____________________________________________________________________________________________________________________________________________________      History of Present Illness    CHIEF COMPLAINT:  Ms. Lamb presents today for follow-up prior to upcoming major surgery.    UPCOMING SURGERY:  She has major surgery scheduled for July 1st, a complex surgical plan that includes an Anthony resection with subtotal pancreatectomy and subtotal gastrectomy, resection of the celiac and evaluation of her hepatic artery collateral through the GDA with consideration for hepatic artery reconstruction. The procedure is expected to last 14-16 hours. She understands post-operative care will begin in ICU, with mobilization expected the day after surgery. The critical  "period will be the first two weeks post-surgery.    ONCOLOGIC HISTORY:  She has completed 12 rounds of chemotherapy with one episode of platelet issues, and radiation therapy without significant complications.    PROGNOSIS:  She reports being informed that current life expectancy without surgery is 3-6 months, while successful surgery could extend life expectancy by an additional 3 years.    SOCIAL HISTORY:  She has strong family support for the upcoming surgery. Her nephews will be present for the procedure, daughter Kailey will provide overnight hospital support, and daughter Svetlana will care for her . Her  has cancer and currently receiving palliative care with fentanyl patch, morphine 15mg, and Norco for breakthrough pain.          No other new complaints today.  Remaining chronic conditions have been reviewed and remain stable. Further detail as stated above.     HM reviewed.     No recent changes to medical/surgical history.    Medications Ordered Prior to Encounter[1]    Review of Systems   Constitutional: Negative.    HENT: Negative.     Eyes: Negative.    Respiratory: Negative.     Cardiovascular: Negative.    Gastrointestinal: Negative.    Endocrine: Negative.    Genitourinary: Negative.    Musculoskeletal: Negative.    Skin: Negative.    Allergic/Immunologic: Negative.    Neurological: Negative.    Hematological: Negative.    Psychiatric/Behavioral: Negative.         Vitals:    06/13/25 1419   BP: 110/62   BP Location: Left arm   Patient Position: Sitting   Pulse: 99   Temp: 98.1 °F (36.7 °C)   SpO2: 98%   Weight: 58 kg (127 lb 12.1 oz)   Height: 5' 1" (1.549 m)       Wt Readings from Last 3 Encounters:   06/13/25 58 kg (127 lb 12.1 oz)   06/04/25 55 kg (121 lb 4.1 oz)   05/21/25 54.2 kg (119 lb 7.8 oz)       Physical Exam  Vitals and nursing note reviewed.   Constitutional:       Appearance: She is well-developed.   HENT:      Head: Normocephalic and atraumatic.   Eyes:      " Conjunctiva/sclera: Conjunctivae normal.   Cardiovascular:      Rate and Rhythm: Normal rate and regular rhythm.      Heart sounds: Normal heart sounds.   Pulmonary:      Effort: Pulmonary effort is normal.      Breath sounds: Normal breath sounds.   Chest:       Musculoskeletal:         General: Normal range of motion.      Cervical back: Normal range of motion and neck supple.   Skin:     General: Skin is warm and dry.   Neurological:      Mental Status: She is alert and oriented to person, place, and time.   Psychiatric:         Behavior: Behavior normal.         Thought Content: Thought content normal.         Judgment: Judgment normal.         Health Maintenance   Topic Date Due    Shingles Vaccine (1 of 2) Never done    RSV Vaccine (Age 60+ and Pregnant patients) (1 - Risk 60-74 years 1-dose series) Never done    Mammogram  07/17/2024    Diabetic Eye Exam  03/05/2025    Diabetes Urine Screening  08/30/2025    Influenza Vaccine (Season Ended) 09/01/2025    Hemoglobin A1c  09/30/2025    Lipid Panel  12/06/2025    Low Dose Statin  06/13/2026    Foot Exam  06/13/2026    DEXA Scan  08/21/2026    Colorectal Cancer Screening  10/24/2033    TETANUS VACCINE  02/28/2034    Hepatitis C Screening  Completed    Pneumococcal Vaccines (Age 50+)  Completed    COVID-19 Vaccine  Discontinued       This note was generated with the assistance of ambient listening technology. Verbal consent was obtained by the patient and accompanying visitor(s) for the recording of patient appointment to facilitate this note. I attest to having reviewed and edited the generated note for accuracy, though some syntax or spelling errors may persist. Please contact the author of this note for any clarification.            [1]   Current Outpatient Medications on File Prior to Visit   Medication Sig Dispense Refill    acetaminophen (TYLENOL) 325 MG tablet Take 650 mg by mouth daily as needed for Pain.      EScitalopram oxalate (LEXAPRO) 10 MG tablet  Take 1 tablet (10 mg total) by mouth once daily. 30 tablet 2    fluticasone propionate (FLONASE) 50 mcg/actuation nasal spray 1 spray (50 mcg total) by Each Nostril route once daily. 18.2 mL 0    ibuprofen (ADVIL,MOTRIN) 800 MG tablet Take 1 tablet (800 mg total) by mouth every 8 (eight) hours as needed for Pain. 42 tablet 1    levothyroxine (SYNTHROID) 50 MCG tablet Take 1 tablet (50 mcg total) by mouth once daily. 90 tablet 3    loratadine (CLARITIN) 10 mg tablet Take 1 tablet (10 mg total) by mouth every morning. 30 tablet 11    LORazepam (ATIVAN) 0.5 MG tablet Take 1 tablet (0.5 mg total) by mouth every 6 (six) hours as needed for Anxiety. 30 tablet 0    potassium chloride SA (K-DUR,KLOR-CON) 20 MEQ tablet Take 40 mEq by mouth 2 (two) times daily.      prochlorperazine (COMPAZINE) 5 MG tablet Take 1 tablet (5 mg total) by mouth every 6 (six) hours as needed for Nausea. 90 tablet 2    simvastatin (ZOCOR) 10 MG tablet Take 1 tablet (10 mg total) by mouth every evening. 90 tablet 3    VITAMIN D2 1,250 mcg (50,000 unit) capsule Take 1 capsule (50,000 Units total) by mouth twice a week. 8 capsule 11     Current Facility-Administered Medications on File Prior to Visit   Medication Dose Route Frequency Provider Last Rate Last Admin    mening vac A,C,Y,W135 dip (PF) (MENVEO) 10-5 mcg/0.5 mL vaccine (PREFERRED)(10 - 56 YO) 0.5 mL  0.5 mL Intramuscular Q8 weeks Flip Valentin MD   0.5 mL at 06/05/25 1009    meningococcal group B vaccine (PF) injection 0.5 mL  0.5 mL Intramuscular Q8 weeks Flip Valentni MD   0.5 mL at 06/05/25 1010

## 2025-06-18 ENCOUNTER — TELEPHONE (OUTPATIENT)
Dept: HEMATOLOGY/ONCOLOGY | Facility: CLINIC | Age: 65
End: 2025-06-18
Payer: MEDICARE

## 2025-06-18 ENCOUNTER — TELEPHONE (OUTPATIENT)
Dept: DIABETES | Facility: CLINIC | Age: 65
End: 2025-06-18
Payer: MEDICARE

## 2025-06-18 ENCOUNTER — TELEPHONE (OUTPATIENT)
Dept: SURGERY | Facility: CLINIC | Age: 65
End: 2025-06-18
Payer: MEDICARE

## 2025-06-18 DIAGNOSIS — C25.9 MALIGNANT NEOPLASM OF PANCREAS, UNSPECIFIED LOCATION OF MALIGNANCY: Primary | ICD-10-CM

## 2025-06-18 NOTE — TELEPHONE ENCOUNTER
Copied from CRM #[id number]. Topic: General Inquiry - Return Call  >> Jun 18, 2025  2:26 PM Nunu wrote:  Type:  Patient Returning Call    Who Called:Bessy Lamb  Who Left Message for Patient:  Does the patient know what this is regarding?:missed a call from your office  Would the patient rather a call back or a response via [facility]? Call back  Best Call Back Number:[phone] (  Additional Information: [id number] [phone]

## 2025-06-18 NOTE — TELEPHONE ENCOUNTER
Spoke to patient and explained Dr Davis plan. She verbalized understanding. Reviewed upcoming date/time .

## 2025-06-18 NOTE — TELEPHONE ENCOUNTER
I called the patient and left her a message to call me back at [phone]     Rajat Hunt MD  [facility]  Hematology and Oncology  [facility]   [address] [place] [address]   O: [phone]  F: [phone]

## 2025-06-19 ENCOUNTER — TELEPHONE (OUTPATIENT)
Dept: DIABETES | Facility: CLINIC | Age: 65
End: 2025-06-19
Payer: MEDICARE

## 2025-06-19 ENCOUNTER — OFFICE VISIT (OUTPATIENT)
Dept: DIABETES | Facility: CLINIC | Age: 65
End: 2025-06-19
Payer: MEDICARE

## 2025-06-19 DIAGNOSIS — E11.9 TYPE 2 DIABETES MELLITUS WITHOUT COMPLICATION, WITHOUT LONG-TERM CURRENT USE OF INSULIN: ICD-10-CM

## 2025-06-19 DIAGNOSIS — C25.1 MALIGNANT NEOPLASM OF BODY OF PANCREAS: Primary | ICD-10-CM

## 2025-06-19 PROCEDURE — 98006 SYNCH AUDIO-VIDEO EST MOD 30: CPT | Mod: 95,,, | Performed by: NURSE PRACTITIONER

## 2025-06-19 PROCEDURE — G2211 COMPLEX E/M VISIT ADD ON: HCPCS | Mod: 95,,, | Performed by: NURSE PRACTITIONER

## 2025-06-19 PROCEDURE — 3044F HG A1C LEVEL LT 7.0%: CPT | Mod: CPTII,95,, | Performed by: NURSE PRACTITIONER

## 2025-06-19 RX ORDER — EMPAGLIFLOZIN, METFORMIN HYDROCHLORIDE 10; 1000 MG/1; MG/1
1 TABLET, EXTENDED RELEASE ORAL DAILY
Qty: 30 TABLET | Refills: 11 | Status: SHIPPED | OUTPATIENT
Start: 2025-06-19 | End: 2026-06-19

## 2025-06-19 NOTE — TELEPHONE ENCOUNTER
Copied from CRM #[id number]. Topic: General Inquiry - Patient Advice  >> Jun 19, 2025  1:11 PM Belén wrote:  .Type:  Needs Medical Advice    Who Called:  Bessy     Would the patient rather a call back or a response via [facility]?  Call back   Best Call Back Number:  [phone]  Additional Information: Pt is calling in regards to missing the virtual appt and  has an upcoming procedure and would like to get a call back to discuss concerns.  Pt states she will be waiting by the phone for a call back   show

## 2025-06-19 NOTE — PATIENT INSTRUCTIONS
PATIENT INSTRUCTIONS      Continue Synjardy XR  mg by mouth daily.      Check blood sugar twice daily. Every morning when you wake up and 1-2 hours after main meal of the day. Keep log and upload to [facility] in [place] prior to each visit.   Blood Sugar Goals:       Fastin-130.       1-2 hours after a meal: Less than 180.

## 2025-06-19 NOTE — TELEPHONE ENCOUNTER
Pt needed call back due to missing appt today at 12:30 . Pt states she needs to really talk to Josey concerning important information . Offered pt's Josey next appt at 3:00 pm today . Pt accepted appt and will get on Virtual Visit with Josey at 3:00 pm today
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
## 2025-06-20 ENCOUNTER — TELEPHONE (OUTPATIENT)
Dept: HEMATOLOGY/ONCOLOGY | Facility: CLINIC | Age: 65
End: 2025-06-20
Payer: MEDICARE

## 2025-06-20 NOTE — TELEPHONE ENCOUNTER
Copied from CRM #[id number]. Topic: General Inquiry - Return Call  >> Jun 20, 2025  9:09 AM Dexter wrote:  Type:  Patient Returning Call    Who Called:Patient  Who Left Message for Patient:Dr Hunt  Does the patient know what this is regarding?:Yes  Would the patient rather a call back or a response via [facility]? Call  Best Call Back Number:[phone]   Additional Information:

## 2025-06-21 NOTE — TELEPHONE ENCOUNTER
I called the patient whom stated she had already received her answers that were needed from the nurses.  The patient asked that she needed to continue Creon after her upcoming surgery.  I instructed the patient she needed to continue Creon at this time.  The patient expressed understanding.  All questions were answered to her satisfaction.    Rajat Hunt MD  [facility]  Hematology and Oncology  [facility]   [address] [place] [address]   O: [phone]  F: [phone]

## 2025-06-23 ENCOUNTER — DOCUMENTATION ONLY (OUTPATIENT)
Dept: INFUSION THERAPY | Facility: HOSPITAL | Age: 65
End: 2025-06-23
Payer: MEDICARE

## 2025-06-23 ENCOUNTER — LAB VISIT (OUTPATIENT)
Dept: LAB | Facility: HOSPITAL | Age: 65
End: 2025-06-23
Attending: SURGERY
Payer: MEDICARE

## 2025-06-23 DIAGNOSIS — Z00.00 ENCOUNTER FOR MEDICARE ANNUAL WELLNESS EXAM: ICD-10-CM

## 2025-06-23 DIAGNOSIS — C25.9 MALIGNANT NEOPLASM OF PANCREAS, UNSPECIFIED LOCATION OF MALIGNANCY: ICD-10-CM

## 2025-06-23 LAB
ABSOLUTE EOSINOPHIL (OHS): 0.11 K/UL
ABSOLUTE MONOCYTE (OHS): 0.26 K/UL (ref 0.3–1)
ABSOLUTE NEUTROPHIL COUNT (OHS): 1.45 K/UL (ref 1.8–7.7)
ALBUMIN SERPL BCP-MCNC: 3.9 G/DL (ref 3.5–5.2)
ALP SERPL-CCNC: 90 UNIT/L (ref 40–150)
ALT SERPL W/O P-5'-P-CCNC: 12 UNIT/L (ref 10–44)
ANION GAP (OHS): 9 MMOL/L (ref 8–16)
AST SERPL-CCNC: 22 UNIT/L (ref 11–45)
BASOPHILS # BLD AUTO: 0.01 K/UL
BASOPHILS NFR BLD AUTO: 0.4 %
BILIRUB SERPL-MCNC: 0.8 MG/DL (ref 0.1–1)
BUN SERPL-MCNC: 11 MG/DL (ref 8–23)
CALCIUM SERPL-MCNC: 9 MG/DL (ref 8.7–10.5)
CANCER AG19-9 SERPL-ACNC: 5.1 U/ML
CHLORIDE SERPL-SCNC: 104 MMOL/L (ref 95–110)
CO2 SERPL-SCNC: 26 MMOL/L (ref 23–29)
CREAT SERPL-MCNC: 0.8 MG/DL (ref 0.5–1.4)
ERYTHROCYTE [DISTWIDTH] IN BLOOD BY AUTOMATED COUNT: 12.4 % (ref 11.5–14.5)
GFR SERPLBLD CREATININE-BSD FMLA CKD-EPI: >60 ML/MIN/1.73/M2
GLUCOSE SERPL-MCNC: 133 MG/DL (ref 70–110)
HCT VFR BLD AUTO: 34.8 % (ref 37–48.5)
HGB BLD-MCNC: 12 GM/DL (ref 12–16)
IMM GRANULOCYTES # BLD AUTO: 0 K/UL (ref 0–0.04)
IMM GRANULOCYTES NFR BLD AUTO: 0 % (ref 0–0.5)
LYMPHOCYTES # BLD AUTO: 0.59 K/UL (ref 1–4.8)
MCH RBC QN AUTO: 32.9 PG (ref 27–31)
MCHC RBC AUTO-ENTMCNC: 34.5 G/DL (ref 32–36)
MCV RBC AUTO: 95 FL (ref 82–98)
NUCLEATED RBC (/100WBC) (OHS): 0 /100 WBC
PLATELET # BLD AUTO: 114 K/UL (ref 150–450)
PMV BLD AUTO: 8.9 FL (ref 9.2–12.9)
POTASSIUM SERPL-SCNC: 4.6 MMOL/L (ref 3.5–5.1)
PROT SERPL-MCNC: 7.1 GM/DL (ref 6–8.4)
RBC # BLD AUTO: 3.65 M/UL (ref 4–5.4)
RELATIVE EOSINOPHIL (OHS): 4.5 %
RELATIVE LYMPHOCYTE (OHS): 24.4 % (ref 18–48)
RELATIVE MONOCYTE (OHS): 10.7 % (ref 4–15)
RELATIVE NEUTROPHIL (OHS): 60 % (ref 38–73)
SODIUM SERPL-SCNC: 139 MMOL/L (ref 136–145)
WBC # BLD AUTO: 2.42 K/UL (ref 3.9–12.7)

## 2025-06-23 PROCEDURE — 85025 COMPLETE CBC W/AUTO DIFF WBC: CPT | Mod: PN

## 2025-06-23 PROCEDURE — 36415 COLL VENOUS BLD VENIPUNCTURE: CPT | Mod: PN

## 2025-06-23 PROCEDURE — 86301 IMMUNOASSAY TUMOR CA 19-9: CPT

## 2025-06-23 PROCEDURE — 82040 ASSAY OF SERUM ALBUMIN: CPT | Mod: PN

## 2025-06-23 NOTE — PROGRESS NOTES
1:45 PM    This [facility] met with this pt to provide a gas card. The pt returned some beanies she was not able to use since her hair is growing back. The pt reported no other practical needs today.

## 2025-06-24 DIAGNOSIS — C25.9 MALIGNANT NEOPLASM OF PANCREAS, UNSPECIFIED LOCATION OF MALIGNANCY: Primary | ICD-10-CM

## 2025-06-24 RX ORDER — HEPARIN SODIUM 5000 [USP'U]/ML
5000 INJECTION, SOLUTION INTRAVENOUS; SUBCUTANEOUS
OUTPATIENT
Start: 2025-06-24

## 2025-06-24 RX ORDER — METRONIDAZOLE 500 MG/100ML
500 INJECTION, SOLUTION INTRAVENOUS
OUTPATIENT
Start: 2025-06-24

## 2025-06-24 RX ORDER — CELECOXIB 200 MG/1
400 CAPSULE ORAL
OUTPATIENT
Start: 2025-06-24

## 2025-06-24 RX ORDER — CIPROFLOXACIN 2 MG/ML
400 INJECTION, SOLUTION INTRAVENOUS
OUTPATIENT
Start: 2025-06-24

## 2025-06-24 RX ORDER — ACETAMINOPHEN 500 MG
1000 TABLET ORAL
OUTPATIENT
Start: 2025-06-24

## 2025-06-27 NOTE — PRE-PROCEDURE INSTRUCTIONS
PreOp Instructions given:   - Verbal medication information (what to hold and what to take)   - NPO guidelines 8428-3635  NO SOLID FOOD, MILK OR MILK PRODUCTS 8 HOURS PRIOR TO SX START TIME.  YOU MAY ONLY HAVE SIPS OF WATER WITH MORNING MEDICATIONS (1) HOUR PRIOR TO ARRIVAL TO HOSPITAL.   - Arrival place directions given; time to be given the day before procedure by the   Surgeon's Office DOSC  - Bathing with antibacterial soap   - Don't wear any jewelry or bring any valuables AM of surgery   - No makeup or moisturizer to face   - No perfume/cologne, powder, lotions or aftershave   Pt. verbalized understanding.   Pt denies any h/o Anesthesia/Sedation complications or side effects.  Patient does not know arrival time.  Explained that this information comes from the surgeon's office and if they haven't heard from them by 2 or 3 pm to call the office.  Patient stated an understanding.

## 2025-06-30 ENCOUNTER — ANESTHESIA EVENT (OUTPATIENT)
Dept: SURGERY | Facility: HOSPITAL | Age: 65
End: 2025-06-30
Payer: MEDICARE

## 2025-06-30 ENCOUNTER — TELEPHONE (OUTPATIENT)
Dept: SURGERY | Facility: CLINIC | Age: 65
End: 2025-06-30
Payer: MEDICARE

## 2025-06-30 NOTE — ANESTHESIA PREPROCEDURE EVALUATION
[facility]  Anesthesia Pre-Operative Evaluation   2025        Bessy Lamb, 1960  [id number]  Procedure(s) (LRB):  PANCREATECTOMY, DISTAL, SUBTOTAL open caro, splenectomy, possible hepatic artery reconstruction (N/A)  SPLENECTOMY, open caro, splenectomy, possible hepatic artery reconstruction (N/A)  EXPLORATION, VEIN, possible hepatic artery reconstruction, open caro and splenectomy (N/A)  GASTRECTOMY - subtotal (N/A)    Subjective    Bessy Lamb is a 64 y.o. female w/ a significant PMHx of hypothyroidism, osteoarthritis, MVP, T2DM, HTN, anemia, MDD. Malignant neoplasm of pancreas, and chemotherapy induced thrombocytopenia.    Patient now presents for above procedure(s).     Prev Airway:   Date Department Mask Airway Size Difficult Intubation Attempts   25 [facility] easy mask 3.0 No 1       LDA:        PowerPort A Cath Single Lumen 24 1128 Internal Jugular Right (Active)   Number of days: 347       Drips: None documented.      Problem List[1]    Review of patient's allergies indicates:   Allergen Reactions    Duricef [cefadroxil] Hives    Grass pollen- grass standard Itching       Current Inpatient Medications:    mening vac A,C,Y,W135 dip (PF)  0.5 mL Intramuscular Q8 weeks    meningococcal group B vaccine (PF)  0.5 mL Intramuscular Q8 weeks       Medications Ordered Prior to Encounter[2]    Past Surgical History:   Procedure Laterality Date    BREAST BIOPSY Left     b9    BREAST SURGERY      Reduction cause of a mass in left breast    c-sections x2       SECTION  3/26/81 & 85    Birth of two girls    COLONOSCOPY  2012         COLONOSCOPY N/A 2018    Procedure: COLONOSCOPY;  Surgeon: Francisco Mcclain MD;  Location: [facility];  Service: Endoscopy;  Laterality: N/A;    COLONOSCOPY N/A 10/24/2023    Procedure: COLONOSCOPY;  Surgeon: Francisco Mcclain MD;  Location: [facility];  Service: Endoscopy;   Laterality: N/A;    ENDOSCOPIC ULTRASOUND OF UPPER GASTROINTESTINAL TRACT Left 7/5/2024    Procedure: ULTRASOUND, UPPER GI TRACT, ENDOSCOPIC;  Surgeon: Andrew Gordon MD;  Location: [facility] ENDO;  Service: Endoscopy;  Laterality: Left;    ESOPHAGOGASTRODUODENOSCOPY N/A 7/5/2024    Procedure: EGD (ESOPHAGOGASTRODUODENOSCOPY);  Surgeon: Andrew Gordon MD;  Location: [facility] ENDO;  Service: Endoscopy;  Laterality: N/A;    hysteroscopy with polypectomy      INCISION AND DRAINAGE Right 2/19/2025    Procedure: Incision and Drainage, RIGHT HAND EXCISION OF INFECTED BONE RIGHT INDEX FINGER;  Surgeon: SALVATORE Chou MD;  Location: [facility] OR;  Service: General;  Laterality: Right;    INCISIONAL BREAST BIOPSY Left 1996    benign; done at same time as reduction    INSERTION OF TUNNELED CENTRAL VENOUS CATHETER (CVC) WITH SUBCUTANEOUS PORT N/A 7/18/2024    Procedure: [id number]-[id number];  Surgeon: Blanca Dillard MD;  Location: [facility];  Service: General;  Laterality: N/A;    TOTAL REDUCTION MAMMOPLASTY Bilateral 1996       Social History:  Tobacco Use: Low Risk  (6/13/2025)    Patient History     Smoking Tobacco Use: Never     Smokeless Tobacco Use: Never     Passive Exposure: Past       Alcohol Use: Not At Risk (12/29/2024)    AUDIT-C     Frequency of Alcohol Consumption: Never     Average Number of Drinks: Patient does not drink     Frequency of Binge Drinking: Never       Objective    Vital Signs Range:  BMI Readings from Last 1 Encounters:   06/13/25 24.14 kg/m²               Significant Labs:        Component Value Date/Time    WBC 2.42 (L) 06/23/2025 1126    HGB 12.0 06/23/2025 1126    HGB 12.1 03/12/2025 1420    HCT 34.8 (L) 06/23/2025 1126    HCT 36.1 (L) 03/12/2025 1420     (L) 06/23/2025 1126     (L) 03/12/2025 1420     06/23/2025 1126     03/12/2025 1420    K 4.6 06/23/2025 1126    K 4.3 03/12/2025 1420     06/23/2025 1126     03/12/2025 1420    CO2 26  06/23/2025 1126    CO2 25 03/12/2025 1420     (H) 06/23/2025 1126     (H) 03/12/2025 1420    BUN 11 06/23/2025 1126    CREATININE 0.8 06/23/2025 1126    MG 1.8 02/22/2025 0505    PHOS 5.1 (H) 02/22/2025 0505    CALCIUM 9.0 06/23/2025 1126    CALCIUM 8.9 03/12/2025 1420    ALBUMIN 3.9 06/23/2025 1126    ALBUMIN 3.5 03/12/2025 1420    PROT 7.1 06/23/2025 1126    PROT 6.7 03/12/2025 1420    ALKPHOS 90 06/23/2025 1126    ALKPHOS 124 03/12/2025 1420    BILITOT 0.8 06/23/2025 1126    BILITOT 0.5 03/12/2025 1420    AST 22 06/23/2025 1126    AST 24 03/12/2025 1420    ALT 12 06/23/2025 1126    ALT 18 03/12/2025 1420    HGBA1C 5.4 03/31/2025 1327    HGBA1C 7.0 (H) 09/20/2024 1435        Please see Results Review for additional labs.     Diagnostic Studies: All relevant studies, reviewed.      EKG:   Results for orders placed or performed during the hospital encounter of 02/19/25   EKG 12-lead    Collection Time: 02/19/25 12:52 PM   Result Value Ref Range    QRS Duration 84 ms    [facility] QTC Calculation 431 ms    Narrative    Test Reason : Z01.818,    Vent. Rate :  79 BPM     Atrial Rate :  79 BPM     P-R Int : 162 ms          QRS Dur :  84 ms      QT Int : 376 ms       P-R-T Axes :  88  74  73 degrees    QTcB Int : 431 ms    Normal sinus rhythm  Normal ECG  When compared with ECG of 20-Sep-2024 21:12,  QT has shortened  Confirmed by Lexa Suazo ([id number]) on 2/21/2025 8:49:25 AM    Referred By: CECELIA           Confirmed By: Lexa Suazo       ECHO:  No results found for this or any previous visit.         Pre-op Assessment    I have reviewed the Patient Summary Reports.     I have reviewed the Nursing Notes. I have reviewed the NPO Status.   I have reviewed the Medications.     Review of Systems  Anesthesia Hx:  No problems with previous Anesthesia   History of prior surgery of interest to airway management or planning:          Denies Family Hx of Anesthesia complications.    Denies Personal Hx of Anesthesia  complications.                    Social:  Non-Smoker       Hematology/Oncology:       -- Anemia:                  Current/Recent Cancer.         chemotherapy   Oncology Comments: Malignant neoplasm of pancreas Stage III 7/24       EENT/Dental:   TMJ          Cardiovascular:     Hypertension Valvular problems/Murmurs, MVP          hyperlipidemia   ECG has been reviewed.  Patient on beta blockers                          Pulmonary:  Pulmonary Normal                       Renal/:     Microalbuminuria due to type 2 diabetes mellitus               Hepatic/GI:     GERD                Musculoskeletal:  Arthritis   Pre-op diagnosis: Infection of Right Index         Spine Disorders: lumbar Disc disease and Degenerative disease           Endocrine:  Diabetes, poorly controlled, type 2 Hypothyroidism          Psych:  Psychiatric History  depression                Physical Exam  General: Well nourished, Cooperative, Alert and Oriented    Airway:  Mallampati: III / II  Mouth Opening: Normal  TM Distance: Normal  Tongue: Normal  Neck ROM: Normal ROM    Dental:  Intact        Anesthesia Plan  Type of Anesthesia, risks & benefits discussed:    Anesthesia Type: Gen ETT  Intra-op Monitoring Plan: Standard ASA Monitors and Art Line  Post Op Pain Control Plan: multimodal analgesia, IV/PO Opioids PRN and peripheral nerve block  Induction:  IV  Airway Plan: Video, Post-Induction  Informed Consent: Patient consented to blood products? Yes  ASA Score: 4  Day of Surgery Review of History & Physical: H&P Update referred to the surgeon/provider.  Anesthesia Plan Notes: RIJ Port -- last chemo 4 weeks ago  Ordered for platelets and pRBC due to baseline thrombocytopaenia and expected blood loss.      Ready For Surgery From Anesthesia Perspective.     .           [1]   Patient Active Problem List  Diagnosis    Hypothyroidism    Primary osteoarthritis involving multiple joints    Mitral valve prolapse    Type 2 diabetes mellitus without  complication, without long-term current use of insulin    Primary hypertension    Pulmonary nodules    Malignant neoplasm of pancreas    Chemotherapy induced diarrhea    Thrombocytopenia    Normocytic anemia    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therapy    Pyogenic arthritis of right hand    Other acute osteomyelitis, multiple sites    Major depressive disorder    Acute osteomyelitis of hand including fingers    Cat bite of hand, initial encounter    Macular rash    Chemotherapy-induced neutropenia    Chemotherapy-induced thrombocytopenia   [2]   No current facility-administered medications on file prior to encounter.     Current Outpatient Medications on File Prior to Encounter   Medication Sig Dispense Refill    acetaminophen (TYLENOL) 325 MG tablet Take 650 mg by mouth daily as needed for Pain.      fluticasone propionate (FLONASE) 50 mcg/actuation nasal spray 1 spray (50 mcg total) by Each Nostril route once daily. 18.2 mL 0    ibuprofen (ADVIL,MOTRIN) 800 MG tablet Take 1 tablet (800 mg total) by mouth every 8 (eight) hours as needed for Pain. 42 tablet 1    levothyroxine (SYNTHROID) 50 MCG tablet Take 1 tablet (50 mcg total) by mouth once daily. 90 tablet 3    loratadine (CLARITIN) 10 mg tablet Take 1 tablet (10 mg total) by mouth every morning. 30 tablet 11    potassium chloride SA (K-DUR,KLOR-CON) 20 MEQ tablet Take 40 mEq by mouth 2 (two) times daily.      prochlorperazine (COMPAZINE) 5 MG tablet Take 1 tablet (5 mg total) by mouth every 6 (six) hours as needed for Nausea. 90 tablet 2    simvastatin (ZOCOR) 10 MG tablet Take 1 tablet (10 mg total) by mouth every evening. 90 tablet 3    VITAMIN D2 1,250 mcg (50,000 unit) capsule Take 1 capsule (50,000 Units total) by mouth twice a week. 8 capsule 11

## 2025-06-30 NOTE — TELEPHONE ENCOUNTER
Spoke to patient. Confirmed procedure for 7/1/2025 with Dr. Valentin.   Informed to arrive at the [facility] on the 2nd floor [address] [facility] for 0500  and surgery time is 0700. Pt will be checking into [facility] this evening. Surgery Instructions provided as follows:  instructed to remain NPO solids 8 hours prior to surgery, clear liquids until 2 hours prior to surgery, to shower with hibiclens/antibacterial soap the night before surgery and morning of surgery before arrival, not to apply any lotions, powders or deodorant, remove all metal and jewelry, to wear comfortable clothes, and leave all valuables at home. Medications reviewed and  instructed to bring medications on DOS. Pt confirmed she spoke to PAT nurse. Confirmed pt  will have transportation home and multiple family members and spouse will accompany pt on DOS.  Post operative instructions reviewed. Pt instructed to bring surgery folder  on DOS. Pt  verbalized understanding to all of the above and instructed to contact us for any questions.

## 2025-07-01 ENCOUNTER — HOSPITAL ENCOUNTER (INPATIENT)
Facility: HOSPITAL | Age: 65
LOS: 4 days | Discharge: HOME | DRG: 407 | End: 2025-07-05
Attending: SURGERY | Admitting: SURGERY
Payer: MEDICARE

## 2025-07-01 ENCOUNTER — ANESTHESIA (OUTPATIENT)
Dept: SURGERY | Facility: HOSPITAL | Age: 65
End: 2025-07-01
Payer: MEDICARE

## 2025-07-01 DIAGNOSIS — C25.9 MALIGNANT NEOPLASM OF PANCREAS, UNSPECIFIED LOCATION OF MALIGNANCY: ICD-10-CM

## 2025-07-01 DIAGNOSIS — C25.1 MALIGNANT NEOPLASM OF BODY OF PANCREAS: Primary | ICD-10-CM

## 2025-07-01 LAB
ABORH RETYPE: NORMAL
GLUCOSE SERPL-MCNC: 186 MG/DL (ref 70–110)
GLUCOSE SERPL-MCNC: 199 MG/DL (ref 70–110)
HCO3 UR-SCNC: 19.5 MMOL/L (ref 24–28)
HCO3 UR-SCNC: 20.6 MMOL/L (ref 24–28)
HCT VFR BLD CALC: 28 %PCV (ref 36–54)
HCT VFR BLD CALC: 30 %PCV (ref 36–54)
INDIRECT COOMBS: NORMAL
PCO2 BLDA: 33.3 MMHG (ref 35–45)
PCO2 BLDA: 36.8 MMHG (ref 35–45)
PH SMN: 7.36 [PH] (ref 7.35–7.45)
PH SMN: 7.38 [PH] (ref 7.35–7.45)
PO2 BLDA: 224 MMHG (ref 80–100)
PO2 BLDA: 262 MMHG (ref 80–100)
POC BE: -5 MMOL/L (ref -2–2)
POC BE: -6 MMOL/L (ref -2–2)
POC IONIZED CALCIUM: 0.97 MMOL/L (ref 1.06–1.42)
POC IONIZED CALCIUM: 1.07 MMOL/L (ref 1.06–1.42)
POC SATURATED O2: 100 % (ref 95–100)
POC SATURATED O2: 100 % (ref 95–100)
POC TCO2: 20 MMOL/L (ref 23–27)
POC TCO2: 22 MMOL/L (ref 23–27)
POCT GLUCOSE: 112 MG/DL (ref 70–110)
POCT GLUCOSE: 175 MG/DL (ref 70–110)
POCT GLUCOSE: 185 MG/DL (ref 70–110)
POTASSIUM BLD-SCNC: 3 MMOL/L (ref 3.5–5.1)
POTASSIUM BLD-SCNC: 3.7 MMOL/L (ref 3.5–5.1)
RH BLD: NORMAL
SAMPLE: ABNORMAL
SAMPLE: ABNORMAL
SODIUM BLD-SCNC: 136 MMOL/L (ref 136–145)
SODIUM BLD-SCNC: 138 MMOL/L (ref 136–145)

## 2025-07-01 PROCEDURE — 36000708 HC OR TIME LEV III 1ST 15 MIN: Performed by: SURGERY

## 2025-07-01 PROCEDURE — 63600175 PHARM REV CODE 636 W HCPCS

## 2025-07-01 PROCEDURE — 25000003 PHARM REV CODE 250

## 2025-07-01 PROCEDURE — 94640 AIRWAY INHALATION TREATMENT: CPT

## 2025-07-01 PROCEDURE — 07BD0ZZ EXCISION OF AORTIC LYMPHATIC, OPEN APPROACH: ICD-10-PCS | Performed by: SURGERY

## 2025-07-01 PROCEDURE — 63600175 PHARM REV CODE 636 W HCPCS: Performed by: ANESTHESIOLOGY

## 2025-07-01 PROCEDURE — 88309 TISSUE EXAM BY PATHOLOGIST: CPT | Mod: TC | Performed by: SURGERY

## 2025-07-01 PROCEDURE — 88342 IMHCHEM/IMCYTCHM 1ST ANTB: CPT | Mod: 26,,, | Performed by: PATHOLOGY

## 2025-07-01 PROCEDURE — 04B30ZZ EXCISION OF HEPATIC ARTERY, OPEN APPROACH: ICD-10-PCS | Performed by: SURGERY

## 2025-07-01 PROCEDURE — 0FBG0ZZ EXCISION OF PANCREAS, OPEN APPROACH: ICD-10-PCS | Performed by: SURGERY

## 2025-07-01 PROCEDURE — 37000009 HC ANESTHESIA EA ADD 15 MINS: Performed by: SURGERY

## 2025-07-01 PROCEDURE — 20600001 HC STEP DOWN PRIVATE ROOM

## 2025-07-01 PROCEDURE — 27201037 HC PRESSURE MONITORING SET UP

## 2025-07-01 PROCEDURE — 71000015 HC POSTOP RECOV 1ST HR: Performed by: SURGERY

## 2025-07-01 PROCEDURE — 27000221 HC OXYGEN, UP TO 24 HOURS

## 2025-07-01 PROCEDURE — 37000008 HC ANESTHESIA 1ST 15 MINUTES: Performed by: SURGERY

## 2025-07-01 PROCEDURE — 86920 COMPATIBILITY TEST SPIN: CPT | Performed by: UROLOGY

## 2025-07-01 PROCEDURE — 25000242 PHARM REV CODE 250 ALT 637 W/ HCPCS: Performed by: NURSE PRACTITIONER

## 2025-07-01 PROCEDURE — 88341 IMHCHEM/IMCYTCHM EA ADD ANTB: CPT | Mod: 26,,, | Performed by: PATHOLOGY

## 2025-07-01 PROCEDURE — 27000646 HC AEROBIKA DEVICE

## 2025-07-01 PROCEDURE — 36000709 HC OR TIME LEV III EA ADD 15 MIN: Performed by: SURGERY

## 2025-07-01 PROCEDURE — 71000039 HC RECOVERY, EACH ADD'L HOUR: Performed by: SURGERY

## 2025-07-01 PROCEDURE — 94664 DEMO&/EVAL PT USE INHALER: CPT

## 2025-07-01 PROCEDURE — 88309 TISSUE EXAM BY PATHOLOGIST: CPT | Mod: 26,,, | Performed by: PATHOLOGY

## 2025-07-01 PROCEDURE — C1729 CATH, DRAINAGE: HCPCS | Performed by: SURGERY

## 2025-07-01 PROCEDURE — 82962 GLUCOSE BLOOD TEST: CPT | Performed by: SURGERY

## 2025-07-01 PROCEDURE — 86900 BLOOD TYPING SEROLOGIC ABO: CPT

## 2025-07-01 PROCEDURE — 27201423 OPTIME MED/SURG SUP & DEVICES STERILE SUPPLY: Performed by: SURGERY

## 2025-07-01 PROCEDURE — 06B50ZZ EXCISION OF SUPERIOR MESENTERIC VEIN, OPEN APPROACH: ICD-10-PCS | Performed by: SURGERY

## 2025-07-01 PROCEDURE — 94799 UNLISTED PULMONARY SVC/PX: CPT

## 2025-07-01 PROCEDURE — 25000003 PHARM REV CODE 250: Performed by: SURGERY

## 2025-07-01 PROCEDURE — 76942 ECHO GUIDE FOR BIOPSY: CPT

## 2025-07-01 PROCEDURE — 63600175 PHARM REV CODE 636 W HCPCS: Performed by: SURGERY

## 2025-07-01 PROCEDURE — 99900035 HC TECH TIME PER 15 MIN (STAT)

## 2025-07-01 PROCEDURE — 94761 N-INVAS EAR/PLS OXIMETRY MLT: CPT

## 2025-07-01 PROCEDURE — 71000033 HC RECOVERY, INTIAL HOUR: Performed by: SURGERY

## 2025-07-01 PROCEDURE — 88304 TISSUE EXAM BY PATHOLOGIST: CPT | Mod: 26,,, | Performed by: PATHOLOGY

## 2025-07-01 PROCEDURE — 07TP0ZZ RESECTION OF SPLEEN, OPEN APPROACH: ICD-10-PCS | Performed by: SURGERY

## 2025-07-01 PROCEDURE — 04B10ZZ EXCISION OF CELIAC ARTERY, OPEN APPROACH: ICD-10-PCS | Performed by: SURGERY

## 2025-07-01 RX ORDER — FENTANYL CITRATE 50 UG/ML
25-200 INJECTION, SOLUTION INTRAMUSCULAR; INTRAVENOUS
Status: DISCONTINUED | OUTPATIENT
Start: 2025-07-01 | End: 2025-07-01

## 2025-07-01 RX ORDER — ONDANSETRON HYDROCHLORIDE 2 MG/ML
4 INJECTION, SOLUTION INTRAVENOUS EVERY 12 HOURS PRN
Status: DISCONTINUED | OUTPATIENT
Start: 2025-07-01 | End: 2025-07-05 | Stop reason: HOSPADM

## 2025-07-01 RX ORDER — ACETAMINOPHEN 10 MG/ML
1000 INJECTION, SOLUTION INTRAVENOUS EVERY 8 HOURS
Status: COMPLETED | OUTPATIENT
Start: 2025-07-01 | End: 2025-07-02

## 2025-07-01 RX ORDER — GLUCAGON 1 MG
1 KIT INJECTION
Status: DISCONTINUED | OUTPATIENT
Start: 2025-07-01 | End: 2025-07-01 | Stop reason: HOSPADM

## 2025-07-01 RX ORDER — LIDOCAINE HYDROCHLORIDE 20 MG/ML
INJECTION INTRAVENOUS
Status: DISCONTINUED | OUTPATIENT
Start: 2025-07-01 | End: 2025-07-01

## 2025-07-01 RX ORDER — DEXAMETHASONE SODIUM PHOSPHATE 4 MG/ML
INJECTION, SOLUTION INTRA-ARTICULAR; INTRALESIONAL; INTRAMUSCULAR; INTRAVENOUS; SOFT TISSUE
Status: DISCONTINUED | OUTPATIENT
Start: 2025-07-01 | End: 2025-07-01

## 2025-07-01 RX ORDER — LEVOTHYROXINE SODIUM 50 UG/1
50 TABLET ORAL DAILY
Status: DISCONTINUED | OUTPATIENT
Start: 2025-07-02 | End: 2025-07-05 | Stop reason: HOSPADM

## 2025-07-01 RX ORDER — HYDROCODONE BITARTRATE AND ACETAMINOPHEN 500; 5 MG/1; MG/1
TABLET ORAL
Status: DISCONTINUED | OUTPATIENT
Start: 2025-07-01 | End: 2025-07-05 | Stop reason: HOSPADM

## 2025-07-01 RX ORDER — NALOXONE HCL 0.4 MG/ML
0.02 VIAL (ML) INJECTION
Status: DISCONTINUED | OUTPATIENT
Start: 2025-07-01 | End: 2025-07-02

## 2025-07-01 RX ORDER — SODIUM CHLORIDE, SODIUM LACTATE, POTASSIUM CHLORIDE, CALCIUM CHLORIDE 600; 310; 30; 20 MG/100ML; MG/100ML; MG/100ML; MG/100ML
INJECTION, SOLUTION INTRAVENOUS CONTINUOUS
Status: DISCONTINUED | OUTPATIENT
Start: 2025-07-01 | End: 2025-07-02

## 2025-07-01 RX ORDER — HYDROMORPHONE HCL IN 0.9% NACL 6 MG/30 ML
PATIENT CONTROLLED ANALGESIA SYRINGE INTRAVENOUS CONTINUOUS
Status: DISCONTINUED | OUTPATIENT
Start: 2025-07-01 | End: 2025-07-02

## 2025-07-01 RX ORDER — MIDAZOLAM HYDROCHLORIDE 1 MG/ML
.5-4 INJECTION, SOLUTION INTRAMUSCULAR; INTRAVENOUS
Status: DISCONTINUED | OUTPATIENT
Start: 2025-07-01 | End: 2025-07-01

## 2025-07-01 RX ORDER — CIPROFLOXACIN 2 MG/ML
400 INJECTION, SOLUTION INTRAVENOUS
Status: DISCONTINUED | OUTPATIENT
Start: 2025-07-01 | End: 2025-07-01

## 2025-07-01 RX ORDER — SODIUM CHLORIDE 0.9 % (FLUSH) 0.9 %
10 SYRINGE (ML) INJECTION
Status: DISCONTINUED | OUTPATIENT
Start: 2025-07-01 | End: 2025-07-05 | Stop reason: HOSPADM

## 2025-07-01 RX ORDER — DIPHENHYDRAMINE HCL 25 MG
25 CAPSULE ORAL EVERY 6 HOURS PRN
Status: DISCONTINUED | OUTPATIENT
Start: 2025-07-01 | End: 2025-07-05 | Stop reason: HOSPADM

## 2025-07-01 RX ORDER — CELECOXIB 200 MG/1
400 CAPSULE ORAL
Status: DISCONTINUED | OUTPATIENT
Start: 2025-07-01 | End: 2025-07-01

## 2025-07-01 RX ORDER — PROCHLORPERAZINE EDISYLATE 5 MG/ML
5 INJECTION INTRAMUSCULAR; INTRAVENOUS EVERY 6 HOURS PRN
Status: DISCONTINUED | OUTPATIENT
Start: 2025-07-01 | End: 2025-07-05 | Stop reason: HOSPADM

## 2025-07-01 RX ORDER — HEPARIN SODIUM 5000 [USP'U]/ML
5000 INJECTION, SOLUTION INTRAVENOUS; SUBCUTANEOUS
Status: DISCONTINUED | OUTPATIENT
Start: 2025-07-01 | End: 2025-07-01

## 2025-07-01 RX ORDER — PHENYLEPHRINE HYDROCHLORIDE 10 MG/ML
INJECTION INTRAVENOUS
Status: DISCONTINUED | OUTPATIENT
Start: 2025-07-01 | End: 2025-07-01

## 2025-07-01 RX ORDER — MUPIROCIN 20 MG/G
OINTMENT TOPICAL 2 TIMES DAILY
Status: DISCONTINUED | OUTPATIENT
Start: 2025-07-01 | End: 2025-07-05 | Stop reason: HOSPADM

## 2025-07-01 RX ORDER — FENTANYL CITRATE 50 UG/ML
25 INJECTION, SOLUTION INTRAMUSCULAR; INTRAVENOUS EVERY 5 MIN PRN
Status: DISCONTINUED | OUTPATIENT
Start: 2025-07-01 | End: 2025-07-01 | Stop reason: HOSPADM

## 2025-07-01 RX ORDER — ROCURONIUM BROMIDE 10 MG/ML
INJECTION, SOLUTION INTRAVENOUS
Status: DISCONTINUED | OUTPATIENT
Start: 2025-07-01 | End: 2025-07-01

## 2025-07-01 RX ORDER — PROPOFOL 10 MG/ML
VIAL (ML) INTRAVENOUS
Status: DISCONTINUED | OUTPATIENT
Start: 2025-07-01 | End: 2025-07-01

## 2025-07-01 RX ORDER — ACETAMINOPHEN 500 MG
1000 TABLET ORAL EVERY 8 HOURS
Status: DISCONTINUED | OUTPATIENT
Start: 2025-07-02 | End: 2025-07-05 | Stop reason: HOSPADM

## 2025-07-01 RX ORDER — DEXMEDETOMIDINE HYDROCHLORIDE 100 UG/ML
INJECTION, SOLUTION INTRAVENOUS
Status: DISCONTINUED | OUTPATIENT
Start: 2025-07-01 | End: 2025-07-01

## 2025-07-01 RX ORDER — LEVALBUTEROL INHALATION SOLUTION 0.63 MG/3ML
0.63 SOLUTION RESPIRATORY (INHALATION)
Status: DISPENSED | OUTPATIENT
Start: 2025-07-01 | End: 2025-07-03

## 2025-07-01 RX ORDER — BUPIVACAINE HYDROCHLORIDE 7.5 MG/ML
INJECTION, SOLUTION EPIDURAL; RETROBULBAR
Status: COMPLETED | OUTPATIENT
Start: 2025-07-01 | End: 2025-07-01

## 2025-07-01 RX ORDER — ESCITALOPRAM OXALATE 10 MG/1
10 TABLET ORAL DAILY
Status: DISCONTINUED | OUTPATIENT
Start: 2025-07-02 | End: 2025-07-05 | Stop reason: HOSPADM

## 2025-07-01 RX ORDER — SODIUM CHLORIDE 0.9 % (FLUSH) 0.9 %
10 SYRINGE (ML) INJECTION
Status: DISCONTINUED | OUTPATIENT
Start: 2025-07-01 | End: 2025-07-01 | Stop reason: HOSPADM

## 2025-07-01 RX ORDER — ONDANSETRON HYDROCHLORIDE 2 MG/ML
INJECTION, SOLUTION INTRAVENOUS
Status: DISCONTINUED | OUTPATIENT
Start: 2025-07-01 | End: 2025-07-01

## 2025-07-01 RX ORDER — ACETAMINOPHEN 500 MG
1000 TABLET ORAL
Status: DISCONTINUED | OUTPATIENT
Start: 2025-07-01 | End: 2025-07-01

## 2025-07-01 RX ORDER — HALOPERIDOL LACTATE 5 MG/ML
0.5 INJECTION, SOLUTION INTRAMUSCULAR EVERY 10 MIN PRN
Status: DISCONTINUED | OUTPATIENT
Start: 2025-07-01 | End: 2025-07-01 | Stop reason: HOSPADM

## 2025-07-01 RX ORDER — PAPAVERINE HYDROCHLORIDE 30 MG/ML
INJECTION INTRAMUSCULAR; INTRAVENOUS
Status: DISCONTINUED | OUTPATIENT
Start: 2025-07-01 | End: 2025-07-01 | Stop reason: HOSPADM

## 2025-07-01 RX ORDER — POLYETHYLENE GLYCOL 3350 17 G/17G
17 POWDER, FOR SOLUTION ORAL DAILY
Status: DISCONTINUED | OUTPATIENT
Start: 2025-07-01 | End: 2025-07-02

## 2025-07-01 RX ORDER — KETAMINE HCL IN 0.9 % NACL 50 MG/5 ML
SYRINGE (ML) INTRAVENOUS
Status: DISCONTINUED | OUTPATIENT
Start: 2025-07-01 | End: 2025-07-01

## 2025-07-01 RX ORDER — METRONIDAZOLE 500 MG/100ML
500 INJECTION, SOLUTION INTRAVENOUS
Status: DISCONTINUED | OUTPATIENT
Start: 2025-07-01 | End: 2025-07-01

## 2025-07-01 RX ADMIN — Medication: at 12:07

## 2025-07-01 RX ADMIN — CIPROFLOXACIN 400 MG: 2 INJECTION, SOLUTION INTRAVENOUS at 07:07

## 2025-07-01 RX ADMIN — ROCURONIUM BROMIDE 100 MG: 10 INJECTION, SOLUTION INTRAVENOUS at 07:07

## 2025-07-01 RX ADMIN — HALOPERIDOL LACTATE 0.5 MG: 5 INJECTION, SOLUTION INTRAMUSCULAR at 12:07

## 2025-07-01 RX ADMIN — Medication 10 MG: at 10:07

## 2025-07-01 RX ADMIN — ROCURONIUM BROMIDE 10 MG: 10 INJECTION, SOLUTION INTRAVENOUS at 09:07

## 2025-07-01 RX ADMIN — PHENYLEPHRINE HYDROCHLORIDE 200 MCG: 10 INJECTION INTRAVENOUS at 08:07

## 2025-07-01 RX ADMIN — PHENYLEPHRINE HYDROCHLORIDE 100 MCG: 10 INJECTION INTRAVENOUS at 10:07

## 2025-07-01 RX ADMIN — ONDANSETRON 4 MG: 2 INJECTION INTRAMUSCULAR; INTRAVENOUS at 03:07

## 2025-07-01 RX ADMIN — SODIUM CHLORIDE, POTASSIUM CHLORIDE, SODIUM LACTATE AND CALCIUM CHLORIDE: 600; 310; 30; 20 INJECTION, SOLUTION INTRAVENOUS at 11:07

## 2025-07-01 RX ADMIN — PHENYLEPHRINE HYDROCHLORIDE 100 MCG: 10 INJECTION INTRAVENOUS at 11:07

## 2025-07-01 RX ADMIN — SODIUM CHLORIDE: 0.9 INJECTION, SOLUTION INTRAVENOUS at 07:07

## 2025-07-01 RX ADMIN — ONDANSETRON 4 MG: 2 INJECTION INTRAMUSCULAR; INTRAVENOUS at 07:07

## 2025-07-01 RX ADMIN — PHENYLEPHRINE HYDROCHLORIDE 100 MCG: 10 INJECTION INTRAVENOUS at 08:07

## 2025-07-01 RX ADMIN — Medication 10 MG: at 11:07

## 2025-07-01 RX ADMIN — PHENYLEPHRINE HYDROCHLORIDE 200 MCG: 10 INJECTION INTRAVENOUS at 07:07

## 2025-07-01 RX ADMIN — SODIUM CHLORIDE, SODIUM LACTATE, POTASSIUM CHLORIDE, AND CALCIUM CHLORIDE: .6; .31; .03; .02 INJECTION, SOLUTION INTRAVENOUS at 11:07

## 2025-07-01 RX ADMIN — DEXAMETHASONE SODIUM PHOSPHATE 4 MG: 4 INJECTION, SOLUTION INTRAMUSCULAR; INTRAVENOUS at 07:07

## 2025-07-01 RX ADMIN — DEXMEDETOMIDINE 12 MCG: 200 INJECTION, SOLUTION INTRAVENOUS at 11:07

## 2025-07-01 RX ADMIN — Medication 10 MG: at 09:07

## 2025-07-01 RX ADMIN — SODIUM CHLORIDE, SODIUM LACTATE, POTASSIUM CHLORIDE, AND CALCIUM CHLORIDE: .6; .31; .03; .02 INJECTION, SOLUTION INTRAVENOUS at 07:07

## 2025-07-01 RX ADMIN — ACETAMINOPHEN 1000 MG: 10 INJECTION, SOLUTION INTRAVENOUS at 01:07

## 2025-07-01 RX ADMIN — LIDOCAINE HYDROCHLORIDE 100 MG: 20 INJECTION INTRAVENOUS at 07:07

## 2025-07-01 RX ADMIN — MUPIROCIN: 20 OINTMENT TOPICAL at 09:07

## 2025-07-01 RX ADMIN — PHENYLEPHRINE HYDROCHLORIDE 0.3 MCG/KG/MIN: 10 INJECTION INTRAVENOUS at 08:07

## 2025-07-01 RX ADMIN — CELECOXIB 400 MG: 200 CAPSULE ORAL at 05:07

## 2025-07-01 RX ADMIN — PROPOFOL 150 MG: 10 INJECTION, EMULSION INTRAVENOUS at 07:07

## 2025-07-01 RX ADMIN — ROCURONIUM BROMIDE 10 MG: 10 INJECTION, SOLUTION INTRAVENOUS at 11:07

## 2025-07-01 RX ADMIN — ACETAMINOPHEN 1000 MG: 10 INJECTION, SOLUTION INTRAVENOUS at 09:07

## 2025-07-01 RX ADMIN — BUPIVACAINE HYDROCHLORIDE 30 ML: 7.5 INJECTION, SOLUTION EPIDURAL; RETROBULBAR at 06:07

## 2025-07-01 RX ADMIN — SODIUM CHLORIDE, SODIUM LACTATE, POTASSIUM CHLORIDE, AND CALCIUM CHLORIDE: .6; .31; .03; .02 INJECTION, SOLUTION INTRAVENOUS at 09:07

## 2025-07-01 RX ADMIN — SODIUM CHLORIDE, POTASSIUM CHLORIDE, SODIUM LACTATE AND CALCIUM CHLORIDE: 600; 310; 30; 20 INJECTION, SOLUTION INTRAVENOUS at 12:07

## 2025-07-01 RX ADMIN — HEPARIN SODIUM 5000 UNITS: 5000 INJECTION INTRAVENOUS; SUBCUTANEOUS at 06:07

## 2025-07-01 RX ADMIN — FENTANYL CITRATE 50 MCG: 50 INJECTION INTRAMUSCULAR; INTRAVENOUS at 06:07

## 2025-07-01 RX ADMIN — ACETAMINOPHEN 1000 MG: 500 TABLET ORAL at 05:07

## 2025-07-01 RX ADMIN — LEVALBUTEROL HYDROCHLORIDE 0.63 MG: 0.63 SOLUTION RESPIRATORY (INHALATION) at 07:07

## 2025-07-01 RX ADMIN — POLYETHYLENE GLYCOL 3350 17 G: 17 POWDER, FOR SOLUTION ORAL at 02:07

## 2025-07-01 RX ADMIN — METRONIDAZOLE 500 MG: 500 INJECTION, SOLUTION INTRAVENOUS at 07:07

## 2025-07-01 NOTE — ANESTHESIA RELEASE NOTE
"Anesthesia Release from PACU Note    Patient: Bessy Lamb    Procedure(s) Performed: Procedure(s) (LRB):  PANCREATECTOMY, DISTAL, SUBTOTAL open caro, splenectomy (N/A)    Anesthesia type: general    Post pain: Adequate analgesia    Post assessment: no apparent anesthetic complications    Last Vitals: Visit Vitals  /61   Pulse (!) 54   Temp 36.6 °C (97.9 °F) (Temporal)   Resp 19   Ht 5' 1" (1.549 m)   Wt 55.9 kg (123 lb 3.8 oz)   SpO2 97%   Breastfeeding No   BMI 23.29 kg/m²       Post vital signs: stable    Level of consciousness: oriented and lethargic    Nausea/Vomiting: no nausea/no vomiting    Complications: none    Airway Patency: patent    Respiratory: nasal cannula    Cardiovascular: stable and blood pressure at baseline    Hydration: euvolemic  "

## 2025-07-01 NOTE — ANESTHESIA PROCEDURE NOTES
Arterial    Diagnosis: pancreatic cancer    Patient location during procedure: done in OR  Timeout: 7/1/2025 7:33 AM  Procedure end time: 7/1/2025 7:45 AM    Staffing  Authorizing Provider: Wyatt Bliss MD  Performing Provider: Lane Madrid MD    Staffing  Performed by: Lane Madrid MD  Authorized by: Wyatt Bliss MD    Anesthesiologist was present at the time of the procedure.    Preanesthetic Checklist  Completed: patient identified, IV checked, site marked, risks and benefits discussed, surgical consent, monitors and equipment checked, pre-op evaluation, timeout performed and anesthesia consent givenArterial  Skin Prep: chlorhexidine gluconate  Local Infiltration: none  Orientation: right  Location: radial    Catheter Size: 22 G  Catheter placement by Ultrasound guidance. Heme positive aspiration all ports.   Vessel Caliber: patent, compressibility normal  Needle advanced into vessel with real time Ultrasound guidance.Insertion Attempts: 1  Assessment  Dressing: secured with tape and tegaderm  Patient: Tolerated well

## 2025-07-01 NOTE — ANESTHESIA POSTPROCEDURE EVALUATION
Anesthesia Post Evaluation    Patient: Bessy Lamb    Procedure(s) Performed: Procedure(s) (LRB):  PANCREATECTOMY, DISTAL, SUBTOTAL open caro, splenectomy (N/A)    Final Anesthesia Type: general      Patient location during evaluation: PACU  Patient participation: Yes- Able to Participate  Level of consciousness: awake and alert and oriented  Post-procedure vital signs: reviewed and stable  Pain management: adequate  Airway patency: patent    PONV status at discharge: No PONV  Anesthetic complications: no      Cardiovascular status: hemodynamically stable  Respiratory status: unassisted  Hydration status: euvolemic  Follow-up not needed.              Vitals Value Taken Time   /55 07/01/25 12:47   Temp 36.5 °C (97.7 °F) 07/01/25 12:16   Pulse 59 07/01/25 12:52   Resp 20 07/01/25 12:52   SpO2 98 % 07/01/25 12:52   Vitals shown include unfiled device data.      No case tracking events are documented in the log.      Pain/Jenae Score: Pain Rating Prior to Med Admin: 9 (7/1/2025 12:38 PM)  Pain Rating Post Med Admin: 0 (7/1/2025  7:00 AM)  Jenae Score: 5 (7/1/2025 12:16 PM)

## 2025-07-01 NOTE — ANESTHESIA PROCEDURE NOTES
B/L JAMESON SS    Patient location during procedure: pre-op   Block not for primary anesthetic.  Reason for block: at surgeon's request and post-op pain management   Post-op Pain Location: B/L abdomen   Start time: 7/1/2025 6:40 AM  Timeout: 7/1/2025 6:40 AM   End time: 7/1/2025 6:45 AM    Staffing  Authorizing Provider: Tae Singh MD  Performing Provider: Haim Hamilton DO    Staffing  Performed by: Haim Hamilton DO  Authorized by: Tae Singh MD    Preanesthetic Checklist  Completed: patient identified, IV checked, site marked, risks and benefits discussed, surgical consent, monitors and equipment checked, pre-op evaluation and timeout performed  Peripheral Block  Patient position: sitting  Prep: ChloraPrep  Patient monitoring: heart rate, cardiac monitor, continuous pulse ox, continuous capnometry and frequent blood pressure checks  Block type: erector spinae plane  Laterality: bilateral  Injection technique: single shot  Interspace: T7-8    Needle  Needle type: Tuohy   Needle gauge: 17 G  Needle length: 3.5 in  Needle localization: anatomical landmarks and ultrasound guidance   -ultrasound image captured on disc.  Assessment  Injection assessment: negative aspiration, negative parasthesia and local visualized surrounding nerve  Paresthesia pain: none  Heart rate change: no  Slow fractionated injection: yes    Medications:    Medications: bupivacaine (pf) (MARCAINE) injection 0.75% - Perineural   30 mL - 7/1/2025 6:52:00 AM    Additional Notes  A time out was conducted. Site allie confirmed with team and patient. Allergies reviewed.   Vital signs stable throughout block. RN monitoring vitals throughout.   Needle advanced under continuous ultrasound guidance.  Local injected incrementally after confirming negative aspiration. No signs or symptoms of intravascular or intraneural injection noted.   No persistent paresthesias elicited or expressed. Patient tolerated procedure well.  2x 0.375 bupi with  epinephrine 1:300K, PF dexamethasone 1 mg, and clonidine 50 mcg used for the block.

## 2025-07-01 NOTE — ANESTHESIA PROCEDURE NOTES
Intubation    Date/Time: 7/1/2025 7:29 AM    Performed by: Lane Madrid MD  Authorized by: Wyatt Bliss MD    Intubation:     Induction:  Intravenous    Intubated:  Postinduction    Mask Ventilation:  Easy with oral airway    Attempts:  1    Attempted By:  Resident anesthesiologist    Method of Intubation:  Direct    Blade:  Bianca 3    Laryngeal View Grade: Grade IIA - cords partially seen      Difficult Airway Encountered?: No      Complications:  None    Airway Device:  Oral endotracheal tube    Airway Device Size:  7.0    Style/Cuff Inflation:  Cuffed (inflated to minimal occlusive pressure)    Tube secured:  22    Secured at:  The lips    Placement Verified By:  Capnometry    Complicating Factors:  None    Findings Post-Intubation:  BS equal bilateral

## 2025-07-01 NOTE — PLAN OF CARE
Problem: Adult Inpatient Plan of Care  Goal: Plan of Care Review  Outcome: Progressing  Goal: Patient-Specific Goal (Individualized)  Outcome: Progressing  Goal: Absence of Hospital-Acquired Illness or Injury  Outcome: Progressing  Goal: Optimal Comfort and Wellbeing  Outcome: Progressing  Goal: Readiness for Transition of Care  Outcome: Progressing     Problem: Diabetes Comorbidity  Goal: Blood Glucose Level Within Targeted Range  Outcome: Progressing     Problem: Wound  Goal: Optimal Coping  Outcome: Progressing  Goal: Optimal Functional Ability  Outcome: Progressing  Goal: Absence of Infection Signs and Symptoms  Outcome: Progressing  Goal: Improved Oral Intake  Outcome: Progressing  Goal: Optimal Pain Control and Function  Outcome: Progressing  Goal: Skin Health and Integrity  Outcome: Progressing  Goal: Optimal Wound Healing  Outcome: Progressing     Problem: Infection  Goal: Absence of Infection Signs and Symptoms  Outcome: Progressing     Problem: Fall Injury Risk  Goal: Absence of Fall and Fall-Related Injury  Outcome: Progressing     Problem: Skin Injury Risk Increased  Goal: Skin Health and Integrity  Outcome: Progressing

## 2025-07-01 NOTE — NURSING
PCU Plan of Care    Patient Dx: <principal problem not specified>    Vital Signs (last 12 hours):   Temp:  [97.5 °F (36.4 °C)-97.9 °F (36.6 °C)]   Pulse:  [53-93]   Resp:  [14-28]   BP: (106-173)/(54-76)   SpO2:  [91 %-100 %]   Arterial Line BP: (100-147)/(45-62)      Neuro: AAOx4, Follows Commands, and Drowsy    Cardiac: normal sinus rhythm    Respiratory: 1L Nasal Cannula     Gtts: MIVF    Frequent Checks: None     Urine Output: Urethral Catheter  100 mL/shift    Diet: NPO , Sips with Meds , and Ice Chips    Mobility: Bed Rest ; Assistance Required: 1-person Assistance      Skin:  Patient turned q2h, bony prominences protected, and mattress inflated/working correctly.   Dylan Score: 17.    Shift Events:  See flowsheet for further assessment/details.  Family updated on current condition/plan of care, questions answered, and emotional support provided.  MD updated on current condition, vitals, labs, and gtts.

## 2025-07-01 NOTE — TRANSFER OF CARE
"Anesthesia Transfer of Care Note    Patient: Bessy Lamb    Procedure(s) Performed: Procedure(s) (LRB):  PANCREATECTOMY, DISTAL, SUBTOTAL open caro, splenectomy (N/A)    Patient location: [facility]    Anesthesia Type: general    Transport from OR: Transported from OR on 6-10 L/min O2 by face mask with adequate spontaneous ventilation    Post pain: adequate analgesia    Post assessment: no apparent anesthetic complications and tolerated procedure well    Post vital signs: stable    Level of consciousness: sedated    Nausea/Vomiting: no nausea/vomiting    Complications: none    Transfer of care protocol was followed      Last vitals: Visit Vitals  BP (!) 159/64 (BP Location: Right arm, Patient Position: Lying)   Pulse 93   Temp 36.8 °C (98.2 °F) (Temporal)   Resp (!) 22   Ht 5' 1" (1.549 m)   Wt 55.9 kg (123 lb 3.8 oz)   SpO2 100%   Breastfeeding No   BMI 23.29 kg/m²     "

## 2025-07-01 NOTE — NURSING TRANSFER
Nursing Transfer Note      7/1/2025   2:11 PM    Nurse giving handoff: [place] DOAN PACU  Nurse receiving handoff: Justyna PCU    Reason patient is being transferred:  recovered from anesthesia    Transfer To: [id number]    Transfer via bed    Transfer with IV pump/fluid, PCA pump, oxygen    Transported by RN x1, PCT x1    Telemetry: Box Number pt is on transport monitor  Order for Tele Monitor? Yes    Additional Lines: Jiménez Catheter    Medicines sent: hydromorphone via PCA pump    Any special needs or follow-up needed: routine    Patient belongings transferred with patient: none    Chart send with patient: Yes    Notified: family via surgical texting system     Patient reassessed at: 7/1/2025 at 1500  1  Upon arrival to floor: cardiac monitor applied, patient oriented to room, and bed in lowest position

## 2025-07-01 NOTE — BRIEF OP NOTE
[facility] - Surgery ([facility])  Brief Operative Note    SUMMARY     Surgery Date: 7/1/2025     Surgeons and Role:     * Flip Valentin MD - Primary     * Charles Hernández MD - Resident - Assisting        Pre-op Diagnosis:  Malignant neoplasm of body of pancreas [C25.1]    Post-op Diagnosis:  Post-Op Diagnosis Codes:     * Malignant neoplasm of body of pancreas [C25.1]    Procedure(s) (LRB):  PANCREATECTOMY, DISTAL, SUBTOTAL open caro, splenectomy (N/A)    Anesthesia: General    Implants:  * No implants in log *    Operative Findings: Open caro performed without apparent complication.     Estimated Blood Loss: 50 mL         Specimens:   Specimen (24h ago, onward)       Start     Ordered    07/01/25 1106  Specimen to Pathology  RELEASE UPON ORDERING        References:    Click here for ordering Quick Tip   Question:  Release to patient  Answer:  Immediate    07/01/25 1106                  ID Type Source Tests Collected by Time Destination   1 : 1- subtotal pancreatectomy, splenectomy, celiac axis and lymph nodes- permanent Tissue Pancreas SPECIMEN TO PATHOLOGY Flip Valentin MD 7/1/2025 1105    2 : 2- gallbladder- permanent Tissue Gallbladder SPECIMEN TO PATHOLOGY Flip Valentin MD 7/1/2025 1114        [id number]

## 2025-07-01 NOTE — H&P
Surgical Oncology   History and Physical    Patient Name: Bessy Lamb  YOB: 1960 (64 y.o.)  MRN: [id number]  Today's Date: 2025    Referring Md:   [place]  [address] [facility]  [place] [address]    SUBJECTIVE:     History of Present Illness:  Ms. Lamb is a 63 y.o. female with Arthritis, DMII, GERD, Hypotyroidism, Obesity, Thyroid disease who presents for pancreatic cancer. This was incidentally found on CT chest for a URI. 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 She is feeling relatively well this morning with now significant change since last clinic visit.    Review of patient's allergies indicates:   Allergen Reactions    Duricef [cefadroxil] Hives    Grass pollen- grass standard Itching     Past Medical History:   Diagnosis Date    Arthritis, lumbar spine     hands    Diabetes mellitus 10/ 21/20    Food allergy     General anesthetics causing adverse effect in therapeutic use     following breast rediuct, hard time awakening  also had anxiety rxn to lidocain in dental ofc    GERD (gastroesophageal reflux disease)     Hypothyroidism 10/08/2014    Major depressive disorder 2025    Maternal anesthesia complication     epidural for 1st child went up instead of down; required intubation    Normocytic anemia 2024    Obesity (BMI 30-39.9) 10/08/2014    Osteoarthritis     Osteoporosis     Do not know when started    pancreatic Cancer         Seasonal allergies 21    Get this ever year    Thyroid disease 10/8/2014    Thyroid nodule 10/08/2014     Past Surgical History:   Procedure Laterality Date    BREAST BIOPSY Left     b9    BREAST SURGERY      Reduction cause of a mass in left breast    c-sections x2       SECTION  3/26/81 &  9/12/85    Birth of two girls    COLONOSCOPY  9/25/2012         COLONOSCOPY N/A 8/30/2018    Procedure: COLONOSCOPY;  Surgeon: Francisco Mcclain MD;  Location: [facility];  Service: Endoscopy;  Laterality: N/A;    COLONOSCOPY N/A 10/24/2023    Procedure: COLONOSCOPY;  Surgeon: Francisco Mcclain MD;  Location: [facility];  Service: Endoscopy;  Laterality: N/A;    ENDOSCOPIC ULTRASOUND OF UPPER GASTROINTESTINAL TRACT Left 7/5/2024    Procedure: ULTRASOUND, UPPER GI TRACT, ENDOSCOPIC;  Surgeon: Andrew Gordon MD;  Location: [facility];  Service: Endoscopy;  Laterality: Left;    ESOPHAGOGASTRODUODENOSCOPY N/A 7/5/2024    Procedure: EGD (ESOPHAGOGASTRODUODENOSCOPY);  Surgeon: Andrew Gordon MD;  Location: [facility];  Service: Endoscopy;  Laterality: N/A;    hysteroscopy with polypectomy      INCISION AND DRAINAGE Right 2/19/2025    Procedure: Incision and Drainage, RIGHT HAND EXCISION OF INFECTED BONE RIGHT INDEX FINGER;  Surgeon: SALVATORE Chou MD;  Location: [facility];  Service: General;  Laterality: Right;    INCISIONAL BREAST BIOPSY Left 1996    benign; done at same time as reduction    INSERTION OF TUNNELED CENTRAL VENOUS CATHETER (CVC) WITH SUBCUTANEOUS PORT N/A 7/18/2024    Procedure: [id number];  Surgeon: Blanca Dillard MD;  Location: [facility];  Service: General;  Laterality: N/A;    TOTAL REDUCTION MAMMOPLASTY Bilateral 1996     Family History   Problem Relation Name Age of Onset    Brain cancer Mother Ravi Tolbert     Early death Mother Ravi Tolbert 51        Passed at 51    Cancer Mother Ravi Choudhury Tomasz         Brain tumor    Arthritis Mother Ravi Choudhury Tomasz     Diabetes Mother Ravi Choudhury Tomasz         Type 2    Hypertension Mother Ravi Choudhury Tomasz     Diabetes Father Ck pham tomasz         Type 2    Cirrhosis Father Ck pham tomasz     Cancer Father Ck ankit tolbert          Bone    COPD Father Ck tolbert         Smoker    Early death Father Ck tolbert         Passed at 64    Lung cancer Father Ck tolbert     Heart disease Sister Mamta (dianna)wescovich         Congestive heart failure (passed 2/11/18    Hypertension Sister Mamta (dianna)wescovich     Kidney disease Sister Mamta (dianna)martincoradha         Doc removed when she was a child    Hypertension Sister Mayank     Depression Sister Ravi (mayank) nicolette Mauricio ( sister)         Due to loss of her 27 year old son    Early death Sister Ravi (mayank) nicolette Mauricio ( sister)         Pulmonary hypertension, cirrhosis of the liver(passed 7/14/2007   Age 57    Heart disease Brother John Choudhury ( passed )         Heart attack    Hypertension Brother John Choudhury ( passed )     Heart disease Brother Juan Francisco Choudhury         Heart  blockage    Heart disease Brother Bhanu Choudhury         Heart attack (passed)    Diabetes Brother Bhanu Choudhury     Macular degeneration Brother Bhanu Choudhury     Breast cancer Maternal Aunt  30    Breast cancer Paternal Aunt  40    Breast cancer Paternal Aunt  40    Breast cancer Maternal Aunt      Breast cancer Paternal Aunt      Ovarian cancer Neg Hx       Social History[1]     Review of Systems:  Review of Systems   Constitutional:  Negative for chills and fever.   Respiratory:  Negative for cough and shortness of breath.    Cardiovascular:  Negative for chest pain.   Gastrointestinal:  Negative for abdominal pain, constipation, nausea and vomiting.   Genitourinary:  Negative for dysuria.   Neurological:  Negative for dizziness.       OBJECTIVE:     Vital Signs (Most Recent)  There were no vitals taken for this visit.    Physical Exam:  Physical Exam  Vitals reviewed.   Constitutional:       Appearance: Normal appearance.   Cardiovascular:      Rate and Rhythm: Normal rate.      Pulses: Normal pulses.   Pulmonary:      Effort: Pulmonary effort is normal.    Abdominal:      General: There is no distension.      Palpations: Abdomen is soft.      Tenderness: There is no abdominal tenderness.   Skin:     General: Skin is warm and dry.   Neurological:      General: No focal deficit present.      Mental Status: She is alert and oriented to person, place, and time.         ASSESSMENT/PLAN:     Bessy Lamb is a 64 y.o. female presenting for scheduled distal pancreatectomy, splenectomy, possible Anthony procedure, possible partial gastrectomy. Consent in the media tab. All questions answered.    - Proceed with planned procedure    Pavan Hernández MD  [facility]         [1]   Social History  Tobacco Use    Smoking status: Never     Passive exposure: Past    Smokeless tobacco: Never   Substance Use Topics    Alcohol use: Not Currently     Comment: 2/ month    Drug use: No

## 2025-07-02 LAB
ABSOLUTE EOSINOPHIL (OHS): 0.01 K/UL
ABSOLUTE MONOCYTE (OHS): 0.78 K/UL (ref 0.3–1)
ABSOLUTE NEUTROPHIL COUNT (OHS): 9.85 K/UL (ref 1.8–7.7)
ALBUMIN SERPL BCP-MCNC: 3.2 G/DL (ref 3.5–5.2)
ALP SERPL-CCNC: 76 UNIT/L (ref 40–150)
ALT SERPL W/O P-5'-P-CCNC: 56 UNIT/L (ref 10–44)
ANION GAP (OHS): 9 MMOL/L (ref 8–16)
AST SERPL-CCNC: 78 UNIT/L (ref 11–45)
BASOPHILS # BLD AUTO: 0.01 K/UL
BASOPHILS NFR BLD AUTO: 0.1 %
BILIRUB SERPL-MCNC: 0.6 MG/DL (ref 0.1–1)
BUN SERPL-MCNC: 11 MG/DL (ref 8–23)
CALCIUM SERPL-MCNC: 8.3 MG/DL (ref 8.7–10.5)
CHLORIDE SERPL-SCNC: 104 MMOL/L (ref 95–110)
CO2 SERPL-SCNC: 22 MMOL/L (ref 23–29)
CREAT SERPL-MCNC: 0.7 MG/DL (ref 0.5–1.4)
EAG (OHS): 100 MG/DL (ref 68–131)
ERYTHROCYTE [DISTWIDTH] IN BLOOD BY AUTOMATED COUNT: 12.4 % (ref 11.5–14.5)
GFR SERPLBLD CREATININE-BSD FMLA CKD-EPI: >60 ML/MIN/1.73/M2
GLUCOSE SERPL-MCNC: 150 MG/DL (ref 70–110)
HBA1C MFR BLD: 5.1 % (ref 4–5.6)
HCT VFR BLD AUTO: 33.7 % (ref 37–48.5)
HGB BLD-MCNC: 11.2 GM/DL (ref 12–16)
IMM GRANULOCYTES # BLD AUTO: 0.04 K/UL (ref 0–0.04)
IMM GRANULOCYTES NFR BLD AUTO: 0.4 % (ref 0–0.5)
LYMPHOCYTES # BLD AUTO: 0.49 K/UL (ref 1–4.8)
MAGNESIUM SERPL-MCNC: 1.8 MG/DL (ref 1.6–2.6)
MCH RBC QN AUTO: 31.8 PG (ref 27–31)
MCHC RBC AUTO-ENTMCNC: 33.2 G/DL (ref 32–36)
MCV RBC AUTO: 96 FL (ref 82–98)
NUCLEATED RBC (/100WBC) (OHS): 0 /100 WBC
PHOSPHATE SERPL-MCNC: 3.8 MG/DL (ref 2.7–4.5)
PLATELET # BLD AUTO: 153 K/UL (ref 150–450)
PMV BLD AUTO: 9.2 FL (ref 9.2–12.9)
POCT GLUCOSE: 159 MG/DL (ref 70–110)
POCT GLUCOSE: 168 MG/DL (ref 70–110)
POCT GLUCOSE: 174 MG/DL (ref 70–110)
POCT GLUCOSE: 192 MG/DL (ref 70–110)
POTASSIUM SERPL-SCNC: 4 MMOL/L (ref 3.5–5.1)
PROT SERPL-MCNC: 5.7 GM/DL (ref 6–8.4)
RBC # BLD AUTO: 3.52 M/UL (ref 4–5.4)
RELATIVE EOSINOPHIL (OHS): 0.1 %
RELATIVE LYMPHOCYTE (OHS): 4.4 % (ref 18–48)
RELATIVE MONOCYTE (OHS): 7 % (ref 4–15)
RELATIVE NEUTROPHIL (OHS): 88 % (ref 38–73)
SODIUM SERPL-SCNC: 135 MMOL/L (ref 136–145)
WBC # BLD AUTO: 11.18 K/UL (ref 3.9–12.7)

## 2025-07-02 PROCEDURE — 94640 AIRWAY INHALATION TREATMENT: CPT

## 2025-07-02 PROCEDURE — 85025 COMPLETE CBC W/AUTO DIFF WBC: CPT

## 2025-07-02 PROCEDURE — 83036 HEMOGLOBIN GLYCOSYLATED A1C: CPT

## 2025-07-02 PROCEDURE — 25000003 PHARM REV CODE 250

## 2025-07-02 PROCEDURE — 80053 COMPREHEN METABOLIC PANEL: CPT

## 2025-07-02 PROCEDURE — 83735 ASSAY OF MAGNESIUM: CPT

## 2025-07-02 PROCEDURE — 11000001 HC ACUTE MED/SURG PRIVATE ROOM

## 2025-07-02 PROCEDURE — 97165 OT EVAL LOW COMPLEX 30 MIN: CPT

## 2025-07-02 PROCEDURE — 84100 ASSAY OF PHOSPHORUS: CPT

## 2025-07-02 PROCEDURE — 97535 SELF CARE MNGMENT TRAINING: CPT

## 2025-07-02 PROCEDURE — 25000242 PHARM REV CODE 250 ALT 637 W/ HCPCS: Performed by: NURSE PRACTITIONER

## 2025-07-02 PROCEDURE — 94761 N-INVAS EAR/PLS OXIMETRY MLT: CPT

## 2025-07-02 PROCEDURE — 27000646 HC AEROBIKA DEVICE

## 2025-07-02 PROCEDURE — 99900035 HC TECH TIME PER 15 MIN (STAT)

## 2025-07-02 PROCEDURE — 27000221 HC OXYGEN, UP TO 24 HOURS

## 2025-07-02 PROCEDURE — 63600175 PHARM REV CODE 636 W HCPCS

## 2025-07-02 PROCEDURE — 94799 UNLISTED PULMONARY SVC/PX: CPT | Mod: XB

## 2025-07-02 PROCEDURE — 94664 DEMO&/EVAL PT USE INHALER: CPT

## 2025-07-02 RX ORDER — OXYCODONE HYDROCHLORIDE 10 MG/1
10 TABLET ORAL EVERY 4 HOURS PRN
Refills: 0 | Status: DISCONTINUED | OUTPATIENT
Start: 2025-07-02 | End: 2025-07-05 | Stop reason: HOSPADM

## 2025-07-02 RX ORDER — ENOXAPARIN SODIUM 100 MG/ML
40 INJECTION SUBCUTANEOUS EVERY 24 HOURS
Status: DISCONTINUED | OUTPATIENT
Start: 2025-07-02 | End: 2025-07-05 | Stop reason: HOSPADM

## 2025-07-02 RX ORDER — INSULIN ASPART 100 [IU]/ML
0-5 INJECTION, SOLUTION INTRAVENOUS; SUBCUTANEOUS
Status: DISCONTINUED | OUTPATIENT
Start: 2025-07-02 | End: 2025-07-05 | Stop reason: HOSPADM

## 2025-07-02 RX ORDER — IBUPROFEN 200 MG
16 TABLET ORAL
Status: DISCONTINUED | OUTPATIENT
Start: 2025-07-02 | End: 2025-07-05 | Stop reason: HOSPADM

## 2025-07-02 RX ORDER — KETOROLAC TROMETHAMINE 10 MG/1
10 TABLET, FILM COATED ORAL EVERY 6 HOURS
Status: DISCONTINUED | OUTPATIENT
Start: 2025-07-02 | End: 2025-07-05 | Stop reason: HOSPADM

## 2025-07-02 RX ORDER — HYDROMORPHONE HYDROCHLORIDE 1 MG/ML
0.5 INJECTION, SOLUTION INTRAMUSCULAR; INTRAVENOUS; SUBCUTANEOUS EVERY 6 HOURS PRN
Refills: 0 | Status: DISCONTINUED | OUTPATIENT
Start: 2025-07-02 | End: 2025-07-05 | Stop reason: HOSPADM

## 2025-07-02 RX ORDER — IBUPROFEN 200 MG
24 TABLET ORAL
Status: DISCONTINUED | OUTPATIENT
Start: 2025-07-02 | End: 2025-07-05 | Stop reason: HOSPADM

## 2025-07-02 RX ORDER — METOCLOPRAMIDE 5 MG/1
10 TABLET ORAL
Status: DISCONTINUED | OUTPATIENT
Start: 2025-07-03 | End: 2025-07-05 | Stop reason: HOSPADM

## 2025-07-02 RX ORDER — OXYCODONE HYDROCHLORIDE 5 MG/1
5 TABLET ORAL EVERY 4 HOURS PRN
Refills: 0 | Status: DISCONTINUED | OUTPATIENT
Start: 2025-07-02 | End: 2025-07-05 | Stop reason: HOSPADM

## 2025-07-02 RX ORDER — GLUCAGON 1 MG
1 KIT INJECTION
Status: DISCONTINUED | OUTPATIENT
Start: 2025-07-02 | End: 2025-07-05 | Stop reason: HOSPADM

## 2025-07-02 RX ADMIN — ENOXAPARIN SODIUM 40 MG: 40 INJECTION SUBCUTANEOUS at 05:07

## 2025-07-02 RX ADMIN — ACETAMINOPHEN 1000 MG: 10 INJECTION, SOLUTION INTRAVENOUS at 05:07

## 2025-07-02 RX ADMIN — KETOROLAC TROMETHAMINE 10 MG: 10 TABLET, FILM COATED ORAL at 11:07

## 2025-07-02 RX ADMIN — ESCITALOPRAM OXALATE 10 MG: 5 TABLET, FILM COATED ORAL at 08:07

## 2025-07-02 RX ADMIN — Medication: at 04:07

## 2025-07-02 RX ADMIN — LEVALBUTEROL HYDROCHLORIDE 0.63 MG: 0.63 SOLUTION RESPIRATORY (INHALATION) at 07:07

## 2025-07-02 RX ADMIN — POLYETHYLENE GLYCOL 3350 17 G: 17 POWDER, FOR SOLUTION ORAL at 08:07

## 2025-07-02 RX ADMIN — KETOROLAC TROMETHAMINE 10 MG: 10 TABLET, FILM COATED ORAL at 08:07

## 2025-07-02 RX ADMIN — ACETAMINOPHEN 1000 MG: 325 TABLET ORAL at 09:07

## 2025-07-02 RX ADMIN — LEVOTHYROXINE SODIUM 50 MCG: 0.05 TABLET ORAL at 08:07

## 2025-07-02 RX ADMIN — ACETAMINOPHEN 1000 MG: 325 TABLET ORAL at 01:07

## 2025-07-02 RX ADMIN — OXYCODONE HYDROCHLORIDE 5 MG: 5 TABLET ORAL at 12:07

## 2025-07-02 RX ADMIN — MUPIROCIN: 20 OINTMENT TOPICAL at 08:07

## 2025-07-02 RX ADMIN — OXYCODONE HYDROCHLORIDE 10 MG: 10 TABLET ORAL at 05:07

## 2025-07-02 NOTE — PLAN OF CARE
07/02/25 1213   Rounds   Attendance Provider;Nurse    Discharge Plan A Home with family   Why the patient remains in the hospital Requires continued medical care   Transition of Care Barriers None     CM met with Provider during rounds to discuss discharge planning.  Patient POD1 for Pancreatectomy and Splenectomy. PT/OT eval placed. CM will continue to follow.     Zac Olivares RN, BSN  Case Management  [phone]

## 2025-07-02 NOTE — ASSESSMENT & PLAN NOTE
64 yoF with a pancreatic body adenocarcinoma with invasion of the portosplenic confluence and celiac axis s/p Anthony procedure on 7/1. Progressing well.    - Clear liquid diet  - Discontinue art line and gaona  - Discontinue PCA  - SSI  - PT/OT  - Ambulate and up to the chair  - Lovenox  - Daily labs  - MMPC  - Stepdown

## 2025-07-02 NOTE — PLAN OF CARE
Problem: Adult Inpatient Plan of Care  Goal: Plan of Care Review  7/1/2025 2157 by Ester Hancock, RN  Outcome: Progressing  7/1/2025 2015 by Ester Hancock, RN  Outcome: Progressing

## 2025-07-02 NOTE — SUBJECTIVE & OBJECTIVE
Interval History: NAEON. No nausea. Pain well controlled with minimal PCA use. AVSS. Labs stable.    Medications:  Continuous Infusions:   hydromorphone in 0.9 % NaCl 6 mg/30 ml   Intravenous Continuous   New Syringe/Bag at 07/02/25 0417     Scheduled Meds:   acetaminophen  1,000 mg Oral Q8H    enoxparin  40 mg Subcutaneous Q24H (prophylaxis, 1700)    EScitalopram oxalate  10 mg Oral Daily    levalbuterol  0.63 mg Nebulization Q6H WAKE    levothyroxine  50 mcg Oral Daily    mupirocin   Nasal BID    polyethylene glycol  17 g Oral Daily     PRN Meds:  Current Facility-Administered Medications:     0.9%  NaCl infusion (for blood administration), , Intravenous, Q24H PRN    0.9%  NaCl infusion (for blood administration), , Intravenous, Q24H PRN    dextrose 50%, 12.5 g, Intravenous, PRN    dextrose 50%, 25 g, Intravenous, PRN    diphenhydrAMINE, 25 mg, Oral, Q6H PRN    glucagon (human recombinant), 1 mg, Intramuscular, PRN    glucose, 16 g, Oral, PRN    glucose, 24 g, Oral, PRN    insulin aspart U-100, 0-5 Units, Subcutaneous, QID (AC + HS) PRN    naloxone, 0.02 mg, Intravenous, PRN    ondansetron, 4 mg, Intravenous, Q12H PRN    prochlorperazine, 5 mg, Intravenous, Q6H PRN    sodium chloride 0.9%, 10 mL, Intra-Catheter, PRN     Review of patient's allergies indicates:   Allergen Reactions    Duricef [cefadroxil] Hives    Grass pollen-june grass standard Itching     Objective:     Vital Signs (Most Recent):  Temp: 98.2 °F (36.8 °C) (07/02/25 0713)  Pulse: 84 (07/02/25 0714)  Resp: 13 (07/02/25 0714)  BP: (!) 103/92 (07/02/25 0713)  SpO2: (!) 94 % (07/02/25 0900) Vital Signs (24h Range):  Temp:  [97.5 °F (36.4 °C)-98.3 °F (36.8 °C)] 98.2 °F (36.8 °C)  Pulse:  [53-84] 84  Resp:  [9-28] 13  SpO2:  [91 %-99 %] 94 %  BP: ()/(51-92) 103/92  Arterial Line BP: (100-147)/(42-62) 119/48     Weight: 55.9 kg (123 lb 3.8 oz)  Body mass index is 23.29 kg/m².    Intake/Output - Last 3 Shifts         06/30 0700  07/01 0659 07/01  0700  07/02 0659 07/02 0700  07/03 0659    I.V. (mL/kg)  1308.7 (23.4)     IV Piggyback  4061.8     Total Intake(mL/kg)  5370.6 (96.1)     Urine (mL/kg/hr)  3400 (2.5)     Total Output  3400     Net  +1970.6                     Physical Exam  Vitals reviewed.   Constitutional:       Appearance: Normal appearance.   Cardiovascular:      Rate and Rhythm: Normal rate.      Pulses: Normal pulses.   Pulmonary:      Effort: Pulmonary effort is normal.   Abdominal:      General: There is no distension.      Palpations: Abdomen is soft. There is no mass.      Tenderness: There is no guarding.      Comments: Appropriately tender, incision healing well.    Skin:     General: Skin is warm and dry.   Neurological:      General: No focal deficit present.      Mental Status: She is alert and oriented to person, place, and time.          Significant Labs:  I have reviewed all pertinent lab results within the past 24 hours.  CBC:   Recent Labs   Lab 07/02/25  0336   WBC 11.18   RBC 3.52*   HGB 11.2*   HCT 33.7*      MCV 96   MCH 31.8*   MCHC 33.2     CMP:   Recent Labs   Lab 07/02/25  0336   *   CALCIUM 8.3*   ALBUMIN 3.2*   PROT 5.7*   *   K 4.0   CO2 22*      BUN 11   CREATININE 0.7   ALKPHOS 76   ALT 56*   AST 78*   BILITOT 0.6       Significant Diagnostics:  I have reviewed all pertinent imaging results/findings within the past 24 hours.

## 2025-07-02 NOTE — PLAN OF CARE
PCU Plan of Care    Patient Dx: Malignant neoplasm of pancreas    Vital Signs (last 12 hours):   Temp:  [98.2 °F (36.8 °C)-98.6 °F (37 °C)]   Pulse:  [70-91]   Resp:  [10-21]   BP: (103-134)/(53-92)   SpO2:  [92 %-96 %]      Neuro: AAOx4, Follows Commands, and Moves all extremities spontaneously     Cardiac: normal sinus rhythm    Respiratory: Room Air    Frequent Checks: None     Urine Output: straight cath 220 mL/shift    Diet: Clear Liquid     Mobility: Up to Chair ; Assistance Required: Stand-by Assist      Skin:   Patient turned q2h, bony prominences protected, and mattress inflated/working correctly.   Dylan Score: 16.    Shift Events:   See flowsheet for further assessment/details.  Family updated on current condition/plan of care, questions answered, and emotional support provided.  MD updated on current condition, vitals, labs, and gtts.

## 2025-07-02 NOTE — PLAN OF CARE
[facility] Inpatient General [facility]  Initial Discharge Assessment       Primary Care Provider: Dayan Jimenez NP    Admission Diagnosis: Malignant neoplasm of body of pancreas [C25.1]  Malignant neoplasm of pancreas, unspecified location of malignancy [C25.9]    Admission Date: 7/1/2025  Expected Discharge Date: 7/7/2025    Transition of Care Barriers: None    Payor: [employer] [facility] / Plan: [employer] / Product Type: Medicare Advantage /     Extended Emergency Contact Information  Primary Emergency Contact: Guanako Lamb  Address: [address] [place] [address]  Home Phone: [phone]  Mobile Phone: [phone]  Relation: Spouse  Secondary Emergency Contact: Gerri Laureano  Mobile Phone: [phone]  Relation: Daughter    Discharge Plan A: Home with family, Home Health  Discharge Plan B: Home      [facility] [address] [address]  Phone: [phone] Fax: [phone]    [facility] [address]  Phone: [phone] Fax: [phone]      Initial Assessment (most recent)       Adult Discharge Assessment - 07/02/25 1550          Discharge Assessment    Assessment Type Discharge Planning Assessment     Confirmed/corrected address, phone number and insurance Yes     Confirmed Demographics Correct on Facesheet     Source of Information patient     Communicated [place] with patient/caregiver Yes     People in Home spouse     Name(s) of People in Home Guanako Lamb (Spouse)  [phone] (Mobile) and Gerri Laureano daughter [phone]     Do you expect to return to your current living situation? Yes     Do you have help at home or someone to help you manage your care at home? Yes     Who are your caregiver(s) and their phone number(s)? Guanako Lamb (Spouse) [phone]  (Mobile) and Gerri Laureano daughter [phone]     Prior to hospitilization cognitive status: Alert/Oriented;No Deficits     Current cognitive status: Alert/Oriented;No Deficits     Walking or Climbing Stairs Difficulty no     Dressing/Bathing Difficulty no     Home Accessibility wheelchair accessible     Home Layout Able to live on 1st floor     Equipment Currently Used at Home grab bar;shower chair;walker, rolling;wheelchair     Readmission within 30 days? No     Patient currently being followed by outpatient case management? No   Have used [facility] before and would like to use again if need be.    Do you currently have service(s) that help you manage your care at home? No     Do you take prescription medications? Yes     Do you have prescription coverage? Yes     Do you have any problems affording any of your prescribed medications? No     Is the patient taking medications as prescribed? yes     Who is going to help you get home at discharge? daughter     How do you get to doctors appointments? car, drives self     Are you on dialysis? No     Do you take coumadin? No     Discharge Plan A Home with family;Home Health     Discharge Plan B Home     DME Needed Upon Discharge  none     Discharge Plan discussed with: Patient;Spouse/sig other     Name(s) and Number(s) Guanako Lamb (Spouse) [phone] (Mobile) and Gerri Laureano daughter [phone]     Transition of Care Barriers None        Financial Resource Strain    How hard is it for you to pay for the very basics like food, housing, medical care, and heating? Not hard at all        Housing Stability    In the last 12 months, was there a time when you were not able to pay the mortgage or rent on time? No     At any time in the past 12 months, were you homeless or living in a shelter (including now)? No        Transportation Needs    In the past 12 months, has lack of transportation kept you from medical appointments or from getting medications? No     In the  past 12 months, has lack of transportation kept you from meetings, work, or from getting things needed for daily living? No        Food Insecurity    Within the past 12 months, you worried that your food would run out before you got the money to buy more. Never true     Within the past 12 months, the food you bought just didn't last and you didn't have money to get more. Never true        Stress    Do you feel stress - tense, restless, nervous, or anxious, or unable to sleep at night because your mind is troubled all the time - these days? To some extent        Social Isolation    How often do you feel lonely or isolated from those around you?  Never        Alcohol Use    Q1: How often do you have a drink containing alcohol? Never     Q2: How many drinks containing alcohol do you have on a typical day when you are drinking? Patient does not drink     Q3: How often do you have six or more drinks on one occasion? Never        Applika    In the past 12 months has the electric, gas, oil, or water company threatened to shut off services in your home? No        Health Literacy    How often do you need to have someone help you when you read instructions, pamphlets, or other written material from your doctor or pharmacy? Never                   CM spoke with patient in Room 57201 at bedside. All information was verified on facesheet. Patient lives in a 1 story house with spouse, 1 step to get inside with no pets. Patient states no assistance is needed. Patient has support from 2 daughters, grand daughter and the grand daughter's . Patient can ambulate, drive, run errands, and complete ADL's independently. Patient does not use any DME or any in-home assistive equipment. Patient has not used Home Health previously. Patient is not on dialysis nor use Coumadin as a blood thinner. Patient takes medication as prescribed and has resources for all prescriptive needs. Patient will have help from family member upon  discharge. Family will provide transportation home. All Questions and concerns addressed and whiteboard updated with CM contact information. Will continue to follow for course of hospitalization.      Discharge Plan A and Plan B have been determined by review of patient's clinical status, future medical and therapeutic needs, and coverage/benefits for post-acute care in coordination with multidisciplinary team members.     Zac Olivares RN, BSN  Case Management  [phone]

## 2025-07-02 NOTE — PLAN OF CARE
PCU PLAN OF CARE    Vital Signs (last 12 hours):   Temp:  [97.5 °F (36.4 °C)-98.3 °F (36.8 °C)]   Pulse:  [53-73]   Resp:  [10-28]   BP: (101-141)/(51-68)   SpO2:  [91 %-99 %]   Arterial Line BP: (100-147)/(45-62)      Neuro: AAOx4, Follows commands , and Moves all extremities spontaneously     Cardiac: Rhythm: normal sinus rhythm    Respiratory: 1L Nasal Cannula     Gtts: MIVF and PCA     Urine Output: Due to void. Jiménez DC'd @ 0600.Total output, 400 mL/shift    Diet: NPO , Sips with Meds , and Ice Chips     Labs/Accuchecks: Daily labs    Skin:  Patient turned independently, bony prominences protected, and mattress inflated/working correctly. OOBTC this AM. Chair cushion utilized.     Shift Events:  Jiménze removed. [place] discontinued. See flowsheet for further assessment/details. MD updated on current condition, vitals, labs, and gtts.     21-Mar-2025 02:00

## 2025-07-02 NOTE — PT/OT/SLP EVAL
"Occupational Therapy   Evaluation    Name: Bessy Lamb  MRN: [id number]  Admitting Diagnosis: Malignant neoplasm of pancreas  Recent Surgery: Procedure(s) (LRB):  PANCREATECTOMY, DISTAL, SUBTOTAL open caro, splenectomy (N/A)  SPLENECTOMY 1 Day Post-Op    Recommendations:     Discharge Recommendations: Low Intensity Therapy  Discharge Equipment Recommendations:  none  Barriers to discharge:       Assessment:     Bessy Lamb is a 64 y.o. female with a medical diagnosis of Malignant neoplasm of pancreas. Performance deficits affecting function: weakness, impaired endurance, impaired functional mobility, impaired self care skills, gait instability, impaired balance, decreased coordination, pain, impaired coordination, impaired cardiopulmonary response to activity. Pt agreeable to therapy and tolerated well. Pt performed home mobility, standing ADLs, and LB dressing this date. Pt reporting slight dizziness, BP cycled throughout session with BP WNL, MAP 70-75. Pt remains limited in ADLs, functional mobility, and functional transfers. Patient currently demonstrates a need for low intensity therapy on a scheduled basis secondary to a decline in functional status due to surgical procedure    Rehab Prognosis: Good; patient would benefit from acute skilled OT services to address these deficits and reach maximum level of function.       Plan:     Patient to be seen 4 x/week to address the above listed problems via self-care/home management, therapeutic activities, therapeutic exercises, neuromuscular re-education  Plan of Care Expires: 08/02/25  Plan of Care Reviewed with: patient, spouse    Subjective     Chief Complaint: None  Patient/Family Comments/goals: "I have a whole bunch of people to take care of me"    Occupational Profile:  Living Environment: Pt lives with spouse and large family in [facility] with built-in bench, grab bars  Previous level of function: (I) ADLs and mobility. 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 Pt reports family can assist him as well as her   Equipment Used at Home: grab bar, shower chair, bedside commode, walker, rolling, wheelchair  Assistance upon Discharge: large family, friends    Pain/Comfort:  Pain Rating 1: 5/10  Location - Orientation 1: lower  Location 1: abdomen  Pain Addressed 1: Reposition, Distraction  Pain Rating Post-Intervention 1: 5/10    Patients cultural, spiritual, Cheondoism conflicts given the current situation: no    Objective:     Communicated with: Nurse prior to session.  Patient found up in chair with telemetry, pulse ox (continuous), blood pressure cuff upon OT entry to room.    General Precautions: Standard, fall  Orthopedic Precautions:    Braces: N/A  Respiratory Status: Room air    Occupational Performance:    Bed Mobility:    N/A 2/2 UIC upon OT arrival    Functional Mobility/Transfers:  Patient completed Sit <> Stand Transfer with contact guard assistance  with  hand-held assist   Functional Mobility: Pt engaged in functional mobility throughout hospital room ~ 12 ft with HHA CGA and ~ 30 ft with no AD SBA  to maximize functional endurance and standing balance required for home/community mobility and occupational engagement. 1  lateral LOB but pt able to self-correct. Pt declined using RW    Activities of Daily Living:  Grooming: stand by assistance pt stood at sink in RW and performed oral hygiene  Upper Body Dressing: maximal assistance donned/doffed hospital gown over back with (A) for line management    Cognitive/Visual Perceptual:  Cognitive/Psychosocial Skills:     -       Oriented to: Person, Place, Time, and Situation   -       Follows Commands/attention:Follows multistep  commands  -       Communication: clear/fluent  -       Memory: No Deficits noted  -       Safety awareness/insight to disability: intact   -       Mood/Affect/Coping skills/emotional control: Appropriate to situation    Physical  Exam:  Balance:    -       CGA progressing to SBA   Sensation:    -       Intact  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 Not worsening since admission  Dominant hand:    -       Right  Upper Extremity Range of Motion:     -       Right Upper Extremity: WNL  -       Left Upper Extremity: WNL  Upper Extremity Strength:    -       Right Upper Extremity: WNL  -       Left Upper Extremity: WNL   Strength:    -       Right Upper Extremity: WNL  -       Left Upper Extremity: WNL  Fine Motor Coordination:    -       Intact    AMPAC 6 Click ADL:  AMPAC Total Score: 19    Treatment & Education:  -Education on energy conservation and task modification to maximize safety and (I) during ADLs and mobility  -Education on importance of OOB activity to improve overall activity tolerance and promote recovery  -Pt educated to call for assistance and to transfer with hospital staff only  -Provided education regarding role of OT, POC, & discharge recommendations with pt verbalizing understanding.  Pt had no further questions & when asked whether there were any concerns pt reported none.      Patient left up in chair with all lines intact, call button in reach, nurse notified, and  present    GOALS:   Multidisciplinary Problems       Occupational Therapy Goals          Problem: Occupational Therapy    Goal Priority Disciplines Outcome Interventions   Occupational Therapy Goal     OT, PT/OT Progressing    Description: Goals to be met by: 8/2/2025     Patient will increase functional independence with ADLs by performing:    UE Dressing with Supervision.  LE Dressing with Supervision.  Grooming while standing at sink with Supervision.  Toileting from toilet with Supervision for hygiene and clothing management.   Step transfer with Supervision  Toilet transfer to toilet with Supervision.                         DME Justifications:  No DME recommended requiring DME justifications    History:     Past Medical  History:   Diagnosis Date    Arthritis, lumbar spine     hands    Diabetes mellitus 10/ 21/20    Food allergy Mariel    General anesthetics causing adverse effect in therapeutic use     following breast rediuct, hard time awakening  also had anxiety rxn to lidocain in dental ofc    GERD (gastroesophageal reflux disease)     Hypothyroidism 10/08/2014    Major depressive disorder 2025    Maternal anesthesia complication     epidural for 1st child went up instead of down; required intubation    Normocytic anemia 2024    Obesity (BMI 30-39.9) 10/08/2014    Osteoarthritis     Osteoporosis     Do not know when started    pancreatic Cancer         Seasonal allergies 21    Get this ever year    Thyroid disease 10/8/2014    Thyroid nodule 10/08/2014         Past Surgical History:   Procedure Laterality Date    BREAST BIOPSY Left     b9    BREAST SURGERY      Reduction cause of a mass in left breast    c-sections x2       SECTION  3/26/81 & 85    Birth of two girls    COLONOSCOPY  2012         COLONOSCOPY N/A 2018    Procedure: COLONOSCOPY;  Surgeon: Francisco Mcclain MD;  Location: [facility];  Service: Endoscopy;  Laterality: N/A;    COLONOSCOPY N/A 10/24/2023    Procedure: COLONOSCOPY;  Surgeon: Francisco Mcclain MD;  Location: [facility];  Service: Endoscopy;  Laterality: N/A;    DISTAL PANCREATECTOMY N/A 2025    Procedure: PANCREATECTOMY, DISTAL, SUBTOTAL open caro, splenectomy;  Surgeon: Flip Valentin MD;  Location: [facility] OR [address] [place] [address];  Service: General;  Laterality: N/A;  Agility BK US probe TECH ONLY booked by TA () for 7:00 a.m case on ()  Conf #  [id number]    ENDOSCOPIC ULTRASOUND OF UPPER GASTROINTESTINAL TRACT Left 2024    Procedure: ULTRASOUND, UPPER GI TRACT, ENDOSCOPIC;  Surgeon: Andrew Gordon MD;  Location: [facility];  Service: Endoscopy;  Laterality: Left;    ESOPHAGOGASTRODUODENOSCOPY N/A 2024    Procedure: EGD  (ESOPHAGOGASTRODUODENOSCOPY);  Surgeon: Andrew Gordon MD;  Location: [facility] ENDO;  Service: Endoscopy;  Laterality: N/A;    hysteroscopy with polypectomy      INCISION AND DRAINAGE Right 2/19/2025    Procedure: Incision and Drainage, RIGHT HAND EXCISION OF INFECTED BONE RIGHT INDEX FINGER;  Surgeon: SALVATORE Chou MD;  Location: [facility];  Service: General;  Laterality: Right;    INCISIONAL BREAST BIOPSY Left 1996    benign; done at same time as reduction    INSERTION OF TUNNELED CENTRAL VENOUS CATHETER (CVC) WITH SUBCUTANEOUS PORT N/A 7/18/2024    Procedure: [id number];  Surgeon: Blanca Dillard MD;  Location: [facility];  Service: General;  Laterality: N/A;    SPLENECTOMY  7/1/2025    Procedure: SPLENECTOMY;  Surgeon: Flip Valentin MD;  Location: [address];  Service: General;;    TOTAL REDUCTION MAMMOPLASTY Bilateral 1996       Time Tracking:     OT Date of Treatment: 07/02/25  OT Start Time: 1106  OT Stop Time: 1131  OT Total Time (min): 25 min    Billable Minutes:Evaluation 10 min  Self Care/Home Management 15 min    7/2/2025

## 2025-07-02 NOTE — PROGRESS NOTES
[facility]  General Surgery  Progress Note    Subjective:     History of Present Illness:  No notes on file    Post-Op Info:  Procedure(s) (LRB):  PANCREATECTOMY, DISTAL, SUBTOTAL open caro, splenectomy (N/A)  SPLENECTOMY   1 Day Post-Op     Interval History: NAEON. No nausea. Pain well controlled with minimal PCA use. AVSS. Labs stable.    Medications:  Continuous Infusions:   hydromorphone in 0.9 % NaCl 6 mg/30 ml   Intravenous Continuous   New Syringe/Bag at 07/02/25 0417     Scheduled Meds:   acetaminophen  1,000 mg Oral Q8H    enoxparin  40 mg Subcutaneous Q24H (prophylaxis, 1700)    EScitalopram oxalate  10 mg Oral Daily    levalbuterol  0.63 mg Nebulization Q6H WAKE    levothyroxine  50 mcg Oral Daily    mupirocin   Nasal BID    polyethylene glycol  17 g Oral Daily     PRN Meds:  Current Facility-Administered Medications:     0.9%  NaCl infusion (for blood administration), , Intravenous, Q24H PRN    0.9%  NaCl infusion (for blood administration), , Intravenous, Q24H PRN    dextrose 50%, 12.5 g, Intravenous, PRN    dextrose 50%, 25 g, Intravenous, PRN    diphenhydrAMINE, 25 mg, Oral, Q6H PRN    glucagon (human recombinant), 1 mg, Intramuscular, PRN    glucose, 16 g, Oral, PRN    glucose, 24 g, Oral, PRN    insulin aspart U-100, 0-5 Units, Subcutaneous, QID (AC + HS) PRN    naloxone, 0.02 mg, Intravenous, PRN    ondansetron, 4 mg, Intravenous, Q12H PRN    prochlorperazine, 5 mg, Intravenous, Q6H PRN    sodium chloride 0.9%, 10 mL, Intra-Catheter, PRN     Review of patient's allergies indicates:   Allergen Reactions    Duricef [cefadroxil] Hives    Grass pollen-june grass standard Itching     Objective:     Vital Signs (Most Recent):  Temp: 98.2 °F (36.8 °C) (07/02/25 0713)  Pulse: 84 (07/02/25 0714)  Resp: 13 (07/02/25 0714)  BP: (!) 103/92 (07/02/25 0713)  SpO2: (!) 94 % (07/02/25 0900) Vital Signs (24h Range):  Temp:  [97.5 °F (36.4 °C)-98.3 °F (36.8 °C)] 98.2 °F (36.8 °C)  Pulse:   [53-84] 84  Resp:  [9-28] 13  SpO2:  [91 %-99 %] 94 %  BP: ()/(51-92) 103/92  Arterial Line BP: (100-147)/(42-62) 119/48     Weight: 55.9 kg (123 lb 3.8 oz)  Body mass index is 23.29 kg/m².    Intake/Output - Last 3 Shifts         06/30 0700 07/01 0659 07/01 0700 07/02 0659 07/02 0700 07/03 0659    I.V. (mL/kg)  1308.7 (23.4)     IV Piggyback  4061.8     Total Intake(mL/kg)  5370.6 (96.1)     Urine (mL/kg/hr)  3400 (2.5)     Total Output  3400     Net  +1970.6                     Physical Exam  Vitals reviewed.   Constitutional:       Appearance: Normal appearance.   Cardiovascular:      Rate and Rhythm: Normal rate.      Pulses: Normal pulses.   Pulmonary:      Effort: Pulmonary effort is normal.   Abdominal:      General: There is no distension.      Palpations: Abdomen is soft. There is no mass.      Tenderness: There is no guarding.      Comments: Appropriately tender, incision healing well.    Skin:     General: Skin is warm and dry.   Neurological:      General: No focal deficit present.      Mental Status: She is alert and oriented to person, place, and time.          Significant Labs:  I have reviewed all pertinent lab results within the past 24 hours.  CBC:   Recent Labs   Lab 07/02/25  0336   WBC 11.18   RBC 3.52*   HGB 11.2*   HCT 33.7*      MCV 96   MCH 31.8*   MCHC 33.2     CMP:   Recent Labs   Lab 07/02/25  0336   *   CALCIUM 8.3*   ALBUMIN 3.2*   PROT 5.7*   *   K 4.0   CO2 22*      BUN 11   CREATININE 0.7   ALKPHOS 76   ALT 56*   AST 78*   BILITOT 0.6       Significant Diagnostics:  I have reviewed all pertinent imaging results/findings within the past 24 hours.  Assessment/Plan:     * Malignant neoplasm of pancreas  64 yoF with a pancreatic body adenocarcinoma with invasion of the portosplenic confluence and celiac axis s/p Chunky procedure on 7/1. Progressing well.    - Clear liquid diet  - Discontinue art line and gaona  - Discontinue PCA  - SSI  - PT/OT  -  Ambulate and up to the chair  - Lovenox  - Daily labs  - MMPC  - Stepdown        Charles Hernández MD  General Surgery  [facility]

## 2025-07-02 NOTE — PLAN OF CARE
Problem: Occupational Therapy  Goal: Occupational Therapy Goal  Description: Goals to be met by: 8/2/2025     Patient will increase functional independence with ADLs by performing:    UE Dressing with Supervision.  LE Dressing with Supervision.  Grooming while standing at sink with Supervision.  Toileting from toilet with Supervision for hygiene and clothing management.   Step transfer with Supervision  Toilet transfer to toilet with Supervision.    Outcome: Progressing

## 2025-07-03 LAB
ABSOLUTE EOSINOPHIL (OHS): 0.01 K/UL
ABSOLUTE MONOCYTE (OHS): 0.88 K/UL (ref 0.3–1)
ABSOLUTE NEUTROPHIL COUNT (OHS): 12.33 K/UL (ref 1.8–7.7)
ALBUMIN SERPL BCP-MCNC: 3 G/DL (ref 3.5–5.2)
ALP SERPL-CCNC: 100 UNIT/L (ref 40–150)
ALT SERPL W/O P-5'-P-CCNC: 131 UNIT/L (ref 10–44)
ANION GAP (OHS): 9 MMOL/L (ref 8–16)
AST SERPL-CCNC: 161 UNIT/L (ref 11–45)
BASOPHILS # BLD AUTO: 0.02 K/UL
BASOPHILS NFR BLD AUTO: 0.1 %
BILIRUB SERPL-MCNC: 0.6 MG/DL (ref 0.1–1)
BUN SERPL-MCNC: 11 MG/DL (ref 8–23)
CALCIUM SERPL-MCNC: 8.6 MG/DL (ref 8.7–10.5)
CHLORIDE SERPL-SCNC: 100 MMOL/L (ref 95–110)
CO2 SERPL-SCNC: 27 MMOL/L (ref 23–29)
CREAT SERPL-MCNC: 0.6 MG/DL (ref 0.5–1.4)
ERYTHROCYTE [DISTWIDTH] IN BLOOD BY AUTOMATED COUNT: 12.4 % (ref 11.5–14.5)
GFR SERPLBLD CREATININE-BSD FMLA CKD-EPI: >60 ML/MIN/1.73/M2
GLUCOSE SERPL-MCNC: 99 MG/DL (ref 70–110)
HCT VFR BLD AUTO: 32.6 % (ref 37–48.5)
HGB BLD-MCNC: 10.7 GM/DL (ref 12–16)
IMM GRANULOCYTES # BLD AUTO: 0.06 K/UL (ref 0–0.04)
IMM GRANULOCYTES NFR BLD AUTO: 0.4 % (ref 0–0.5)
LYMPHOCYTES # BLD AUTO: 0.65 K/UL (ref 1–4.8)
MAGNESIUM SERPL-MCNC: 1.8 MG/DL (ref 1.6–2.6)
MCH RBC QN AUTO: 31.2 PG (ref 27–31)
MCHC RBC AUTO-ENTMCNC: 32.8 G/DL (ref 32–36)
MCV RBC AUTO: 95 FL (ref 82–98)
NUCLEATED RBC (/100WBC) (OHS): 0 /100 WBC
PHOSPHATE SERPL-MCNC: 1.5 MG/DL (ref 2.7–4.5)
PLATELET # BLD AUTO: 145 K/UL (ref 150–450)
PMV BLD AUTO: 10.1 FL (ref 9.2–12.9)
POCT GLUCOSE: 118 MG/DL (ref 70–110)
POCT GLUCOSE: 118 MG/DL (ref 70–110)
POCT GLUCOSE: 122 MG/DL (ref 70–110)
POCT GLUCOSE: 127 MG/DL (ref 70–110)
POTASSIUM SERPL-SCNC: 3.5 MMOL/L (ref 3.5–5.1)
PROT SERPL-MCNC: 5.8 GM/DL (ref 6–8.4)
RBC # BLD AUTO: 3.43 M/UL (ref 4–5.4)
RELATIVE EOSINOPHIL (OHS): 0.1 %
RELATIVE LYMPHOCYTE (OHS): 4.7 % (ref 18–48)
RELATIVE MONOCYTE (OHS): 6.3 % (ref 4–15)
RELATIVE NEUTROPHIL (OHS): 88.4 % (ref 38–73)
SODIUM SERPL-SCNC: 136 MMOL/L (ref 136–145)
WBC # BLD AUTO: 13.95 K/UL (ref 3.9–12.7)

## 2025-07-03 PROCEDURE — 63600175 PHARM REV CODE 636 W HCPCS

## 2025-07-03 PROCEDURE — 97161 PT EVAL LOW COMPLEX 20 MIN: CPT

## 2025-07-03 PROCEDURE — 85025 COMPLETE CBC W/AUTO DIFF WBC: CPT

## 2025-07-03 PROCEDURE — 25000003 PHARM REV CODE 250

## 2025-07-03 PROCEDURE — 25000242 PHARM REV CODE 250 ALT 637 W/ HCPCS: Performed by: NURSE PRACTITIONER

## 2025-07-03 PROCEDURE — 99900035 HC TECH TIME PER 15 MIN (STAT)

## 2025-07-03 PROCEDURE — 36415 COLL VENOUS BLD VENIPUNCTURE: CPT

## 2025-07-03 PROCEDURE — 83735 ASSAY OF MAGNESIUM: CPT

## 2025-07-03 PROCEDURE — 11000001 HC ACUTE MED/SURG PRIVATE ROOM

## 2025-07-03 PROCEDURE — 84100 ASSAY OF PHOSPHORUS: CPT

## 2025-07-03 PROCEDURE — 25000003 PHARM REV CODE 250: Performed by: SURGERY

## 2025-07-03 PROCEDURE — 94761 N-INVAS EAR/PLS OXIMETRY MLT: CPT

## 2025-07-03 PROCEDURE — 94640 AIRWAY INHALATION TREATMENT: CPT

## 2025-07-03 PROCEDURE — 94664 DEMO&/EVAL PT USE INHALER: CPT

## 2025-07-03 PROCEDURE — 80053 COMPREHEN METABOLIC PANEL: CPT

## 2025-07-03 RX ORDER — POLYETHYLENE GLYCOL 3350 17 G/17G
17 POWDER, FOR SOLUTION ORAL DAILY
Status: DISCONTINUED | OUTPATIENT
Start: 2025-07-03 | End: 2025-07-04

## 2025-07-03 RX ORDER — SODIUM,POTASSIUM PHOSPHATES 280-250MG
1 POWDER IN PACKET (EA) ORAL 2 TIMES DAILY
Status: COMPLETED | OUTPATIENT
Start: 2025-07-03 | End: 2025-07-03

## 2025-07-03 RX ORDER — NAPROXEN SODIUM 220 MG/1
81 TABLET, FILM COATED ORAL DAILY
Status: DISCONTINUED | OUTPATIENT
Start: 2025-07-03 | End: 2025-07-05 | Stop reason: HOSPADM

## 2025-07-03 RX ADMIN — METOCLOPRAMIDE 10 MG: 5 TABLET ORAL at 11:07

## 2025-07-03 RX ADMIN — ACETAMINOPHEN 1000 MG: 325 TABLET ORAL at 02:07

## 2025-07-03 RX ADMIN — METOCLOPRAMIDE 10 MG: 5 TABLET ORAL at 06:07

## 2025-07-03 RX ADMIN — LEVALBUTEROL HYDROCHLORIDE 0.63 MG: 0.63 SOLUTION RESPIRATORY (INHALATION) at 08:07

## 2025-07-03 RX ADMIN — POLYETHYLENE GLYCOL 3350 17 G: 17 POWDER, FOR SOLUTION ORAL at 11:07

## 2025-07-03 RX ADMIN — MUPIROCIN: 20 OINTMENT TOPICAL at 09:07

## 2025-07-03 RX ADMIN — ACETAMINOPHEN 1000 MG: 325 TABLET ORAL at 09:07

## 2025-07-03 RX ADMIN — POTASSIUM & SODIUM PHOSPHATES POWDER PACK 280-160-250 MG 1 PACKET: 280-160-250 PACK at 09:07

## 2025-07-03 RX ADMIN — LEVOTHYROXINE SODIUM 50 MCG: 0.05 TABLET ORAL at 09:07

## 2025-07-03 RX ADMIN — ACETAMINOPHEN 1000 MG: 325 TABLET ORAL at 06:07

## 2025-07-03 RX ADMIN — ESCITALOPRAM OXALATE 10 MG: 5 TABLET, FILM COATED ORAL at 09:07

## 2025-07-03 RX ADMIN — KETOROLAC TROMETHAMINE 10 MG: 10 TABLET, FILM COATED ORAL at 06:07

## 2025-07-03 RX ADMIN — KETOROLAC TROMETHAMINE 10 MG: 10 TABLET, FILM COATED ORAL at 11:07

## 2025-07-03 RX ADMIN — METOCLOPRAMIDE 10 MG: 5 TABLET ORAL at 05:07

## 2025-07-03 RX ADMIN — ASPIRIN 81 MG CHEWABLE TABLET 81 MG: 81 TABLET CHEWABLE at 11:07

## 2025-07-03 RX ADMIN — ENOXAPARIN SODIUM 40 MG: 40 INJECTION SUBCUTANEOUS at 05:07

## 2025-07-03 RX ADMIN — KETOROLAC TROMETHAMINE 10 MG: 10 TABLET, FILM COATED ORAL at 05:07

## 2025-07-03 RX ADMIN — OXYCODONE HYDROCHLORIDE 5 MG: 5 TABLET ORAL at 09:07

## 2025-07-03 RX ADMIN — LEVALBUTEROL HYDROCHLORIDE 0.63 MG: 0.63 SOLUTION RESPIRATORY (INHALATION) at 02:07

## 2025-07-03 RX ADMIN — KETOROLAC TROMETHAMINE 10 MG: 10 TABLET, FILM COATED ORAL at 10:07

## 2025-07-03 NOTE — OP NOTE
[facility]  Surgery Department  Operative Note       Date of Procedure: 7/1/2025     Procedure: Procedure(s) (LRB):  PANCREATECTOMY, DISTAL, SUBTOTAL open caro, splenectomy (N/A)  SPLENECTOMY     Surgeons and Role:     * Flip Valentin MD - Primary     * Charles Hernández MD - Resident - Assisting        Pre-Operative Diagnosis: Malignant neoplasm of body of pancreas [C25.1]    Pre-Operative Variables:  Preoperative diagnosis: pancreatic adenocarcinoma  Going into surgery, the lesion was believed to be locally advanced  Chemotherapy within 90 Days: Y  Radiation Therapy within 90 Days: Y  Preoperative Gastroenteric Obstruction: N  Preoperative Gastroenteric Stent: N  Preoperative Jaundice: N   Preoperative Biliary Stent: N    Post-Operative Diagnosis:   Same    Anesthesia: General    Operative Findings:   No evidence of distant intraperitoneal metastases   Firm mas in the body of the pancreas encasing the proximal common hepatic, splenic, and distal celiac arteries  Resection status:  Ro pending final pathology.  No gross disease remaining post dissection.    Procedures:  Distal subtotal pancreatectomy, splenectomy, with en bloc resection of the celiac axis/hepatic artery and resection of the superior mesenteric vein with primary reconstruction and portal lymphadenectomy -22 mod    Estimated Blood Loss (EBL):  25 mL           Indications:  Bessy Lamb presents for the above procedures.  Risks and benefits were reviewed including bleeding, infection, damage to local structures, cardiovascular and pulmonary complications,  need for additional procedures, death, and imponderables.  She understands and gave informed consent to proceed.    Details:  The patient was transported to the operating room and satisfactory anesthesia established.  Preoperative antibiotics were administered.  The patient was placed in the supine position and extremities were padded and protected  throughout.  A gaona catheter was placed. An appropriate timeout was performed.    We make an upper midline incision and entered the abdomen and palpate around for any evidence of peritoneal metastasis.  With no significant findings the incision was expanded in satisfactory retraction was established.  The lesser sac was entered and I examined the pancreas.  There is a fixed mass in the midbody of the pancreas but there is no clear involvement of the posterior wall of the stomach and I see no disease in the lesser sac.  A complete Kocher was performed to expose the left renal vein in the origin of the superior mesenteric artery.  The gastrohepatic ligament was divided and I am able to dig out and encircle the common hepatic artery just proximal to the gastroduodenal artery.  A vascular clamp was placed here and we confirmed good Doppler signals in the shiv hepatis and a palpable pulse.  This confirms that we should have good inflow to the liver with the hepatic artery resection without reconstruction.  The clamp was left in place.  I continued to work with the neck of the pancreas and expose the portal vein.  At the undersurface of the pancreas we defined the inferior border of the pancreas all the way out to the lateral abdominal wall.  The superior mesenteric vein was exposed and we create a tunnel posterior to the pancreas.  The pancreas here in the neck feels completely normal and we will be a good transection line.  This is looped.  Now I worked in the attachments of the spleen and we dropped the splenic flexure of the colon.  The short gastric vessels were divided with the LigaSure but we leave the fundus attached to the diaphragm to preserve any phrenic artery collaterals.  The spleen was then elevated and then we continued to divide all of the retropancreatic tissue.  Here we stay in the natural plane but then as we approached the tumor I dive down under Gerota's fascia and meet our dissection from the  other side on the anterior border of the left renal vein.  To expose this I have to divided through the colonic mesentery, dividing a large middle colic branch as well as the inferior mesenteric vein as I expected to have to do based on her preop CT.  This further exposes the medial border of the superior mesenteric vein and we continued this dissection to the superior mesenteric artery now I worked the anterior border of the superior mesenteric artery away from the undersurface of the pancreas all the way down to its origin at the aorta to confirm its lack of involvement in the tumor.  This helped me expose the lateral border of the aorta and the origin of the celiac artery.  This is facilitated by division of the muscle fibers of the crura.  I am able to encircle and then clamp the celiac artery at its origin.  I again go back to the shiv hepatis and we Doppler the signals and there are good strong hepatic arterial signals above the level of the gastroduodenal artery.  Additionally the stomach continues to look healthy despite division of the short gastric vessels and clamping of the celiac.  This really confirmed good flow through the GDA collateral system into the right gastric and right gastroepiploic arcades as well as the intact phrenic vessels.  Given this we go ahead and divided the common hepatic artery with the Endo-JACKY stapler as well as the proximal celiac artery.  I continued to work this off now of the superior border of the superior mesenteric artery to incorporate all of these retroperitoneal nodes.  The pancreas was then divided with the Endo-JACKY black load reinforced stapler with slow compression.  The staple line looks excellent with no fracturing of the pancreas.  I incised the lateral border of the shiv hepatis and sweep the lymph nodes here posteriorly through the foramen of [place] and continued to work them onto the specimen, incorporating some fibrotic tissue in the aortocaval space up  into the specimen and really performing a complete portal lymph node dissection.  This is all kept on bloc.  I continued to work the undersurface of the superior mesenteric vein until we effectively have this hanging off of the obliterated splenic vein origin where there is clear involvement of the medial border of the superior mesenteric vein.  Now we apply a partially occluding vascular clamp and the vein was sharply incised to remove the specimen.  The resultant about 2-1/2 cm venotomy is repaired with running 4-0 Prolene suture.  This is allowed to re-expand under some pressure before tying down.  There is minimal waist here and I am happy with the end result.  The abdomen was irrigated and examined for hemostasis.  The field looks quite good and I bring up the omentum posterior the stomach and we sutured down around the pancreatic stump to try to exclude the arterial staple lines from any potential pancreatic leak.  The field was then coated in he mobilized powder.  The stomach is repositioned in its normal anatomic position.  The abdomen was then closed with 0 PDS and short bite shallow advanced fashioned.  The skin was closed with Monocryl and Dermabond.    All needle, lap, and sponge counts were reported as correct. I communicated the intraoperative findings with the family following the procedure.     Implants: * No implants in log *    Specimens:   Specimen (24h ago, onward)      None                    Condition: Good    Disposition: PACU - hemodynamically stable.    Attestation: I was present and scrubbed for the entire procedure.    This was a complex case that went over and above the technical work required for a standard resection billed under the appropriate code.  I am attaching a -22 modifier to reflect the additional work demanded by aberrant anatomy, post radiation fibrosis, operative risk, and complex intraoperative decision making.

## 2025-07-03 NOTE — PLAN OF CARE
Problem: Adult Inpatient Plan of Care  Goal: Plan of Care Review  Outcome: Progressing  Goal: Patient-Specific Goal (Individualized)  Outcome: Progressing  Goal: Absence of Hospital-Acquired Illness or Injury  Outcome: Progressing  Goal: Optimal Comfort and Wellbeing  Outcome: Progressing  Goal: Readiness for Transition of Care  Outcome: Progressing     Problem: Diabetes Comorbidity  Goal: Blood Glucose Level Within Targeted Range  Outcome: Progressing     Problem: Wound  Goal: Optimal Coping  Outcome: Progressing  Goal: Optimal Functional Ability  Outcome: Progressing  Goal: Absence of Infection Signs and Symptoms  Outcome: Progressing  Goal: Improved Oral Intake  Outcome: Progressing  Goal: Optimal Pain Control and Function  Outcome: Progressing  Goal: Skin Health and Integrity  Outcome: Progressing  Goal: Optimal Wound Healing  Outcome: Progressing     Problem: Infection  Goal: Absence of Infection Signs and Symptoms  Outcome: Progressing     Problem: Fall Injury Risk  Goal: Absence of Fall and Fall-Related Injury  Outcome: Progressing     Problem: Skin Injury Risk Increased  Goal: Skin Health and Integrity  Outcome: Progressing     Problem: Wound  Goal: Optimal Coping  Outcome: Progressing     Problem: Infection  Goal: Absence of Infection Signs and Symptoms  Outcome: Progressing     Problem: Fall Injury Risk  Goal: Absence of Fall and Fall-Related Injury  Outcome: Progressing     Problem: Skin Injury Risk Increased  Goal: Skin Health and Integrity  Outcome: Progressing

## 2025-07-03 NOTE — SUBJECTIVE & OBJECTIVE
Interval History: NAEON. Pain better controlled this morning. No flatus or BM yet. No nausea. AVSS. Lab work benign. KUB with expected small bowel dilation postop, but no large gastric bubble.    Medications:  Continuous Infusions:      Scheduled Meds:   acetaminophen  1,000 mg Oral Q8H    enoxparin  40 mg Subcutaneous Q24H (prophylaxis, 1700)    EScitalopram oxalate  10 mg Oral Daily    ketorolac  10 mg Oral Q6H    levalbuterol  0.63 mg Nebulization Q6H WAKE    levothyroxine  50 mcg Oral Daily    metoclopramide HCl  10 mg Oral TID AC    mupirocin   Nasal BID    potassium, sodium phosphates  1 packet Oral BID     PRN Meds:  Current Facility-Administered Medications:     0.9%  NaCl infusion (for blood administration), , Intravenous, Q24H PRN    0.9%  NaCl infusion (for blood administration), , Intravenous, Q24H PRN    dextrose 50%, 12.5 g, Intravenous, PRN    dextrose 50%, 25 g, Intravenous, PRN    diphenhydrAMINE, 25 mg, Oral, Q6H PRN    glucagon (human recombinant), 1 mg, Intramuscular, PRN    glucose, 16 g, Oral, PRN    glucose, 24 g, Oral, PRN    HYDROmorphone, 0.5 mg, Intravenous, Q6H PRN    insulin aspart U-100, 0-5 Units, Subcutaneous, QID (AC + HS) PRN    ondansetron, 4 mg, Intravenous, Q12H PRN    oxyCODONE, 5 mg, Oral, Q4H PRN    oxyCODONE, 10 mg, Oral, Q4H PRN    prochlorperazine, 5 mg, Intravenous, Q6H PRN    sodium chloride 0.9%, 10 mL, Intra-Catheter, PRN     Review of patient's allergies indicates:   Allergen Reactions    Duricef [cefadroxil] Hives    Grass pollen-june grass standard Itching     Objective:     Vital Signs (Most Recent):  Temp: 98.5 °F (36.9 °C) (07/03/25 0726)  Pulse: 106 (07/03/25 0813)  Resp: 19 (07/03/25 0813)  BP: 119/72 (07/03/25 0726)  SpO2: 96 % (07/03/25 0813) Vital Signs (24h Range):  Temp:  [98.5 °F (36.9 °C)-98.7 °F (37.1 °C)] 98.5 °F (36.9 °C)  Pulse:  [] 106  Resp:  [14-20] 19  SpO2:  [94 %-96 %] 96 %  BP: (107-135)/(53-72) 119/72     Weight: 55.9 kg (123 lb 3.8  oz)  Body mass index is 23.29 kg/m².    Intake/Output - Last 3 Shifts         07/01 0700  07/02 0659 07/02 0700 07/03 0659 07/03 0700 07/04 0659    I.V. (mL/kg) 1308.7 (23.4)      IV Piggyback 4061.8      Total Intake(mL/kg) 5370.6 (96.1)      Urine (mL/kg/hr) 3400 (2.5) 1220 (0.9)     Total Output 3400 1220     Net +1970.6 -1220                     Physical Exam  Vitals reviewed.   Constitutional:       Appearance: Normal appearance.   Cardiovascular:      Rate and Rhythm: Normal rate.      Pulses: Normal pulses.   Pulmonary:      Effort: Pulmonary effort is normal.   Abdominal:      General: There is no distension.      Palpations: Abdomen is soft. There is no mass.      Tenderness: There is no guarding.      Comments: Appropriately tender, incision healing well.    Skin:     General: Skin is warm and dry.   Neurological:      General: No focal deficit present.      Mental Status: She is alert and oriented to person, place, and time.          Significant Labs:  I have reviewed all pertinent lab results within the past 24 hours.  CBC:   Recent Labs   Lab 07/03/25 0319   WBC 13.95*   RBC 3.43*   HGB 10.7*   HCT 32.6*   *   MCV 95   MCH 31.2*   MCHC 32.8     CMP:   Recent Labs   Lab 07/03/25 0319   GLU 99   CALCIUM 8.6*   ALBUMIN 3.0*   PROT 5.8*      K 3.5   CO2 27      BUN 11   CREATININE 0.6   ALKPHOS 100   *   *   BILITOT 0.6       Significant Diagnostics:  I have reviewed all pertinent imaging results/findings within the past 24 hours.

## 2025-07-03 NOTE — PLAN OF CARE
07/03/25 1528   Rounds   Attendance Provider;Nurse    Discharge Plan A Home with family   Why the patient remains in the hospital Requires continued medical care   Transition of Care Barriers None     CM met with Provider during rounds to discuss discharge planning. Patient is s/p Anthony procedure on 7/1 for malignant neoplasm of pancreas. Patient has no discharge needs. CM will continue to follow up.     Zac Olivares, RN, BSN  Case Management  [phone]

## 2025-07-03 NOTE — ASSESSMENT & PLAN NOTE
64 yoF with a pancreatic body adenocarcinoma with invasion of the portosplenic confluence and celiac axis s/p Anthony procedure on 7/1. Progressing well, awaiting return of bowel function.    - NPO with sips and chips  - SSI  - PT/OT  - Ambulate and up to the chair  - Lovenox  - Daily labs  - MMPC  - Stepdown

## 2025-07-03 NOTE — PROGRESS NOTES
[facility]  General Surgery  Progress Note    Subjective:     History of Present Illness:  No notes on file    Post-Op Info:  Procedure(s) (LRB):  PANCREATECTOMY, DISTAL, SUBTOTAL open caro, splenectomy (N/A)  SPLENECTOMY   2 Days Post-Op     Interval History: NAEON. Pain better controlled this morning. No flatus or BM yet. No nausea. AVSS. Lab work benign. KUB with expected small bowel dilation postop, but no large gastric bubble.    Medications:  Continuous Infusions:      Scheduled Meds:   acetaminophen  1,000 mg Oral Q8H    enoxparin  40 mg Subcutaneous Q24H (prophylaxis, 1700)    EScitalopram oxalate  10 mg Oral Daily    ketorolac  10 mg Oral Q6H    levalbuterol  0.63 mg Nebulization Q6H WAKE    levothyroxine  50 mcg Oral Daily    metoclopramide HCl  10 mg Oral TID AC    mupirocin   Nasal BID    potassium, sodium phosphates  1 packet Oral BID     PRN Meds:  Current Facility-Administered Medications:     0.9%  NaCl infusion (for blood administration), , Intravenous, Q24H PRN    0.9%  NaCl infusion (for blood administration), , Intravenous, Q24H PRN    dextrose 50%, 12.5 g, Intravenous, PRN    dextrose 50%, 25 g, Intravenous, PRN    diphenhydrAMINE, 25 mg, Oral, Q6H PRN    glucagon (human recombinant), 1 mg, Intramuscular, PRN    glucose, 16 g, Oral, PRN    glucose, 24 g, Oral, PRN    HYDROmorphone, 0.5 mg, Intravenous, Q6H PRN    insulin aspart U-100, 0-5 Units, Subcutaneous, QID (AC + HS) PRN    ondansetron, 4 mg, Intravenous, Q12H PRN    oxyCODONE, 5 mg, Oral, Q4H PRN    oxyCODONE, 10 mg, Oral, Q4H PRN    prochlorperazine, 5 mg, Intravenous, Q6H PRN    sodium chloride 0.9%, 10 mL, Intra-Catheter, PRN     Review of patient's allergies indicates:   Allergen Reactions    Duricef [cefadroxil] Hives    Grass pollen-magdalena grass standard Itching     Objective:     Vital Signs (Most Recent):  Temp: 98.5 °F (36.9 °C) (07/03/25 0726)  Pulse: 106 (07/03/25 0813)  Resp: 19 (07/03/25 0813)  BP:  119/72 (07/03/25 0726)  SpO2: 96 % (07/03/25 0813) Vital Signs (24h Range):  Temp:  [98.5 °F (36.9 °C)-98.7 °F (37.1 °C)] 98.5 °F (36.9 °C)  Pulse:  [] 106  Resp:  [14-20] 19  SpO2:  [94 %-96 %] 96 %  BP: (107-135)/(53-72) 119/72     Weight: 55.9 kg (123 lb 3.8 oz)  Body mass index is 23.29 kg/m².    Intake/Output - Last 3 Shifts         07/01 0700 07/02 0659 07/02 0700 07/03 0659 07/03 0700 07/04 0659    I.V. (mL/kg) 1308.7 (23.4)      IV Piggyback 4061.8      Total Intake(mL/kg) 5370.6 (96.1)      Urine (mL/kg/hr) 3400 (2.5) 1220 (0.9)     Total Output 3400 1220     Net +1970.6 -1220                     Physical Exam  Vitals reviewed.   Constitutional:       Appearance: Normal appearance.   Cardiovascular:      Rate and Rhythm: Normal rate.      Pulses: Normal pulses.   Pulmonary:      Effort: Pulmonary effort is normal.   Abdominal:      General: There is no distension.      Palpations: Abdomen is soft. There is no mass.      Tenderness: There is no guarding.      Comments: Appropriately tender, incision healing well.    Skin:     General: Skin is warm and dry.   Neurological:      General: No focal deficit present.      Mental Status: She is alert and oriented to person, place, and time.          Significant Labs:  I have reviewed all pertinent lab results within the past 24 hours.  CBC:   Recent Labs   Lab 07/03/25 0319   WBC 13.95*   RBC 3.43*   HGB 10.7*   HCT 32.6*   *   MCV 95   MCH 31.2*   MCHC 32.8     CMP:   Recent Labs   Lab 07/03/25 0319   GLU 99   CALCIUM 8.6*   ALBUMIN 3.0*   PROT 5.8*      K 3.5   CO2 27      BUN 11   CREATININE 0.6   ALKPHOS 100   *   *   BILITOT 0.6       Significant Diagnostics:  I have reviewed all pertinent imaging results/findings within the past 24 hours.  Assessment/Plan:     * Malignant neoplasm of pancreas  64 yoF with a pancreatic body adenocarcinoma with invasion of the portosplenic confluence and celiac axis s/p Anthony  procedure on 7/1. Progressing well, awaiting return of bowel function.    - NPO with sips and chips  - SSI  - PT/OT  - Ambulate and up to the chair  - Lovenox  - Daily labs  - MMPC  - Stepdown        Charles Hernández MD  General Surgery  [facility]

## 2025-07-03 NOTE — PT/OT/SLP EVAL
Physical Therapy Evaluation and Discharge Note    Patient Name:  Bessy Lamb   MRN:  [id number]  Admitting Diagnosis:  Malignant neoplasm of pancreas   Recent Surgery: Procedure(s) (LRB):  PANCREATECTOMY, DISTAL, SUBTOTAL open caro, splenectomy (N/A)  SPLENECTOMY 2 Days Post-Op  Admit Date: 7/1/2025  Length of Stay: 2 days    Recommendations:     Discharge Recommendations:  Low Intensity Therapy  Discharge Equipment Recommendations: none   Barriers to discharge: none    Assessment:     Bessy Lamb is a 64 y.o. female admitted with a medical diagnosis of Malignant neoplasm of pancreas.  PT evaluation complete and no goals established. Pt is functioning at high level and does not required acute care physical therapy services. Education provided to ambulate in hallway 3x/day with RN in order to maintain strength and endurance. Pt verbalized understanding. Please re-consult if functional mobility changes.       Plan:     During this hospitalization, patient does not require further acute PT services.  Please re-consult if situation changes.      Subjective   Communicated with RN prior to session.  Patient found up in chair with telemetry, pulse ox (continuous), blood pressure cuff, central line, peripheral IV upon PT entry to room, agreeable to evaluation. Bessy Lamb's  present during session.    Chief Complaint: none reported   Patient/Family Comments/goals: to get better  Pain/Comfort:  Pain Rating 1: 0/10  Pain Rating Post-Intervention 1: 0/10    Living Environment:  Living Environment: Pt lives with spouse and large family in [facility] with built-in bench, grab bars  Previous level of function: (I) ADLs and mobility. 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 Pt reports family can assist him as well as her   Equipment Used at Home: grab bar, shower chair, bedside commode, walker, rolling, wheelchair  Assistance upon Discharge: large family,  "friends    Objective:   Patient found with: telemetry, pulse ox (continuous), blood pressure cuff, central line, peripheral IV   General Precautions: Standard, fall   Orthopedic Precautions:N/A   Braces: N/A   Oxygen Device: Room Air  Vitals: /72   Pulse 87   Temp 98.5 °F (36.9 °C) (Oral)   Resp 19   Ht 5' 1" (1.549 m)   Wt 55.9 kg (123 lb 3.8 oz)   SpO2 96%   Breastfeeding No   BMI 23.29 kg/m²     Exams:  Cognition:   Alert and Cooperative  Ox4  Command following: Follows multistep  commands  Fluency: clear/fluent    RLE ROM: WFL  RLE Strength: WFL  LLE ROM: WFL  LLE Strength: WFL      Outcome Measures:  AM-PAC 6 CLICK MOBILITY  Turning over in bed (including adjusting bedclothes, sheets and blankets)?: 4  Sitting down on and standing up from a chair with arms (e.g., wheelchair, bedside commode, etc.): 4  Moving from lying on back to sitting on the side of the bed?: 4  Moving to and from a bed to a chair (including a wheelchair)?: 4  Need to walk in hospital room?: 4  Climbing 3-5 steps with a railing?: 3  Basic Mobility Total Score: 23     Functional Mobility:  Additional Staff Present: NA  Bed Mobility:   Not performed 2nd to pt found in the chair     Transfers:   Sit <> Stand Transfer: supervision with no assistive device from the chair       Gait:   Patient ambulated: 250ft   Patient required: supervision  Patient used:  no assistive device   Gait Pattern observed: reciprocal gait  Gait Deviation(s): decreased step length, decreased arm swing, and decreased juan jose  Impairments due to: post surgical instability   Comments:   Portable monitor intact  Verbal cueing for pacing and normal arm swing       Therapeutic Activities, Exercises, & Education:   Educated pt on PT role/POC  Educated pt on importance of OOB activity and daily ambulation   Pt verbalized understanding       Patient left up in chair with all lines intact, call button in reach, and RN notified and  present    GOALS: None " identified at this time    DME Justifications:  No DME recommended requiring DME justifications    History:     Past Medical History:   Diagnosis Date    Arthritis, lumbar spine     hands    Diabetes mellitus 10/ 21/20    Food allergy Mariel    General anesthetics causing adverse effect in therapeutic use     following breast rediuct, hard time awakening  also had anxiety rxn to lidocain in dental ofc    GERD (gastroesophageal reflux disease)     Hypothyroidism 10/08/2014    Major depressive disorder 2025    Maternal anesthesia complication     epidural for 1st child went up instead of down; required intubation    Normocytic anemia 2024    Obesity (BMI 30-39.9) 10/08/2014    Osteoarthritis     Osteoporosis     Do not know when started    pancreatic Cancer         Seasonal allergies 21    Get this ever year    Thyroid disease 10/8/2014    Thyroid nodule 10/08/2014       Past Surgical History:   Procedure Laterality Date    BREAST BIOPSY Left     b9    BREAST SURGERY      Reduction cause of a mass in left breast    c-sections x2       SECTION  3/26/81 & 85    Birth of two girls    COLONOSCOPY  2012         COLONOSCOPY N/A 2018    Procedure: COLONOSCOPY;  Surgeon: Francisco Mcclain MD;  Location: [facility];  Service: Endoscopy;  Laterality: N/A;    COLONOSCOPY N/A 10/24/2023    Procedure: COLONOSCOPY;  Surgeon: Francisco Mcclain MD;  Location: [facility];  Service: Endoscopy;  Laterality: N/A;    DISTAL PANCREATECTOMY N/A 2025    Procedure: PANCREATECTOMY, DISTAL, SUBTOTAL open caro, splenectomy;  Surgeon: Flip Valentin MD;  Location: [address];  Service: General;  Laterality: N/A;  Agility BK US probe TECH ONLY booked by TA () for 7:00 a.m case on ()  Conf #  [id number]    ENDOSCOPIC ULTRASOUND OF UPPER GASTROINTESTINAL TRACT Left 2024    Procedure: ULTRASOUND, UPPER GI TRACT, ENDOSCOPIC;  Surgeon: Andrew Gordon MD;  Location: [facility];   Service: Endoscopy;  Laterality: Left;    ESOPHAGOGASTRODUODENOSCOPY N/A 7/5/2024    Procedure: EGD (ESOPHAGOGASTRODUODENOSCOPY);  Surgeon: Andrew Gordon MD;  Location: [facility];  Service: Endoscopy;  Laterality: N/A;    hysteroscopy with polypectomy      INCISION AND DRAINAGE Right 2/19/2025    Procedure: Incision and Drainage, RIGHT HAND EXCISION OF INFECTED BONE RIGHT INDEX FINGER;  Surgeon: SALVATORE Chou MD;  Location: [facility];  Service: General;  Laterality: Right;    INCISIONAL BREAST BIOPSY Left 1996    benign; done at same time as reduction    INSERTION OF TUNNELED CENTRAL VENOUS CATHETER (CVC) WITH SUBCUTANEOUS PORT N/A 7/18/2024    Procedure: [id number];  Surgeon: Blanca Dillard MD;  Location: [facility];  Service: General;  Laterality: N/A;    SPLENECTOMY  7/1/2025    Procedure: SPLENECTOMY;  Surgeon: Flip Valentin MD;  Location: [address];  Service: General;;    TOTAL REDUCTION MAMMOPLASTY Bilateral 1996       Time Tracking:     PT Received On: 07/03/25  PT Start Time: 1043     PT Stop Time: 1055  PT Total Time (min): 12 min     Billable Minutes: Evaluation 12

## 2025-07-04 LAB
ABSOLUTE EOSINOPHIL (OHS): 0.11 K/UL
ABSOLUTE MONOCYTE (OHS): 0.73 K/UL (ref 0.3–1)
ABSOLUTE NEUTROPHIL COUNT (OHS): 10.68 K/UL (ref 1.8–7.7)
ALBUMIN SERPL BCP-MCNC: 2.5 G/DL (ref 3.5–5.2)
ALP SERPL-CCNC: 81 UNIT/L (ref 40–150)
ALT SERPL W/O P-5'-P-CCNC: 87 UNIT/L (ref 10–44)
ANION GAP (OHS): 8 MMOL/L (ref 8–16)
ANION GAP (OHS): 9 MMOL/L (ref 8–16)
AST SERPL-CCNC: 70 UNIT/L (ref 11–45)
BASOPHILS # BLD AUTO: 0.03 K/UL
BASOPHILS NFR BLD AUTO: 0.2 %
BILIRUB SERPL-MCNC: 0.5 MG/DL (ref 0.1–1)
BUN SERPL-MCNC: 10 MG/DL (ref 8–23)
BUN SERPL-MCNC: 12 MG/DL (ref 8–23)
CALCIUM SERPL-MCNC: 8.4 MG/DL (ref 8.7–10.5)
CALCIUM SERPL-MCNC: 8.7 MG/DL (ref 8.7–10.5)
CHLORIDE SERPL-SCNC: 103 MMOL/L (ref 95–110)
CHLORIDE SERPL-SCNC: 103 MMOL/L (ref 95–110)
CO2 SERPL-SCNC: 27 MMOL/L (ref 23–29)
CO2 SERPL-SCNC: 28 MMOL/L (ref 23–29)
CREAT SERPL-MCNC: 0.5 MG/DL (ref 0.5–1.4)
CREAT SERPL-MCNC: 0.6 MG/DL (ref 0.5–1.4)
ERYTHROCYTE [DISTWIDTH] IN BLOOD BY AUTOMATED COUNT: 12.4 % (ref 11.5–14.5)
GFR SERPLBLD CREATININE-BSD FMLA CKD-EPI: >60 ML/MIN/1.73/M2
GFR SERPLBLD CREATININE-BSD FMLA CKD-EPI: >60 ML/MIN/1.73/M2
GLUCOSE SERPL-MCNC: 92 MG/DL (ref 70–110)
GLUCOSE SERPL-MCNC: 98 MG/DL (ref 70–110)
HCT VFR BLD AUTO: 33.4 % (ref 37–48.5)
HGB BLD-MCNC: 11 GM/DL (ref 12–16)
IMM GRANULOCYTES # BLD AUTO: 0.04 K/UL (ref 0–0.04)
IMM GRANULOCYTES NFR BLD AUTO: 0.3 % (ref 0–0.5)
LYMPHOCYTES # BLD AUTO: 0.58 K/UL (ref 1–4.8)
MAGNESIUM SERPL-MCNC: 1.9 MG/DL (ref 1.6–2.6)
MCH RBC QN AUTO: 31.3 PG (ref 27–31)
MCHC RBC AUTO-ENTMCNC: 32.9 G/DL (ref 32–36)
MCV RBC AUTO: 95 FL (ref 82–98)
NUCLEATED RBC (/100WBC) (OHS): 0 /100 WBC
PLATELET # BLD AUTO: 170 K/UL (ref 150–450)
PMV BLD AUTO: 9.6 FL (ref 9.2–12.9)
POCT GLUCOSE: 102 MG/DL (ref 70–110)
POCT GLUCOSE: 107 MG/DL (ref 70–110)
POCT GLUCOSE: 117 MG/DL (ref 70–110)
POCT GLUCOSE: 96 MG/DL (ref 70–110)
POTASSIUM SERPL-SCNC: 3.4 MMOL/L (ref 3.5–5.1)
POTASSIUM SERPL-SCNC: 5 MMOL/L (ref 3.5–5.1)
PROT SERPL-MCNC: 5.3 GM/DL (ref 6–8.4)
RBC # BLD AUTO: 3.52 M/UL (ref 4–5.4)
RELATIVE EOSINOPHIL (OHS): 0.9 %
RELATIVE LYMPHOCYTE (OHS): 4.8 % (ref 18–48)
RELATIVE MONOCYTE (OHS): 6 % (ref 4–15)
RELATIVE NEUTROPHIL (OHS): 87.8 % (ref 38–73)
SODIUM SERPL-SCNC: 138 MMOL/L (ref 136–145)
SODIUM SERPL-SCNC: 140 MMOL/L (ref 136–145)
WBC # BLD AUTO: 12.17 K/UL (ref 3.9–12.7)

## 2025-07-04 PROCEDURE — 80053 COMPREHEN METABOLIC PANEL: CPT

## 2025-07-04 PROCEDURE — 25000003 PHARM REV CODE 250: Performed by: SURGERY

## 2025-07-04 PROCEDURE — 25000003 PHARM REV CODE 250

## 2025-07-04 PROCEDURE — 83735 ASSAY OF MAGNESIUM: CPT

## 2025-07-04 PROCEDURE — 63600175 PHARM REV CODE 636 W HCPCS

## 2025-07-04 PROCEDURE — 36415 COLL VENOUS BLD VENIPUNCTURE: CPT

## 2025-07-04 PROCEDURE — 20600001 HC STEP DOWN PRIVATE ROOM

## 2025-07-04 PROCEDURE — 85025 COMPLETE CBC W/AUTO DIFF WBC: CPT

## 2025-07-04 RX ORDER — POLYETHYLENE GLYCOL 3350 17 G/17G
17 POWDER, FOR SOLUTION ORAL 2 TIMES DAILY
Status: DISCONTINUED | OUTPATIENT
Start: 2025-07-04 | End: 2025-07-05 | Stop reason: HOSPADM

## 2025-07-04 RX ADMIN — ENOXAPARIN SODIUM 40 MG: 40 INJECTION SUBCUTANEOUS at 04:07

## 2025-07-04 RX ADMIN — METOCLOPRAMIDE 10 MG: 5 TABLET ORAL at 04:07

## 2025-07-04 RX ADMIN — LEVOTHYROXINE SODIUM 50 MCG: 0.05 TABLET ORAL at 10:07

## 2025-07-04 RX ADMIN — MUPIROCIN: 20 OINTMENT TOPICAL at 09:07

## 2025-07-04 RX ADMIN — MUPIROCIN: 20 OINTMENT TOPICAL at 08:07

## 2025-07-04 RX ADMIN — KETOROLAC TROMETHAMINE 10 MG: 10 TABLET, FILM COATED ORAL at 05:07

## 2025-07-04 RX ADMIN — POTASSIUM BICARBONATE 60 MEQ: 391 TABLET, EFFERVESCENT ORAL at 10:07

## 2025-07-04 RX ADMIN — KETOROLAC TROMETHAMINE 10 MG: 10 TABLET, FILM COATED ORAL at 11:07

## 2025-07-04 RX ADMIN — KETOROLAC TROMETHAMINE 10 MG: 10 TABLET, FILM COATED ORAL at 04:07

## 2025-07-04 RX ADMIN — ACETAMINOPHEN 1000 MG: 325 TABLET ORAL at 04:07

## 2025-07-04 RX ADMIN — ESCITALOPRAM OXALATE 10 MG: 5 TABLET, FILM COATED ORAL at 08:07

## 2025-07-04 RX ADMIN — POLYETHYLENE GLYCOL 3350 17 G: 17 POWDER, FOR SOLUTION ORAL at 08:07

## 2025-07-04 RX ADMIN — ACETAMINOPHEN 1000 MG: 325 TABLET ORAL at 02:07

## 2025-07-04 RX ADMIN — ACETAMINOPHEN 1000 MG: 325 TABLET ORAL at 09:07

## 2025-07-04 RX ADMIN — ASPIRIN 81 MG CHEWABLE TABLET 81 MG: 81 TABLET CHEWABLE at 08:07

## 2025-07-04 RX ADMIN — METOCLOPRAMIDE 10 MG: 5 TABLET ORAL at 10:07

## 2025-07-04 RX ADMIN — KETOROLAC TROMETHAMINE 10 MG: 10 TABLET, FILM COATED ORAL at 10:07

## 2025-07-04 NOTE — PROGRESS NOTES
She really looks great. LFTs downtrending, VS have stabilized and are normal. Her abd is very soft. Passing flatus consistently, awaiting BM.  Jeffrey liquids.  Taking very minimal narcotics, ambulating well.    Will advance her diet today and step up her bowel regimen. I suspect if she progresses along this pathway she can get out of there tomorrow. Will continue baby asa as outpatient for her vein reconstruction, and DVT prophylaxis.      Flip Valentin MD  Upper GI / Hepatobiliary Surgical Oncology  [facility] [place], [place]  Office: [phone]  Fax: [phone]

## 2025-07-04 NOTE — PROGRESS NOTES
[facility]  General Surgery  Progress Note    Subjective:     History of Present Illness:  No notes on file    Post-Op Info:  Procedure(s) (LRB):  PANCREATECTOMY, DISTAL, SUBTOTAL open caro, splenectomy (N/A)  SPLENECTOMY   3 Days Post-Op     Interval History: NAEON. Having bowel function, tolerating CLD without nausea or vomiting. Up in chair this morning. Feels well, pain well controlled.    Medications:  Continuous Infusions:  Scheduled Meds:   acetaminophen  1,000 mg Oral Q8H    aspirin  81 mg Oral Daily    enoxparin  40 mg Subcutaneous Q24H (prophylaxis, 1700)    EScitalopram oxalate  10 mg Oral Daily    ketorolac  10 mg Oral Q6H    levothyroxine  50 mcg Oral Daily    metoclopramide HCl  10 mg Oral TID AC    mupirocin   Nasal BID    polyethylene glycol  17 g Oral BID    potassium bicarbonate  60 mEq Oral Once     PRN Meds:  Current Facility-Administered Medications:     0.9%  NaCl infusion (for blood administration), , Intravenous, Q24H PRN    0.9%  NaCl infusion (for blood administration), , Intravenous, Q24H PRN    dextrose 50%, 12.5 g, Intravenous, PRN    dextrose 50%, 25 g, Intravenous, PRN    diphenhydrAMINE, 25 mg, Oral, Q6H PRN    glucagon (human recombinant), 1 mg, Intramuscular, PRN    glucose, 16 g, Oral, PRN    glucose, 24 g, Oral, PRN    HYDROmorphone, 0.5 mg, Intravenous, Q6H PRN    insulin aspart U-100, 0-5 Units, Subcutaneous, QID (AC + HS) PRN    ondansetron, 4 mg, Intravenous, Q12H PRN    oxyCODONE, 5 mg, Oral, Q4H PRN    oxyCODONE, 10 mg, Oral, Q4H PRN    prochlorperazine, 5 mg, Intravenous, Q6H PRN    sodium chloride 0.9%, 10 mL, Intra-Catheter, PRN     Review of patient's allergies indicates:   Allergen Reactions    Duricef [cefadroxil] Hives    Grass pollen-june grass standard Itching     Objective:     Vital Signs (Most Recent):  Temp: 98.4 °F (36.9 °C) (07/04/25 0815)  Pulse: 72 (07/04/25 1000)  Resp: (!) 22 (07/04/25 1000)  BP: 129/63 (07/04/25 0900)  SpO2: 98 % (07/04/25 0930)  Vital Signs (24h Range):  Temp:  [97.9 °F (36.6 °C)-98.5 °F (36.9 °C)] 98.4 °F (36.9 °C)  Pulse:  [69-98] 72  Resp:  [13-28] 22  SpO2:  [95 %-99 %] 98 %  BP: (112-141)/(55-65) 129/63     Weight: 55.9 kg (123 lb 3.8 oz)  Body mass index is 23.29 kg/m².    Intake/Output - Last 3 Shifts         07/02 0700  07/03 0659 07/03 0700  07/04 0659 07/04 0700  07/05 0659    I.V. (mL/kg)       IV Piggyback       Total Intake(mL/kg)       Urine (mL/kg/hr) 1220 (0.9) 950 (0.7) 100 (0.5)    Total Output 1220 950 100    Net -1220 -950 -100                    Physical Exam  Vitals reviewed.   Constitutional:       Appearance: Normal appearance.   Cardiovascular:      Rate and Rhythm: Normal rate.      Pulses: Normal pulses.   Pulmonary:      Effort: Pulmonary effort is normal.   Abdominal:      General: There is no distension.      Palpations: Abdomen is soft. There is no mass.      Tenderness: There is no guarding.      Comments: Appropriately tender, incision healing well.    Skin:     General: Skin is warm and dry.   Neurological:      General: No focal deficit present.      Mental Status: She is alert and oriented to person, place, and time.          Significant Labs:  I have reviewed all pertinent lab results within the past 24 hours.  CBC:   Recent Labs   Lab 07/04/25  0442   WBC 12.17   RBC 3.52*   HGB 11.0*   HCT 33.4*      MCV 95   MCH 31.3*   MCHC 32.9     BMP:   Recent Labs   Lab 07/04/25  0441   GLU 92      K 3.4*      CO2 27   BUN 10   CREATININE 0.6   CALCIUM 8.4*   MG 1.9       Significant Diagnostics:  I have reviewed all pertinent imaging results/findings within the past 24 hours.  Assessment/Plan:     * Malignant neoplasm of pancreas  64 yoF with a pancreatic body adenocarcinoma with invasion of the portosplenic confluence and celiac axis s/p [place] procedure on 7/1. Progressing well.    - soft diet today  - ASA 81mg daily  - SSI  - PT/OT  - Ambulate and up to the chair  - Lovenox  - Daily  labs  - [facility]    Dispo: likely discharge tomorrow        Fadumo Molina MD  General Surgery  [facility]

## 2025-07-04 NOTE — SUBJECTIVE & OBJECTIVE
Interval History: NAEON. Having bowel function, tolerating CLD without nausea or vomiting. Up in chair this morning. Feels well, pain well controlled.    Medications:  Continuous Infusions:  Scheduled Meds:   acetaminophen  1,000 mg Oral Q8H    aspirin  81 mg Oral Daily    enoxparin  40 mg Subcutaneous Q24H (prophylaxis, 1700)    EScitalopram oxalate  10 mg Oral Daily    ketorolac  10 mg Oral Q6H    levothyroxine  50 mcg Oral Daily    metoclopramide HCl  10 mg Oral TID AC    mupirocin   Nasal BID    polyethylene glycol  17 g Oral BID    potassium bicarbonate  60 mEq Oral Once     PRN Meds:  Current Facility-Administered Medications:     0.9%  NaCl infusion (for blood administration), , Intravenous, Q24H PRN    0.9%  NaCl infusion (for blood administration), , Intravenous, Q24H PRN    dextrose 50%, 12.5 g, Intravenous, PRN    dextrose 50%, 25 g, Intravenous, PRN    diphenhydrAMINE, 25 mg, Oral, Q6H PRN    glucagon (human recombinant), 1 mg, Intramuscular, PRN    glucose, 16 g, Oral, PRN    glucose, 24 g, Oral, PRN    HYDROmorphone, 0.5 mg, Intravenous, Q6H PRN    insulin aspart U-100, 0-5 Units, Subcutaneous, QID (AC + HS) PRN    ondansetron, 4 mg, Intravenous, Q12H PRN    oxyCODONE, 5 mg, Oral, Q4H PRN    oxyCODONE, 10 mg, Oral, Q4H PRN    prochlorperazine, 5 mg, Intravenous, Q6H PRN    sodium chloride 0.9%, 10 mL, Intra-Catheter, PRN     Review of patient's allergies indicates:   Allergen Reactions    Duricef [cefadroxil] Hives    Grass pollen-june grass standard Itching     Objective:     Vital Signs (Most Recent):  Temp: 98.4 °F (36.9 °C) (07/04/25 0815)  Pulse: 72 (07/04/25 1000)  Resp: (!) 22 (07/04/25 1000)  BP: 129/63 (07/04/25 0900)  SpO2: 98 % (07/04/25 0930) Vital Signs (24h Range):  Temp:  [97.9 °F (36.6 °C)-98.5 °F (36.9 °C)] 98.4 °F (36.9 °C)  Pulse:  [69-98] 72  Resp:  [13-28] 22  SpO2:  [95 %-99 %] 98 %  BP: (112-141)/(55-65) 129/63     Weight: 55.9 kg (123 lb 3.8 oz)  Body mass index is 23.29  kg/m².    Intake/Output - Last 3 Shifts         07/02 0700  07/03 0659 07/03 0700  07/04 0659 07/04 0700 07/05 0659    I.V. (mL/kg)       IV Piggyback       Total Intake(mL/kg)       Urine (mL/kg/hr) 1220 (0.9) 950 (0.7) 100 (0.5)    Total Output 1220 950 100    Net -1220 -950 -100                    Physical Exam  Vitals reviewed.   Constitutional:       Appearance: Normal appearance.   Cardiovascular:      Rate and Rhythm: Normal rate.      Pulses: Normal pulses.   Pulmonary:      Effort: Pulmonary effort is normal.   Abdominal:      General: There is no distension.      Palpations: Abdomen is soft. There is no mass.      Tenderness: There is no guarding.      Comments: Appropriately tender, incision healing well.    Skin:     General: Skin is warm and dry.   Neurological:      General: No focal deficit present.      Mental Status: She is alert and oriented to person, place, and time.          Significant Labs:  I have reviewed all pertinent lab results within the past 24 hours.  CBC:   Recent Labs   Lab 07/04/25  0442   WBC 12.17   RBC 3.52*   HGB 11.0*   HCT 33.4*      MCV 95   MCH 31.3*   MCHC 32.9     BMP:   Recent Labs   Lab 07/04/25  0441   GLU 92      K 3.4*      CO2 27   BUN 10   CREATININE 0.6   CALCIUM 8.4*   MG 1.9       Significant Diagnostics:  I have reviewed all pertinent imaging results/findings within the past 24 hours.

## 2025-07-04 NOTE — ASSESSMENT & PLAN NOTE
64 yoF with a pancreatic body adenocarcinoma with invasion of the portosplenic confluence and celiac axis s/p Anthony procedure on 7/1. Progressing well.    - soft diet today  - ASA 81mg daily  - SSI  - PT/OT  - Ambulate and up to the chair  - Lovenox  - Daily labs  - MMPC    Dispo: likely discharge tomorrow

## 2025-07-05 VITALS
RESPIRATION RATE: 18 BRPM | SYSTOLIC BLOOD PRESSURE: 121 MMHG | HEIGHT: 61 IN | TEMPERATURE: 98 F | HEART RATE: 76 BPM | BODY MASS INDEX: 23.27 KG/M2 | WEIGHT: 123.25 LBS | OXYGEN SATURATION: 95 % | DIASTOLIC BLOOD PRESSURE: 73 MMHG

## 2025-07-05 LAB
ABO + RH BLD: NORMAL
ABO + RH BLD: NORMAL
ABSOLUTE EOSINOPHIL (OHS): 0.13 K/UL
ABSOLUTE MONOCYTE (OHS): 0.72 K/UL (ref 0.3–1)
ABSOLUTE NEUTROPHIL COUNT (OHS): 7.17 K/UL (ref 1.8–7.7)
ALBUMIN SERPL BCP-MCNC: 2.5 G/DL (ref 3.5–5.2)
ALP SERPL-CCNC: 87 UNIT/L (ref 40–150)
ALT SERPL W/O P-5'-P-CCNC: 59 UNIT/L (ref 10–44)
ANION GAP (OHS): 11 MMOL/L (ref 8–16)
AST SERPL-CCNC: 36 UNIT/L (ref 11–45)
BASOPHILS # BLD AUTO: 0.03 K/UL
BASOPHILS NFR BLD AUTO: 0.3 %
BILIRUB SERPL-MCNC: 0.5 MG/DL (ref 0.1–1)
BLD PROD TYP BPU: NORMAL
BLD PROD TYP BPU: NORMAL
BLOOD UNIT EXPIRATION DATE: NORMAL
BLOOD UNIT EXPIRATION DATE: NORMAL
BLOOD UNIT TYPE CODE: 6200
BLOOD UNIT TYPE CODE: 6200
BUN SERPL-MCNC: 11 MG/DL (ref 8–23)
CALCIUM SERPL-MCNC: 8.6 MG/DL (ref 8.7–10.5)
CHLORIDE SERPL-SCNC: 101 MMOL/L (ref 95–110)
CO2 SERPL-SCNC: 25 MMOL/L (ref 23–29)
CREAT SERPL-MCNC: 0.6 MG/DL (ref 0.5–1.4)
CROSSMATCH INTERPRETATION: NORMAL
CROSSMATCH INTERPRETATION: NORMAL
DISPENSE STATUS: NORMAL
DISPENSE STATUS: NORMAL
ERYTHROCYTE [DISTWIDTH] IN BLOOD BY AUTOMATED COUNT: 12.3 % (ref 11.5–14.5)
GFR SERPLBLD CREATININE-BSD FMLA CKD-EPI: >60 ML/MIN/1.73/M2
GLUCOSE SERPL-MCNC: 88 MG/DL (ref 70–110)
HCT VFR BLD AUTO: 31.8 % (ref 37–48.5)
HGB BLD-MCNC: 10.8 GM/DL (ref 12–16)
IMM GRANULOCYTES # BLD AUTO: 0.02 K/UL (ref 0–0.04)
IMM GRANULOCYTES NFR BLD AUTO: 0.2 % (ref 0–0.5)
LYMPHOCYTES # BLD AUTO: 0.57 K/UL (ref 1–4.8)
MAGNESIUM SERPL-MCNC: 1.8 MG/DL (ref 1.6–2.6)
MCH RBC QN AUTO: 32.2 PG (ref 27–31)
MCHC RBC AUTO-ENTMCNC: 34 G/DL (ref 32–36)
MCV RBC AUTO: 95 FL (ref 82–98)
NUCLEATED RBC (/100WBC) (OHS): 0 /100 WBC
PLATELET # BLD AUTO: 191 K/UL (ref 150–450)
PLT APH: NORMAL
PLT APH: NORMAL
PMV BLD AUTO: 9.6 FL (ref 9.2–12.9)
POTASSIUM SERPL-SCNC: 3.7 MMOL/L (ref 3.5–5.1)
PROT SERPL-MCNC: 5.6 GM/DL (ref 6–8.4)
RBC # BLD AUTO: 3.35 M/UL (ref 4–5.4)
RELATIVE EOSINOPHIL (OHS): 1.5 %
RELATIVE LYMPHOCYTE (OHS): 6.6 % (ref 18–48)
RELATIVE MONOCYTE (OHS): 8.3 % (ref 4–15)
RELATIVE NEUTROPHIL (OHS): 83.1 % (ref 38–73)
SODIUM SERPL-SCNC: 137 MMOL/L (ref 136–145)
UNIT NUMBER: NORMAL
UNIT NUMBER: NORMAL
WBC # BLD AUTO: 8.64 K/UL (ref 3.9–12.7)

## 2025-07-05 PROCEDURE — 25000003 PHARM REV CODE 250: Performed by: SURGERY

## 2025-07-05 PROCEDURE — 80053 COMPREHEN METABOLIC PANEL: CPT

## 2025-07-05 PROCEDURE — 85025 COMPLETE CBC W/AUTO DIFF WBC: CPT

## 2025-07-05 PROCEDURE — 36415 COLL VENOUS BLD VENIPUNCTURE: CPT

## 2025-07-05 PROCEDURE — 25000003 PHARM REV CODE 250

## 2025-07-05 PROCEDURE — 83735 ASSAY OF MAGNESIUM: CPT

## 2025-07-05 RX ORDER — POLYETHYLENE GLYCOL 3350 17 G/17G
17 POWDER, FOR SOLUTION ORAL 2 TIMES DAILY
Qty: 952 G | Refills: 0 | Status: ON HOLD | OUTPATIENT
Start: 2025-07-05

## 2025-07-05 RX ORDER — OXYCODONE HYDROCHLORIDE 5 MG/1
5 TABLET ORAL EVERY 4 HOURS PRN
Qty: 15 TABLET | Refills: 0 | Status: SHIPPED | OUTPATIENT
Start: 2025-07-05 | End: 2025-07-07 | Stop reason: SDUPTHER

## 2025-07-05 RX ORDER — ACETAMINOPHEN 500 MG
1000 TABLET ORAL EVERY 8 HOURS
Qty: 180 TABLET | OUTPATIENT
Start: 2025-07-05

## 2025-07-05 RX ORDER — NAPROXEN SODIUM 220 MG/1
81 TABLET, FILM COATED ORAL DAILY
Qty: 90 TABLET | Refills: 0 | Status: ON HOLD | OUTPATIENT
Start: 2025-07-06 | End: 2025-10-04

## 2025-07-05 RX ADMIN — ASPIRIN 81 MG CHEWABLE TABLET 81 MG: 81 TABLET CHEWABLE at 08:07

## 2025-07-05 RX ADMIN — ESCITALOPRAM OXALATE 10 MG: 5 TABLET, FILM COATED ORAL at 08:07

## 2025-07-05 RX ADMIN — ACETAMINOPHEN 1000 MG: 325 TABLET ORAL at 06:07

## 2025-07-05 RX ADMIN — KETOROLAC TROMETHAMINE 10 MG: 10 TABLET, FILM COATED ORAL at 06:07

## 2025-07-05 RX ADMIN — METOCLOPRAMIDE 10 MG: 5 TABLET ORAL at 06:07

## 2025-07-05 RX ADMIN — POLYETHYLENE GLYCOL 3350 17 G: 17 POWDER, FOR SOLUTION ORAL at 08:07

## 2025-07-05 RX ADMIN — LEVOTHYROXINE SODIUM 50 MCG: 0.05 TABLET ORAL at 08:07

## 2025-07-05 NOTE — DISCHARGE SUMMARY
[facility]  General Surgery  Discharge Summary      Patient Name: Bessy Zafar  MRN: [id number]  Admission Date: 7/1/2025  Hospital Length of Stay: 4 days  Discharge Date and Time: 07/05/2025 8:52 AM  Attending Physician: Flip Valentin MD   Discharging Provider: Fadumo Molina MD  Primary Care Provider: Dayan Jimenez NP     HPI: Ms. Zafar is a 63 y.o. female with Arthritis, DMII, GERD, Hypotyroidism, Obesity, Thyroid disease who presents for pancreatic cancer. This was incidentally found on CT chest for a URI. 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 She is feeling relatively well this morning with now significant change since last clinic visit.     Procedure(s) (LRB):  PANCREATECTOMY, DISTAL, SUBTOTAL open caro, splenectomy (N/A)  SPLENECTOMY     Hospital Course:   BESSY ZFAAR 64 y.o.female underwent: Procedure(s) (LRB):  PANCREATECTOMY, DISTAL, SUBTOTAL open caro, splenectomy (N/A)  SPLENECTOMY. The patient tolerated the procedure well, was transferred to recovery post-op, and then transferred to the PCU for continuation of medical care. The patient's clinical condition progressively improved. Had ROBF. Leukocytosis improved. Patient was HDS throughout admission. By the time of discharge, they were meeting all post op milestones, tolerating a diet without nausea or vomiting, pain was well controlled with oral medications, and they were ambulating without difficulty. Voiding appropriately. On POD 4 the patient was discharged to home. On discharge, the patient's incisions were c/d/i and the surgical site was soft and appropriately tender to palpation. The patient will follow up in general surgery clinic in 1 weeks. Discussed POC and ED precautions with patient.  Patient verbalized understanding and is agreeable to plan. All questions answered.    Please see hospital and op notes for further detail regarding patient's admission.    Patient's discharge was discussed with Dr. Valentin.         Physical Exam  Vitals reviewed.   Constitutional:       Appearance: Normal appearance.   Cardiovascular:      Rate and Rhythm: Normal rate.      Pulses: Normal pulses.   Pulmonary:      Effort: Pulmonary effort is normal.   Abdominal:      General: There is no distension.      Palpations: Abdomen is soft. There is no mass.      Tenderness: There is no guarding.      Comments: Appropriately tender, incision healing well.    Skin:     General: Skin is warm and dry.   Neurological:      General: No focal deficit present.      Mental Status: She is alert and oriented to person, place, and time.           Indwelling Lines/Drains at time of discharge:   Lines/Drains/Airways       Central Venous Catheter Line  Duration                  PowerPort A Cath Single Lumen 07/18/24 1128 Internal Jugular Right 351 days                    Significant Diagnostic Studies: Labs: CMP   Recent Labs   Lab 07/04/25  0441 07/04/25  1446 07/05/25  0243    140 137   K 3.4* 5.0 3.7    103 101   CO2 27 28 25   GLU 92 98 88   BUN 10 12 11   CREATININE 0.6 0.5 0.6   CALCIUM 8.4* 8.7 8.6*   PROT 5.3*  --  5.6*   ALBUMIN 2.5*  --  2.5*   BILITOT 0.5  --  0.5   ALKPHOS 81  --  87   AST 70*  --  36   ALT 87*  --  59*   ANIONGAP 8 9 11    and CBC   Recent Labs   Lab 07/04/25  0442 07/05/25  0243   WBC 12.17 8.64   HGB 11.0* 10.8*   HCT 33.4* 31.8*    191       Pending Diagnostic Studies:       None            Final Active Diagnoses:    Diagnosis Date Noted POA    PRINCIPAL PROBLEM:  Malignant neoplasm of pancreas [C25.9] 07/12/2024 Yes      Problems Resolved During this Admission:        Discharged Condition: good    Disposition: Home or Self Care    Follow Up:   Follow-up Information       Flip Valentin  MD DIOMEDES Follow up on 7/16/2025.    Specialties: Surgical Oncology, General Surgery  Contact information:  [address]  [facility] [id number]  [phone]                             Patient Instructions:      Diet Adult Regular     Change dressing (specify)   Order Comments: WOUND CARE  You have skin glue over your incision(s); this will slowly flake away over the next few weeks.  -- Ok to shower; however, no baths or submerging in water (I.e. swimming, submerging in water) for at least two weeks.  -- Please keep the incision clean with soap and water, pat your incision dry, do not scrub hard over your incisions.    MEDICATIONS AND PAIN CONTROL  -- Please resume all home medications as instructed and take any newly prescribed medications.  -- Most people find that over-the-counter pain medications (Tylenol combined with Ibuprofen) will be sufficient for most pain control.  -- If you're taking prescription narcotics, do not drive or operate heavy machinery. Do not drive if your pain is not controlled enough for you to react quickly safely.  -- Take a stool softener with narcotics medications to prevent constipation.    OTHER INSTRUCTIONS  -- Monitor for temp > 101 F, bleeding, redness, purulent drainage, or any sudden, new extreme pain. If any occur, please call our clinic or go to the emergency department if after normal business hours.  -- You may resume your regular diet as tolerated.   -- No heavy lifting (anything >10 lbs or = to a gallon of milk) or strenuous exercise until cleared by a physician.  -- Follow up with Dr. Valentin in 2 weeks in clinic for a post-op check. If no appointment is made within the week, please call the clinic to schedule.     Notify your health care provider if you experience any of the following:  temperature >100.4     Notify your health care provider if you experience any of the following:  persistent nausea and vomiting or diarrhea     Notify your health care provider if you  experience any of the following:  severe uncontrolled pain     Notify your health care provider if you experience any of the following:  redness, tenderness, or signs of infection (pain, swelling, redness, odor or green/yellow discharge around incision site)     Notify your health care provider if you experience any of the following:  difficulty breathing or increased cough     Notify your health care provider if you experience any of the following:  severe persistent headache     Notify your health care provider if you experience any of the following:  worsening rash     Notify your health care provider if you experience any of the following:  persistent dizziness, light-headedness, or visual disturbances     Notify your health care provider if you experience any of the following:  increased confusion or weakness     Activity as tolerated       Medications:  Reconciled Home Medications:      Medication List        START taking these medications      apixaban 2.5 mg Tab  Commonly known as: ELIQUIS  Take 1 tablet (2.5 mg total) by mouth 2 (two) times daily.     aspirin 81 MG Chew  Take 1 tablet (81 mg total) by mouth once daily.  Start taking on: July 6, 2025     oxyCODONE 5 MG immediate release tablet  Commonly known as: ROXICODONE  Take 1 tablet (5 mg total) by mouth every 4 (four) hours as needed for Pain.     polyethylene glycol 17 gram Pwpk  Commonly known as: GLYCOLAX  Take 17 g by mouth 2 (two) times daily.            CONTINUE taking these medications      acetaminophen 325 MG tablet  Commonly known as: TYLENOL  Take 650 mg by mouth daily as needed for Pain.     EScitalopram oxalate 10 MG tablet  Commonly known as: LEXAPRO  Take 1 tablet (10 mg total) by mouth once daily.     fluticasone propionate 50 mcg/actuation nasal spray  Commonly known as: FLONASE  1 spray (50 mcg total) by Each Nostril route once daily.     ibuprofen 800 MG tablet  Commonly known as: ADVIL,MOTRIN  Take 1 tablet (800 mg total) by mouth  every 8 (eight) hours as needed for Pain.     levothyroxine 50 MCG tablet  Commonly known as: SYNTHROID  Take 1 tablet (50 mcg total) by mouth once daily.     loratadine 10 mg tablet  Commonly known as: CLARITIN  Take 1 tablet (10 mg total) by mouth every morning.     LORazepam 0.5 MG tablet  Commonly known as: ATIVAN  Take 1 tablet (0.5 mg total) by mouth every 6 (six) hours as needed for Anxiety.     potassium chloride SA 20 MEQ tablet  Commonly known as: K-DUR,KLOR-CON  Take 40 mEq by mouth 2 (two) times daily.     prochlorperazine 5 MG tablet  Commonly known as: COMPAZINE  Take 1 tablet (5 mg total) by mouth every 6 (six) hours as needed for Nausea.     simvastatin 10 MG tablet  Commonly known as: ZOCOR  Take 1 tablet (10 mg total) by mouth every evening.     SYNJARDY XR 10-1,000 mg Tbph  Generic drug: empagliflozin-metformin  Take 1 tablet by mouth once daily.     VITAMIN D2 1,250 mcg (50,000 unit) capsule  Generic drug: ergocalciferol  Take 1 capsule (50,000 Units total) by mouth twice a week.              Fadumo Molina MD           Patient was seen and examined on the date of discharge and determined to be suitable for discharge.  Total time spent preparing discharge services: 10 minutes.  Time was spent speaking with consultants and case management, reviewing records, and/or discussing the plan of care with patient/family.      Fadumo Molina M.D.  General Surgery PGY-III  [facility]

## 2025-07-05 NOTE — PLAN OF CARE
CHW met with patient/family at bedside. Patient experience rounding completed and reviewed the following.    Do you know your discharge plan? Yes or No  If yes, what is the plan? (Home, Home Health, Rehab, SNF, LTAC, or Other) Yes Home    Have you dicussed your needs and preferences with your SW/CM? Yes or No Yes Home    If you are discharging home do you have help at home? Yes or No Yes (spouse)    Do you think you will need additional help at home at discharge? Yes or No  No     Do you currently have difficulty keeping up with bills, affording medicine or buying food? Yes or No No    Assigned SW/Cm notified of any patient/family needs or concerns. Appropriate resources provided to address patient needs. Burt Cristina, JESUSITA, RSW, BSW  Case Management  [phone]

## 2025-07-07 ENCOUNTER — PATIENT MESSAGE (OUTPATIENT)
Dept: HEMATOLOGY/ONCOLOGY | Facility: CLINIC | Age: 65
End: 2025-07-07
Payer: MEDICARE

## 2025-07-07 DIAGNOSIS — C25.1 MALIGNANT NEOPLASM OF BODY OF PANCREAS: ICD-10-CM

## 2025-07-07 RX ORDER — OXYCODONE HYDROCHLORIDE 5 MG/1
5 TABLET ORAL EVERY 6 HOURS PRN
Qty: 15 TABLET | Refills: 0 | Status: SHIPPED | OUTPATIENT
Start: 2025-07-07 | End: 2025-07-10 | Stop reason: SDUPTHER

## 2025-07-07 RX ORDER — POTASSIUM CHLORIDE ORAL 1.5 G/15ML
40 SOLUTION ORAL DAILY
Qty: 473 ML | Refills: 0 | Status: SHIPPED | OUTPATIENT
Start: 2025-07-07

## 2025-07-07 NOTE — PLAN OF CARE
[facility]  Discharge Final Note    Primary Care Provider: Dayan Jimenez NP    Expected Discharge Date: 7/5/2025    Patient discharged to home via family transportation.     Patient's bedside nurse provided discharge instructions.    Discharge Plan A and Plan B have been determined by review of patient's clinical status, future medical and therapeutic needs, and coverage/benefits for post-acute care in coordination with multidisciplinary team members.        Final Discharge Note (most recent)       Final Note - 07/02/25 1550          Final Note    Assessment Type Discharge Planning Assessment                     Important Message from Medicare             Contact Info       Flip Valentin MD   Specialty: Surgical Oncology, General Surgery    [address]  [facility] [address]   Phone: [phone]       Next Steps: Follow up on 7/16/2025            Future Appointments   Date Time Provider Department Center   7/14/2025  7:45 AM LAB, [facility] DRAW STATION [facility] LAB [facility]   7/15/2025 10:40 AM Rajat Hunt MD [facility] HEMONC [facility]   7/16/2025 10:30 AM Flip Valentin MD [facility] SURGONC [facility]   7/17/2025 10:00 AM Karishma De Jesus RD [facility] INONCSF Venu Haq   7/31/2025 11:00 AM INJECTION, INFECTIOUS DISEASES [facility] ID INJ [facility]   8/21/2025 12:30 PM Josey Welch P [facility] GAIL Estrada   9/22/2025  1:00 PM Rene Rascon MD [facility] RADONC [facility] at [facility] scheduled post-discharge follow-up appointment and information added to AVS.     Zac Olivares, RN, BSN  Case Management  [phone]

## 2025-07-07 NOTE — TELEPHONE ENCOUNTER
"Call placed to pt. Pt reports the day after surgery a PCT grabbed her left arm because she thought she was getting ready to fall.  PT reports a "bruise with a knot" in that area where the PCT caught her. She is worried about a blood clot. Denies redness, warmth to touch, and pain. She is not certain if the "knot" was there prior to surgery. Pt is taking Eliquis as prescribed. Will follow up with Gina MAI and if ultrasound is ordered, will contact pt to schedule.  "

## 2025-07-07 NOTE — TELEPHONE ENCOUNTER
"Call placed to pt regarding message. Spoke to pt.  Pt reports she is eating a very small amount every 15 minutes and experiences a lot bloating.  Pt reports she is not in pain but experiencing "uncomfortableness from eating and drinking".  Diet recall consists of grits, eggs, pancake, peaches,  grape juice, ensure (drinks over a couple of hours). Pt reports she was on creon per Dr. Hunt a while back but it was previously discontinued prior to surgery because her bm became normal.  Pt is requesting to change K Dur to a liquid as she is having difficulty swallowing the pill. Denies fever, nausea, vomiting, redness and drainage to surgical site. She also reports she is  taking several walks per day. Last bm was yesterday and reports it was watery. Encouraged pt to eat small frequent meals, and avoid becoming over heated when walking outside. Pharmacy confirmed. Will follow up with Gina MAI regarding creon and potassium prescription. Pt verbalized understanding.  "

## 2025-07-08 ENCOUNTER — PATIENT OUTREACH (OUTPATIENT)
Dept: ADMINISTRATIVE | Facility: CLINIC | Age: 65
End: 2025-07-08
Payer: MEDICARE

## 2025-07-08 NOTE — PROGRESS NOTES
C3 nurse spoke with Bessy Lamb  for a TCC post hospital discharge follow up call. The patient does not have a scheduled HOSFU appointment with Dayan Jimenez NP  within 5-7 days post hospital discharge date 07/05/2025. C3 nurse was unable to schedule HOSFU appointment in [facility].    Message sent to PCP staff requesting they contact patient and schedule follow up appointment.

## 2025-07-10 ENCOUNTER — TELEPHONE (OUTPATIENT)
Dept: SURGERY | Facility: CLINIC | Age: 65
End: 2025-07-10
Payer: MEDICARE

## 2025-07-10 ENCOUNTER — PATIENT MESSAGE (OUTPATIENT)
Dept: HEMATOLOGY/ONCOLOGY | Facility: CLINIC | Age: 65
End: 2025-07-10
Payer: MEDICARE

## 2025-07-10 DIAGNOSIS — C25.1 MALIGNANT NEOPLASM OF BODY OF PANCREAS: ICD-10-CM

## 2025-07-10 NOTE — TELEPHONE ENCOUNTER
"Received incoming call from pt. Pt reports she experienced swelling in her abdominal during her hospital stay and Dr. Valentin was aware. She is requesting VV with Dr. Valentin.  She has a lump on her left abdomen (also present during her hospitalization). Pt reports once she eats, drinks or takes miralax and Potassium her "abdomen swells up". Pt reports she is having bm's daily and sometimes it is liquid in consistency. Diet recall consist of waffle , watermelon, yogurt, broccoli rice, carrots and peas.  Pt reports she has resumed creon. Pt reports she is taking creon tid and taking it before meals. Denies fever, nausea, vomiting, and pain. Denies redness, swelling and drainage to surgical site. Instructed pt to take creon with meals and eat small frequent meals focusing on protein. Offered VV with NP today. Pt declined. She is aware of appt next week. Instructed to contact the office with any questions  or concerns. Pt verbalized understanding and thanked this RN.  "

## 2025-07-11 RX ORDER — OXYCODONE HYDROCHLORIDE 5 MG/1
5 TABLET ORAL EVERY 6 HOURS PRN
Qty: 15 TABLET | Refills: 0 | Status: SHIPPED | OUTPATIENT
Start: 2025-07-11

## 2025-07-13 ENCOUNTER — NURSE TRIAGE (OUTPATIENT)
Dept: ADMINISTRATIVE | Facility: CLINIC | Age: 65
End: 2025-07-13
Payer: MEDICARE

## 2025-07-13 NOTE — TELEPHONE ENCOUNTER
Daughter Gerri calling on behalf of pt. Pt is post pancreatectomy on 7/1/25. C/o pt having diarrhea, vomiting, sweating and pain. Pain is in shoulder, upper back, buttock and abdomen. Pain 10/10 when trying to get up. Pt also vomited at 0300 this morning. Denies fever. States that oxycodone 5 mg only helps for 3 hours. Advise to contact surgeon now per protocol. Provider paged per protocol. Received callback from Alex Pollock and transferred to daughter. Encounter routed to provider.    Reason for Disposition   [1] SEVERE post-op pain (e.g., excruciating, pain scale 8-10) AND [2] not controlled with pain medications    Additional Information   Negative: Sounds like a life-threatening emergency to the triager   Negative: [1] Widespread rash AND [2] bright red, sunburn-like   Negative: [1] SEVERE headache (e.g., excruciating) AND [2] after spinal (epidural) anesthesia   Negative: [1] Vomiting AND [2] persists > 4 hours   Negative: [1] Vomiting AND [2] abdomen looks much more swollen than usual   Negative: [1] Drinking very little AND [2] dehydration suspected (e.g., no urine > 12 hours, very dry mouth, very lightheaded)   Negative: Patient sounds very sick or weak to the triager   Negative: Sounds like a serious complication to the triager   Negative: Fever > 100.4 F (38.0 C)    Protocols used: Post-Op Symptoms and [id number]

## 2025-07-13 NOTE — TELEPHONE ENCOUNTER
July 1st had surgery and is in excruciating abdominal pain and bloated. Has been having gas pain and took gas x but is not completely relieved. Advised to speak with surgeon within 24 hours. Protocol advised contact MD in the am when call is between 9 pm and 9am. Advised to call back in the morning. Caller wanting decreased intervals for taking pain medication.  Reason for Disposition   [1] MILD-MODERATE post-op pain (e.g., pain scale 1-7) AND [2] not controlled with pain medications    Additional Information   Negative: Sounds like a life-threatening emergency to the triager   Negative: [1] Widespread rash AND [2] bright red, sunburn-like   Negative: [1] SEVERE headache (e.g., excruciating) AND [2] after spinal (epidural) anesthesia   Negative: [1] Vomiting AND [2] persists > 4 hours   Negative: [1] Vomiting AND [2] abdomen looks much more swollen than usual   Negative: [1] Drinking very little AND [2] dehydration suspected (e.g., no urine > 12 hours, very dry mouth, very lightheaded)   Negative: Patient sounds very sick or weak to the triager   Negative: Sounds like a serious complication to the triager   Negative: Fever > 100.4 F (38.0 C)   Negative: [1] SEVERE post-op pain (e.g., excruciating, pain scale 8-10) AND [2] not controlled with pain medications   Negative: [1] Caller has URGENT question AND [2] triager unable to answer question   Negative: [1] MILD-MODERATE headache (e.g., pain scale 1-7) AND [2] after spinal (epidural) anesthesia   Negative: Fever present > 3 days (72 hours)    Protocols used: Post-Op Symptoms and [id number]

## 2025-07-14 ENCOUNTER — TELEPHONE (OUTPATIENT)
Dept: SURGERY | Facility: CLINIC | Age: 65
End: 2025-07-14
Payer: MEDICARE

## 2025-07-14 ENCOUNTER — OFFICE VISIT (OUTPATIENT)
Dept: HEMATOLOGY/ONCOLOGY | Facility: CLINIC | Age: 65
End: 2025-07-14
Payer: MEDICARE

## 2025-07-14 ENCOUNTER — LAB VISIT (OUTPATIENT)
Dept: LAB | Facility: HOSPITAL | Age: 65
End: 2025-07-14
Attending: INTERNAL MEDICINE
Payer: MEDICARE

## 2025-07-14 ENCOUNTER — DOCUMENTATION ONLY (OUTPATIENT)
Dept: INFUSION THERAPY | Facility: HOSPITAL | Age: 65
End: 2025-07-14
Payer: MEDICARE

## 2025-07-14 VITALS
HEIGHT: 61 IN | WEIGHT: 121.94 LBS | BODY MASS INDEX: 23.02 KG/M2 | TEMPERATURE: 98 F | OXYGEN SATURATION: 93 % | HEART RATE: 65 BPM | RESPIRATION RATE: 12 BRPM

## 2025-07-14 DIAGNOSIS — C25.1 MALIGNANT NEOPLASM OF BODY OF PANCREAS: ICD-10-CM

## 2025-07-14 DIAGNOSIS — N17.9 AKI (ACUTE KIDNEY INJURY): ICD-10-CM

## 2025-07-14 DIAGNOSIS — D72.829 LEUKOCYTOSIS, UNSPECIFIED TYPE: Primary | ICD-10-CM

## 2025-07-14 PROBLEM — T81.9XXA POST-OPERATIVE COMPLICATION: Status: ACTIVE | Noted: 2025-07-14

## 2025-07-14 LAB
ABSOLUTE EOSINOPHIL (OHS): 0 K/UL
ABSOLUTE MONOCYTE (OHS): 1.42 K/UL (ref 0.3–1)
ABSOLUTE NEUTROPHIL COUNT (OHS): 24.74 K/UL (ref 1.8–7.7)
ALBUMIN SERPL BCP-MCNC: 2.2 G/DL (ref 3.5–5.2)
ALP SERPL-CCNC: 96 UNIT/L (ref 40–150)
ALT SERPL W/O P-5'-P-CCNC: 21 UNIT/L (ref 10–44)
ANION GAP (OHS): 18 MMOL/L (ref 8–16)
AST SERPL-CCNC: 21 UNIT/L (ref 11–45)
BASOPHILS # BLD AUTO: 0.09 K/UL
BASOPHILS NFR BLD AUTO: 0.3 %
BILIRUB SERPL-MCNC: 0.3 MG/DL (ref 0.1–1)
BUN SERPL-MCNC: 33 MG/DL (ref 8–23)
CALCIUM SERPL-MCNC: 9.5 MG/DL (ref 8.7–10.5)
CANCER AG19-9 SERPL-ACNC: 4.2 U/ML
CHLORIDE SERPL-SCNC: 96 MMOL/L (ref 95–110)
CO2 SERPL-SCNC: 17 MMOL/L (ref 23–29)
CREAT SERPL-MCNC: 2.5 MG/DL (ref 0.5–1.4)
DHEA SERPL-MCNC: NORMAL
ERYTHROCYTE [DISTWIDTH] IN BLOOD BY AUTOMATED COUNT: 12.6 % (ref 11.5–14.5)
ESTRIOL SERPL-MCNC: NORMAL NG/ML
ESTROGEN SERPL-MCNC: NORMAL PG/ML
GFR SERPLBLD CREATININE-BSD FMLA CKD-EPI: 21 ML/MIN/1.73/M2
GLUCOSE SERPL-MCNC: 223 MG/DL (ref 70–110)
HCT VFR BLD AUTO: 39.8 % (ref 37–48.5)
HGB BLD-MCNC: 13.4 GM/DL (ref 12–16)
IMM GRANULOCYTES # BLD AUTO: 0.32 K/UL (ref 0–0.04)
IMM GRANULOCYTES NFR BLD AUTO: 1.2 % (ref 0–0.5)
INSULIN SERPL-ACNC: NORMAL U[IU]/ML
LAB AP CLINICAL INFORMATION: NORMAL
LAB AP GROSS DESCRIPTION: NORMAL
LAB AP PERFORMING LOCATION(S): NORMAL
LAB AP REPORT FOOTNOTES: NORMAL
LAB AP SYNOPTIC CHECKLIST: NORMAL
LYMPHOCYTES # BLD AUTO: 0.16 K/UL (ref 1–4.8)
MCH RBC QN AUTO: 31.5 PG (ref 27–31)
MCHC RBC AUTO-ENTMCNC: 33.7 G/DL (ref 32–36)
MCV RBC AUTO: 93 FL (ref 82–98)
NUCLEATED RBC (/100WBC) (OHS): 0 /100 WBC
PLATELET # BLD AUTO: 809 K/UL (ref 150–450)
PMV BLD AUTO: 9.2 FL (ref 9.2–12.9)
POTASSIUM SERPL-SCNC: 5.6 MMOL/L (ref 3.5–5.1)
PROT SERPL-MCNC: 6.9 GM/DL (ref 6–8.4)
RBC # BLD AUTO: 4.26 M/UL (ref 4–5.4)
RELATIVE EOSINOPHIL (OHS): 0 %
RELATIVE LYMPHOCYTE (OHS): 0.6 % (ref 18–48)
RELATIVE MONOCYTE (OHS): 5.3 % (ref 4–15)
RELATIVE NEUTROPHIL (OHS): 92.6 % (ref 38–73)
SODIUM SERPL-SCNC: 131 MMOL/L (ref 136–145)
WBC # BLD AUTO: 26.73 K/UL (ref 3.9–12.7)

## 2025-07-14 PROCEDURE — 99999 PR PBB SHADOW E&M-EST. PATIENT-LVL III: CPT | Mod: PBBFAC,,, | Performed by: INTERNAL MEDICINE

## 2025-07-14 PROCEDURE — 85025 COMPLETE CBC W/AUTO DIFF WBC: CPT | Mod: PN

## 2025-07-14 PROCEDURE — 1101F PT FALLS ASSESS-DOCD LE1/YR: CPT | Mod: CPTII,S$GLB,, | Performed by: INTERNAL MEDICINE

## 2025-07-14 PROCEDURE — 86301 IMMUNOASSAY TUMOR CA 19-9: CPT

## 2025-07-14 PROCEDURE — 3288F FALL RISK ASSESSMENT DOCD: CPT | Mod: CPTII,S$GLB,, | Performed by: INTERNAL MEDICINE

## 2025-07-14 PROCEDURE — 3008F BODY MASS INDEX DOCD: CPT | Mod: CPTII,S$GLB,, | Performed by: INTERNAL MEDICINE

## 2025-07-14 PROCEDURE — 99215 OFFICE O/P EST HI 40 MIN: CPT | Mod: 25,S$GLB,, | Performed by: INTERNAL MEDICINE

## 2025-07-14 PROCEDURE — 36415 COLL VENOUS BLD VENIPUNCTURE: CPT | Mod: PN

## 2025-07-14 PROCEDURE — 1111F DSCHRG MED/CURRENT MED MERGE: CPT | Mod: CPTII,S$GLB,, | Performed by: INTERNAL MEDICINE

## 2025-07-14 PROCEDURE — 82040 ASSAY OF SERUM ALBUMIN: CPT | Mod: PN

## 2025-07-14 PROCEDURE — 3044F HG A1C LEVEL LT 7.0%: CPT | Mod: CPTII,S$GLB,, | Performed by: INTERNAL MEDICINE

## 2025-07-14 PROCEDURE — 1125F AMNT PAIN NOTED PAIN PRSNT: CPT | Mod: CPTII,S$GLB,, | Performed by: INTERNAL MEDICINE

## 2025-07-14 RX ORDER — OXYCODONE HYDROCHLORIDE 5 MG/1
5 TABLET ORAL EVERY 4 HOURS PRN
Qty: 15 TABLET | Refills: 0 | Status: ON HOLD | OUTPATIENT
Start: 2025-07-14

## 2025-07-14 NOTE — PROGRESS NOTES
Pt was provided a gas card today. After seeing the doctor she was sent to the hospital and was admitted. SW did provided emotional support to pt's daughter who was here with pt.     Bharati Fritz, ANA MARIAW

## 2025-07-14 NOTE — PROGRESS NOTES
PATIENT: Bessy Lamb  MRN: [id number]  DATE: 7/14/2025      Diagnosis:   1. Leukocytosis, unspecified type    2. SHERON (acute kidney injury)    3. Malignant neoplasm of body of pancreas                    Chief Complaint: Follow-up (Sepsis)      Oncologic History:      Oncologic History     Oncologic Treatment     Pathology           Subjective:    Interval History:  Ms. Lamb is a 64 y.o. female with Arthritis, DMII, GERD, Hypotyroidism, Obesity, Thyroid disease who presents for pancreatic cancer.    Since the last clinic visit the patient underwent Whipple procedure on 07/01/2025 with path showing ypT1a disease with 14 negative LN's.   The patient states for the last few days she has been  developing abdominal swelling, nausea, vomiting, early satiety.  The patient denies CP, cough, SOB, constipation, diarrhea.  The patient denies fever, chills, night sweats, weight loss, new lumps or bumps, easy bruising or bleeding.    Prior History:   The patient initially underwent a CXR on 11/13/23 fur a URI which showed possible subtle pulmonary nodules.  CT chest 12/01/2023 showed a cluster of ill-defined centrilobular ground-glass opacity he inferior right and left upper lobes as well as a 1.2 cm ground-glass opacity in the superior segment of the left lower lobe; solid 3 mm pulmonary nodule in the right upper lobe; subcentimeter right hepatic lobe hypodensity likely representing a cyst.  The patient underwent surveillance CT chest on 05/21/2024 showing a 3.7 x 2.7 pancreatic body mass in the pancreatic tail atrophy suspicious of pancreatic malignancy as well as and unchanged benign 3 mm nodule in the right upper lobe.  MRI abdomen on 06/30/2024 showed hypoenhancing mass centered in the proximal pancreatic body measuring 2.9 x 2 cm with abutment and possible encasement of the distal splenic vein.  EUS was performed on 07/05/2024 mass in the pancreatic body stage T4 N0 M0 by endo sonographic criteria.  Pathology from  biopsy of the stomach showed mild focally moderate chronic gastritis with no evidence of the H pylori.  Stomach polyp which was removed code hyperplastic polyps with chronic inflammation.  Pancreatic biopsy showed adenocarcinoma.  CT of the chest, abdomen, and pelvis on 07/09/2024 showed a 9 mm lesion in the right hepatic lobe previously characterized as cysts; mass in the pancreatic body measuring 4.4 x 3.7 cm with diffuse pancreatic ductal dilatation measuring 8 mm:  Weaning vein at the portal confluence occluded with reconstitution via collaterals.  The mass abuts the portal vein by less than 180° but does not case the proximal splenic artery remains patent.  Also noted was a pancreatic lymph node measuring 0.7 cm.   Patient's case was discussed at tumor board on 07/17/2024 with recommendation for proceeding with the chemotherapy.  Patient had port placement on 07/18/2024.   The patient is admitted to the hospital from 09/20/2024 until 09/22/2024 for hypokalemia with potassium of 2.3.  The patient was subsequently discharged underwent treatment with FOLFIRINOX on 09/23/2024.  CT of the chest, abdomen, and pelvis on 10/02/2024 showed a new 5 mm ground-glass nodule in the right lower lobe; stable 8 mm hypodensity present within the right hepatic lobe suggestive of a cyst; 37 x 35 mm proximal pancreatic body mass slightly smaller in transverse dimension involving 90 degree area of the celiac artery and 100 degree area of the splenic artery with soft tissue extending along the anterior right lateral wall of the portal vein; abnormal soft tissue insinuating between the lateral margin of the celiac artery and adjacent splenic vein; invasion and focal occlusion of the proximal most aspect of the splenic vein; concentric wall thickening present within the distal sigmoid colon/rectum with infectious and inflammatory etiologies possible.   The patient met with Dr Valentin on 10/16/24 whom felt the patient could receive a few  more cycles of chemo and then transition to perioperative radiation therapy.   The patient's case was discussed with Dr. Valentin and .  Plan is to proceed with up to 12 cycles FOLFIRINOX prior to consideration of radiation versus surgical resection.   CT chest, abdomen, and pelvis on 01/27/2025 showing apical pleural thickening bilaterally stable since prior exam; 5 mm nodule of the right lung base; 3 possibly slightly smaller nodules at 4 mm; 1-2 mm nodule in the left lung apex; 8 mm hypodensity at the tip of the right lobe of the liver favored to be a cyst; significant improvement in pancreatic mass now measuring 2.9 x 1.7 cm appearing surround the celiac artery and to wrap partially around the portal vein likely greater than 180°.   The patient was admitted to the hospital from 2/19/25 until 2/22/25 for osteomyelitis.   Pt presented to the hospital with septic arthritis from a cat bite.  MRI hand and fingers on 2/17/25 showed findings concerning for septic arthritis and early changes of osteomyelitis, and cellulitis in the 2nd MCP on the right hand as well as tenosynovitis and cellulitis about dorsal aspect of the hand/wrist.  Pt underwent debridement of soft tissue and tendon, arthrotomy of the 2nd MCP joint with irrigation and excision of infected bone about the 2nd metacarpal and index finger proximal phalanx.  Cultures failed to grow any bacteria.  Pt was discharged on doxycycline and ertapenem to complete 6 weeks of abx.    The patient underwent CT C/A/P on 3/12/25 showing multiple new centrilobular ground-glass nodules in the left lower lobe with the largest measuring 12 mm; ground-glass nodularity extending to basal segments of the left lower lobe; 10 mm ground-glass nodule in the posteromedial right lower lobe; unchanged right apical nodular septal thickening; resolution of previously described 4 mm ground-glass nodule in the right apex.   The patient underwent CT chest, abdomen, and pelvis on  2025 showing stable ill-defined mass in the pancreatic body measuring 1.5 cm with similar attenuation of the fat surrounding the celiac axis and proximal splenic vein of the portal confluence.     Past Medical History:   Past Medical History:   Diagnosis Date    Arthritis, lumbar spine     hands    Diabetes mellitus 10/ 21/20    Food allergy Mariel    General anesthetics causing adverse effect in therapeutic use     following breast rediuct, hard time awakening  also had anxiety rxn to lidocain in dental ofc    GERD (gastroesophageal reflux disease)     Hypothyroidism 10/08/2014    Major depressive disorder 2025    Maternal anesthesia complication     epidural for 1st child went up instead of down; required intubation    Normocytic anemia 2024    Obesity (BMI 30-39.9) 10/08/2014    Osteoarthritis     Osteoporosis     Do not know when started    pancreatic Cancer         Seasonal allergies 21    Get this ever year    Thyroid disease 10/8/2014    Thyroid nodule 10/08/2014       Past Surgical HIstory:   Past Surgical History:   Procedure Laterality Date    BREAST BIOPSY Left     b9    BREAST SURGERY      Reduction cause of a mass in left breast    c-sections x2       SECTION  3/26/81 & 85    Birth of two girls    COLONOSCOPY  2012         COLONOSCOPY N/A 2018    Procedure: COLONOSCOPY;  Surgeon: Francisco Mcclain MD;  Location: [facility];  Service: Endoscopy;  Laterality: N/A;    COLONOSCOPY N/A 10/24/2023    Procedure: COLONOSCOPY;  Surgeon: Francisco Mcclain MD;  Location: [facility];  Service: Endoscopy;  Laterality: N/A;    DISTAL PANCREATECTOMY N/A 2025    Procedure: PANCREATECTOMY, DISTAL, SUBTOTAL open caro, splenectomy;  Surgeon: Flip Valentin MD;  Location: [facility] OR [address] [place] [address];  Service: General;  Laterality: N/A;  Agility BK US probe TECH ONLY booked by TA () for 7:00 a.m case on ()  Conf #  [id number]    ENDOSCOPIC ULTRASOUND OF UPPER  GASTROINTESTINAL TRACT Left 7/5/2024    Procedure: ULTRASOUND, UPPER GI TRACT, ENDOSCOPIC;  Surgeon: Andrew Gordon MD;  Location: [facility] ENDO;  Service: Endoscopy;  Laterality: Left;    ESOPHAGOGASTRODUODENOSCOPY N/A 7/5/2024    Procedure: EGD (ESOPHAGOGASTRODUODENOSCOPY);  Surgeon: Andrew Gordon MD;  Location: [facility] ENDO;  Service: Endoscopy;  Laterality: N/A;    hysteroscopy with polypectomy      INCISION AND DRAINAGE Right 2/19/2025    Procedure: Incision and Drainage, RIGHT HAND EXCISION OF INFECTED BONE RIGHT INDEX FINGER;  Surgeon: SALVATORE Chou MD;  Location: [facility] OR;  Service: General;  Laterality: Right;    INCISIONAL BREAST BIOPSY Left 1996    benign; done at same time as reduction    INSERTION OF TUNNELED CENTRAL VENOUS CATHETER (CVC) WITH SUBCUTANEOUS PORT N/A 7/18/2024    Procedure: [id number];  Surgeon: Blanca Dillard MD;  Location: [facility];  Service: General;  Laterality: N/A;    SPLENECTOMY  7/1/2025    Procedure: SPLENECTOMY;  Surgeon: Flip Valentin MD;  Location: [address];  Service: General;;    TOTAL REDUCTION MAMMOPLASTY Bilateral 1996       Family History:   Family History   Problem Relation Name Age of Onset    Brain cancer Mother Ravi Tolbert     Early death Mother Ravi Tolbert 51        Passed at 51    Cancer Mother Ravi Tolbert         Brain tumor    Arthritis Mother Ravi Gill Maceymacario Tolbert     Diabetes Mother Ravi Tolbert         Type 2    Hypertension Mother Ravi Gill Maceymacario Tolbert     Diabetes Father Ck ankit tolbert         Type 2    Cirrhosis Father Ck ankit tolbert     Cancer Father Ck tolbert         Bone    COPD Father Ck tolbert         Smoker    Early death Father Ck tolbert         Passed at 64    Lung cancer Father Ck tolbert     Heart disease Sister Mamta (dianna)nilaykarimesalmia          Congestive heart failure (passed 2/11/18    Hypertension Sister Mamta (dianna)wescovicsalima     Kidney disease Sister Mamta (dianna)felix         Doc removed when she was a child    Hypertension Sister Mayank     Depression Sister Ravi (mayank) nicolette Mauricio ( sister)         Due to loss of her 27 year old son    Early death Sister Ravi (mayank) nicolette Mauricio ( sister)         Pulmonary hypertension, cirrhosis of the liver(passed 7/14/2007   Age 57    Heart disease Brother John Choudhury ( passed )         Heart attack    Hypertension Brother John Choudhury ( passed )     Heart disease Brother Juan Francisco Choudhury         Heart  blockage    Heart disease Brother Bhanu Choudhury         Heart attack (passed)    Diabetes Brother Bhanu Choudhury     Macular degeneration Brother Bhanu Choudhury     Breast cancer Maternal Aunt  30    Breast cancer Paternal Aunt  40    Breast cancer Paternal Aunt  40    Breast cancer Maternal Aunt      Breast cancer Paternal Aunt      Ovarian cancer Neg Hx         Social History:  reports that she has never smoked. She has been exposed to tobacco smoke. She has never used smokeless tobacco. She reports that she does not currently use alcohol. She reports that she does not use drugs.    Allergies:  Review of patient's allergies indicates:   Allergen Reactions    Duricef [cefadroxil] Hives    Grass pollen-june grass standard Itching       Medications:  Current Facility-Administered Medications   Medication Dose Route Frequency Provider Last Rate Last Admin    mening vac A,C,Y,W135 dip (PF) (MENVEO) 10-5 mcg/0.5 mL vaccine (PREFERRED)(10 - 56 YO) 0.5 mL  0.5 mL Intramuscular Q8 weeks Flip Valentin MD   0.5 mL at 06/05/25 1009    meningococcal group B vaccine (PF) injection 0.5 mL  0.5 mL Intramuscular Q8 weeks Flip Valentin MD   0.5 mL at 06/05/25 1010     Current Outpatient Medications   Medication Sig Dispense Refill    acetaminophen (TYLENOL) 325 MG tablet Take 650 mg by mouth daily as needed  for Pain.      apixaban (ELIQUIS) 2.5 mg Tab Take 1 tablet (2.5 mg total) by mouth 2 (two) times daily. 56 tablet 0    aspirin 81 MG Chew Chew and swallow 1 tablet (81 mg total) by mouth once daily. 90 tablet 0    EScitalopram oxalate (LEXAPRO) 10 MG tablet Take 1 tablet (10 mg total) by mouth once daily. 30 tablet 2    fluticasone propionate (FLONASE) 50 mcg/actuation nasal spray 1 spray (50 mcg total) by Each Nostril route once daily. (Patient taking differently: 1 spray by Each Nostril route daily as needed for Rhinitis or Allergies.) 18.2 mL 0    ibuprofen (ADVIL,MOTRIN) 800 MG tablet Take 1 tablet (800 mg total) by mouth every 8 (eight) hours as needed for Pain. 42 tablet 1    levothyroxine (SYNTHROID) 50 MCG tablet Take 1 tablet (50 mcg total) by mouth once daily. 90 tablet 3    loratadine (CLARITIN) 10 mg tablet Take 1 tablet (10 mg total) by mouth every morning. 30 tablet 11    oxyCODONE (ROXICODONE) 5 MG immediate release tablet Take 1 tablet (5 mg total) by mouth every 4 (four) hours as needed for Pain. 15 tablet 0    polyethylene glycol (GLYCOLAX) 17 gram/dose powder Use to cap to measure 17g, mix with liquid, and take by mouth 2 (two) times daily. (Patient not taking: Reported on 7/14/2025) 952 g 0    potassium chloride 10% (KAYCIEL) 20 mEq/15 mL oral solution Take 30 mLs by mouth once daily.      simvastatin (ZOCOR) 10 MG tablet Take 1 tablet (10 mg total) by mouth every evening. 90 tablet 3    SYNJARDY XR 10-1,000 mg TBph Take 1 tablet by mouth once daily. 30 tablet 11    VITAMIN D2 1,250 mcg (50,000 unit) capsule Take 1 capsule (50,000 Units total) by mouth twice a week. (Patient taking differently: Take 50,000 Units by mouth every Monday and Thursday.) 8 capsule 11    LORazepam (ATIVAN) 0.5 MG tablet Take 1 tablet (0.5 mg total) by mouth every 6 (six) hours as needed for Anxiety. (Patient not taking: Reported on 7/14/2025) 30 tablet 0     Facility-Administered Medications Ordered in Other Visits  "  Medication Dose Route Frequency Provider Last Rate Last Admin    DAPTOmycin (CUBICIN) 350 mg in 0.9% NaCl 7 mL IV syringe (conc: 50 mg/mL)  350 mg Intravenous Q48H Gordon Villarreal MD   Stopped at 07/14/25 1202       Review of Systems   Constitutional:  Negative for chills, fatigue, fever and unexpected weight change.   Respiratory:  Negative for cough and shortness of breath.    Cardiovascular:  Negative for chest pain and palpitations.   Gastrointestinal:  Positive for abdominal distention, abdominal pain, nausea and vomiting. Negative for constipation and diarrhea.   Skin:  Negative for rash.   Neurological:  Negative for headaches.   Hematological:  Negative for adenopathy. Does not bruise/bleed easily.       ECOG Performance Status: 0   Objective:      Vitals:   Vitals:    07/14/25 0837   BP Location: Left arm   Patient Position: Sitting   Pulse: 65   Resp: 12   Temp: 97.6 °F (36.4 °C)   TempSrc: Temporal   SpO2: (!) 93%   Weight: 55.3 kg (121 lb 14.6 oz)   Height: 5' 1" (1.549 m)             Physical Exam  Constitutional:       General: She is not in acute distress.     Appearance: She is well-developed. She is not diaphoretic.   HENT:      Head: Normocephalic and atraumatic.   Cardiovascular:      Rate and Rhythm: Normal rate and regular rhythm.      Heart sounds: Normal heart sounds. No murmur heard.     No friction rub. No gallop.   Pulmonary:      Effort: Pulmonary effort is normal. No respiratory distress.      Breath sounds: Normal breath sounds. No wheezing or rales.   Chest:      Chest wall: No tenderness.   Abdominal:      General: Bowel sounds are normal. There is distension.      Palpations: Abdomen is soft. There is no mass.      Tenderness: There is no abdominal tenderness. There is no guarding or rebound.   Musculoskeletal:      Comments: PORT in right chest     Lymphadenopathy:      Cervical: No cervical adenopathy.      Upper Body:      Right upper body: No supraclavicular or axillary " adenopathy.      Left upper body: No supraclavicular or axillary adenopathy.   Skin:     Findings: No erythema or rash.      Comments: Abdominal incision C/D/I   Neurological:      Mental Status: She is alert and oriented to person, place, and time.   Psychiatric:         Behavior: Behavior normal.       Laboratory Data:  Admission on 07/14/2025   Component Date Value Ref Range Status    aPTT 07/14/2025 38.2 (H)  24.6 - 36.7 sec Final    PTT normal range is not established for pediatrics.    Blood Culture, Routine 07/14/2025 No Growth to date   Preliminary    Blood Culture, Routine 07/14/2025 No Growth to date   Preliminary    WBC 07/14/2025 27.76 (H)  3.90 - 12.70 K/uL Final    RBC 07/14/2025 4.24  4.00 - 5.40 M/uL Final    Hemoglobin 07/14/2025 13.3  12.0 - 16.0 g/dL Final    Hematocrit 07/14/2025 40.8  37.0 - 48.5 % Final    MCV 07/14/2025 96  82 - 98 fL Final    MCH 07/14/2025 31.4 (H)  27.0 - 31.0 pg Final    MCHC 07/14/2025 32.6  32.0 - 36.0 g/dL Final    RDW 07/14/2025 12.7  11.5 - 14.5 % Final    Platelets 07/14/2025 826 (H)  150 - 450 K/uL Final    MPV 07/14/2025 9.6  9.2 - 12.9 fL Final    Immature Granulocytes 07/14/2025 1.6 (H)  0.0 - 0.5 % Final    Gran # (ANC) 07/14/2025 25.3 (H)  1.8 - 7.7 K/uL Final    Immature Grans (Abs) 07/14/2025 0.44 (H)  0.00 - 0.04 K/uL Final    Comment: Mild elevation in immature granulocytes is non specific and   can be seen in a variety of conditions including stress response,   acute inflammation, trauma and pregnancy. Correlation with other   laboratory and clinical findings is essential.      Lymph # 07/14/2025 0.2 (L)  1.0 - 4.8 K/uL Final    Mono # 07/14/2025 1.7 (H)  0.3 - 1.0 K/uL Final    Eos # 07/14/2025 0.0  0.0 - 0.5 K/uL Final    Baso # 07/14/2025 0.11  0.00 - 0.20 K/uL Final    nRBC 07/14/2025 0  0 /100 WBC Final    Gran % 07/14/2025 91.3 (H)  38.0 - 73.0 % Final    Lymph % 07/14/2025 0.7 (L)  18.0 - 48.0 % Final    Mono % 07/14/2025 6.0  4.0 - 15.0 % Final     Eosinophil % 07/14/2025 0.0  0.0 - 8.0 % Final    Basophil % 07/14/2025 0.4  0.0 - 1.9 % Final    Platelet Estimate 07/14/2025 Increased (A)   Final    Differential Method 07/14/2025 Automated   Final    Sodium 07/14/2025 133 (L)  136 - 145 mmol/L Final    Potassium 07/14/2025 5.5 (H)  3.5 - 5.1 mmol/L Final    Comment: Anion Gap reference range revised on 4/28/2023  Specimen slightly hemolyzed.      Chloride 07/14/2025 98  95 - 110 mmol/L Final    CO2 07/14/2025 19 (L)  23 - 29 mmol/L Final    Glucose 07/14/2025 207 (H)  70 - 110 mg/dL Final    Comment: The ADA recommends the following guidelines for fasting glucose:    Normal:       less than 100 mg/dL    Prediabetes:  100 mg/dL to 125 mg/dL    Diabetes:     126 mg/dL or higher      BUN 07/14/2025 36 (H)  8 - 23 mg/dL Final    Creatinine 07/14/2025 2.38 (H)  0.50 - 1.40 mg/dL Final    Calcium 07/14/2025 9.2  8.7 - 10.5 mg/dL Final    Total Protein 07/14/2025 6.5  6.0 - 8.4 g/dL Final    Albumin 07/14/2025 2.4 (L)  3.5 - 5.2 g/dL Final    Total Bilirubin 07/14/2025 0.3  0.2 - 1.0 mg/dL Final    Alkaline Phosphatase 07/14/2025 100  40 - 150 U/L Final    AST 07/14/2025 30  10 - 40 U/L Final    ALT 07/14/2025 <7 (L)  10 - 44 U/L Final    Anion Gap 07/14/2025 16  8 - 16 mmol/L Final    Anion Gap reference range revised on 4/28/2023    eGFR 07/14/2025 22 (A)  >60 mL/min/1.73 m^2 Final    Lactate (Lactic Acid) 07/14/2025 3.4 (H)  0.5 - 2.2 mmol/L Final    PT 07/14/2025 26.7 (H)  11.8 - 14.7 sec Final    PT normal range is not established for pediatrics.    INR 07/14/2025 2.5   Final    Specimen UA 07/14/2025 Urine, Clean Catch   Final    Color, UA 07/14/2025 Yellow  Yellow, Straw, Silvana Final    Appearance, UA 07/14/2025 Clear  Clear Final    pH, UA 07/14/2025 5.0  5.0 - 8.0 Final    Specific Gravity, UA 07/14/2025 >=1.030 (A)  1.005 - 1.030 Final    Protein, UA 07/14/2025 1+ (A)  Negative Final    Comment: Recommend a 24 hour urine protein or a urine    protein/creatinine ratio if globulin induced proteinuria is  clinically suspected.      Glucose, UA 07/14/2025 3+ (A)  Negative Final    Ketones, UA 07/14/2025 Trace (A)  Negative Final    Bilirubin (UA) 07/14/2025 Negative  Negative Final    Occult Blood UA 07/14/2025 Negative  Negative Final    Nitrite, UA 07/14/2025 Negative  Negative Final    Urobilinogen, UA 07/14/2025 0.2  <2.0 EU/dL Final    Leukocytes, UA 07/14/2025 Negative  Negative Final    QRS Duration 07/14/2025 84  ms In process    OHS QTC Calculation 07/14/2025 479  ms In process    Lactate (Lactic Acid) 07/14/2025 1.9  0.5 - 2.2 mmol/L Final    RBC, UA 07/14/2025 1  0 - 4 /hpf Final    WBC, UA 07/14/2025 1  0 - 5 /hpf Final    Bacteria 07/14/2025 Negative  Negative /hpf Final    Squam Epithel, UA 07/14/2025 5  /hpf Final    Hyaline Casts, UA 07/14/2025 2 (A)  0 - 1 /lpf Final    Microscopic Comment 07/14/2025 SEE COMMENT   Final    Comment: Other formed elements not mentioned in the report are not   present in the microscopic examination.      Lab Visit on 07/14/2025   Component Date Value Ref Range Status    Sodium 07/14/2025 131 (L)  136 - 145 mmol/L Final    Potassium 07/14/2025 5.6 (H)  3.5 - 5.1 mmol/L Final    Chloride 07/14/2025 96  95 - 110 mmol/L Final    CO2 07/14/2025 17 (L)  23 - 29 mmol/L Final    Glucose 07/14/2025 223 (H)  70 - 110 mg/dL Final    BUN 07/14/2025 33 (H)  8 - 23 mg/dL Final    Creatinine 07/14/2025 2.5 (H)  0.5 - 1.4 mg/dL Final    Calcium 07/14/2025 9.5  8.7 - 10.5 mg/dL Final    Protein Total 07/14/2025 6.9  6.0 - 8.4 gm/dL Final    Albumin 07/14/2025 2.2 (L)  3.5 - 5.2 g/dL Final    Bilirubin Total 07/14/2025 0.3  0.1 - 1.0 mg/dL Final    For infants and newborns, interpretation of results should be based   on gestational age, weight and in agreement with clinical   observations.    Premature Infant recommended reference ranges:   0-24 hours:  <8.0 mg/dL   24-48 hours: <12.0 mg/dL   3-5 days:    <15.0 mg/dL   6-29  days:   <15.0 mg/dL    ALP 07/14/2025 96  40 - 150 unit/L Final    AST 07/14/2025 21  11 - 45 unit/L Final    ALT 07/14/2025 21  10 - 44 unit/L Final    Anion Gap 07/14/2025 18 (H)  8 - 16 mmol/L Final    eGFR 07/14/2025 21 (L)  >60 mL/min/1.73/m2 Final    Estimated GFR calculated using the CKD-EPI creatinine (2021) equation.    WBC 07/14/2025 26.73 (H)  3.90 - 12.70 K/uL Final    RBC 07/14/2025 4.26  4.00 - 5.40 M/uL Final    HGB 07/14/2025 13.4  12.0 - 16.0 gm/dL Final    HCT 07/14/2025 39.8  37.0 - 48.5 % Final    MCV 07/14/2025 93  82 - 98 fL Final    MCH 07/14/2025 31.5 (H)  27.0 - 31.0 pg Final    MCHC 07/14/2025 33.7  32.0 - 36.0 g/dL Final    RDW 07/14/2025 12.6  11.5 - 14.5 % Final    Platelet Count 07/14/2025 809 (H)  150 - 450 K/uL Final    MPV 07/14/2025 9.2  9.2 - 12.9 fL Final    Nucleated RBC 07/14/2025 0  <=0 /100 WBC Final    Neut % 07/14/2025 92.6 (H)  38 - 73 % Final    Lymph % 07/14/2025 0.6 (L)  18 - 48 % Final    Mono % 07/14/2025 5.3  4 - 15 % Final    Eos % 07/14/2025 0.0  <=8 % Final    Basophil % 07/14/2025 0.3  <=1.9 % Final    Imm Grans % 07/14/2025 1.2 (H)  0.0 - 0.5 % Final    Neut # 07/14/2025 24.74 (H)  1.8 - 7.7 K/uL Final    Lymph # 07/14/2025 0.16 (L)  1 - 4.8 K/uL Final    Mono # 07/14/2025 1.42 (H)  0.3 - 1 K/uL Final    Eos # 07/14/2025 0.00  <=0.5 K/uL Final    Baso # 07/14/2025 0.09  <=0.2 K/uL Final    Imm Grans # 07/14/2025 0.32 (H)  0.00 - 0.04 K/uL Final    Mild elevation in immature granulocytes is non specific and can be seen in a variety of conditions including stress response, acute inflammation, trauma and pregnancy. Correlation with other laboratory and clinical findings is essential.         Imaging:     CT C/A/P 5/19/25  Right chest port in place. Thoracic aorta is normal caliber contains atherosclerosis. Heart is normal size. No pericardial effusion.     No pathologically enlarged thoracic lymph nodes.     Mild scarring in the lung apices. Resolution of prior  ground-glass opacities in the lower lobes. No concerning pulmonary nodule. No consolidation, pleural effusion, or pneumothorax.     Small hypodensity in the inferior right hepatic lobe corresponding with the cyst seen on prior MRI. No concerning liver lesion. Cholelithiasis. No biliary ductal dilatation.     Spleen and adrenal glands are unremarkable.     Grossly stable ill-defined mass in the pancreatic body measuring approximately 1.5 cm (series 601, image 43). Similar attenuation of the fat surrounding the celiac axis and proximal splenic vein at the portal confluence. Diffuse atrophy of the pancreatic parenchyma. No significant ductal dilatation.     No hydronephrosis or ureteral dilatation. Bladder and reproductive structures are unremarkable.     No evidence of bowel obstruction or inflammation.     No free intraperitoneal fluid or air. No pathologically enlarged abdominopelvic lymph nodes.     Abdominal aorta is normal caliber and contains mild atherosclerosis.     No significant abnormality of the body wall soft tissues.     Degenerative changes of the osseous structures. No acute or aggressive bony abnormality.       Assessment:       1. Leukocytosis, unspecified type    2. SHERON (acute kidney injury)    3. Malignant neoplasm of body of pancreas                   Plan:   Leukocytosis -   Concern for sepsis status post Whipple procedure   -Given abdominal pain concern for intra-abdominal infection  -EMS called due to hypotension and patient sent to the ER    SHERON -  likely secondary to sepsis   -Patient is sent to the emergency room    Pancreatic Cancer - Patient with Stage III pancreatic cancer with concern for potential encasement of the distal celiac artery per discussion with Dr Valentin  -No sign of mets  - 94.5 on 6/17/24  -Case discussed at tumor board 7/17/24 with recommendation to proceed with chemo  -[employer] genetic testing negative for the genes tested  -[facility] tissue testing showed TP53, KRAS  p.G12D, CDKN2A, CDKN2B mutations.  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  -Will need to keep irinotecan dose reduced at 165mg/mm2  -CT C/A/P on 10/02/24 shows stable disease and a possible new RLL nodule  -Pt saw Dr Valentin 10/16/24 whom recommended a few more cycles of chemo and then transition to perioperative radiation therapy  - decreased all the way to 9.4U/mL on 11/11/24 with level today pending   -Pt to go to [facility] 12/09/24  -PT completed 12 cycles  -Repeat scans 1/27/25 showeddecrease in size of pancreatic mass appearing to surround the celiac artery and to wrap partially around the portal vein likely greater than 180°  -Case discussed at tumor board on 2/05/25 with recommendation for radiation  -Pt was to start capecitabine and radiation but will need to hold until she has had continued time to heal from recently diagnosed osteomyelitis of the 2nd digit of the right hand  -Repeat scans 3/12/25 showed decreased pancreatic mass and new groundglass opacities in the lung  -Radiation completed 04/22/2025 with 50 Gy given in 25 fractions.  PT was on concomitant Capecitabine on days of radiation    -PT underwent whipple on 7/01/25  -Concern for potential post surgical infection  -PT to go to the ER and be transferred to [facility]    Route Chart for Scheduling    Med Onc Chart Routing      Follow up with physician Other. PT needs to go straight to the hospitlal for concern for sepsis.   Follow up with ANALIA    Infusion scheduling note    Injection scheduling note    Labs    Imaging    Pharmacy appointment    Other referrals            Treatment Plan Information   OP CAPECITABINE 5 DAYS + RADIOTHERAPY Rajat Hunt MD   Associated diagnosis: Malignant neoplasm of pancreas Stage III cT4, cN0, cM0 noted on 7/12/2024   Line of treatment: Neoadjuvant  Treatment Goal: Curative     Upcoming Treatment Dates - OP CAPECITABINE 5 DAYS + RADIOTHERAPY    2/24/2025        Antiemetics       Physician communication order       Take Home Chemotherapy       capecitabine (XELODA) tablet 1,300 mg    Therapy Plan Information  PORT FLUSH for Malignant neoplasm of pancreas, noted on 7/12/2024  Flushes  heparin, porcine (PF) 100 unit/mL injection flush 500 Units  500 Units, Intravenous, Every visit  sodium chloride 0.9% flush 10 mL  10 mL, Intravenous, Every visit      No therapy plan of the specified type found.    No therapy plan of the specified type found.      Rajat Hunt MD  [facility]  Hematology and Oncology  [facility]   [address] [place] [address]   O: [phone]  F: [phone]

## 2025-07-14 NOTE — TELEPHONE ENCOUNTER
"Received 2 messages from triage nurse. Pt called x 2 over the weekend. Follow up call placed to pt. Spoke to Gerri. [facility] and being see by Dr. Hunt this morning. Reports Friday and Saturday pt started with "excruciating pain". Therefore, they called triage nurse around midnight on Saturday. Pt took gas x and then vomited and had diarrhea with incontinence. Pain progressed and they called triage nurse again who put them in touch with with Dr. Chirinos.  Dr. Chirinos informed pt to increase oxycodone to every 4 hours and also incorporate advil into her pain regimen. Gerri reports pt is feeling much better now and pain well controlled with new pain regimen.  Denies fever. Denies redness, bleeding, drainage to surgical site. Reports pt's glucose is running high. Advised her to contact her endocrine provider today with history of glucose readings. Gerri states pt needs refill of pain medicine as she took her last one this morning. Will notify Gina MAI of request for refill. Instructed Gerri if condition worsens and/or does not improve to contact the office. Gerri verbalized understanding.        "

## 2025-07-15 ENCOUNTER — HOSPITAL ENCOUNTER (INPATIENT)
Facility: HOSPITAL | Age: 65
LOS: 38 days | Discharge: HOME HEALTH | DRG: 393 | End: 2025-08-22
Attending: EMERGENCY MEDICINE | Admitting: SURGERY
Payer: MEDICARE

## 2025-07-15 DIAGNOSIS — Z91.89 AT RISK FOR PROLONGED QT INTERVAL SYNDROME: ICD-10-CM

## 2025-07-15 DIAGNOSIS — R18.8 INTRAABDOMINAL FLUID COLLECTION: ICD-10-CM

## 2025-07-15 DIAGNOSIS — E11.9 TYPE 2 DIABETES MELLITUS WITHOUT COMPLICATION, WITHOUT LONG-TERM CURRENT USE OF INSULIN: ICD-10-CM

## 2025-07-15 DIAGNOSIS — C25.9 MALIGNANT NEOPLASM OF PANCREAS, UNSPECIFIED LOCATION OF MALIGNANCY: ICD-10-CM

## 2025-07-15 DIAGNOSIS — N17.9 AKI (ACUTE KIDNEY INJURY): Primary | ICD-10-CM

## 2025-07-15 DIAGNOSIS — E03.9 HYPOTHYROIDISM, UNSPECIFIED TYPE: ICD-10-CM

## 2025-07-15 DIAGNOSIS — A41.9 SEPSIS, DUE TO UNSPECIFIED ORGANISM, UNSPECIFIED WHETHER ACUTE ORGAN DYSFUNCTION PRESENT: ICD-10-CM

## 2025-07-15 DIAGNOSIS — Z43.1 ENCOUNTER FOR PEG (PERCUTANEOUS ENDOSCOPIC GASTROSTOMY): ICD-10-CM

## 2025-07-15 DIAGNOSIS — C25.9 MALIGNANT NEOPLASM OF PANCREAS: ICD-10-CM

## 2025-07-15 DIAGNOSIS — J06.9 ACUTE UPPER RESPIRATORY INFECTION: ICD-10-CM

## 2025-07-15 PROBLEM — E87.5 HYPERKALEMIA: Status: ACTIVE | Noted: 2025-07-15

## 2025-07-15 PROBLEM — R65.20 SEPSIS WITH ACUTE RENAL FAILURE: Status: ACTIVE | Noted: 2025-07-15

## 2025-07-15 PROBLEM — G93.41 ENCEPHALOPATHY, METABOLIC: Status: ACTIVE | Noted: 2025-07-15

## 2025-07-15 LAB
ABSOLUTE EOSINOPHIL (OHS): 0.09 K/UL
ABSOLUTE MONOCYTE (OHS): 1.43 K/UL (ref 0.3–1)
ABSOLUTE NEUTROPHIL COUNT (OHS): 21.45 K/UL (ref 1.8–7.7)
ALBUMIN SERPL BCP-MCNC: 1.9 G/DL (ref 3.5–5.2)
ALP SERPL-CCNC: 117 UNIT/L (ref 40–150)
ALT SERPL W/O P-5'-P-CCNC: 14 UNIT/L (ref 10–44)
ANION GAP (OHS): 13 MMOL/L (ref 8–16)
AST SERPL-CCNC: 34 UNIT/L (ref 11–45)
BASOPHILS # BLD AUTO: 0.07 K/UL
BASOPHILS NFR BLD AUTO: 0.3 %
BILIRUB SERPL-MCNC: 0.3 MG/DL (ref 0.1–1)
BIPAP: 0
BUN SERPL-MCNC: 49 MG/DL (ref 8–23)
CALCIUM SERPL-MCNC: 8.1 MG/DL (ref 8.7–10.5)
CHLORIDE SERPL-SCNC: 105 MMOL/L (ref 95–110)
CO2 SERPL-SCNC: 18 MMOL/L (ref 23–29)
CREAT SERPL-MCNC: 3 MG/DL (ref 0.5–1.4)
ERYTHROCYTE [DISTWIDTH] IN BLOOD BY AUTOMATED COUNT: 13 % (ref 11.5–14.5)
FIO2: 21 %
GFR SERPLBLD CREATININE-BSD FMLA CKD-EPI: 17 ML/MIN/1.73/M2
GLUCOSE SERPL-MCNC: 139 MG/DL (ref 70–110)
HCT VFR BLD AUTO: 34.9 % (ref 37–48.5)
HCV AB SERPL QL IA: NORMAL
HGB BLD-MCNC: 11.2 GM/DL (ref 12–16)
HIV 1+2 AB+HIV1 P24 AG SERPL QL IA: NORMAL
IMM GRANULOCYTES # BLD AUTO: 0.54 K/UL (ref 0–0.04)
IMM GRANULOCYTES NFR BLD AUTO: 2.3 % (ref 0–0.5)
LDH SERPL L TO P-CCNC: 1.1 MMOL/L (ref 0.5–2.2)
LYMPHOCYTES # BLD AUTO: 0.13 K/UL (ref 1–4.8)
MCH RBC QN AUTO: 30.5 PG (ref 27–31)
MCHC RBC AUTO-ENTMCNC: 32.1 G/DL (ref 32–36)
MCV RBC AUTO: 95 FL (ref 82–98)
NUCLEATED RBC (/100WBC) (OHS): 0 /100 WBC
PLATELET # BLD AUTO: 753 K/UL (ref 150–450)
PMV BLD AUTO: 9 FL (ref 9.2–12.9)
POC PERFORMED BY: NORMAL
POC TEMPERATURE: 37 C
POTASSIUM SERPL-SCNC: 5.4 MMOL/L (ref 3.5–5.1)
PROT SERPL-MCNC: 5.2 GM/DL (ref 6–8.4)
RBC # BLD AUTO: 3.67 M/UL (ref 4–5.4)
RELATIVE EOSINOPHIL (OHS): 0.4 %
RELATIVE LYMPHOCYTE (OHS): 0.5 % (ref 18–48)
RELATIVE MONOCYTE (OHS): 6 % (ref 4–15)
RELATIVE NEUTROPHIL (OHS): 90.5 % (ref 38–73)
SODIUM SERPL-SCNC: 136 MMOL/L (ref 136–145)
SPECIMEN SOURCE: NORMAL
WBC # BLD AUTO: 23.71 K/UL (ref 3.9–12.7)

## 2025-07-15 PROCEDURE — 80053 COMPREHEN METABOLIC PANEL: CPT | Performed by: EMERGENCY MEDICINE

## 2025-07-15 PROCEDURE — 12000002 HC ACUTE/MED SURGE SEMI-PRIVATE ROOM

## 2025-07-15 PROCEDURE — 63600175 PHARM REV CODE 636 W HCPCS: Performed by: EMERGENCY MEDICINE

## 2025-07-15 PROCEDURE — 94761 N-INVAS EAR/PLS OXIMETRY MLT: CPT

## 2025-07-15 PROCEDURE — 99900035 HC TECH TIME PER 15 MIN (STAT)

## 2025-07-15 PROCEDURE — 99285 EMERGENCY DEPT VISIT HI MDM: CPT

## 2025-07-15 PROCEDURE — 87389 HIV-1 AG W/HIV-1&-2 AB AG IA: CPT | Performed by: PHYSICIAN ASSISTANT

## 2025-07-15 PROCEDURE — 86803 HEPATITIS C AB TEST: CPT | Performed by: PHYSICIAN ASSISTANT

## 2025-07-15 PROCEDURE — 87040 BLOOD CULTURE FOR BACTERIA: CPT | Performed by: EMERGENCY MEDICINE

## 2025-07-15 PROCEDURE — 82962 GLUCOSE BLOOD TEST: CPT

## 2025-07-15 PROCEDURE — 85025 COMPLETE CBC W/AUTO DIFF WBC: CPT | Performed by: EMERGENCY MEDICINE

## 2025-07-15 PROCEDURE — 25000003 PHARM REV CODE 250

## 2025-07-15 PROCEDURE — 83605 ASSAY OF LACTIC ACID: CPT

## 2025-07-15 PROCEDURE — 25000003 PHARM REV CODE 250: Performed by: EMERGENCY MEDICINE

## 2025-07-15 RX ORDER — ENOXAPARIN SODIUM 100 MG/ML
40 INJECTION SUBCUTANEOUS EVERY 24 HOURS
Status: DISCONTINUED | OUTPATIENT
Start: 2025-07-16 | End: 2025-07-15

## 2025-07-15 RX ORDER — ONDANSETRON HYDROCHLORIDE 2 MG/ML
4 INJECTION, SOLUTION INTRAVENOUS EVERY 6 HOURS PRN
Status: DISCONTINUED | OUTPATIENT
Start: 2025-07-15 | End: 2025-07-15

## 2025-07-15 RX ORDER — SODIUM CHLORIDE 9 MG/ML
INJECTION, SOLUTION INTRAVENOUS CONTINUOUS
Status: DISCONTINUED | OUTPATIENT
Start: 2025-07-15 | End: 2025-07-22

## 2025-07-15 RX ORDER — ACETAMINOPHEN 500 MG
1000 TABLET ORAL EVERY 6 HOURS PRN
Status: DISCONTINUED | OUTPATIENT
Start: 2025-07-15 | End: 2025-07-24

## 2025-07-15 RX ORDER — ONDANSETRON HYDROCHLORIDE 2 MG/ML
4 INJECTION, SOLUTION INTRAVENOUS EVERY 6 HOURS PRN
Status: DISCONTINUED | OUTPATIENT
Start: 2025-07-16 | End: 2025-08-18

## 2025-07-15 RX ORDER — ENOXAPARIN SODIUM 100 MG/ML
30 INJECTION SUBCUTANEOUS EVERY 24 HOURS
Status: DISCONTINUED | OUTPATIENT
Start: 2025-07-16 | End: 2025-07-21

## 2025-07-15 RX ADMIN — SODIUM CHLORIDE 1000 ML: 9 INJECTION, SOLUTION INTRAVENOUS at 09:07

## 2025-07-15 RX ADMIN — PIPERACILLIN SODIUM AND TAZOBACTAM SODIUM 4.5 G: 4; .5 INJECTION, POWDER, FOR SOLUTION INTRAVENOUS at 09:07

## 2025-07-16 LAB
ABSOLUTE EOSINOPHIL (OHS): 0 K/UL
ABSOLUTE MONOCYTE (OHS): 1.49 K/UL (ref 0.3–1)
ABSOLUTE NEUTROPHIL COUNT (OHS): 16.24 K/UL (ref 1.8–7.7)
ALBUMIN FLD-MCNC: 1.7 G/DL
ALBUMIN FLD-MCNC: 1.7 G/DL
ALBUMIN SERPL BCP-MCNC: 1.4 G/DL (ref 3.5–5.2)
ALP SERPL-CCNC: 100 UNIT/L (ref 40–150)
ALT SERPL W/O P-5'-P-CCNC: 12 UNIT/L (ref 10–44)
AMYLASE FLD-CCNC: 64 U/L
ANION GAP (OHS): 11 MMOL/L (ref 8–16)
ANION GAP (OHS): 13 MMOL/L (ref 8–16)
ANION GAP (OHS): 14 MMOL/L (ref 8–16)
APPEARANCE FLD: NORMAL
AST SERPL-CCNC: 33 UNIT/L (ref 11–45)
BASOPHILS # BLD AUTO: 0.03 K/UL
BASOPHILS NFR BLD AUTO: 0.2 %
BILIRUB FLD-MCNC: 0.8 MG/DL
BILIRUB SERPL-MCNC: 0.2 MG/DL (ref 0.1–1)
BUN SERPL-MCNC: 46 MG/DL (ref 8–23)
BUN SERPL-MCNC: 50 MG/DL (ref 8–23)
BUN SERPL-MCNC: 53 MG/DL (ref 8–23)
CALCIUM SERPL-MCNC: 6.7 MG/DL (ref 8.7–10.5)
CALCIUM SERPL-MCNC: 8.3 MG/DL (ref 8.7–10.5)
CALCIUM SERPL-MCNC: 8.3 MG/DL (ref 8.7–10.5)
CHLORIDE SERPL-SCNC: 106 MMOL/L (ref 95–110)
CHLORIDE SERPL-SCNC: 109 MMOL/L (ref 95–110)
CHLORIDE SERPL-SCNC: 115 MMOL/L (ref 95–110)
CO2 SERPL-SCNC: 12 MMOL/L (ref 23–29)
CO2 SERPL-SCNC: 16 MMOL/L (ref 23–29)
CO2 SERPL-SCNC: 16 MMOL/L (ref 23–29)
COLOR FLD: NORMAL
CREAT FLD-MCNC: 2.9 MG/DL
CREAT SERPL-MCNC: 2.4 MG/DL (ref 0.5–1.4)
CREAT SERPL-MCNC: 2.9 MG/DL (ref 0.5–1.4)
CREAT SERPL-MCNC: 2.9 MG/DL (ref 0.5–1.4)
ERYTHROCYTE [DISTWIDTH] IN BLOOD BY AUTOMATED COUNT: 13.1 % (ref 11.5–14.5)
GFR SERPLBLD CREATININE-BSD FMLA CKD-EPI: 17 ML/MIN/1.73/M2
GFR SERPLBLD CREATININE-BSD FMLA CKD-EPI: 17 ML/MIN/1.73/M2
GFR SERPLBLD CREATININE-BSD FMLA CKD-EPI: 22 ML/MIN/1.73/M2
GLUCOSE SERPL-MCNC: 116 MG/DL (ref 70–110)
GLUCOSE SERPL-MCNC: 134 MG/DL (ref 70–110)
GLUCOSE SERPL-MCNC: 138 MG/DL (ref 70–110)
GRAM STN SPEC: NORMAL
GRAM STN SPEC: NORMAL
HCT VFR BLD AUTO: 27.5 % (ref 37–48.5)
HGB BLD-MCNC: 9 GM/DL (ref 12–16)
IMM GRANULOCYTES # BLD AUTO: 0.24 K/UL (ref 0–0.04)
IMM GRANULOCYTES NFR BLD AUTO: 1.3 % (ref 0–0.5)
INDIRECT COOMBS: NORMAL
LDH FLD L TO P-CCNC: 579 U/L
LYMPHOCYTES # BLD AUTO: 0.11 K/UL (ref 1–4.8)
LYMPHOCYTES NFR FLD MANUAL: 21 %
MAGNESIUM SERPL-MCNC: 1.7 MG/DL (ref 1.6–2.6)
MCH RBC QN AUTO: 31.3 PG (ref 27–31)
MCHC RBC AUTO-ENTMCNC: 32.7 G/DL (ref 32–36)
MCV RBC AUTO: 96 FL (ref 82–98)
MONOS+MACROS NFR FLD MANUAL: 9 %
NEUTROPHILS NFR FLD MANUAL: 70 %
NUCLEATED RBC (/100WBC) (OHS): 0 /100 WBC
PHOSPHATE SERPL-MCNC: 4.4 MG/DL (ref 2.7–4.5)
PLATELET # BLD AUTO: 559 K/UL (ref 150–450)
PMV BLD AUTO: 9.2 FL (ref 9.2–12.9)
POCT GLUCOSE: 153 MG/DL (ref 70–110)
POTASSIUM SERPL-SCNC: 4.2 MMOL/L (ref 3.5–5.1)
POTASSIUM SERPL-SCNC: 4.5 MMOL/L (ref 3.5–5.1)
POTASSIUM SERPL-SCNC: 5 MMOL/L (ref 3.5–5.1)
PROT FLD-MCNC: 3.8 G/DL
PROT SERPL-MCNC: 4.3 GM/DL (ref 6–8.4)
RBC # BLD AUTO: 2.88 M/UL (ref 4–5.4)
RELATIVE EOSINOPHIL (OHS): 0 %
RELATIVE LYMPHOCYTE (OHS): 0.6 % (ref 18–48)
RELATIVE MONOCYTE (OHS): 8.2 % (ref 4–15)
RELATIVE NEUTROPHIL (OHS): 89.7 % (ref 38–73)
RH BLD: NORMAL
SODIUM SERPL-SCNC: 136 MMOL/L (ref 136–145)
SODIUM SERPL-SCNC: 138 MMOL/L (ref 136–145)
SODIUM SERPL-SCNC: 138 MMOL/L (ref 136–145)
SPECIMEN OUTDATE: NORMAL
WBC # BLD AUTO: 18.11 K/UL (ref 3.9–12.7)
WBC # FLD: 2480 /CU MM

## 2025-07-16 PROCEDURE — 84157 ASSAY OF PROTEIN OTHER: CPT

## 2025-07-16 PROCEDURE — 25000003 PHARM REV CODE 250

## 2025-07-16 PROCEDURE — 88305 TISSUE EXAM BY PATHOLOGIST: CPT | Mod: 26,,, | Performed by: PATHOLOGY

## 2025-07-16 PROCEDURE — 82042 OTHER SOURCE ALBUMIN QUAN EA: CPT

## 2025-07-16 PROCEDURE — 89051 BODY FLUID CELL COUNT: CPT

## 2025-07-16 PROCEDURE — 99900035 HC TECH TIME PER 15 MIN (STAT)

## 2025-07-16 PROCEDURE — 86901 BLOOD TYPING SEROLOGIC RH(D): CPT

## 2025-07-16 PROCEDURE — 82150 ASSAY OF AMYLASE: CPT

## 2025-07-16 PROCEDURE — P9045 ALBUMIN (HUMAN), 5%, 250 ML: HCPCS | Mod: JZ,TB

## 2025-07-16 PROCEDURE — 25000003 PHARM REV CODE 250: Performed by: EMERGENCY MEDICINE

## 2025-07-16 PROCEDURE — 82570 ASSAY OF URINE CREATININE: CPT

## 2025-07-16 PROCEDURE — 82247 BILIRUBIN TOTAL: CPT

## 2025-07-16 PROCEDURE — 87075 CULTR BACTERIA EXCEPT BLOOD: CPT

## 2025-07-16 PROCEDURE — 94799 UNLISTED PULMONARY SVC/PX: CPT

## 2025-07-16 PROCEDURE — 80053 COMPREHEN METABOLIC PANEL: CPT

## 2025-07-16 PROCEDURE — P9047 ALBUMIN (HUMAN), 25%, 50ML: HCPCS

## 2025-07-16 PROCEDURE — 63600175 PHARM REV CODE 636 W HCPCS

## 2025-07-16 PROCEDURE — 83615 LACTATE (LD) (LDH) ENZYME: CPT

## 2025-07-16 PROCEDURE — 83735 ASSAY OF MAGNESIUM: CPT

## 2025-07-16 PROCEDURE — 20600001 HC STEP DOWN PRIVATE ROOM

## 2025-07-16 PROCEDURE — 63600175 PHARM REV CODE 636 W HCPCS: Mod: JZ,TB

## 2025-07-16 PROCEDURE — 88112 CYTOPATH CELL ENHANCE TECH: CPT | Mod: 26,,, | Performed by: PATHOLOGY

## 2025-07-16 PROCEDURE — 0W9G30Z DRAINAGE OF PERITONEAL CAVITY WITH DRAINAGE DEVICE, PERCUTANEOUS APPROACH: ICD-10-PCS | Performed by: STUDENT IN AN ORGANIZED HEALTH CARE EDUCATION/TRAINING PROGRAM

## 2025-07-16 PROCEDURE — 87070 CULTURE OTHR SPECIMN AEROBIC: CPT

## 2025-07-16 PROCEDURE — 63600175 PHARM REV CODE 636 W HCPCS: Performed by: STUDENT IN AN ORGANIZED HEALTH CARE EDUCATION/TRAINING PROGRAM

## 2025-07-16 PROCEDURE — 87205 SMEAR GRAM STAIN: CPT

## 2025-07-16 PROCEDURE — 63600175 PHARM REV CODE 636 W HCPCS: Performed by: EMERGENCY MEDICINE

## 2025-07-16 PROCEDURE — 99284 EMERGENCY DEPT VISIT MOD MDM: CPT | Mod: 25,,, | Performed by: FAMILY MEDICINE

## 2025-07-16 PROCEDURE — 88112 CYTOPATH CELL ENHANCE TECH: CPT | Mod: TC

## 2025-07-16 PROCEDURE — 85025 COMPLETE CBC W/AUTO DIFF WBC: CPT

## 2025-07-16 PROCEDURE — 84100 ASSAY OF PHOSPHORUS: CPT

## 2025-07-16 RX ORDER — FENTANYL CITRATE 50 UG/ML
INJECTION, SOLUTION INTRAMUSCULAR; INTRAVENOUS
Status: COMPLETED | OUTPATIENT
Start: 2025-07-16 | End: 2025-07-16

## 2025-07-16 RX ORDER — LEVOTHYROXINE SODIUM 50 UG/1
50 TABLET ORAL
Status: DISCONTINUED | OUTPATIENT
Start: 2025-07-16 | End: 2025-07-24

## 2025-07-16 RX ORDER — MIDAZOLAM HYDROCHLORIDE 1 MG/ML
INJECTION, SOLUTION INTRAMUSCULAR; INTRAVENOUS
Status: COMPLETED | OUTPATIENT
Start: 2025-07-16 | End: 2025-07-16

## 2025-07-16 RX ORDER — ALBUMIN HUMAN 50 G/1000ML
25 SOLUTION INTRAVENOUS ONCE
Status: COMPLETED | OUTPATIENT
Start: 2025-07-16 | End: 2025-07-16

## 2025-07-16 RX ORDER — TALC
6 POWDER (GRAM) TOPICAL NIGHTLY PRN
Status: DISCONTINUED | OUTPATIENT
Start: 2025-07-16 | End: 2025-07-24

## 2025-07-16 RX ORDER — LIDOCAINE HYDROCHLORIDE 10 MG/ML
INJECTION, SOLUTION INFILTRATION; PERINEURAL
Status: COMPLETED | OUTPATIENT
Start: 2025-07-16 | End: 2025-07-16

## 2025-07-16 RX ORDER — ALBUMIN HUMAN 250 G/1000ML
SOLUTION INTRAVENOUS
Status: COMPLETED
Start: 2025-07-16 | End: 2025-07-16

## 2025-07-16 RX ORDER — ALBUMIN HUMAN 250 G/1000ML
50 SOLUTION INTRAVENOUS ONCE
Status: DISCONTINUED | OUTPATIENT
Start: 2025-07-16 | End: 2025-07-16 | Stop reason: SDUPTHER

## 2025-07-16 RX ORDER — ESCITALOPRAM OXALATE 10 MG/1
10 TABLET ORAL DAILY
Status: DISCONTINUED | OUTPATIENT
Start: 2025-07-16 | End: 2025-07-24

## 2025-07-16 RX ORDER — OXYCODONE HYDROCHLORIDE 5 MG/1
5 TABLET ORAL EVERY 6 HOURS PRN
Status: DISCONTINUED | OUTPATIENT
Start: 2025-07-16 | End: 2025-07-24

## 2025-07-16 RX ADMIN — PIPERACILLIN SODIUM AND TAZOBACTAM SODIUM 4.5 G: 4; .5 INJECTION, POWDER, FOR SOLUTION INTRAVENOUS at 10:07

## 2025-07-16 RX ADMIN — ALBUMIN (HUMAN) 25 G: 12.5 SOLUTION INTRAVENOUS at 09:07

## 2025-07-16 RX ADMIN — MIDAZOLAM HYDROCHLORIDE 1 MG: 2 INJECTION, SOLUTION INTRAMUSCULAR; INTRAVENOUS at 12:07

## 2025-07-16 RX ADMIN — ENOXAPARIN SODIUM 30 MG: 40 INJECTION SUBCUTANEOUS at 04:07

## 2025-07-16 RX ADMIN — ESCITALOPRAM OXALATE 10 MG: 5 TABLET, FILM COATED ORAL at 10:07

## 2025-07-16 RX ADMIN — ALBUMIN (HUMAN) 25 G: 12.5 SOLUTION INTRAVENOUS at 01:07

## 2025-07-16 RX ADMIN — Medication 6 MG: at 03:07

## 2025-07-16 RX ADMIN — LEVOTHYROXINE SODIUM 50 MCG: 0.05 TABLET ORAL at 05:07

## 2025-07-16 RX ADMIN — LIDOCAINE HYDROCHLORIDE 5 ML: 10 INJECTION, SOLUTION INFILTRATION; PERINEURAL at 12:07

## 2025-07-16 RX ADMIN — OXYCODONE HYDROCHLORIDE 5 MG: 5 TABLET ORAL at 05:07

## 2025-07-16 RX ADMIN — FENTANYL CITRATE 25 MCG: 50 INJECTION, SOLUTION INTRAMUSCULAR; INTRAVENOUS at 12:07

## 2025-07-16 RX ADMIN — SODIUM CHLORIDE: 9 INJECTION, SOLUTION INTRAVENOUS at 12:07

## 2025-07-16 RX ADMIN — OXYCODONE HYDROCHLORIDE 5 MG: 5 TABLET ORAL at 10:07

## 2025-07-17 LAB
ABSOLUTE NEUTROPHIL MANUAL (OHS): 15.7 K/UL
ALBUMIN SERPL BCP-MCNC: 2.1 G/DL (ref 3.5–5.2)
ALP SERPL-CCNC: 101 UNIT/L (ref 40–150)
ALT SERPL W/O P-5'-P-CCNC: 16 UNIT/L (ref 10–44)
ANION GAP (OHS): 10 MMOL/L (ref 8–16)
ANION GAP (OHS): 12 MMOL/L (ref 8–16)
ANISOCYTOSIS BLD QL SMEAR: SLIGHT
AST SERPL-CCNC: 45 UNIT/L (ref 11–45)
BILIRUB SERPL-MCNC: 0.3 MG/DL (ref 0.1–1)
BUN SERPL-MCNC: 58 MG/DL (ref 8–23)
BUN SERPL-MCNC: 61 MG/DL (ref 8–23)
BURR CELLS BLD QL SMEAR: ABNORMAL
CALCIUM SERPL-MCNC: 8.2 MG/DL (ref 8.7–10.5)
CALCIUM SERPL-MCNC: 8.4 MG/DL (ref 8.7–10.5)
CHLORIDE SERPL-SCNC: 108 MMOL/L (ref 95–110)
CHLORIDE SERPL-SCNC: 111 MMOL/L (ref 95–110)
CO2 SERPL-SCNC: 17 MMOL/L (ref 23–29)
CO2 SERPL-SCNC: 18 MMOL/L (ref 23–29)
CREAT SERPL-MCNC: 2.8 MG/DL (ref 0.5–1.4)
CREAT SERPL-MCNC: 3 MG/DL (ref 0.5–1.4)
DOHLE BOD BLD QL SMEAR: PRESENT
ERYTHROCYTE [DISTWIDTH] IN BLOOD BY AUTOMATED COUNT: 13.2 % (ref 11.5–14.5)
GFR SERPLBLD CREATININE-BSD FMLA CKD-EPI: 17 ML/MIN/1.73/M2
GFR SERPLBLD CREATININE-BSD FMLA CKD-EPI: 18 ML/MIN/1.73/M2
GLUCOSE SERPL-MCNC: 138 MG/DL (ref 70–110)
GLUCOSE SERPL-MCNC: 175 MG/DL (ref 70–110)
HCT VFR BLD AUTO: 26.8 % (ref 37–48.5)
HGB BLD-MCNC: 8.9 GM/DL (ref 12–16)
HYPOCHROMIA BLD QL SMEAR: ABNORMAL
LYMPHOCYTES NFR BLD MANUAL: 3 % (ref 18–48)
MAGNESIUM SERPL-MCNC: 2.1 MG/DL (ref 1.6–2.6)
MCH RBC QN AUTO: 31.6 PG (ref 27–31)
MCHC RBC AUTO-ENTMCNC: 33.2 G/DL (ref 32–36)
MCV RBC AUTO: 95 FL (ref 82–98)
MONOCYTES NFR BLD MANUAL: 7 % (ref 4–15)
NEUTROPHILS NFR BLD MANUAL: 90 % (ref 38–73)
NUCLEATED RBC (/100WBC) (OHS): 1 /100 WBC
PHOSPHATE SERPL-MCNC: 5.3 MG/DL (ref 2.7–4.5)
PLATELET # BLD AUTO: 550 K/UL (ref 150–450)
PLATELET BLD QL SMEAR: ABNORMAL
PMV BLD AUTO: 9.3 FL (ref 9.2–12.9)
POCT GLUCOSE: 165 MG/DL (ref 70–110)
POCT GLUCOSE: 167 MG/DL (ref 70–110)
POCT GLUCOSE: 195 MG/DL (ref 70–110)
POIKILOCYTOSIS BLD QL SMEAR: SLIGHT
POLYCHROMASIA BLD QL SMEAR: ABNORMAL
POTASSIUM SERPL-SCNC: 4 MMOL/L (ref 3.5–5.1)
POTASSIUM SERPL-SCNC: 4 MMOL/L (ref 3.5–5.1)
PROT SERPL-MCNC: 4.8 GM/DL (ref 6–8.4)
RBC # BLD AUTO: 2.82 M/UL (ref 4–5.4)
SODIUM SERPL-SCNC: 137 MMOL/L (ref 136–145)
SODIUM SERPL-SCNC: 139 MMOL/L (ref 136–145)
TOXIC GRANULES BLD QL SMEAR: PRESENT
WBC # BLD AUTO: 17.49 K/UL (ref 3.9–12.7)
WBC TOXIC VACUOLES BLD QL SMEAR: PRESENT

## 2025-07-17 PROCEDURE — 63600175 PHARM REV CODE 636 W HCPCS: Performed by: EMERGENCY MEDICINE

## 2025-07-17 PROCEDURE — 36415 COLL VENOUS BLD VENIPUNCTURE: CPT

## 2025-07-17 PROCEDURE — 80053 COMPREHEN METABOLIC PANEL: CPT

## 2025-07-17 PROCEDURE — 97530 THERAPEUTIC ACTIVITIES: CPT

## 2025-07-17 PROCEDURE — 84100 ASSAY OF PHOSPHORUS: CPT

## 2025-07-17 PROCEDURE — 25000003 PHARM REV CODE 250

## 2025-07-17 PROCEDURE — 99900035 HC TECH TIME PER 15 MIN (STAT)

## 2025-07-17 PROCEDURE — 97165 OT EVAL LOW COMPLEX 30 MIN: CPT

## 2025-07-17 PROCEDURE — 83735 ASSAY OF MAGNESIUM: CPT

## 2025-07-17 PROCEDURE — 25000003 PHARM REV CODE 250: Performed by: EMERGENCY MEDICINE

## 2025-07-17 PROCEDURE — 20600001 HC STEP DOWN PRIVATE ROOM

## 2025-07-17 PROCEDURE — 97535 SELF CARE MNGMENT TRAINING: CPT

## 2025-07-17 PROCEDURE — 97116 GAIT TRAINING THERAPY: CPT

## 2025-07-17 PROCEDURE — 85025 COMPLETE CBC W/AUTO DIFF WBC: CPT

## 2025-07-17 PROCEDURE — 84478 ASSAY OF TRIGLYCERIDES: CPT

## 2025-07-17 PROCEDURE — 97161 PT EVAL LOW COMPLEX 20 MIN: CPT

## 2025-07-17 RX ADMIN — ESCITALOPRAM OXALATE 10 MG: 5 TABLET, FILM COATED ORAL at 09:07

## 2025-07-17 RX ADMIN — PIPERACILLIN SODIUM AND TAZOBACTAM SODIUM 4.5 G: 4; .5 INJECTION, POWDER, FOR SOLUTION INTRAVENOUS at 11:07

## 2025-07-17 RX ADMIN — SODIUM CHLORIDE 1000 ML: 9 INJECTION, SOLUTION INTRAVENOUS at 10:07

## 2025-07-17 RX ADMIN — OXYCODONE HYDROCHLORIDE 5 MG: 5 TABLET ORAL at 04:07

## 2025-07-17 RX ADMIN — LEVOTHYROXINE SODIUM 50 MCG: 0.05 TABLET ORAL at 05:07

## 2025-07-17 RX ADMIN — PIPERACILLIN SODIUM AND TAZOBACTAM SODIUM 4.5 G: 4; .5 INJECTION, POWDER, FOR SOLUTION INTRAVENOUS at 09:07

## 2025-07-17 RX ADMIN — ENOXAPARIN SODIUM 30 MG: 40 INJECTION SUBCUTANEOUS at 04:07

## 2025-07-17 RX ADMIN — SODIUM CHLORIDE: 9 INJECTION, SOLUTION INTRAVENOUS at 11:07

## 2025-07-18 PROBLEM — E88.09 HYPOALBUMINEMIA DUE TO PROTEIN-CALORIE MALNUTRITION: Status: ACTIVE | Noted: 2025-07-18

## 2025-07-18 PROBLEM — Z98.890 S/P ABDOMINAL PARACENTESIS: Status: ACTIVE | Noted: 2025-07-18

## 2025-07-18 PROBLEM — R18.8 ASCITES: Status: ACTIVE | Noted: 2025-07-18

## 2025-07-18 PROBLEM — R18.8 INTRAABDOMINAL FLUID COLLECTION: Status: ACTIVE | Noted: 2025-07-18

## 2025-07-18 PROBLEM — E46 HYPOALBUMINEMIA DUE TO PROTEIN-CALORIE MALNUTRITION: Status: ACTIVE | Noted: 2025-07-18

## 2025-07-18 LAB
ABSOLUTE EOSINOPHIL (OHS): 0.01 K/UL
ABSOLUTE MONOCYTE (OHS): 2.05 K/UL (ref 0.3–1)
ABSOLUTE NEUTROPHIL COUNT (OHS): 15.42 K/UL (ref 1.8–7.7)
ALBUMIN SERPL BCP-MCNC: 2 G/DL (ref 3.5–5.2)
ALP SERPL-CCNC: 123 UNIT/L (ref 40–150)
ALT SERPL W/O P-5'-P-CCNC: 18 UNIT/L (ref 10–44)
ANION GAP (OHS): 13 MMOL/L (ref 8–16)
ANION GAP (OHS): 15 MMOL/L (ref 8–16)
ANISOCYTOSIS BLD QL SMEAR: SLIGHT
AST SERPL-CCNC: 39 UNIT/L (ref 11–45)
BASOPHILS # BLD AUTO: 0.03 K/UL
BASOPHILS NFR BLD AUTO: 0.2 %
BILIRUB SERPL-MCNC: 0.3 MG/DL (ref 0.1–1)
BUN SERPL-MCNC: 54 MG/DL (ref 8–23)
BUN SERPL-MCNC: 55 MG/DL (ref 8–23)
BURR CELLS BLD QL SMEAR: ABNORMAL
CALCIUM SERPL-MCNC: 8.4 MG/DL (ref 8.7–10.5)
CALCIUM SERPL-MCNC: 8.7 MG/DL (ref 8.7–10.5)
CHLORIDE SERPL-SCNC: 108 MMOL/L (ref 95–110)
CHLORIDE SERPL-SCNC: 110 MMOL/L (ref 95–110)
CO2 SERPL-SCNC: 16 MMOL/L (ref 23–29)
CO2 SERPL-SCNC: 17 MMOL/L (ref 23–29)
CREAT SERPL-MCNC: 2.5 MG/DL (ref 0.5–1.4)
CREAT SERPL-MCNC: 2.7 MG/DL (ref 0.5–1.4)
DOHLE BOD BLD QL SMEAR: PRESENT
ERYTHROCYTE [DISTWIDTH] IN BLOOD BY AUTOMATED COUNT: 13.3 % (ref 11.5–14.5)
ESTROGEN SERPL-MCNC: NORMAL PG/ML
GFR SERPLBLD CREATININE-BSD FMLA CKD-EPI: 19 ML/MIN/1.73/M2
GFR SERPLBLD CREATININE-BSD FMLA CKD-EPI: 21 ML/MIN/1.73/M2
GLUCOSE SERPL-MCNC: 156 MG/DL (ref 70–110)
GLUCOSE SERPL-MCNC: 211 MG/DL (ref 70–110)
HCT VFR BLD AUTO: 31.7 % (ref 37–48.5)
HGB BLD-MCNC: 10.3 GM/DL (ref 12–16)
HOLD SPECIMEN: NORMAL
HYPOCHROMIA BLD QL SMEAR: ABNORMAL
IMM GRANULOCYTES # BLD AUTO: 0.19 K/UL (ref 0–0.04)
IMM GRANULOCYTES NFR BLD AUTO: 1.1 % (ref 0–0.5)
INSULIN SERPL-ACNC: NORMAL U[IU]/ML
LAB AP GROSS DESCRIPTION: NORMAL
LAB AP NON-GYN INTERPRETATION SPECIMEN 1: NORMAL
LAB AP PERFORMING LOCATION(S): NORMAL
LYMPHOCYTES # BLD AUTO: 0.15 K/UL (ref 1–4.8)
MAGNESIUM SERPL-MCNC: 1.9 MG/DL (ref 1.6–2.6)
MCH RBC QN AUTO: 30.7 PG (ref 27–31)
MCHC RBC AUTO-ENTMCNC: 32.5 G/DL (ref 32–36)
MCV RBC AUTO: 94 FL (ref 82–98)
NUCLEATED RBC (/100WBC) (OHS): 1 /100 WBC
PHOSPHATE SERPL-MCNC: 4.7 MG/DL (ref 2.7–4.5)
PLATELET # BLD AUTO: 593 K/UL (ref 150–450)
PLATELET BLD QL SMEAR: ABNORMAL
PMV BLD AUTO: 9.3 FL (ref 9.2–12.9)
POIKILOCYTOSIS BLD QL SMEAR: SLIGHT
POLYCHROMASIA BLD QL SMEAR: ABNORMAL
POTASSIUM SERPL-SCNC: 3.4 MMOL/L (ref 3.5–5.1)
POTASSIUM SERPL-SCNC: 3.7 MMOL/L (ref 3.5–5.1)
PROT SERPL-MCNC: 5.1 GM/DL (ref 6–8.4)
RBC # BLD AUTO: 3.36 M/UL (ref 4–5.4)
RELATIVE EOSINOPHIL (OHS): 0.1 %
RELATIVE LYMPHOCYTE (OHS): 0.8 % (ref 18–48)
RELATIVE MONOCYTE (OHS): 11.5 % (ref 4–15)
RELATIVE NEUTROPHIL (OHS): 86.3 % (ref 38–73)
SODIUM SERPL-SCNC: 139 MMOL/L (ref 136–145)
SODIUM SERPL-SCNC: 140 MMOL/L (ref 136–145)
TOXIC GRANULES BLD QL SMEAR: PRESENT
WBC # BLD AUTO: 17.85 K/UL (ref 3.9–12.7)

## 2025-07-18 PROCEDURE — 25000003 PHARM REV CODE 250: Performed by: EMERGENCY MEDICINE

## 2025-07-18 PROCEDURE — 80053 COMPREHEN METABOLIC PANEL: CPT

## 2025-07-18 PROCEDURE — 84100 ASSAY OF PHOSPHORUS: CPT

## 2025-07-18 PROCEDURE — 25000003 PHARM REV CODE 250

## 2025-07-18 PROCEDURE — 83735 ASSAY OF MAGNESIUM: CPT

## 2025-07-18 PROCEDURE — 36415 COLL VENOUS BLD VENIPUNCTURE: CPT

## 2025-07-18 PROCEDURE — 63600175 PHARM REV CODE 636 W HCPCS: Performed by: EMERGENCY MEDICINE

## 2025-07-18 PROCEDURE — 63600175 PHARM REV CODE 636 W HCPCS

## 2025-07-18 PROCEDURE — 85025 COMPLETE CBC W/AUTO DIFF WBC: CPT

## 2025-07-18 PROCEDURE — 99900035 HC TECH TIME PER 15 MIN (STAT)

## 2025-07-18 PROCEDURE — 20600001 HC STEP DOWN PRIVATE ROOM

## 2025-07-18 PROCEDURE — 25000003 PHARM REV CODE 250: Performed by: INTERNAL MEDICINE

## 2025-07-18 RX ORDER — MUPIROCIN 20 MG/G
OINTMENT TOPICAL 2 TIMES DAILY
Status: COMPLETED | OUTPATIENT
Start: 2025-07-18 | End: 2025-07-22

## 2025-07-18 RX ORDER — POLYETHYLENE GLYCOL 3350 17 G/17G
17 POWDER, FOR SOLUTION ORAL DAILY
Status: DISCONTINUED | OUTPATIENT
Start: 2025-07-18 | End: 2025-07-19

## 2025-07-18 RX ADMIN — ONDANSETRON 4 MG: 2 INJECTION INTRAMUSCULAR; INTRAVENOUS at 11:07

## 2025-07-18 RX ADMIN — POTASSIUM BICARBONATE 25 MEQ: 978 TABLET, EFFERVESCENT ORAL at 05:07

## 2025-07-18 RX ADMIN — ESCITALOPRAM OXALATE 10 MG: 5 TABLET, FILM COATED ORAL at 08:07

## 2025-07-18 RX ADMIN — PIPERACILLIN SODIUM AND TAZOBACTAM SODIUM 4.5 G: 4; .5 INJECTION, POWDER, FOR SOLUTION INTRAVENOUS at 09:07

## 2025-07-18 RX ADMIN — MUPIROCIN: 20 OINTMENT TOPICAL at 09:07

## 2025-07-18 RX ADMIN — SODIUM CHLORIDE, POTASSIUM CHLORIDE, SODIUM LACTATE AND CALCIUM CHLORIDE 1000 ML: 600; 310; 30; 20 INJECTION, SOLUTION INTRAVENOUS at 05:07

## 2025-07-18 RX ADMIN — LEVOTHYROXINE SODIUM 50 MCG: 0.05 TABLET ORAL at 05:07

## 2025-07-18 RX ADMIN — ENOXAPARIN SODIUM 30 MG: 40 INJECTION SUBCUTANEOUS at 04:07

## 2025-07-19 LAB
ABSOLUTE EOSINOPHIL (OHS): 0.02 K/UL
ABSOLUTE MONOCYTE (OHS): 1.72 K/UL (ref 0.3–1)
ABSOLUTE NEUTROPHIL COUNT (OHS): 15.81 K/UL (ref 1.8–7.7)
ALBUMIN SERPL BCP-MCNC: 1.7 G/DL (ref 3.5–5.2)
ALP SERPL-CCNC: 117 UNIT/L (ref 40–150)
ALT SERPL W/O P-5'-P-CCNC: 16 UNIT/L (ref 10–44)
ANION GAP (OHS): 13 MMOL/L (ref 8–16)
AST SERPL-CCNC: 26 UNIT/L (ref 11–45)
BACTERIA SPEC AEROBE CULT: NO GROWTH
BASOPHILS # BLD AUTO: 0.04 K/UL
BASOPHILS NFR BLD AUTO: 0.2 %
BILIRUB SERPL-MCNC: 0.3 MG/DL (ref 0.1–1)
BODY FLD TYPE: NORMAL
BUN SERPL-MCNC: 54 MG/DL (ref 8–23)
CALCIUM SERPL-MCNC: 8.3 MG/DL (ref 8.7–10.5)
CHLORIDE SERPL-SCNC: 110 MMOL/L (ref 95–110)
CO2 SERPL-SCNC: 15 MMOL/L (ref 23–29)
CREAT SERPL-MCNC: 2.2 MG/DL (ref 0.5–1.4)
ERYTHROCYTE [DISTWIDTH] IN BLOOD BY AUTOMATED COUNT: 13.4 % (ref 11.5–14.5)
GFR SERPLBLD CREATININE-BSD FMLA CKD-EPI: 24 ML/MIN/1.73/M2
GLUCOSE SERPL-MCNC: 183 MG/DL (ref 70–110)
HCT VFR BLD AUTO: 30.8 % (ref 37–48.5)
HGB BLD-MCNC: 10.3 GM/DL (ref 12–16)
IMM GRANULOCYTES # BLD AUTO: 0.21 K/UL (ref 0–0.04)
IMM GRANULOCYTES NFR BLD AUTO: 1.2 % (ref 0–0.5)
LYMPHOCYTES # BLD AUTO: 0.14 K/UL (ref 1–4.8)
MAGNESIUM SERPL-MCNC: 1.7 MG/DL (ref 1.6–2.6)
MCH RBC QN AUTO: 31.2 PG (ref 27–31)
MCHC RBC AUTO-ENTMCNC: 33.4 G/DL (ref 32–36)
MCV RBC AUTO: 93 FL (ref 82–98)
NUCLEATED RBC (/100WBC) (OHS): 1 /100 WBC
PHOSPHATE SERPL-MCNC: 3.7 MG/DL (ref 2.7–4.5)
PLATELET # BLD AUTO: 546 K/UL (ref 150–450)
PMV BLD AUTO: 9.6 FL (ref 9.2–12.9)
POCT GLUCOSE: 181 MG/DL (ref 70–110)
POCT GLUCOSE: 182 MG/DL (ref 70–110)
POCT GLUCOSE: 206 MG/DL (ref 70–110)
POCT GLUCOSE: 213 MG/DL (ref 70–110)
POTASSIUM SERPL-SCNC: 3.2 MMOL/L (ref 3.5–5.1)
PROT SERPL-MCNC: 4.9 GM/DL (ref 6–8.4)
RBC # BLD AUTO: 3.3 M/UL (ref 4–5.4)
RELATIVE EOSINOPHIL (OHS): 0.1 %
RELATIVE LYMPHOCYTE (OHS): 0.8 % (ref 18–48)
RELATIVE MONOCYTE (OHS): 9.6 % (ref 4–15)
RELATIVE NEUTROPHIL (OHS): 88.1 % (ref 38–73)
SODIUM SERPL-SCNC: 138 MMOL/L (ref 136–145)
TRIGL FLD-MCNC: 283 MG/DL
WBC # BLD AUTO: 17.94 K/UL (ref 3.9–12.7)

## 2025-07-19 PROCEDURE — 25000003 PHARM REV CODE 250

## 2025-07-19 PROCEDURE — 94761 N-INVAS EAR/PLS OXIMETRY MLT: CPT

## 2025-07-19 PROCEDURE — 36415 COLL VENOUS BLD VENIPUNCTURE: CPT

## 2025-07-19 PROCEDURE — 94799 UNLISTED PULMONARY SVC/PX: CPT

## 2025-07-19 PROCEDURE — 63600175 PHARM REV CODE 636 W HCPCS: Performed by: EMERGENCY MEDICINE

## 2025-07-19 PROCEDURE — 20600001 HC STEP DOWN PRIVATE ROOM

## 2025-07-19 PROCEDURE — 25000003 PHARM REV CODE 250: Performed by: EMERGENCY MEDICINE

## 2025-07-19 PROCEDURE — 82040 ASSAY OF SERUM ALBUMIN: CPT

## 2025-07-19 PROCEDURE — 83735 ASSAY OF MAGNESIUM: CPT

## 2025-07-19 PROCEDURE — 63600175 PHARM REV CODE 636 W HCPCS

## 2025-07-19 PROCEDURE — 85025 COMPLETE CBC W/AUTO DIFF WBC: CPT

## 2025-07-19 PROCEDURE — 84100 ASSAY OF PHOSPHORUS: CPT

## 2025-07-19 RX ORDER — POTASSIUM CHLORIDE 7.45 MG/ML
10 INJECTION INTRAVENOUS
Status: COMPLETED | OUTPATIENT
Start: 2025-07-19 | End: 2025-07-19

## 2025-07-19 RX ORDER — POLYETHYLENE GLYCOL 3350 17 G/17G
17 POWDER, FOR SOLUTION ORAL 2 TIMES DAILY
Status: DISCONTINUED | OUTPATIENT
Start: 2025-07-19 | End: 2025-07-23

## 2025-07-19 RX ADMIN — POTASSIUM CHLORIDE 10 MEQ: 7.46 INJECTION, SOLUTION INTRAVENOUS at 02:07

## 2025-07-19 RX ADMIN — ENOXAPARIN SODIUM 30 MG: 40 INJECTION SUBCUTANEOUS at 04:07

## 2025-07-19 RX ADMIN — POTASSIUM CHLORIDE 10 MEQ: 7.46 INJECTION, SOLUTION INTRAVENOUS at 10:07

## 2025-07-19 RX ADMIN — MUPIROCIN: 20 OINTMENT TOPICAL at 08:07

## 2025-07-19 RX ADMIN — OXYCODONE HYDROCHLORIDE 5 MG: 5 TABLET ORAL at 03:07

## 2025-07-19 RX ADMIN — POTASSIUM CHLORIDE 10 MEQ: 7.46 INJECTION, SOLUTION INTRAVENOUS at 11:07

## 2025-07-19 RX ADMIN — ESCITALOPRAM OXALATE 10 MG: 5 TABLET, FILM COATED ORAL at 08:07

## 2025-07-19 RX ADMIN — ONDANSETRON 4 MG: 2 INJECTION INTRAMUSCULAR; INTRAVENOUS at 09:07

## 2025-07-19 RX ADMIN — PIPERACILLIN SODIUM AND TAZOBACTAM SODIUM 4.5 G: 4; .5 INJECTION, POWDER, FOR SOLUTION INTRAVENOUS at 08:07

## 2025-07-19 RX ADMIN — POLYETHYLENE GLYCOL 3350 17 G: 17 POWDER, FOR SOLUTION ORAL at 08:07

## 2025-07-19 RX ADMIN — POTASSIUM CHLORIDE 10 MEQ: 7.46 INJECTION, SOLUTION INTRAVENOUS at 12:07

## 2025-07-19 RX ADMIN — PIPERACILLIN SODIUM AND TAZOBACTAM SODIUM 4.5 G: 4; .5 INJECTION, POWDER, FOR SOLUTION INTRAVENOUS at 10:07

## 2025-07-19 RX ADMIN — POTASSIUM BICARBONATE 40 MEQ: 391 TABLET, EFFERVESCENT ORAL at 10:07

## 2025-07-19 RX ADMIN — LEVOTHYROXINE SODIUM 50 MCG: 0.05 TABLET ORAL at 05:07

## 2025-07-20 LAB
ABSOLUTE EOSINOPHIL (OHS): 0.01 K/UL
ABSOLUTE MONOCYTE (OHS): 1.61 K/UL (ref 0.3–1)
ABSOLUTE NEUTROPHIL COUNT (OHS): 17.14 K/UL (ref 1.8–7.7)
ALBUMIN SERPL BCP-MCNC: 1.7 G/DL (ref 3.5–5.2)
ALP SERPL-CCNC: 125 UNIT/L (ref 40–150)
ALT SERPL W/O P-5'-P-CCNC: 18 UNIT/L (ref 10–44)
ANION GAP (OHS): 14 MMOL/L (ref 8–16)
AST SERPL-CCNC: 30 UNIT/L (ref 11–45)
BACTERIA BLD CULT: NORMAL
BACTERIA BLD CULT: NORMAL
BASOPHILS # BLD AUTO: 0.06 K/UL
BASOPHILS NFR BLD AUTO: 0.3 %
BILIRUB SERPL-MCNC: 0.3 MG/DL (ref 0.1–1)
BUN SERPL-MCNC: 50 MG/DL (ref 8–23)
CALCIUM SERPL-MCNC: 8.2 MG/DL (ref 8.7–10.5)
CHLORIDE SERPL-SCNC: 108 MMOL/L (ref 95–110)
CO2 SERPL-SCNC: 15 MMOL/L (ref 23–29)
CREAT SERPL-MCNC: 1.9 MG/DL (ref 0.5–1.4)
ERYTHROCYTE [DISTWIDTH] IN BLOOD BY AUTOMATED COUNT: 13.4 % (ref 11.5–14.5)
GFR SERPLBLD CREATININE-BSD FMLA CKD-EPI: 29 ML/MIN/1.73/M2
GLUCOSE SERPL-MCNC: 155 MG/DL (ref 70–110)
HCT VFR BLD AUTO: 31 % (ref 37–48.5)
HGB BLD-MCNC: 10.3 GM/DL (ref 12–16)
IMM GRANULOCYTES # BLD AUTO: 0.29 K/UL (ref 0–0.04)
IMM GRANULOCYTES NFR BLD AUTO: 1.5 % (ref 0–0.5)
LYMPHOCYTES # BLD AUTO: 0.23 K/UL (ref 1–4.8)
MAGNESIUM SERPL-MCNC: 1.6 MG/DL (ref 1.6–2.6)
MCH RBC QN AUTO: 31.2 PG (ref 27–31)
MCHC RBC AUTO-ENTMCNC: 33.2 G/DL (ref 32–36)
MCV RBC AUTO: 94 FL (ref 82–98)
NUCLEATED RBC (/100WBC) (OHS): 0 /100 WBC
PHOSPHATE SERPL-MCNC: 3.2 MG/DL (ref 2.7–4.5)
PLATELET # BLD AUTO: 523 K/UL (ref 150–450)
PMV BLD AUTO: 9.6 FL (ref 9.2–12.9)
POTASSIUM SERPL-SCNC: 3.2 MMOL/L (ref 3.5–5.1)
PROT SERPL-MCNC: 5 GM/DL (ref 6–8.4)
RBC # BLD AUTO: 3.3 M/UL (ref 4–5.4)
RELATIVE EOSINOPHIL (OHS): 0.1 %
RELATIVE LYMPHOCYTE (OHS): 1.2 % (ref 18–48)
RELATIVE MONOCYTE (OHS): 8.3 % (ref 4–15)
RELATIVE NEUTROPHIL (OHS): 88.6 % (ref 38–73)
SODIUM SERPL-SCNC: 137 MMOL/L (ref 136–145)
WBC # BLD AUTO: 19.34 K/UL (ref 3.9–12.7)

## 2025-07-20 PROCEDURE — 25000003 PHARM REV CODE 250: Performed by: EMERGENCY MEDICINE

## 2025-07-20 PROCEDURE — 25000003 PHARM REV CODE 250

## 2025-07-20 PROCEDURE — 20600001 HC STEP DOWN PRIVATE ROOM

## 2025-07-20 PROCEDURE — 36415 COLL VENOUS BLD VENIPUNCTURE: CPT

## 2025-07-20 PROCEDURE — 83735 ASSAY OF MAGNESIUM: CPT

## 2025-07-20 PROCEDURE — 63600175 PHARM REV CODE 636 W HCPCS: Performed by: EMERGENCY MEDICINE

## 2025-07-20 PROCEDURE — 63600175 PHARM REV CODE 636 W HCPCS: Performed by: SURGERY

## 2025-07-20 PROCEDURE — 25000003 PHARM REV CODE 250: Performed by: SURGERY

## 2025-07-20 PROCEDURE — 63600175 PHARM REV CODE 636 W HCPCS

## 2025-07-20 PROCEDURE — 85025 COMPLETE CBC W/AUTO DIFF WBC: CPT

## 2025-07-20 PROCEDURE — 25000003 PHARM REV CODE 250: Performed by: INTERNAL MEDICINE

## 2025-07-20 PROCEDURE — 82374 ASSAY BLOOD CARBON DIOXIDE: CPT

## 2025-07-20 PROCEDURE — 84100 ASSAY OF PHOSPHORUS: CPT

## 2025-07-20 RX ORDER — POTASSIUM CHLORIDE 20 MEQ/1
40 TABLET, EXTENDED RELEASE ORAL EVERY 6 HOURS
Status: DISCONTINUED | OUTPATIENT
Start: 2025-07-20 | End: 2025-07-20

## 2025-07-20 RX ORDER — ADHESIVE BANDAGE
30 BANDAGE TOPICAL DAILY
Status: DISCONTINUED | OUTPATIENT
Start: 2025-07-20 | End: 2025-07-23

## 2025-07-20 RX ORDER — PROCHLORPERAZINE EDISYLATE 5 MG/ML
2.5 INJECTION INTRAMUSCULAR; INTRAVENOUS EVERY 6 HOURS PRN
Status: DISCONTINUED | OUTPATIENT
Start: 2025-07-20 | End: 2025-08-06

## 2025-07-20 RX ADMIN — MUPIROCIN: 20 OINTMENT TOPICAL at 09:07

## 2025-07-20 RX ADMIN — ESCITALOPRAM OXALATE 10 MG: 5 TABLET, FILM COATED ORAL at 09:07

## 2025-07-20 RX ADMIN — PIPERACILLIN SODIUM AND TAZOBACTAM SODIUM 4.5 G: 4; .5 INJECTION, POWDER, FOR SOLUTION INTRAVENOUS at 05:07

## 2025-07-20 RX ADMIN — MAGNESIUM HYDROXIDE 2400 MG: 400 SUSPENSION ORAL at 06:07

## 2025-07-20 RX ADMIN — ENOXAPARIN SODIUM 30 MG: 40 INJECTION SUBCUTANEOUS at 05:07

## 2025-07-20 RX ADMIN — POLYETHYLENE GLYCOL 3350 17 G: 17 POWDER, FOR SOLUTION ORAL at 09:07

## 2025-07-20 RX ADMIN — LEVOTHYROXINE SODIUM 50 MCG: 0.05 TABLET ORAL at 05:07

## 2025-07-20 RX ADMIN — PIPERACILLIN SODIUM AND TAZOBACTAM SODIUM 4.5 G: 4; .5 INJECTION, POWDER, FOR SOLUTION INTRAVENOUS at 09:07

## 2025-07-20 RX ADMIN — SODIUM CHLORIDE: 9 INJECTION, SOLUTION INTRAVENOUS at 02:07

## 2025-07-20 RX ADMIN — PROCHLORPERAZINE EDISYLATE 2.5 MG: 5 INJECTION INTRAMUSCULAR; INTRAVENOUS at 06:07

## 2025-07-20 RX ADMIN — POTASSIUM BICARBONATE 40 MEQ: 391 TABLET, EFFERVESCENT ORAL at 11:07

## 2025-07-20 RX ADMIN — ONDANSETRON 4 MG: 2 INJECTION INTRAMUSCULAR; INTRAVENOUS at 04:07

## 2025-07-21 LAB
ABSOLUTE EOSINOPHIL (OHS): 0.01 K/UL
ABSOLUTE MONOCYTE (OHS): 1.28 K/UL (ref 0.3–1)
ABSOLUTE NEUTROPHIL COUNT (OHS): 16.87 K/UL (ref 1.8–7.7)
ALBUMIN SERPL BCP-MCNC: 1.6 G/DL (ref 3.5–5.2)
ALP SERPL-CCNC: 122 UNIT/L (ref 40–150)
ALT SERPL W/O P-5'-P-CCNC: 18 UNIT/L (ref 10–44)
ANION GAP (OHS): 11 MMOL/L (ref 8–16)
APPEARANCE FLD: NORMAL
APPEARANCE FLD: NORMAL
AST SERPL-CCNC: 23 UNIT/L (ref 11–45)
BASOPHILS # BLD AUTO: 0.07 K/UL
BASOPHILS NFR BLD AUTO: 0.4 %
BILIRUB SERPL-MCNC: 0.3 MG/DL (ref 0.1–1)
BUN SERPL-MCNC: 43 MG/DL (ref 8–23)
CALCIUM SERPL-MCNC: 8.3 MG/DL (ref 8.7–10.5)
CHLORIDE SERPL-SCNC: 112 MMOL/L (ref 95–110)
CO2 SERPL-SCNC: 16 MMOL/L (ref 23–29)
COLOR FLD: NORMAL
COLOR FLD: NORMAL
CREAT SERPL-MCNC: 1.7 MG/DL (ref 0.5–1.4)
EOSINOPHIL NFR FLD MANUAL: 11 %
ERYTHROCYTE [DISTWIDTH] IN BLOOD BY AUTOMATED COUNT: 13.6 % (ref 11.5–14.5)
GFR SERPLBLD CREATININE-BSD FMLA CKD-EPI: 33 ML/MIN/1.73/M2
GLUCOSE SERPL-MCNC: 162 MG/DL (ref 70–110)
HCT VFR BLD AUTO: 31.7 % (ref 37–48.5)
HGB BLD-MCNC: 10.2 GM/DL (ref 12–16)
IMM GRANULOCYTES # BLD AUTO: 0.24 K/UL (ref 0–0.04)
IMM GRANULOCYTES NFR BLD AUTO: 1.3 % (ref 0–0.5)
INR PPP: 1.3 (ref 0.8–1.2)
LYMPHOCYTES # BLD AUTO: 0.23 K/UL (ref 1–4.8)
LYMPHOCYTES NFR FLD MANUAL: 33 %
MAGNESIUM SERPL-MCNC: 1.7 MG/DL (ref 1.6–2.6)
MCH RBC QN AUTO: 30.5 PG (ref 27–31)
MCHC RBC AUTO-ENTMCNC: 32.2 G/DL (ref 32–36)
MCV RBC AUTO: 95 FL (ref 82–98)
MONOS+MACROS NFR FLD MANUAL: 10 %
NEUTROPHILS NFR FLD MANUAL: 58 %
NEUTROPHILS NFR FLD MANUAL: 89 %
NUCLEATED RBC (/100WBC) (OHS): 0 /100 WBC
PHOSPHATE SERPL-MCNC: 2.7 MG/DL (ref 2.7–4.5)
PLATELET # BLD AUTO: 467 K/UL (ref 150–450)
PMV BLD AUTO: 9.9 FL (ref 9.2–12.9)
POTASSIUM SERPL-SCNC: 3.6 MMOL/L (ref 3.5–5.1)
PROT SERPL-MCNC: 4.8 GM/DL (ref 6–8.4)
PROTHROMBIN TIME: 14.4 SECONDS (ref 9–12.5)
RBC # BLD AUTO: 3.34 M/UL (ref 4–5.4)
RELATIVE EOSINOPHIL (OHS): 0.1 %
RELATIVE LYMPHOCYTE (OHS): 1.2 % (ref 18–48)
RELATIVE MONOCYTE (OHS): 6.8 % (ref 4–15)
RELATIVE NEUTROPHIL (OHS): 90.2 % (ref 38–73)
SODIUM SERPL-SCNC: 139 MMOL/L (ref 136–145)
WBC # BLD AUTO: 18.7 K/UL (ref 3.9–12.7)
WBC # FLD: 670 /CU MM
WBC # FLD: NORMAL /CU MM

## 2025-07-21 PROCEDURE — 89051 BODY FLUID CELL COUNT: CPT

## 2025-07-21 PROCEDURE — 87075 CULTR BACTERIA EXCEPT BLOOD: CPT | Performed by: SURGERY

## 2025-07-21 PROCEDURE — 63600175 PHARM REV CODE 636 W HCPCS

## 2025-07-21 PROCEDURE — 36415 COLL VENOUS BLD VENIPUNCTURE: CPT

## 2025-07-21 PROCEDURE — 87075 CULTR BACTERIA EXCEPT BLOOD: CPT

## 2025-07-21 PROCEDURE — 89051 BODY FLUID CELL COUNT: CPT | Performed by: SURGERY

## 2025-07-21 PROCEDURE — 87070 CULTURE OTHR SPECIMN AEROBIC: CPT | Performed by: SURGERY

## 2025-07-21 PROCEDURE — 80053 COMPREHEN METABOLIC PANEL: CPT

## 2025-07-21 PROCEDURE — 84100 ASSAY OF PHOSPHORUS: CPT

## 2025-07-21 PROCEDURE — 20600001 HC STEP DOWN PRIVATE ROOM

## 2025-07-21 PROCEDURE — 63600175 PHARM REV CODE 636 W HCPCS: Performed by: SURGERY

## 2025-07-21 PROCEDURE — 87106 FUNGI IDENTIFICATION YEAST: CPT

## 2025-07-21 PROCEDURE — 83735 ASSAY OF MAGNESIUM: CPT

## 2025-07-21 PROCEDURE — 25000003 PHARM REV CODE 250

## 2025-07-21 PROCEDURE — 87205 SMEAR GRAM STAIN: CPT | Performed by: SURGERY

## 2025-07-21 PROCEDURE — 63600175 PHARM REV CODE 636 W HCPCS: Performed by: RADIOLOGY

## 2025-07-21 PROCEDURE — 85025 COMPLETE CBC W/AUTO DIFF WBC: CPT

## 2025-07-21 PROCEDURE — 87205 SMEAR GRAM STAIN: CPT

## 2025-07-21 PROCEDURE — 25000003 PHARM REV CODE 250: Performed by: INTERNAL MEDICINE

## 2025-07-21 PROCEDURE — 25000003 PHARM REV CODE 250: Performed by: SURGERY

## 2025-07-21 PROCEDURE — 87102 FUNGUS ISOLATION CULTURE: CPT | Performed by: SURGERY

## 2025-07-21 PROCEDURE — 0W9G30Z DRAINAGE OF PERITONEAL CAVITY WITH DRAINAGE DEVICE, PERCUTANEOUS APPROACH: ICD-10-PCS | Performed by: STUDENT IN AN ORGANIZED HEALTH CARE EDUCATION/TRAINING PROGRAM

## 2025-07-21 PROCEDURE — 85610 PROTHROMBIN TIME: CPT

## 2025-07-21 PROCEDURE — 87077 CULTURE AEROBIC IDENTIFY: CPT

## 2025-07-21 RX ORDER — MIDAZOLAM HYDROCHLORIDE 1 MG/ML
INJECTION, SOLUTION INTRAMUSCULAR; INTRAVENOUS
Status: DISCONTINUED | OUTPATIENT
Start: 2025-07-21 | End: 2025-07-24

## 2025-07-21 RX ORDER — FENTANYL CITRATE 50 UG/ML
INJECTION, SOLUTION INTRAMUSCULAR; INTRAVENOUS
Status: DISCONTINUED | OUTPATIENT
Start: 2025-07-21 | End: 2025-07-24

## 2025-07-21 RX ORDER — LIDOCAINE HYDROCHLORIDE 10 MG/ML
INJECTION, SOLUTION INFILTRATION; PERINEURAL
Status: DISCONTINUED | OUTPATIENT
Start: 2025-07-21 | End: 2025-07-24

## 2025-07-21 RX ORDER — FUROSEMIDE 10 MG/ML
40 INJECTION INTRAMUSCULAR; INTRAVENOUS ONCE
Status: DISCONTINUED | OUTPATIENT
Start: 2025-07-21 | End: 2025-07-21

## 2025-07-21 RX ADMIN — FENTANYL CITRATE 25 MCG: 50 INJECTION, SOLUTION INTRAMUSCULAR; INTRAVENOUS at 03:07

## 2025-07-21 RX ADMIN — PIPERACILLIN SODIUM AND TAZOBACTAM SODIUM 4.5 G: 4; .5 INJECTION, POWDER, FOR SOLUTION INTRAVENOUS at 09:07

## 2025-07-21 RX ADMIN — MAGNESIUM HYDROXIDE 2400 MG: 400 SUSPENSION ORAL at 09:07

## 2025-07-21 RX ADMIN — MIDAZOLAM 1 MG: 1 INJECTION INTRAMUSCULAR; INTRAVENOUS at 03:07

## 2025-07-21 RX ADMIN — FENTANYL CITRATE 50 MCG: 50 INJECTION, SOLUTION INTRAMUSCULAR; INTRAVENOUS at 03:07

## 2025-07-21 RX ADMIN — LIDOCAINE HYDROCHLORIDE 5 ML: 10 INJECTION, SOLUTION INFILTRATION; PERINEURAL at 03:07

## 2025-07-21 RX ADMIN — POTASSIUM BICARBONATE 40 MEQ: 391 TABLET, EFFERVESCENT ORAL at 12:07

## 2025-07-21 RX ADMIN — POTASSIUM BICARBONATE 40 MEQ: 391 TABLET, EFFERVESCENT ORAL at 06:07

## 2025-07-21 RX ADMIN — SODIUM CHLORIDE: 9 INJECTION, SOLUTION INTRAVENOUS at 06:07

## 2025-07-21 RX ADMIN — ONDANSETRON 4 MG: 2 INJECTION INTRAMUSCULAR; INTRAVENOUS at 09:07

## 2025-07-21 RX ADMIN — MUPIROCIN: 20 OINTMENT TOPICAL at 09:07

## 2025-07-21 RX ADMIN — MIDAZOLAM 0.5 MG: 1 INJECTION INTRAMUSCULAR; INTRAVENOUS at 03:07

## 2025-07-21 RX ADMIN — POLYETHYLENE GLYCOL 3350 17 G: 17 POWDER, FOR SOLUTION ORAL at 09:07

## 2025-07-21 RX ADMIN — PIPERACILLIN SODIUM AND TAZOBACTAM SODIUM 4.5 G: 4; .5 INJECTION, POWDER, FOR SOLUTION INTRAVENOUS at 03:07

## 2025-07-21 RX ADMIN — SODIUM CHLORIDE: 9 INJECTION, SOLUTION INTRAVENOUS at 05:07

## 2025-07-21 RX ADMIN — ACETAMINOPHEN 1000 MG: 500 TABLET ORAL at 06:07

## 2025-07-21 RX ADMIN — PIPERACILLIN SODIUM AND TAZOBACTAM SODIUM 4.5 G: 4; .5 INJECTION, POWDER, FOR SOLUTION INTRAVENOUS at 06:07

## 2025-07-21 RX ADMIN — POTASSIUM BICARBONATE 40 MEQ: 391 TABLET, EFFERVESCENT ORAL at 03:07

## 2025-07-21 RX ADMIN — ONDANSETRON 4 MG: 2 INJECTION INTRAMUSCULAR; INTRAVENOUS at 03:07

## 2025-07-21 RX ADMIN — ESCITALOPRAM OXALATE 10 MG: 5 TABLET, FILM COATED ORAL at 09:07

## 2025-07-21 RX ADMIN — LEVOTHYROXINE SODIUM 50 MCG: 0.05 TABLET ORAL at 05:07

## 2025-07-22 LAB
ABSOLUTE EOSINOPHIL (OHS): 0.01 K/UL
ABSOLUTE MONOCYTE (OHS): 1.17 K/UL (ref 0.3–1)
ABSOLUTE NEUTROPHIL COUNT (OHS): 19.13 K/UL (ref 1.8–7.7)
ALBUMIN SERPL BCP-MCNC: 1.5 G/DL (ref 3.5–5.2)
ALP SERPL-CCNC: 120 UNIT/L (ref 40–150)
ALT SERPL W/O P-5'-P-CCNC: 17 UNIT/L (ref 10–44)
ANION GAP (OHS): 7 MMOL/L (ref 8–16)
AST SERPL-CCNC: 24 UNIT/L (ref 11–45)
BASOPHILS # BLD AUTO: 0.06 K/UL
BASOPHILS NFR BLD AUTO: 0.3 %
BILIRUB SERPL-MCNC: 0.3 MG/DL (ref 0.1–1)
BUN SERPL-MCNC: 36 MG/DL (ref 8–23)
CALCIUM SERPL-MCNC: 8.1 MG/DL (ref 8.7–10.5)
CHLORIDE SERPL-SCNC: 112 MMOL/L (ref 95–110)
CO2 SERPL-SCNC: 23 MMOL/L (ref 23–29)
CREAT SERPL-MCNC: 1.5 MG/DL (ref 0.5–1.4)
ERYTHROCYTE [DISTWIDTH] IN BLOOD BY AUTOMATED COUNT: 13.8 % (ref 11.5–14.5)
GFR SERPLBLD CREATININE-BSD FMLA CKD-EPI: 38 ML/MIN/1.73/M2
GLUCOSE SERPL-MCNC: 195 MG/DL (ref 70–110)
GRAM STN SPEC: NORMAL
GRAM STN SPEC: NORMAL
HCT VFR BLD AUTO: 32.5 % (ref 37–48.5)
HGB BLD-MCNC: 10.6 GM/DL (ref 12–16)
IMM GRANULOCYTES # BLD AUTO: 0.29 K/UL (ref 0–0.04)
IMM GRANULOCYTES NFR BLD AUTO: 1.4 % (ref 0–0.5)
LYMPHOCYTES # BLD AUTO: 0.27 K/UL (ref 1–4.8)
MAGNESIUM SERPL-MCNC: 1.9 MG/DL (ref 1.6–2.6)
MCH RBC QN AUTO: 31 PG (ref 27–31)
MCHC RBC AUTO-ENTMCNC: 32.6 G/DL (ref 32–36)
MCV RBC AUTO: 95 FL (ref 82–98)
NUCLEATED RBC (/100WBC) (OHS): 0 /100 WBC
PHOSPHATE SERPL-MCNC: 2.7 MG/DL (ref 2.7–4.5)
PLATELET # BLD AUTO: 429 K/UL (ref 150–450)
PMV BLD AUTO: 10 FL (ref 9.2–12.9)
POTASSIUM SERPL-SCNC: 3.8 MMOL/L (ref 3.5–5.1)
PROT SERPL-MCNC: 4.8 GM/DL (ref 6–8.4)
RBC # BLD AUTO: 3.42 M/UL (ref 4–5.4)
RELATIVE EOSINOPHIL (OHS): 0 %
RELATIVE LYMPHOCYTE (OHS): 1.3 % (ref 18–48)
RELATIVE MONOCYTE (OHS): 5.6 % (ref 4–15)
RELATIVE NEUTROPHIL (OHS): 91.4 % (ref 38–73)
SODIUM SERPL-SCNC: 142 MMOL/L (ref 136–145)
WBC # BLD AUTO: 20.93 K/UL (ref 3.9–12.7)

## 2025-07-22 PROCEDURE — 82040 ASSAY OF SERUM ALBUMIN: CPT

## 2025-07-22 PROCEDURE — 36573 INSJ PICC RS&I 5 YR+: CPT

## 2025-07-22 PROCEDURE — 63600175 PHARM REV CODE 636 W HCPCS

## 2025-07-22 PROCEDURE — 76937 US GUIDE VASCULAR ACCESS: CPT

## 2025-07-22 PROCEDURE — 25000003 PHARM REV CODE 250

## 2025-07-22 PROCEDURE — C1751 CATH, INF, PER/CENT/MIDLINE: HCPCS

## 2025-07-22 PROCEDURE — 84100 ASSAY OF PHOSPHORUS: CPT

## 2025-07-22 PROCEDURE — 25000003 PHARM REV CODE 250: Performed by: NURSE PRACTITIONER

## 2025-07-22 PROCEDURE — 25000003 PHARM REV CODE 250: Performed by: SURGERY

## 2025-07-22 PROCEDURE — 20600001 HC STEP DOWN PRIVATE ROOM

## 2025-07-22 PROCEDURE — 36415 COLL VENOUS BLD VENIPUNCTURE: CPT

## 2025-07-22 PROCEDURE — 97116 GAIT TRAINING THERAPY: CPT | Mod: CQ

## 2025-07-22 PROCEDURE — 02HV33Z INSERTION OF INFUSION DEVICE INTO SUPERIOR VENA CAVA, PERCUTANEOUS APPROACH: ICD-10-PCS | Performed by: SURGERY

## 2025-07-22 PROCEDURE — A4217 STERILE WATER/SALINE, 500 ML: HCPCS

## 2025-07-22 PROCEDURE — B4185 PARENTERAL SOL 10 GM LIPIDS: HCPCS

## 2025-07-22 PROCEDURE — 85025 COMPLETE CBC W/AUTO DIFF WBC: CPT

## 2025-07-22 PROCEDURE — 83735 ASSAY OF MAGNESIUM: CPT

## 2025-07-22 PROCEDURE — 97530 THERAPEUTIC ACTIVITIES: CPT | Mod: CQ

## 2025-07-22 PROCEDURE — 63600175 PHARM REV CODE 636 W HCPCS: Performed by: SURGERY

## 2025-07-22 RX ORDER — ENOXAPARIN SODIUM 100 MG/ML
30 INJECTION SUBCUTANEOUS EVERY 24 HOURS
Status: DISCONTINUED | OUTPATIENT
Start: 2025-07-22 | End: 2025-07-23

## 2025-07-22 RX ORDER — SODIUM CHLORIDE 0.9 % (FLUSH) 0.9 %
10 SYRINGE (ML) INJECTION EVERY 12 HOURS PRN
Status: DISCONTINUED | OUTPATIENT
Start: 2025-07-22 | End: 2025-08-22 | Stop reason: HOSPADM

## 2025-07-22 RX ADMIN — PIPERACILLIN SODIUM AND TAZOBACTAM SODIUM 4.5 G: 4; .5 INJECTION, POWDER, FOR SOLUTION INTRAVENOUS at 10:07

## 2025-07-22 RX ADMIN — ONDANSETRON 4 MG: 2 INJECTION INTRAMUSCULAR; INTRAVENOUS at 11:07

## 2025-07-22 RX ADMIN — PROCHLORPERAZINE EDISYLATE 2.5 MG: 5 INJECTION INTRAMUSCULAR; INTRAVENOUS at 03:07

## 2025-07-22 RX ADMIN — PIPERACILLIN SODIUM AND TAZOBACTAM SODIUM 4.5 G: 4; .5 INJECTION, POWDER, FOR SOLUTION INTRAVENOUS at 01:07

## 2025-07-22 RX ADMIN — ENOXAPARIN SODIUM 30 MG: 30 INJECTION SUBCUTANEOUS at 04:07

## 2025-07-22 RX ADMIN — POTASSIUM BICARBONATE 40 MEQ: 391 TABLET, EFFERVESCENT ORAL at 06:07

## 2025-07-22 RX ADMIN — PIPERACILLIN SODIUM AND TAZOBACTAM SODIUM 4.5 G: 4; .5 INJECTION, POWDER, FOR SOLUTION INTRAVENOUS at 05:07

## 2025-07-22 RX ADMIN — LEVOTHYROXINE SODIUM 50 MCG: 0.05 TABLET ORAL at 06:07

## 2025-07-22 RX ADMIN — ESCITALOPRAM OXALATE 10 MG: 5 TABLET, FILM COATED ORAL at 10:07

## 2025-07-22 RX ADMIN — POTASSIUM BICARBONATE 40 MEQ: 391 TABLET, EFFERVESCENT ORAL at 12:07

## 2025-07-22 RX ADMIN — ASCORBIC ACID, VITAMIN A PALMITATE, CHOLECALCIFEROL, THIAMINE HYDROCHLORIDE, RIBOFLAVIN-5 PHOSPHATE SODIUM, PYRIDOXINE HYDROCHLORIDE, NIACINAMIDE, DEXPANTHENOL, ALPHA-TOCOPHEROL ACETATE, VITAMIN K1, FOLIC ACID, BIOTIN, CYANOCOBALAMIN: 200; 3300; 200; 6; 3.6; 6; 40; 15; 10; 150; 600; 60; 5 INJECTION, SOLUTION INTRAVENOUS at 10:07

## 2025-07-22 RX ADMIN — MUPIROCIN: 20 OINTMENT TOPICAL at 10:07

## 2025-07-22 RX ADMIN — POTASSIUM PHOSPHATE, MONOBASIC AND POTASSIUM PHOSPHATE, DIBASIC 15 MMOL: 224; 236 INJECTION, SOLUTION, CONCENTRATE INTRAVENOUS at 04:07

## 2025-07-22 RX ADMIN — I.V. FAT EMULSION 250 ML: 20 EMULSION INTRAVENOUS at 10:07

## 2025-07-23 ENCOUNTER — ANESTHESIA EVENT (OUTPATIENT)
Dept: SURGERY | Facility: HOSPITAL | Age: 65
End: 2025-07-23
Payer: MEDICARE

## 2025-07-23 LAB
ABSOLUTE EOSINOPHIL (OHS): 0.01 K/UL
ABSOLUTE MONOCYTE (OHS): 1.31 K/UL (ref 0.3–1)
ABSOLUTE NEUTROPHIL COUNT (OHS): 22.05 K/UL (ref 1.8–7.7)
ALBUMIN SERPL BCP-MCNC: 1.5 G/DL (ref 3.5–5.2)
ALP SERPL-CCNC: 110 UNIT/L (ref 40–150)
ALT SERPL W/O P-5'-P-CCNC: 25 UNIT/L (ref 10–44)
ANION GAP (OHS): 8 MMOL/L (ref 8–16)
ANISOCYTOSIS BLD QL SMEAR: SLIGHT
AST SERPL-CCNC: 30 UNIT/L (ref 11–45)
BACTERIA SPEC ANAEROBE CULT: NORMAL
BASOPHILS # BLD AUTO: 0.06 K/UL
BASOPHILS NFR BLD AUTO: 0.2 %
BILIRUB SERPL-MCNC: 0.2 MG/DL (ref 0.1–1)
BUN SERPL-MCNC: 30 MG/DL (ref 8–23)
CALCIUM SERPL-MCNC: 7.9 MG/DL (ref 8.7–10.5)
CHLORIDE SERPL-SCNC: 112 MMOL/L (ref 95–110)
CO2 SERPL-SCNC: 24 MMOL/L (ref 23–29)
CREAT SERPL-MCNC: 1.2 MG/DL (ref 0.5–1.4)
DOHLE BOD BLD QL SMEAR: PRESENT
ERYTHROCYTE [DISTWIDTH] IN BLOOD BY AUTOMATED COUNT: 13.6 % (ref 11.5–14.5)
GFR SERPLBLD CREATININE-BSD FMLA CKD-EPI: 50 ML/MIN/1.73/M2
GLUCOSE SERPL-MCNC: 403 MG/DL (ref 70–110)
HCT VFR BLD AUTO: 31.3 % (ref 37–48.5)
HGB BLD-MCNC: 10.2 GM/DL (ref 12–16)
HYPOCHROMIA BLD QL SMEAR: NORMAL
IMM GRANULOCYTES # BLD AUTO: 0.32 K/UL (ref 0–0.04)
IMM GRANULOCYTES NFR BLD AUTO: 1.3 % (ref 0–0.5)
LYMPHOCYTES # BLD AUTO: 0.26 K/UL (ref 1–4.8)
MAGNESIUM SERPL-MCNC: 1.8 MG/DL (ref 1.6–2.6)
MCH RBC QN AUTO: 30.5 PG (ref 27–31)
MCHC RBC AUTO-ENTMCNC: 32.6 G/DL (ref 32–36)
MCV RBC AUTO: 94 FL (ref 82–98)
NUCLEATED RBC (/100WBC) (OHS): 0 /100 WBC
PHOSPHATE SERPL-MCNC: 2.2 MG/DL (ref 2.7–4.5)
PLATELET # BLD AUTO: 372 K/UL (ref 150–450)
PLATELET BLD QL SMEAR: NORMAL
PMV BLD AUTO: 10.6 FL (ref 9.2–12.9)
POCT GLUCOSE: 326 MG/DL (ref 70–110)
POCT GLUCOSE: 382 MG/DL (ref 70–110)
POCT GLUCOSE: 392 MG/DL (ref 70–110)
POCT GLUCOSE: 417 MG/DL (ref 70–110)
POTASSIUM SERPL-SCNC: 3.3 MMOL/L (ref 3.5–5.1)
PROT SERPL-MCNC: 4.7 GM/DL (ref 6–8.4)
RBC # BLD AUTO: 3.34 M/UL (ref 4–5.4)
RELATIVE EOSINOPHIL (OHS): 0 %
RELATIVE LYMPHOCYTE (OHS): 1.1 % (ref 18–48)
RELATIVE MONOCYTE (OHS): 5.5 % (ref 4–15)
RELATIVE NEUTROPHIL (OHS): 91.9 % (ref 38–73)
SODIUM SERPL-SCNC: 144 MMOL/L (ref 136–145)
TOXIC GRANULES BLD QL SMEAR: PRESENT
TRIGL SERPL-MCNC: 171 MG/DL (ref 30–150)
WBC # BLD AUTO: 24.01 K/UL (ref 3.9–12.7)

## 2025-07-23 PROCEDURE — 84478 ASSAY OF TRIGLYCERIDES: CPT

## 2025-07-23 PROCEDURE — 25000003 PHARM REV CODE 250

## 2025-07-23 PROCEDURE — 63600175 PHARM REV CODE 636 W HCPCS: Performed by: SURGERY

## 2025-07-23 PROCEDURE — 63600175 PHARM REV CODE 636 W HCPCS: Performed by: NURSE PRACTITIONER

## 2025-07-23 PROCEDURE — 84100 ASSAY OF PHOSPHORUS: CPT

## 2025-07-23 PROCEDURE — B4185 PARENTERAL SOL 10 GM LIPIDS: HCPCS

## 2025-07-23 PROCEDURE — 25000003 PHARM REV CODE 250: Performed by: SURGERY

## 2025-07-23 PROCEDURE — 85025 COMPLETE CBC W/AUTO DIFF WBC: CPT

## 2025-07-23 PROCEDURE — 36415 COLL VENOUS BLD VENIPUNCTURE: CPT

## 2025-07-23 PROCEDURE — A4217 STERILE WATER/SALINE, 500 ML: HCPCS

## 2025-07-23 PROCEDURE — 82040 ASSAY OF SERUM ALBUMIN: CPT

## 2025-07-23 PROCEDURE — 63600175 PHARM REV CODE 636 W HCPCS

## 2025-07-23 PROCEDURE — 25000003 PHARM REV CODE 250: Performed by: NURSE PRACTITIONER

## 2025-07-23 PROCEDURE — 25500020 PHARM REV CODE 255

## 2025-07-23 PROCEDURE — 20600001 HC STEP DOWN PRIVATE ROOM

## 2025-07-23 PROCEDURE — 83735 ASSAY OF MAGNESIUM: CPT

## 2025-07-23 PROCEDURE — 99222 1ST HOSP IP/OBS MODERATE 55: CPT | Mod: ,,, | Performed by: NURSE PRACTITIONER

## 2025-07-23 RX ORDER — FLUCONAZOLE 2 MG/ML
200 INJECTION, SOLUTION INTRAVENOUS
Status: DISCONTINUED | OUTPATIENT
Start: 2025-07-23 | End: 2025-07-23

## 2025-07-23 RX ORDER — IBUPROFEN 200 MG
16 TABLET ORAL
Status: DISCONTINUED | OUTPATIENT
Start: 2025-07-23 | End: 2025-07-23

## 2025-07-23 RX ORDER — GLUCAGON 1 MG
1 KIT INJECTION
Status: DISCONTINUED | OUTPATIENT
Start: 2025-07-23 | End: 2025-07-23

## 2025-07-23 RX ORDER — INSULIN ASPART 100 [IU]/ML
0-5 INJECTION, SOLUTION INTRAVENOUS; SUBCUTANEOUS EVERY 4 HOURS PRN
Status: DISCONTINUED | OUTPATIENT
Start: 2025-07-23 | End: 2025-08-12

## 2025-07-23 RX ORDER — IBUPROFEN 200 MG
24 TABLET ORAL
Status: DISCONTINUED | OUTPATIENT
Start: 2025-07-23 | End: 2025-07-23

## 2025-07-23 RX ORDER — SCOPOLAMINE 1 MG/3D
1 PATCH, EXTENDED RELEASE TRANSDERMAL
Status: DISCONTINUED | OUTPATIENT
Start: 2025-07-23 | End: 2025-08-06

## 2025-07-23 RX ORDER — GLUCAGON 1 MG
1 KIT INJECTION
Status: DISCONTINUED | OUTPATIENT
Start: 2025-07-23 | End: 2025-08-12

## 2025-07-23 RX ORDER — ENOXAPARIN SODIUM 100 MG/ML
40 INJECTION SUBCUTANEOUS EVERY 24 HOURS
Status: DISCONTINUED | OUTPATIENT
Start: 2025-07-23 | End: 2025-08-22 | Stop reason: HOSPADM

## 2025-07-23 RX ORDER — FLUCONAZOLE 200 MG/1
200 TABLET ORAL DAILY
Status: DISCONTINUED | OUTPATIENT
Start: 2025-07-23 | End: 2025-07-23

## 2025-07-23 RX ORDER — FLUCONAZOLE 2 MG/ML
200 INJECTION, SOLUTION INTRAVENOUS
Status: DISCONTINUED | OUTPATIENT
Start: 2025-07-24 | End: 2025-07-29

## 2025-07-23 RX ORDER — ONDANSETRON HYDROCHLORIDE 2 MG/ML
4 INJECTION, SOLUTION INTRAVENOUS ONCE
Status: COMPLETED | OUTPATIENT
Start: 2025-07-23 | End: 2025-07-23

## 2025-07-23 RX ORDER — INSULIN ASPART 100 [IU]/ML
2 INJECTION, SOLUTION INTRAVENOUS; SUBCUTANEOUS
Status: DISCONTINUED | OUTPATIENT
Start: 2025-07-23 | End: 2025-07-23

## 2025-07-23 RX ORDER — INSULIN ASPART 100 [IU]/ML
0-5 INJECTION, SOLUTION INTRAVENOUS; SUBCUTANEOUS
Status: DISCONTINUED | OUTPATIENT
Start: 2025-07-23 | End: 2025-07-23

## 2025-07-23 RX ORDER — INSULIN ASPART 100 [IU]/ML
5 INJECTION, SOLUTION INTRAVENOUS; SUBCUTANEOUS
Status: DISCONTINUED | OUTPATIENT
Start: 2025-07-23 | End: 2025-07-24

## 2025-07-23 RX ORDER — MAGNESIUM SULFATE HEPTAHYDRATE 40 MG/ML
2 INJECTION, SOLUTION INTRAVENOUS ONCE
Status: COMPLETED | OUTPATIENT
Start: 2025-07-23 | End: 2025-07-23

## 2025-07-23 RX ADMIN — INSULIN ASPART 5 UNITS: 100 INJECTION, SOLUTION INTRAVENOUS; SUBCUTANEOUS at 08:07

## 2025-07-23 RX ADMIN — ESCITALOPRAM OXALATE 10 MG: 5 TABLET, FILM COATED ORAL at 09:07

## 2025-07-23 RX ADMIN — INSULIN ASPART 5 UNITS: 100 INJECTION, SOLUTION INTRAVENOUS; SUBCUTANEOUS at 09:07

## 2025-07-23 RX ADMIN — PROCHLORPERAZINE EDISYLATE 2.5 MG: 5 INJECTION INTRAMUSCULAR; INTRAVENOUS at 04:07

## 2025-07-23 RX ADMIN — I.V. FAT EMULSION 250 ML: 20 EMULSION INTRAVENOUS at 11:07

## 2025-07-23 RX ADMIN — PIPERACILLIN SODIUM AND TAZOBACTAM SODIUM 4.5 G: 4; .5 INJECTION, POWDER, FOR SOLUTION INTRAVENOUS at 02:07

## 2025-07-23 RX ADMIN — SCOPOLAMINE 1 PATCH: 1.5 PATCH, EXTENDED RELEASE TRANSDERMAL at 02:07

## 2025-07-23 RX ADMIN — POTASSIUM PHOSPHATE, MONOBASIC AND POTASSIUM PHOSPHATE, DIBASIC 20 MMOL: 224; 236 INJECTION, SOLUTION, CONCENTRATE INTRAVENOUS at 02:07

## 2025-07-23 RX ADMIN — PIPERACILLIN SODIUM AND TAZOBACTAM SODIUM 4.5 G: 4; .5 INJECTION, POWDER, FOR SOLUTION INTRAVENOUS at 05:07

## 2025-07-23 RX ADMIN — INSULIN ASPART 4 UNITS: 100 INJECTION, SOLUTION INTRAVENOUS; SUBCUTANEOUS at 05:07

## 2025-07-23 RX ADMIN — INSULIN ASPART 5 UNITS: 100 INJECTION, SOLUTION INTRAVENOUS; SUBCUTANEOUS at 05:07

## 2025-07-23 RX ADMIN — PIPERACILLIN SODIUM AND TAZOBACTAM SODIUM 4.5 G: 4; .5 INJECTION, POWDER, FOR SOLUTION INTRAVENOUS at 09:07

## 2025-07-23 RX ADMIN — INSULIN ASPART 5 UNITS: 100 INJECTION, SOLUTION INTRAVENOUS; SUBCUTANEOUS at 01:07

## 2025-07-23 RX ADMIN — IOHEXOL 15 ML: 350 INJECTION, SOLUTION INTRAVENOUS at 02:07

## 2025-07-23 RX ADMIN — MAGNESIUM SULFATE HEPTAHYDRATE 2 G: 40 INJECTION, SOLUTION INTRAVENOUS at 02:07

## 2025-07-23 RX ADMIN — INSULIN ASPART 2 UNITS: 100 INJECTION, SOLUTION INTRAVENOUS; SUBCUTANEOUS at 01:07

## 2025-07-23 RX ADMIN — SODIUM CHLORIDE, POTASSIUM CHLORIDE, SODIUM LACTATE AND CALCIUM CHLORIDE 500 ML: 600; 310; 30; 20 INJECTION, SOLUTION INTRAVENOUS at 06:07

## 2025-07-23 RX ADMIN — PROCHLORPERAZINE EDISYLATE 2.5 MG: 5 INJECTION INTRAMUSCULAR; INTRAVENOUS at 12:07

## 2025-07-23 RX ADMIN — INSULIN ASPART 2 UNITS: 100 INJECTION, SOLUTION INTRAVENOUS; SUBCUTANEOUS at 08:07

## 2025-07-23 RX ADMIN — MAGNESIUM SULFATE HEPTAHYDRATE: 500 INJECTION, SOLUTION INTRAMUSCULAR; INTRAVENOUS at 11:07

## 2025-07-23 RX ADMIN — ONDANSETRON 4 MG: 2 INJECTION INTRAMUSCULAR; INTRAVENOUS at 01:07

## 2025-07-23 RX ADMIN — IOHEXOL 15 ML: 350 INJECTION, SOLUTION INTRAVENOUS at 01:07

## 2025-07-23 RX ADMIN — FLUCONAZOLE 200 MG: 200 TABLET ORAL at 09:07

## 2025-07-23 RX ADMIN — LEVOTHYROXINE SODIUM 50 MCG: 0.05 TABLET ORAL at 05:07

## 2025-07-23 RX ADMIN — ONDANSETRON 4 MG: 2 INJECTION INTRAMUSCULAR; INTRAVENOUS at 10:07

## 2025-07-23 RX ADMIN — ENOXAPARIN SODIUM 40 MG: 40 INJECTION SUBCUTANEOUS at 05:07

## 2025-07-23 NOTE — ANESTHESIA PREPROCEDURE EVALUATION
[facility]  Anesthesia Pre-Operative Evaluation         Patient Name/: Bessy Lamb, 1960  MRN: [id number]    SUBJECTIVE:     Please refer to the paper consent in patient chart.     Pre-operative evaluation for Procedure(s) (LRB):  EGD (ESOPHAGOGASTRODUODENOSCOPY) (N/A)     2025    Bessy Lamb is a 64 y.o. female w/ a significant PMHx of invasive pancreatic malignancy, T2D, GERD, MVP, hypothyroidism, anemia, HTN. S/p recent caro procedure with Dr. Valentin on .  She re-presented on  with abdominal distension and pain. Patient now presents for the above procedure(s).    Patient denies any other known cardiopulmonary disease.     Level of Care:  [facility]    NPO status:  NPO since midnight    Access:  PICC L brachial  PIV R forearm  RIJ port    Hemodynamics:  Stable     Previous Airway:  Date/Time: 2025 7:29 AM     Performed by: Lane Madrid MD  Authorized by: Wyatt Bliss MD    Intubation:     Induction:  Intravenous    Intubated:  Postinduction    Mask Ventilation:  Easy with oral airway    Attempts:  1    Attempted By:  Resident anesthesiologist    Method of Intubation:  Direct    Blade:  Bianca 3    Laryngeal View Grade: Grade IIA - cords partially seen      Difficult Airway Encountered?: No      Complications:  None    Airway Device:  Oral endotracheal tube    Airway Device Size:  7.0    Style/Cuff Inflation:  Cuffed (inflated to minimal occlusive pressure)    Tube secured:  22    Secured at:  The lips    Placement Verified By:  Capnometry    Complicating Factors:  None    Findings Post-Intubation:  BS equal bilateral     ________________________________________  Echo: No results found for this or any previous visit.    ________________________________________    LDA:   PICC Single Lumen 25 1555 left brachial (Active)   Line Necessity Review Longterm central access required 25 1555   Verification by X-ray Yes 25 155   Site  Assessment No drainage;No redness;No swelling;No warmth 07/22/25 1555   Extremity Assessment Distal to IV No abnormal discoloration 07/22/25 1555   Line Securement Device Secured with sutureless device 07/22/25 1555   Dressing Type CHG impregnated dressing/sponge;Central line dressing 07/22/25 1555   Dressing Status Clean;Dry;Intact 07/22/25 1555   Dressing Intervention First dressing 07/22/25 1555   Date on Dressing 07/22/25 07/22/25 1555   Dressing Due to be Changed 07/29/25 07/22/25 1555   Distal Patency/Care blood return present;flushed w/o difficulty;normal saline locked 07/22/25 1555   Current Insertion Depth (cm) 34 cm 07/22/25 1555   Current Exposed Catheter (cm) 0 cm 07/22/25 1555   Extremity Circumference (cm) 26 cm 07/22/25 1555   Number of days: 0            PowerPort A Cath Single Lumen 07/18/24 1128 Internal Jugular Right (Active)   Site Assessment No redness;No drainage;No swelling;No warmth 07/21/25 2000   Status Not Accessed 07/22/25 2006   Number of days: 370       Peripheral IV 07/22/25 1700 Anterior;Proximal;Right Forearm (Active)   Number of days: 0            Closed/Suction Drain 07/21/25 1514 Medial;Superior LUQ Bulb 14 Fr. (Active)   Site Description Healing 07/22/25 2006   Dressing Type Transparent (Tegaderm) 07/22/25 2006   Dressing Status Clean;Dry;Intact 07/22/25 2006   Dressing Intervention Integrity maintained 07/22/25 2006   Drainage Tan 07/22/25 2006   Status To bulb suction 07/22/25 2006   Output (mL) 0 mL 07/23/25 1215   Number of days: 1            Closed/Suction Drain 07/21/25 1534 Medial LLQ Bulb 10 Fr. (Active)   Site Description Healing 07/22/25 2006   Dressing Type Transparent (Tegaderm) 07/22/25 2006   Dressing Status Clean;Dry;Intact 07/22/25 2006   Dressing Intervention Integrity maintained 07/22/25 2006   Drainage Yellow;Milky 07/22/25 2006   Status To bulb suction 07/22/25 2006   Output (mL) 30 mL 07/23/25 1215   Number of days: 1       Drips:    Standard Custom Day One  ADULT TPN for patient WITH electrolyte abnormality or renal dysfunction (CENTRAL)   Intravenous Continuous 42 mL/hr at 25 2228 New Bag at 25 2228    TPN ADULT CENTRAL LINE CUSTOM   Intravenous Continuous           Problem List[1]    Review of patient's allergies indicates:   Allergen Reactions    Duricef [cefadroxil] Hives    Grass pollen- grass standard Itching       Current Inpatient Medications:    enoxparin  40 mg Subcutaneous Q24H (prophylaxis, 1700)    EScitalopram oxalate  10 mg Oral Daily    fat emulsion 20%  250 mL Intravenous Daily    [START ON 2025] fluconazole (DIFLUCAN) IV (PEDS and ADULTS)  200 mg Intravenous Q24H    insulin aspart U-100  2 Units Subcutaneous 6 times per day    levothyroxine  50 mcg Oral Before breakfast    magnesium sulfate 2 g IVPB  2 g Intravenous Once    piperacillin-tazobactam (Zosyn) IV (PEDS and ADULTS) (extended infusion is not appropriate)  4.5 g Intravenous Q8H    potassium phosphate IVPB  20 mmol Intravenous Once    scopolamine  1 patch Transdermal Q3 Days       Medications Ordered Prior to Encounter[2]    Past Surgical History:   Procedure Laterality Date    BREAST BIOPSY Left     b9    BREAST SURGERY      Reduction cause of a mass in left breast    c-sections x2       SECTION  3/26/81 & 85    Birth of two girls    COLONOSCOPY  2012         COLONOSCOPY N/A 2018    Procedure: COLONOSCOPY;  Surgeon: Francisco Mcclain MD;  Location: [facility];  Service: Endoscopy;  Laterality: N/A;    COLONOSCOPY N/A 10/24/2023    Procedure: COLONOSCOPY;  Surgeon: Francisco Mcclain MD;  Location: [facility];  Service: Endoscopy;  Laterality: N/A;    DISTAL PANCREATECTOMY N/A 2025    Procedure: PANCREATECTOMY, DISTAL, SUBTOTAL open caro, splenectomy;  Surgeon: Flip Valentin MD;  Location: [address];  Service: General;  Laterality: N/A;  Agility BK US probe TECH ONLY booked by TOMMY () for 7:00 a.m case on ()  Conf #  [id number]     ENDOSCOPIC ULTRASOUND OF UPPER GASTROINTESTINAL TRACT Left 7/5/2024    Procedure: ULTRASOUND, UPPER GI TRACT, ENDOSCOPIC;  Surgeon: Andrew Gordon MD;  Location: [facility] ENDO;  Service: Endoscopy;  Laterality: Left;    ESOPHAGOGASTRODUODENOSCOPY N/A 7/5/2024    Procedure: EGD (ESOPHAGOGASTRODUODENOSCOPY);  Surgeon: Andrew Gordon MD;  Location: [facility] ENDO;  Service: Endoscopy;  Laterality: N/A;    hysteroscopy with polypectomy      INCISION AND DRAINAGE Right 2/19/2025    Procedure: Incision and Drainage, RIGHT HAND EXCISION OF INFECTED BONE RIGHT INDEX FINGER;  Surgeon: SALVATORE Chou MD;  Location: [facility] OR;  Service: General;  Laterality: Right;    INCISIONAL BREAST BIOPSY Left 1996    benign; done at same time as reduction    INSERTION OF TUNNELED CENTRAL VENOUS CATHETER (CVC) WITH SUBCUTANEOUS PORT N/A 7/18/2024    Procedure: [id number];  Surgeon: Blanca Dillard MD;  Location: [facility];  Service: General;  Laterality: N/A;    SPLENECTOMY  7/1/2025    Procedure: SPLENECTOMY;  Surgeon: Flip Valentin MD;  [facility];  Service: General;;    TOTAL REDUCTION MAMMOPLASTY Bilateral 1996       Social History:  Tobacco Use: Low Risk  (7/15/2025)    Patient History     Smoking Tobacco Use: Never     Smokeless Tobacco Use: Never     Passive Exposure: Past       Alcohol Use: Not At Risk (7/17/2025)    AUDIT-C     Frequency of Alcohol Consumption: Never     Average Number of Drinks: Patient does not drink     Frequency of Binge Drinking: Never       OBJECTIVE:     Vital Signs Range:  BMI Readings from Last 1 Encounters:   07/15/25 23.04 kg/m²       Temp:  [36.7 °C (98 °F)-37 °C (98.6 °F)]   Pulse:  [77-82]   Resp:  [18]   BP: (137-143)/(64-70)   SpO2:  [94 %-95 %]        Significant Labs:        Component Value Date/Time    WBC 24.01 (H) 07/23/2025 0701    HGB 10.2 (L) 07/23/2025 0701    HGB 11.6 (L) 07/15/2025 0646    HCT 31.3 (L) 07/23/2025 0701    HCT 34.4 (L) 07/15/2025  0646    HCT 28 (L) 07/01/2025 1125     07/23/2025 0701     (H) 07/15/2025 0646     07/23/2025 0701     (L) 07/15/2025 0646    K 3.3 (L) 07/23/2025 0701    K 5.4 (H) 07/15/2025 0646     (H) 07/23/2025 0701    CL 99 07/15/2025 0646    CO2 24 07/23/2025 0701    CO2 22 (L) 07/15/2025 0646     (H) 07/23/2025 0701     (H) 07/15/2025 0646    BUN 30 (H) 07/23/2025 0701    CREATININE 1.2 07/23/2025 0701    MG 1.8 07/23/2025 0701    PHOS 2.2 (L) 07/23/2025 0701    PHOS 5.9 (H) 07/15/2025 0646    CALCIUM 7.9 (L) 07/23/2025 0701    CALCIUM 8.6 (L) 07/15/2025 0646    ALBUMIN 1.5 (L) 07/23/2025 0701    ALBUMIN 2.0 (L) 07/15/2025 0646    PROT 4.7 (L) 07/23/2025 0701    PROT 5.8 (L) 07/15/2025 0646    ALKPHOS 110 07/23/2025 0701    ALKPHOS 96 07/15/2025 0646    BILITOT 0.2 07/23/2025 0701    BILITOT 0.3 07/15/2025 0646    AST 30 07/23/2025 0701    AST 28 07/15/2025 0646    ALT 25 07/23/2025 0701    ALT 12 07/15/2025 0646    INR 1.3 (H) 07/21/2025 0937    INR 2.5 07/14/2025 1003    HGBA1C 5.1 07/02/2025 0336    HGBA1C 7.0 (H) 09/20/2024 1435        Please see Results Review for additional labs.     Diagnostic Studies: No relevant studies.    EKG:   Results for orders placed or performed during the hospital encounter of 07/14/25   EKG 12-lead    Collection Time: 07/14/25  9:44 AM   Result Value Ref Range    QRS Duration 84 ms    [facility] QTC Calculation 479 ms    Narrative    Test Reason : R53.1,    Vent. Rate : 124 BPM     Atrial Rate : 124 BPM     P-R Int : 130 ms          QRS Dur :  84 ms      QT Int : 334 ms       P-R-T Axes :  59  25  69 degrees    QTcB Int : 479 ms    Sinus tachycardia  Cannot rule out Inferior infarct ,age undetermined  Abnormal ECG  When compared with ECG of 19-Feb-2025 12:52,  Vent. rate has increased by  45 bpm  Confirmed by Theodore Ortiz ([id number]) on 7/19/2025 4:07:17 PM    Referred By: AAAREFERRAL SELF           Confirmed By: Theodore Ortiz       ECHO:  See subjective, if  available.      ASSESSMENT/PLAN:         Pre-op Assessment    I have reviewed the Patient Summary Reports.     I have reviewed the Nursing Notes. I have reviewed the NPO Status.   I have reviewed the Medications.     Review of Systems  Anesthesia Hx:  No problems with previous Anesthesia             Denies Family Hx of Anesthesia complications.    Denies Personal Hx of Anesthesia complications.                    Social:  No Alcohol Use, Non-Smoker       Hematology/Oncology:  Hematology Normal                     Current/Recent Cancer.                EENT/Dental:  EENT/Dental Normal           Cardiovascular:     Hypertension                                          Pulmonary:  Pulmonary Normal                       Renal/:  Renal/ Normal                 Hepatic/GI:     GERD                Musculoskeletal:  Arthritis               Neurological:  Neurology Normal                                      Endocrine:  Diabetes Hypothyroidism          Dermatological:  Skin Normal    Psych:  Psychiatric History                  Physical Exam  General: Well nourished, Cooperative, Alert and Oriented    Airway:  Mallampati: II   Mouth Opening: Normal  TM Distance: Normal  Tongue: Normal  Neck ROM: Normal ROM    Dental:  Intact, Dentures    Chest/Lungs:  Normal Respiratory Rate    Heart:  Rate: Normal  Rhythm: Regular Rhythm        Anesthesia Plan  Type of Anesthesia, risks & benefits discussed:    Anesthesia Type: Gen ETT  Intra-op Monitoring Plan: Standard ASA Monitors  Post Op Pain Control Plan: multimodal analgesia and IV/PO Opioids PRN  Induction:  IV and rapid sequence  Airway Plan: Direct and Video, Post-Induction  Informed Consent: Informed consent signed with the Patient and all parties understand the risks and agree with anesthesia plan.  All questions answered.   ASA Score: 3  Day of Surgery Review of History & Physical: H&P Update referred to the surgeon/provider.    Ready For Surgery From Anesthesia Perspective.      .           [1]   Patient Active Problem List  Diagnosis    Hypothyroidism    Primary osteoarthritis involving multiple joints    Mitral valve prolapse    Type 2 diabetes mellitus without complication, without long-term current use of insulin    Primary hypertension    Pulmonary nodules    Malignant neoplasm of pancreas    Chemotherapy induced diarrhea    Thrombocytopenia    Normocytic anemia    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therapy    ACP (advance care planning)    Pyogenic arthritis of right hand    Other acute osteomyelitis, multiple sites    Major depressive disorder    Acute osteomyelitis of hand including fingers    Cat bite of hand, initial encounter    Macular rash    Chemotherapy-induced neutropenia    Chemotherapy-induced thrombocytopenia    Post-operative complication    Sepsis with acute renal failure    SHERON (acute kidney injury)    Hyperkalemia    Encephalopathy, metabolic    Ascites    BMI 23.0-23.9, adult    Intraabdominal fluid collection    Hypoalbuminemia due to protein-calorie malnutrition    S/P abdominal paracentesis   [2]   No current facility-administered medications on file prior to encounter.     Current Outpatient Medications on File Prior to Encounter   Medication Sig Dispense Refill    acetaminophen (TYLENOL) 325 MG tablet Take 650 mg by mouth daily as needed for Pain.      apixaban (ELIQUIS) 2.5 mg Tab Take 1 tablet (2.5 mg total) by mouth 2 (two) times daily. 56 tablet 0    aspirin 81 MG Chew Chew and swallow 1 tablet (81 mg total) by mouth once daily. 90 tablet 0    EScitalopram oxalate (LEXAPRO) 10 MG tablet Take 1 tablet (10 mg total) by mouth once daily. 30 tablet 2    fluticasone propionate (FLONASE) 50 mcg/actuation nasal spray 1 spray (50 mcg total) by Each Nostril route once daily. (Patient taking differently: 1 spray by Each Nostril route daily as needed for Rhinitis or Allergies.) 18.2 mL 0    ibuprofen (ADVIL,MOTRIN) 800 MG tablet Take 1 tablet (800 mg total) by mouth  every 8 (eight) hours as needed for Pain. 42 tablet 1    levothyroxine (SYNTHROID) 50 MCG tablet Take 1 tablet (50 mcg total) by mouth once daily. 90 tablet 3    loratadine (CLARITIN) 10 mg tablet Take 1 tablet (10 mg total) by mouth every morning. 30 tablet 11    LORazepam (ATIVAN) 0.5 MG tablet Take 1 tablet (0.5 mg total) by mouth every 6 (six) hours as needed for Anxiety. (Patient not taking: Reported on 7/14/2025) 30 tablet 0    oxyCODONE (ROXICODONE) 5 MG immediate release tablet Take 1 tablet (5 mg total) by mouth every 4 (four) hours as needed for Pain. 15 tablet 0    polyethylene glycol (GLYCOLAX) 17 gram/dose powder Use to cap to measure 17g, mix with liquid, and take by mouth 2 (two) times daily. (Patient not taking: Reported on 7/14/2025) 952 g 0    potassium chloride 10% (KAYCIEL) 20 mEq/15 mL oral solution Take 30 mLs by mouth once daily.      simvastatin (ZOCOR) 10 MG tablet Take 1 tablet (10 mg total) by mouth every evening. 90 tablet 3    SYNJARDY XR 10-1,000 mg TBph Take 1 tablet by mouth once daily. 30 tablet 11    VITAMIN D2 1,250 mcg (50,000 unit) capsule Take 1 capsule (50,000 Units total) by mouth twice a week. (Patient taking differently: Take 50,000 Units by mouth every Monday and Thursday.) 8 capsule 11

## 2025-07-24 ENCOUNTER — ANESTHESIA (OUTPATIENT)
Dept: SURGERY | Facility: HOSPITAL | Age: 65
End: 2025-07-24
Payer: MEDICARE

## 2025-07-24 LAB
ABSOLUTE EOSINOPHIL (OHS): 0.04 K/UL
ABSOLUTE MONOCYTE (OHS): 1.34 K/UL (ref 0.3–1)
ABSOLUTE NEUTROPHIL COUNT (OHS): 18.62 K/UL (ref 1.8–7.7)
ALBUMIN SERPL BCP-MCNC: 1.5 G/DL (ref 3.5–5.2)
ALP SERPL-CCNC: 105 UNIT/L (ref 40–150)
ALT SERPL W/O P-5'-P-CCNC: 20 UNIT/L (ref 10–44)
ANION GAP (OHS): 11 MMOL/L (ref 8–16)
ANION GAP (OHS): 8 MMOL/L (ref 8–16)
AST SERPL-CCNC: 28 UNIT/L (ref 11–45)
BASOPHILS # BLD AUTO: 0.06 K/UL
BASOPHILS NFR BLD AUTO: 0.3 %
BILIRUB SERPL-MCNC: 0.2 MG/DL (ref 0.1–1)
BUN SERPL-MCNC: 24 MG/DL (ref 8–23)
BUN SERPL-MCNC: 24 MG/DL (ref 8–23)
CALCIUM SERPL-MCNC: 7.9 MG/DL (ref 8.7–10.5)
CALCIUM SERPL-MCNC: 8.2 MG/DL (ref 8.7–10.5)
CHLORIDE SERPL-SCNC: 107 MMOL/L (ref 95–110)
CHLORIDE SERPL-SCNC: 108 MMOL/L (ref 95–110)
CO2 SERPL-SCNC: 28 MMOL/L (ref 23–29)
CO2 SERPL-SCNC: 29 MMOL/L (ref 23–29)
CREAT SERPL-MCNC: 0.9 MG/DL (ref 0.5–1.4)
CREAT SERPL-MCNC: 1 MG/DL (ref 0.5–1.4)
ERYTHROCYTE [DISTWIDTH] IN BLOOD BY AUTOMATED COUNT: 13.4 % (ref 11.5–14.5)
GFR SERPLBLD CREATININE-BSD FMLA CKD-EPI: >60 ML/MIN/1.73/M2
GFR SERPLBLD CREATININE-BSD FMLA CKD-EPI: >60 ML/MIN/1.73/M2
GLUCOSE SERPL-MCNC: 206 MG/DL (ref 70–110)
GLUCOSE SERPL-MCNC: 213 MG/DL (ref 70–110)
HCT VFR BLD AUTO: 30.4 % (ref 37–48.5)
HGB BLD-MCNC: 10 GM/DL (ref 12–16)
IMM GRANULOCYTES # BLD AUTO: 0.21 K/UL (ref 0–0.04)
IMM GRANULOCYTES NFR BLD AUTO: 1 % (ref 0–0.5)
LYMPHOCYTES # BLD AUTO: 0.39 K/UL (ref 1–4.8)
MAGNESIUM SERPL-MCNC: 2.1 MG/DL (ref 1.6–2.6)
MCH RBC QN AUTO: 30.7 PG (ref 27–31)
MCHC RBC AUTO-ENTMCNC: 32.9 G/DL (ref 32–36)
MCV RBC AUTO: 93 FL (ref 82–98)
NUCLEATED RBC (/100WBC) (OHS): 0 /100 WBC
OHS QRS DURATION: 94 MS
OHS QTC CALCULATION: 429 MS
PHOSPHATE SERPL-MCNC: 2.3 MG/DL (ref 2.7–4.5)
PLATELET # BLD AUTO: 351 K/UL (ref 150–450)
PMV BLD AUTO: 10.6 FL (ref 9.2–12.9)
POCT GLUCOSE: 181 MG/DL (ref 70–110)
POCT GLUCOSE: 196 MG/DL (ref 70–110)
POCT GLUCOSE: 261 MG/DL (ref 70–110)
POCT GLUCOSE: 268 MG/DL (ref 70–110)
POCT GLUCOSE: 272 MG/DL (ref 70–110)
POCT GLUCOSE: 281 MG/DL (ref 70–110)
POCT GLUCOSE: 292 MG/DL (ref 70–110)
POCT GLUCOSE: 313 MG/DL (ref 70–110)
POCT GLUCOSE: 348 MG/DL (ref 70–110)
POTASSIUM SERPL-SCNC: 2.6 MMOL/L (ref 3.5–5.1)
POTASSIUM SERPL-SCNC: 3.1 MMOL/L (ref 3.5–5.1)
PROT SERPL-MCNC: 4.8 GM/DL (ref 6–8.4)
RBC # BLD AUTO: 3.26 M/UL (ref 4–5.4)
RELATIVE EOSINOPHIL (OHS): 0.2 %
RELATIVE LYMPHOCYTE (OHS): 1.9 % (ref 18–48)
RELATIVE MONOCYTE (OHS): 6.5 % (ref 4–15)
RELATIVE NEUTROPHIL (OHS): 90.1 % (ref 38–73)
SODIUM SERPL-SCNC: 145 MMOL/L (ref 136–145)
SODIUM SERPL-SCNC: 146 MMOL/L (ref 136–145)
WBC # BLD AUTO: 20.66 K/UL (ref 3.9–12.7)

## 2025-07-24 PROCEDURE — 63600175 PHARM REV CODE 636 W HCPCS: Performed by: NURSE PRACTITIONER

## 2025-07-24 PROCEDURE — 25000003 PHARM REV CODE 250

## 2025-07-24 PROCEDURE — 63600175 PHARM REV CODE 636 W HCPCS

## 2025-07-24 PROCEDURE — B4185 PARENTERAL SOL 10 GM LIPIDS: HCPCS

## 2025-07-24 PROCEDURE — 43246 EGD PLACE GASTROSTOMY TUBE: CPT | Mod: 78,,, | Performed by: SURGERY

## 2025-07-24 PROCEDURE — 71000033 HC RECOVERY, INTIAL HOUR: Performed by: SURGERY

## 2025-07-24 PROCEDURE — 71000016 HC POSTOP RECOV ADDL HR: Performed by: SURGERY

## 2025-07-24 PROCEDURE — 63600175 PHARM REV CODE 636 W HCPCS: Performed by: SURGERY

## 2025-07-24 PROCEDURE — 99232 SBSQ HOSP IP/OBS MODERATE 35: CPT | Mod: ,,, | Performed by: NURSE PRACTITIONER

## 2025-07-24 PROCEDURE — 93005 ELECTROCARDIOGRAM TRACING: CPT

## 2025-07-24 PROCEDURE — A4217 STERILE WATER/SALINE, 500 ML: HCPCS

## 2025-07-24 PROCEDURE — 37000008 HC ANESTHESIA 1ST 15 MINUTES: Performed by: SURGERY

## 2025-07-24 PROCEDURE — 43245 EGD DILATE STRICTURE: CPT | Mod: 51,78,, | Performed by: SURGERY

## 2025-07-24 PROCEDURE — D9220A PRA ANESTHESIA: Mod: ANES,,, | Performed by: ANESTHESIOLOGY

## 2025-07-24 PROCEDURE — 0DH63UZ INSERTION OF FEEDING DEVICE INTO STOMACH, PERCUTANEOUS APPROACH: ICD-10-PCS | Performed by: SURGERY

## 2025-07-24 PROCEDURE — 93010 ELECTROCARDIOGRAM REPORT: CPT | Mod: ,,, | Performed by: INTERNAL MEDICINE

## 2025-07-24 PROCEDURE — 83735 ASSAY OF MAGNESIUM: CPT

## 2025-07-24 PROCEDURE — 71000015 HC POSTOP RECOV 1ST HR: Performed by: SURGERY

## 2025-07-24 PROCEDURE — 0D9W30Z DRAINAGE OF PERITONEUM WITH DRAINAGE DEVICE, PERCUTANEOUS APPROACH: ICD-10-PCS | Performed by: SURGERY

## 2025-07-24 PROCEDURE — D9220A PRA ANESTHESIA: Mod: CRNA,,, | Performed by: NURSE ANESTHETIST, CERTIFIED REGISTERED

## 2025-07-24 PROCEDURE — 37000009 HC ANESTHESIA EA ADD 15 MINS: Performed by: SURGERY

## 2025-07-24 PROCEDURE — 36000707: Performed by: SURGERY

## 2025-07-24 PROCEDURE — 84100 ASSAY OF PHOSPHORUS: CPT

## 2025-07-24 PROCEDURE — 27201423 OPTIME MED/SURG SUP & DEVICES STERILE SUPPLY: Performed by: SURGERY

## 2025-07-24 PROCEDURE — 82310 ASSAY OF CALCIUM: CPT

## 2025-07-24 PROCEDURE — 80053 COMPREHEN METABOLIC PANEL: CPT

## 2025-07-24 PROCEDURE — 63600175 PHARM REV CODE 636 W HCPCS: Performed by: NURSE ANESTHETIST, CERTIFIED REGISTERED

## 2025-07-24 PROCEDURE — 0D768ZZ DILATION OF STOMACH, VIA NATURAL OR ARTIFICIAL OPENING ENDOSCOPIC: ICD-10-PCS | Performed by: SURGERY

## 2025-07-24 PROCEDURE — 85025 COMPLETE CBC W/AUTO DIFF WBC: CPT

## 2025-07-24 PROCEDURE — 25000003 PHARM REV CODE 250: Performed by: SURGERY

## 2025-07-24 PROCEDURE — 25000003 PHARM REV CODE 250: Performed by: NURSE ANESTHETIST, CERTIFIED REGISTERED

## 2025-07-24 PROCEDURE — 20600001 HC STEP DOWN PRIVATE ROOM

## 2025-07-24 PROCEDURE — C1726 CATH, BAL DIL, NON-VASCULAR: HCPCS | Performed by: SURGERY

## 2025-07-24 PROCEDURE — 36000706: Performed by: SURGERY

## 2025-07-24 PROCEDURE — 94761 N-INVAS EAR/PLS OXIMETRY MLT: CPT

## 2025-07-24 RX ORDER — EPHEDRINE SULFATE 50 MG/ML
INJECTION, SOLUTION INTRAVENOUS
Status: DISCONTINUED | OUTPATIENT
Start: 2025-07-24 | End: 2025-07-24

## 2025-07-24 RX ORDER — POTASSIUM CHLORIDE 14.9 MG/ML
20 INJECTION INTRAVENOUS
Status: COMPLETED | OUTPATIENT
Start: 2025-07-24 | End: 2025-07-24

## 2025-07-24 RX ORDER — HALOPERIDOL LACTATE 5 MG/ML
0.5 INJECTION, SOLUTION INTRAMUSCULAR EVERY 10 MIN PRN
Status: DISCONTINUED | OUTPATIENT
Start: 2025-07-24 | End: 2025-07-24 | Stop reason: HOSPADM

## 2025-07-24 RX ORDER — PANTOPRAZOLE SODIUM 40 MG/10ML
40 INJECTION, POWDER, LYOPHILIZED, FOR SOLUTION INTRAVENOUS DAILY
Status: DISCONTINUED | OUTPATIENT
Start: 2025-07-25 | End: 2025-07-24

## 2025-07-24 RX ORDER — HYDROMORPHONE HYDROCHLORIDE 1 MG/ML
0.2 INJECTION, SOLUTION INTRAMUSCULAR; INTRAVENOUS; SUBCUTANEOUS EVERY 6 HOURS PRN
Status: DISCONTINUED | OUTPATIENT
Start: 2025-07-24 | End: 2025-07-26

## 2025-07-24 RX ORDER — INSULIN ASPART 100 [IU]/ML
7 INJECTION, SOLUTION INTRAVENOUS; SUBCUTANEOUS
Status: DISCONTINUED | OUTPATIENT
Start: 2025-07-24 | End: 2025-07-25

## 2025-07-24 RX ORDER — ROCURONIUM BROMIDE 10 MG/ML
INJECTION, SOLUTION INTRAVENOUS
Status: DISCONTINUED | OUTPATIENT
Start: 2025-07-24 | End: 2025-07-24

## 2025-07-24 RX ORDER — GLUCAGON 1 MG
1 KIT INJECTION
Status: DISCONTINUED | OUTPATIENT
Start: 2025-07-24 | End: 2025-07-24 | Stop reason: HOSPADM

## 2025-07-24 RX ORDER — HYDROMORPHONE HYDROCHLORIDE 1 MG/ML
INJECTION, SOLUTION INTRAMUSCULAR; INTRAVENOUS; SUBCUTANEOUS
Status: COMPLETED
Start: 2025-07-24 | End: 2025-07-26

## 2025-07-24 RX ORDER — HYDROMORPHONE HYDROCHLORIDE 1 MG/ML
0.2 INJECTION, SOLUTION INTRAMUSCULAR; INTRAVENOUS; SUBCUTANEOUS EVERY 5 MIN PRN
Status: DISCONTINUED | OUTPATIENT
Start: 2025-07-24 | End: 2025-07-24 | Stop reason: HOSPADM

## 2025-07-24 RX ORDER — PROPOFOL 10 MG/ML
VIAL (ML) INTRAVENOUS
Status: DISCONTINUED | OUTPATIENT
Start: 2025-07-24 | End: 2025-07-24

## 2025-07-24 RX ORDER — FENTANYL CITRATE 50 UG/ML
INJECTION, SOLUTION INTRAMUSCULAR; INTRAVENOUS
Status: DISCONTINUED | OUTPATIENT
Start: 2025-07-24 | End: 2025-07-24

## 2025-07-24 RX ORDER — POTASSIUM CHLORIDE 20 MEQ/1
60 TABLET, EXTENDED RELEASE ORAL ONCE
Status: DISCONTINUED | OUTPATIENT
Start: 2025-07-24 | End: 2025-07-24

## 2025-07-24 RX ORDER — LEVOTHYROXINE SODIUM 20 UG/ML
25 INJECTION, SOLUTION INTRAVENOUS DAILY
Status: DISCONTINUED | OUTPATIENT
Start: 2025-07-25 | End: 2025-08-22 | Stop reason: HOSPADM

## 2025-07-24 RX ORDER — SODIUM CHLORIDE, SODIUM LACTATE, POTASSIUM CHLORIDE, CALCIUM CHLORIDE 600; 310; 30; 20 MG/100ML; MG/100ML; MG/100ML; MG/100ML
INJECTION, SOLUTION INTRAVENOUS CONTINUOUS
Status: ACTIVE | OUTPATIENT
Start: 2025-07-24 | End: 2025-07-25

## 2025-07-24 RX ORDER — PANTOPRAZOLE SODIUM 40 MG/10ML
40 INJECTION, POWDER, LYOPHILIZED, FOR SOLUTION INTRAVENOUS 2 TIMES DAILY
Status: DISCONTINUED | OUTPATIENT
Start: 2025-07-24 | End: 2025-08-19

## 2025-07-24 RX ORDER — SUCCINYLCHOLINE CHLORIDE 20 MG/ML
INJECTION INTRAMUSCULAR; INTRAVENOUS
Status: DISCONTINUED | OUTPATIENT
Start: 2025-07-24 | End: 2025-07-24

## 2025-07-24 RX ORDER — HYDROMORPHONE HYDROCHLORIDE 1 MG/ML
0.5 INJECTION, SOLUTION INTRAMUSCULAR; INTRAVENOUS; SUBCUTANEOUS
Status: DISCONTINUED | OUTPATIENT
Start: 2025-07-24 | End: 2025-07-24

## 2025-07-24 RX ORDER — HYDROMORPHONE HYDROCHLORIDE 1 MG/ML
0.5 INJECTION, SOLUTION INTRAMUSCULAR; INTRAVENOUS; SUBCUTANEOUS
Status: DISPENSED | OUTPATIENT
Start: 2025-07-24 | End: 2025-07-25

## 2025-07-24 RX ORDER — SODIUM CHLORIDE 0.9 % (FLUSH) 0.9 %
10 SYRINGE (ML) INJECTION
Status: DISCONTINUED | OUTPATIENT
Start: 2025-07-24 | End: 2025-07-24 | Stop reason: HOSPADM

## 2025-07-24 RX ORDER — OXYCODONE HYDROCHLORIDE 5 MG/1
5 TABLET ORAL
Status: DISCONTINUED | OUTPATIENT
Start: 2025-07-24 | End: 2025-07-24 | Stop reason: HOSPADM

## 2025-07-24 RX ORDER — LIDOCAINE HYDROCHLORIDE 20 MG/ML
INJECTION INTRAVENOUS
Status: DISCONTINUED | OUTPATIENT
Start: 2025-07-24 | End: 2025-07-24

## 2025-07-24 RX ORDER — POTASSIUM CHLORIDE 14.9 MG/ML
20 INJECTION INTRAVENOUS
Status: DISCONTINUED | OUTPATIENT
Start: 2025-07-24 | End: 2025-07-24

## 2025-07-24 RX ORDER — MIDAZOLAM HYDROCHLORIDE 1 MG/ML
INJECTION INTRAMUSCULAR; INTRAVENOUS
Status: DISCONTINUED | OUTPATIENT
Start: 2025-07-24 | End: 2025-07-24

## 2025-07-24 RX ORDER — ONDANSETRON HYDROCHLORIDE 2 MG/ML
INJECTION, SOLUTION INTRAVENOUS
Status: DISCONTINUED | OUTPATIENT
Start: 2025-07-24 | End: 2025-07-24

## 2025-07-24 RX ORDER — POTASSIUM CHLORIDE 7.45 MG/ML
10 INJECTION INTRAVENOUS
Status: DISCONTINUED | OUTPATIENT
Start: 2025-07-24 | End: 2025-07-24

## 2025-07-24 RX ADMIN — HYDROMORPHONE HYDROCHLORIDE 0.2 MG: 1 INJECTION, SOLUTION INTRAMUSCULAR; INTRAVENOUS; SUBCUTANEOUS at 02:07

## 2025-07-24 RX ADMIN — ROCURONIUM BROMIDE 10 MG: 10 INJECTION, SOLUTION INTRAVENOUS at 01:07

## 2025-07-24 RX ADMIN — PIPERACILLIN SODIUM AND TAZOBACTAM SODIUM 4.5 G: 4; .5 INJECTION, POWDER, FOR SOLUTION INTRAVENOUS at 05:07

## 2025-07-24 RX ADMIN — FENTANYL CITRATE 50 MCG: 50 INJECTION, SOLUTION INTRAMUSCULAR; INTRAVENOUS at 12:07

## 2025-07-24 RX ADMIN — ONDANSETRON 4 MG: 2 INJECTION INTRAMUSCULAR; INTRAVENOUS at 12:07

## 2025-07-24 RX ADMIN — ROCURONIUM BROMIDE 5 MG: 10 INJECTION, SOLUTION INTRAVENOUS at 12:07

## 2025-07-24 RX ADMIN — I.V. FAT EMULSION 250 ML: 20 EMULSION INTRAVENOUS at 10:07

## 2025-07-24 RX ADMIN — HYDROMORPHONE HYDROCHLORIDE 0.2 MG: 1 INJECTION, SOLUTION INTRAMUSCULAR; INTRAVENOUS; SUBCUTANEOUS at 03:07

## 2025-07-24 RX ADMIN — POTASSIUM CHLORIDE: 200 INJECTION, SOLUTION INTRAVENOUS at 12:07

## 2025-07-24 RX ADMIN — FENTANYL CITRATE 25 MCG: 50 INJECTION, SOLUTION INTRAMUSCULAR; INTRAVENOUS at 01:07

## 2025-07-24 RX ADMIN — PROPOFOL 150 MG: 10 INJECTION, EMULSION INTRAVENOUS at 12:07

## 2025-07-24 RX ADMIN — SODIUM CHLORIDE: 0.9 INJECTION, SOLUTION INTRAVENOUS at 12:07

## 2025-07-24 RX ADMIN — HYDROMORPHONE HYDROCHLORIDE 0.5 MG: 1 INJECTION, SOLUTION INTRAMUSCULAR; INTRAVENOUS; SUBCUTANEOUS at 06:07

## 2025-07-24 RX ADMIN — INSULIN ASPART 3 UNITS: 100 INJECTION, SOLUTION INTRAVENOUS; SUBCUTANEOUS at 05:07

## 2025-07-24 RX ADMIN — INSULIN ASPART 5 UNITS: 100 INJECTION, SOLUTION INTRAVENOUS; SUBCUTANEOUS at 08:07

## 2025-07-24 RX ADMIN — INSULIN ASPART 5 UNITS: 100 INJECTION, SOLUTION INTRAVENOUS; SUBCUTANEOUS at 04:07

## 2025-07-24 RX ADMIN — PANTOPRAZOLE SODIUM 40 MG: 40 INJECTION, POWDER, LYOPHILIZED, FOR SOLUTION INTRAVENOUS at 09:07

## 2025-07-24 RX ADMIN — POTASSIUM CHLORIDE 10 MEQ: 7.46 INJECTION, SOLUTION INTRAVENOUS at 05:07

## 2025-07-24 RX ADMIN — SUCCINYLCHOLINE CHLORIDE 160 MG: 20 INJECTION, SOLUTION INTRAMUSCULAR; INTRAVENOUS; PARENTERAL at 12:07

## 2025-07-24 RX ADMIN — FLUCONAZOLE 200 MG: 2 INJECTION, SOLUTION INTRAVENOUS at 08:07

## 2025-07-24 RX ADMIN — EPHEDRINE SULFATE 5 MG: 50 INJECTION INTRAVENOUS at 12:07

## 2025-07-24 RX ADMIN — EPHEDRINE SULFATE 10 MG: 50 INJECTION INTRAVENOUS at 01:07

## 2025-07-24 RX ADMIN — INSULIN ASPART 1 UNITS: 100 INJECTION, SOLUTION INTRAVENOUS; SUBCUTANEOUS at 09:07

## 2025-07-24 RX ADMIN — INSULIN ASPART 5 UNITS: 100 INJECTION, SOLUTION INTRAVENOUS; SUBCUTANEOUS at 12:07

## 2025-07-24 RX ADMIN — SODIUM CHLORIDE, POTASSIUM CHLORIDE, SODIUM LACTATE AND CALCIUM CHLORIDE: 600; 310; 30; 20 INJECTION, SOLUTION INTRAVENOUS at 06:07

## 2025-07-24 RX ADMIN — SODIUM CHLORIDE, POTASSIUM CHLORIDE, SODIUM LACTATE AND CALCIUM CHLORIDE: 600; 310; 30; 20 INJECTION, SOLUTION INTRAVENOUS at 10:07

## 2025-07-24 RX ADMIN — INSULIN ASPART 3 UNITS: 100 INJECTION, SOLUTION INTRAVENOUS; SUBCUTANEOUS at 02:07

## 2025-07-24 RX ADMIN — ROCURONIUM BROMIDE 20 MG: 10 INJECTION, SOLUTION INTRAVENOUS at 01:07

## 2025-07-24 RX ADMIN — MAGNESIUM SULFATE HEPTAHYDRATE: 500 INJECTION, SOLUTION INTRAMUSCULAR; INTRAVENOUS at 10:07

## 2025-07-24 RX ADMIN — EPHEDRINE SULFATE 10 MG: 50 INJECTION INTRAVENOUS at 12:07

## 2025-07-24 RX ADMIN — INSULIN ASPART 1 UNITS: 100 INJECTION, SOLUTION INTRAVENOUS; SUBCUTANEOUS at 12:07

## 2025-07-24 RX ADMIN — INSULIN ASPART 7 UNITS: 100 INJECTION, SOLUTION INTRAVENOUS; SUBCUTANEOUS at 05:07

## 2025-07-24 RX ADMIN — LIDOCAINE HYDROCHLORIDE 75 MG: 20 INJECTION INTRAVENOUS at 12:07

## 2025-07-24 RX ADMIN — SUGAMMADEX 100 MG: 100 INJECTION, SOLUTION INTRAVENOUS at 02:07

## 2025-07-24 RX ADMIN — PIPERACILLIN SODIUM AND TAZOBACTAM SODIUM 4.5 G: 4; .5 INJECTION, POWDER, FOR SOLUTION INTRAVENOUS at 09:07

## 2025-07-24 RX ADMIN — POTASSIUM CHLORIDE 20 MEQ: 200 INJECTION, SOLUTION INTRAVENOUS at 10:07

## 2025-07-24 RX ADMIN — MIDAZOLAM HYDROCHLORIDE 2 MG: 2 INJECTION, SOLUTION INTRAMUSCULAR; INTRAVENOUS at 12:07

## 2025-07-24 RX ADMIN — ENOXAPARIN SODIUM 40 MG: 40 INJECTION SUBCUTANEOUS at 05:07

## 2025-07-24 RX ADMIN — EPHEDRINE SULFATE 5 MG: 50 INJECTION INTRAVENOUS at 01:07

## 2025-07-24 RX ADMIN — INSULIN ASPART 7 UNITS: 100 INJECTION, SOLUTION INTRAVENOUS; SUBCUTANEOUS at 09:07

## 2025-07-24 RX ADMIN — PIPERACILLIN SODIUM AND TAZOBACTAM SODIUM 4.5 G: 4; .5 INJECTION, POWDER, FOR SOLUTION INTRAVENOUS at 02:07

## 2025-07-24 RX ADMIN — POTASSIUM CHLORIDE 20 MEQ: 200 INJECTION, SOLUTION INTRAVENOUS at 05:07

## 2025-07-24 RX ADMIN — POTASSIUM PHOSPHATE, MONOBASIC AND POTASSIUM PHOSPHATE, DIBASIC 15 MMOL: 224; 236 INJECTION, SOLUTION, CONCENTRATE INTRAVENOUS at 10:07

## 2025-07-24 NOTE — ANESTHESIA PROCEDURE NOTES
Intubation    Date/Time: 7/24/2025 12:46 PM    Performed by: Tami Ortiz CRNA  Authorized by: Tami Ortiz CRNA    Intubation:     Induction:  Rapid sequence induction    Intubated:  Postinduction    Mask Ventilation:  Not attempted    Attempts:  1    Attempted By:  CRNA    Method of Intubation:  Video laryngoscopy    Blade:  Wilhelm 3    Laryngeal View Grade: Grade I - full view of cords      Difficult Airway Encountered?: No      Complications:  None    Airway Device:  Oral endotracheal tube    Airway Device Size:  7.0    Style/Cuff Inflation:  Cuffed (inflated to minimal occlusive pressure)    Tube secured:  22    Secured at:  The lips    Placement Verified By:  Capnometry    Complicating Factors:  None    Findings Post-Intubation:  BS equal bilateral and atraumatic/condition of teeth unchanged

## 2025-07-24 NOTE — TRANSFER OF CARE
"Anesthesia Transfer of Care Note    Patient: Bessy Lamb    Procedure(s) Performed: Procedure(s) (LRB):  EGD (ESOPHAGOGASTRODUODENOSCOPY) (N/A)    Patient location: PACU    Anesthesia Type: general    Transport from OR: Transported from OR on 100% O2 by closed face mask with adequate spontaneous ventilation    Post pain: adequate analgesia    Post assessment: no apparent anesthetic complications and tolerated procedure well    Post vital signs: stable    Level of consciousness: awake    Nausea/Vomiting: no nausea/vomiting    Complications: none    Transfer of care protocol was followed      Last vitals: Visit Vitals  /63 (BP Location: Right arm, Patient Position: Sitting)   Pulse 70   Temp 36.6 °C (97.9 °F) (Temporal)   Resp 18   Ht 5' 1" (1.549 m)   Wt 55.3 kg (121 lb 14.6 oz)   SpO2 95%   BMI 23.04 kg/m²     "

## 2025-07-25 LAB
ABSOLUTE EOSINOPHIL (OHS): 0 K/UL
ABSOLUTE MONOCYTE (OHS): 1.28 K/UL (ref 0.3–1)
ABSOLUTE NEUTROPHIL COUNT (OHS): 20.63 K/UL (ref 1.8–7.7)
ALBUMIN SERPL BCP-MCNC: 1.4 G/DL (ref 3.5–5.2)
ALP SERPL-CCNC: 99 UNIT/L (ref 40–150)
ALT SERPL W/O P-5'-P-CCNC: 12 UNIT/L (ref 0–55)
ANION GAP (OHS): 10 MMOL/L (ref 8–16)
AST SERPL-CCNC: 28 UNIT/L (ref 0–50)
BASOPHILS # BLD AUTO: 0.06 K/UL
BASOPHILS NFR BLD AUTO: 0.3 %
BILIRUB SERPL-MCNC: 0.1 MG/DL (ref 0.1–1)
BUN SERPL-MCNC: 23 MG/DL (ref 8–23)
CALCIUM SERPL-MCNC: 7.6 MG/DL (ref 8.7–10.5)
CHLORIDE SERPL-SCNC: 106 MMOL/L (ref 95–110)
CO2 SERPL-SCNC: 29 MMOL/L (ref 23–29)
CREAT SERPL-MCNC: 0.8 MG/DL (ref 0.5–1.4)
ERYTHROCYTE [DISTWIDTH] IN BLOOD BY AUTOMATED COUNT: 13.6 % (ref 11.5–14.5)
GFR SERPLBLD CREATININE-BSD FMLA CKD-EPI: >60 ML/MIN/1.73/M2
GLUCOSE SERPL-MCNC: 181 MG/DL (ref 70–110)
HCT VFR BLD AUTO: 29.4 % (ref 37–48.5)
HGB BLD-MCNC: 9.6 GM/DL (ref 12–16)
IMM GRANULOCYTES # BLD AUTO: 0.21 K/UL (ref 0–0.04)
IMM GRANULOCYTES NFR BLD AUTO: 0.9 % (ref 0–0.5)
LYMPHOCYTES # BLD AUTO: 0.28 K/UL (ref 1–4.8)
MAGNESIUM SERPL-MCNC: 1.9 MG/DL (ref 1.6–2.6)
MCH RBC QN AUTO: 30.4 PG (ref 27–31)
MCHC RBC AUTO-ENTMCNC: 32.7 G/DL (ref 32–36)
MCV RBC AUTO: 93 FL (ref 82–98)
NUCLEATED RBC (/100WBC) (OHS): 0 /100 WBC
PHOSPHATE SERPL-MCNC: 2.3 MG/DL (ref 2.7–4.5)
PLATELET # BLD AUTO: 316 K/UL (ref 150–450)
PMV BLD AUTO: 11.1 FL (ref 9.2–12.9)
POCT GLUCOSE: 149 MG/DL (ref 70–110)
POCT GLUCOSE: 167 MG/DL (ref 70–110)
POCT GLUCOSE: 208 MG/DL (ref 70–110)
POCT GLUCOSE: 221 MG/DL (ref 70–110)
POCT GLUCOSE: 243 MG/DL (ref 70–110)
POCT GLUCOSE: 249 MG/DL (ref 70–110)
POTASSIUM SERPL-SCNC: 3.2 MMOL/L (ref 3.5–5.1)
PROT SERPL-MCNC: 4.6 GM/DL (ref 6–8.4)
RBC # BLD AUTO: 3.16 M/UL (ref 4–5.4)
RELATIVE EOSINOPHIL (OHS): 0 %
RELATIVE LYMPHOCYTE (OHS): 1.2 % (ref 18–48)
RELATIVE MONOCYTE (OHS): 5.7 % (ref 4–15)
RELATIVE NEUTROPHIL (OHS): 91.9 % (ref 38–73)
SODIUM SERPL-SCNC: 145 MMOL/L (ref 136–145)
WBC # BLD AUTO: 22.46 K/UL (ref 3.9–12.7)

## 2025-07-25 PROCEDURE — 99900035 HC TECH TIME PER 15 MIN (STAT)

## 2025-07-25 PROCEDURE — 20600001 HC STEP DOWN PRIVATE ROOM

## 2025-07-25 PROCEDURE — 80053 COMPREHEN METABOLIC PANEL: CPT

## 2025-07-25 PROCEDURE — 63600175 PHARM REV CODE 636 W HCPCS

## 2025-07-25 PROCEDURE — 85025 COMPLETE CBC W/AUTO DIFF WBC: CPT

## 2025-07-25 PROCEDURE — 25000003 PHARM REV CODE 250

## 2025-07-25 PROCEDURE — 99232 SBSQ HOSP IP/OBS MODERATE 35: CPT | Mod: ,,, | Performed by: NURSE PRACTITIONER

## 2025-07-25 PROCEDURE — 63600175 PHARM REV CODE 636 W HCPCS: Performed by: NURSE PRACTITIONER

## 2025-07-25 PROCEDURE — A4217 STERILE WATER/SALINE, 500 ML: HCPCS | Performed by: NURSE PRACTITIONER

## 2025-07-25 PROCEDURE — 25000003 PHARM REV CODE 250: Performed by: PSYCHIATRY & NEUROLOGY

## 2025-07-25 PROCEDURE — 63600175 PHARM REV CODE 636 W HCPCS: Performed by: PSYCHIATRY & NEUROLOGY

## 2025-07-25 PROCEDURE — 83735 ASSAY OF MAGNESIUM: CPT

## 2025-07-25 PROCEDURE — B4185 PARENTERAL SOL 10 GM LIPIDS: HCPCS

## 2025-07-25 PROCEDURE — 84100 ASSAY OF PHOSPHORUS: CPT

## 2025-07-25 PROCEDURE — 51798 US URINE CAPACITY MEASURE: CPT

## 2025-07-25 PROCEDURE — 25000003 PHARM REV CODE 250: Performed by: NURSE PRACTITIONER

## 2025-07-25 RX ORDER — POTASSIUM CHLORIDE 7.45 MG/ML
10 INJECTION INTRAVENOUS
Status: COMPLETED | OUTPATIENT
Start: 2025-07-25 | End: 2025-07-25

## 2025-07-25 RX ORDER — INSULIN ASPART 100 [IU]/ML
9 INJECTION, SOLUTION INTRAVENOUS; SUBCUTANEOUS
Status: DISCONTINUED | OUTPATIENT
Start: 2025-07-25 | End: 2025-07-26

## 2025-07-25 RX ADMIN — INSULIN ASPART 9 UNITS: 100 INJECTION, SOLUTION INTRAVENOUS; SUBCUTANEOUS at 08:07

## 2025-07-25 RX ADMIN — PIPERACILLIN SODIUM AND TAZOBACTAM SODIUM 4.5 G: 4; .5 INJECTION, POWDER, FOR SOLUTION INTRAVENOUS at 12:07

## 2025-07-25 RX ADMIN — POTASSIUM PHOSPHATE, MONOBASIC AND POTASSIUM PHOSPHATE, DIBASIC 30 MMOL: 224; 236 INJECTION, SOLUTION, CONCENTRATE INTRAVENOUS at 12:07

## 2025-07-25 RX ADMIN — INSULIN ASPART 1 UNITS: 100 INJECTION, SOLUTION INTRAVENOUS; SUBCUTANEOUS at 01:07

## 2025-07-25 RX ADMIN — INSULIN ASPART 1 UNITS: 100 INJECTION, SOLUTION INTRAVENOUS; SUBCUTANEOUS at 08:07

## 2025-07-25 RX ADMIN — PIPERACILLIN SODIUM AND TAZOBACTAM SODIUM 4.5 G: 4; .5 INJECTION, POWDER, FOR SOLUTION INTRAVENOUS at 03:07

## 2025-07-25 RX ADMIN — PANTOPRAZOLE SODIUM 40 MG: 40 INJECTION, POWDER, LYOPHILIZED, FOR SOLUTION INTRAVENOUS at 08:07

## 2025-07-25 RX ADMIN — INSULIN ASPART 2 UNITS: 100 INJECTION, SOLUTION INTRAVENOUS; SUBCUTANEOUS at 05:07

## 2025-07-25 RX ADMIN — ENOXAPARIN SODIUM 40 MG: 40 INJECTION SUBCUTANEOUS at 04:07

## 2025-07-25 RX ADMIN — POTASSIUM CHLORIDE 10 MEQ: 7.46 INJECTION, SOLUTION INTRAVENOUS at 11:07

## 2025-07-25 RX ADMIN — MAGNESIUM SULFATE HEPTAHYDRATE: 500 INJECTION, SOLUTION INTRAMUSCULAR; INTRAVENOUS at 09:07

## 2025-07-25 RX ADMIN — I.V. FAT EMULSION 250 ML: 20 EMULSION INTRAVENOUS at 09:07

## 2025-07-25 RX ADMIN — SODIUM CHLORIDE, POTASSIUM CHLORIDE, SODIUM LACTATE AND CALCIUM CHLORIDE: 600; 310; 30; 20 INJECTION, SOLUTION INTRAVENOUS at 08:07

## 2025-07-25 RX ADMIN — HYDROMORPHONE HYDROCHLORIDE 0.2 MG: 1 INJECTION, SOLUTION INTRAMUSCULAR; INTRAVENOUS; SUBCUTANEOUS at 11:07

## 2025-07-25 RX ADMIN — LEVOTHYROXINE SODIUM 25 MCG: 20 INJECTION, SOLUTION INTRAVENOUS at 08:07

## 2025-07-25 RX ADMIN — INSULIN ASPART 7 UNITS: 100 INJECTION, SOLUTION INTRAVENOUS; SUBCUTANEOUS at 05:07

## 2025-07-25 RX ADMIN — PIPERACILLIN SODIUM AND TAZOBACTAM SODIUM 4.5 G: 4; .5 INJECTION, POWDER, FOR SOLUTION INTRAVENOUS at 08:07

## 2025-07-25 RX ADMIN — FLUCONAZOLE 200 MG: 2 INJECTION, SOLUTION INTRAVENOUS at 08:07

## 2025-07-25 RX ADMIN — HYDROMORPHONE HYDROCHLORIDE 0.2 MG: 1 INJECTION, SOLUTION INTRAMUSCULAR; INTRAVENOUS; SUBCUTANEOUS at 09:07

## 2025-07-25 RX ADMIN — INSULIN ASPART 9 UNITS: 100 INJECTION, SOLUTION INTRAVENOUS; SUBCUTANEOUS at 04:07

## 2025-07-25 RX ADMIN — INSULIN ASPART 9 UNITS: 100 INJECTION, SOLUTION INTRAVENOUS; SUBCUTANEOUS at 12:07

## 2025-07-25 RX ADMIN — INSULIN ASPART 7 UNITS: 100 INJECTION, SOLUTION INTRAVENOUS; SUBCUTANEOUS at 01:07

## 2025-07-25 RX ADMIN — INSULIN ASPART 7 UNITS: 100 INJECTION, SOLUTION INTRAVENOUS; SUBCUTANEOUS at 08:07

## 2025-07-26 LAB
ABSOLUTE EOSINOPHIL (OHS): 0.1 K/UL
ABSOLUTE MONOCYTE (OHS): 1.5 K/UL (ref 0.3–1)
ABSOLUTE NEUTROPHIL COUNT (OHS): 20.01 K/UL (ref 1.8–7.7)
ALBUMIN SERPL BCP-MCNC: 1.4 G/DL (ref 3.5–5.2)
ALP SERPL-CCNC: 130 UNIT/L (ref 40–150)
ALT SERPL W/O P-5'-P-CCNC: 13 UNIT/L (ref 0–55)
ANION GAP (OHS): 3 MMOL/L (ref 8–16)
AST SERPL-CCNC: 33 UNIT/L (ref 0–50)
BACTERIA FLD AEROBE CULT: NORMAL
BASOPHILS # BLD AUTO: 0.06 K/UL
BASOPHILS NFR BLD AUTO: 0.3 %
BILIRUB SERPL-MCNC: 0.1 MG/DL (ref 0.1–1)
BUN SERPL-MCNC: 20 MG/DL (ref 8–23)
CALCIUM SERPL-MCNC: 7.5 MG/DL (ref 8.7–10.5)
CHLORIDE SERPL-SCNC: 103 MMOL/L (ref 95–110)
CO2 SERPL-SCNC: 35 MMOL/L (ref 23–29)
CREAT SERPL-MCNC: 0.7 MG/DL (ref 0.5–1.4)
ERYTHROCYTE [DISTWIDTH] IN BLOOD BY AUTOMATED COUNT: 13.9 % (ref 11.5–14.5)
GFR SERPLBLD CREATININE-BSD FMLA CKD-EPI: >60 ML/MIN/1.73/M2
GLUCOSE SERPL-MCNC: 111 MG/DL (ref 70–110)
GRAM STN SPEC: NORMAL
GRAM STN SPEC: NORMAL
HCT VFR BLD AUTO: 31 % (ref 37–48.5)
HGB BLD-MCNC: 10 GM/DL (ref 12–16)
IMM GRANULOCYTES # BLD AUTO: 0.29 K/UL (ref 0–0.04)
IMM GRANULOCYTES NFR BLD AUTO: 1.3 % (ref 0–0.5)
LYMPHOCYTES # BLD AUTO: 0.44 K/UL (ref 1–4.8)
MAGNESIUM SERPL-MCNC: 1.7 MG/DL (ref 1.6–2.6)
MCH RBC QN AUTO: 30.4 PG (ref 27–31)
MCHC RBC AUTO-ENTMCNC: 32.3 G/DL (ref 32–36)
MCV RBC AUTO: 94 FL (ref 82–98)
NUCLEATED RBC (/100WBC) (OHS): 0 /100 WBC
PHOSPHATE SERPL-MCNC: 3.6 MG/DL (ref 2.7–4.5)
PLATELET # BLD AUTO: 296 K/UL (ref 150–450)
PMV BLD AUTO: 11.5 FL (ref 9.2–12.9)
POCT GLUCOSE: 115 MG/DL (ref 70–110)
POCT GLUCOSE: 117 MG/DL (ref 70–110)
POCT GLUCOSE: 117 MG/DL (ref 70–110)
POCT GLUCOSE: 142 MG/DL (ref 70–110)
POCT GLUCOSE: 196 MG/DL (ref 70–110)
POCT GLUCOSE: 62 MG/DL (ref 70–110)
POCT GLUCOSE: 67 MG/DL (ref 70–110)
POCT GLUCOSE: 79 MG/DL (ref 70–110)
POTASSIUM SERPL-SCNC: 3.5 MMOL/L (ref 3.5–5.1)
PROT SERPL-MCNC: 4.7 GM/DL (ref 6–8.4)
RBC # BLD AUTO: 3.29 M/UL (ref 4–5.4)
RELATIVE EOSINOPHIL (OHS): 0.4 %
RELATIVE LYMPHOCYTE (OHS): 2 % (ref 18–48)
RELATIVE MONOCYTE (OHS): 6.7 % (ref 4–15)
RELATIVE NEUTROPHIL (OHS): 89.3 % (ref 38–73)
SODIUM SERPL-SCNC: 141 MMOL/L (ref 136–145)
WBC # BLD AUTO: 22.4 K/UL (ref 3.9–12.7)

## 2025-07-26 PROCEDURE — 63600175 PHARM REV CODE 636 W HCPCS

## 2025-07-26 PROCEDURE — 85025 COMPLETE CBC W/AUTO DIFF WBC: CPT

## 2025-07-26 PROCEDURE — 83735 ASSAY OF MAGNESIUM: CPT

## 2025-07-26 PROCEDURE — 63600175 PHARM REV CODE 636 W HCPCS: Performed by: NURSE PRACTITIONER

## 2025-07-26 PROCEDURE — 25000003 PHARM REV CODE 250

## 2025-07-26 PROCEDURE — 80053 COMPREHEN METABOLIC PANEL: CPT

## 2025-07-26 PROCEDURE — 84100 ASSAY OF PHOSPHORUS: CPT

## 2025-07-26 PROCEDURE — 20600001 HC STEP DOWN PRIVATE ROOM

## 2025-07-26 PROCEDURE — A4217 STERILE WATER/SALINE, 500 ML: HCPCS

## 2025-07-26 PROCEDURE — B4185 PARENTERAL SOL 10 GM LIPIDS: HCPCS

## 2025-07-26 PROCEDURE — 99232 SBSQ HOSP IP/OBS MODERATE 35: CPT | Mod: ,,, | Performed by: NURSE PRACTITIONER

## 2025-07-26 RX ORDER — FUROSEMIDE 10 MG/ML
40 INJECTION INTRAMUSCULAR; INTRAVENOUS ONCE
Status: COMPLETED | OUTPATIENT
Start: 2025-07-26 | End: 2025-07-26

## 2025-07-26 RX ORDER — INSULIN ASPART 100 [IU]/ML
6 INJECTION, SOLUTION INTRAVENOUS; SUBCUTANEOUS
Status: DISCONTINUED | OUTPATIENT
Start: 2025-07-26 | End: 2025-07-29

## 2025-07-26 RX ORDER — INSULIN ASPART 100 [IU]/ML
8 INJECTION, SOLUTION INTRAVENOUS; SUBCUTANEOUS
Status: DISCONTINUED | OUTPATIENT
Start: 2025-07-26 | End: 2025-07-26

## 2025-07-26 RX ORDER — HYDROMORPHONE HYDROCHLORIDE 1 MG/ML
0.2 INJECTION, SOLUTION INTRAMUSCULAR; INTRAVENOUS; SUBCUTANEOUS EVERY 4 HOURS PRN
Status: DISCONTINUED | OUTPATIENT
Start: 2025-07-26 | End: 2025-08-02

## 2025-07-26 RX ADMIN — LEVOTHYROXINE SODIUM 25 MCG: 20 INJECTION, SOLUTION INTRAVENOUS at 08:07

## 2025-07-26 RX ADMIN — PANTOPRAZOLE SODIUM 40 MG: 40 INJECTION, POWDER, LYOPHILIZED, FOR SOLUTION INTRAVENOUS at 08:07

## 2025-07-26 RX ADMIN — DEXTROSE MONOHYDRATE 12.5 G: 25 INJECTION, SOLUTION INTRAVENOUS at 04:07

## 2025-07-26 RX ADMIN — HYDROMORPHONE HYDROCHLORIDE 0.2 MG: 1 INJECTION, SOLUTION INTRAMUSCULAR; INTRAVENOUS; SUBCUTANEOUS at 02:07

## 2025-07-26 RX ADMIN — INSULIN ASPART 9 UNITS: 100 INJECTION, SOLUTION INTRAVENOUS; SUBCUTANEOUS at 12:07

## 2025-07-26 RX ADMIN — I.V. FAT EMULSION 250 ML: 20 EMULSION INTRAVENOUS at 08:07

## 2025-07-26 RX ADMIN — INSULIN ASPART 8 UNITS: 100 INJECTION, SOLUTION INTRAVENOUS; SUBCUTANEOUS at 12:07

## 2025-07-26 RX ADMIN — ENOXAPARIN SODIUM 40 MG: 40 INJECTION SUBCUTANEOUS at 05:07

## 2025-07-26 RX ADMIN — HYDROMORPHONE HYDROCHLORIDE 0.2 MG: 1 INJECTION, SOLUTION INTRAMUSCULAR; INTRAVENOUS; SUBCUTANEOUS at 01:07

## 2025-07-26 RX ADMIN — PIPERACILLIN SODIUM AND TAZOBACTAM SODIUM 4.5 G: 4; .5 INJECTION, POWDER, FOR SOLUTION INTRAVENOUS at 03:07

## 2025-07-26 RX ADMIN — INSULIN ASPART 9 UNITS: 100 INJECTION, SOLUTION INTRAVENOUS; SUBCUTANEOUS at 08:07

## 2025-07-26 RX ADMIN — FLUCONAZOLE 200 MG: 2 INJECTION, SOLUTION INTRAVENOUS at 08:07

## 2025-07-26 RX ADMIN — INSULIN ASPART 6 UNITS: 100 INJECTION, SOLUTION INTRAVENOUS; SUBCUTANEOUS at 08:07

## 2025-07-26 RX ADMIN — PIPERACILLIN SODIUM AND TAZOBACTAM SODIUM 4.5 G: 4; .5 INJECTION, POWDER, FOR SOLUTION INTRAVENOUS at 06:07

## 2025-07-26 RX ADMIN — HYDROMORPHONE HYDROCHLORIDE 0.2 MG: 1 INJECTION, SOLUTION INTRAMUSCULAR; INTRAVENOUS; SUBCUTANEOUS at 04:07

## 2025-07-26 RX ADMIN — HYDROMORPHONE HYDROCHLORIDE 0.2 MG: 1 INJECTION, SOLUTION INTRAMUSCULAR; INTRAVENOUS; SUBCUTANEOUS at 06:07

## 2025-07-26 RX ADMIN — MAGNESIUM SULFATE HEPTAHYDRATE: 500 INJECTION, SOLUTION INTRAMUSCULAR; INTRAVENOUS at 08:07

## 2025-07-26 RX ADMIN — PIPERACILLIN SODIUM AND TAZOBACTAM SODIUM 4.5 G: 4; .5 INJECTION, POWDER, FOR SOLUTION INTRAVENOUS at 11:07

## 2025-07-26 RX ADMIN — SCOPOLAMINE 1 PATCH: 1.5 PATCH, EXTENDED RELEASE TRANSDERMAL at 03:07

## 2025-07-26 RX ADMIN — FUROSEMIDE 40 MG: 10 INJECTION, SOLUTION INTRAVENOUS at 11:07

## 2025-07-27 LAB
ABSOLUTE EOSINOPHIL (OHS): 0.1 K/UL
ABSOLUTE MONOCYTE (OHS): 0.98 K/UL (ref 0.3–1)
ABSOLUTE NEUTROPHIL COUNT (OHS): 18.61 K/UL (ref 1.8–7.7)
ALBUMIN SERPL BCP-MCNC: 1.3 G/DL (ref 3.5–5.2)
ALP SERPL-CCNC: 124 UNIT/L (ref 40–150)
ALT SERPL W/O P-5'-P-CCNC: <8 UNIT/L (ref 0–55)
ANION GAP (OHS): 9 MMOL/L (ref 8–16)
AST SERPL-CCNC: 25 UNIT/L (ref 0–50)
BASOPHILS # BLD AUTO: 0.04 K/UL
BASOPHILS NFR BLD AUTO: 0.2 %
BILIRUB SERPL-MCNC: 0.2 MG/DL (ref 0.1–1)
BUN SERPL-MCNC: 23 MG/DL (ref 8–23)
CALCIUM SERPL-MCNC: 7.4 MG/DL (ref 8.7–10.5)
CHLORIDE SERPL-SCNC: 99 MMOL/L (ref 95–110)
CO2 SERPL-SCNC: 31 MMOL/L (ref 23–29)
CREAT SERPL-MCNC: 0.8 MG/DL (ref 0.5–1.4)
ERYTHROCYTE [DISTWIDTH] IN BLOOD BY AUTOMATED COUNT: 13.8 % (ref 11.5–14.5)
GFR SERPLBLD CREATININE-BSD FMLA CKD-EPI: >60 ML/MIN/1.73/M2
GLUCOSE SERPL-MCNC: 113 MG/DL (ref 70–110)
HCT VFR BLD AUTO: 30.1 % (ref 37–48.5)
HGB BLD-MCNC: 9.8 GM/DL (ref 12–16)
IMM GRANULOCYTES # BLD AUTO: 0.19 K/UL (ref 0–0.04)
IMM GRANULOCYTES NFR BLD AUTO: 0.9 % (ref 0–0.5)
LYMPHOCYTES # BLD AUTO: 0.51 K/UL (ref 1–4.8)
MAGNESIUM SERPL-MCNC: 1.6 MG/DL (ref 1.6–2.6)
MCH RBC QN AUTO: 30.2 PG (ref 27–31)
MCHC RBC AUTO-ENTMCNC: 32.6 G/DL (ref 32–36)
MCV RBC AUTO: 93 FL (ref 82–98)
NUCLEATED RBC (/100WBC) (OHS): 0 /100 WBC
PHOSPHATE SERPL-MCNC: 3.3 MG/DL (ref 2.7–4.5)
PLATELET # BLD AUTO: 271 K/UL (ref 150–450)
PMV BLD AUTO: 11.7 FL (ref 9.2–12.9)
POCT GLUCOSE: 152 MG/DL (ref 70–110)
POCT GLUCOSE: 155 MG/DL (ref 70–110)
POCT GLUCOSE: 161 MG/DL (ref 70–110)
POCT GLUCOSE: 163 MG/DL (ref 70–110)
POCT GLUCOSE: 184 MG/DL (ref 70–110)
POCT GLUCOSE: 187 MG/DL (ref 70–110)
POTASSIUM SERPL-SCNC: 3 MMOL/L (ref 3.5–5.1)
PROT SERPL-MCNC: 4.6 GM/DL (ref 6–8.4)
RBC # BLD AUTO: 3.25 M/UL (ref 4–5.4)
RELATIVE EOSINOPHIL (OHS): 0.5 %
RELATIVE LYMPHOCYTE (OHS): 2.5 % (ref 18–48)
RELATIVE MONOCYTE (OHS): 4.8 % (ref 4–15)
RELATIVE NEUTROPHIL (OHS): 91.1 % (ref 38–73)
SODIUM SERPL-SCNC: 139 MMOL/L (ref 136–145)
WBC # BLD AUTO: 20.43 K/UL (ref 3.9–12.7)

## 2025-07-27 PROCEDURE — 85025 COMPLETE CBC W/AUTO DIFF WBC: CPT

## 2025-07-27 PROCEDURE — 80053 COMPREHEN METABOLIC PANEL: CPT

## 2025-07-27 PROCEDURE — 83735 ASSAY OF MAGNESIUM: CPT

## 2025-07-27 PROCEDURE — 84100 ASSAY OF PHOSPHORUS: CPT

## 2025-07-27 PROCEDURE — A4217 STERILE WATER/SALINE, 500 ML: HCPCS

## 2025-07-27 PROCEDURE — 63600175 PHARM REV CODE 636 W HCPCS: Performed by: NURSE PRACTITIONER

## 2025-07-27 PROCEDURE — 20600001 HC STEP DOWN PRIVATE ROOM

## 2025-07-27 PROCEDURE — B4185 PARENTERAL SOL 10 GM LIPIDS: HCPCS

## 2025-07-27 PROCEDURE — 63600175 PHARM REV CODE 636 W HCPCS

## 2025-07-27 PROCEDURE — 99232 SBSQ HOSP IP/OBS MODERATE 35: CPT | Mod: ,,, | Performed by: NURSE PRACTITIONER

## 2025-07-27 PROCEDURE — 25000003 PHARM REV CODE 250

## 2025-07-27 PROCEDURE — 27000207 HC ISOLATION

## 2025-07-27 RX ORDER — POTASSIUM CHLORIDE 7.45 MG/ML
10 INJECTION INTRAVENOUS
Status: DISPENSED | OUTPATIENT
Start: 2025-07-27 | End: 2025-07-27

## 2025-07-27 RX ADMIN — PIPERACILLIN SODIUM AND TAZOBACTAM SODIUM 4.5 G: 4; .5 INJECTION, POWDER, FOR SOLUTION INTRAVENOUS at 04:07

## 2025-07-27 RX ADMIN — POTASSIUM CHLORIDE 10 MEQ: 7.46 INJECTION, SOLUTION INTRAVENOUS at 10:07

## 2025-07-27 RX ADMIN — PIPERACILLIN SODIUM AND TAZOBACTAM SODIUM 4.5 G: 4; .5 INJECTION, POWDER, FOR SOLUTION INTRAVENOUS at 10:07

## 2025-07-27 RX ADMIN — INSULIN ASPART 6 UNITS: 100 INJECTION, SOLUTION INTRAVENOUS; SUBCUTANEOUS at 08:07

## 2025-07-27 RX ADMIN — POTASSIUM CHLORIDE 10 MEQ: 7.46 INJECTION, SOLUTION INTRAVENOUS at 09:07

## 2025-07-27 RX ADMIN — POTASSIUM CHLORIDE 10 MEQ: 7.46 INJECTION, SOLUTION INTRAVENOUS at 02:07

## 2025-07-27 RX ADMIN — INSULIN ASPART 6 UNITS: 100 INJECTION, SOLUTION INTRAVENOUS; SUBCUTANEOUS at 04:07

## 2025-07-27 RX ADMIN — PIPERACILLIN SODIUM AND TAZOBACTAM SODIUM 4.5 G: 4; .5 INJECTION, POWDER, FOR SOLUTION INTRAVENOUS at 09:07

## 2025-07-27 RX ADMIN — I.V. FAT EMULSION 250 ML: 20 EMULSION INTRAVENOUS at 08:07

## 2025-07-27 RX ADMIN — PANTOPRAZOLE SODIUM 40 MG: 40 INJECTION, POWDER, LYOPHILIZED, FOR SOLUTION INTRAVENOUS at 08:07

## 2025-07-27 RX ADMIN — MAGNESIUM SULFATE HEPTAHYDRATE: 500 INJECTION, SOLUTION INTRAMUSCULAR; INTRAVENOUS at 08:07

## 2025-07-27 RX ADMIN — INSULIN ASPART 6 UNITS: 100 INJECTION, SOLUTION INTRAVENOUS; SUBCUTANEOUS at 09:07

## 2025-07-27 RX ADMIN — INSULIN ASPART 6 UNITS: 100 INJECTION, SOLUTION INTRAVENOUS; SUBCUTANEOUS at 01:07

## 2025-07-27 RX ADMIN — FLUCONAZOLE 200 MG: 2 INJECTION, SOLUTION INTRAVENOUS at 09:07

## 2025-07-27 RX ADMIN — INSULIN ASPART 6 UNITS: 100 INJECTION, SOLUTION INTRAVENOUS; SUBCUTANEOUS at 12:07

## 2025-07-27 RX ADMIN — POTASSIUM CHLORIDE 10 MEQ: 7.46 INJECTION, SOLUTION INTRAVENOUS at 01:07

## 2025-07-27 RX ADMIN — ENOXAPARIN SODIUM 40 MG: 40 INJECTION SUBCUTANEOUS at 04:07

## 2025-07-27 RX ADMIN — LEVOTHYROXINE SODIUM 25 MCG: 20 INJECTION, SOLUTION INTRAVENOUS at 09:07

## 2025-07-27 RX ADMIN — HYDROMORPHONE HYDROCHLORIDE 0.2 MG: 1 INJECTION, SOLUTION INTRAMUSCULAR; INTRAVENOUS; SUBCUTANEOUS at 01:07

## 2025-07-27 RX ADMIN — PANTOPRAZOLE SODIUM 40 MG: 40 INJECTION, POWDER, LYOPHILIZED, FOR SOLUTION INTRAVENOUS at 09:07

## 2025-07-28 PROBLEM — K65.1 INTRA-ABDOMINAL ABSCESS: Status: ACTIVE | Noted: 2025-07-28

## 2025-07-28 PROBLEM — L89.152 PRESSURE INJURY OF SACRAL REGION, STAGE 2: Status: ACTIVE | Noted: 2025-07-28

## 2025-07-28 LAB
ABSOLUTE EOSINOPHIL (OHS): 0.09 K/UL
ABSOLUTE MONOCYTE (OHS): 1.47 K/UL (ref 0.3–1)
ABSOLUTE NEUTROPHIL COUNT (OHS): 16.71 K/UL (ref 1.8–7.7)
ALBUMIN SERPL BCP-MCNC: 1.4 G/DL (ref 3.5–5.2)
ALP SERPL-CCNC: 109 UNIT/L (ref 40–150)
ALT SERPL W/O P-5'-P-CCNC: <8 UNIT/L (ref 0–55)
ANION GAP (OHS): 9 MMOL/L (ref 8–16)
AST SERPL-CCNC: 28 UNIT/L (ref 0–50)
BACTERIA SPEC ANAEROBE CULT: NORMAL
BASOPHILS # BLD AUTO: 0.05 K/UL
BASOPHILS NFR BLD AUTO: 0.3 %
BILIRUB SERPL-MCNC: 0.2 MG/DL (ref 0.1–1)
BUN SERPL-MCNC: 22 MG/DL (ref 8–23)
CALCIUM SERPL-MCNC: 7.3 MG/DL (ref 8.7–10.5)
CHLORIDE SERPL-SCNC: 101 MMOL/L (ref 95–110)
CO2 SERPL-SCNC: 27 MMOL/L (ref 23–29)
CREAT SERPL-MCNC: 0.8 MG/DL (ref 0.5–1.4)
ERYTHROCYTE [DISTWIDTH] IN BLOOD BY AUTOMATED COUNT: 13.9 % (ref 11.5–14.5)
GFR SERPLBLD CREATININE-BSD FMLA CKD-EPI: >60 ML/MIN/1.73/M2
GLUCOSE SERPL-MCNC: 166 MG/DL (ref 70–110)
HCT VFR BLD AUTO: 29.6 % (ref 37–48.5)
HGB BLD-MCNC: 9.7 GM/DL (ref 12–16)
IMM GRANULOCYTES # BLD AUTO: 0.22 K/UL (ref 0–0.04)
IMM GRANULOCYTES NFR BLD AUTO: 1.2 % (ref 0–0.5)
LYMPHOCYTES # BLD AUTO: 0.52 K/UL (ref 1–4.8)
MAGNESIUM SERPL-MCNC: 1.6 MG/DL (ref 1.6–2.6)
MCH RBC QN AUTO: 30.1 PG (ref 27–31)
MCHC RBC AUTO-ENTMCNC: 32.8 G/DL (ref 32–36)
MCV RBC AUTO: 92 FL (ref 82–98)
NUCLEATED RBC (/100WBC) (OHS): 0 /100 WBC
PHOSPHATE SERPL-MCNC: 3.1 MG/DL (ref 2.7–4.5)
PLATELET # BLD AUTO: 294 K/UL (ref 150–450)
PMV BLD AUTO: 11.7 FL (ref 9.2–12.9)
POCT GLUCOSE: 115 MG/DL (ref 70–110)
POCT GLUCOSE: 116 MG/DL (ref 70–110)
POCT GLUCOSE: 141 MG/DL (ref 70–110)
POCT GLUCOSE: 169 MG/DL (ref 70–110)
POCT GLUCOSE: 181 MG/DL (ref 70–110)
POCT GLUCOSE: 184 MG/DL (ref 70–110)
POCT GLUCOSE: 48 MG/DL (ref 70–110)
POCT GLUCOSE: 98 MG/DL (ref 70–110)
POTASSIUM SERPL-SCNC: 3.4 MMOL/L (ref 3.5–5.1)
PROT SERPL-MCNC: 4.8 GM/DL (ref 6–8.4)
RBC # BLD AUTO: 3.22 M/UL (ref 4–5.4)
RELATIVE EOSINOPHIL (OHS): 0.5 %
RELATIVE LYMPHOCYTE (OHS): 2.7 % (ref 18–48)
RELATIVE MONOCYTE (OHS): 7.7 % (ref 4–15)
RELATIVE NEUTROPHIL (OHS): 87.6 % (ref 38–73)
SODIUM SERPL-SCNC: 137 MMOL/L (ref 136–145)
WBC # BLD AUTO: 19.06 K/UL (ref 3.9–12.7)

## 2025-07-28 PROCEDURE — 63600175 PHARM REV CODE 636 W HCPCS

## 2025-07-28 PROCEDURE — B4185 PARENTERAL SOL 10 GM LIPIDS: HCPCS

## 2025-07-28 PROCEDURE — 84100 ASSAY OF PHOSPHORUS: CPT

## 2025-07-28 PROCEDURE — 27000207 HC ISOLATION

## 2025-07-28 PROCEDURE — 83735 ASSAY OF MAGNESIUM: CPT

## 2025-07-28 PROCEDURE — 25500020 PHARM REV CODE 255

## 2025-07-28 PROCEDURE — 97530 THERAPEUTIC ACTIVITIES: CPT

## 2025-07-28 PROCEDURE — 25000003 PHARM REV CODE 250

## 2025-07-28 PROCEDURE — 63600175 PHARM REV CODE 636 W HCPCS: Mod: JZ,TB | Performed by: INTERNAL MEDICINE

## 2025-07-28 PROCEDURE — 85025 COMPLETE CBC W/AUTO DIFF WBC: CPT

## 2025-07-28 PROCEDURE — 25500020 PHARM REV CODE 255: Performed by: SURGERY

## 2025-07-28 PROCEDURE — A4217 STERILE WATER/SALINE, 500 ML: HCPCS

## 2025-07-28 PROCEDURE — 80053 COMPREHEN METABOLIC PANEL: CPT

## 2025-07-28 PROCEDURE — 25000003 PHARM REV CODE 250: Performed by: INTERNAL MEDICINE

## 2025-07-28 PROCEDURE — G0545 PR VISIT INHERENT TO INPT OR OBS CARE, INFECTIOUS DISEASE: HCPCS | Mod: ,,, | Performed by: INTERNAL MEDICINE

## 2025-07-28 PROCEDURE — 20600001 HC STEP DOWN PRIVATE ROOM

## 2025-07-28 PROCEDURE — 99223 1ST HOSP IP/OBS HIGH 75: CPT | Mod: ,,, | Performed by: INTERNAL MEDICINE

## 2025-07-28 PROCEDURE — 97168 OT RE-EVAL EST PLAN CARE: CPT

## 2025-07-28 PROCEDURE — 99222 1ST HOSP IP/OBS MODERATE 55: CPT | Mod: ,,, | Performed by: NURSE PRACTITIONER

## 2025-07-28 RX ORDER — POTASSIUM CHLORIDE 7.45 MG/ML
10 INJECTION INTRAVENOUS
Status: COMPLETED | OUTPATIENT
Start: 2025-07-28 | End: 2025-07-28

## 2025-07-28 RX ADMIN — LEVOTHYROXINE SODIUM 25 MCG: 20 INJECTION, SOLUTION INTRAVENOUS at 09:07

## 2025-07-28 RX ADMIN — DEXTROSE MONOHYDRATE 25 G: 25 INJECTION, SOLUTION INTRAVENOUS at 04:07

## 2025-07-28 RX ADMIN — POTASSIUM CHLORIDE 10 MEQ: 7.46 INJECTION, SOLUTION INTRAVENOUS at 01:07

## 2025-07-28 RX ADMIN — HYDROMORPHONE HYDROCHLORIDE 0.2 MG: 1 INJECTION, SOLUTION INTRAMUSCULAR; INTRAVENOUS; SUBCUTANEOUS at 06:07

## 2025-07-28 RX ADMIN — PIPERACILLIN SODIUM AND TAZOBACTAM SODIUM 4.5 G: 4; .5 INJECTION, POWDER, FOR SOLUTION INTRAVENOUS at 06:07

## 2025-07-28 RX ADMIN — POTASSIUM CHLORIDE 10 MEQ: 7.46 INJECTION, SOLUTION INTRAVENOUS at 09:07

## 2025-07-28 RX ADMIN — IOHEXOL 75 ML: 350 INJECTION, SOLUTION INTRAVENOUS at 02:07

## 2025-07-28 RX ADMIN — INSULIN ASPART 6 UNITS: 100 INJECTION, SOLUTION INTRAVENOUS; SUBCUTANEOUS at 08:07

## 2025-07-28 RX ADMIN — INSULIN ASPART 6 UNITS: 100 INJECTION, SOLUTION INTRAVENOUS; SUBCUTANEOUS at 09:07

## 2025-07-28 RX ADMIN — INSULIN ASPART 6 UNITS: 100 INJECTION, SOLUTION INTRAVENOUS; SUBCUTANEOUS at 12:07

## 2025-07-28 RX ADMIN — FLUCONAZOLE 200 MG: 2 INJECTION, SOLUTION INTRAVENOUS at 09:07

## 2025-07-28 RX ADMIN — POTASSIUM CHLORIDE 10 MEQ: 7.46 INJECTION, SOLUTION INTRAVENOUS at 10:07

## 2025-07-28 RX ADMIN — POTASSIUM CHLORIDE 10 MEQ: 7.46 INJECTION, SOLUTION INTRAVENOUS at 12:07

## 2025-07-28 RX ADMIN — PANTOPRAZOLE SODIUM 40 MG: 40 INJECTION, POWDER, LYOPHILIZED, FOR SOLUTION INTRAVENOUS at 08:07

## 2025-07-28 RX ADMIN — CEFIDEROCOL SULFATE TOSYLATE 1.5 G: 1 INJECTION, POWDER, FOR SOLUTION INTRAVENOUS at 05:07

## 2025-07-28 RX ADMIN — ONDANSETRON 4 MG: 2 INJECTION INTRAMUSCULAR; INTRAVENOUS at 11:07

## 2025-07-28 RX ADMIN — I.V. FAT EMULSION 250 ML: 20 EMULSION INTRAVENOUS at 09:07

## 2025-07-28 RX ADMIN — IOHEXOL 15 ML: 350 INJECTION, SOLUTION INTRAVENOUS at 11:07

## 2025-07-28 RX ADMIN — ENOXAPARIN SODIUM 40 MG: 40 INJECTION SUBCUTANEOUS at 04:07

## 2025-07-28 RX ADMIN — INSULIN ASPART 6 UNITS: 100 INJECTION, SOLUTION INTRAVENOUS; SUBCUTANEOUS at 04:07

## 2025-07-28 RX ADMIN — PANTOPRAZOLE SODIUM 40 MG: 40 INJECTION, POWDER, LYOPHILIZED, FOR SOLUTION INTRAVENOUS at 09:07

## 2025-07-28 RX ADMIN — MAGNESIUM SULFATE HEPTAHYDRATE: 500 INJECTION, SOLUTION INTRAMUSCULAR; INTRAVENOUS at 09:07

## 2025-07-28 NOTE — ANESTHESIA POSTPROCEDURE EVALUATION
Anesthesia Post Evaluation    Patient: Bessy Lamb    Procedure(s) Performed: Procedure(s) (LRB):  EGD, WITH BALLOON DILATION (N/A)  EGD, WITH PEG TUBE INSERTION    Final Anesthesia Type: general      Patient location during evaluation: PACU      Anesthetic complications: no      Cardiovascular status: blood pressure returned to baseline  Respiratory status: unassisted  Hydration status: euvolemic  Follow-up not needed.              Vitals Value Taken Time   /82 07/28/25 11:49   Temp 36.3 °C (97.4 °F) 07/28/25 11:49   Pulse 81 07/28/25 11:49   Resp 16 07/28/25 11:49   SpO2 97 % 07/28/25 11:49         Event Time   Out of Recovery 07/24/2025 15:15:00         Pain/Jenae Score: Pain Rating Prior to Med Admin: 9 (7/28/2025  6:39 AM)  Pain Rating Post Med Admin: 2 (7/27/2025  1:39 PM)  Jenae Score: 10 (7/28/2025  7:30 AM)

## 2025-07-29 LAB
ALBUMIN SERPL BCP-MCNC: 1.4 G/DL (ref 3.5–5.2)
ALP SERPL-CCNC: 109 UNIT/L (ref 40–150)
ALT SERPL W/O P-5'-P-CCNC: <8 UNIT/L (ref 0–55)
ANION GAP (OHS): 7 MMOL/L (ref 8–16)
APPEARANCE FLD: CLEAR
AST SERPL-CCNC: 24 UNIT/L (ref 0–50)
BACTERIA SPEC ANAEROBE CULT: NORMAL
BILIRUB SERPL-MCNC: 0.1 MG/DL (ref 0.1–1)
BUN SERPL-MCNC: 20 MG/DL (ref 8–23)
CALCIUM SERPL-MCNC: 7.5 MG/DL (ref 8.7–10.5)
CHLORIDE SERPL-SCNC: 105 MMOL/L (ref 95–110)
CO2 SERPL-SCNC: 26 MMOL/L (ref 23–29)
COLOR FLD: YELLOW
CREAT SERPL-MCNC: 0.7 MG/DL (ref 0.5–1.4)
GFR SERPLBLD CREATININE-BSD FMLA CKD-EPI: >60 ML/MIN/1.73/M2
GLUCOSE SERPL-MCNC: 104 MG/DL (ref 70–110)
LYMPHOCYTES NFR FLD MANUAL: 59 %
MAGNESIUM SERPL-MCNC: 1.7 MG/DL (ref 1.6–2.6)
MONOS+MACROS NFR FLD MANUAL: 3 %
NEUTROPHILS NFR FLD MANUAL: 38 %
PHOSPHATE SERPL-MCNC: 3 MG/DL (ref 2.7–4.5)
POCT GLUCOSE: 110 MG/DL (ref 70–110)
POCT GLUCOSE: 119 MG/DL (ref 70–110)
POCT GLUCOSE: 127 MG/DL (ref 70–110)
POCT GLUCOSE: 145 MG/DL (ref 70–110)
POCT GLUCOSE: 210 MG/DL (ref 70–110)
POTASSIUM SERPL-SCNC: 4.7 MMOL/L (ref 3.5–5.1)
PROT SERPL-MCNC: 5 GM/DL (ref 6–8.4)
SODIUM SERPL-SCNC: 138 MMOL/L (ref 136–145)
WBC # FLD: 91 /CU MM

## 2025-07-29 PROCEDURE — 87205 SMEAR GRAM STAIN: CPT | Performed by: SURGERY

## 2025-07-29 PROCEDURE — 99232 SBSQ HOSP IP/OBS MODERATE 35: CPT | Mod: ,,, | Performed by: NURSE PRACTITIONER

## 2025-07-29 PROCEDURE — 99900035 HC TECH TIME PER 15 MIN (STAT)

## 2025-07-29 PROCEDURE — 63600175 PHARM REV CODE 636 W HCPCS

## 2025-07-29 PROCEDURE — 25000003 PHARM REV CODE 250: Performed by: INTERNAL MEDICINE

## 2025-07-29 PROCEDURE — 27000207 HC ISOLATION

## 2025-07-29 PROCEDURE — 25000003 PHARM REV CODE 250

## 2025-07-29 PROCEDURE — 87102 FUNGUS ISOLATION CULTURE: CPT | Performed by: SURGERY

## 2025-07-29 PROCEDURE — 83735 ASSAY OF MAGNESIUM: CPT | Performed by: SURGERY

## 2025-07-29 PROCEDURE — A4217 STERILE WATER/SALINE, 500 ML: HCPCS

## 2025-07-29 PROCEDURE — 63600175 PHARM REV CODE 636 W HCPCS: Mod: JZ,TB | Performed by: INTERNAL MEDICINE

## 2025-07-29 PROCEDURE — 0W2GX0Z CHANGE DRAINAGE DEVICE IN PERITONEAL CAVITY, EXTERNAL APPROACH: ICD-10-PCS | Performed by: RADIOLOGY

## 2025-07-29 PROCEDURE — 84100 ASSAY OF PHOSPHORUS: CPT | Performed by: SURGERY

## 2025-07-29 PROCEDURE — 63600175 PHARM REV CODE 636 W HCPCS: Performed by: RADIOLOGY

## 2025-07-29 PROCEDURE — 87070 CULTURE OTHR SPECIMN AEROBIC: CPT | Performed by: SURGERY

## 2025-07-29 PROCEDURE — 87076 CULTURE ANAEROBE IDENT EACH: CPT | Performed by: SURGERY

## 2025-07-29 PROCEDURE — 0W9B30Z DRAINAGE OF LEFT PLEURAL CAVITY WITH DRAINAGE DEVICE, PERCUTANEOUS APPROACH: ICD-10-PCS | Performed by: RADIOLOGY

## 2025-07-29 PROCEDURE — 25500020 PHARM REV CODE 255: Performed by: SURGERY

## 2025-07-29 PROCEDURE — 20600001 HC STEP DOWN PRIVATE ROOM

## 2025-07-29 PROCEDURE — B4185 PARENTERAL SOL 10 GM LIPIDS: HCPCS

## 2025-07-29 PROCEDURE — 36415 COLL VENOUS BLD VENIPUNCTURE: CPT | Performed by: SURGERY

## 2025-07-29 PROCEDURE — 89051 BODY FLUID CELL COUNT: CPT | Performed by: SURGERY

## 2025-07-29 PROCEDURE — 80053 COMPREHEN METABOLIC PANEL: CPT | Performed by: SURGERY

## 2025-07-29 RX ORDER — INSULIN ASPART 100 [IU]/ML
5 INJECTION, SOLUTION INTRAVENOUS; SUBCUTANEOUS
Status: DISCONTINUED | OUTPATIENT
Start: 2025-07-29 | End: 2025-07-31

## 2025-07-29 RX ORDER — MIDAZOLAM HYDROCHLORIDE 1 MG/ML
INJECTION, SOLUTION INTRAMUSCULAR; INTRAVENOUS
Status: COMPLETED | OUTPATIENT
Start: 2025-07-29 | End: 2025-07-29

## 2025-07-29 RX ORDER — FENTANYL CITRATE 50 UG/ML
INJECTION, SOLUTION INTRAMUSCULAR; INTRAVENOUS
Status: COMPLETED | OUTPATIENT
Start: 2025-07-29 | End: 2025-07-29

## 2025-07-29 RX ORDER — FLUCONAZOLE 2 MG/ML
400 INJECTION, SOLUTION INTRAVENOUS
Status: DISCONTINUED | OUTPATIENT
Start: 2025-07-30 | End: 2025-08-21

## 2025-07-29 RX ADMIN — MAGNESIUM SULFATE HEPTAHYDRATE: 500 INJECTION, SOLUTION INTRAMUSCULAR; INTRAVENOUS at 10:07

## 2025-07-29 RX ADMIN — CEFIDEROCOL SULFATE TOSYLATE 1.5 G: 1 INJECTION, POWDER, FOR SOLUTION INTRAVENOUS at 09:07

## 2025-07-29 RX ADMIN — MIDAZOLAM 1 MG: 1 INJECTION INTRAMUSCULAR; INTRAVENOUS at 01:07

## 2025-07-29 RX ADMIN — INSULIN ASPART 5 UNITS: 100 INJECTION, SOLUTION INTRAVENOUS; SUBCUTANEOUS at 05:07

## 2025-07-29 RX ADMIN — CEFIDEROCOL SULFATE TOSYLATE 1.5 G: 1 INJECTION, POWDER, FOR SOLUTION INTRAVENOUS at 12:07

## 2025-07-29 RX ADMIN — FENTANYL CITRATE 50 MCG: 50 INJECTION, SOLUTION INTRAMUSCULAR; INTRAVENOUS at 01:07

## 2025-07-29 RX ADMIN — CEFIDEROCOL SULFATE TOSYLATE 2 G: 1 INJECTION, POWDER, FOR SOLUTION INTRAVENOUS at 04:07

## 2025-07-29 RX ADMIN — LEVOTHYROXINE SODIUM 25 MCG: 20 INJECTION, SOLUTION INTRAVENOUS at 09:07

## 2025-07-29 RX ADMIN — INSULIN ASPART 6 UNITS: 100 INJECTION, SOLUTION INTRAVENOUS; SUBCUTANEOUS at 12:07

## 2025-07-29 RX ADMIN — ENOXAPARIN SODIUM 40 MG: 40 INJECTION SUBCUTANEOUS at 04:07

## 2025-07-29 RX ADMIN — HYDROMORPHONE HYDROCHLORIDE 0.2 MG: 1 INJECTION, SOLUTION INTRAMUSCULAR; INTRAVENOUS; SUBCUTANEOUS at 05:07

## 2025-07-29 RX ADMIN — PANTOPRAZOLE SODIUM 40 MG: 40 INJECTION, POWDER, LYOPHILIZED, FOR SOLUTION INTRAVENOUS at 08:07

## 2025-07-29 RX ADMIN — PANTOPRAZOLE SODIUM 40 MG: 40 INJECTION, POWDER, LYOPHILIZED, FOR SOLUTION INTRAVENOUS at 09:07

## 2025-07-29 RX ADMIN — I.V. FAT EMULSION 250 ML: 20 EMULSION INTRAVENOUS at 10:07

## 2025-07-29 RX ADMIN — FENTANYL CITRATE 25 MCG: 50 INJECTION, SOLUTION INTRAMUSCULAR; INTRAVENOUS at 01:07

## 2025-07-29 RX ADMIN — MIDAZOLAM 0.5 MG: 1 INJECTION INTRAMUSCULAR; INTRAVENOUS at 01:07

## 2025-07-29 RX ADMIN — HYDROMORPHONE HYDROCHLORIDE 0.2 MG: 1 INJECTION, SOLUTION INTRAMUSCULAR; INTRAVENOUS; SUBCUTANEOUS at 09:07

## 2025-07-29 RX ADMIN — FLUCONAZOLE 200 MG: 2 INJECTION, SOLUTION INTRAVENOUS at 09:07

## 2025-07-29 RX ADMIN — INSULIN ASPART 5 UNITS: 100 INJECTION, SOLUTION INTRAVENOUS; SUBCUTANEOUS at 08:07

## 2025-07-29 RX ADMIN — SCOPOLAMINE 1 PATCH: 1.5 PATCH, EXTENDED RELEASE TRANSDERMAL at 04:07

## 2025-07-29 RX ADMIN — INSULIN ASPART 6 UNITS: 100 INJECTION, SOLUTION INTRAVENOUS; SUBCUTANEOUS at 04:07

## 2025-07-29 RX ADMIN — IOHEXOL 20 ML: 300 INJECTION, SOLUTION INTRAVENOUS at 02:07

## 2025-07-30 PROBLEM — Z43.1 ENCOUNTER FOR PEG (PERCUTANEOUS ENDOSCOPIC GASTROSTOMY): Status: ACTIVE | Noted: 2025-07-30

## 2025-07-30 LAB
ABSOLUTE EOSINOPHIL (OHS): 0.09 K/UL
ABSOLUTE MONOCYTE (OHS): 0.97 K/UL (ref 0.3–1)
ABSOLUTE NEUTROPHIL COUNT (OHS): 12.08 K/UL (ref 1.8–7.7)
ALBUMIN SERPL BCP-MCNC: 1.4 G/DL (ref 3.5–5.2)
ALP SERPL-CCNC: 115 UNIT/L (ref 40–150)
ALT SERPL W/O P-5'-P-CCNC: <8 UNIT/L (ref 0–55)
ANION GAP (OHS): 7 MMOL/L (ref 8–16)
AST SERPL-CCNC: 32 UNIT/L (ref 0–50)
BACTERIA SPEC AEROBE CULT: ABNORMAL
BASOPHILS # BLD AUTO: 0.05 K/UL
BASOPHILS NFR BLD AUTO: 0.4 %
BILIRUB SERPL-MCNC: 0.1 MG/DL (ref 0.1–1)
BUN SERPL-MCNC: 21 MG/DL (ref 8–23)
CALCIUM SERPL-MCNC: 7.7 MG/DL (ref 8.7–10.5)
CARBA-R: ABNORMAL
CHLORIDE SERPL-SCNC: 108 MMOL/L (ref 95–110)
CO2 SERPL-SCNC: 24 MMOL/L (ref 23–29)
CREAT SERPL-MCNC: 0.7 MG/DL (ref 0.5–1.4)
ERYTHROCYTE [DISTWIDTH] IN BLOOD BY AUTOMATED COUNT: 14.4 % (ref 11.5–14.5)
GFR SERPLBLD CREATININE-BSD FMLA CKD-EPI: >60 ML/MIN/1.73/M2
GLUCOSE SERPL-MCNC: 132 MG/DL (ref 70–110)
GRAM STN SPEC: NORMAL
GRAM STN SPEC: NORMAL
HCT VFR BLD AUTO: 32.3 % (ref 37–48.5)
HGB BLD-MCNC: 10.3 GM/DL (ref 12–16)
IMM GRANULOCYTES # BLD AUTO: 0.17 K/UL (ref 0–0.04)
IMM GRANULOCYTES NFR BLD AUTO: 1.2 % (ref 0–0.5)
LYMPHOCYTES # BLD AUTO: 0.48 K/UL (ref 1–4.8)
MAGNESIUM SERPL-MCNC: 1.8 MG/DL (ref 1.6–2.6)
MCH RBC QN AUTO: 29.7 PG (ref 27–31)
MCHC RBC AUTO-ENTMCNC: 31.9 G/DL (ref 32–36)
MCV RBC AUTO: 93 FL (ref 82–98)
NUCLEATED RBC (/100WBC) (OHS): 0 /100 WBC
PHOSPHATE SERPL-MCNC: 3.1 MG/DL (ref 2.7–4.5)
PLATELET # BLD AUTO: 340 K/UL (ref 150–450)
PMV BLD AUTO: 11.5 FL (ref 9.2–12.9)
POCT GLUCOSE: 102 MG/DL (ref 70–110)
POCT GLUCOSE: 120 MG/DL (ref 70–110)
POCT GLUCOSE: 134 MG/DL (ref 70–110)
POCT GLUCOSE: 140 MG/DL (ref 70–110)
POCT GLUCOSE: 145 MG/DL (ref 70–110)
POCT GLUCOSE: 149 MG/DL (ref 70–110)
POTASSIUM SERPL-SCNC: 4 MMOL/L (ref 3.5–5.1)
PROT SERPL-MCNC: 5.1 GM/DL (ref 6–8.4)
RBC # BLD AUTO: 3.47 M/UL (ref 4–5.4)
RELATIVE EOSINOPHIL (OHS): 0.7 %
RELATIVE LYMPHOCYTE (OHS): 3.5 % (ref 18–48)
RELATIVE MONOCYTE (OHS): 7 % (ref 4–15)
RELATIVE NEUTROPHIL (OHS): 87.2 % (ref 38–73)
SODIUM SERPL-SCNC: 139 MMOL/L (ref 136–145)
TRIGL SERPL-MCNC: 105 MG/DL (ref 30–150)
WBC # BLD AUTO: 13.84 K/UL (ref 3.9–12.7)

## 2025-07-30 PROCEDURE — 63600175 PHARM REV CODE 636 W HCPCS

## 2025-07-30 PROCEDURE — 83735 ASSAY OF MAGNESIUM: CPT

## 2025-07-30 PROCEDURE — 84100 ASSAY OF PHOSPHORUS: CPT

## 2025-07-30 PROCEDURE — 85025 COMPLETE CBC W/AUTO DIFF WBC: CPT

## 2025-07-30 PROCEDURE — 84478 ASSAY OF TRIGLYCERIDES: CPT

## 2025-07-30 PROCEDURE — 20600001 HC STEP DOWN PRIVATE ROOM

## 2025-07-30 PROCEDURE — 63600175 PHARM REV CODE 636 W HCPCS: Mod: JZ,TB | Performed by: INTERNAL MEDICINE

## 2025-07-30 PROCEDURE — 80053 COMPREHEN METABOLIC PANEL: CPT

## 2025-07-30 PROCEDURE — G0545 PR VISIT INHERENT TO INPT OR OBS CARE, INFECTIOUS DISEASE: HCPCS | Mod: ,,, | Performed by: INTERNAL MEDICINE

## 2025-07-30 PROCEDURE — 36415 COLL VENOUS BLD VENIPUNCTURE: CPT

## 2025-07-30 PROCEDURE — 99900035 HC TECH TIME PER 15 MIN (STAT)

## 2025-07-30 PROCEDURE — 99233 SBSQ HOSP IP/OBS HIGH 50: CPT | Mod: ,,, | Performed by: INTERNAL MEDICINE

## 2025-07-30 PROCEDURE — 27000207 HC ISOLATION

## 2025-07-30 PROCEDURE — 94761 N-INVAS EAR/PLS OXIMETRY MLT: CPT

## 2025-07-30 PROCEDURE — B4185 PARENTERAL SOL 10 GM LIPIDS: HCPCS

## 2025-07-30 PROCEDURE — A4217 STERILE WATER/SALINE, 500 ML: HCPCS

## 2025-07-30 PROCEDURE — 25000003 PHARM REV CODE 250

## 2025-07-30 PROCEDURE — 25000003 PHARM REV CODE 250: Performed by: INTERNAL MEDICINE

## 2025-07-30 RX ADMIN — HYDROMORPHONE HYDROCHLORIDE 0.2 MG: 1 INJECTION, SOLUTION INTRAMUSCULAR; INTRAVENOUS; SUBCUTANEOUS at 12:07

## 2025-07-30 RX ADMIN — INSULIN ASPART 5 UNITS: 100 INJECTION, SOLUTION INTRAVENOUS; SUBCUTANEOUS at 12:07

## 2025-07-30 RX ADMIN — PANTOPRAZOLE SODIUM 40 MG: 40 INJECTION, POWDER, LYOPHILIZED, FOR SOLUTION INTRAVENOUS at 09:07

## 2025-07-30 RX ADMIN — MAGNESIUM SULFATE HEPTAHYDRATE: 500 INJECTION, SOLUTION INTRAMUSCULAR; INTRAVENOUS at 10:07

## 2025-07-30 RX ADMIN — HYDROMORPHONE HYDROCHLORIDE 0.2 MG: 1 INJECTION, SOLUTION INTRAMUSCULAR; INTRAVENOUS; SUBCUTANEOUS at 05:07

## 2025-07-30 RX ADMIN — CEFIDEROCOL SULFATE TOSYLATE 2 G: 1 INJECTION, POWDER, FOR SOLUTION INTRAVENOUS at 08:07

## 2025-07-30 RX ADMIN — INSULIN ASPART 5 UNITS: 100 INJECTION, SOLUTION INTRAVENOUS; SUBCUTANEOUS at 04:07

## 2025-07-30 RX ADMIN — FLUCONAZOLE 400 MG: 2 INJECTION, SOLUTION INTRAVENOUS at 09:07

## 2025-07-30 RX ADMIN — CEFIDEROCOL SULFATE TOSYLATE 2 G: 1 INJECTION, POWDER, FOR SOLUTION INTRAVENOUS at 12:07

## 2025-07-30 RX ADMIN — CEFIDEROCOL SULFATE TOSYLATE 2 G: 1 INJECTION, POWDER, FOR SOLUTION INTRAVENOUS at 03:07

## 2025-07-30 RX ADMIN — PANTOPRAZOLE SODIUM 40 MG: 40 INJECTION, POWDER, LYOPHILIZED, FOR SOLUTION INTRAVENOUS at 08:07

## 2025-07-30 RX ADMIN — ENOXAPARIN SODIUM 40 MG: 40 INJECTION SUBCUTANEOUS at 05:07

## 2025-07-30 RX ADMIN — INSULIN ASPART 5 UNITS: 100 INJECTION, SOLUTION INTRAVENOUS; SUBCUTANEOUS at 08:07

## 2025-07-30 RX ADMIN — I.V. FAT EMULSION 250 ML: 20 EMULSION INTRAVENOUS at 10:07

## 2025-07-30 RX ADMIN — LEVOTHYROXINE SODIUM 25 MCG: 20 INJECTION, SOLUTION INTRAVENOUS at 09:07

## 2025-07-30 RX ADMIN — HYDROMORPHONE HYDROCHLORIDE 0.2 MG: 1 INJECTION, SOLUTION INTRAMUSCULAR; INTRAVENOUS; SUBCUTANEOUS at 08:07

## 2025-07-31 LAB
ABSOLUTE EOSINOPHIL (OHS): 0.07 K/UL
ABSOLUTE MONOCYTE (OHS): 1.23 K/UL (ref 0.3–1)
ABSOLUTE NEUTROPHIL COUNT (OHS): 12.58 K/UL (ref 1.8–7.7)
ALBUMIN SERPL BCP-MCNC: 1.4 G/DL (ref 3.5–5.2)
ALP SERPL-CCNC: 122 UNIT/L (ref 40–150)
ALT SERPL W/O P-5'-P-CCNC: <8 UNIT/L (ref 0–55)
ANION GAP (OHS): 4 MMOL/L (ref 8–16)
AST SERPL-CCNC: 26 UNIT/L (ref 0–50)
BASOPHILS # BLD AUTO: 0.06 K/UL
BASOPHILS NFR BLD AUTO: 0.4 %
BILIRUB SERPL-MCNC: 0.2 MG/DL (ref 0.1–1)
BUN SERPL-MCNC: 20 MG/DL (ref 8–23)
CALCIUM SERPL-MCNC: 7.9 MG/DL (ref 8.7–10.5)
CHLORIDE SERPL-SCNC: 109 MMOL/L (ref 95–110)
CO2 SERPL-SCNC: 25 MMOL/L (ref 23–29)
CREAT SERPL-MCNC: 0.7 MG/DL (ref 0.5–1.4)
ERYTHROCYTE [DISTWIDTH] IN BLOOD BY AUTOMATED COUNT: 14.3 % (ref 11.5–14.5)
GFR SERPLBLD CREATININE-BSD FMLA CKD-EPI: >60 ML/MIN/1.73/M2
GLUCOSE SERPL-MCNC: 156 MG/DL (ref 70–110)
HCT VFR BLD AUTO: 32 % (ref 37–48.5)
HGB BLD-MCNC: 10.2 GM/DL (ref 12–16)
IMM GRANULOCYTES # BLD AUTO: 0.12 K/UL (ref 0–0.04)
IMM GRANULOCYTES NFR BLD AUTO: 0.8 % (ref 0–0.5)
LYMPHOCYTES # BLD AUTO: 0.45 K/UL (ref 1–4.8)
MAGNESIUM SERPL-MCNC: 1.8 MG/DL (ref 1.6–2.6)
MCH RBC QN AUTO: 30.4 PG (ref 27–31)
MCHC RBC AUTO-ENTMCNC: 31.9 G/DL (ref 32–36)
MCV RBC AUTO: 95 FL (ref 82–98)
NUCLEATED RBC (/100WBC) (OHS): 0 /100 WBC
PHOSPHATE SERPL-MCNC: 3 MG/DL (ref 2.7–4.5)
PLATELET # BLD AUTO: 341 K/UL (ref 150–450)
PMV BLD AUTO: 11.5 FL (ref 9.2–12.9)
POCT GLUCOSE: 100 MG/DL (ref 70–110)
POCT GLUCOSE: 129 MG/DL (ref 70–110)
POCT GLUCOSE: 131 MG/DL (ref 70–110)
POCT GLUCOSE: 140 MG/DL (ref 70–110)
POCT GLUCOSE: 143 MG/DL (ref 70–110)
POCT GLUCOSE: 167 MG/DL (ref 70–110)
POCT GLUCOSE: 70 MG/DL (ref 70–110)
POTASSIUM SERPL-SCNC: 4.4 MMOL/L (ref 3.5–5.1)
PROT SERPL-MCNC: 5.2 GM/DL (ref 6–8.4)
RBC # BLD AUTO: 3.36 M/UL (ref 4–5.4)
RELATIVE EOSINOPHIL (OHS): 0.5 %
RELATIVE LYMPHOCYTE (OHS): 3.1 % (ref 18–48)
RELATIVE MONOCYTE (OHS): 8.5 % (ref 4–15)
RELATIVE NEUTROPHIL (OHS): 86.7 % (ref 38–73)
SODIUM SERPL-SCNC: 138 MMOL/L (ref 136–145)
WBC # BLD AUTO: 14.51 K/UL (ref 3.9–12.7)

## 2025-07-31 PROCEDURE — 80053 COMPREHEN METABOLIC PANEL: CPT

## 2025-07-31 PROCEDURE — 63600175 PHARM REV CODE 636 W HCPCS

## 2025-07-31 PROCEDURE — 25000003 PHARM REV CODE 250

## 2025-07-31 PROCEDURE — 97530 THERAPEUTIC ACTIVITIES: CPT

## 2025-07-31 PROCEDURE — 84100 ASSAY OF PHOSPHORUS: CPT

## 2025-07-31 PROCEDURE — 20600001 HC STEP DOWN PRIVATE ROOM

## 2025-07-31 PROCEDURE — 63600175 PHARM REV CODE 636 W HCPCS: Performed by: NURSE PRACTITIONER

## 2025-07-31 PROCEDURE — 27000207 HC ISOLATION

## 2025-07-31 PROCEDURE — 36415 COLL VENOUS BLD VENIPUNCTURE: CPT

## 2025-07-31 PROCEDURE — 25000003 PHARM REV CODE 250: Performed by: INTERNAL MEDICINE

## 2025-07-31 PROCEDURE — B4185 PARENTERAL SOL 10 GM LIPIDS: HCPCS

## 2025-07-31 PROCEDURE — 99232 SBSQ HOSP IP/OBS MODERATE 35: CPT | Mod: ,,,

## 2025-07-31 PROCEDURE — 85025 COMPLETE CBC W/AUTO DIFF WBC: CPT

## 2025-07-31 PROCEDURE — A4217 STERILE WATER/SALINE, 500 ML: HCPCS

## 2025-07-31 PROCEDURE — 83735 ASSAY OF MAGNESIUM: CPT

## 2025-07-31 PROCEDURE — 63600175 PHARM REV CODE 636 W HCPCS: Performed by: INTERNAL MEDICINE

## 2025-07-31 RX ORDER — METHOCARBAMOL 100 MG/ML
500 INJECTION, SOLUTION INTRAMUSCULAR; INTRAVENOUS EVERY 6 HOURS
Status: DISPENSED | OUTPATIENT
Start: 2025-07-31 | End: 2025-08-03

## 2025-07-31 RX ORDER — ACETAMINOPHEN 10 MG/ML
1000 INJECTION, SOLUTION INTRAVENOUS EVERY 8 HOURS
Status: COMPLETED | OUTPATIENT
Start: 2025-07-31 | End: 2025-07-31

## 2025-07-31 RX ORDER — HYDROMORPHONE HYDROCHLORIDE 1 MG/ML
0.5 INJECTION, SOLUTION INTRAMUSCULAR; INTRAVENOUS; SUBCUTANEOUS EVERY 6 HOURS PRN
Status: DISCONTINUED | OUTPATIENT
Start: 2025-07-31 | End: 2025-08-06

## 2025-07-31 RX ORDER — INSULIN ASPART 100 [IU]/ML
3 INJECTION, SOLUTION INTRAVENOUS; SUBCUTANEOUS
Status: DISCONTINUED | OUTPATIENT
Start: 2025-08-01 | End: 2025-08-03

## 2025-07-31 RX ORDER — FUROSEMIDE 10 MG/ML
40 INJECTION INTRAMUSCULAR; INTRAVENOUS ONCE
Status: COMPLETED | OUTPATIENT
Start: 2025-07-31 | End: 2025-07-31

## 2025-07-31 RX ORDER — KETOROLAC TROMETHAMINE 15 MG/ML
15 INJECTION, SOLUTION INTRAMUSCULAR; INTRAVENOUS EVERY 6 HOURS
Status: DISCONTINUED | OUTPATIENT
Start: 2025-07-31 | End: 2025-07-31

## 2025-07-31 RX ADMIN — HYDROMORPHONE HYDROCHLORIDE 0.5 MG: 1 INJECTION, SOLUTION INTRAMUSCULAR; INTRAVENOUS; SUBCUTANEOUS at 05:07

## 2025-07-31 RX ADMIN — FUROSEMIDE 40 MG: 10 INJECTION, SOLUTION INTRAVENOUS at 10:07

## 2025-07-31 RX ADMIN — INSULIN ASPART 5 UNITS: 100 INJECTION, SOLUTION INTRAVENOUS; SUBCUTANEOUS at 12:07

## 2025-07-31 RX ADMIN — I.V. FAT EMULSION 250 ML: 20 EMULSION INTRAVENOUS at 10:07

## 2025-07-31 RX ADMIN — ENOXAPARIN SODIUM 40 MG: 40 INJECTION SUBCUTANEOUS at 05:07

## 2025-07-31 RX ADMIN — ACETAMINOPHEN 1000 MG: 10 INJECTION INTRAVENOUS at 10:07

## 2025-07-31 RX ADMIN — DEXTROSE MONOHYDRATE 12.5 G: 25 INJECTION, SOLUTION INTRAVENOUS at 07:07

## 2025-07-31 RX ADMIN — CEFIDEROCOL SULFATE TOSYLATE 2 G: 1 INJECTION, POWDER, FOR SOLUTION INTRAVENOUS at 10:07

## 2025-07-31 RX ADMIN — INSULIN ASPART 5 UNITS: 100 INJECTION, SOLUTION INTRAVENOUS; SUBCUTANEOUS at 08:07

## 2025-07-31 RX ADMIN — FLUCONAZOLE 400 MG: 2 INJECTION, SOLUTION INTRAVENOUS at 10:07

## 2025-07-31 RX ADMIN — METHOCARBAMOL 500 MG: 100 INJECTION INTRAMUSCULAR; INTRAVENOUS at 06:07

## 2025-07-31 RX ADMIN — CEFIDEROCOL SULFATE TOSYLATE 2 G: 1 INJECTION, POWDER, FOR SOLUTION INTRAVENOUS at 05:07

## 2025-07-31 RX ADMIN — HYDROMORPHONE HYDROCHLORIDE 0.2 MG: 1 INJECTION, SOLUTION INTRAMUSCULAR; INTRAVENOUS; SUBCUTANEOUS at 02:07

## 2025-07-31 RX ADMIN — INSULIN ASPART 5 UNITS: 100 INJECTION, SOLUTION INTRAVENOUS; SUBCUTANEOUS at 05:07

## 2025-07-31 RX ADMIN — INSULIN ASPART 5 UNITS: 100 INJECTION, SOLUTION INTRAVENOUS; SUBCUTANEOUS at 04:07

## 2025-07-31 RX ADMIN — PANTOPRAZOLE SODIUM 40 MG: 40 INJECTION, POWDER, LYOPHILIZED, FOR SOLUTION INTRAVENOUS at 08:07

## 2025-07-31 RX ADMIN — HYDROMORPHONE HYDROCHLORIDE 0.2 MG: 1 INJECTION, SOLUTION INTRAMUSCULAR; INTRAVENOUS; SUBCUTANEOUS at 12:07

## 2025-07-31 RX ADMIN — HYDROMORPHONE HYDROCHLORIDE 0.2 MG: 1 INJECTION, SOLUTION INTRAMUSCULAR; INTRAVENOUS; SUBCUTANEOUS at 09:07

## 2025-07-31 RX ADMIN — MAGNESIUM SULFATE HEPTAHYDRATE: 500 INJECTION, SOLUTION INTRAMUSCULAR; INTRAVENOUS at 10:07

## 2025-07-31 RX ADMIN — CEFIDEROCOL SULFATE TOSYLATE 2 G: 1 INJECTION, POWDER, FOR SOLUTION INTRAVENOUS at 12:07

## 2025-07-31 RX ADMIN — ACETAMINOPHEN 1000 MG: 10 INJECTION INTRAVENOUS at 06:07

## 2025-07-31 RX ADMIN — LEVOTHYROXINE SODIUM 25 MCG: 20 INJECTION, SOLUTION INTRAVENOUS at 08:07

## 2025-07-31 RX ADMIN — ACETAMINOPHEN 1000 MG: 10 INJECTION INTRAVENOUS at 03:07

## 2025-07-31 RX ADMIN — PANTOPRAZOLE SODIUM 40 MG: 40 INJECTION, POWDER, LYOPHILIZED, FOR SOLUTION INTRAVENOUS at 10:07

## 2025-08-01 ENCOUNTER — ANESTHESIA EVENT (OUTPATIENT)
Dept: SURGERY | Facility: HOSPITAL | Age: 65
End: 2025-08-01
Payer: MEDICARE

## 2025-08-01 LAB
ABSOLUTE EOSINOPHIL (OHS): 0.14 K/UL
ABSOLUTE MONOCYTE (OHS): 1.23 K/UL (ref 0.3–1)
ABSOLUTE NEUTROPHIL COUNT (OHS): 11.92 K/UL (ref 1.8–7.7)
ALBUMIN SERPL BCP-MCNC: 1.1 G/DL (ref 3.5–5.2)
ALP SERPL-CCNC: 118 UNIT/L (ref 40–150)
ALT SERPL W/O P-5'-P-CCNC: <8 UNIT/L (ref 0–55)
ANION GAP (OHS): 12 MMOL/L (ref 8–16)
AST SERPL-CCNC: 24 UNIT/L (ref 0–50)
BASOPHILS # BLD AUTO: 0.03 K/UL
BASOPHILS NFR BLD AUTO: 0.2 %
BILIRUB SERPL-MCNC: 0.1 MG/DL (ref 0.1–1)
BUN SERPL-MCNC: 21 MG/DL (ref 8–23)
CALCIUM SERPL-MCNC: 7 MG/DL (ref 8.7–10.5)
CHLORIDE SERPL-SCNC: 110 MMOL/L (ref 95–110)
CO2 SERPL-SCNC: 22 MMOL/L (ref 23–29)
CREAT SERPL-MCNC: 0.6 MG/DL (ref 0.5–1.4)
ERYTHROCYTE [DISTWIDTH] IN BLOOD BY AUTOMATED COUNT: 14.2 % (ref 11.5–14.5)
GFR SERPLBLD CREATININE-BSD FMLA CKD-EPI: >60 ML/MIN/1.73/M2
GLUCOSE SERPL-MCNC: 114 MG/DL (ref 70–110)
HCT VFR BLD AUTO: 27.5 % (ref 37–48.5)
HGB BLD-MCNC: 8.8 GM/DL (ref 12–16)
IMM GRANULOCYTES # BLD AUTO: 0.12 K/UL (ref 0–0.04)
IMM GRANULOCYTES NFR BLD AUTO: 0.9 % (ref 0–0.5)
LYMPHOCYTES # BLD AUTO: 0.43 K/UL (ref 1–4.8)
MAGNESIUM SERPL-MCNC: 1.6 MG/DL (ref 1.6–2.6)
MCH RBC QN AUTO: 30.1 PG (ref 27–31)
MCHC RBC AUTO-ENTMCNC: 32 G/DL (ref 32–36)
MCV RBC AUTO: 94 FL (ref 82–98)
NUCLEATED RBC (/100WBC) (OHS): 0 /100 WBC
PHOSPHATE SERPL-MCNC: 2.8 MG/DL (ref 2.7–4.5)
PLATELET # BLD AUTO: 341 K/UL (ref 150–450)
PMV BLD AUTO: 11.4 FL (ref 9.2–12.9)
POCT GLUCOSE: 121 MG/DL (ref 70–110)
POCT GLUCOSE: 124 MG/DL (ref 70–110)
POCT GLUCOSE: 143 MG/DL (ref 70–110)
POCT GLUCOSE: 144 MG/DL (ref 70–110)
POCT GLUCOSE: 155 MG/DL (ref 70–110)
POCT GLUCOSE: 157 MG/DL (ref 70–110)
POCT GLUCOSE: 159 MG/DL (ref 70–110)
POCT GLUCOSE: 166 MG/DL (ref 70–110)
POTASSIUM SERPL-SCNC: 3.4 MMOL/L (ref 3.5–5.1)
PROT SERPL-MCNC: 4.6 GM/DL (ref 6–8.4)
RBC # BLD AUTO: 2.92 M/UL (ref 4–5.4)
RELATIVE EOSINOPHIL (OHS): 1 %
RELATIVE LYMPHOCYTE (OHS): 3.1 % (ref 18–48)
RELATIVE MONOCYTE (OHS): 8.9 % (ref 4–15)
RELATIVE NEUTROPHIL (OHS): 85.9 % (ref 38–73)
SODIUM SERPL-SCNC: 144 MMOL/L (ref 136–145)
WBC # BLD AUTO: 13.87 K/UL (ref 3.9–12.7)

## 2025-08-01 PROCEDURE — A4217 STERILE WATER/SALINE, 500 ML: HCPCS

## 2025-08-01 PROCEDURE — 63600175 PHARM REV CODE 636 W HCPCS

## 2025-08-01 PROCEDURE — 99233 SBSQ HOSP IP/OBS HIGH 50: CPT | Mod: ,,, | Performed by: INTERNAL MEDICINE

## 2025-08-01 PROCEDURE — G0545 PR VISIT INHERENT TO INPT OR OBS CARE, INFECTIOUS DISEASE: HCPCS | Mod: ,,, | Performed by: INTERNAL MEDICINE

## 2025-08-01 PROCEDURE — 27000207 HC ISOLATION

## 2025-08-01 PROCEDURE — 63600175 PHARM REV CODE 636 W HCPCS: Mod: JZ,TB | Performed by: INTERNAL MEDICINE

## 2025-08-01 PROCEDURE — 97530 THERAPEUTIC ACTIVITIES: CPT

## 2025-08-01 PROCEDURE — B4185 PARENTERAL SOL 10 GM LIPIDS: HCPCS

## 2025-08-01 PROCEDURE — 99232 SBSQ HOSP IP/OBS MODERATE 35: CPT | Mod: ,,,

## 2025-08-01 PROCEDURE — 20600001 HC STEP DOWN PRIVATE ROOM

## 2025-08-01 PROCEDURE — 80053 COMPREHEN METABOLIC PANEL: CPT

## 2025-08-01 PROCEDURE — 97116 GAIT TRAINING THERAPY: CPT | Mod: CQ

## 2025-08-01 PROCEDURE — 25000003 PHARM REV CODE 250

## 2025-08-01 PROCEDURE — 84100 ASSAY OF PHOSPHORUS: CPT

## 2025-08-01 PROCEDURE — 25000003 PHARM REV CODE 250: Performed by: INTERNAL MEDICINE

## 2025-08-01 PROCEDURE — 36415 COLL VENOUS BLD VENIPUNCTURE: CPT

## 2025-08-01 PROCEDURE — 83735 ASSAY OF MAGNESIUM: CPT

## 2025-08-01 PROCEDURE — 85025 COMPLETE CBC W/AUTO DIFF WBC: CPT

## 2025-08-01 RX ORDER — POTASSIUM CHLORIDE 7.45 MG/ML
10 INJECTION INTRAVENOUS
Status: COMPLETED | OUTPATIENT
Start: 2025-08-01 | End: 2025-08-01

## 2025-08-01 RX ADMIN — FLUCONAZOLE 400 MG: 2 INJECTION, SOLUTION INTRAVENOUS at 08:08

## 2025-08-01 RX ADMIN — ENOXAPARIN SODIUM 40 MG: 40 INJECTION SUBCUTANEOUS at 04:08

## 2025-08-01 RX ADMIN — LEVOTHYROXINE SODIUM 25 MCG: 20 INJECTION, SOLUTION INTRAVENOUS at 08:08

## 2025-08-01 RX ADMIN — HYDROMORPHONE HYDROCHLORIDE 0.5 MG: 1 INJECTION, SOLUTION INTRAMUSCULAR; INTRAVENOUS; SUBCUTANEOUS at 06:08

## 2025-08-01 RX ADMIN — POTASSIUM CHLORIDE 10 MEQ: 7.46 INJECTION, SOLUTION INTRAVENOUS at 06:08

## 2025-08-01 RX ADMIN — METHOCARBAMOL 500 MG: 100 INJECTION INTRAMUSCULAR; INTRAVENOUS at 11:08

## 2025-08-01 RX ADMIN — CEFIDEROCOL SULFATE TOSYLATE 2 G: 1 INJECTION, POWDER, FOR SOLUTION INTRAVENOUS at 11:08

## 2025-08-01 RX ADMIN — POTASSIUM CHLORIDE 10 MEQ: 7.46 INJECTION, SOLUTION INTRAVENOUS at 11:08

## 2025-08-01 RX ADMIN — INSULIN ASPART 3 UNITS: 100 INJECTION, SOLUTION INTRAVENOUS; SUBCUTANEOUS at 12:08

## 2025-08-01 RX ADMIN — CEFIDEROCOL SULFATE TOSYLATE 2 G: 1 INJECTION, POWDER, FOR SOLUTION INTRAVENOUS at 12:08

## 2025-08-01 RX ADMIN — HYDROMORPHONE HYDROCHLORIDE 0.5 MG: 1 INJECTION, SOLUTION INTRAMUSCULAR; INTRAVENOUS; SUBCUTANEOUS at 12:08

## 2025-08-01 RX ADMIN — CEFIDEROCOL SULFATE TOSYLATE 2 G: 1 INJECTION, POWDER, FOR SOLUTION INTRAVENOUS at 04:08

## 2025-08-01 RX ADMIN — HYDROMORPHONE HYDROCHLORIDE 0.5 MG: 1 INJECTION, SOLUTION INTRAMUSCULAR; INTRAVENOUS; SUBCUTANEOUS at 11:08

## 2025-08-01 RX ADMIN — MAGNESIUM SULFATE HEPTAHYDRATE: 500 INJECTION, SOLUTION INTRAMUSCULAR; INTRAVENOUS at 10:08

## 2025-08-01 RX ADMIN — PANTOPRAZOLE SODIUM 40 MG: 40 INJECTION, POWDER, LYOPHILIZED, FOR SOLUTION INTRAVENOUS at 10:08

## 2025-08-01 RX ADMIN — INSULIN ASPART 3 UNITS: 100 INJECTION, SOLUTION INTRAVENOUS; SUBCUTANEOUS at 04:08

## 2025-08-01 RX ADMIN — SCOPOLAMINE 1 PATCH: 1.5 PATCH, EXTENDED RELEASE TRANSDERMAL at 04:08

## 2025-08-01 RX ADMIN — INSULIN ASPART 3 UNITS: 100 INJECTION, SOLUTION INTRAVENOUS; SUBCUTANEOUS at 05:08

## 2025-08-01 RX ADMIN — POTASSIUM CHLORIDE 10 MEQ: 7.46 INJECTION, SOLUTION INTRAVENOUS at 10:08

## 2025-08-01 RX ADMIN — HYDROMORPHONE HYDROCHLORIDE 0.5 MG: 1 INJECTION, SOLUTION INTRAMUSCULAR; INTRAVENOUS; SUBCUTANEOUS at 05:08

## 2025-08-01 RX ADMIN — INSULIN ASPART 3 UNITS: 100 INJECTION, SOLUTION INTRAVENOUS; SUBCUTANEOUS at 08:08

## 2025-08-01 RX ADMIN — POTASSIUM CHLORIDE 10 MEQ: 7.46 INJECTION, SOLUTION INTRAVENOUS at 08:08

## 2025-08-01 RX ADMIN — METHOCARBAMOL 500 MG: 100 INJECTION INTRAMUSCULAR; INTRAVENOUS at 05:08

## 2025-08-01 RX ADMIN — I.V. FAT EMULSION 250 ML: 20 EMULSION INTRAVENOUS at 10:08

## 2025-08-01 RX ADMIN — CEFIDEROCOL SULFATE TOSYLATE 2 G: 1 INJECTION, POWDER, FOR SOLUTION INTRAVENOUS at 08:08

## 2025-08-01 RX ADMIN — METHOCARBAMOL 500 MG: 100 INJECTION INTRAMUSCULAR; INTRAVENOUS at 12:08

## 2025-08-01 RX ADMIN — POTASSIUM CHLORIDE 10 MEQ: 7.46 INJECTION, SOLUTION INTRAVENOUS at 12:08

## 2025-08-01 RX ADMIN — PANTOPRAZOLE SODIUM 40 MG: 40 INJECTION, POWDER, LYOPHILIZED, FOR SOLUTION INTRAVENOUS at 08:08

## 2025-08-01 RX ADMIN — INSULIN ASPART 3 UNITS: 100 INJECTION, SOLUTION INTRAVENOUS; SUBCUTANEOUS at 10:08

## 2025-08-01 NOTE — ANESTHESIA PREPROCEDURE EVALUATION
[facility]  Anesthesia Pre-Operative Evaluation     Patient Name: Bessy Lamb  YOB: 1960  MRN: [id number]  [facility]: [id number]      Code Status: Prior   Date of Procedure: 8/4/2025  Anesthesia: General Procedure: Procedure(s) (LRB):  EGD (ESOPHAGOGASTRODUODENOSCOPY) (N/A)  Pre-Operative Diagnosis: Intraabdominal fluid collection [R18.8]  Proceduralist: Surgeons and Role:     * Flip Valentin MD - Primary          SUBJECTIVE:     Pre-operative evaluation for Procedure(s) (LRB):  EGD (ESOPHAGOGASTRODUODENOSCOPY) (N/A)     08/01/2025    Bessy Lamb is a 64 y.o. female with pancreatic cancer s/p distal pancreatectomy, splenectomy, and celiac axis resection, T2DM, hypothyroidism, mitral valve prolapse, HTN, anemia and MDD.     Patient now presents for the above procedure(s).       Anticoagulants   Medication Route Frequency    enoxaparin injection 40 mg Subcutaneous Q24H (prophylaxis, 1700)        ________________________________________  No results found for this or any previous visit.    ________________________________________    Prev airway:       Intubation     Date/Time: 7/24/2025 12:46 PM     Performed by: Tami Ortiz CRNA  Authorized by: Tami Ortiz CRNA    Intubation:     Induction:  Rapid sequence induction    Intubated:  Postinduction    Mask Ventilation:  Not attempted    Attempts:  1    Attempted By:  CRNA    Method of Intubation:  Video laryngoscopy    Blade:  Wilhelm 3    Laryngeal View Grade: Grade I - full view of cords      Difficult Airway Encountered?: No      Complications:  None    Airway Device:  Oral endotracheal tube    Airway Device Size:  7.0    Style/Cuff Inflation:  Cuffed (inflated to minimal occlusive pressure)    Tube secured:  22    Secured at:  The lips    Placement Verified By:  Capnometry    Complicating Factors:  None    Findings Post-Intubation:  BS equal bilateral and atraumatic/condition of teeth unchanged                   LDA:   PICC Single Lumen 07/22/25 1555 left brachial (Active)   Line Necessity Review Medication caustic to vasculature 07/29/25 0730   Verification by X-ray Yes 07/29/25 0730   Site Assessment No drainage;No redness;No swelling;No warmth 07/31/25 0701   Extremity Assessment Distal to IV No warmth;No swelling;No redness;No abnormal discoloration 07/31/25 0701   Line Securement Device Secured with sutureless device 07/31/25 0701   Dressing Type CHG impregnated dressing/sponge 07/31/25 0701   Dressing Status Clean;Dry;Intact 07/31/25 0701   Dressing Intervention Integrity maintained 07/31/25 0701   Date on Dressing 07/28/25 07/31/25 0701   Dressing Due to be Changed 08/04/25 07/31/25 0701   Distal Patency/Care infusing 07/31/25 0701   Current Insertion Depth (cm) 34 cm 07/22/25 1555   Current Exposed Catheter (cm) 0 cm 07/22/25 1555   Extremity Circumference (cm) 26 cm 07/22/25 1555   Number of days: 9            PowerPort A Cath Single Lumen 07/18/24 1128 Internal Jugular Right (Active)   Line Necessity Review Other (comment) 07/29/25 0730   Site Assessment No drainage;No redness;No swelling;No warmth 07/31/25 0701   Status Not Accessed 07/31/25 0701   Number of days: 378       Peripheral IV 07/31/25 1002 22 G Anterior;Right Forearm (Active)   Site Assessment Clean;Intact;Dry;No redness;No swelling 08/01/25 0400   Extremity Assessment Distal to IV No abnormal discoloration;No redness;No swelling;No warmth 07/31/25 1002   Line Status Infusing 08/01/25 0400   Dressing Status Clean;Dry;Intact 07/31/25 1002   Dressing Intervention Integrity maintained 07/31/25 1002   Number of days: 0            Chest Tube 07/29/25 1351 Left 14 Fr. (Active)   Chest Tube WDL WDL 07/31/25 0701   Air Leak/Fluctuation air leak not present 07/31/25 0701   Safety all connections secured;suction checked 07/31/25 0701   Securement tubing secured to body distal to insertion site with sutures 07/31/25 0701   Patency Intervention Tip/tilt  07/31/25 0701   Dressing Appearance clean and dry 08/01/25 0400   Site Assessment Dry;Intact;No redness;No swelling 08/01/25 0400   Output (mL) 0 mL 08/01/25 0400   Number of days: 2            Closed/Suction Drain 07/21/25 1514 Medial;Superior LUQ Bulb 14 Fr. (Active)   Site Description Unable to view 07/31/25 0701   Dressing Type Transparent (Tegaderm) 07/31/25 0701   Dressing Status Clean;Dry;Intact 07/31/25 0701   Dressing Intervention Integrity maintained 07/31/25 0701   Drainage Serous 07/31/25 0701   Status To bulb suction 07/31/25 0701   Output (mL) 0 mL 08/01/25 0400   Number of days: 10            Closed/Suction Drain 07/29/25 1318 Medial LLQ Bulb 14 Fr. (Active)   Site Description Unable to view 07/31/25 0701   Dressing Type Gauze;Transparent (Tegaderm) 07/31/25 0701   Dressing Status Clean;Dry;Intact 07/31/25 0701   Dressing Intervention Integrity maintained 07/31/25 0701   Drainage Serous 07/31/25 0701   Status To bulb suction 07/31/25 0701   Output (mL) 10 mL 08/01/25 0400   Number of days: 2            Closed/Suction Drain 07/24/25 1353 Tube - 1 Lateral LLQ Bulb 24 Fr. (Active)   Site Description Unable to view 07/31/25 0701   Dressing Type Gauze 07/31/25 0701   Dressing Status Clean;Dry;Intact 07/31/25 0701   Dressing Intervention Integrity maintained 07/31/25 0701   Drainage Serosanguineous 07/31/25 0701   Status To bulb suction 07/31/25 0701   Output (mL) 5 mL 08/01/25 0400   Number of days: 7            Gastrostomy/Enterostomy 07/24/25 1408 LUQ (Active)   Securement secured to abdomen 07/31/25 2000   Interventions Prior to Feeding patency checked;residual checked 07/31/25 2000   Suction Setting/Drainage Method dependent drainage 07/31/25 2000   Output Characteristics milky;green;yellow 07/31/25 2000   Feeding Type by gravity 07/31/25 2000   Clamp Status/Tolerance unclamped;no abdominal distention;no emesis 07/31/25 2000   Dressing no dressing 07/31/25 2000   Insertion Site no redness;no warmth;no  tenderness;no swelling 25   Site Care sterile 4 x 4 gauze dressing applied 25   Tube Output(mL)(Include Discarded Residual) 0 mL 25 0400   Number of days: 7       Drips:    TPN ADULT CENTRAL LINE CUSTOM   Intravenous Continuous 75 mL/hr at 25 New Bag at 25       Problem List[1]    Review of patient's allergies indicates:   Allergen Reactions    Duricef [cefadroxil] Hives    Grass pollen-magdalena grass standard Itching       Current Inpatient Medications:    cefiderocoL 2 g in 0.9% NaCl 100 mL infusion  2 g Intravenous Q8H    enoxparin  40 mg Subcutaneous Q24H (prophylaxis, )    fat emulsion 20%  250 mL Intravenous Daily    fluconazole (DIFLUCAN) IV (PEDS and ADULTS)  400 mg Intravenous Q24H    insulin aspart U-100  3 Units Subcutaneous 6 times per day    levothyroxine  25 mcg Intravenous Daily    methocarbamol injection  500 mg Intravenous Q6H    pantoprazole  40 mg Intravenous BID    potassium chloride  10 mEq Intravenous Q1H    scopolamine  1 patch Transdermal Q3 Days       Medications Ordered Prior to Encounter[2]    Past Surgical History:   Procedure Laterality Date    BREAST BIOPSY Left     b9    BREAST SURGERY      Reduction cause of a mass in left breast    c-sections x2       SECTION  3/26/81 & 85    Birth of two girls    COLONOSCOPY  2012         COLONOSCOPY N/A 2018    Procedure: COLONOSCOPY;  Surgeon: Francisco Mcclain MD;  Location: [facility];  Service: Endoscopy;  Laterality: N/A;    COLONOSCOPY N/A 10/24/2023    Procedure: COLONOSCOPY;  Surgeon: Francisco Mcclain MD;  Location: [facility];  Service: Endoscopy;  Laterality: N/A;    DISTAL PANCREATECTOMY N/A 2025    Procedure: PANCREATECTOMY, DISTAL, SUBTOTAL open caro, splenectomy;  Surgeon: Flip Valentin MD;  Location: [address];  Service: General;  Laterality: N/A;  Agility BK US probe TECH ONLY booked by TOMMY () for 7:00 a.m case on ()  Conf #  [id number]     EGD, WITH BALLOON DILATION N/A 7/24/2025    Procedure: EGD, WITH BALLOON DILATION;  Surgeon: Flip Valentin MD;  Location: [facility] OR [facility] FLR;  Service: General;  Laterality: N/A;    ENDOSCOPIC ULTRASOUND OF UPPER GASTROINTESTINAL TRACT Left 7/5/2024    Procedure: ULTRASOUND, UPPER GI TRACT, ENDOSCOPIC;  Surgeon: Andrew Gordon MD;  Location: [facility] ENDO;  Service: Endoscopy;  Laterality: Left;    ESOPHAGOGASTRODUODENOSCOPY N/A 7/5/2024    Procedure: EGD (ESOPHAGOGASTRODUODENOSCOPY);  Surgeon: Andrew Gordon MD;  Location: [facility] ENDO;  Service: Endoscopy;  Laterality: N/A;    ESOPHAGOGASTRODUODENOSCOPY W/ PEG  7/24/2025    Procedure: EGD, WITH PEG TUBE INSERTION;  Surgeon: Flip Valentin MD;  Location: [facility] OR [address] [place] [address];  Service: General;;    hysteroscopy with polypectomy      INCISION AND DRAINAGE Right 2/19/2025    Procedure: Incision and Drainage, RIGHT HAND EXCISION OF INFECTED BONE RIGHT INDEX FINGER;  Surgeon: SALVATORE Chou MD;  Location: [facility];  Service: General;  Laterality: Right;    INCISIONAL BREAST BIOPSY Left 1996    benign; done at same time as reduction    INSERTION OF TUNNELED CENTRAL VENOUS CATHETER (CVC) WITH SUBCUTANEOUS PORT N/A 7/18/2024    Procedure: [id number];  Surgeon: Blanca Dillard MD;  Location: [facility];  Service: General;  Laterality: N/A;    SPLENECTOMY  7/1/2025    Procedure: SPLENECTOMY;  Surgeon: Flip Valentin MD;  Location: [address];  Service: General;;    TOTAL REDUCTION MAMMOPLASTY Bilateral 1996       Social History:  Tobacco Use: Low Risk  (7/24/2025)    Patient History     Smoking Tobacco Use: Never     Smokeless Tobacco Use: Never     Passive Exposure: Past       Alcohol Use: Not At Risk (7/17/2025)    AUDIT-C     Frequency of Alcohol Consumption: Never     Average Number of Drinks: Patient does not drink     Frequency of Binge Drinking: Never       OBJECTIVE:     Vital Signs Range:  BMI Readings from Last 1 Encounters:    07/28/25 23.04 kg/m²       Temp:  [36.1 °C (97 °F)-36.5 °C (97.7 °F)]   Pulse:  []   Resp:  [16-18]   BP: (118-130)/(57-73)   SpO2:  [95 %-97 %]        Significant Labs:        Component Value Date/Time    WBC 13.87 (H) 08/01/2025 0256    HGB 8.8 (L) 08/01/2025 0256    HGB 11.6 (L) 07/15/2025 0646    HCT 27.5 (L) 08/01/2025 0256    HCT 34.4 (L) 07/15/2025 0646    HCT 28 (L) 07/01/2025 1125     08/01/2025 0256     (H) 07/15/2025 0646     08/01/2025 0256     (L) 07/15/2025 0646    K 3.4 (L) 08/01/2025 0256    K 5.4 (H) 07/15/2025 0646     08/01/2025 0256    CL 99 07/15/2025 0646    CO2 22 (L) 08/01/2025 0256    CO2 22 (L) 07/15/2025 0646     (H) 08/01/2025 0256     (H) 07/15/2025 0646    BUN 21 08/01/2025 0256    CREATININE 0.6 08/01/2025 0256    MG 1.6 08/01/2025 0256    PHOS 2.8 08/01/2025 0256    PHOS 5.9 (H) 07/15/2025 0646    CALCIUM 7.0 (L) 08/01/2025 0256    CALCIUM 8.6 (L) 07/15/2025 0646    ALBUMIN 1.1 (L) 08/01/2025 0256    ALBUMIN 2.0 (L) 07/15/2025 0646    PROT 4.6 (L) 08/01/2025 0256    PROT 5.8 (L) 07/15/2025 0646    ALKPHOS 118 08/01/2025 0256    ALKPHOS 96 07/15/2025 0646    BILITOT 0.1 08/01/2025 0256    BILITOT 0.3 07/15/2025 0646    AST 24 08/01/2025 0256    AST 28 07/15/2025 0646    ALT <8 08/01/2025 0256    ALT 12 07/15/2025 0646    INR 1.3 (H) 07/21/2025 0937    INR 2.5 07/14/2025 1003    HGBA1C 5.1 07/02/2025 0336    HGBA1C 7.0 (H) 09/20/2024 1435        Please see Results Review for additional labs.     Diagnostic Studies: No relevant studies.    EKG:   Results for orders placed or performed during the hospital encounter of 07/15/25   EKG 12-lead    Collection Time: 07/24/25 10:08 AM   Result Value Ref Range    QRS Duration 94 ms    OHS QTC Calculation 429 ms    Narrative    Test Reason : Z91.89,    Vent. Rate :  67 BPM     Atrial Rate :  67 BPM     P-R Int : 168 ms          QRS Dur :  94 ms      QT Int : 406 ms       P-R-T Axes :  38  10   86 degrees    QTcB Int : 429 ms    Normal sinus rhythm  Nonspecific ST and T wave abnormality  Abnormal ECG  When compared with ECG of 14-Jul-2025 09:44,  Vent. rate has decreased by  57 bpm  Borderline/ Minimal criteria for Inferior infarct are no longer Present  Nonspecific T wave abnormality now evident in Anterior leads  Confirmed by Herberth Zaidi ([id number]) on 7/24/2025 12:05:15 PM    Referred By: JEREMY COREAS           Confirmed By: Herberth Zaidi       ECHO:  See subjective, if available.      ASSESSMENT/PLAN:           Pre-op Assessment    I have reviewed the Patient Summary Reports.     I have reviewed the Nursing Notes. I have reviewed the NPO Status.   I have reviewed the Medications.     Review of Systems  Anesthesia Hx:               Denies Personal Hx of Anesthesia complications.                    Cardiovascular:     Hypertension                                    Hypertension         Renal/:  Chronic Renal Disease        Kidney Function/Disease             Hepatic/GI:     GERD         Gerd          Musculoskeletal:  Arthritis        Arthritis          Neurological:      Arthritis                           Endocrine:  Diabetes Hypothyroidism   Diabetes                   Hypothyroidism          Psych:  Psychiatric History                  Physical Exam  General: Well nourished, Cooperative, Alert and Oriented    Airway:  Mallampati: II   Mouth Opening: Normal  TM Distance: Normal  Tongue: Normal  Neck ROM: Normal ROM    Dental:  Intact, Dentures        Anesthesia Plan  Type of Anesthesia, risks & benefits discussed:    Anesthesia Type: MAC, Gen Natural Airway, Gen Supraglottic Airway, Gen ETT  Intra-op Monitoring Plan: Standard ASA Monitors  Post Op Pain Control Plan: multimodal analgesia and IV/PO Opioids PRN  Induction:  IV  Informed Consent: Informed consent signed with the Patient and all parties understand the risks and agree with anesthesia plan.  All questions answered.   ASA Score: 3  Day of Surgery  Review of History & Physical: H&P Update referred to the surgeon/provider.    Ready For Surgery From Anesthesia Perspective.     .           [1]   Patient Active Problem List  Diagnosis    Hypothyroidism    Primary osteoarthritis involving multiple joints    Mitral valve prolapse    Type 2 diabetes mellitus without complication, without long-term current use of insulin    Primary hypertension    Pulmonary nodules    Malignant neoplasm of pancreas    Chemotherapy induced diarrhea    Thrombocytopenia    Normocytic anemia    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therapy    ACP (advance care planning)    Pyogenic arthritis of right hand    Other acute osteomyelitis, multiple sites    Major depressive disorder    Acute osteomyelitis of hand including fingers    Cat bite of hand, initial encounter    Macular rash    Chemotherapy-induced neutropenia    Chemotherapy-induced thrombocytopenia    Post-operative complication    Sepsis with acute renal failure    SHERON (acute kidney injury)    Hyperkalemia    Encephalopathy, metabolic    Ascites    BMI 23.0-23.9, adult    Intraabdominal fluid collection    Hypoalbuminemia due to protein-calorie malnutrition    S/P abdominal paracentesis    Pressure injury of sacral region, stage 2    Intra-abdominal abscess    Encounter for PEG (percutaneous endoscopic gastrostomy)   [2]   No current facility-administered medications on file prior to encounter.     Current Outpatient Medications on File Prior to Encounter   Medication Sig Dispense Refill    acetaminophen (TYLENOL) 325 MG tablet Take 650 mg by mouth daily as needed for Pain.      apixaban (ELIQUIS) 2.5 mg Tab Take 1 tablet (2.5 mg total) by mouth 2 (two) times daily. 56 tablet 0    aspirin 81 MG Chew Chew and swallow 1 tablet (81 mg total) by mouth once daily. 90 tablet 0    EScitalopram oxalate (LEXAPRO) 10 MG tablet Take 1 tablet (10 mg total) by mouth once daily. 30 tablet 2    fluticasone propionate (FLONASE) 50 mcg/actuation nasal  spray 1 spray (50 mcg total) by Each Nostril route once daily. (Patient taking differently: 1 spray by Each Nostril route daily as needed for Rhinitis or Allergies.) 18.2 mL 0    ibuprofen (ADVIL,MOTRIN) 800 MG tablet Take 1 tablet (800 mg total) by mouth every 8 (eight) hours as needed for Pain. 42 tablet 1    levothyroxine (SYNTHROID) 50 MCG tablet Take 1 tablet (50 mcg total) by mouth once daily. 90 tablet 3    loratadine (CLARITIN) 10 mg tablet Take 1 tablet (10 mg total) by mouth every morning. 30 tablet 11    LORazepam (ATIVAN) 0.5 MG tablet Take 1 tablet (0.5 mg total) by mouth every 6 (six) hours as needed for Anxiety. (Patient not taking: Reported on 7/14/2025) 30 tablet 0    oxyCODONE (ROXICODONE) 5 MG immediate release tablet Take 1 tablet (5 mg total) by mouth every 4 (four) hours as needed for Pain. 15 tablet 0    polyethylene glycol (GLYCOLAX) 17 gram/dose powder Use to cap to measure 17g, mix with liquid, and take by mouth 2 (two) times daily. (Patient not taking: Reported on 7/14/2025) 952 g 0    potassium chloride 10% (KAYCIEL) 20 mEq/15 mL oral solution Take 30 mLs by mouth once daily.      simvastatin (ZOCOR) 10 MG tablet Take 1 tablet (10 mg total) by mouth every evening. 90 tablet 3    SYNJARDY XR 10-1,000 mg TBph Take 1 tablet by mouth once daily. 30 tablet 11    VITAMIN D2 1,250 mcg (50,000 unit) capsule Take 1 capsule (50,000 Units total) by mouth twice a week. (Patient taking differently: Take 50,000 Units by mouth every Monday and Thursday.) 8 capsule 11

## 2025-08-02 LAB
ABSOLUTE EOSINOPHIL (OHS): 0.2 K/UL
ABSOLUTE MONOCYTE (OHS): 1.16 K/UL (ref 0.3–1)
ABSOLUTE NEUTROPHIL COUNT (OHS): 14.06 K/UL (ref 1.8–7.7)
ALBUMIN SERPL BCP-MCNC: 1.3 G/DL (ref 3.5–5.2)
ALP SERPL-CCNC: 145 UNIT/L (ref 40–150)
ALT SERPL W/O P-5'-P-CCNC: <8 UNIT/L (ref 0–55)
ANION GAP (OHS): 7 MMOL/L (ref 8–16)
AST SERPL-CCNC: 22 UNIT/L (ref 0–50)
BACTERIA SPEC AEROBE CULT: NO GROWTH
BASOPHILS # BLD AUTO: 0.07 K/UL
BASOPHILS NFR BLD AUTO: 0.4 %
BILIRUB SERPL-MCNC: 0.1 MG/DL (ref 0.1–1)
BUN SERPL-MCNC: 21 MG/DL (ref 8–23)
CALCIUM SERPL-MCNC: 7.9 MG/DL (ref 8.7–10.5)
CHLORIDE SERPL-SCNC: 109 MMOL/L (ref 95–110)
CO2 SERPL-SCNC: 22 MMOL/L (ref 23–29)
CREAT SERPL-MCNC: 0.7 MG/DL (ref 0.5–1.4)
ERYTHROCYTE [DISTWIDTH] IN BLOOD BY AUTOMATED COUNT: 14.5 % (ref 11.5–14.5)
GFR SERPLBLD CREATININE-BSD FMLA CKD-EPI: >60 ML/MIN/1.73/M2
GLUCOSE SERPL-MCNC: 151 MG/DL (ref 70–110)
HCT VFR BLD AUTO: 27.9 % (ref 37–48.5)
HGB BLD-MCNC: 9.1 GM/DL (ref 12–16)
IMM GRANULOCYTES # BLD AUTO: 0.19 K/UL (ref 0–0.04)
IMM GRANULOCYTES NFR BLD AUTO: 1.2 % (ref 0–0.5)
LYMPHOCYTES # BLD AUTO: 0.64 K/UL (ref 1–4.8)
MAGNESIUM SERPL-MCNC: 1.7 MG/DL (ref 1.6–2.6)
MCH RBC QN AUTO: 30.5 PG (ref 27–31)
MCHC RBC AUTO-ENTMCNC: 32.6 G/DL (ref 32–36)
MCV RBC AUTO: 94 FL (ref 82–98)
NUCLEATED RBC (/100WBC) (OHS): 0 /100 WBC
PHOSPHATE SERPL-MCNC: 2.7 MG/DL (ref 2.7–4.5)
PLATELET # BLD AUTO: 363 K/UL (ref 150–450)
PMV BLD AUTO: 11 FL (ref 9.2–12.9)
POCT GLUCOSE: 100 MG/DL (ref 70–110)
POCT GLUCOSE: 106 MG/DL (ref 70–110)
POCT GLUCOSE: 117 MG/DL (ref 70–110)
POCT GLUCOSE: 130 MG/DL (ref 70–110)
POCT GLUCOSE: 148 MG/DL (ref 70–110)
POCT GLUCOSE: 155 MG/DL (ref 70–110)
POCT GLUCOSE: 158 MG/DL (ref 70–110)
POTASSIUM SERPL-SCNC: 4.5 MMOL/L (ref 3.5–5.1)
PROT SERPL-MCNC: 5.1 GM/DL (ref 6–8.4)
RBC # BLD AUTO: 2.98 M/UL (ref 4–5.4)
RELATIVE EOSINOPHIL (OHS): 1.2 %
RELATIVE LYMPHOCYTE (OHS): 3.9 % (ref 18–48)
RELATIVE MONOCYTE (OHS): 7.1 % (ref 4–15)
RELATIVE NEUTROPHIL (OHS): 86.2 % (ref 38–73)
SODIUM SERPL-SCNC: 138 MMOL/L (ref 136–145)
WBC # BLD AUTO: 16.32 K/UL (ref 3.9–12.7)

## 2025-08-02 PROCEDURE — 83735 ASSAY OF MAGNESIUM: CPT

## 2025-08-02 PROCEDURE — 63600175 PHARM REV CODE 636 W HCPCS

## 2025-08-02 PROCEDURE — 27000207 HC ISOLATION

## 2025-08-02 PROCEDURE — 80053 COMPREHEN METABOLIC PANEL: CPT

## 2025-08-02 PROCEDURE — 63600175 PHARM REV CODE 636 W HCPCS: Performed by: INTERNAL MEDICINE

## 2025-08-02 PROCEDURE — 25000003 PHARM REV CODE 250

## 2025-08-02 PROCEDURE — B4185 PARENTERAL SOL 10 GM LIPIDS: HCPCS

## 2025-08-02 PROCEDURE — 85025 COMPLETE CBC W/AUTO DIFF WBC: CPT

## 2025-08-02 PROCEDURE — A4217 STERILE WATER/SALINE, 500 ML: HCPCS

## 2025-08-02 PROCEDURE — 20600001 HC STEP DOWN PRIVATE ROOM

## 2025-08-02 PROCEDURE — 25000003 PHARM REV CODE 250: Performed by: INTERNAL MEDICINE

## 2025-08-02 PROCEDURE — 84100 ASSAY OF PHOSPHORUS: CPT

## 2025-08-02 PROCEDURE — 36415 COLL VENOUS BLD VENIPUNCTURE: CPT

## 2025-08-02 RX ORDER — HYDROMORPHONE HYDROCHLORIDE 1 MG/ML
0.2 INJECTION, SOLUTION INTRAMUSCULAR; INTRAVENOUS; SUBCUTANEOUS EVERY 6 HOURS PRN
Status: DISCONTINUED | OUTPATIENT
Start: 2025-08-02 | End: 2025-08-06

## 2025-08-02 RX ADMIN — HYDROMORPHONE HYDROCHLORIDE 0.5 MG: 1 INJECTION, SOLUTION INTRAMUSCULAR; INTRAVENOUS; SUBCUTANEOUS at 08:08

## 2025-08-02 RX ADMIN — INSULIN ASPART 3 UNITS: 100 INJECTION, SOLUTION INTRAVENOUS; SUBCUTANEOUS at 08:08

## 2025-08-02 RX ADMIN — PANTOPRAZOLE SODIUM 40 MG: 40 INJECTION, POWDER, LYOPHILIZED, FOR SOLUTION INTRAVENOUS at 08:08

## 2025-08-02 RX ADMIN — INSULIN ASPART 3 UNITS: 100 INJECTION, SOLUTION INTRAVENOUS; SUBCUTANEOUS at 05:08

## 2025-08-02 RX ADMIN — LEVOTHYROXINE SODIUM 25 MCG: 20 INJECTION, SOLUTION INTRAVENOUS at 08:08

## 2025-08-02 RX ADMIN — ONDANSETRON 4 MG: 2 INJECTION INTRAMUSCULAR; INTRAVENOUS at 05:08

## 2025-08-02 RX ADMIN — METHOCARBAMOL 500 MG: 100 INJECTION INTRAMUSCULAR; INTRAVENOUS at 12:08

## 2025-08-02 RX ADMIN — MAGNESIUM SULFATE HEPTAHYDRATE: 500 INJECTION, SOLUTION INTRAMUSCULAR; INTRAVENOUS at 09:08

## 2025-08-02 RX ADMIN — ENOXAPARIN SODIUM 40 MG: 40 INJECTION SUBCUTANEOUS at 05:08

## 2025-08-02 RX ADMIN — CEFIDEROCOL SULFATE TOSYLATE 2 G: 1 INJECTION, POWDER, FOR SOLUTION INTRAVENOUS at 03:08

## 2025-08-02 RX ADMIN — CEFIDEROCOL SULFATE TOSYLATE 2 G: 1 INJECTION, POWDER, FOR SOLUTION INTRAVENOUS at 08:08

## 2025-08-02 RX ADMIN — FLUCONAZOLE 400 MG: 2 INJECTION, SOLUTION INTRAVENOUS at 08:08

## 2025-08-02 RX ADMIN — I.V. FAT EMULSION 250 ML: 20 EMULSION INTRAVENOUS at 09:08

## 2025-08-02 RX ADMIN — METHOCARBAMOL 500 MG: 100 INJECTION INTRAMUSCULAR; INTRAVENOUS at 05:08

## 2025-08-02 RX ADMIN — INSULIN ASPART 3 UNITS: 100 INJECTION, SOLUTION INTRAVENOUS; SUBCUTANEOUS at 12:08

## 2025-08-03 LAB
ABSOLUTE EOSINOPHIL (OHS): 0.15 K/UL
ABSOLUTE MONOCYTE (OHS): 1.36 K/UL (ref 0.3–1)
ABSOLUTE NEUTROPHIL COUNT (OHS): 13.49 K/UL (ref 1.8–7.7)
ALBUMIN SERPL BCP-MCNC: 1.3 G/DL (ref 3.5–5.2)
ALP SERPL-CCNC: 161 UNIT/L (ref 40–150)
ALT SERPL W/O P-5'-P-CCNC: <8 UNIT/L (ref 0–55)
ANION GAP (OHS): 8 MMOL/L (ref 8–16)
AST SERPL-CCNC: 24 UNIT/L (ref 0–50)
BASOPHILS # BLD AUTO: 0.06 K/UL
BASOPHILS NFR BLD AUTO: 0.4 %
BILIRUB SERPL-MCNC: 0.1 MG/DL (ref 0.1–1)
BUN SERPL-MCNC: 20 MG/DL (ref 8–23)
CALCIUM SERPL-MCNC: 7.7 MG/DL (ref 8.7–10.5)
CHLORIDE SERPL-SCNC: 105 MMOL/L (ref 95–110)
CO2 SERPL-SCNC: 25 MMOL/L (ref 23–29)
CREAT SERPL-MCNC: 0.7 MG/DL (ref 0.5–1.4)
ERYTHROCYTE [DISTWIDTH] IN BLOOD BY AUTOMATED COUNT: 14.5 % (ref 11.5–14.5)
GFR SERPLBLD CREATININE-BSD FMLA CKD-EPI: >60 ML/MIN/1.73/M2
GLUCOSE SERPL-MCNC: 148 MG/DL (ref 70–110)
HCT VFR BLD AUTO: 27 % (ref 37–48.5)
HGB BLD-MCNC: 8.9 GM/DL (ref 12–16)
IMM GRANULOCYTES # BLD AUTO: 0.16 K/UL (ref 0–0.04)
IMM GRANULOCYTES NFR BLD AUTO: 1 % (ref 0–0.5)
LYMPHOCYTES # BLD AUTO: 0.48 K/UL (ref 1–4.8)
MAGNESIUM SERPL-MCNC: 1.7 MG/DL (ref 1.6–2.6)
MCH RBC QN AUTO: 30.7 PG (ref 27–31)
MCHC RBC AUTO-ENTMCNC: 33 G/DL (ref 32–36)
MCV RBC AUTO: 93 FL (ref 82–98)
NUCLEATED RBC (/100WBC) (OHS): 0 /100 WBC
PHOSPHATE SERPL-MCNC: 2.8 MG/DL (ref 2.7–4.5)
PLATELET # BLD AUTO: 376 K/UL (ref 150–450)
PMV BLD AUTO: 10.8 FL (ref 9.2–12.9)
POCT GLUCOSE: 164 MG/DL (ref 70–110)
POCT GLUCOSE: 166 MG/DL (ref 70–110)
POCT GLUCOSE: 171 MG/DL (ref 70–110)
POCT GLUCOSE: 181 MG/DL (ref 70–110)
POCT GLUCOSE: 97 MG/DL (ref 70–110)
POTASSIUM SERPL-SCNC: 4 MMOL/L (ref 3.5–5.1)
PROT SERPL-MCNC: 5.2 GM/DL (ref 6–8.4)
RBC # BLD AUTO: 2.9 M/UL (ref 4–5.4)
RELATIVE EOSINOPHIL (OHS): 1 %
RELATIVE LYMPHOCYTE (OHS): 3.1 % (ref 18–48)
RELATIVE MONOCYTE (OHS): 8.7 % (ref 4–15)
RELATIVE NEUTROPHIL (OHS): 85.8 % (ref 38–73)
SODIUM SERPL-SCNC: 138 MMOL/L (ref 136–145)
WBC # BLD AUTO: 15.7 K/UL (ref 3.9–12.7)

## 2025-08-03 PROCEDURE — 63600175 PHARM REV CODE 636 W HCPCS: Mod: JZ,TB | Performed by: INTERNAL MEDICINE

## 2025-08-03 PROCEDURE — A4217 STERILE WATER/SALINE, 500 ML: HCPCS

## 2025-08-03 PROCEDURE — 99900035 HC TECH TIME PER 15 MIN (STAT)

## 2025-08-03 PROCEDURE — 63600175 PHARM REV CODE 636 W HCPCS

## 2025-08-03 PROCEDURE — 25000003 PHARM REV CODE 250

## 2025-08-03 PROCEDURE — B4185 PARENTERAL SOL 10 GM LIPIDS: HCPCS

## 2025-08-03 PROCEDURE — 84100 ASSAY OF PHOSPHORUS: CPT

## 2025-08-03 PROCEDURE — 25500020 PHARM REV CODE 255

## 2025-08-03 PROCEDURE — 25000003 PHARM REV CODE 250: Performed by: INTERNAL MEDICINE

## 2025-08-03 PROCEDURE — 27000207 HC ISOLATION

## 2025-08-03 PROCEDURE — 36415 COLL VENOUS BLD VENIPUNCTURE: CPT

## 2025-08-03 PROCEDURE — 25500020 PHARM REV CODE 255: Performed by: SURGERY

## 2025-08-03 PROCEDURE — 83735 ASSAY OF MAGNESIUM: CPT

## 2025-08-03 PROCEDURE — 80053 COMPREHEN METABOLIC PANEL: CPT

## 2025-08-03 PROCEDURE — 20600001 HC STEP DOWN PRIVATE ROOM

## 2025-08-03 PROCEDURE — 85025 COMPLETE CBC W/AUTO DIFF WBC: CPT

## 2025-08-03 RX ORDER — ACETAMINOPHEN 650 MG/20.3ML
650 LIQUID ORAL EVERY 6 HOURS PRN
Status: DISCONTINUED | OUTPATIENT
Start: 2025-08-03 | End: 2025-08-06

## 2025-08-03 RX ADMIN — METHOCARBAMOL 500 MG: 100 INJECTION INTRAMUSCULAR; INTRAVENOUS at 12:08

## 2025-08-03 RX ADMIN — IOHEXOL 15 ML: 350 INJECTION, SOLUTION INTRAVENOUS at 05:08

## 2025-08-03 RX ADMIN — IOHEXOL 75 ML: 350 INJECTION, SOLUTION INTRAVENOUS at 06:08

## 2025-08-03 RX ADMIN — LEVOTHYROXINE SODIUM 25 MCG: 20 INJECTION, SOLUTION INTRAVENOUS at 08:08

## 2025-08-03 RX ADMIN — PANTOPRAZOLE SODIUM 40 MG: 40 INJECTION, POWDER, LYOPHILIZED, FOR SOLUTION INTRAVENOUS at 09:08

## 2025-08-03 RX ADMIN — HYDROMORPHONE HYDROCHLORIDE 0.5 MG: 1 INJECTION, SOLUTION INTRAMUSCULAR; INTRAVENOUS; SUBCUTANEOUS at 09:08

## 2025-08-03 RX ADMIN — ONDANSETRON 4 MG: 2 INJECTION INTRAMUSCULAR; INTRAVENOUS at 09:08

## 2025-08-03 RX ADMIN — I.V. FAT EMULSION 250 ML: 20 EMULSION INTRAVENOUS at 09:08

## 2025-08-03 RX ADMIN — CEFIDEROCOL SULFATE TOSYLATE 2 G: 1 INJECTION, POWDER, FOR SOLUTION INTRAVENOUS at 12:08

## 2025-08-03 RX ADMIN — CEFIDEROCOL SULFATE TOSYLATE 2 G: 1 INJECTION, POWDER, FOR SOLUTION INTRAVENOUS at 02:08

## 2025-08-03 RX ADMIN — MAGNESIUM SULFATE HEPTAHYDRATE: 500 INJECTION, SOLUTION INTRAMUSCULAR; INTRAVENOUS at 09:08

## 2025-08-03 RX ADMIN — INSULIN ASPART 3 UNITS: 100 INJECTION, SOLUTION INTRAVENOUS; SUBCUTANEOUS at 04:08

## 2025-08-03 RX ADMIN — METHOCARBAMOL 500 MG: 100 INJECTION INTRAMUSCULAR; INTRAVENOUS at 05:08

## 2025-08-03 RX ADMIN — PANTOPRAZOLE SODIUM 40 MG: 40 INJECTION, POWDER, LYOPHILIZED, FOR SOLUTION INTRAVENOUS at 08:08

## 2025-08-03 RX ADMIN — CEFIDEROCOL SULFATE TOSYLATE 2 G: 1 INJECTION, POWDER, FOR SOLUTION INTRAVENOUS at 08:08

## 2025-08-03 RX ADMIN — FLUCONAZOLE 400 MG: 2 INJECTION, SOLUTION INTRAVENOUS at 08:08

## 2025-08-03 RX ADMIN — ENOXAPARIN SODIUM 40 MG: 40 INJECTION SUBCUTANEOUS at 06:08

## 2025-08-03 RX ADMIN — CEFIDEROCOL SULFATE TOSYLATE 2 G: 1 INJECTION, POWDER, FOR SOLUTION INTRAVENOUS at 11:08

## 2025-08-03 RX ADMIN — HYDROMORPHONE HYDROCHLORIDE 0.2 MG: 1 INJECTION, SOLUTION INTRAMUSCULAR; INTRAVENOUS; SUBCUTANEOUS at 02:08

## 2025-08-04 ENCOUNTER — ANESTHESIA (OUTPATIENT)
Dept: SURGERY | Facility: HOSPITAL | Age: 65
End: 2025-08-04
Payer: MEDICARE

## 2025-08-04 LAB
ABSOLUTE EOSINOPHIL (OHS): 0.19 K/UL
ABSOLUTE MONOCYTE (OHS): 0.7 K/UL (ref 0.3–1)
ABSOLUTE NEUTROPHIL COUNT (OHS): 11.25 K/UL (ref 1.8–7.7)
ALBUMIN SERPL BCP-MCNC: 1.3 G/DL (ref 3.5–5.2)
ALP SERPL-CCNC: 225 UNIT/L (ref 40–150)
ALT SERPL W/O P-5'-P-CCNC: <8 UNIT/L (ref 0–55)
ANION GAP (OHS): 7 MMOL/L (ref 8–16)
APPEARANCE FLD: NORMAL
AST SERPL-CCNC: 38 UNIT/L (ref 0–50)
BASOPHILS # BLD AUTO: 0.08 K/UL
BASOPHILS NFR BLD AUTO: 0.6 %
BILIRUB SERPL-MCNC: 0.1 MG/DL (ref 0.1–1)
BUN SERPL-MCNC: 18 MG/DL (ref 8–23)
CALCIUM SERPL-MCNC: 7.7 MG/DL (ref 8.7–10.5)
CHLORIDE SERPL-SCNC: 103 MMOL/L (ref 95–110)
CO2 SERPL-SCNC: 28 MMOL/L (ref 23–29)
COLOR FLD: NORMAL
CREAT SERPL-MCNC: 0.6 MG/DL (ref 0.5–1.4)
ERYTHROCYTE [DISTWIDTH] IN BLOOD BY AUTOMATED COUNT: 14.6 % (ref 11.5–14.5)
GFR SERPLBLD CREATININE-BSD FMLA CKD-EPI: >60 ML/MIN/1.73/M2
GLUCOSE SERPL-MCNC: 159 MG/DL (ref 70–110)
GRAM STN SPEC: NORMAL
GRAM STN SPEC: NORMAL
HCT VFR BLD AUTO: 26.9 % (ref 37–48.5)
HGB BLD-MCNC: 8.6 GM/DL (ref 12–16)
IMM GRANULOCYTES # BLD AUTO: 0.16 K/UL (ref 0–0.04)
IMM GRANULOCYTES NFR BLD AUTO: 1.3 % (ref 0–0.5)
LYMPHOCYTES # BLD AUTO: 0.31 K/UL (ref 1–4.8)
LYMPHOCYTES NFR FLD MANUAL: 70 %
MAGNESIUM SERPL-MCNC: 1.7 MG/DL (ref 1.6–2.6)
MCH RBC QN AUTO: 29.6 PG (ref 27–31)
MCHC RBC AUTO-ENTMCNC: 32 G/DL (ref 32–36)
MCV RBC AUTO: 92 FL (ref 82–98)
MONOS+MACROS NFR FLD MANUAL: 9 %
NEUTROPHILS NFR FLD MANUAL: 21 %
NUCLEATED RBC (/100WBC) (OHS): 0 /100 WBC
PHOSPHATE SERPL-MCNC: 2.5 MG/DL (ref 2.7–4.5)
PLATELET # BLD AUTO: 392 K/UL (ref 150–450)
PMV BLD AUTO: 10.6 FL (ref 9.2–12.9)
POCT GLUCOSE: 154 MG/DL (ref 70–110)
POCT GLUCOSE: 175 MG/DL (ref 70–110)
POCT GLUCOSE: 192 MG/DL (ref 70–110)
POCT GLUCOSE: 200 MG/DL (ref 70–110)
POCT GLUCOSE: 206 MG/DL (ref 70–110)
POCT GLUCOSE: 222 MG/DL (ref 70–110)
POTASSIUM SERPL-SCNC: 3.8 MMOL/L (ref 3.5–5.1)
PROT SERPL-MCNC: 5.4 GM/DL (ref 6–8.4)
RBC # BLD AUTO: 2.91 M/UL (ref 4–5.4)
RELATIVE EOSINOPHIL (OHS): 1.5 %
RELATIVE LYMPHOCYTE (OHS): 2.4 % (ref 18–48)
RELATIVE MONOCYTE (OHS): 5.5 % (ref 4–15)
RELATIVE NEUTROPHIL (OHS): 88.7 % (ref 38–73)
SODIUM SERPL-SCNC: 138 MMOL/L (ref 136–145)
WBC # BLD AUTO: 12.69 K/UL (ref 3.9–12.7)
WBC # FLD: 177 /CU MM

## 2025-08-04 PROCEDURE — 20600001 HC STEP DOWN PRIVATE ROOM

## 2025-08-04 PROCEDURE — 63600175 PHARM REV CODE 636 W HCPCS

## 2025-08-04 PROCEDURE — 87102 FUNGUS ISOLATION CULTURE: CPT | Performed by: EMERGENCY MEDICINE

## 2025-08-04 PROCEDURE — 87205 SMEAR GRAM STAIN: CPT | Performed by: EMERGENCY MEDICINE

## 2025-08-04 PROCEDURE — 63600175 PHARM REV CODE 636 W HCPCS: Performed by: NURSE ANESTHETIST, CERTIFIED REGISTERED

## 2025-08-04 PROCEDURE — 25000003 PHARM REV CODE 250: Performed by: ANESTHESIOLOGY

## 2025-08-04 PROCEDURE — 83735 ASSAY OF MAGNESIUM: CPT

## 2025-08-04 PROCEDURE — 87206 SMEAR FLUORESCENT/ACID STAI: CPT | Performed by: EMERGENCY MEDICINE

## 2025-08-04 PROCEDURE — 82962 GLUCOSE BLOOD TEST: CPT | Performed by: SURGERY

## 2025-08-04 PROCEDURE — 85025 COMPLETE CBC W/AUTO DIFF WBC: CPT

## 2025-08-04 PROCEDURE — 99900035 HC TECH TIME PER 15 MIN (STAT)

## 2025-08-04 PROCEDURE — 25000003 PHARM REV CODE 250

## 2025-08-04 PROCEDURE — 36000706: Performed by: SURGERY

## 2025-08-04 PROCEDURE — 36415 COLL VENOUS BLD VENIPUNCTURE: CPT

## 2025-08-04 PROCEDURE — 84100 ASSAY OF PHOSPHORUS: CPT

## 2025-08-04 PROCEDURE — 0DJ08ZZ INSPECTION OF UPPER INTESTINAL TRACT, VIA NATURAL OR ARTIFICIAL OPENING ENDOSCOPIC: ICD-10-PCS | Performed by: SURGERY

## 2025-08-04 PROCEDURE — 89051 BODY FLUID CELL COUNT: CPT | Performed by: EMERGENCY MEDICINE

## 2025-08-04 PROCEDURE — 99232 SBSQ HOSP IP/OBS MODERATE 35: CPT | Mod: ,,,

## 2025-08-04 PROCEDURE — A4217 STERILE WATER/SALINE, 500 ML: HCPCS

## 2025-08-04 PROCEDURE — 36000707: Performed by: SURGERY

## 2025-08-04 PROCEDURE — 87070 CULTURE OTHR SPECIMN AEROBIC: CPT | Performed by: EMERGENCY MEDICINE

## 2025-08-04 PROCEDURE — 63600175 PHARM REV CODE 636 W HCPCS: Mod: JZ,TB

## 2025-08-04 PROCEDURE — 87116 MYCOBACTERIA CULTURE: CPT | Performed by: EMERGENCY MEDICINE

## 2025-08-04 PROCEDURE — 71000033 HC RECOVERY, INTIAL HOUR: Performed by: SURGERY

## 2025-08-04 PROCEDURE — 0W9G3ZZ DRAINAGE OF PERITONEAL CAVITY, PERCUTANEOUS APPROACH: ICD-10-PCS | Performed by: INTERNAL MEDICINE

## 2025-08-04 PROCEDURE — B4185 PARENTERAL SOL 10 GM LIPIDS: HCPCS

## 2025-08-04 PROCEDURE — 27000207 HC ISOLATION

## 2025-08-04 PROCEDURE — 80053 COMPREHEN METABOLIC PANEL: CPT

## 2025-08-04 PROCEDURE — 71000015 HC POSTOP RECOV 1ST HR: Performed by: SURGERY

## 2025-08-04 PROCEDURE — 37000008 HC ANESTHESIA 1ST 15 MINUTES: Performed by: SURGERY

## 2025-08-04 PROCEDURE — 37000009 HC ANESTHESIA EA ADD 15 MINS: Performed by: SURGERY

## 2025-08-04 PROCEDURE — 25000003 PHARM REV CODE 250: Performed by: NURSE ANESTHETIST, CERTIFIED REGISTERED

## 2025-08-04 PROCEDURE — 43235 EGD DIAGNOSTIC BRUSH WASH: CPT | Mod: 58,,, | Performed by: SURGERY

## 2025-08-04 RX ORDER — LORAZEPAM 2 MG/ML
0.25 INJECTION INTRAMUSCULAR ONCE AS NEEDED
Status: DISCONTINUED | OUTPATIENT
Start: 2025-08-04 | End: 2025-08-04 | Stop reason: HOSPADM

## 2025-08-04 RX ORDER — ONDANSETRON HYDROCHLORIDE 2 MG/ML
INJECTION, SOLUTION INTRAVENOUS
Status: DISCONTINUED | OUTPATIENT
Start: 2025-08-04 | End: 2025-08-04

## 2025-08-04 RX ORDER — HYDROMORPHONE HYDROCHLORIDE 1 MG/ML
0.2 INJECTION, SOLUTION INTRAMUSCULAR; INTRAVENOUS; SUBCUTANEOUS EVERY 5 MIN PRN
Status: DISCONTINUED | OUTPATIENT
Start: 2025-08-04 | End: 2025-08-04 | Stop reason: HOSPADM

## 2025-08-04 RX ORDER — FENTANYL CITRATE 50 UG/ML
INJECTION, SOLUTION INTRAMUSCULAR; INTRAVENOUS
Status: DISCONTINUED | OUTPATIENT
Start: 2025-08-04 | End: 2025-08-04

## 2025-08-04 RX ORDER — DEXAMETHASONE SODIUM PHOSPHATE 4 MG/ML
INJECTION, SOLUTION INTRA-ARTICULAR; INTRALESIONAL; INTRAMUSCULAR; INTRAVENOUS; SOFT TISSUE
Status: DISCONTINUED | OUTPATIENT
Start: 2025-08-04 | End: 2025-08-04

## 2025-08-04 RX ORDER — GLUCAGON 1 MG
1 KIT INJECTION
Status: DISCONTINUED | OUTPATIENT
Start: 2025-08-04 | End: 2025-08-04 | Stop reason: HOSPADM

## 2025-08-04 RX ORDER — SUCCINYLCHOLINE CHLORIDE 20 MG/ML
INJECTION INTRAMUSCULAR; INTRAVENOUS
Status: DISCONTINUED | OUTPATIENT
Start: 2025-08-04 | End: 2025-08-04

## 2025-08-04 RX ORDER — INSULIN ASPART 100 [IU]/ML
3 INJECTION, SOLUTION INTRAVENOUS; SUBCUTANEOUS
Status: DISCONTINUED | OUTPATIENT
Start: 2025-08-04 | End: 2025-08-05

## 2025-08-04 RX ORDER — MIDAZOLAM HYDROCHLORIDE 1 MG/ML
INJECTION INTRAMUSCULAR; INTRAVENOUS
Status: DISCONTINUED | OUTPATIENT
Start: 2025-08-04 | End: 2025-08-04

## 2025-08-04 RX ORDER — MEPERIDINE HYDROCHLORIDE 50 MG/ML
12.5 INJECTION INTRAMUSCULAR; INTRAVENOUS; SUBCUTANEOUS ONCE AS NEEDED
Status: DISCONTINUED | OUTPATIENT
Start: 2025-08-04 | End: 2025-08-04 | Stop reason: HOSPADM

## 2025-08-04 RX ORDER — LIDOCAINE HYDROCHLORIDE 20 MG/ML
INJECTION INTRAVENOUS
Status: DISCONTINUED | OUTPATIENT
Start: 2025-08-04 | End: 2025-08-04

## 2025-08-04 RX ORDER — PROPOFOL 10 MG/ML
VIAL (ML) INTRAVENOUS
Status: DISCONTINUED | OUTPATIENT
Start: 2025-08-04 | End: 2025-08-04

## 2025-08-04 RX ORDER — SODIUM CHLORIDE 9 MG/ML
INJECTION, SOLUTION INTRAVENOUS CONTINUOUS
Status: DISCONTINUED | OUTPATIENT
Start: 2025-08-04 | End: 2025-08-05

## 2025-08-04 RX ORDER — ONDANSETRON HYDROCHLORIDE 2 MG/ML
4 INJECTION, SOLUTION INTRAVENOUS DAILY PRN
Status: DISCONTINUED | OUTPATIENT
Start: 2025-08-04 | End: 2025-08-04 | Stop reason: HOSPADM

## 2025-08-04 RX ADMIN — LIDOCAINE HYDROCHLORIDE 50 MG: 20 INJECTION INTRAVENOUS at 07:08

## 2025-08-04 RX ADMIN — PROPOFOL 75 MCG/KG/MIN: 10 INJECTION, EMULSION INTRAVENOUS at 07:08

## 2025-08-04 RX ADMIN — INSULIN ASPART 3 UNITS: 100 INJECTION, SOLUTION INTRAVENOUS; SUBCUTANEOUS at 12:08

## 2025-08-04 RX ADMIN — SODIUM CHLORIDE: 9 INJECTION, SOLUTION INTRAVENOUS at 08:08

## 2025-08-04 RX ADMIN — PANTOPRAZOLE SODIUM 40 MG: 40 INJECTION, POWDER, LYOPHILIZED, FOR SOLUTION INTRAVENOUS at 08:08

## 2025-08-04 RX ADMIN — PROPOFOL 100 MG: 10 INJECTION, EMULSION INTRAVENOUS at 07:08

## 2025-08-04 RX ADMIN — CEFIDEROCOL SULFATE TOSYLATE 2 G: 1 INJECTION, POWDER, FOR SOLUTION INTRAVENOUS at 04:08

## 2025-08-04 RX ADMIN — INSULIN ASPART 3 UNITS: 100 INJECTION, SOLUTION INTRAVENOUS; SUBCUTANEOUS at 04:08

## 2025-08-04 RX ADMIN — FENTANYL CITRATE 25 MCG: 50 INJECTION, SOLUTION INTRAMUSCULAR; INTRAVENOUS at 07:08

## 2025-08-04 RX ADMIN — DEXAMETHASONE SODIUM PHOSPHATE 4 MG: 4 INJECTION, SOLUTION INTRAMUSCULAR; INTRAVENOUS at 07:08

## 2025-08-04 RX ADMIN — I.V. FAT EMULSION 250 ML: 20 EMULSION INTRAVENOUS at 09:08

## 2025-08-04 RX ADMIN — MAGNESIUM SULFATE HEPTAHYDRATE: 500 INJECTION, SOLUTION INTRAMUSCULAR; INTRAVENOUS at 09:08

## 2025-08-04 RX ADMIN — LEVOTHYROXINE SODIUM 25 MCG: 20 INJECTION, SOLUTION INTRAVENOUS at 08:08

## 2025-08-04 RX ADMIN — SODIUM PHOSPHATE, MONOBASIC, MONOHYDRATE AND SODIUM PHOSPHATE, DIBASIC, ANHYDROUS 15 MMOL: 142; 276 INJECTION, SOLUTION INTRAVENOUS at 12:08

## 2025-08-04 RX ADMIN — MIDAZOLAM HYDROCHLORIDE 2 MG: 2 INJECTION, SOLUTION INTRAMUSCULAR; INTRAVENOUS at 06:08

## 2025-08-04 RX ADMIN — SCOPOLAMINE 1 PATCH: 1.5 PATCH, EXTENDED RELEASE TRANSDERMAL at 04:08

## 2025-08-04 RX ADMIN — INSULIN ASPART 2 UNITS: 100 INJECTION, SOLUTION INTRAVENOUS; SUBCUTANEOUS at 12:08

## 2025-08-04 RX ADMIN — SUCCINYLCHOLINE CHLORIDE 120 MG: 20 INJECTION, SOLUTION INTRAMUSCULAR; INTRAVENOUS; PARENTERAL at 07:08

## 2025-08-04 RX ADMIN — FLUCONAZOLE 400 MG: 2 INJECTION, SOLUTION INTRAVENOUS at 08:08

## 2025-08-04 RX ADMIN — HYDROMORPHONE HYDROCHLORIDE 0.2 MG: 1 INJECTION, SOLUTION INTRAMUSCULAR; INTRAVENOUS; SUBCUTANEOUS at 05:08

## 2025-08-04 RX ADMIN — ENOXAPARIN SODIUM 40 MG: 40 INJECTION SUBCUTANEOUS at 04:08

## 2025-08-04 RX ADMIN — SODIUM CHLORIDE: 0.9 INJECTION, SOLUTION INTRAVENOUS at 06:08

## 2025-08-04 RX ADMIN — CEFIDEROCOL SULFATE TOSYLATE 2 G: 1 INJECTION, POWDER, FOR SOLUTION INTRAVENOUS at 08:08

## 2025-08-04 RX ADMIN — ONDANSETRON 4 MG: 2 INJECTION INTRAMUSCULAR; INTRAVENOUS at 07:08

## 2025-08-04 RX ADMIN — INSULIN ASPART 3 UNITS: 100 INJECTION, SOLUTION INTRAVENOUS; SUBCUTANEOUS at 08:08

## 2025-08-04 NOTE — ANESTHESIA PROCEDURE NOTES
Intubation    Date/Time: 8/4/2025 7:12 AM    Performed by: Valarie Houston CRNA  Authorized by: Maldonado Torres Jr., MD    Intubation:     Induction:  Intravenous    Intubated:  Postinduction    Mask Ventilation:  Easy mask    Attempts:  1    Attempted By:  CRNA    Method of Intubation:  Video laryngoscopy    Blade:  Wilhelm 3    Laryngeal View Grade: Grade I - full view of cords      Difficult Airway Encountered?: No      Complications:  None    Airway Device:  Oral endotracheal tube    Airway Device Size:  7.0    Style/Cuff Inflation:  Cuffed    Inflation Amount (mL):  6    Tube secured:  19    Secured at:  The lips    Placement Verified By:  Capnometry    Complicating Factors:  None    Findings Post-Intubation:  BS equal bilateral

## 2025-08-04 NOTE — TRANSFER OF CARE
"Anesthesia Transfer of Care Note    Patient: Bessy Lamb    Procedure(s) Performed: Procedure(s) (LRB):  EGD (ESOPHAGOGASTRODUODENOSCOPY) (N/A)    Patient location: PACU    Anesthesia Type: general    Transport from OR: Transported from OR on 6-10 L/min O2 by face mask with adequate spontaneous ventilation    Post pain: adequate analgesia    Post assessment: no apparent anesthetic complications    Post vital signs: stable    Level of consciousness: awake    Nausea/Vomiting: no nausea/vomiting    Complications: none    Transfer of care protocol was followed      Last vitals: Visit Vitals  BP (!) 149/66 (BP Location: Right arm)   Pulse 94   Temp 36.5 °C (97.7 °F)   Resp 18   Ht 5' 1" (1.549 m)   Wt 55.3 kg (121 lb 14.6 oz)   SpO2 97%   Breastfeeding No   BMI 23.04 kg/m²     "

## 2025-08-04 NOTE — ANESTHESIA POSTPROCEDURE EVALUATION
Anesthesia Post Evaluation    Patient: Bessy Lamb    Procedure(s) Performed: Procedure(s) (LRB):  EGD (ESOPHAGOGASTRODUODENOSCOPY) (N/A)    Final Anesthesia Type: general      Patient location during evaluation: PACU  Patient participation: Yes- Able to Participate  Level of consciousness: awake and alert  Post-procedure vital signs: reviewed and stable  Pain management: adequate  Airway patency: patent    PONV status at discharge: No PONV  Anesthetic complications: no      Cardiovascular status: blood pressure returned to baseline  Respiratory status: spontaneous ventilation and room air  Hydration status: euvolemic  Follow-up not needed.              Vitals Value Taken Time   /67 08/04/25 08:22   Temp 36.5 °C (97.7 °F) 08/04/25 08:22   Pulse 85 08/04/25 08:22   Resp 14 08/04/25 08:22   SpO2 94 % 08/04/25 08:22         Event Time   Out of Recovery 08/04/2025 08:00:00         Pain/Jenae Score: Pain Rating Prior to Med Admin: 8 (8/3/2025  9:09 PM)  Pain Rating Post Med Admin: 5 (8/3/2025  9:39 PM)  Jenae Score: 10 (8/4/2025  8:00 AM)          
Normal vision: sees adequately in most situations; can see medication labels, newsprint

## 2025-08-05 LAB
ABSOLUTE EOSINOPHIL (OHS): 0.01 K/UL
ABSOLUTE MONOCYTE (OHS): 1.26 K/UL (ref 0.3–1)
ABSOLUTE NEUTROPHIL COUNT (OHS): 11.51 K/UL (ref 1.8–7.7)
ACID FAST MOD KINY STN SPEC: NORMAL
ALBUMIN SERPL BCP-MCNC: 1.3 G/DL (ref 3.5–5.2)
ALP SERPL-CCNC: 198 UNIT/L (ref 40–150)
ALT SERPL W/O P-5'-P-CCNC: <8 UNIT/L (ref 0–55)
ANION GAP (OHS): 8 MMOL/L (ref 8–16)
AST SERPL-CCNC: 29 UNIT/L (ref 0–50)
BASOPHILS # BLD AUTO: 0.02 K/UL
BASOPHILS NFR BLD AUTO: 0.1 %
BILIRUB SERPL-MCNC: 0.1 MG/DL (ref 0.1–1)
BUN SERPL-MCNC: 19 MG/DL (ref 8–23)
CALCIUM SERPL-MCNC: 7.7 MG/DL (ref 8.7–10.5)
CHLORIDE SERPL-SCNC: 103 MMOL/L (ref 95–110)
CO2 SERPL-SCNC: 27 MMOL/L (ref 23–29)
CREAT SERPL-MCNC: 0.6 MG/DL (ref 0.5–1.4)
ERYTHROCYTE [DISTWIDTH] IN BLOOD BY AUTOMATED COUNT: 14.3 % (ref 11.5–14.5)
GFR SERPLBLD CREATININE-BSD FMLA CKD-EPI: >60 ML/MIN/1.73/M2
GLUCOSE SERPL-MCNC: 191 MG/DL (ref 70–110)
HCT VFR BLD AUTO: 25.4 % (ref 37–48.5)
HGB BLD-MCNC: 8.5 GM/DL (ref 12–16)
IMM GRANULOCYTES # BLD AUTO: 0.19 K/UL (ref 0–0.04)
IMM GRANULOCYTES NFR BLD AUTO: 1.4 % (ref 0–0.5)
LYMPHOCYTES # BLD AUTO: 0.45 K/UL (ref 1–4.8)
MAGNESIUM SERPL-MCNC: 1.6 MG/DL (ref 1.6–2.6)
MCH RBC QN AUTO: 30.1 PG (ref 27–31)
MCHC RBC AUTO-ENTMCNC: 33.5 G/DL (ref 32–36)
MCV RBC AUTO: 90 FL (ref 82–98)
NUCLEATED RBC (/100WBC) (OHS): 0 /100 WBC
PHOSPHATE SERPL-MCNC: 2.5 MG/DL (ref 2.7–4.5)
PLATELET # BLD AUTO: 381 K/UL (ref 150–450)
PMV BLD AUTO: 10.5 FL (ref 9.2–12.9)
POCT GLUCOSE: 132 MG/DL (ref 70–110)
POCT GLUCOSE: 149 MG/DL (ref 70–110)
POCT GLUCOSE: 157 MG/DL (ref 70–110)
POCT GLUCOSE: 176 MG/DL (ref 70–110)
POCT GLUCOSE: 202 MG/DL (ref 70–110)
POCT GLUCOSE: 207 MG/DL (ref 70–110)
POCT GLUCOSE: 219 MG/DL (ref 70–110)
POTASSIUM SERPL-SCNC: 4.2 MMOL/L (ref 3.5–5.1)
PROT SERPL-MCNC: 5.3 GM/DL (ref 6–8.4)
RBC # BLD AUTO: 2.82 M/UL (ref 4–5.4)
RELATIVE EOSINOPHIL (OHS): 0.1 %
RELATIVE LYMPHOCYTE (OHS): 3.3 % (ref 18–48)
RELATIVE MONOCYTE (OHS): 9.4 % (ref 4–15)
RELATIVE NEUTROPHIL (OHS): 85.7 % (ref 38–73)
SODIUM SERPL-SCNC: 138 MMOL/L (ref 136–145)
WBC # BLD AUTO: 13.44 K/UL (ref 3.9–12.7)

## 2025-08-05 PROCEDURE — 97530 THERAPEUTIC ACTIVITIES: CPT | Mod: CQ

## 2025-08-05 PROCEDURE — 25000003 PHARM REV CODE 250

## 2025-08-05 PROCEDURE — 80053 COMPREHEN METABOLIC PANEL: CPT

## 2025-08-05 PROCEDURE — 63600175 PHARM REV CODE 636 W HCPCS: Mod: JZ,TB

## 2025-08-05 PROCEDURE — 20600001 HC STEP DOWN PRIVATE ROOM

## 2025-08-05 PROCEDURE — 85025 COMPLETE CBC W/AUTO DIFF WBC: CPT

## 2025-08-05 PROCEDURE — 63600175 PHARM REV CODE 636 W HCPCS

## 2025-08-05 PROCEDURE — 83735 ASSAY OF MAGNESIUM: CPT

## 2025-08-05 PROCEDURE — 27000207 HC ISOLATION

## 2025-08-05 PROCEDURE — B4185 PARENTERAL SOL 10 GM LIPIDS: HCPCS

## 2025-08-05 PROCEDURE — 99232 SBSQ HOSP IP/OBS MODERATE 35: CPT | Mod: ,,, | Performed by: NURSE PRACTITIONER

## 2025-08-05 PROCEDURE — A4217 STERILE WATER/SALINE, 500 ML: HCPCS

## 2025-08-05 PROCEDURE — 84100 ASSAY OF PHOSPHORUS: CPT

## 2025-08-05 RX ORDER — INSULIN ASPART 100 [IU]/ML
5 INJECTION, SOLUTION INTRAVENOUS; SUBCUTANEOUS
Status: DISCONTINUED | OUTPATIENT
Start: 2025-08-05 | End: 2025-08-11

## 2025-08-05 RX ADMIN — INSULIN ASPART 3 UNITS: 100 INJECTION, SOLUTION INTRAVENOUS; SUBCUTANEOUS at 04:08

## 2025-08-05 RX ADMIN — INSULIN ASPART 5 UNITS: 100 INJECTION, SOLUTION INTRAVENOUS; SUBCUTANEOUS at 11:08

## 2025-08-05 RX ADMIN — HYDROMORPHONE HYDROCHLORIDE 0.5 MG: 1 INJECTION, SOLUTION INTRAMUSCULAR; INTRAVENOUS; SUBCUTANEOUS at 12:08

## 2025-08-05 RX ADMIN — INSULIN ASPART 3 UNITS: 100 INJECTION, SOLUTION INTRAVENOUS; SUBCUTANEOUS at 12:08

## 2025-08-05 RX ADMIN — CEFIDEROCOL SULFATE TOSYLATE 2 G: 1 INJECTION, POWDER, FOR SOLUTION INTRAVENOUS at 08:08

## 2025-08-05 RX ADMIN — LEVOTHYROXINE SODIUM 25 MCG: 20 INJECTION, SOLUTION INTRAVENOUS at 08:08

## 2025-08-05 RX ADMIN — CEFIDEROCOL SULFATE TOSYLATE 2 G: 1 INJECTION, POWDER, FOR SOLUTION INTRAVENOUS at 02:08

## 2025-08-05 RX ADMIN — CEFIDEROCOL SULFATE TOSYLATE 2 G: 1 INJECTION, POWDER, FOR SOLUTION INTRAVENOUS at 12:08

## 2025-08-05 RX ADMIN — SMOFLIPID 250 ML: 6; 6; 5; 3 INJECTION, EMULSION INTRAVENOUS at 09:08

## 2025-08-05 RX ADMIN — SODIUM PHOSPHATE, MONOBASIC, MONOHYDRATE AND SODIUM PHOSPHATE, DIBASIC, ANHYDROUS 20.1 MMOL: 142; 276 INJECTION, SOLUTION INTRAVENOUS at 02:08

## 2025-08-05 RX ADMIN — MAGNESIUM SULFATE HEPTAHYDRATE: 500 INJECTION, SOLUTION INTRAMUSCULAR; INTRAVENOUS at 09:08

## 2025-08-05 RX ADMIN — FLUCONAZOLE 400 MG: 2 INJECTION, SOLUTION INTRAVENOUS at 11:08

## 2025-08-05 RX ADMIN — ENOXAPARIN SODIUM 40 MG: 40 INJECTION SUBCUTANEOUS at 05:08

## 2025-08-05 RX ADMIN — INSULIN ASPART 5 UNITS: 100 INJECTION, SOLUTION INTRAVENOUS; SUBCUTANEOUS at 08:08

## 2025-08-05 RX ADMIN — INSULIN ASPART 2 UNITS: 100 INJECTION, SOLUTION INTRAVENOUS; SUBCUTANEOUS at 04:08

## 2025-08-05 RX ADMIN — INSULIN ASPART 5 UNITS: 100 INJECTION, SOLUTION INTRAVENOUS; SUBCUTANEOUS at 09:08

## 2025-08-05 RX ADMIN — HYDROMORPHONE HYDROCHLORIDE 0.5 MG: 1 INJECTION, SOLUTION INTRAMUSCULAR; INTRAVENOUS; SUBCUTANEOUS at 05:08

## 2025-08-05 RX ADMIN — INSULIN ASPART 5 UNITS: 100 INJECTION, SOLUTION INTRAVENOUS; SUBCUTANEOUS at 04:08

## 2025-08-05 RX ADMIN — PANTOPRAZOLE SODIUM 40 MG: 40 INJECTION, POWDER, LYOPHILIZED, FOR SOLUTION INTRAVENOUS at 09:08

## 2025-08-05 RX ADMIN — ONDANSETRON 4 MG: 2 INJECTION INTRAMUSCULAR; INTRAVENOUS at 03:08

## 2025-08-05 RX ADMIN — PANTOPRAZOLE SODIUM 40 MG: 40 INJECTION, POWDER, LYOPHILIZED, FOR SOLUTION INTRAVENOUS at 08:08

## 2025-08-06 LAB
ABSOLUTE EOSINOPHIL (OHS): 0.14 K/UL
ABSOLUTE MONOCYTE (OHS): 1.27 K/UL (ref 0.3–1)
ABSOLUTE NEUTROPHIL COUNT (OHS): 10.37 K/UL (ref 1.8–7.7)
ALBUMIN SERPL BCP-MCNC: 1.3 G/DL (ref 3.5–5.2)
ALP SERPL-CCNC: 207 UNIT/L (ref 40–150)
ALT SERPL W/O P-5'-P-CCNC: <8 UNIT/L (ref 0–55)
ANION GAP (OHS): 9 MMOL/L (ref 8–16)
AST SERPL-CCNC: 45 UNIT/L (ref 0–50)
BACTERIA SPEC ANAEROBE CULT: ABNORMAL
BASOPHILS # BLD AUTO: 0.05 K/UL
BASOPHILS NFR BLD AUTO: 0.4 %
BILIRUB SERPL-MCNC: 0.1 MG/DL (ref 0.1–1)
BUN SERPL-MCNC: 22 MG/DL (ref 8–23)
CALCIUM SERPL-MCNC: 7.4 MG/DL (ref 8.7–10.5)
CHLORIDE SERPL-SCNC: 100 MMOL/L (ref 95–110)
CO2 SERPL-SCNC: 27 MMOL/L (ref 23–29)
CREAT SERPL-MCNC: 0.7 MG/DL (ref 0.5–1.4)
ERYTHROCYTE [DISTWIDTH] IN BLOOD BY AUTOMATED COUNT: 14.6 % (ref 11.5–14.5)
GFR SERPLBLD CREATININE-BSD FMLA CKD-EPI: >60 ML/MIN/1.73/M2
GLUCOSE SERPL-MCNC: 174 MG/DL (ref 70–110)
HCT VFR BLD AUTO: 25.8 % (ref 37–48.5)
HGB BLD-MCNC: 8.2 GM/DL (ref 12–16)
IMM GRANULOCYTES # BLD AUTO: 0.22 K/UL (ref 0–0.04)
IMM GRANULOCYTES NFR BLD AUTO: 1.7 % (ref 0–0.5)
LYMPHOCYTES # BLD AUTO: 0.61 K/UL (ref 1–4.8)
MAGNESIUM SERPL-MCNC: 1.6 MG/DL (ref 1.6–2.6)
MCH RBC QN AUTO: 29.4 PG (ref 27–31)
MCHC RBC AUTO-ENTMCNC: 31.8 G/DL (ref 32–36)
MCV RBC AUTO: 93 FL (ref 82–98)
NUCLEATED RBC (/100WBC) (OHS): 0 /100 WBC
PHOSPHATE SERPL-MCNC: 2.8 MG/DL (ref 2.7–4.5)
PLATELET # BLD AUTO: 399 K/UL (ref 150–450)
PMV BLD AUTO: 11.4 FL (ref 9.2–12.9)
POCT GLUCOSE: 111 MG/DL (ref 70–110)
POCT GLUCOSE: 114 MG/DL (ref 70–110)
POCT GLUCOSE: 143 MG/DL (ref 70–110)
POCT GLUCOSE: 175 MG/DL (ref 70–110)
POCT GLUCOSE: 220 MG/DL (ref 70–110)
POCT GLUCOSE: 248 MG/DL (ref 70–110)
POTASSIUM SERPL-SCNC: 4.3 MMOL/L (ref 3.5–5.1)
PROT SERPL-MCNC: 5.2 GM/DL (ref 6–8.4)
RBC # BLD AUTO: 2.79 M/UL (ref 4–5.4)
RELATIVE EOSINOPHIL (OHS): 1.1 %
RELATIVE LYMPHOCYTE (OHS): 4.8 % (ref 18–48)
RELATIVE MONOCYTE (OHS): 10 % (ref 4–15)
RELATIVE NEUTROPHIL (OHS): 82 % (ref 38–73)
SODIUM SERPL-SCNC: 136 MMOL/L (ref 136–145)
TRIGL SERPL-MCNC: 147 MG/DL (ref 30–150)
WBC # BLD AUTO: 12.66 K/UL (ref 3.9–12.7)

## 2025-08-06 PROCEDURE — G0545 PR VISIT INHERENT TO INPT OR OBS CARE, INFECTIOUS DISEASE: HCPCS | Mod: ,,, | Performed by: INTERNAL MEDICINE

## 2025-08-06 PROCEDURE — 63600175 PHARM REV CODE 636 W HCPCS

## 2025-08-06 PROCEDURE — 97116 GAIT TRAINING THERAPY: CPT | Mod: CQ

## 2025-08-06 PROCEDURE — 85025 COMPLETE CBC W/AUTO DIFF WBC: CPT

## 2025-08-06 PROCEDURE — 25000003 PHARM REV CODE 250

## 2025-08-06 PROCEDURE — A4217 STERILE WATER/SALINE, 500 ML: HCPCS

## 2025-08-06 PROCEDURE — 27000207 HC ISOLATION

## 2025-08-06 PROCEDURE — 63600175 PHARM REV CODE 636 W HCPCS: Performed by: INTERNAL MEDICINE

## 2025-08-06 PROCEDURE — 20600001 HC STEP DOWN PRIVATE ROOM

## 2025-08-06 PROCEDURE — 83735 ASSAY OF MAGNESIUM: CPT

## 2025-08-06 PROCEDURE — B4185 PARENTERAL SOL 10 GM LIPIDS: HCPCS

## 2025-08-06 PROCEDURE — 99232 SBSQ HOSP IP/OBS MODERATE 35: CPT | Mod: ,,, | Performed by: NURSE PRACTITIONER

## 2025-08-06 PROCEDURE — 80053 COMPREHEN METABOLIC PANEL: CPT

## 2025-08-06 PROCEDURE — 97530 THERAPEUTIC ACTIVITIES: CPT | Mod: CQ

## 2025-08-06 PROCEDURE — 84478 ASSAY OF TRIGLYCERIDES: CPT

## 2025-08-06 PROCEDURE — 99233 SBSQ HOSP IP/OBS HIGH 50: CPT | Mod: ,,, | Performed by: INTERNAL MEDICINE

## 2025-08-06 PROCEDURE — 84100 ASSAY OF PHOSPHORUS: CPT

## 2025-08-06 RX ORDER — OXYCODONE HYDROCHLORIDE 10 MG/1
10 TABLET ORAL EVERY 6 HOURS PRN
Refills: 0 | Status: DISCONTINUED | OUTPATIENT
Start: 2025-08-06 | End: 2025-08-06

## 2025-08-06 RX ORDER — PROMETHAZINE HYDROCHLORIDE 12.5 MG/1
12.5 TABLET ORAL EVERY 6 HOURS
Status: DISCONTINUED | OUTPATIENT
Start: 2025-08-06 | End: 2025-08-13

## 2025-08-06 RX ORDER — PROCHLORPERAZINE EDISYLATE 5 MG/ML
2.5 INJECTION INTRAMUSCULAR; INTRAVENOUS EVERY 6 HOURS PRN
Status: DISCONTINUED | OUTPATIENT
Start: 2025-08-06 | End: 2025-08-22 | Stop reason: HOSPADM

## 2025-08-06 RX ORDER — OXYCODONE HYDROCHLORIDE 5 MG/1
5 TABLET ORAL EVERY 4 HOURS PRN
Refills: 0 | Status: DISCONTINUED | OUTPATIENT
Start: 2025-08-06 | End: 2025-08-10

## 2025-08-06 RX ORDER — HYDROMORPHONE HYDROCHLORIDE 1 MG/ML
0.5 INJECTION, SOLUTION INTRAMUSCULAR; INTRAVENOUS; SUBCUTANEOUS EVERY 6 HOURS PRN
Refills: 0 | Status: DISCONTINUED | OUTPATIENT
Start: 2025-08-06 | End: 2025-08-06

## 2025-08-06 RX ORDER — OXYCODONE HYDROCHLORIDE 5 MG/1
5 TABLET ORAL EVERY 4 HOURS PRN
Refills: 0 | Status: DISCONTINUED | OUTPATIENT
Start: 2025-08-06 | End: 2025-08-06

## 2025-08-06 RX ORDER — METRONIDAZOLE 500 MG/100ML
500 INJECTION, SOLUTION INTRAVENOUS
Status: DISCONTINUED | OUTPATIENT
Start: 2025-08-06 | End: 2025-08-09

## 2025-08-06 RX ORDER — ACETAMINOPHEN 325 MG/1
650 TABLET ORAL EVERY 6 HOURS
Status: DISCONTINUED | OUTPATIENT
Start: 2025-08-06 | End: 2025-08-07

## 2025-08-06 RX ADMIN — HYDROMORPHONE HYDROCHLORIDE 0.5 MG: 1 INJECTION, SOLUTION INTRAMUSCULAR; INTRAVENOUS; SUBCUTANEOUS at 08:08

## 2025-08-06 RX ADMIN — PROCHLORPERAZINE EDISYLATE 2.5 MG: 5 INJECTION INTRAMUSCULAR; INTRAVENOUS at 01:08

## 2025-08-06 RX ADMIN — PANTOPRAZOLE SODIUM 40 MG: 40 INJECTION, POWDER, LYOPHILIZED, FOR SOLUTION INTRAVENOUS at 09:08

## 2025-08-06 RX ADMIN — MAGNESIUM SULFATE HEPTAHYDRATE: 500 INJECTION, SOLUTION INTRAMUSCULAR; INTRAVENOUS at 09:08

## 2025-08-06 RX ADMIN — OXYCODONE HYDROCHLORIDE 5 MG: 5 TABLET ORAL at 03:08

## 2025-08-06 RX ADMIN — FLUCONAZOLE 400 MG: 2 INJECTION, SOLUTION INTRAVENOUS at 09:08

## 2025-08-06 RX ADMIN — PROMETHAZINE HYDROCHLORIDE 12.5 MG: 12.5 TABLET ORAL at 05:08

## 2025-08-06 RX ADMIN — INSULIN ASPART 2 UNITS: 100 INJECTION, SOLUTION INTRAVENOUS; SUBCUTANEOUS at 04:08

## 2025-08-06 RX ADMIN — CEFIDEROCOL SULFATE TOSYLATE 2 G: 1 INJECTION, POWDER, FOR SOLUTION INTRAVENOUS at 05:08

## 2025-08-06 RX ADMIN — ONDANSETRON 4 MG: 2 INJECTION INTRAMUSCULAR; INTRAVENOUS at 09:08

## 2025-08-06 RX ADMIN — INSULIN ASPART 5 UNITS: 100 INJECTION, SOLUTION INTRAVENOUS; SUBCUTANEOUS at 09:08

## 2025-08-06 RX ADMIN — PANTOPRAZOLE SODIUM 40 MG: 40 INJECTION, POWDER, LYOPHILIZED, FOR SOLUTION INTRAVENOUS at 08:08

## 2025-08-06 RX ADMIN — INSULIN ASPART 5 UNITS: 100 INJECTION, SOLUTION INTRAVENOUS; SUBCUTANEOUS at 04:08

## 2025-08-06 RX ADMIN — OXYCODONE HYDROCHLORIDE 5 MG: 5 TABLET ORAL at 09:08

## 2025-08-06 RX ADMIN — HYDROMORPHONE HYDROCHLORIDE 0.2 MG: 1 INJECTION, SOLUTION INTRAMUSCULAR; INTRAVENOUS; SUBCUTANEOUS at 12:08

## 2025-08-06 RX ADMIN — ONDANSETRON 4 MG: 2 INJECTION INTRAMUSCULAR; INTRAVENOUS at 12:08

## 2025-08-06 RX ADMIN — ONDANSETRON 4 MG: 2 INJECTION INTRAMUSCULAR; INTRAVENOUS at 08:08

## 2025-08-06 RX ADMIN — METRONIDAZOLE 500 MG: 5 INJECTION, SOLUTION INTRAVENOUS at 04:08

## 2025-08-06 RX ADMIN — CEFIDEROCOL SULFATE TOSYLATE 2 G: 1 INJECTION, POWDER, FOR SOLUTION INTRAVENOUS at 12:08

## 2025-08-06 RX ADMIN — LEVOTHYROXINE SODIUM 25 MCG: 20 INJECTION, SOLUTION INTRAVENOUS at 08:08

## 2025-08-06 RX ADMIN — ENOXAPARIN SODIUM 40 MG: 40 INJECTION SUBCUTANEOUS at 04:08

## 2025-08-06 RX ADMIN — INSULIN ASPART 5 UNITS: 100 INJECTION, SOLUTION INTRAVENOUS; SUBCUTANEOUS at 01:08

## 2025-08-06 RX ADMIN — INSULIN ASPART 5 UNITS: 100 INJECTION, SOLUTION INTRAVENOUS; SUBCUTANEOUS at 08:08

## 2025-08-06 RX ADMIN — INSULIN ASPART 5 UNITS: 100 INJECTION, SOLUTION INTRAVENOUS; SUBCUTANEOUS at 12:08

## 2025-08-06 RX ADMIN — SMOFLIPID 250 ML: 6; 6; 5; 3 INJECTION, EMULSION INTRAVENOUS at 09:08

## 2025-08-06 RX ADMIN — CEFIDEROCOL SULFATE TOSYLATE 2 G: 1 INJECTION, POWDER, FOR SOLUTION INTRAVENOUS at 09:08

## 2025-08-06 RX ADMIN — INSULIN ASPART 2 UNITS: 100 INJECTION, SOLUTION INTRAVENOUS; SUBCUTANEOUS at 08:08

## 2025-08-07 LAB
ABSOLUTE EOSINOPHIL (OHS): 0.18 K/UL
ABSOLUTE MONOCYTE (OHS): 1.34 K/UL (ref 0.3–1)
ABSOLUTE NEUTROPHIL COUNT (OHS): 11.83 K/UL (ref 1.8–7.7)
ALBUMIN SERPL BCP-MCNC: 1.3 G/DL (ref 3.5–5.2)
ALP SERPL-CCNC: 471 UNIT/L (ref 40–150)
ALT SERPL W/O P-5'-P-CCNC: 39 UNIT/L (ref 0–55)
ANION GAP (OHS): 6 MMOL/L (ref 8–16)
AST SERPL-CCNC: 137 UNIT/L (ref 0–50)
BACTERIA SPEC AEROBE CULT: NO GROWTH
BASOPHILS # BLD AUTO: 0.08 K/UL
BASOPHILS NFR BLD AUTO: 0.6 %
BILIRUB SERPL-MCNC: 0.4 MG/DL (ref 0.1–1)
BUN SERPL-MCNC: 23 MG/DL (ref 8–23)
CALCIUM SERPL-MCNC: 7.5 MG/DL (ref 8.7–10.5)
CHLORIDE SERPL-SCNC: 101 MMOL/L (ref 95–110)
CO2 SERPL-SCNC: 27 MMOL/L (ref 23–29)
CREAT SERPL-MCNC: 0.6 MG/DL (ref 0.5–1.4)
ERYTHROCYTE [DISTWIDTH] IN BLOOD BY AUTOMATED COUNT: 14.7 % (ref 11.5–14.5)
GFR SERPLBLD CREATININE-BSD FMLA CKD-EPI: >60 ML/MIN/1.73/M2
GLUCOSE SERPL-MCNC: 102 MG/DL (ref 70–110)
HCT VFR BLD AUTO: 25.2 % (ref 37–48.5)
HGB BLD-MCNC: 8.1 GM/DL (ref 12–16)
IMM GRANULOCYTES # BLD AUTO: 0.25 K/UL (ref 0–0.04)
IMM GRANULOCYTES NFR BLD AUTO: 1.8 % (ref 0–0.5)
LYMPHOCYTES # BLD AUTO: 0.57 K/UL (ref 1–4.8)
MAGNESIUM SERPL-MCNC: 1.5 MG/DL (ref 1.6–2.6)
MCH RBC QN AUTO: 29.6 PG (ref 27–31)
MCHC RBC AUTO-ENTMCNC: 32.1 G/DL (ref 32–36)
MCV RBC AUTO: 92 FL (ref 82–98)
NUCLEATED RBC (/100WBC) (OHS): 0 /100 WBC
PHOSPHATE SERPL-MCNC: 2.7 MG/DL (ref 2.7–4.5)
PLATELET # BLD AUTO: 403 K/UL (ref 150–450)
PMV BLD AUTO: 11.2 FL (ref 9.2–12.9)
POCT GLUCOSE: 101 MG/DL (ref 70–110)
POCT GLUCOSE: 101 MG/DL (ref 70–110)
POCT GLUCOSE: 111 MG/DL (ref 70–110)
POCT GLUCOSE: 120 MG/DL (ref 70–110)
POCT GLUCOSE: 124 MG/DL (ref 70–110)
POCT GLUCOSE: 153 MG/DL (ref 70–110)
POCT GLUCOSE: 161 MG/DL (ref 70–110)
POCT GLUCOSE: 77 MG/DL (ref 70–110)
POTASSIUM SERPL-SCNC: 3.8 MMOL/L (ref 3.5–5.1)
PROT SERPL-MCNC: 5.1 GM/DL (ref 6–8.4)
RBC # BLD AUTO: 2.74 M/UL (ref 4–5.4)
RELATIVE EOSINOPHIL (OHS): 1.3 %
RELATIVE LYMPHOCYTE (OHS): 4 % (ref 18–48)
RELATIVE MONOCYTE (OHS): 9.4 % (ref 4–15)
RELATIVE NEUTROPHIL (OHS): 82.9 % (ref 38–73)
SODIUM SERPL-SCNC: 134 MMOL/L (ref 136–145)
WBC # BLD AUTO: 14.25 K/UL (ref 3.9–12.7)

## 2025-08-07 PROCEDURE — 63600175 PHARM REV CODE 636 W HCPCS: Performed by: NURSE PRACTITIONER

## 2025-08-07 PROCEDURE — 84100 ASSAY OF PHOSPHORUS: CPT

## 2025-08-07 PROCEDURE — 20600001 HC STEP DOWN PRIVATE ROOM

## 2025-08-07 PROCEDURE — 85025 COMPLETE CBC W/AUTO DIFF WBC: CPT

## 2025-08-07 PROCEDURE — 25000003 PHARM REV CODE 250

## 2025-08-07 PROCEDURE — 63600175 PHARM REV CODE 636 W HCPCS

## 2025-08-07 PROCEDURE — 97530 THERAPEUTIC ACTIVITIES: CPT | Mod: CQ

## 2025-08-07 PROCEDURE — 63600175 PHARM REV CODE 636 W HCPCS: Performed by: INTERNAL MEDICINE

## 2025-08-07 PROCEDURE — A4217 STERILE WATER/SALINE, 500 ML: HCPCS

## 2025-08-07 PROCEDURE — 83735 ASSAY OF MAGNESIUM: CPT

## 2025-08-07 PROCEDURE — 27000207 HC ISOLATION

## 2025-08-07 PROCEDURE — B4185 PARENTERAL SOL 10 GM LIPIDS: HCPCS

## 2025-08-07 PROCEDURE — 80053 COMPREHEN METABOLIC PANEL: CPT

## 2025-08-07 RX ORDER — MAGNESIUM SULFATE HEPTAHYDRATE 40 MG/ML
2 INJECTION, SOLUTION INTRAVENOUS ONCE
Status: COMPLETED | OUTPATIENT
Start: 2025-08-07 | End: 2025-08-07

## 2025-08-07 RX ORDER — SUMATRIPTAN SUCCINATE 50 MG/1
100 TABLET ORAL ONCE
Status: COMPLETED | OUTPATIENT
Start: 2025-08-07 | End: 2025-08-07

## 2025-08-07 RX ADMIN — ACETAMINOPHEN 650 MG: 325 TABLET ORAL at 12:08

## 2025-08-07 RX ADMIN — MAGNESIUM SULFATE HEPTAHYDRATE 2 G: 40 INJECTION, SOLUTION INTRAVENOUS at 01:08

## 2025-08-07 RX ADMIN — OXYCODONE HYDROCHLORIDE 5 MG: 5 TABLET ORAL at 01:08

## 2025-08-07 RX ADMIN — INSULIN ASPART 5 UNITS: 100 INJECTION, SOLUTION INTRAVENOUS; SUBCUTANEOUS at 04:08

## 2025-08-07 RX ADMIN — PROMETHAZINE HYDROCHLORIDE 12.5 MG: 12.5 TABLET ORAL at 06:08

## 2025-08-07 RX ADMIN — INSULIN ASPART 5 UNITS: 100 INJECTION, SOLUTION INTRAVENOUS; SUBCUTANEOUS at 09:08

## 2025-08-07 RX ADMIN — SMOFLIPID 250 ML: 6; 6; 5; 3 INJECTION, EMULSION INTRAVENOUS at 09:08

## 2025-08-07 RX ADMIN — MAGNESIUM SULFATE HEPTAHYDRATE: 500 INJECTION, SOLUTION INTRAMUSCULAR; INTRAVENOUS at 09:08

## 2025-08-07 RX ADMIN — PROMETHAZINE HYDROCHLORIDE 12.5 MG: 12.5 TABLET ORAL at 05:08

## 2025-08-07 RX ADMIN — FLUCONAZOLE 400 MG: 2 INJECTION, SOLUTION INTRAVENOUS at 08:08

## 2025-08-07 RX ADMIN — PROMETHAZINE HYDROCHLORIDE 12.5 MG: 12.5 TABLET ORAL at 12:08

## 2025-08-07 RX ADMIN — METRONIDAZOLE 500 MG: 5 INJECTION, SOLUTION INTRAVENOUS at 04:08

## 2025-08-07 RX ADMIN — SUMATRIPTAN SUCCINATE 100 MG: 50 TABLET ORAL at 04:08

## 2025-08-07 RX ADMIN — INSULIN ASPART 5 UNITS: 100 INJECTION, SOLUTION INTRAVENOUS; SUBCUTANEOUS at 08:08

## 2025-08-07 RX ADMIN — ENOXAPARIN SODIUM 40 MG: 40 INJECTION SUBCUTANEOUS at 04:08

## 2025-08-07 RX ADMIN — LEVOTHYROXINE SODIUM 25 MCG: 20 INJECTION, SOLUTION INTRAVENOUS at 08:08

## 2025-08-07 RX ADMIN — OXYCODONE HYDROCHLORIDE 5 MG: 5 TABLET ORAL at 09:08

## 2025-08-07 RX ADMIN — CEFIDEROCOL SULFATE TOSYLATE 2 G: 1 INJECTION, POWDER, FOR SOLUTION INTRAVENOUS at 10:08

## 2025-08-07 RX ADMIN — CEFIDEROCOL SULFATE TOSYLATE 2 G: 1 INJECTION, POWDER, FOR SOLUTION INTRAVENOUS at 12:08

## 2025-08-07 RX ADMIN — PANTOPRAZOLE SODIUM 40 MG: 40 INJECTION, POWDER, LYOPHILIZED, FOR SOLUTION INTRAVENOUS at 08:08

## 2025-08-07 RX ADMIN — PANTOPRAZOLE SODIUM 40 MG: 40 INJECTION, POWDER, LYOPHILIZED, FOR SOLUTION INTRAVENOUS at 09:08

## 2025-08-07 RX ADMIN — ONDANSETRON 4 MG: 2 INJECTION INTRAMUSCULAR; INTRAVENOUS at 09:08

## 2025-08-07 RX ADMIN — INSULIN ASPART 5 UNITS: 100 INJECTION, SOLUTION INTRAVENOUS; SUBCUTANEOUS at 12:08

## 2025-08-07 RX ADMIN — CEFIDEROCOL SULFATE TOSYLATE 2 G: 1 INJECTION, POWDER, FOR SOLUTION INTRAVENOUS at 06:08

## 2025-08-08 LAB
ABSOLUTE EOSINOPHIL (OHS): 0.13 K/UL
ABSOLUTE MONOCYTE (OHS): 0.87 K/UL (ref 0.3–1)
ABSOLUTE NEUTROPHIL COUNT (OHS): 10.78 K/UL (ref 1.8–7.7)
ALBUMIN SERPL BCP-MCNC: 1.3 G/DL (ref 3.5–5.2)
ALP SERPL-CCNC: 769 UNIT/L (ref 40–150)
ALT SERPL W/O P-5'-P-CCNC: 71 UNIT/L (ref 0–55)
ANION GAP (OHS): 10 MMOL/L (ref 8–16)
AST SERPL-CCNC: 142 UNIT/L (ref 0–50)
BASOPHILS # BLD AUTO: 0.06 K/UL
BASOPHILS NFR BLD AUTO: 0.5 %
BILIRUB SERPL-MCNC: 0.4 MG/DL (ref 0.1–1)
BUN SERPL-MCNC: 18 MG/DL (ref 8–23)
CALCIUM SERPL-MCNC: 7.7 MG/DL (ref 8.7–10.5)
CHLORIDE SERPL-SCNC: 99 MMOL/L (ref 95–110)
CO2 SERPL-SCNC: 26 MMOL/L (ref 23–29)
CREAT SERPL-MCNC: 0.6 MG/DL (ref 0.5–1.4)
ERYTHROCYTE [DISTWIDTH] IN BLOOD BY AUTOMATED COUNT: 14.7 % (ref 11.5–14.5)
GFR SERPLBLD CREATININE-BSD FMLA CKD-EPI: >60 ML/MIN/1.73/M2
GLUCOSE SERPL-MCNC: 171 MG/DL (ref 70–110)
HCT VFR BLD AUTO: 26.1 % (ref 37–48.5)
HGB BLD-MCNC: 8.4 GM/DL (ref 12–16)
IMM GRANULOCYTES # BLD AUTO: 0.2 K/UL (ref 0–0.04)
IMM GRANULOCYTES NFR BLD AUTO: 1.6 % (ref 0–0.5)
LYMPHOCYTES # BLD AUTO: 0.44 K/UL (ref 1–4.8)
MAGNESIUM SERPL-MCNC: 1.7 MG/DL (ref 1.6–2.6)
MCH RBC QN AUTO: 29.2 PG (ref 27–31)
MCHC RBC AUTO-ENTMCNC: 32.2 G/DL (ref 32–36)
MCV RBC AUTO: 91 FL (ref 82–98)
NUCLEATED RBC (/100WBC) (OHS): 0 /100 WBC
PHOSPHATE SERPL-MCNC: 2.3 MG/DL (ref 2.7–4.5)
PLATELET # BLD AUTO: 405 K/UL (ref 150–450)
PMV BLD AUTO: 10.6 FL (ref 9.2–12.9)
POCT GLUCOSE: 120 MG/DL (ref 70–110)
POCT GLUCOSE: 147 MG/DL (ref 70–110)
POCT GLUCOSE: 151 MG/DL (ref 70–110)
POCT GLUCOSE: 178 MG/DL (ref 70–110)
POCT GLUCOSE: 196 MG/DL (ref 70–110)
POTASSIUM SERPL-SCNC: 3.8 MMOL/L (ref 3.5–5.1)
PROT SERPL-MCNC: 5.3 GM/DL (ref 6–8.4)
RBC # BLD AUTO: 2.88 M/UL (ref 4–5.4)
RELATIVE EOSINOPHIL (OHS): 1 %
RELATIVE LYMPHOCYTE (OHS): 3.5 % (ref 18–48)
RELATIVE MONOCYTE (OHS): 7 % (ref 4–15)
RELATIVE NEUTROPHIL (OHS): 86.4 % (ref 38–73)
SODIUM SERPL-SCNC: 135 MMOL/L (ref 136–145)
WBC # BLD AUTO: 12.48 K/UL (ref 3.9–12.7)

## 2025-08-08 PROCEDURE — 84100 ASSAY OF PHOSPHORUS: CPT

## 2025-08-08 PROCEDURE — A4217 STERILE WATER/SALINE, 500 ML: HCPCS

## 2025-08-08 PROCEDURE — 99232 SBSQ HOSP IP/OBS MODERATE 35: CPT | Mod: ,,, | Performed by: NURSE PRACTITIONER

## 2025-08-08 PROCEDURE — 63600175 PHARM REV CODE 636 W HCPCS

## 2025-08-08 PROCEDURE — 63600175 PHARM REV CODE 636 W HCPCS: Performed by: INTERNAL MEDICINE

## 2025-08-08 PROCEDURE — 83735 ASSAY OF MAGNESIUM: CPT

## 2025-08-08 PROCEDURE — 25000003 PHARM REV CODE 250

## 2025-08-08 PROCEDURE — B4185 PARENTERAL SOL 10 GM LIPIDS: HCPCS

## 2025-08-08 PROCEDURE — 85025 COMPLETE CBC W/AUTO DIFF WBC: CPT

## 2025-08-08 PROCEDURE — 94761 N-INVAS EAR/PLS OXIMETRY MLT: CPT

## 2025-08-08 PROCEDURE — 82040 ASSAY OF SERUM ALBUMIN: CPT

## 2025-08-08 PROCEDURE — 20600001 HC STEP DOWN PRIVATE ROOM

## 2025-08-08 PROCEDURE — 27000207 HC ISOLATION

## 2025-08-08 RX ORDER — SUMATRIPTAN SUCCINATE 50 MG/1
100 TABLET ORAL 2 TIMES DAILY PRN
Status: DISCONTINUED | OUTPATIENT
Start: 2025-08-08 | End: 2025-08-22 | Stop reason: HOSPADM

## 2025-08-08 RX ORDER — POLYETHYLENE GLYCOL 3350 17 G/17G
17 POWDER, FOR SOLUTION ORAL DAILY
Status: DISCONTINUED | OUTPATIENT
Start: 2025-08-08 | End: 2025-08-22 | Stop reason: HOSPADM

## 2025-08-08 RX ORDER — BISACODYL 10 MG/1
10 SUPPOSITORY RECTAL ONCE
Status: COMPLETED | OUTPATIENT
Start: 2025-08-08 | End: 2025-08-08

## 2025-08-08 RX ORDER — MAGNESIUM SULFATE HEPTAHYDRATE 40 MG/ML
2 INJECTION, SOLUTION INTRAVENOUS ONCE
Status: COMPLETED | OUTPATIENT
Start: 2025-08-08 | End: 2025-08-08

## 2025-08-08 RX ADMIN — MAGNESIUM SULFATE HEPTAHYDRATE 2 G: 40 INJECTION, SOLUTION INTRAVENOUS at 10:08

## 2025-08-08 RX ADMIN — BISACODYL 10 MG: 10 SUPPOSITORY RECTAL at 03:08

## 2025-08-08 RX ADMIN — INSULIN ASPART 5 UNITS: 100 INJECTION, SOLUTION INTRAVENOUS; SUBCUTANEOUS at 12:08

## 2025-08-08 RX ADMIN — SODIUM PHOSPHATE, MONOBASIC, MONOHYDRATE AND SODIUM PHOSPHATE, DIBASIC, ANHYDROUS 15 MMOL: 142; 276 INJECTION, SOLUTION INTRAVENOUS at 12:08

## 2025-08-08 RX ADMIN — PROMETHAZINE HYDROCHLORIDE 12.5 MG: 12.5 TABLET ORAL at 12:08

## 2025-08-08 RX ADMIN — INSULIN ASPART 5 UNITS: 100 INJECTION, SOLUTION INTRAVENOUS; SUBCUTANEOUS at 08:08

## 2025-08-08 RX ADMIN — PANTOPRAZOLE SODIUM 40 MG: 40 INJECTION, POWDER, LYOPHILIZED, FOR SOLUTION INTRAVENOUS at 09:08

## 2025-08-08 RX ADMIN — SMOFLIPID 250 ML: 6; 6; 5; 3 INJECTION, EMULSION INTRAVENOUS at 09:08

## 2025-08-08 RX ADMIN — CEFIDEROCOL SULFATE TOSYLATE 2 G: 1 INJECTION, POWDER, FOR SOLUTION INTRAVENOUS at 10:08

## 2025-08-08 RX ADMIN — METRONIDAZOLE 500 MG: 5 INJECTION, SOLUTION INTRAVENOUS at 05:08

## 2025-08-08 RX ADMIN — METRONIDAZOLE 500 MG: 5 INJECTION, SOLUTION INTRAVENOUS at 03:08

## 2025-08-08 RX ADMIN — ENOXAPARIN SODIUM 40 MG: 40 INJECTION SUBCUTANEOUS at 05:08

## 2025-08-08 RX ADMIN — INSULIN ASPART 5 UNITS: 100 INJECTION, SOLUTION INTRAVENOUS; SUBCUTANEOUS at 05:08

## 2025-08-08 RX ADMIN — FLUCONAZOLE 400 MG: 2 INJECTION, SOLUTION INTRAVENOUS at 08:08

## 2025-08-08 RX ADMIN — INSULIN ASPART 5 UNITS: 100 INJECTION, SOLUTION INTRAVENOUS; SUBCUTANEOUS at 09:08

## 2025-08-08 RX ADMIN — PROMETHAZINE HYDROCHLORIDE 12.5 MG: 12.5 TABLET ORAL at 05:08

## 2025-08-08 RX ADMIN — CEFIDEROCOL SULFATE TOSYLATE 2 G: 1 INJECTION, POWDER, FOR SOLUTION INTRAVENOUS at 12:08

## 2025-08-08 RX ADMIN — POLYETHYLENE GLYCOL 3350 17 G: 17 POWDER, FOR SOLUTION ORAL at 03:08

## 2025-08-08 RX ADMIN — MAGNESIUM SULFATE HEPTAHYDRATE: 500 INJECTION, SOLUTION INTRAMUSCULAR; INTRAVENOUS at 09:08

## 2025-08-08 RX ADMIN — CEFIDEROCOL SULFATE TOSYLATE 2 G: 1 INJECTION, POWDER, FOR SOLUTION INTRAVENOUS at 05:08

## 2025-08-08 RX ADMIN — LEVOTHYROXINE SODIUM 25 MCG: 20 INJECTION, SOLUTION INTRAVENOUS at 09:08

## 2025-08-09 LAB
ABSOLUTE EOSINOPHIL (OHS): 0.13 K/UL
ABSOLUTE MONOCYTE (OHS): 1.19 K/UL (ref 0.3–1)
ABSOLUTE NEUTROPHIL COUNT (OHS): 10.45 K/UL (ref 1.8–7.7)
ALBUMIN SERPL BCP-MCNC: 1.3 G/DL (ref 3.5–5.2)
ALP SERPL-CCNC: 936 UNIT/L (ref 40–150)
ALT SERPL W/O P-5'-P-CCNC: 69 UNIT/L (ref 0–55)
ANION GAP (OHS): 8 MMOL/L (ref 8–16)
AST SERPL-CCNC: 147 UNIT/L (ref 0–50)
BASOPHILS # BLD AUTO: 0.06 K/UL
BASOPHILS NFR BLD AUTO: 0.5 %
BILIRUB SERPL-MCNC: 0.9 MG/DL (ref 0.1–1)
BUN SERPL-MCNC: 17 MG/DL (ref 8–23)
CALCIUM SERPL-MCNC: 7.4 MG/DL (ref 8.7–10.5)
CHLORIDE SERPL-SCNC: 101 MMOL/L (ref 95–110)
CO2 SERPL-SCNC: 27 MMOL/L (ref 23–29)
CREAT SERPL-MCNC: 0.5 MG/DL (ref 0.5–1.4)
ERYTHROCYTE [DISTWIDTH] IN BLOOD BY AUTOMATED COUNT: 15.4 % (ref 11.5–14.5)
GFR SERPLBLD CREATININE-BSD FMLA CKD-EPI: >60 ML/MIN/1.73/M2
GLUCOSE SERPL-MCNC: 130 MG/DL (ref 70–110)
HCT VFR BLD AUTO: 24.8 % (ref 37–48.5)
HGB BLD-MCNC: 8.2 GM/DL (ref 12–16)
IMM GRANULOCYTES # BLD AUTO: 0.27 K/UL (ref 0–0.04)
IMM GRANULOCYTES NFR BLD AUTO: 2.1 % (ref 0–0.5)
LYMPHOCYTES # BLD AUTO: 0.49 K/UL (ref 1–4.8)
MAGNESIUM SERPL-MCNC: 2 MG/DL (ref 1.6–2.6)
MCH RBC QN AUTO: 29.8 PG (ref 27–31)
MCHC RBC AUTO-ENTMCNC: 33.1 G/DL (ref 32–36)
MCV RBC AUTO: 90 FL (ref 82–98)
NUCLEATED RBC (/100WBC) (OHS): 0 /100 WBC
PHOSPHATE SERPL-MCNC: 2.4 MG/DL (ref 2.7–4.5)
PLATELET # BLD AUTO: 419 K/UL (ref 150–450)
PMV BLD AUTO: 11 FL (ref 9.2–12.9)
POCT GLUCOSE: 110 MG/DL (ref 70–110)
POCT GLUCOSE: 118 MG/DL (ref 70–110)
POCT GLUCOSE: 152 MG/DL (ref 70–110)
POCT GLUCOSE: 156 MG/DL (ref 70–110)
POCT GLUCOSE: 158 MG/DL (ref 70–110)
POCT GLUCOSE: 159 MG/DL (ref 70–110)
POCT GLUCOSE: 181 MG/DL (ref 70–110)
POTASSIUM SERPL-SCNC: 3.4 MMOL/L (ref 3.5–5.1)
PROT SERPL-MCNC: 5.1 GM/DL (ref 6–8.4)
RBC # BLD AUTO: 2.75 M/UL (ref 4–5.4)
RELATIVE EOSINOPHIL (OHS): 1 %
RELATIVE LYMPHOCYTE (OHS): 3.9 % (ref 18–48)
RELATIVE MONOCYTE (OHS): 9.5 % (ref 4–15)
RELATIVE NEUTROPHIL (OHS): 83 % (ref 38–73)
SODIUM SERPL-SCNC: 136 MMOL/L (ref 136–145)
WBC # BLD AUTO: 12.59 K/UL (ref 3.9–12.7)

## 2025-08-09 PROCEDURE — 80053 COMPREHEN METABOLIC PANEL: CPT

## 2025-08-09 PROCEDURE — 25000003 PHARM REV CODE 250

## 2025-08-09 PROCEDURE — 84100 ASSAY OF PHOSPHORUS: CPT

## 2025-08-09 PROCEDURE — 83735 ASSAY OF MAGNESIUM: CPT

## 2025-08-09 PROCEDURE — A4217 STERILE WATER/SALINE, 500 ML: HCPCS

## 2025-08-09 PROCEDURE — 63600175 PHARM REV CODE 636 W HCPCS

## 2025-08-09 PROCEDURE — 25500020 PHARM REV CODE 255

## 2025-08-09 PROCEDURE — 85025 COMPLETE CBC W/AUTO DIFF WBC: CPT

## 2025-08-09 PROCEDURE — B4185 PARENTERAL SOL 10 GM LIPIDS: HCPCS

## 2025-08-09 PROCEDURE — 25500020 PHARM REV CODE 255: Performed by: SURGERY

## 2025-08-09 PROCEDURE — 27000207 HC ISOLATION

## 2025-08-09 PROCEDURE — 63600175 PHARM REV CODE 636 W HCPCS: Performed by: INTERNAL MEDICINE

## 2025-08-09 PROCEDURE — 20600001 HC STEP DOWN PRIVATE ROOM

## 2025-08-09 RX ADMIN — IOHEXOL 15 ML: 350 INJECTION, SOLUTION INTRAVENOUS at 11:08

## 2025-08-09 RX ADMIN — CEFIDEROCOL SULFATE TOSYLATE 2 G: 1 INJECTION, POWDER, FOR SOLUTION INTRAVENOUS at 05:08

## 2025-08-09 RX ADMIN — MAGNESIUM SULFATE HEPTAHYDRATE: 500 INJECTION, SOLUTION INTRAMUSCULAR; INTRAVENOUS at 09:08

## 2025-08-09 RX ADMIN — PANTOPRAZOLE SODIUM 40 MG: 40 INJECTION, POWDER, LYOPHILIZED, FOR SOLUTION INTRAVENOUS at 08:08

## 2025-08-09 RX ADMIN — SMOFLIPID 250 ML: 6; 6; 5; 3 INJECTION, EMULSION INTRAVENOUS at 09:08

## 2025-08-09 RX ADMIN — PROMETHAZINE HYDROCHLORIDE 12.5 MG: 12.5 TABLET ORAL at 12:08

## 2025-08-09 RX ADMIN — INSULIN ASPART 5 UNITS: 100 INJECTION, SOLUTION INTRAVENOUS; SUBCUTANEOUS at 12:08

## 2025-08-09 RX ADMIN — INSULIN ASPART 5 UNITS: 100 INJECTION, SOLUTION INTRAVENOUS; SUBCUTANEOUS at 03:08

## 2025-08-09 RX ADMIN — CEFIDEROCOL SULFATE TOSYLATE 2 G: 1 INJECTION, POWDER, FOR SOLUTION INTRAVENOUS at 02:08

## 2025-08-09 RX ADMIN — OXYCODONE HYDROCHLORIDE 5 MG: 5 TABLET ORAL at 09:08

## 2025-08-09 RX ADMIN — METRONIDAZOLE 500 MG: 5 INJECTION, SOLUTION INTRAVENOUS at 04:08

## 2025-08-09 RX ADMIN — PROMETHAZINE HYDROCHLORIDE 12.5 MG: 12.5 TABLET ORAL at 05:08

## 2025-08-09 RX ADMIN — POLYETHYLENE GLYCOL 3350 17 G: 17 POWDER, FOR SOLUTION ORAL at 08:08

## 2025-08-09 RX ADMIN — CEFIDEROCOL SULFATE TOSYLATE 2 G: 1 INJECTION, POWDER, FOR SOLUTION INTRAVENOUS at 08:08

## 2025-08-09 RX ADMIN — INSULIN ASPART 5 UNITS: 100 INJECTION, SOLUTION INTRAVENOUS; SUBCUTANEOUS at 08:08

## 2025-08-09 RX ADMIN — INSULIN ASPART 5 UNITS: 100 INJECTION, SOLUTION INTRAVENOUS; SUBCUTANEOUS at 04:08

## 2025-08-09 RX ADMIN — FLUCONAZOLE 400 MG: 2 INJECTION, SOLUTION INTRAVENOUS at 08:08

## 2025-08-09 RX ADMIN — LEVOTHYROXINE SODIUM 25 MCG: 20 INJECTION, SOLUTION INTRAVENOUS at 08:08

## 2025-08-09 RX ADMIN — PROMETHAZINE HYDROCHLORIDE 12.5 MG: 12.5 TABLET ORAL at 04:08

## 2025-08-09 RX ADMIN — OXYCODONE HYDROCHLORIDE 5 MG: 5 TABLET ORAL at 05:08

## 2025-08-09 RX ADMIN — ONDANSETRON 4 MG: 2 INJECTION INTRAMUSCULAR; INTRAVENOUS at 08:08

## 2025-08-09 RX ADMIN — IOHEXOL 100 ML: 350 INJECTION, SOLUTION INTRAVENOUS at 03:08

## 2025-08-09 RX ADMIN — POTASSIUM PHOSPHATE, MONOBASIC AND POTASSIUM PHOSPHATE, DIBASIC 20 MMOL: 224; 236 INJECTION, SOLUTION, CONCENTRATE INTRAVENOUS at 09:08

## 2025-08-09 RX ADMIN — ENOXAPARIN SODIUM 40 MG: 40 INJECTION SUBCUTANEOUS at 04:08

## 2025-08-10 LAB
ABSOLUTE EOSINOPHIL (OHS): 0.2 K/UL
ABSOLUTE MONOCYTE (OHS): 1.38 K/UL (ref 0.3–1)
ABSOLUTE NEUTROPHIL COUNT (OHS): 11.51 K/UL (ref 1.8–7.7)
ALBUMIN SERPL BCP-MCNC: 1.3 G/DL (ref 3.5–5.2)
ALP SERPL-CCNC: 1123 UNIT/L (ref 40–150)
ALT SERPL W/O P-5'-P-CCNC: 67 UNIT/L (ref 0–55)
ANION GAP (OHS): 8 MMOL/L (ref 8–16)
AST SERPL-CCNC: 141 UNIT/L (ref 0–50)
BASOPHILS # BLD AUTO: 0.09 K/UL
BASOPHILS NFR BLD AUTO: 0.6 %
BILIRUB SERPL-MCNC: 1 MG/DL (ref 0.1–1)
BUN SERPL-MCNC: 20 MG/DL (ref 8–23)
CALCIUM SERPL-MCNC: 7.7 MG/DL (ref 8.7–10.5)
CHLORIDE SERPL-SCNC: 101 MMOL/L (ref 95–110)
CO2 SERPL-SCNC: 27 MMOL/L (ref 23–29)
CREAT SERPL-MCNC: 0.5 MG/DL (ref 0.5–1.4)
ERYTHROCYTE [DISTWIDTH] IN BLOOD BY AUTOMATED COUNT: 15.9 % (ref 11.5–14.5)
GFR SERPLBLD CREATININE-BSD FMLA CKD-EPI: >60 ML/MIN/1.73/M2
GLUCOSE SERPL-MCNC: 156 MG/DL (ref 70–110)
HCT VFR BLD AUTO: 24.9 % (ref 37–48.5)
HGB BLD-MCNC: 8.2 GM/DL (ref 12–16)
IMM GRANULOCYTES # BLD AUTO: 0.29 K/UL (ref 0–0.04)
IMM GRANULOCYTES NFR BLD AUTO: 2.1 % (ref 0–0.5)
LYMPHOCYTES # BLD AUTO: 0.61 K/UL (ref 1–4.8)
MAGNESIUM SERPL-MCNC: 1.8 MG/DL (ref 1.6–2.6)
MCH RBC QN AUTO: 29.6 PG (ref 27–31)
MCHC RBC AUTO-ENTMCNC: 32.9 G/DL (ref 32–36)
MCV RBC AUTO: 90 FL (ref 82–98)
NUCLEATED RBC (/100WBC) (OHS): 0 /100 WBC
PHOSPHATE SERPL-MCNC: 2.9 MG/DL (ref 2.7–4.5)
PLATELET # BLD AUTO: 434 K/UL (ref 150–450)
PMV BLD AUTO: 11.2 FL (ref 9.2–12.9)
POCT GLUCOSE: 149 MG/DL (ref 70–110)
POCT GLUCOSE: 155 MG/DL (ref 70–110)
POCT GLUCOSE: 183 MG/DL (ref 70–110)
POCT GLUCOSE: 191 MG/DL (ref 70–110)
POCT GLUCOSE: 197 MG/DL (ref 70–110)
POTASSIUM SERPL-SCNC: 4.1 MMOL/L (ref 3.5–5.1)
PROT SERPL-MCNC: 5.2 GM/DL (ref 6–8.4)
RBC # BLD AUTO: 2.77 M/UL (ref 4–5.4)
RELATIVE EOSINOPHIL (OHS): 1.4 %
RELATIVE LYMPHOCYTE (OHS): 4.3 % (ref 18–48)
RELATIVE MONOCYTE (OHS): 9.8 % (ref 4–15)
RELATIVE NEUTROPHIL (OHS): 81.8 % (ref 38–73)
SODIUM SERPL-SCNC: 136 MMOL/L (ref 136–145)
WBC # BLD AUTO: 14.08 K/UL (ref 3.9–12.7)

## 2025-08-10 PROCEDURE — A4217 STERILE WATER/SALINE, 500 ML: HCPCS

## 2025-08-10 PROCEDURE — 63600175 PHARM REV CODE 636 W HCPCS: Mod: JZ,TB

## 2025-08-10 PROCEDURE — 94799 UNLISTED PULMONARY SVC/PX: CPT

## 2025-08-10 PROCEDURE — 83735 ASSAY OF MAGNESIUM: CPT

## 2025-08-10 PROCEDURE — 63600175 PHARM REV CODE 636 W HCPCS

## 2025-08-10 PROCEDURE — 25000003 PHARM REV CODE 250

## 2025-08-10 PROCEDURE — 20600001 HC STEP DOWN PRIVATE ROOM

## 2025-08-10 PROCEDURE — 84100 ASSAY OF PHOSPHORUS: CPT

## 2025-08-10 PROCEDURE — 27000207 HC ISOLATION

## 2025-08-10 PROCEDURE — 80053 COMPREHEN METABOLIC PANEL: CPT

## 2025-08-10 PROCEDURE — 85025 COMPLETE CBC W/AUTO DIFF WBC: CPT

## 2025-08-10 PROCEDURE — B4185 PARENTERAL SOL 10 GM LIPIDS: HCPCS

## 2025-08-10 PROCEDURE — 99232 SBSQ HOSP IP/OBS MODERATE 35: CPT | Mod: ,,, | Performed by: NURSE PRACTITIONER

## 2025-08-10 RX ORDER — OXYCODONE HYDROCHLORIDE 10 MG/1
10 TABLET ORAL EVERY 4 HOURS PRN
Status: DISCONTINUED | OUTPATIENT
Start: 2025-08-10 | End: 2025-08-18

## 2025-08-10 RX ORDER — HYDROMORPHONE HYDROCHLORIDE 1 MG/ML
0.5 INJECTION, SOLUTION INTRAMUSCULAR; INTRAVENOUS; SUBCUTANEOUS EVERY 6 HOURS PRN
Status: DISCONTINUED | OUTPATIENT
Start: 2025-08-10 | End: 2025-08-19

## 2025-08-10 RX ORDER — OXYCODONE HYDROCHLORIDE 5 MG/1
5 TABLET ORAL EVERY 4 HOURS PRN
Status: DISCONTINUED | OUTPATIENT
Start: 2025-08-10 | End: 2025-08-18

## 2025-08-10 RX ADMIN — MAGNESIUM SULFATE HEPTAHYDRATE: 500 INJECTION, SOLUTION INTRAMUSCULAR; INTRAVENOUS at 09:08

## 2025-08-10 RX ADMIN — INSULIN ASPART 5 UNITS: 100 INJECTION, SOLUTION INTRAVENOUS; SUBCUTANEOUS at 05:08

## 2025-08-10 RX ADMIN — PANTOPRAZOLE SODIUM 40 MG: 40 INJECTION, POWDER, LYOPHILIZED, FOR SOLUTION INTRAVENOUS at 08:08

## 2025-08-10 RX ADMIN — INSULIN ASPART 5 UNITS: 100 INJECTION, SOLUTION INTRAVENOUS; SUBCUTANEOUS at 12:08

## 2025-08-10 RX ADMIN — LEVOTHYROXINE SODIUM 25 MCG: 20 INJECTION, SOLUTION INTRAVENOUS at 08:08

## 2025-08-10 RX ADMIN — OXYCODONE HYDROCHLORIDE 5 MG: 5 TABLET ORAL at 06:08

## 2025-08-10 RX ADMIN — HYDROMORPHONE HYDROCHLORIDE 0.5 MG: 1 INJECTION, SOLUTION INTRAMUSCULAR; INTRAVENOUS; SUBCUTANEOUS at 07:08

## 2025-08-10 RX ADMIN — CEFIDEROCOL SULFATE TOSYLATE 2 G: 1 INJECTION, POWDER, FOR SOLUTION INTRAVENOUS at 08:08

## 2025-08-10 RX ADMIN — ONDANSETRON 4 MG: 2 INJECTION INTRAMUSCULAR; INTRAVENOUS at 09:08

## 2025-08-10 RX ADMIN — POLYETHYLENE GLYCOL 3350 17 G: 17 POWDER, FOR SOLUTION ORAL at 08:08

## 2025-08-10 RX ADMIN — SMOFLIPID 250 ML: 6; 6; 5; 3 INJECTION, EMULSION INTRAVENOUS at 09:08

## 2025-08-10 RX ADMIN — OXYCODONE HYDROCHLORIDE 5 MG: 5 TABLET ORAL at 09:08

## 2025-08-10 RX ADMIN — FLUCONAZOLE 400 MG: 2 INJECTION, SOLUTION INTRAVENOUS at 08:08

## 2025-08-10 RX ADMIN — CEFIDEROCOL SULFATE TOSYLATE 2 G: 1 INJECTION, POWDER, FOR SOLUTION INTRAVENOUS at 05:08

## 2025-08-10 RX ADMIN — ENOXAPARIN SODIUM 40 MG: 40 INJECTION SUBCUTANEOUS at 05:08

## 2025-08-10 RX ADMIN — INSULIN ASPART 5 UNITS: 100 INJECTION, SOLUTION INTRAVENOUS; SUBCUTANEOUS at 08:08

## 2025-08-10 RX ADMIN — PROMETHAZINE HYDROCHLORIDE 12.5 MG: 12.5 TABLET ORAL at 12:08

## 2025-08-10 RX ADMIN — CEFIDEROCOL SULFATE TOSYLATE 2 G: 1 INJECTION, POWDER, FOR SOLUTION INTRAVENOUS at 12:08

## 2025-08-10 RX ADMIN — INSULIN ASPART 5 UNITS: 100 INJECTION, SOLUTION INTRAVENOUS; SUBCUTANEOUS at 07:08

## 2025-08-10 RX ADMIN — INSULIN ASPART 5 UNITS: 100 INJECTION, SOLUTION INTRAVENOUS; SUBCUTANEOUS at 04:08

## 2025-08-11 ENCOUNTER — ANESTHESIA EVENT (OUTPATIENT)
Dept: ENDOSCOPY | Facility: HOSPITAL | Age: 65
End: 2025-08-11
Payer: MEDICARE

## 2025-08-11 ENCOUNTER — ANESTHESIA (OUTPATIENT)
Dept: ENDOSCOPY | Facility: HOSPITAL | Age: 65
End: 2025-08-11
Payer: MEDICARE

## 2025-08-11 LAB
ABSOLUTE EOSINOPHIL (OHS): 0.18 K/UL
ABSOLUTE MONOCYTE (OHS): 1.28 K/UL (ref 0.3–1)
ABSOLUTE NEUTROPHIL COUNT (OHS): 12.97 K/UL (ref 1.8–7.7)
ALBUMIN SERPL BCP-MCNC: 1.3 G/DL (ref 3.5–5.2)
ALP SERPL-CCNC: 1355 UNIT/L (ref 40–150)
ALT SERPL W/O P-5'-P-CCNC: 69 UNIT/L (ref 0–55)
ANION GAP (OHS): 6 MMOL/L (ref 8–16)
AST SERPL-CCNC: 151 UNIT/L (ref 0–50)
BASOPHILS # BLD AUTO: 0.07 K/UL
BASOPHILS NFR BLD AUTO: 0.5 %
BILIRUB SERPL-MCNC: 1.4 MG/DL (ref 0.1–1)
BUN SERPL-MCNC: 21 MG/DL (ref 8–23)
CALCIUM SERPL-MCNC: 7.7 MG/DL (ref 8.7–10.5)
CHLORIDE SERPL-SCNC: 101 MMOL/L (ref 95–110)
CO2 SERPL-SCNC: 28 MMOL/L (ref 23–29)
CREAT SERPL-MCNC: 0.5 MG/DL (ref 0.5–1.4)
ERYTHROCYTE [DISTWIDTH] IN BLOOD BY AUTOMATED COUNT: 16.3 % (ref 11.5–14.5)
GFR SERPLBLD CREATININE-BSD FMLA CKD-EPI: >60 ML/MIN/1.73/M2
GLUCOSE SERPL-MCNC: 188 MG/DL (ref 70–110)
HCT VFR BLD AUTO: 25.3 % (ref 37–48.5)
HGB BLD-MCNC: 8.5 GM/DL (ref 12–16)
IMM GRANULOCYTES # BLD AUTO: 0.27 K/UL (ref 0–0.04)
IMM GRANULOCYTES NFR BLD AUTO: 1.8 % (ref 0–0.5)
LYMPHOCYTES # BLD AUTO: 0.46 K/UL (ref 1–4.8)
MAGNESIUM SERPL-MCNC: 1.7 MG/DL (ref 1.6–2.6)
MCH RBC QN AUTO: 30.2 PG (ref 27–31)
MCHC RBC AUTO-ENTMCNC: 33.6 G/DL (ref 32–36)
MCV RBC AUTO: 90 FL (ref 82–98)
NUCLEATED RBC (/100WBC) (OHS): 0 /100 WBC
PHOSPHATE SERPL-MCNC: 2.9 MG/DL (ref 2.7–4.5)
PLATELET # BLD AUTO: 444 K/UL (ref 150–450)
PMV BLD AUTO: 11 FL (ref 9.2–12.9)
POCT GLUCOSE: 175 MG/DL (ref 70–110)
POCT GLUCOSE: 199 MG/DL (ref 70–110)
POCT GLUCOSE: 230 MG/DL (ref 70–110)
POCT GLUCOSE: 237 MG/DL (ref 70–110)
POCT GLUCOSE: 278 MG/DL (ref 70–110)
POCT GLUCOSE: 305 MG/DL (ref 70–110)
POTASSIUM SERPL-SCNC: 4.3 MMOL/L (ref 3.5–5.1)
PROT SERPL-MCNC: 5.6 GM/DL (ref 6–8.4)
RBC # BLD AUTO: 2.81 M/UL (ref 4–5.4)
RELATIVE EOSINOPHIL (OHS): 1.2 %
RELATIVE LYMPHOCYTE (OHS): 3 % (ref 18–48)
RELATIVE MONOCYTE (OHS): 8.4 % (ref 4–15)
RELATIVE NEUTROPHIL (OHS): 85.1 % (ref 38–73)
SODIUM SERPL-SCNC: 135 MMOL/L (ref 136–145)
WBC # BLD AUTO: 15.23 K/UL (ref 3.9–12.7)

## 2025-08-11 PROCEDURE — 37000008 HC ANESTHESIA 1ST 15 MINUTES: Performed by: INTERNAL MEDICINE

## 2025-08-11 PROCEDURE — 63600175 PHARM REV CODE 636 W HCPCS

## 2025-08-11 PROCEDURE — 27202127 HC STENT INTRODUCER: Performed by: INTERNAL MEDICINE

## 2025-08-11 PROCEDURE — C2617 STENT, NON-COR, TEM W/O DEL: HCPCS | Performed by: INTERNAL MEDICINE

## 2025-08-11 PROCEDURE — 99900035 HC TECH TIME PER 15 MIN (STAT)

## 2025-08-11 PROCEDURE — C2625 STENT, NON-COR, TEM W/DEL SY: HCPCS | Performed by: INTERNAL MEDICINE

## 2025-08-11 PROCEDURE — 99233 SBSQ HOSP IP/OBS HIGH 50: CPT | Mod: ,,, | Performed by: INTERNAL MEDICINE

## 2025-08-11 PROCEDURE — 25000003 PHARM REV CODE 250

## 2025-08-11 PROCEDURE — 0F798DZ DILATION OF COMMON BILE DUCT WITH INTRALUMINAL DEVICE, VIA NATURAL OR ARTIFICIAL OPENING ENDOSCOPIC: ICD-10-PCS | Performed by: INTERNAL MEDICINE

## 2025-08-11 PROCEDURE — 43999 UNLISTED PROCEDURE STOMACH: CPT | Performed by: INTERNAL MEDICINE

## 2025-08-11 PROCEDURE — 85025 COMPLETE CBC W/AUTO DIFF WBC: CPT

## 2025-08-11 PROCEDURE — 99232 SBSQ HOSP IP/OBS MODERATE 35: CPT | Mod: ,,,

## 2025-08-11 PROCEDURE — 27000221 HC OXYGEN, UP TO 24 HOURS

## 2025-08-11 PROCEDURE — 63600175 PHARM REV CODE 636 W HCPCS: Performed by: NURSE ANESTHETIST, CERTIFIED REGISTERED

## 2025-08-11 PROCEDURE — C1726 CATH, BAL DIL, NON-VASCULAR: HCPCS | Performed by: INTERNAL MEDICINE

## 2025-08-11 PROCEDURE — 27000207 HC ISOLATION

## 2025-08-11 PROCEDURE — C1769 GUIDE WIRE: HCPCS | Performed by: INTERNAL MEDICINE

## 2025-08-11 PROCEDURE — 74328 X-RAY BILE DUCT ENDOSCOPY: CPT | Mod: 26,,, | Performed by: INTERNAL MEDICINE

## 2025-08-11 PROCEDURE — 43274 ERCP DUCT STENT PLACEMENT: CPT | Performed by: INTERNAL MEDICINE

## 2025-08-11 PROCEDURE — 63600175 PHARM REV CODE 636 W HCPCS: Mod: JZ,TB

## 2025-08-11 PROCEDURE — 43999 UNLISTED PROCEDURE STOMACH: CPT | Mod: 51,,, | Performed by: INTERNAL MEDICINE

## 2025-08-11 PROCEDURE — 27201674 HC SPHINCTERTOME: Performed by: INTERNAL MEDICINE

## 2025-08-11 PROCEDURE — 94761 N-INVAS EAR/PLS OXIMETRY MLT: CPT

## 2025-08-11 PROCEDURE — 25000003 PHARM REV CODE 250: Performed by: NURSE ANESTHETIST, CERTIFIED REGISTERED

## 2025-08-11 PROCEDURE — A4217 STERILE WATER/SALINE, 500 ML: HCPCS

## 2025-08-11 PROCEDURE — 37000009 HC ANESTHESIA EA ADD 15 MINS: Performed by: INTERNAL MEDICINE

## 2025-08-11 PROCEDURE — 0F9G80Z DRAINAGE OF PANCREAS WITH DRAINAGE DEVICE, VIA NATURAL OR ARTIFICIAL OPENING ENDOSCOPIC: ICD-10-PCS | Performed by: INTERNAL MEDICINE

## 2025-08-11 PROCEDURE — 20600001 HC STEP DOWN PRIVATE ROOM

## 2025-08-11 PROCEDURE — 25500020 PHARM REV CODE 255: Performed by: INTERNAL MEDICINE

## 2025-08-11 PROCEDURE — 80053 COMPREHEN METABOLIC PANEL: CPT

## 2025-08-11 PROCEDURE — 83735 ASSAY OF MAGNESIUM: CPT

## 2025-08-11 PROCEDURE — B4185 PARENTERAL SOL 10 GM LIPIDS: HCPCS

## 2025-08-11 PROCEDURE — C1874 STENT, COATED/COV W/DEL SYS: HCPCS | Performed by: INTERNAL MEDICINE

## 2025-08-11 PROCEDURE — 82962 GLUCOSE BLOOD TEST: CPT | Performed by: INTERNAL MEDICINE

## 2025-08-11 PROCEDURE — 43274 ERCP DUCT STENT PLACEMENT: CPT | Mod: ,,, | Performed by: INTERNAL MEDICINE

## 2025-08-11 PROCEDURE — C1876 STENT, NON-COA/NON-COV W/DEL: HCPCS | Performed by: INTERNAL MEDICINE

## 2025-08-11 PROCEDURE — 99223 1ST HOSP IP/OBS HIGH 75: CPT | Mod: 25,GC,, | Performed by: INTERNAL MEDICINE

## 2025-08-11 PROCEDURE — 84100 ASSAY OF PHOSPHORUS: CPT

## 2025-08-11 PROCEDURE — 74328 X-RAY BILE DUCT ENDOSCOPY: CPT | Mod: TC | Performed by: INTERNAL MEDICINE

## 2025-08-11 DEVICE — IMPLANTABLE DEVICE: Type: IMPLANTABLE DEVICE | Site: BILE DUCT | Status: FUNCTIONAL

## 2025-08-11 DEVICE — IMPLANTABLE DEVICE: Type: IMPLANTABLE DEVICE | Site: PANCREAS | Status: FUNCTIONAL

## 2025-08-11 RX ORDER — ONDANSETRON HYDROCHLORIDE 2 MG/ML
INJECTION, SOLUTION INTRAVENOUS
Status: DISCONTINUED | OUTPATIENT
Start: 2025-08-11 | End: 2025-08-11

## 2025-08-11 RX ORDER — DEXAMETHASONE SODIUM PHOSPHATE 4 MG/ML
INJECTION, SOLUTION INTRA-ARTICULAR; INTRALESIONAL; INTRAMUSCULAR; INTRAVENOUS; SOFT TISSUE
Status: DISCONTINUED | OUTPATIENT
Start: 2025-08-11 | End: 2025-08-11

## 2025-08-11 RX ORDER — PHENYLEPHRINE HYDROCHLORIDE 10 MG/ML
INJECTION INTRAVENOUS
Status: DISCONTINUED | OUTPATIENT
Start: 2025-08-11 | End: 2025-08-11

## 2025-08-11 RX ORDER — GLUCAGON 1 MG
1 KIT INJECTION
Status: DISCONTINUED | OUTPATIENT
Start: 2025-08-11 | End: 2025-08-15 | Stop reason: HOSPADM

## 2025-08-11 RX ORDER — FENTANYL CITRATE 50 UG/ML
INJECTION, SOLUTION INTRAMUSCULAR; INTRAVENOUS
Status: DISCONTINUED | OUTPATIENT
Start: 2025-08-11 | End: 2025-08-11

## 2025-08-11 RX ORDER — SUCCINYLCHOLINE CHLORIDE 20 MG/ML
INJECTION INTRAMUSCULAR; INTRAVENOUS
Status: DISCONTINUED | OUTPATIENT
Start: 2025-08-11 | End: 2025-08-11

## 2025-08-11 RX ORDER — PROPOFOL 10 MG/ML
VIAL (ML) INTRAVENOUS
Status: DISCONTINUED | OUTPATIENT
Start: 2025-08-11 | End: 2025-08-11

## 2025-08-11 RX ORDER — ROCURONIUM BROMIDE 10 MG/ML
INJECTION, SOLUTION INTRAVENOUS
Status: DISCONTINUED | OUTPATIENT
Start: 2025-08-11 | End: 2025-08-11

## 2025-08-11 RX ORDER — LIDOCAINE HYDROCHLORIDE 20 MG/ML
INJECTION INTRAVENOUS
Status: DISCONTINUED | OUTPATIENT
Start: 2025-08-11 | End: 2025-08-11

## 2025-08-11 RX ORDER — INSULIN ASPART 100 [IU]/ML
6 INJECTION, SOLUTION INTRAVENOUS; SUBCUTANEOUS
Status: DISCONTINUED | OUTPATIENT
Start: 2025-08-11 | End: 2025-08-12

## 2025-08-11 RX ADMIN — PHENYLEPHRINE HYDROCHLORIDE 200 MCG: 10 INJECTION INTRAVENOUS at 09:08

## 2025-08-11 RX ADMIN — INSULIN ASPART 4 UNITS: 100 INJECTION, SOLUTION INTRAVENOUS; SUBCUTANEOUS at 08:08

## 2025-08-11 RX ADMIN — CEFIDEROCOL SULFATE TOSYLATE 2 G: 1 INJECTION, POWDER, FOR SOLUTION INTRAVENOUS at 05:08

## 2025-08-11 RX ADMIN — INSULIN ASPART 6 UNITS: 100 INJECTION, SOLUTION INTRAVENOUS; SUBCUTANEOUS at 08:08

## 2025-08-11 RX ADMIN — LIDOCAINE HYDROCHLORIDE 100 MG: 20 INJECTION INTRAVENOUS at 08:08

## 2025-08-11 RX ADMIN — PHENYLEPHRINE HYDROCHLORIDE 100 MCG: 10 INJECTION INTRAVENOUS at 09:08

## 2025-08-11 RX ADMIN — CEFIDEROCOL SULFATE TOSYLATE 2 G: 1 INJECTION, POWDER, FOR SOLUTION INTRAVENOUS at 12:08

## 2025-08-11 RX ADMIN — PROMETHAZINE HYDROCHLORIDE 12.5 MG: 12.5 TABLET ORAL at 11:08

## 2025-08-11 RX ADMIN — ROCURONIUM BROMIDE 5 MG: 10 INJECTION INTRAVENOUS at 08:08

## 2025-08-11 RX ADMIN — ROCURONIUM BROMIDE 40 MG: 10 INJECTION INTRAVENOUS at 08:08

## 2025-08-11 RX ADMIN — SMOFLIPID 250 ML: 6; 6; 5; 3 INJECTION, EMULSION INTRAVENOUS at 09:08

## 2025-08-11 RX ADMIN — INSULIN ASPART 5 UNITS: 100 INJECTION, SOLUTION INTRAVENOUS; SUBCUTANEOUS at 11:08

## 2025-08-11 RX ADMIN — INSULIN ASPART 5 UNITS: 100 INJECTION, SOLUTION INTRAVENOUS; SUBCUTANEOUS at 03:08

## 2025-08-11 RX ADMIN — CEFIDEROCOL SULFATE TOSYLATE 2 G: 1 INJECTION, POWDER, FOR SOLUTION INTRAVENOUS at 11:08

## 2025-08-11 RX ADMIN — INSULIN ASPART 6 UNITS: 100 INJECTION, SOLUTION INTRAVENOUS; SUBCUTANEOUS at 04:08

## 2025-08-11 RX ADMIN — FENTANYL CITRATE 50 MCG: 50 INJECTION, SOLUTION INTRAMUSCULAR; INTRAVENOUS at 08:08

## 2025-08-11 RX ADMIN — LEVOTHYROXINE SODIUM 25 MCG: 20 INJECTION, SOLUTION INTRAVENOUS at 11:08

## 2025-08-11 RX ADMIN — OXYCODONE HYDROCHLORIDE 10 MG: 10 TABLET ORAL at 11:08

## 2025-08-11 RX ADMIN — SODIUM CHLORIDE: 0.9 INJECTION, SOLUTION INTRAVENOUS at 08:08

## 2025-08-11 RX ADMIN — PROMETHAZINE HYDROCHLORIDE 12.5 MG: 12.5 TABLET ORAL at 05:08

## 2025-08-11 RX ADMIN — FLUCONAZOLE 400 MG: 2 INJECTION, SOLUTION INTRAVENOUS at 11:08

## 2025-08-11 RX ADMIN — INSULIN ASPART 3 UNITS: 100 INJECTION, SOLUTION INTRAVENOUS; SUBCUTANEOUS at 05:08

## 2025-08-11 RX ADMIN — PANTOPRAZOLE SODIUM 40 MG: 40 INJECTION, POWDER, LYOPHILIZED, FOR SOLUTION INTRAVENOUS at 10:08

## 2025-08-11 RX ADMIN — INSULIN ASPART 5 UNITS: 100 INJECTION, SOLUTION INTRAVENOUS; SUBCUTANEOUS at 09:08

## 2025-08-11 RX ADMIN — SUCCINYLCHOLINE CHLORIDE 100 MG: 20 INJECTION, SOLUTION INTRAMUSCULAR; INTRAVENOUS; PARENTERAL at 08:08

## 2025-08-11 RX ADMIN — ROCURONIUM BROMIDE 10 MG: 10 INJECTION INTRAVENOUS at 09:08

## 2025-08-11 RX ADMIN — DEXAMETHASONE SODIUM PHOSPHATE 4 MG: 4 INJECTION, SOLUTION INTRAMUSCULAR; INTRAVENOUS at 08:08

## 2025-08-11 RX ADMIN — PROPOFOL 100 MG: 10 INJECTION, EMULSION INTRAVENOUS at 08:08

## 2025-08-11 RX ADMIN — INSULIN ASPART 2 UNITS: 100 INJECTION, SOLUTION INTRAVENOUS; SUBCUTANEOUS at 11:08

## 2025-08-11 RX ADMIN — OXYCODONE HYDROCHLORIDE 10 MG: 10 TABLET ORAL at 12:08

## 2025-08-11 RX ADMIN — PHENYLEPHRINE HYDROCHLORIDE 50 MCG: 10 INJECTION INTRAVENOUS at 08:08

## 2025-08-11 RX ADMIN — SUGAMMADEX 200 MG: 100 INJECTION, SOLUTION INTRAVENOUS at 09:08

## 2025-08-11 RX ADMIN — ENOXAPARIN SODIUM 40 MG: 40 INJECTION SUBCUTANEOUS at 05:08

## 2025-08-11 RX ADMIN — MAGNESIUM SULFATE HEPTAHYDRATE: 500 INJECTION, SOLUTION INTRAMUSCULAR; INTRAVENOUS at 09:08

## 2025-08-11 RX ADMIN — INSULIN ASPART 5 UNITS: 100 INJECTION, SOLUTION INTRAVENOUS; SUBCUTANEOUS at 12:08

## 2025-08-11 RX ADMIN — PANTOPRAZOLE SODIUM 40 MG: 40 INJECTION, POWDER, LYOPHILIZED, FOR SOLUTION INTRAVENOUS at 08:08

## 2025-08-11 RX ADMIN — ONDANSETRON 4 MG: 2 INJECTION INTRAMUSCULAR; INTRAVENOUS at 09:08

## 2025-08-11 RX ADMIN — PHENYLEPHRINE HYDROCHLORIDE 50 MCG: 10 INJECTION INTRAVENOUS at 09:08

## 2025-08-12 LAB
ABSOLUTE EOSINOPHIL (OHS): 0 K/UL
ABSOLUTE MONOCYTE (OHS): 1.19 K/UL (ref 0.3–1)
ABSOLUTE NEUTROPHIL COUNT (OHS): 15.59 K/UL (ref 1.8–7.7)
ALBUMIN SERPL BCP-MCNC: 1.4 G/DL (ref 3.5–5.2)
ALP SERPL-CCNC: 1152 UNIT/L (ref 40–150)
ALT SERPL W/O P-5'-P-CCNC: 53 UNIT/L (ref 0–55)
ANION GAP (OHS): 8 MMOL/L (ref 8–16)
AST SERPL-CCNC: 70 UNIT/L (ref 0–50)
BASOPHILS # BLD AUTO: 0.02 K/UL
BASOPHILS NFR BLD AUTO: 0.1 %
BILIRUB SERPL-MCNC: 0.4 MG/DL (ref 0.1–1)
BUN SERPL-MCNC: 27 MG/DL (ref 8–23)
CALCIUM SERPL-MCNC: 7.8 MG/DL (ref 8.7–10.5)
CHLORIDE SERPL-SCNC: 102 MMOL/L (ref 95–110)
CO2 SERPL-SCNC: 26 MMOL/L (ref 23–29)
CREAT SERPL-MCNC: 0.6 MG/DL (ref 0.5–1.4)
ERYTHROCYTE [DISTWIDTH] IN BLOOD BY AUTOMATED COUNT: 16.6 % (ref 11.5–14.5)
GFR SERPLBLD CREATININE-BSD FMLA CKD-EPI: >60 ML/MIN/1.73/M2
GLUCOSE SERPL-MCNC: 251 MG/DL (ref 70–110)
HCT VFR BLD AUTO: 23.6 % (ref 37–48.5)
HGB BLD-MCNC: 7.9 GM/DL (ref 12–16)
IMM GRANULOCYTES # BLD AUTO: 0.27 K/UL (ref 0–0.04)
IMM GRANULOCYTES NFR BLD AUTO: 1.5 % (ref 0–0.5)
LYMPHOCYTES # BLD AUTO: 0.37 K/UL (ref 1–4.8)
MAGNESIUM SERPL-MCNC: 1.8 MG/DL (ref 1.6–2.6)
MCH RBC QN AUTO: 29.9 PG (ref 27–31)
MCHC RBC AUTO-ENTMCNC: 33.5 G/DL (ref 32–36)
MCV RBC AUTO: 89 FL (ref 82–98)
NUCLEATED RBC (/100WBC) (OHS): 0 /100 WBC
PHOSPHATE SERPL-MCNC: 3 MG/DL (ref 2.7–4.5)
PLATELET # BLD AUTO: 418 K/UL (ref 150–450)
PMV BLD AUTO: 11 FL (ref 9.2–12.9)
POCT GLUCOSE: 159 MG/DL (ref 70–110)
POCT GLUCOSE: 192 MG/DL (ref 70–110)
POCT GLUCOSE: 196 MG/DL (ref 70–110)
POCT GLUCOSE: 198 MG/DL (ref 70–110)
POCT GLUCOSE: 259 MG/DL (ref 70–110)
POCT GLUCOSE: 272 MG/DL (ref 70–110)
POCT GLUCOSE: 339 MG/DL (ref 70–110)
POTASSIUM SERPL-SCNC: 4.7 MMOL/L (ref 3.5–5.1)
PROT SERPL-MCNC: 5.5 GM/DL (ref 6–8.4)
RBC # BLD AUTO: 2.64 M/UL (ref 4–5.4)
RELATIVE EOSINOPHIL (OHS): 0 %
RELATIVE LYMPHOCYTE (OHS): 2.1 % (ref 18–48)
RELATIVE MONOCYTE (OHS): 6.8 % (ref 4–15)
RELATIVE NEUTROPHIL (OHS): 89.5 % (ref 38–73)
SODIUM SERPL-SCNC: 136 MMOL/L (ref 136–145)
WBC # BLD AUTO: 17.44 K/UL (ref 3.9–12.7)

## 2025-08-12 PROCEDURE — 80053 COMPREHEN METABOLIC PANEL: CPT

## 2025-08-12 PROCEDURE — 99232 SBSQ HOSP IP/OBS MODERATE 35: CPT | Mod: ,,,

## 2025-08-12 PROCEDURE — 25000003 PHARM REV CODE 250

## 2025-08-12 PROCEDURE — 99900035 HC TECH TIME PER 15 MIN (STAT)

## 2025-08-12 PROCEDURE — 97530 THERAPEUTIC ACTIVITIES: CPT

## 2025-08-12 PROCEDURE — 20600001 HC STEP DOWN PRIVATE ROOM

## 2025-08-12 PROCEDURE — 63600175 PHARM REV CODE 636 W HCPCS

## 2025-08-12 PROCEDURE — 27000207 HC ISOLATION

## 2025-08-12 PROCEDURE — 94761 N-INVAS EAR/PLS OXIMETRY MLT: CPT

## 2025-08-12 PROCEDURE — 83735 ASSAY OF MAGNESIUM: CPT

## 2025-08-12 PROCEDURE — 84100 ASSAY OF PHOSPHORUS: CPT

## 2025-08-12 PROCEDURE — A4217 STERILE WATER/SALINE, 500 ML: HCPCS

## 2025-08-12 PROCEDURE — B4185 PARENTERAL SOL 10 GM LIPIDS: HCPCS

## 2025-08-12 PROCEDURE — 63600175 PHARM REV CODE 636 W HCPCS: Mod: JZ,TB

## 2025-08-12 PROCEDURE — 85025 COMPLETE CBC W/AUTO DIFF WBC: CPT

## 2025-08-12 RX ORDER — INSULIN ASPART 100 [IU]/ML
8 INJECTION, SOLUTION INTRAVENOUS; SUBCUTANEOUS
Status: DISCONTINUED | OUTPATIENT
Start: 2025-08-12 | End: 2025-08-16

## 2025-08-12 RX ORDER — GLUCAGON 1 MG
1 KIT INJECTION
Status: DISCONTINUED | OUTPATIENT
Start: 2025-08-12 | End: 2025-08-13

## 2025-08-12 RX ORDER — IBUPROFEN 200 MG
24 TABLET ORAL
Status: DISCONTINUED | OUTPATIENT
Start: 2025-08-12 | End: 2025-08-13

## 2025-08-12 RX ORDER — IBUPROFEN 200 MG
16 TABLET ORAL
Status: DISCONTINUED | OUTPATIENT
Start: 2025-08-12 | End: 2025-08-13

## 2025-08-12 RX ORDER — INSULIN ASPART 100 [IU]/ML
0-10 INJECTION, SOLUTION INTRAVENOUS; SUBCUTANEOUS
Status: DISCONTINUED | OUTPATIENT
Start: 2025-08-12 | End: 2025-08-13

## 2025-08-12 RX ADMIN — INSULIN ASPART 8 UNITS: 100 INJECTION, SOLUTION INTRAVENOUS; SUBCUTANEOUS at 09:08

## 2025-08-12 RX ADMIN — INSULIN ASPART 2 UNITS: 100 INJECTION, SOLUTION INTRAVENOUS; SUBCUTANEOUS at 12:08

## 2025-08-12 RX ADMIN — PROMETHAZINE HYDROCHLORIDE 12.5 MG: 12.5 TABLET ORAL at 07:08

## 2025-08-12 RX ADMIN — FLUCONAZOLE 400 MG: 2 INJECTION, SOLUTION INTRAVENOUS at 09:08

## 2025-08-12 RX ADMIN — SODIUM CHLORIDE, POTASSIUM CHLORIDE, SODIUM LACTATE AND CALCIUM CHLORIDE 1000 ML: 600; 310; 30; 20 INJECTION, SOLUTION INTRAVENOUS at 10:08

## 2025-08-12 RX ADMIN — INSULIN ASPART 8 UNITS: 100 INJECTION, SOLUTION INTRAVENOUS; SUBCUTANEOUS at 12:08

## 2025-08-12 RX ADMIN — PROMETHAZINE HYDROCHLORIDE 12.5 MG: 12.5 TABLET ORAL at 12:08

## 2025-08-12 RX ADMIN — PROCHLORPERAZINE EDISYLATE 2.5 MG: 5 INJECTION INTRAMUSCULAR; INTRAVENOUS at 11:08

## 2025-08-12 RX ADMIN — INSULIN ASPART 8 UNITS: 100 INJECTION, SOLUTION INTRAVENOUS; SUBCUTANEOUS at 08:08

## 2025-08-12 RX ADMIN — PANTOPRAZOLE SODIUM 40 MG: 40 INJECTION, POWDER, LYOPHILIZED, FOR SOLUTION INTRAVENOUS at 08:08

## 2025-08-12 RX ADMIN — INSULIN ASPART 2 UNITS: 100 INJECTION, SOLUTION INTRAVENOUS; SUBCUTANEOUS at 04:08

## 2025-08-12 RX ADMIN — POLYETHYLENE GLYCOL 3350 17 G: 17 POWDER, FOR SOLUTION ORAL at 09:08

## 2025-08-12 RX ADMIN — MAGNESIUM SULFATE HEPTAHYDRATE: 500 INJECTION, SOLUTION INTRAMUSCULAR; INTRAVENOUS at 09:08

## 2025-08-12 RX ADMIN — INSULIN ASPART 3 UNITS: 100 INJECTION, SOLUTION INTRAVENOUS; SUBCUTANEOUS at 04:08

## 2025-08-12 RX ADMIN — INSULIN ASPART 3 UNITS: 100 INJECTION, SOLUTION INTRAVENOUS; SUBCUTANEOUS at 12:08

## 2025-08-12 RX ADMIN — INSULIN ASPART 6 UNITS: 100 INJECTION, SOLUTION INTRAVENOUS; SUBCUTANEOUS at 12:08

## 2025-08-12 RX ADMIN — INSULIN ASPART 6 UNITS: 100 INJECTION, SOLUTION INTRAVENOUS; SUBCUTANEOUS at 04:08

## 2025-08-12 RX ADMIN — PANTOPRAZOLE SODIUM 40 MG: 40 INJECTION, POWDER, LYOPHILIZED, FOR SOLUTION INTRAVENOUS at 09:08

## 2025-08-12 RX ADMIN — CEFIDEROCOL SULFATE TOSYLATE 2 G: 1 INJECTION, POWDER, FOR SOLUTION INTRAVENOUS at 09:08

## 2025-08-12 RX ADMIN — INSULIN ASPART 1 UNITS: 100 INJECTION, SOLUTION INTRAVENOUS; SUBCUTANEOUS at 08:08

## 2025-08-12 RX ADMIN — ENOXAPARIN SODIUM 40 MG: 40 INJECTION SUBCUTANEOUS at 04:08

## 2025-08-12 RX ADMIN — CEFIDEROCOL SULFATE TOSYLATE 2 G: 1 INJECTION, POWDER, FOR SOLUTION INTRAVENOUS at 12:08

## 2025-08-12 RX ADMIN — CEFIDEROCOL SULFATE TOSYLATE 2 G: 1 INJECTION, POWDER, FOR SOLUTION INTRAVENOUS at 04:08

## 2025-08-12 RX ADMIN — SMOFLIPID 250 ML: 6; 6; 5; 3 INJECTION, EMULSION INTRAVENOUS at 09:08

## 2025-08-12 RX ADMIN — LEVOTHYROXINE SODIUM 25 MCG: 20 INJECTION, SOLUTION INTRAVENOUS at 09:08

## 2025-08-12 RX ADMIN — INSULIN ASPART 8 UNITS: 100 INJECTION, SOLUTION INTRAVENOUS; SUBCUTANEOUS at 04:08

## 2025-08-13 ENCOUNTER — PATIENT MESSAGE (OUTPATIENT)
Dept: ADMINISTRATIVE | Facility: HOSPITAL | Age: 65
End: 2025-08-13
Payer: MEDICARE

## 2025-08-13 LAB
ABSOLUTE EOSINOPHIL (OHS): 0.09 K/UL
ABSOLUTE MONOCYTE (OHS): 1.77 K/UL (ref 0.3–1)
ABSOLUTE NEUTROPHIL COUNT (OHS): 14.63 K/UL (ref 1.8–7.7)
ALBUMIN SERPL BCP-MCNC: 1.5 G/DL (ref 3.5–5.2)
ALP SERPL-CCNC: 977 UNIT/L (ref 40–150)
ALT SERPL W/O P-5'-P-CCNC: 41 UNIT/L (ref 0–55)
ANION GAP (OHS): 8 MMOL/L (ref 8–16)
AST SERPL-CCNC: 39 UNIT/L (ref 0–50)
BASOPHILS # BLD AUTO: 0.05 K/UL
BASOPHILS NFR BLD AUTO: 0.3 %
BILIRUB SERPL-MCNC: 0.3 MG/DL (ref 0.1–1)
BUN SERPL-MCNC: 26 MG/DL (ref 8–23)
CALCIUM SERPL-MCNC: 8 MG/DL (ref 8.7–10.5)
CHLORIDE SERPL-SCNC: 100 MMOL/L (ref 95–110)
CO2 SERPL-SCNC: 25 MMOL/L (ref 23–29)
CREAT SERPL-MCNC: 0.5 MG/DL (ref 0.5–1.4)
ERYTHROCYTE [DISTWIDTH] IN BLOOD BY AUTOMATED COUNT: 17.4 % (ref 11.5–14.5)
GFR SERPLBLD CREATININE-BSD FMLA CKD-EPI: >60 ML/MIN/1.73/M2
GLUCOSE SERPL-MCNC: 169 MG/DL (ref 70–110)
HCT VFR BLD AUTO: 25.3 % (ref 37–48.5)
HGB BLD-MCNC: 8.3 GM/DL (ref 12–16)
IMM GRANULOCYTES # BLD AUTO: 0.26 K/UL (ref 0–0.04)
IMM GRANULOCYTES NFR BLD AUTO: 1.5 % (ref 0–0.5)
LYMPHOCYTES # BLD AUTO: 0.64 K/UL (ref 1–4.8)
MAGNESIUM SERPL-MCNC: 1.6 MG/DL (ref 1.6–2.6)
MCH RBC QN AUTO: 29.4 PG (ref 27–31)
MCHC RBC AUTO-ENTMCNC: 32.8 G/DL (ref 32–36)
MCV RBC AUTO: 90 FL (ref 82–98)
NUCLEATED RBC (/100WBC) (OHS): 0 /100 WBC
PHOSPHATE SERPL-MCNC: 3.3 MG/DL (ref 2.7–4.5)
PLATELET # BLD AUTO: 424 K/UL (ref 150–450)
PMV BLD AUTO: 11 FL (ref 9.2–12.9)
POCT GLUCOSE: 112 MG/DL (ref 70–110)
POCT GLUCOSE: 146 MG/DL (ref 70–110)
POCT GLUCOSE: 156 MG/DL (ref 70–110)
POCT GLUCOSE: 170 MG/DL (ref 70–110)
POCT GLUCOSE: 177 MG/DL (ref 70–110)
POCT GLUCOSE: 198 MG/DL (ref 70–110)
POTASSIUM SERPL-SCNC: 4.4 MMOL/L (ref 3.5–5.1)
PROT SERPL-MCNC: 6.1 GM/DL (ref 6–8.4)
RBC # BLD AUTO: 2.82 M/UL (ref 4–5.4)
RELATIVE EOSINOPHIL (OHS): 0.5 %
RELATIVE LYMPHOCYTE (OHS): 3.7 % (ref 18–48)
RELATIVE MONOCYTE (OHS): 10.1 % (ref 4–15)
RELATIVE NEUTROPHIL (OHS): 83.9 % (ref 38–73)
SODIUM SERPL-SCNC: 133 MMOL/L (ref 136–145)
TRIGL SERPL-MCNC: 152 MG/DL (ref 30–150)
WBC # BLD AUTO: 17.44 K/UL (ref 3.9–12.7)

## 2025-08-13 PROCEDURE — 99232 SBSQ HOSP IP/OBS MODERATE 35: CPT | Mod: ,,,

## 2025-08-13 PROCEDURE — 63600175 PHARM REV CODE 636 W HCPCS

## 2025-08-13 PROCEDURE — 20600001 HC STEP DOWN PRIVATE ROOM

## 2025-08-13 PROCEDURE — 84100 ASSAY OF PHOSPHORUS: CPT

## 2025-08-13 PROCEDURE — 84478 ASSAY OF TRIGLYCERIDES: CPT

## 2025-08-13 PROCEDURE — 63600175 PHARM REV CODE 636 W HCPCS: Mod: JZ,TB

## 2025-08-13 PROCEDURE — B4185 PARENTERAL SOL 10 GM LIPIDS: HCPCS

## 2025-08-13 PROCEDURE — 25000003 PHARM REV CODE 250

## 2025-08-13 PROCEDURE — 99233 SBSQ HOSP IP/OBS HIGH 50: CPT | Mod: ,,, | Performed by: INTERNAL MEDICINE

## 2025-08-13 PROCEDURE — 27000207 HC ISOLATION

## 2025-08-13 PROCEDURE — A4217 STERILE WATER/SALINE, 500 ML: HCPCS

## 2025-08-13 PROCEDURE — 63600175 PHARM REV CODE 636 W HCPCS: Performed by: INTERNAL MEDICINE

## 2025-08-13 PROCEDURE — 85025 COMPLETE CBC W/AUTO DIFF WBC: CPT

## 2025-08-13 PROCEDURE — 83735 ASSAY OF MAGNESIUM: CPT

## 2025-08-13 PROCEDURE — 80053 COMPREHEN METABOLIC PANEL: CPT

## 2025-08-13 PROCEDURE — 99232 SBSQ HOSP IP/OBS MODERATE 35: CPT | Mod: ,,, | Performed by: NURSE PRACTITIONER

## 2025-08-13 RX ORDER — METOCLOPRAMIDE HYDROCHLORIDE 5 MG/ML
10 INJECTION INTRAMUSCULAR; INTRAVENOUS EVERY 6 HOURS
Status: DISCONTINUED | OUTPATIENT
Start: 2025-08-13 | End: 2025-08-13

## 2025-08-13 RX ORDER — INSULIN ASPART 100 [IU]/ML
0-10 INJECTION, SOLUTION INTRAVENOUS; SUBCUTANEOUS EVERY 4 HOURS PRN
Status: DISCONTINUED | OUTPATIENT
Start: 2025-08-13 | End: 2025-08-22 | Stop reason: HOSPADM

## 2025-08-13 RX ORDER — METRONIDAZOLE 500 MG/100ML
500 INJECTION, SOLUTION INTRAVENOUS
Status: DISCONTINUED | OUTPATIENT
Start: 2025-08-13 | End: 2025-08-21

## 2025-08-13 RX ORDER — ACETAMINOPHEN 10 MG/ML
1000 INJECTION, SOLUTION INTRAVENOUS EVERY 8 HOURS
Status: COMPLETED | OUTPATIENT
Start: 2025-08-13 | End: 2025-08-14

## 2025-08-13 RX ORDER — ACETAMINOPHEN 10 MG/ML
1000 INJECTION, SOLUTION INTRAVENOUS ONCE
Status: DISCONTINUED | OUTPATIENT
Start: 2025-08-13 | End: 2025-08-13

## 2025-08-13 RX ORDER — DIPHENHYDRAMINE HYDROCHLORIDE 50 MG/ML
25 INJECTION, SOLUTION INTRAMUSCULAR; INTRAVENOUS NIGHTLY PRN
Status: DISCONTINUED | OUTPATIENT
Start: 2025-08-13 | End: 2025-08-19

## 2025-08-13 RX ORDER — METOCLOPRAMIDE HYDROCHLORIDE 5 MG/ML
10 INJECTION INTRAMUSCULAR; INTRAVENOUS EVERY 6 HOURS
Status: DISPENSED | OUTPATIENT
Start: 2025-08-13 | End: 2025-08-16

## 2025-08-13 RX ADMIN — ACETAMINOPHEN 1000 MG: 10 INJECTION INTRAVENOUS at 09:08

## 2025-08-13 RX ADMIN — LEVOTHYROXINE SODIUM 25 MCG: 20 INJECTION, SOLUTION INTRAVENOUS at 09:08

## 2025-08-13 RX ADMIN — METRONIDAZOLE 500 MG: 500 INJECTION, SOLUTION INTRAVENOUS at 12:08

## 2025-08-13 RX ADMIN — PROCHLORPERAZINE EDISYLATE 2.5 MG: 5 INJECTION INTRAMUSCULAR; INTRAVENOUS at 12:08

## 2025-08-13 RX ADMIN — FLUCONAZOLE 400 MG: 2 INJECTION, SOLUTION INTRAVENOUS at 09:08

## 2025-08-13 RX ADMIN — CEFIDEROCOL SULFATE TOSYLATE 2 G: 1 INJECTION, POWDER, FOR SOLUTION INTRAVENOUS at 09:08

## 2025-08-13 RX ADMIN — DIPHENHYDRAMINE HYDROCHLORIDE 25 MG: 50 INJECTION, SOLUTION INTRAMUSCULAR; INTRAVENOUS at 09:08

## 2025-08-13 RX ADMIN — INSULIN ASPART 8 UNITS: 100 INJECTION, SOLUTION INTRAVENOUS; SUBCUTANEOUS at 04:08

## 2025-08-13 RX ADMIN — METOCLOPRAMIDE 10 MG: 5 INJECTION, SOLUTION INTRAMUSCULAR; INTRAVENOUS at 06:08

## 2025-08-13 RX ADMIN — PROCHLORPERAZINE EDISYLATE 2.5 MG: 5 INJECTION INTRAMUSCULAR; INTRAVENOUS at 06:08

## 2025-08-13 RX ADMIN — SMOFLIPID 250 ML: 6; 6; 5; 3 INJECTION, EMULSION INTRAVENOUS at 10:08

## 2025-08-13 RX ADMIN — INSULIN ASPART 8 UNITS: 100 INJECTION, SOLUTION INTRAVENOUS; SUBCUTANEOUS at 08:08

## 2025-08-13 RX ADMIN — ENOXAPARIN SODIUM 40 MG: 40 INJECTION SUBCUTANEOUS at 06:08

## 2025-08-13 RX ADMIN — INSULIN ASPART 8 UNITS: 100 INJECTION, SOLUTION INTRAVENOUS; SUBCUTANEOUS at 12:08

## 2025-08-13 RX ADMIN — METOCLOPRAMIDE 10 MG: 5 INJECTION, SOLUTION INTRAMUSCULAR; INTRAVENOUS at 12:08

## 2025-08-13 RX ADMIN — CEFIDEROCOL SULFATE TOSYLATE 2 G: 1 INJECTION, POWDER, FOR SOLUTION INTRAVENOUS at 06:08

## 2025-08-13 RX ADMIN — PANTOPRAZOLE SODIUM 40 MG: 40 INJECTION, POWDER, LYOPHILIZED, FOR SOLUTION INTRAVENOUS at 08:08

## 2025-08-13 RX ADMIN — MAGNESIUM SULFATE HEPTAHYDRATE: 500 INJECTION, SOLUTION INTRAMUSCULAR; INTRAVENOUS at 10:08

## 2025-08-13 RX ADMIN — ONDANSETRON 4 MG: 2 INJECTION INTRAMUSCULAR; INTRAVENOUS at 08:08

## 2025-08-13 RX ADMIN — INSULIN ASPART 8 UNITS: 100 INJECTION, SOLUTION INTRAVENOUS; SUBCUTANEOUS at 05:08

## 2025-08-13 RX ADMIN — METRONIDAZOLE 500 MG: 500 INJECTION, SOLUTION INTRAVENOUS at 11:08

## 2025-08-13 RX ADMIN — CEFIDEROCOL SULFATE TOSYLATE 2 G: 1 INJECTION, POWDER, FOR SOLUTION INTRAVENOUS at 12:08

## 2025-08-13 RX ADMIN — PANTOPRAZOLE SODIUM 40 MG: 40 INJECTION, POWDER, LYOPHILIZED, FOR SOLUTION INTRAVENOUS at 09:08

## 2025-08-14 LAB
ABSOLUTE EOSINOPHIL (OHS): 0.19 K/UL
ABSOLUTE MONOCYTE (OHS): 1.76 K/UL (ref 0.3–1)
ABSOLUTE NEUTROPHIL COUNT (OHS): 12.3 K/UL (ref 1.8–7.7)
ALBUMIN SERPL BCP-MCNC: 1.4 G/DL (ref 3.5–5.2)
ALP SERPL-CCNC: 746 UNIT/L (ref 40–150)
ALT SERPL W/O P-5'-P-CCNC: 21 UNIT/L (ref 0–55)
ANION GAP (OHS): 8 MMOL/L (ref 8–16)
AST SERPL-CCNC: 33 UNIT/L (ref 0–50)
BASOPHILS # BLD AUTO: 0.05 K/UL
BASOPHILS NFR BLD AUTO: 0.3 %
BILIRUB SERPL-MCNC: 0.3 MG/DL (ref 0.1–1)
BUN SERPL-MCNC: 21 MG/DL (ref 8–23)
CALCIUM SERPL-MCNC: 7.8 MG/DL (ref 8.7–10.5)
CHLORIDE SERPL-SCNC: 102 MMOL/L (ref 95–110)
CO2 SERPL-SCNC: 26 MMOL/L (ref 23–29)
CREAT SERPL-MCNC: 0.5 MG/DL (ref 0.5–1.4)
ERYTHROCYTE [DISTWIDTH] IN BLOOD BY AUTOMATED COUNT: 17.5 % (ref 11.5–14.5)
GFR SERPLBLD CREATININE-BSD FMLA CKD-EPI: >60 ML/MIN/1.73/M2
GLUCOSE SERPL-MCNC: 125 MG/DL (ref 70–110)
HCT VFR BLD AUTO: 23.6 % (ref 37–48.5)
HGB BLD-MCNC: 7.8 GM/DL (ref 12–16)
IMM GRANULOCYTES # BLD AUTO: 0.12 K/UL (ref 0–0.04)
IMM GRANULOCYTES NFR BLD AUTO: 0.8 % (ref 0–0.5)
LYMPHOCYTES # BLD AUTO: 0.64 K/UL (ref 1–4.8)
MAGNESIUM SERPL-MCNC: 1.6 MG/DL (ref 1.6–2.6)
MCH RBC QN AUTO: 30.1 PG (ref 27–31)
MCHC RBC AUTO-ENTMCNC: 33.1 G/DL (ref 32–36)
MCV RBC AUTO: 91 FL (ref 82–98)
NUCLEATED RBC (/100WBC) (OHS): 0 /100 WBC
PHOSPHATE SERPL-MCNC: 3.5 MG/DL (ref 2.7–4.5)
PLATELET # BLD AUTO: 421 K/UL (ref 150–450)
PMV BLD AUTO: 10.5 FL (ref 9.2–12.9)
POCT GLUCOSE: 119 MG/DL (ref 70–110)
POCT GLUCOSE: 127 MG/DL (ref 70–110)
POCT GLUCOSE: 143 MG/DL (ref 70–110)
POCT GLUCOSE: 216 MG/DL (ref 70–110)
POCT GLUCOSE: 236 MG/DL (ref 70–110)
POTASSIUM SERPL-SCNC: 3.9 MMOL/L (ref 3.5–5.1)
PROT SERPL-MCNC: 5.7 GM/DL (ref 6–8.4)
RBC # BLD AUTO: 2.59 M/UL (ref 4–5.4)
RELATIVE EOSINOPHIL (OHS): 1.3 %
RELATIVE LYMPHOCYTE (OHS): 4.2 % (ref 18–48)
RELATIVE MONOCYTE (OHS): 11.7 % (ref 4–15)
RELATIVE NEUTROPHIL (OHS): 81.7 % (ref 38–73)
SODIUM SERPL-SCNC: 136 MMOL/L (ref 136–145)
WBC # BLD AUTO: 15.06 K/UL (ref 3.9–12.7)

## 2025-08-14 PROCEDURE — 25000003 PHARM REV CODE 250

## 2025-08-14 PROCEDURE — 63600175 PHARM REV CODE 636 W HCPCS

## 2025-08-14 PROCEDURE — 63600175 PHARM REV CODE 636 W HCPCS: Performed by: INTERNAL MEDICINE

## 2025-08-14 PROCEDURE — A4217 STERILE WATER/SALINE, 500 ML: HCPCS

## 2025-08-14 PROCEDURE — 20600001 HC STEP DOWN PRIVATE ROOM

## 2025-08-14 PROCEDURE — B4185 PARENTERAL SOL 10 GM LIPIDS: HCPCS

## 2025-08-14 PROCEDURE — 85025 COMPLETE CBC W/AUTO DIFF WBC: CPT

## 2025-08-14 PROCEDURE — 84100 ASSAY OF PHOSPHORUS: CPT

## 2025-08-14 PROCEDURE — 63600175 PHARM REV CODE 636 W HCPCS: Mod: JZ,TB

## 2025-08-14 PROCEDURE — 80053 COMPREHEN METABOLIC PANEL: CPT

## 2025-08-14 PROCEDURE — 27000207 HC ISOLATION

## 2025-08-14 PROCEDURE — 25500020 PHARM REV CODE 255

## 2025-08-14 PROCEDURE — 25500020 PHARM REV CODE 255: Performed by: SURGERY

## 2025-08-14 PROCEDURE — 83735 ASSAY OF MAGNESIUM: CPT

## 2025-08-14 RX ADMIN — ACETAMINOPHEN 1000 MG: 10 INJECTION INTRAVENOUS at 01:08

## 2025-08-14 RX ADMIN — METOCLOPRAMIDE 10 MG: 5 INJECTION, SOLUTION INTRAMUSCULAR; INTRAVENOUS at 05:08

## 2025-08-14 RX ADMIN — IOHEXOL 75 ML: 350 INJECTION, SOLUTION INTRAVENOUS at 07:08

## 2025-08-14 RX ADMIN — ACETAMINOPHEN 1000 MG: 10 INJECTION INTRAVENOUS at 05:08

## 2025-08-14 RX ADMIN — INSULIN ASPART 4 UNITS: 100 INJECTION, SOLUTION INTRAVENOUS; SUBCUTANEOUS at 01:08

## 2025-08-14 RX ADMIN — ONDANSETRON 4 MG: 2 INJECTION INTRAMUSCULAR; INTRAVENOUS at 04:08

## 2025-08-14 RX ADMIN — ONDANSETRON 4 MG: 2 INJECTION INTRAMUSCULAR; INTRAVENOUS at 01:08

## 2025-08-14 RX ADMIN — PANTOPRAZOLE SODIUM 40 MG: 40 INJECTION, POWDER, LYOPHILIZED, FOR SOLUTION INTRAVENOUS at 09:08

## 2025-08-14 RX ADMIN — INSULIN ASPART 8 UNITS: 100 INJECTION, SOLUTION INTRAVENOUS; SUBCUTANEOUS at 01:08

## 2025-08-14 RX ADMIN — ONDANSETRON 4 MG: 2 INJECTION INTRAMUSCULAR; INTRAVENOUS at 08:08

## 2025-08-14 RX ADMIN — INSULIN ASPART 4 UNITS: 100 INJECTION, SOLUTION INTRAVENOUS; SUBCUTANEOUS at 05:08

## 2025-08-14 RX ADMIN — CEFIDEROCOL SULFATE TOSYLATE 2 G: 1 INJECTION, POWDER, FOR SOLUTION INTRAVENOUS at 12:08

## 2025-08-14 RX ADMIN — INSULIN ASPART 8 UNITS: 100 INJECTION, SOLUTION INTRAVENOUS; SUBCUTANEOUS at 12:08

## 2025-08-14 RX ADMIN — FLUCONAZOLE 400 MG: 2 INJECTION, SOLUTION INTRAVENOUS at 09:08

## 2025-08-14 RX ADMIN — MAGNESIUM SULFATE HEPTAHYDRATE: 500 INJECTION, SOLUTION INTRAMUSCULAR; INTRAVENOUS at 09:08

## 2025-08-14 RX ADMIN — METOCLOPRAMIDE 10 MG: 5 INJECTION, SOLUTION INTRAMUSCULAR; INTRAVENOUS at 12:08

## 2025-08-14 RX ADMIN — METRONIDAZOLE 500 MG: 500 INJECTION, SOLUTION INTRAVENOUS at 11:08

## 2025-08-14 RX ADMIN — CEFIDEROCOL SULFATE TOSYLATE 2 G: 1 INJECTION, POWDER, FOR SOLUTION INTRAVENOUS at 05:08

## 2025-08-14 RX ADMIN — LEVOTHYROXINE SODIUM 25 MCG: 20 INJECTION, SOLUTION INTRAVENOUS at 09:08

## 2025-08-14 RX ADMIN — METOCLOPRAMIDE 10 MG: 5 INJECTION, SOLUTION INTRAMUSCULAR; INTRAVENOUS at 01:08

## 2025-08-14 RX ADMIN — SMOFLIPID 250 ML: 6; 6; 5; 3 INJECTION, EMULSION INTRAVENOUS at 09:08

## 2025-08-14 RX ADMIN — IOHEXOL 15 ML: 350 INJECTION, SOLUTION INTRAVENOUS at 03:08

## 2025-08-14 RX ADMIN — PANTOPRAZOLE SODIUM 40 MG: 40 INJECTION, POWDER, LYOPHILIZED, FOR SOLUTION INTRAVENOUS at 08:08

## 2025-08-14 RX ADMIN — INSULIN ASPART 8 UNITS: 100 INJECTION, SOLUTION INTRAVENOUS; SUBCUTANEOUS at 04:08

## 2025-08-14 RX ADMIN — INSULIN ASPART 8 UNITS: 100 INJECTION, SOLUTION INTRAVENOUS; SUBCUTANEOUS at 09:08

## 2025-08-14 RX ADMIN — ENOXAPARIN SODIUM 40 MG: 40 INJECTION SUBCUTANEOUS at 05:08

## 2025-08-14 RX ADMIN — INSULIN ASPART 8 UNITS: 100 INJECTION, SOLUTION INTRAVENOUS; SUBCUTANEOUS at 08:08

## 2025-08-14 RX ADMIN — CEFIDEROCOL SULFATE TOSYLATE 2 G: 1 INJECTION, POWDER, FOR SOLUTION INTRAVENOUS at 09:08

## 2025-08-14 RX ADMIN — INSULIN ASPART 8 UNITS: 100 INJECTION, SOLUTION INTRAVENOUS; SUBCUTANEOUS at 05:08

## 2025-08-15 ENCOUNTER — ANESTHESIA EVENT (OUTPATIENT)
Dept: SURGERY | Facility: HOSPITAL | Age: 65
End: 2025-08-15
Payer: MEDICARE

## 2025-08-15 ENCOUNTER — ANESTHESIA (OUTPATIENT)
Dept: SURGERY | Facility: HOSPITAL | Age: 65
End: 2025-08-15
Payer: MEDICARE

## 2025-08-15 LAB
ABSOLUTE EOSINOPHIL (OHS): 0.1 K/UL
ABSOLUTE MONOCYTE (OHS): 1.47 K/UL (ref 0.3–1)
ABSOLUTE NEUTROPHIL COUNT (OHS): 12.64 K/UL (ref 1.8–7.7)
ALBUMIN SERPL BCP-MCNC: 1.6 G/DL (ref 3.5–5.2)
ALP SERPL-CCNC: 652 UNIT/L (ref 40–150)
ALT SERPL W/O P-5'-P-CCNC: 16 UNIT/L (ref 0–55)
ANION GAP (OHS): 9 MMOL/L (ref 8–16)
AST SERPL-CCNC: 32 UNIT/L (ref 0–50)
BASOPHILS # BLD AUTO: 0.05 K/UL
BASOPHILS NFR BLD AUTO: 0.3 %
BILIRUB SERPL-MCNC: 0.3 MG/DL (ref 0.1–1)
BUN SERPL-MCNC: 19 MG/DL (ref 8–23)
CALCIUM SERPL-MCNC: 7.7 MG/DL (ref 8.7–10.5)
CHLORIDE SERPL-SCNC: 102 MMOL/L (ref 95–110)
CO2 SERPL-SCNC: 24 MMOL/L (ref 23–29)
CREAT SERPL-MCNC: 0.5 MG/DL (ref 0.5–1.4)
ERYTHROCYTE [DISTWIDTH] IN BLOOD BY AUTOMATED COUNT: 17.4 % (ref 11.5–14.5)
GFR SERPLBLD CREATININE-BSD FMLA CKD-EPI: >60 ML/MIN/1.73/M2
GLUCOSE SERPL-MCNC: 158 MG/DL (ref 70–110)
HCT VFR BLD AUTO: 24.3 % (ref 37–48.5)
HGB BLD-MCNC: 7.9 GM/DL (ref 12–16)
IMM GRANULOCYTES # BLD AUTO: 0.14 K/UL (ref 0–0.04)
IMM GRANULOCYTES NFR BLD AUTO: 0.9 % (ref 0–0.5)
LYMPHOCYTES # BLD AUTO: 0.51 K/UL (ref 1–4.8)
MAGNESIUM SERPL-MCNC: 1.6 MG/DL (ref 1.6–2.6)
MCH RBC QN AUTO: 29.7 PG (ref 27–31)
MCHC RBC AUTO-ENTMCNC: 32.5 G/DL (ref 32–36)
MCV RBC AUTO: 91 FL (ref 82–98)
NUCLEATED RBC (/100WBC) (OHS): 0 /100 WBC
PHOSPHATE SERPL-MCNC: 2.9 MG/DL (ref 2.7–4.5)
PLATELET # BLD AUTO: 447 K/UL (ref 150–450)
PMV BLD AUTO: 10.9 FL (ref 9.2–12.9)
POCT GLUCOSE: 119 MG/DL (ref 70–110)
POCT GLUCOSE: 140 MG/DL (ref 70–110)
POCT GLUCOSE: 164 MG/DL (ref 70–110)
POCT GLUCOSE: 172 MG/DL (ref 70–110)
POCT GLUCOSE: 180 MG/DL (ref 70–110)
POCT GLUCOSE: 225 MG/DL (ref 70–110)
POCT GLUCOSE: 252 MG/DL (ref 70–110)
POTASSIUM SERPL-SCNC: 3.7 MMOL/L (ref 3.5–5.1)
PROT SERPL-MCNC: 5.8 GM/DL (ref 6–8.4)
RBC # BLD AUTO: 2.66 M/UL (ref 4–5.4)
RELATIVE EOSINOPHIL (OHS): 0.7 %
RELATIVE LYMPHOCYTE (OHS): 3.4 % (ref 18–48)
RELATIVE MONOCYTE (OHS): 9.9 % (ref 4–15)
RELATIVE NEUTROPHIL (OHS): 84.8 % (ref 38–73)
SODIUM SERPL-SCNC: 135 MMOL/L (ref 136–145)
WBC # BLD AUTO: 14.91 K/UL (ref 3.9–12.7)

## 2025-08-15 PROCEDURE — 44360 SMALL BOWEL ENDOSCOPY: CPT | Mod: 58,,, | Performed by: SURGERY

## 2025-08-15 PROCEDURE — 36000706: Performed by: SURGERY

## 2025-08-15 PROCEDURE — 63600175 PHARM REV CODE 636 W HCPCS

## 2025-08-15 PROCEDURE — 71000015 HC POSTOP RECOV 1ST HR: Performed by: SURGERY

## 2025-08-15 PROCEDURE — 27201423 OPTIME MED/SURG SUP & DEVICES STERILE SUPPLY: Performed by: SURGERY

## 2025-08-15 PROCEDURE — 63600175 PHARM REV CODE 636 W HCPCS: Performed by: INTERNAL MEDICINE

## 2025-08-15 PROCEDURE — 83735 ASSAY OF MAGNESIUM: CPT

## 2025-08-15 PROCEDURE — 49446 CHANGE G-TUBE TO G-J PERC: CPT | Mod: 58,,, | Performed by: SURGERY

## 2025-08-15 PROCEDURE — C1769 GUIDE WIRE: HCPCS | Performed by: SURGERY

## 2025-08-15 PROCEDURE — 36000707: Performed by: SURGERY

## 2025-08-15 PROCEDURE — 85025 COMPLETE CBC W/AUTO DIFF WBC: CPT

## 2025-08-15 PROCEDURE — 37000008 HC ANESTHESIA 1ST 15 MINUTES: Performed by: SURGERY

## 2025-08-15 PROCEDURE — B4185 PARENTERAL SOL 10 GM LIPIDS: HCPCS

## 2025-08-15 PROCEDURE — 71000033 HC RECOVERY, INTIAL HOUR: Performed by: SURGERY

## 2025-08-15 PROCEDURE — 20600001 HC STEP DOWN PRIVATE ROOM

## 2025-08-15 PROCEDURE — 25000003 PHARM REV CODE 250

## 2025-08-15 PROCEDURE — 84100 ASSAY OF PHOSPHORUS: CPT

## 2025-08-15 PROCEDURE — 37000009 HC ANESTHESIA EA ADD 15 MINS: Performed by: SURGERY

## 2025-08-15 PROCEDURE — A4217 STERILE WATER/SALINE, 500 ML: HCPCS

## 2025-08-15 PROCEDURE — 80053 COMPREHEN METABOLIC PANEL: CPT

## 2025-08-15 PROCEDURE — 63600175 PHARM REV CODE 636 W HCPCS: Mod: JZ,TB

## 2025-08-15 PROCEDURE — 27000207 HC ISOLATION

## 2025-08-15 PROCEDURE — 25500020 PHARM REV CODE 255: Performed by: SURGERY

## 2025-08-15 PROCEDURE — 82962 GLUCOSE BLOOD TEST: CPT | Performed by: SURGERY

## 2025-08-15 PROCEDURE — 0DHA3UZ INSERTION OF FEEDING DEVICE INTO JEJUNUM, PERCUTANEOUS APPROACH: ICD-10-PCS | Performed by: SURGERY

## 2025-08-15 PROCEDURE — 99232 SBSQ HOSP IP/OBS MODERATE 35: CPT | Mod: ,,,

## 2025-08-15 RX ORDER — ROCURONIUM BROMIDE 10 MG/ML
INJECTION, SOLUTION INTRAVENOUS
Status: DISCONTINUED | OUTPATIENT
Start: 2025-08-15 | End: 2025-08-15

## 2025-08-15 RX ORDER — PROPOFOL 10 MG/ML
VIAL (ML) INTRAVENOUS
Status: DISCONTINUED | OUTPATIENT
Start: 2025-08-15 | End: 2025-08-15

## 2025-08-15 RX ORDER — SUCCINYLCHOLINE CHLORIDE 20 MG/ML
INJECTION INTRAMUSCULAR; INTRAVENOUS
Status: DISCONTINUED | OUTPATIENT
Start: 2025-08-15 | End: 2025-08-15

## 2025-08-15 RX ORDER — LIDOCAINE HYDROCHLORIDE 20 MG/ML
INJECTION, SOLUTION EPIDURAL; INFILTRATION; INTRACAUDAL; PERINEURAL
Status: DISCONTINUED | OUTPATIENT
Start: 2025-08-15 | End: 2025-08-15

## 2025-08-15 RX ORDER — HALOPERIDOL LACTATE 5 MG/ML
0.5 INJECTION, SOLUTION INTRAMUSCULAR EVERY 10 MIN PRN
Status: DISCONTINUED | OUTPATIENT
Start: 2025-08-15 | End: 2025-08-15 | Stop reason: HOSPADM

## 2025-08-15 RX ORDER — GLUCAGON 1 MG
1 KIT INJECTION
Status: DISCONTINUED | OUTPATIENT
Start: 2025-08-15 | End: 2025-08-15 | Stop reason: HOSPADM

## 2025-08-15 RX ORDER — DEXAMETHASONE SODIUM PHOSPHATE 4 MG/ML
INJECTION, SOLUTION INTRA-ARTICULAR; INTRALESIONAL; INTRAMUSCULAR; INTRAVENOUS; SOFT TISSUE
Status: DISCONTINUED | OUTPATIENT
Start: 2025-08-15 | End: 2025-08-15

## 2025-08-15 RX ORDER — HYDROMORPHONE HYDROCHLORIDE 1 MG/ML
0.2 INJECTION, SOLUTION INTRAMUSCULAR; INTRAVENOUS; SUBCUTANEOUS EVERY 5 MIN PRN
Status: DISCONTINUED | OUTPATIENT
Start: 2025-08-15 | End: 2025-08-15 | Stop reason: HOSPADM

## 2025-08-15 RX ORDER — OXYCODONE HYDROCHLORIDE 5 MG/1
5 TABLET ORAL
Status: DISCONTINUED | OUTPATIENT
Start: 2025-08-15 | End: 2025-08-15 | Stop reason: HOSPADM

## 2025-08-15 RX ADMIN — FLUCONAZOLE 400 MG: 2 INJECTION, SOLUTION INTRAVENOUS at 10:08

## 2025-08-15 RX ADMIN — INSULIN ASPART 8 UNITS: 100 INJECTION, SOLUTION INTRAVENOUS; SUBCUTANEOUS at 12:08

## 2025-08-15 RX ADMIN — LIDOCAINE HYDROCHLORIDE 80 MG: 20 INJECTION, SOLUTION EPIDURAL; INFILTRATION; INTRACAUDAL; PERINEURAL at 12:08

## 2025-08-15 RX ADMIN — CEFIDEROCOL SULFATE TOSYLATE 2 G: 1 INJECTION, POWDER, FOR SOLUTION INTRAVENOUS at 09:08

## 2025-08-15 RX ADMIN — ONDANSETRON 4 MG: 2 INJECTION INTRAMUSCULAR; INTRAVENOUS at 02:08

## 2025-08-15 RX ADMIN — METRONIDAZOLE 500 MG: 500 INJECTION, SOLUTION INTRAVENOUS at 03:08

## 2025-08-15 RX ADMIN — DIPHENHYDRAMINE HYDROCHLORIDE 25 MG: 50 INJECTION, SOLUTION INTRAMUSCULAR; INTRAVENOUS at 02:08

## 2025-08-15 RX ADMIN — HYDROMORPHONE HYDROCHLORIDE 0.2 MG: 1 INJECTION, SOLUTION INTRAMUSCULAR; INTRAVENOUS; SUBCUTANEOUS at 03:08

## 2025-08-15 RX ADMIN — PANTOPRAZOLE SODIUM 40 MG: 40 INJECTION, POWDER, LYOPHILIZED, FOR SOLUTION INTRAVENOUS at 09:08

## 2025-08-15 RX ADMIN — ONDANSETRON 4 MG: 2 INJECTION INTRAMUSCULAR; INTRAVENOUS at 08:08

## 2025-08-15 RX ADMIN — SUGAMMADEX 200 MG: 100 INJECTION, SOLUTION INTRAVENOUS at 02:08

## 2025-08-15 RX ADMIN — ENOXAPARIN SODIUM 40 MG: 40 INJECTION SUBCUTANEOUS at 04:08

## 2025-08-15 RX ADMIN — LEVOTHYROXINE SODIUM 25 MCG: 20 INJECTION, SOLUTION INTRAVENOUS at 09:08

## 2025-08-15 RX ADMIN — PROPOFOL 150 MG: 10 INJECTION, EMULSION INTRAVENOUS at 12:08

## 2025-08-15 RX ADMIN — ROCURONIUM BROMIDE 10 MG: 10 INJECTION, SOLUTION INTRAVENOUS at 01:08

## 2025-08-15 RX ADMIN — METOCLOPRAMIDE 10 MG: 5 INJECTION, SOLUTION INTRAMUSCULAR; INTRAVENOUS at 05:08

## 2025-08-15 RX ADMIN — OXYCODONE HYDROCHLORIDE 5 MG: 5 TABLET ORAL at 08:08

## 2025-08-15 RX ADMIN — INSULIN ASPART 8 UNITS: 100 INJECTION, SOLUTION INTRAVENOUS; SUBCUTANEOUS at 09:08

## 2025-08-15 RX ADMIN — INSULIN ASPART 8 UNITS: 100 INJECTION, SOLUTION INTRAVENOUS; SUBCUTANEOUS at 03:08

## 2025-08-15 RX ADMIN — CEFIDEROCOL SULFATE TOSYLATE 2 G: 1 INJECTION, POWDER, FOR SOLUTION INTRAVENOUS at 05:08

## 2025-08-15 RX ADMIN — SMOFLIPID 250 ML: 6; 6; 5; 3 INJECTION, EMULSION INTRAVENOUS at 09:08

## 2025-08-15 RX ADMIN — DEXAMETHASONE SODIUM PHOSPHATE 4 MG: 4 INJECTION, SOLUTION INTRAMUSCULAR; INTRAVENOUS at 12:08

## 2025-08-15 RX ADMIN — ROCURONIUM BROMIDE 10 MG: 10 INJECTION, SOLUTION INTRAVENOUS at 12:08

## 2025-08-15 RX ADMIN — MAGNESIUM SULFATE HEPTAHYDRATE: 500 INJECTION, SOLUTION INTRAMUSCULAR; INTRAVENOUS at 09:08

## 2025-08-15 RX ADMIN — SUCCINYLCHOLINE CHLORIDE 110 MG: 20 INJECTION, SOLUTION INTRAMUSCULAR; INTRAVENOUS; PARENTERAL at 12:08

## 2025-08-15 RX ADMIN — INSULIN ASPART 2 UNITS: 100 INJECTION, SOLUTION INTRAVENOUS; SUBCUTANEOUS at 03:08

## 2025-08-15 RX ADMIN — CEFIDEROCOL SULFATE TOSYLATE 2 G: 1 INJECTION, POWDER, FOR SOLUTION INTRAVENOUS at 02:08

## 2025-08-15 RX ADMIN — INSULIN ASPART 8 UNITS: 100 INJECTION, SOLUTION INTRAVENOUS; SUBCUTANEOUS at 04:08

## 2025-08-15 RX ADMIN — INSULIN ASPART 3 UNITS: 100 INJECTION, SOLUTION INTRAVENOUS; SUBCUTANEOUS at 07:08

## 2025-08-15 RX ADMIN — INSULIN ASPART 2 UNITS: 100 INJECTION, SOLUTION INTRAVENOUS; SUBCUTANEOUS at 04:08

## 2025-08-15 RX ADMIN — INSULIN ASPART 8 UNITS: 100 INJECTION, SOLUTION INTRAVENOUS; SUBCUTANEOUS at 07:08

## 2025-08-15 RX ADMIN — METOCLOPRAMIDE 10 MG: 5 INJECTION, SOLUTION INTRAMUSCULAR; INTRAVENOUS at 12:08

## 2025-08-15 RX ADMIN — SODIUM CHLORIDE: 0.9 INJECTION, SOLUTION INTRAVENOUS at 01:08

## 2025-08-16 LAB
ABSOLUTE EOSINOPHIL (OHS): 0 K/UL
ABSOLUTE MONOCYTE (OHS): 1.15 K/UL (ref 0.3–1)
ABSOLUTE NEUTROPHIL COUNT (OHS): 13.96 K/UL (ref 1.8–7.7)
ALBUMIN SERPL BCP-MCNC: 1.7 G/DL (ref 3.5–5.2)
ALP SERPL-CCNC: 613 UNIT/L (ref 40–150)
ALT SERPL W/O P-5'-P-CCNC: 14 UNIT/L (ref 0–55)
ANION GAP (OHS): 8 MMOL/L (ref 8–16)
AST SERPL-CCNC: 26 UNIT/L (ref 0–50)
BASOPHILS # BLD AUTO: 0.02 K/UL
BASOPHILS NFR BLD AUTO: 0.1 %
BILIRUB SERPL-MCNC: 0.2 MG/DL (ref 0.1–1)
BUN SERPL-MCNC: 26 MG/DL (ref 8–23)
CALCIUM SERPL-MCNC: 8.1 MG/DL (ref 8.7–10.5)
CHLORIDE SERPL-SCNC: 106 MMOL/L (ref 95–110)
CO2 SERPL-SCNC: 24 MMOL/L (ref 23–29)
CREAT SERPL-MCNC: 0.6 MG/DL (ref 0.5–1.4)
ERYTHROCYTE [DISTWIDTH] IN BLOOD BY AUTOMATED COUNT: 18 % (ref 11.5–14.5)
GFR SERPLBLD CREATININE-BSD FMLA CKD-EPI: >60 ML/MIN/1.73/M2
GLUCOSE SERPL-MCNC: 212 MG/DL (ref 70–110)
HCT VFR BLD AUTO: 25.5 % (ref 37–48.5)
HGB BLD-MCNC: 8.4 GM/DL (ref 12–16)
IMM GRANULOCYTES # BLD AUTO: 0.16 K/UL (ref 0–0.04)
IMM GRANULOCYTES NFR BLD AUTO: 1 % (ref 0–0.5)
LYMPHOCYTES # BLD AUTO: 0.36 K/UL (ref 1–4.8)
MAGNESIUM SERPL-MCNC: 1.9 MG/DL (ref 1.6–2.6)
MCH RBC QN AUTO: 30.2 PG (ref 27–31)
MCHC RBC AUTO-ENTMCNC: 32.9 G/DL (ref 32–36)
MCV RBC AUTO: 92 FL (ref 82–98)
NUCLEATED RBC (/100WBC) (OHS): 0 /100 WBC
PHOSPHATE SERPL-MCNC: 2.9 MG/DL (ref 2.7–4.5)
PLATELET # BLD AUTO: 477 K/UL (ref 150–450)
PMV BLD AUTO: 10.8 FL (ref 9.2–12.9)
POCT GLUCOSE: 121 MG/DL (ref 70–110)
POCT GLUCOSE: 124 MG/DL (ref 70–110)
POCT GLUCOSE: 130 MG/DL (ref 70–110)
POCT GLUCOSE: 161 MG/DL (ref 70–110)
POCT GLUCOSE: 177 MG/DL (ref 70–110)
POCT GLUCOSE: 220 MG/DL (ref 70–110)
POTASSIUM SERPL-SCNC: 4.5 MMOL/L (ref 3.5–5.1)
PROT SERPL-MCNC: 6.3 GM/DL (ref 6–8.4)
RBC # BLD AUTO: 2.78 M/UL (ref 4–5.4)
RELATIVE EOSINOPHIL (OHS): 0 %
RELATIVE LYMPHOCYTE (OHS): 2.3 % (ref 18–48)
RELATIVE MONOCYTE (OHS): 7.3 % (ref 4–15)
RELATIVE NEUTROPHIL (OHS): 89.3 % (ref 38–73)
SODIUM SERPL-SCNC: 138 MMOL/L (ref 136–145)
WBC # BLD AUTO: 15.65 K/UL (ref 3.9–12.7)

## 2025-08-16 PROCEDURE — 83735 ASSAY OF MAGNESIUM: CPT

## 2025-08-16 PROCEDURE — 63600175 PHARM REV CODE 636 W HCPCS: Mod: JZ,TB

## 2025-08-16 PROCEDURE — 63600175 PHARM REV CODE 636 W HCPCS

## 2025-08-16 PROCEDURE — B4185 PARENTERAL SOL 10 GM LIPIDS: HCPCS

## 2025-08-16 PROCEDURE — 25000003 PHARM REV CODE 250

## 2025-08-16 PROCEDURE — 27000207 HC ISOLATION

## 2025-08-16 PROCEDURE — 84100 ASSAY OF PHOSPHORUS: CPT

## 2025-08-16 PROCEDURE — 85025 COMPLETE CBC W/AUTO DIFF WBC: CPT

## 2025-08-16 PROCEDURE — A4217 STERILE WATER/SALINE, 500 ML: HCPCS

## 2025-08-16 PROCEDURE — 63600175 PHARM REV CODE 636 W HCPCS: Performed by: INTERNAL MEDICINE

## 2025-08-16 PROCEDURE — 20600001 HC STEP DOWN PRIVATE ROOM

## 2025-08-16 PROCEDURE — 80053 COMPREHEN METABOLIC PANEL: CPT

## 2025-08-16 RX ORDER — INSULIN ASPART 100 [IU]/ML
10 INJECTION, SOLUTION INTRAVENOUS; SUBCUTANEOUS
Status: DISCONTINUED | OUTPATIENT
Start: 2025-08-16 | End: 2025-08-16

## 2025-08-16 RX ORDER — INSULIN ASPART 100 [IU]/ML
8 INJECTION, SOLUTION INTRAVENOUS; SUBCUTANEOUS
Status: DISCONTINUED | OUTPATIENT
Start: 2025-08-16 | End: 2025-08-19

## 2025-08-16 RX ADMIN — INSULIN ASPART 2 UNITS: 100 INJECTION, SOLUTION INTRAVENOUS; SUBCUTANEOUS at 12:08

## 2025-08-16 RX ADMIN — INSULIN ASPART 8 UNITS: 100 INJECTION, SOLUTION INTRAVENOUS; SUBCUTANEOUS at 04:08

## 2025-08-16 RX ADMIN — INSULIN ASPART 4 UNITS: 100 INJECTION, SOLUTION INTRAVENOUS; SUBCUTANEOUS at 04:08

## 2025-08-16 RX ADMIN — SMOFLIPID 250 ML: 6; 6; 5; 3 INJECTION, EMULSION INTRAVENOUS at 09:08

## 2025-08-16 RX ADMIN — CEFIDEROCOL SULFATE TOSYLATE 2 G: 1 INJECTION, POWDER, FOR SOLUTION INTRAVENOUS at 08:08

## 2025-08-16 RX ADMIN — METRONIDAZOLE 500 MG: 500 INJECTION, SOLUTION INTRAVENOUS at 12:08

## 2025-08-16 RX ADMIN — OXYCODONE HYDROCHLORIDE 5 MG: 5 TABLET ORAL at 08:08

## 2025-08-16 RX ADMIN — LEVOTHYROXINE SODIUM 25 MCG: 20 INJECTION, SOLUTION INTRAVENOUS at 09:08

## 2025-08-16 RX ADMIN — INSULIN ASPART 2 UNITS: 100 INJECTION, SOLUTION INTRAVENOUS; SUBCUTANEOUS at 07:08

## 2025-08-16 RX ADMIN — PANTOPRAZOLE SODIUM 40 MG: 40 INJECTION, POWDER, LYOPHILIZED, FOR SOLUTION INTRAVENOUS at 09:08

## 2025-08-16 RX ADMIN — ENOXAPARIN SODIUM 40 MG: 40 INJECTION SUBCUTANEOUS at 05:08

## 2025-08-16 RX ADMIN — FLUCONAZOLE 400 MG: 2 INJECTION, SOLUTION INTRAVENOUS at 09:08

## 2025-08-16 RX ADMIN — METOCLOPRAMIDE 10 MG: 5 INJECTION, SOLUTION INTRAMUSCULAR; INTRAVENOUS at 12:08

## 2025-08-16 RX ADMIN — CEFIDEROCOL SULFATE TOSYLATE 2 G: 1 INJECTION, POWDER, FOR SOLUTION INTRAVENOUS at 12:08

## 2025-08-16 RX ADMIN — INSULIN ASPART 10 UNITS: 100 INJECTION, SOLUTION INTRAVENOUS; SUBCUTANEOUS at 12:08

## 2025-08-16 RX ADMIN — CEFIDEROCOL SULFATE TOSYLATE 2 G: 1 INJECTION, POWDER, FOR SOLUTION INTRAVENOUS at 01:08

## 2025-08-16 RX ADMIN — INSULIN ASPART 8 UNITS: 100 INJECTION, SOLUTION INTRAVENOUS; SUBCUTANEOUS at 07:08

## 2025-08-16 RX ADMIN — ONDANSETRON 4 MG: 2 INJECTION INTRAMUSCULAR; INTRAVENOUS at 06:08

## 2025-08-16 RX ADMIN — PROCHLORPERAZINE EDISYLATE 2.5 MG: 5 INJECTION INTRAMUSCULAR; INTRAVENOUS at 09:08

## 2025-08-16 RX ADMIN — MAGNESIUM SULFATE HEPTAHYDRATE: 500 INJECTION, SOLUTION INTRAMUSCULAR; INTRAVENOUS at 09:08

## 2025-08-16 RX ADMIN — OXYCODONE HYDROCHLORIDE 5 MG: 5 TABLET ORAL at 01:08

## 2025-08-16 RX ADMIN — PANTOPRAZOLE SODIUM 40 MG: 40 INJECTION, POWDER, LYOPHILIZED, FOR SOLUTION INTRAVENOUS at 08:08

## 2025-08-16 RX ADMIN — INSULIN ASPART 8 UNITS: 100 INJECTION, SOLUTION INTRAVENOUS; SUBCUTANEOUS at 12:08

## 2025-08-16 RX ADMIN — INSULIN ASPART 8 UNITS: 100 INJECTION, SOLUTION INTRAVENOUS; SUBCUTANEOUS at 08:08

## 2025-08-17 LAB
ABSOLUTE EOSINOPHIL (OHS): 0.15 K/UL
ABSOLUTE MONOCYTE (OHS): 1.4 K/UL (ref 0.3–1)
ABSOLUTE NEUTROPHIL COUNT (OHS): 11.45 K/UL (ref 1.8–7.7)
ALBUMIN SERPL BCP-MCNC: 1.7 G/DL (ref 3.5–5.2)
ALP SERPL-CCNC: 1066 UNIT/L (ref 40–150)
ALT SERPL W/O P-5'-P-CCNC: 44 UNIT/L (ref 0–55)
ANION GAP (OHS): 6 MMOL/L (ref 8–16)
AST SERPL-CCNC: 133 UNIT/L (ref 0–50)
BASOPHILS # BLD AUTO: 0.06 K/UL
BASOPHILS NFR BLD AUTO: 0.4 %
BILIRUB SERPL-MCNC: 0.3 MG/DL (ref 0.1–1)
BUN SERPL-MCNC: 24 MG/DL (ref 8–23)
CALCIUM SERPL-MCNC: 7.8 MG/DL (ref 8.7–10.5)
CHLORIDE SERPL-SCNC: 102 MMOL/L (ref 95–110)
CO2 SERPL-SCNC: 27 MMOL/L (ref 23–29)
CREAT SERPL-MCNC: 0.5 MG/DL (ref 0.5–1.4)
ERYTHROCYTE [DISTWIDTH] IN BLOOD BY AUTOMATED COUNT: 18.2 % (ref 11.5–14.5)
GFR SERPLBLD CREATININE-BSD FMLA CKD-EPI: >60 ML/MIN/1.73/M2
GLUCOSE SERPL-MCNC: 148 MG/DL (ref 70–110)
HCT VFR BLD AUTO: 25.3 % (ref 37–48.5)
HGB BLD-MCNC: 8.3 GM/DL (ref 12–16)
IMM GRANULOCYTES # BLD AUTO: 0.14 K/UL (ref 0–0.04)
IMM GRANULOCYTES NFR BLD AUTO: 1 % (ref 0–0.5)
LYMPHOCYTES # BLD AUTO: 0.56 K/UL (ref 1–4.8)
MAGNESIUM SERPL-MCNC: 1.7 MG/DL (ref 1.6–2.6)
MCH RBC QN AUTO: 30.6 PG (ref 27–31)
MCHC RBC AUTO-ENTMCNC: 32.8 G/DL (ref 32–36)
MCV RBC AUTO: 93 FL (ref 82–98)
NUCLEATED RBC (/100WBC) (OHS): 0 /100 WBC
PHOSPHATE SERPL-MCNC: 3 MG/DL (ref 2.7–4.5)
PLATELET # BLD AUTO: 453 K/UL (ref 150–450)
PMV BLD AUTO: 10.4 FL (ref 9.2–12.9)
POCT GLUCOSE: 140 MG/DL (ref 70–110)
POCT GLUCOSE: 148 MG/DL (ref 70–110)
POCT GLUCOSE: 154 MG/DL (ref 70–110)
POCT GLUCOSE: 154 MG/DL (ref 70–110)
POCT GLUCOSE: 199 MG/DL (ref 70–110)
POTASSIUM SERPL-SCNC: 4.7 MMOL/L (ref 3.5–5.1)
PROT SERPL-MCNC: 6 GM/DL (ref 6–8.4)
RBC # BLD AUTO: 2.71 M/UL (ref 4–5.4)
RELATIVE EOSINOPHIL (OHS): 1.1 %
RELATIVE LYMPHOCYTE (OHS): 4.1 % (ref 18–48)
RELATIVE MONOCYTE (OHS): 10.2 % (ref 4–15)
RELATIVE NEUTROPHIL (OHS): 83.2 % (ref 38–73)
SODIUM SERPL-SCNC: 135 MMOL/L (ref 136–145)
WBC # BLD AUTO: 13.76 K/UL (ref 3.9–12.7)

## 2025-08-17 PROCEDURE — 80053 COMPREHEN METABOLIC PANEL: CPT

## 2025-08-17 PROCEDURE — 63600175 PHARM REV CODE 636 W HCPCS: Performed by: INTERNAL MEDICINE

## 2025-08-17 PROCEDURE — 25000003 PHARM REV CODE 250

## 2025-08-17 PROCEDURE — 83735 ASSAY OF MAGNESIUM: CPT

## 2025-08-17 PROCEDURE — 85025 COMPLETE CBC W/AUTO DIFF WBC: CPT

## 2025-08-17 PROCEDURE — 63600175 PHARM REV CODE 636 W HCPCS

## 2025-08-17 PROCEDURE — 84100 ASSAY OF PHOSPHORUS: CPT

## 2025-08-17 PROCEDURE — B4185 PARENTERAL SOL 10 GM LIPIDS: HCPCS

## 2025-08-17 PROCEDURE — A4217 STERILE WATER/SALINE, 500 ML: HCPCS

## 2025-08-17 PROCEDURE — 20600001 HC STEP DOWN PRIVATE ROOM

## 2025-08-17 PROCEDURE — 27000207 HC ISOLATION

## 2025-08-17 RX ORDER — BISACODYL 10 MG/1
10 SUPPOSITORY RECTAL DAILY PRN
Status: DISCONTINUED | OUTPATIENT
Start: 2025-08-17 | End: 2025-08-22 | Stop reason: HOSPADM

## 2025-08-17 RX ADMIN — ONDANSETRON 4 MG: 2 INJECTION INTRAMUSCULAR; INTRAVENOUS at 07:08

## 2025-08-17 RX ADMIN — PROCHLORPERAZINE EDISYLATE 2.5 MG: 5 INJECTION INTRAMUSCULAR; INTRAVENOUS at 10:08

## 2025-08-17 RX ADMIN — ENOXAPARIN SODIUM 40 MG: 40 INJECTION SUBCUTANEOUS at 04:08

## 2025-08-17 RX ADMIN — INSULIN ASPART 2 UNITS: 100 INJECTION, SOLUTION INTRAVENOUS; SUBCUTANEOUS at 04:08

## 2025-08-17 RX ADMIN — POLYETHYLENE GLYCOL 3350 17 G: 17 POWDER, FOR SOLUTION ORAL at 10:08

## 2025-08-17 RX ADMIN — INSULIN ASPART 1 UNITS: 100 INJECTION, SOLUTION INTRAVENOUS; SUBCUTANEOUS at 08:08

## 2025-08-17 RX ADMIN — INSULIN ASPART 8 UNITS: 100 INJECTION, SOLUTION INTRAVENOUS; SUBCUTANEOUS at 12:08

## 2025-08-17 RX ADMIN — CEFIDEROCOL SULFATE TOSYLATE 2 G: 1 INJECTION, POWDER, FOR SOLUTION INTRAVENOUS at 09:08

## 2025-08-17 RX ADMIN — INSULIN ASPART 2 UNITS: 100 INJECTION, SOLUTION INTRAVENOUS; SUBCUTANEOUS at 11:08

## 2025-08-17 RX ADMIN — METRONIDAZOLE 500 MG: 500 INJECTION, SOLUTION INTRAVENOUS at 10:08

## 2025-08-17 RX ADMIN — LEVOTHYROXINE SODIUM 25 MCG: 20 INJECTION, SOLUTION INTRAVENOUS at 08:08

## 2025-08-17 RX ADMIN — INSULIN ASPART 8 UNITS: 100 INJECTION, SOLUTION INTRAVENOUS; SUBCUTANEOUS at 04:08

## 2025-08-17 RX ADMIN — CEFIDEROCOL SULFATE TOSYLATE 2 G: 1 INJECTION, POWDER, FOR SOLUTION INTRAVENOUS at 04:08

## 2025-08-17 RX ADMIN — FLUCONAZOLE 400 MG: 2 INJECTION, SOLUTION INTRAVENOUS at 08:08

## 2025-08-17 RX ADMIN — PROCHLORPERAZINE EDISYLATE 2.5 MG: 5 INJECTION INTRAMUSCULAR; INTRAVENOUS at 07:08

## 2025-08-17 RX ADMIN — METRONIDAZOLE 500 MG: 500 INJECTION, SOLUTION INTRAVENOUS at 12:08

## 2025-08-17 RX ADMIN — INSULIN ASPART 8 UNITS: 100 INJECTION, SOLUTION INTRAVENOUS; SUBCUTANEOUS at 08:08

## 2025-08-17 RX ADMIN — BISACODYL 10 MG: 10 SUPPOSITORY RECTAL at 01:08

## 2025-08-17 RX ADMIN — ONDANSETRON 4 MG: 2 INJECTION INTRAMUSCULAR; INTRAVENOUS at 05:08

## 2025-08-17 RX ADMIN — PANTOPRAZOLE SODIUM 40 MG: 40 INJECTION, POWDER, LYOPHILIZED, FOR SOLUTION INTRAVENOUS at 08:08

## 2025-08-17 RX ADMIN — CEFIDEROCOL SULFATE TOSYLATE 2 G: 1 INJECTION, POWDER, FOR SOLUTION INTRAVENOUS at 01:08

## 2025-08-17 RX ADMIN — MAGNESIUM SULFATE HEPTAHYDRATE: 500 INJECTION, SOLUTION INTRAMUSCULAR; INTRAVENOUS at 09:08

## 2025-08-17 RX ADMIN — INSULIN ASPART 8 UNITS: 100 INJECTION, SOLUTION INTRAVENOUS; SUBCUTANEOUS at 07:08

## 2025-08-17 RX ADMIN — INSULIN ASPART 8 UNITS: 100 INJECTION, SOLUTION INTRAVENOUS; SUBCUTANEOUS at 11:08

## 2025-08-17 RX ADMIN — OXYCODONE HYDROCHLORIDE 10 MG: 10 TABLET ORAL at 07:08

## 2025-08-17 RX ADMIN — SMOFLIPID 250 ML: 6; 6; 5; 3 INJECTION, EMULSION INTRAVENOUS at 09:08

## 2025-08-18 LAB
ABSOLUTE EOSINOPHIL (OHS): 0.24 K/UL
ABSOLUTE MONOCYTE (OHS): 1.16 K/UL (ref 0.3–1)
ABSOLUTE NEUTROPHIL COUNT (OHS): 9.25 K/UL (ref 1.8–7.7)
ALBUMIN SERPL BCP-MCNC: 1.7 G/DL (ref 3.5–5.2)
ALP SERPL-CCNC: 1078 UNIT/L (ref 40–150)
ALT SERPL W/O P-5'-P-CCNC: 32 UNIT/L (ref 0–55)
ANION GAP (OHS): 10 MMOL/L (ref 8–16)
AST SERPL-CCNC: 54 UNIT/L (ref 0–50)
BASOPHILS # BLD AUTO: 0.04 K/UL
BASOPHILS NFR BLD AUTO: 0.4 %
BILIRUB SERPL-MCNC: 0.2 MG/DL (ref 0.1–1)
BUN SERPL-MCNC: 24 MG/DL (ref 8–23)
CALCIUM SERPL-MCNC: 8.1 MG/DL (ref 8.7–10.5)
CHLORIDE SERPL-SCNC: 100 MMOL/L (ref 95–110)
CO2 SERPL-SCNC: 23 MMOL/L (ref 23–29)
CREAT SERPL-MCNC: 0.6 MG/DL (ref 0.5–1.4)
ERYTHROCYTE [DISTWIDTH] IN BLOOD BY AUTOMATED COUNT: 18.1 % (ref 11.5–14.5)
GFR SERPLBLD CREATININE-BSD FMLA CKD-EPI: >60 ML/MIN/1.73/M2
GLUCOSE SERPL-MCNC: 215 MG/DL (ref 70–110)
HCT VFR BLD AUTO: 24.6 % (ref 37–48.5)
HGB BLD-MCNC: 8.2 GM/DL (ref 12–16)
IMM GRANULOCYTES # BLD AUTO: 0.13 K/UL (ref 0–0.04)
IMM GRANULOCYTES NFR BLD AUTO: 1.2 % (ref 0–0.5)
LYMPHOCYTES # BLD AUTO: 0.46 K/UL (ref 1–4.8)
MAGNESIUM SERPL-MCNC: 1.7 MG/DL (ref 1.6–2.6)
MCH RBC QN AUTO: 30.5 PG (ref 27–31)
MCHC RBC AUTO-ENTMCNC: 33.3 G/DL (ref 32–36)
MCV RBC AUTO: 91 FL (ref 82–98)
NUCLEATED RBC (/100WBC) (OHS): 0 /100 WBC
PHOSPHATE SERPL-MCNC: 3.6 MG/DL (ref 2.7–4.5)
PLATELET # BLD AUTO: 439 K/UL (ref 150–450)
PMV BLD AUTO: 10.9 FL (ref 9.2–12.9)
POCT GLUCOSE: 147 MG/DL (ref 70–110)
POCT GLUCOSE: 160 MG/DL (ref 70–110)
POCT GLUCOSE: 184 MG/DL (ref 70–110)
POCT GLUCOSE: 194 MG/DL (ref 70–110)
POCT GLUCOSE: 196 MG/DL (ref 70–110)
POCT GLUCOSE: 237 MG/DL (ref 70–110)
POTASSIUM SERPL-SCNC: 4.6 MMOL/L (ref 3.5–5.1)
PROT SERPL-MCNC: 5.7 GM/DL (ref 6–8.4)
RBC # BLD AUTO: 2.69 M/UL (ref 4–5.4)
RELATIVE EOSINOPHIL (OHS): 2.1 %
RELATIVE LYMPHOCYTE (OHS): 4.1 % (ref 18–48)
RELATIVE MONOCYTE (OHS): 10.3 % (ref 4–15)
RELATIVE NEUTROPHIL (OHS): 81.9 % (ref 38–73)
SODIUM SERPL-SCNC: 133 MMOL/L (ref 136–145)
WBC # BLD AUTO: 11.28 K/UL (ref 3.9–12.7)

## 2025-08-18 PROCEDURE — 63600175 PHARM REV CODE 636 W HCPCS

## 2025-08-18 PROCEDURE — 85025 COMPLETE CBC W/AUTO DIFF WBC: CPT

## 2025-08-18 PROCEDURE — 25000242 PHARM REV CODE 250 ALT 637 W/ HCPCS

## 2025-08-18 PROCEDURE — 25000003 PHARM REV CODE 250

## 2025-08-18 PROCEDURE — 84100 ASSAY OF PHOSPHORUS: CPT

## 2025-08-18 PROCEDURE — B4185 PARENTERAL SOL 10 GM LIPIDS: HCPCS

## 2025-08-18 PROCEDURE — 27000207 HC ISOLATION

## 2025-08-18 PROCEDURE — G0545 PR VISIT INHERENT TO INPT OR OBS CARE, INFECTIOUS DISEASE: HCPCS | Mod: ,,, | Performed by: INTERNAL MEDICINE

## 2025-08-18 PROCEDURE — 36415 COLL VENOUS BLD VENIPUNCTURE: CPT

## 2025-08-18 PROCEDURE — 80053 COMPREHEN METABOLIC PANEL: CPT

## 2025-08-18 PROCEDURE — 63600175 PHARM REV CODE 636 W HCPCS: Performed by: INTERNAL MEDICINE

## 2025-08-18 PROCEDURE — 83735 ASSAY OF MAGNESIUM: CPT

## 2025-08-18 PROCEDURE — 20600001 HC STEP DOWN PRIVATE ROOM

## 2025-08-18 PROCEDURE — 94761 N-INVAS EAR/PLS OXIMETRY MLT: CPT

## 2025-08-18 PROCEDURE — 99233 SBSQ HOSP IP/OBS HIGH 50: CPT | Mod: ,,, | Performed by: INTERNAL MEDICINE

## 2025-08-18 RX ORDER — ONDANSETRON 8 MG/1
8 TABLET, ORALLY DISINTEGRATING ORAL EVERY 6 HOURS PRN
Status: DISCONTINUED | OUTPATIENT
Start: 2025-08-18 | End: 2025-08-22 | Stop reason: HOSPADM

## 2025-08-18 RX ORDER — LEVOFLOXACIN 25 MG/ML
750 SOLUTION ORAL DAILY
Qty: 900 ML | Refills: 0 | Status: SHIPPED | OUTPATIENT
Start: 2025-08-18 | End: 2025-09-21

## 2025-08-18 RX ORDER — OXYCODONE HYDROCHLORIDE 5 MG/1
5 TABLET ORAL EVERY 4 HOURS PRN
Refills: 0 | Status: DISCONTINUED | OUTPATIENT
Start: 2025-08-18 | End: 2025-08-18

## 2025-08-18 RX ORDER — FLUCONAZOLE 40 MG/ML
400 POWDER, FOR SUSPENSION ORAL DAILY
Qty: 315 ML | Refills: 0 | Status: SHIPPED | OUTPATIENT
Start: 2025-08-18 | End: 2025-09-17

## 2025-08-18 RX ORDER — ONDANSETRON HYDROCHLORIDE 2 MG/ML
4 INJECTION, SOLUTION INTRAVENOUS EVERY 6 HOURS PRN
Status: DISCONTINUED | OUTPATIENT
Start: 2025-08-18 | End: 2025-08-18

## 2025-08-18 RX ORDER — DEXTROSE MONOHYDRATE, SODIUM CHLORIDE, AND POTASSIUM CHLORIDE 50; 1.49; 4.5 G/1000ML; G/1000ML; G/1000ML
INJECTION, SOLUTION INTRAVENOUS CONTINUOUS
Status: DISCONTINUED | OUTPATIENT
Start: 2025-08-18 | End: 2025-08-19

## 2025-08-18 RX ORDER — OXYCODONE HCL 5 MG/5 ML
10 SOLUTION, ORAL ORAL EVERY 4 HOURS PRN
Refills: 0 | Status: DISCONTINUED | OUTPATIENT
Start: 2025-08-18 | End: 2025-08-19

## 2025-08-18 RX ORDER — ONDANSETRON HYDROCHLORIDE 2 MG/ML
4 INJECTION, SOLUTION INTRAVENOUS EVERY 12 HOURS PRN
Status: DISCONTINUED | OUTPATIENT
Start: 2025-08-18 | End: 2025-08-19

## 2025-08-18 RX ORDER — OXYCODONE HYDROCHLORIDE 10 MG/1
10 TABLET ORAL EVERY 4 HOURS PRN
Refills: 0 | Status: DISCONTINUED | OUTPATIENT
Start: 2025-08-18 | End: 2025-08-18

## 2025-08-18 RX ORDER — OXYCODONE HCL 5 MG/5 ML
5 SOLUTION, ORAL ORAL EVERY 4 HOURS PRN
Refills: 0 | Status: DISCONTINUED | OUTPATIENT
Start: 2025-08-18 | End: 2025-08-18

## 2025-08-18 RX ORDER — OXYCODONE HCL 5 MG/5 ML
5 SOLUTION, ORAL ORAL EVERY 4 HOURS PRN
Refills: 0 | Status: DISCONTINUED | OUTPATIENT
Start: 2025-08-18 | End: 2025-08-19

## 2025-08-18 RX ORDER — METRONIDAZOLE ORAL 100 MG/ML
500 SUSPENSION ORAL EVERY 12 HOURS
Qty: 300 ML | Refills: 0 | Status: SHIPPED | OUTPATIENT
Start: 2025-08-18 | End: 2025-08-21 | Stop reason: HOSPADM

## 2025-08-18 RX ADMIN — INSULIN ASPART 8 UNITS: 100 INJECTION, SOLUTION INTRAVENOUS; SUBCUTANEOUS at 12:08

## 2025-08-18 RX ADMIN — INSULIN ASPART 2 UNITS: 100 INJECTION, SOLUTION INTRAVENOUS; SUBCUTANEOUS at 04:08

## 2025-08-18 RX ADMIN — ONDANSETRON 4 MG: 2 INJECTION INTRAMUSCULAR; INTRAVENOUS at 07:08

## 2025-08-18 RX ADMIN — ENOXAPARIN SODIUM 40 MG: 40 INJECTION SUBCUTANEOUS at 04:08

## 2025-08-18 RX ADMIN — METRONIDAZOLE 500 MG: 500 INJECTION, SOLUTION INTRAVENOUS at 11:08

## 2025-08-18 RX ADMIN — FLUCONAZOLE 400 MG: 2 INJECTION, SOLUTION INTRAVENOUS at 10:08

## 2025-08-18 RX ADMIN — INSULIN ASPART 8 UNITS: 100 INJECTION, SOLUTION INTRAVENOUS; SUBCUTANEOUS at 08:08

## 2025-08-18 RX ADMIN — LEVOTHYROXINE SODIUM 25 MCG: 20 INJECTION, SOLUTION INTRAVENOUS at 09:08

## 2025-08-18 RX ADMIN — PANTOPRAZOLE SODIUM 40 MG: 40 INJECTION, POWDER, LYOPHILIZED, FOR SOLUTION INTRAVENOUS at 08:08

## 2025-08-18 RX ADMIN — PANTOPRAZOLE SODIUM 40 MG: 40 INJECTION, POWDER, LYOPHILIZED, FOR SOLUTION INTRAVENOUS at 09:08

## 2025-08-18 RX ADMIN — INSULIN ASPART 8 UNITS: 100 INJECTION, SOLUTION INTRAVENOUS; SUBCUTANEOUS at 04:08

## 2025-08-18 RX ADMIN — METRONIDAZOLE 500 MG: 500 INJECTION, SOLUTION INTRAVENOUS at 12:08

## 2025-08-18 RX ADMIN — INSULIN ASPART 4 UNITS: 100 INJECTION, SOLUTION INTRAVENOUS; SUBCUTANEOUS at 05:08

## 2025-08-18 RX ADMIN — INSULIN ASPART 2 UNITS: 100 INJECTION, SOLUTION INTRAVENOUS; SUBCUTANEOUS at 08:08

## 2025-08-18 RX ADMIN — PROCHLORPERAZINE EDISYLATE 2.5 MG: 5 INJECTION INTRAMUSCULAR; INTRAVENOUS at 10:08

## 2025-08-18 RX ADMIN — CEFIDEROCOL SULFATE TOSYLATE 2 G: 1 INJECTION, POWDER, FOR SOLUTION INTRAVENOUS at 05:08

## 2025-08-18 RX ADMIN — SMOFLIPID 250 ML: 6; 6; 5; 3 INJECTION, EMULSION INTRAVENOUS at 09:08

## 2025-08-18 RX ADMIN — POTASSIUM CHLORIDE, DEXTROSE MONOHYDRATE AND SODIUM CHLORIDE: 150; 5; 450 INJECTION, SOLUTION INTRAVENOUS at 11:08

## 2025-08-18 RX ADMIN — INSULIN ASPART 2 UNITS: 100 INJECTION, SOLUTION INTRAVENOUS; SUBCUTANEOUS at 12:08

## 2025-08-18 RX ADMIN — HYDROMORPHONE HYDROCHLORIDE 0.5 MG: 1 INJECTION, SOLUTION INTRAMUSCULAR; INTRAVENOUS; SUBCUTANEOUS at 12:08

## 2025-08-18 RX ADMIN — CEFIDEROCOL SULFATE TOSYLATE 2 G: 1 INJECTION, POWDER, FOR SOLUTION INTRAVENOUS at 08:08

## 2025-08-18 RX ADMIN — INSULIN ASPART 8 UNITS: 100 INJECTION, SOLUTION INTRAVENOUS; SUBCUTANEOUS at 05:08

## 2025-08-18 RX ADMIN — CEFIDEROCOL SULFATE TOSYLATE 2 G: 1 INJECTION, POWDER, FOR SOLUTION INTRAVENOUS at 12:08

## 2025-08-18 RX ADMIN — OXYCODONE HYDROCHLORIDE 10 MG: 10 TABLET ORAL at 08:08

## 2025-08-18 RX ADMIN — OXYCODONE HYDROCHLORIDE 10 MG: 5 SOLUTION ORAL at 09:08

## 2025-08-19 LAB
ABSOLUTE EOSINOPHIL (OHS): 0.21 K/UL
ABSOLUTE MONOCYTE (OHS): 1.52 K/UL (ref 0.3–1)
ABSOLUTE NEUTROPHIL COUNT (OHS): 9.56 K/UL (ref 1.8–7.7)
ALBUMIN SERPL BCP-MCNC: 1.8 G/DL (ref 3.5–5.2)
ALP SERPL-CCNC: 1078 UNIT/L (ref 40–150)
ALT SERPL W/O P-5'-P-CCNC: 26 UNIT/L (ref 0–55)
ANION GAP (OHS): 8 MMOL/L (ref 8–16)
AST SERPL-CCNC: 46 UNIT/L (ref 0–50)
BASOPHILS # BLD AUTO: 0.08 K/UL
BASOPHILS NFR BLD AUTO: 0.7 %
BILIRUB SERPL-MCNC: 0.3 MG/DL (ref 0.1–1)
BUN SERPL-MCNC: 20 MG/DL (ref 8–23)
CALCIUM SERPL-MCNC: 8.4 MG/DL (ref 8.7–10.5)
CHLORIDE SERPL-SCNC: 100 MMOL/L (ref 95–110)
CO2 SERPL-SCNC: 26 MMOL/L (ref 23–29)
CREAT SERPL-MCNC: 0.5 MG/DL (ref 0.5–1.4)
ERYTHROCYTE [DISTWIDTH] IN BLOOD BY AUTOMATED COUNT: 18.1 % (ref 11.5–14.5)
FUNGUS SPEC CULT: ABNORMAL
FUNGUS SPEC CULT: NORMAL
FUNGUS SPEC CULT: NORMAL
GFR SERPLBLD CREATININE-BSD FMLA CKD-EPI: >60 ML/MIN/1.73/M2
GLUCOSE SERPL-MCNC: 113 MG/DL (ref 70–110)
HCT VFR BLD AUTO: 25.9 % (ref 37–48.5)
HGB BLD-MCNC: 8.5 GM/DL (ref 12–16)
IMM GRANULOCYTES # BLD AUTO: 0.13 K/UL (ref 0–0.04)
IMM GRANULOCYTES NFR BLD AUTO: 1.1 % (ref 0–0.5)
LYMPHOCYTES # BLD AUTO: 0.54 K/UL (ref 1–4.8)
MAGNESIUM SERPL-MCNC: 1.6 MG/DL (ref 1.6–2.6)
MCH RBC QN AUTO: 30.4 PG (ref 27–31)
MCHC RBC AUTO-ENTMCNC: 32.8 G/DL (ref 32–36)
MCV RBC AUTO: 93 FL (ref 82–98)
NUCLEATED RBC (/100WBC) (OHS): 0 /100 WBC
PHOSPHATE SERPL-MCNC: 2.5 MG/DL (ref 2.7–4.5)
PLATELET # BLD AUTO: 477 K/UL (ref 150–450)
PMV BLD AUTO: 10.5 FL (ref 9.2–12.9)
POCT GLUCOSE: 123 MG/DL (ref 70–110)
POCT GLUCOSE: 155 MG/DL (ref 70–110)
POCT GLUCOSE: 156 MG/DL (ref 70–110)
POCT GLUCOSE: 160 MG/DL (ref 70–110)
POCT GLUCOSE: 171 MG/DL (ref 70–110)
POCT GLUCOSE: 93 MG/DL (ref 70–110)
POTASSIUM SERPL-SCNC: 4.1 MMOL/L (ref 3.5–5.1)
PROT SERPL-MCNC: 6.2 GM/DL (ref 6–8.4)
RBC # BLD AUTO: 2.8 M/UL (ref 4–5.4)
RELATIVE EOSINOPHIL (OHS): 1.7 %
RELATIVE LYMPHOCYTE (OHS): 4.5 % (ref 18–48)
RELATIVE MONOCYTE (OHS): 12.6 % (ref 4–15)
RELATIVE NEUTROPHIL (OHS): 79.4 % (ref 38–73)
SODIUM SERPL-SCNC: 134 MMOL/L (ref 136–145)
WBC # BLD AUTO: 12.04 K/UL (ref 3.9–12.7)

## 2025-08-19 PROCEDURE — 63600175 PHARM REV CODE 636 W HCPCS

## 2025-08-19 PROCEDURE — 25000242 PHARM REV CODE 250 ALT 637 W/ HCPCS

## 2025-08-19 PROCEDURE — 84100 ASSAY OF PHOSPHORUS: CPT

## 2025-08-19 PROCEDURE — 80053 COMPREHEN METABOLIC PANEL: CPT

## 2025-08-19 PROCEDURE — 99232 SBSQ HOSP IP/OBS MODERATE 35: CPT | Mod: ,,, | Performed by: NURSE PRACTITIONER

## 2025-08-19 PROCEDURE — 20600001 HC STEP DOWN PRIVATE ROOM

## 2025-08-19 PROCEDURE — 27000207 HC ISOLATION

## 2025-08-19 PROCEDURE — 25000003 PHARM REV CODE 250

## 2025-08-19 PROCEDURE — 63600175 PHARM REV CODE 636 W HCPCS: Mod: JZ,TB

## 2025-08-19 PROCEDURE — 25000003 PHARM REV CODE 250: Performed by: SURGERY

## 2025-08-19 PROCEDURE — 63600175 PHARM REV CODE 636 W HCPCS: Performed by: INTERNAL MEDICINE

## 2025-08-19 PROCEDURE — 83735 ASSAY OF MAGNESIUM: CPT

## 2025-08-19 PROCEDURE — 85025 COMPLETE CBC W/AUTO DIFF WBC: CPT

## 2025-08-19 RX ORDER — PANTOPRAZOLE SODIUM 40 MG/1
40 TABLET, DELAYED RELEASE ORAL DAILY
Status: DISCONTINUED | OUTPATIENT
Start: 2025-08-19 | End: 2025-08-21

## 2025-08-19 RX ORDER — OXYCODONE HCL 5 MG/5 ML
2.5 SOLUTION, ORAL ORAL EVERY 4 HOURS PRN
Status: DISCONTINUED | OUTPATIENT
Start: 2025-08-19 | End: 2025-08-22 | Stop reason: HOSPADM

## 2025-08-19 RX ORDER — OXYCODONE HCL 5 MG/5 ML
5 SOLUTION, ORAL ORAL EVERY 4 HOURS PRN
Status: DISCONTINUED | OUTPATIENT
Start: 2025-08-19 | End: 2025-08-22 | Stop reason: HOSPADM

## 2025-08-19 RX ORDER — INSULIN ASPART 100 [IU]/ML
4 INJECTION, SOLUTION INTRAVENOUS; SUBCUTANEOUS
Status: DISCONTINUED | OUTPATIENT
Start: 2025-08-19 | End: 2025-08-21

## 2025-08-19 RX ADMIN — OXYCODONE HYDROCHLORIDE 10 MG: 5 SOLUTION ORAL at 07:08

## 2025-08-19 RX ADMIN — METRONIDAZOLE 500 MG: 500 INJECTION, SOLUTION INTRAVENOUS at 12:08

## 2025-08-19 RX ADMIN — PANTOPRAZOLE SODIUM 40 MG: 40 INJECTION, POWDER, LYOPHILIZED, FOR SOLUTION INTRAVENOUS at 08:08

## 2025-08-19 RX ADMIN — OXYCODONE HYDROCHLORIDE 5 MG: 5 SOLUTION ORAL at 10:08

## 2025-08-19 RX ADMIN — CEFIDEROCOL SULFATE TOSYLATE 2 G: 1 INJECTION, POWDER, FOR SOLUTION INTRAVENOUS at 08:08

## 2025-08-19 RX ADMIN — ONDANSETRON 8 MG: 8 TABLET, ORALLY DISINTEGRATING ORAL at 10:08

## 2025-08-19 RX ADMIN — CEFIDEROCOL SULFATE TOSYLATE 2 G: 1 INJECTION, POWDER, FOR SOLUTION INTRAVENOUS at 12:08

## 2025-08-19 RX ADMIN — INSULIN ASPART 2 UNITS: 100 INJECTION, SOLUTION INTRAVENOUS; SUBCUTANEOUS at 08:08

## 2025-08-19 RX ADMIN — METRONIDAZOLE 500 MG: 500 INJECTION, SOLUTION INTRAVENOUS at 10:08

## 2025-08-19 RX ADMIN — OXYCODONE HYDROCHLORIDE 5 MG: 5 SOLUTION ORAL at 05:08

## 2025-08-19 RX ADMIN — CEFIDEROCOL SULFATE TOSYLATE 2 G: 1 INJECTION, POWDER, FOR SOLUTION INTRAVENOUS at 04:08

## 2025-08-19 RX ADMIN — INSULIN ASPART 8 UNITS: 100 INJECTION, SOLUTION INTRAVENOUS; SUBCUTANEOUS at 12:08

## 2025-08-19 RX ADMIN — LEVOTHYROXINE SODIUM 25 MCG: 20 INJECTION, SOLUTION INTRAVENOUS at 08:08

## 2025-08-19 RX ADMIN — INSULIN ASPART 1 UNITS: 100 INJECTION, SOLUTION INTRAVENOUS; SUBCUTANEOUS at 12:08

## 2025-08-19 RX ADMIN — INSULIN ASPART 2 UNITS: 100 INJECTION, SOLUTION INTRAVENOUS; SUBCUTANEOUS at 04:08

## 2025-08-19 RX ADMIN — INSULIN ASPART 4 UNITS: 100 INJECTION, SOLUTION INTRAVENOUS; SUBCUTANEOUS at 04:08

## 2025-08-19 RX ADMIN — INSULIN ASPART 1 UNITS: 100 INJECTION, SOLUTION INTRAVENOUS; SUBCUTANEOUS at 08:08

## 2025-08-19 RX ADMIN — INSULIN ASPART 8 UNITS: 100 INJECTION, SOLUTION INTRAVENOUS; SUBCUTANEOUS at 04:08

## 2025-08-19 RX ADMIN — FLUCONAZOLE 400 MG: 2 INJECTION, SOLUTION INTRAVENOUS at 08:08

## 2025-08-19 RX ADMIN — INSULIN ASPART 8 UNITS: 100 INJECTION, SOLUTION INTRAVENOUS; SUBCUTANEOUS at 08:08

## 2025-08-19 RX ADMIN — INSULIN ASPART 4 UNITS: 100 INJECTION, SOLUTION INTRAVENOUS; SUBCUTANEOUS at 08:08

## 2025-08-19 RX ADMIN — ENOXAPARIN SODIUM 40 MG: 40 INJECTION SUBCUTANEOUS at 05:08

## 2025-08-19 RX ADMIN — OXYCODONE HYDROCHLORIDE 5 MG: 5 SOLUTION ORAL at 12:08

## 2025-08-20 LAB
ABSOLUTE EOSINOPHIL (OHS): 0.22 K/UL
ABSOLUTE MONOCYTE (OHS): 1.58 K/UL (ref 0.3–1)
ABSOLUTE NEUTROPHIL COUNT (OHS): 9.88 K/UL (ref 1.8–7.7)
ALBUMIN SERPL BCP-MCNC: 1.8 G/DL (ref 3.5–5.2)
ALP SERPL-CCNC: 1105 UNIT/L (ref 40–150)
ALT SERPL W/O P-5'-P-CCNC: 29 UNIT/L (ref 0–55)
ANION GAP (OHS): 9 MMOL/L (ref 8–16)
AST SERPL-CCNC: 55 UNIT/L (ref 0–50)
BASOPHILS # BLD AUTO: 0.07 K/UL
BASOPHILS NFR BLD AUTO: 0.6 %
BILIRUB SERPL-MCNC: 0.3 MG/DL (ref 0.1–1)
BUN SERPL-MCNC: 14 MG/DL (ref 8–23)
CALCIUM SERPL-MCNC: 8.8 MG/DL (ref 8.7–10.5)
CHLORIDE SERPL-SCNC: 99 MMOL/L (ref 95–110)
CO2 SERPL-SCNC: 26 MMOL/L (ref 23–29)
CREAT SERPL-MCNC: 0.6 MG/DL (ref 0.5–1.4)
ERYTHROCYTE [DISTWIDTH] IN BLOOD BY AUTOMATED COUNT: 18 % (ref 11.5–14.5)
GFR SERPLBLD CREATININE-BSD FMLA CKD-EPI: >60 ML/MIN/1.73/M2
GLUCOSE SERPL-MCNC: 115 MG/DL (ref 70–110)
HCT VFR BLD AUTO: 26.8 % (ref 37–48.5)
HGB BLD-MCNC: 8.6 GM/DL (ref 12–16)
IMM GRANULOCYTES # BLD AUTO: 0.17 K/UL (ref 0–0.04)
IMM GRANULOCYTES NFR BLD AUTO: 1.3 % (ref 0–0.5)
LYMPHOCYTES # BLD AUTO: 0.7 K/UL (ref 1–4.8)
MAGNESIUM SERPL-MCNC: 1.4 MG/DL (ref 1.6–2.6)
MCH RBC QN AUTO: 30 PG (ref 27–31)
MCHC RBC AUTO-ENTMCNC: 32.1 G/DL (ref 32–36)
MCV RBC AUTO: 93 FL (ref 82–98)
NUCLEATED RBC (/100WBC) (OHS): 0 /100 WBC
PHOSPHATE SERPL-MCNC: 2.7 MG/DL (ref 2.7–4.5)
PLATELET # BLD AUTO: 504 K/UL (ref 150–450)
PMV BLD AUTO: 10.8 FL (ref 9.2–12.9)
POCT GLUCOSE: 111 MG/DL (ref 70–110)
POCT GLUCOSE: 112 MG/DL (ref 70–110)
POCT GLUCOSE: 118 MG/DL (ref 70–110)
POCT GLUCOSE: 170 MG/DL (ref 70–110)
POCT GLUCOSE: 179 MG/DL (ref 70–110)
POCT GLUCOSE: 94 MG/DL (ref 70–110)
POTASSIUM SERPL-SCNC: 3.8 MMOL/L (ref 3.5–5.1)
PROT SERPL-MCNC: 6.5 GM/DL (ref 6–8.4)
RBC # BLD AUTO: 2.87 M/UL (ref 4–5.4)
RELATIVE EOSINOPHIL (OHS): 1.7 %
RELATIVE LYMPHOCYTE (OHS): 5.5 % (ref 18–48)
RELATIVE MONOCYTE (OHS): 12.5 % (ref 4–15)
RELATIVE NEUTROPHIL (OHS): 78.4 % (ref 38–73)
SODIUM SERPL-SCNC: 134 MMOL/L (ref 136–145)
TRIGL SERPL-MCNC: 69 MG/DL (ref 30–150)
WBC # BLD AUTO: 12.62 K/UL (ref 3.9–12.7)

## 2025-08-20 PROCEDURE — 25000003 PHARM REV CODE 250

## 2025-08-20 PROCEDURE — 20600001 HC STEP DOWN PRIVATE ROOM

## 2025-08-20 PROCEDURE — 82040 ASSAY OF SERUM ALBUMIN: CPT

## 2025-08-20 PROCEDURE — 84478 ASSAY OF TRIGLYCERIDES: CPT

## 2025-08-20 PROCEDURE — 25000242 PHARM REV CODE 250 ALT 637 W/ HCPCS

## 2025-08-20 PROCEDURE — 25000003 PHARM REV CODE 250: Performed by: INTERNAL MEDICINE

## 2025-08-20 PROCEDURE — 63600175 PHARM REV CODE 636 W HCPCS

## 2025-08-20 PROCEDURE — 63600175 PHARM REV CODE 636 W HCPCS: Mod: JZ,TB

## 2025-08-20 PROCEDURE — 83735 ASSAY OF MAGNESIUM: CPT

## 2025-08-20 PROCEDURE — 85025 COMPLETE CBC W/AUTO DIFF WBC: CPT

## 2025-08-20 PROCEDURE — 27000207 HC ISOLATION

## 2025-08-20 PROCEDURE — 63600175 PHARM REV CODE 636 W HCPCS: Performed by: INTERNAL MEDICINE

## 2025-08-20 PROCEDURE — 84100 ASSAY OF PHOSPHORUS: CPT

## 2025-08-20 RX ORDER — POTASSIUM CHLORIDE 7.45 MG/ML
10 INJECTION INTRAVENOUS
Status: COMPLETED | OUTPATIENT
Start: 2025-08-20 | End: 2025-08-20

## 2025-08-20 RX ORDER — LEVOFLOXACIN 25 MG/ML
750 SOLUTION ORAL DAILY
Status: DISCONTINUED | OUTPATIENT
Start: 2025-08-20 | End: 2025-08-22 | Stop reason: HOSPADM

## 2025-08-20 RX ADMIN — LEVOFLOXACIN 750 MG: 25 SOLUTION ORAL at 05:08

## 2025-08-20 RX ADMIN — INSULIN ASPART 2 UNITS: 100 INJECTION, SOLUTION INTRAVENOUS; SUBCUTANEOUS at 05:08

## 2025-08-20 RX ADMIN — PROCHLORPERAZINE EDISYLATE 2.5 MG: 5 INJECTION INTRAMUSCULAR; INTRAVENOUS at 12:08

## 2025-08-20 RX ADMIN — FLUCONAZOLE 400 MG: 2 INJECTION, SOLUTION INTRAVENOUS at 08:08

## 2025-08-20 RX ADMIN — ENOXAPARIN SODIUM 40 MG: 40 INJECTION SUBCUTANEOUS at 05:08

## 2025-08-20 RX ADMIN — INSULIN ASPART 4 UNITS: 100 INJECTION, SOLUTION INTRAVENOUS; SUBCUTANEOUS at 12:08

## 2025-08-20 RX ADMIN — CEFIDEROCOL SULFATE TOSYLATE 2 G: 1 INJECTION, POWDER, FOR SOLUTION INTRAVENOUS at 04:08

## 2025-08-20 RX ADMIN — INSULIN ASPART 2 UNITS: 100 INJECTION, SOLUTION INTRAVENOUS; SUBCUTANEOUS at 08:08

## 2025-08-20 RX ADMIN — INSULIN ASPART 4 UNITS: 100 INJECTION, SOLUTION INTRAVENOUS; SUBCUTANEOUS at 08:08

## 2025-08-20 RX ADMIN — OXYCODONE HYDROCHLORIDE 5 MG: 5 SOLUTION ORAL at 12:08

## 2025-08-20 RX ADMIN — INSULIN ASPART 4 UNITS: 100 INJECTION, SOLUTION INTRAVENOUS; SUBCUTANEOUS at 05:08

## 2025-08-20 RX ADMIN — OXYCODONE HYDROCHLORIDE 5 MG: 5 SOLUTION ORAL at 09:08

## 2025-08-20 RX ADMIN — POTASSIUM CHLORIDE 10 MEQ: 7.46 INJECTION, SOLUTION INTRAVENOUS at 08:08

## 2025-08-20 RX ADMIN — OXYCODONE HYDROCHLORIDE 5 MG: 5 SOLUTION ORAL at 05:08

## 2025-08-20 RX ADMIN — METRONIDAZOLE 500 MG: 500 INJECTION, SOLUTION INTRAVENOUS at 12:08

## 2025-08-20 RX ADMIN — OXYCODONE HYDROCHLORIDE 5 MG: 5 SOLUTION ORAL at 08:08

## 2025-08-20 RX ADMIN — LEVOTHYROXINE SODIUM 25 MCG: 20 INJECTION, SOLUTION INTRAVENOUS at 08:08

## 2025-08-20 RX ADMIN — POTASSIUM CHLORIDE 10 MEQ: 7.46 INJECTION, SOLUTION INTRAVENOUS at 06:08

## 2025-08-21 LAB
ABSOLUTE EOSINOPHIL (OHS): 0.09 K/UL
ABSOLUTE MONOCYTE (OHS): 1.55 K/UL (ref 0.3–1)
ABSOLUTE NEUTROPHIL COUNT (OHS): 8.57 K/UL (ref 1.8–7.7)
ALBUMIN SERPL BCP-MCNC: 1.8 G/DL (ref 3.5–5.2)
ALP SERPL-CCNC: 863 UNIT/L (ref 40–150)
ALT SERPL W/O P-5'-P-CCNC: 20 UNIT/L (ref 0–55)
ANION GAP (OHS): 10 MMOL/L (ref 8–16)
AST SERPL-CCNC: 38 UNIT/L (ref 0–50)
BASOPHILS # BLD AUTO: 0.05 K/UL
BASOPHILS NFR BLD AUTO: 0.5 %
BILIRUB SERPL-MCNC: 0.2 MG/DL (ref 0.1–1)
BUN SERPL-MCNC: 11 MG/DL (ref 8–23)
CALCIUM SERPL-MCNC: 8.1 MG/DL (ref 8.7–10.5)
CHLORIDE SERPL-SCNC: 98 MMOL/L (ref 95–110)
CO2 SERPL-SCNC: 26 MMOL/L (ref 23–29)
CREAT SERPL-MCNC: 0.6 MG/DL (ref 0.5–1.4)
ERYTHROCYTE [DISTWIDTH] IN BLOOD BY AUTOMATED COUNT: 17.6 % (ref 11.5–14.5)
GFR SERPLBLD CREATININE-BSD FMLA CKD-EPI: >60 ML/MIN/1.73/M2
GLUCOSE SERPL-MCNC: 188 MG/DL (ref 70–110)
HCT VFR BLD AUTO: 25.4 % (ref 37–48.5)
HGB BLD-MCNC: 8.1 GM/DL (ref 12–16)
IMM GRANULOCYTES # BLD AUTO: 0.11 K/UL (ref 0–0.04)
IMM GRANULOCYTES NFR BLD AUTO: 1 % (ref 0–0.5)
LYMPHOCYTES # BLD AUTO: 0.47 K/UL (ref 1–4.8)
MAGNESIUM SERPL-MCNC: 1.4 MG/DL (ref 1.6–2.6)
MCH RBC QN AUTO: 29.9 PG (ref 27–31)
MCHC RBC AUTO-ENTMCNC: 31.9 G/DL (ref 32–36)
MCV RBC AUTO: 94 FL (ref 82–98)
NUCLEATED RBC (/100WBC) (OHS): 0 /100 WBC
PHOSPHATE SERPL-MCNC: 2.3 MG/DL (ref 2.7–4.5)
PLATELET # BLD AUTO: 478 K/UL (ref 150–450)
PMV BLD AUTO: 10.4 FL (ref 9.2–12.9)
POCT GLUCOSE: 149 MG/DL (ref 70–110)
POCT GLUCOSE: 153 MG/DL (ref 70–110)
POCT GLUCOSE: 159 MG/DL (ref 70–110)
POCT GLUCOSE: 170 MG/DL (ref 70–110)
POCT GLUCOSE: 178 MG/DL (ref 70–110)
POCT GLUCOSE: 183 MG/DL (ref 70–110)
POCT GLUCOSE: 208 MG/DL (ref 70–110)
POTASSIUM SERPL-SCNC: 3.9 MMOL/L (ref 3.5–5.1)
PROT SERPL-MCNC: 5.9 GM/DL (ref 6–8.4)
RBC # BLD AUTO: 2.71 M/UL (ref 4–5.4)
RELATIVE EOSINOPHIL (OHS): 0.8 %
RELATIVE LYMPHOCYTE (OHS): 4.3 % (ref 18–48)
RELATIVE MONOCYTE (OHS): 14.3 % (ref 4–15)
RELATIVE NEUTROPHIL (OHS): 79.1 % (ref 38–73)
SODIUM SERPL-SCNC: 134 MMOL/L (ref 136–145)
WBC # BLD AUTO: 10.84 K/UL (ref 3.9–12.7)

## 2025-08-21 PROCEDURE — 27000207 HC ISOLATION

## 2025-08-21 PROCEDURE — 85025 COMPLETE CBC W/AUTO DIFF WBC: CPT

## 2025-08-21 PROCEDURE — 80053 COMPREHEN METABOLIC PANEL: CPT

## 2025-08-21 PROCEDURE — 63600175 PHARM REV CODE 636 W HCPCS

## 2025-08-21 PROCEDURE — 84100 ASSAY OF PHOSPHORUS: CPT

## 2025-08-21 PROCEDURE — 83735 ASSAY OF MAGNESIUM: CPT

## 2025-08-21 PROCEDURE — 20600001 HC STEP DOWN PRIVATE ROOM

## 2025-08-21 PROCEDURE — 25000003 PHARM REV CODE 250: Performed by: INTERNAL MEDICINE

## 2025-08-21 PROCEDURE — 63600175 PHARM REV CODE 636 W HCPCS: Performed by: INTERNAL MEDICINE

## 2025-08-21 PROCEDURE — 25000003 PHARM REV CODE 250

## 2025-08-21 PROCEDURE — 99232 SBSQ HOSP IP/OBS MODERATE 35: CPT | Mod: ,,, | Performed by: NURSE PRACTITIONER

## 2025-08-21 PROCEDURE — 25000242 PHARM REV CODE 250 ALT 637 W/ HCPCS

## 2025-08-21 RX ORDER — ESCITALOPRAM OXALATE 5 MG/5ML
10 SOLUTION ORAL DAILY
Qty: 300 ML | Refills: 0 | Status: SHIPPED | OUTPATIENT
Start: 2025-08-21 | End: 2025-09-21

## 2025-08-21 RX ORDER — INSULIN ASPART 100 [IU]/ML
5 INJECTION, SOLUTION INTRAVENOUS; SUBCUTANEOUS
Status: DISCONTINUED | OUTPATIENT
Start: 2025-08-21 | End: 2025-08-22 | Stop reason: HOSPADM

## 2025-08-21 RX ORDER — PANTOPRAZOLE SODIUM 40 MG/10ML
40 INJECTION, POWDER, LYOPHILIZED, FOR SOLUTION INTRAVENOUS DAILY
Status: DISCONTINUED | OUTPATIENT
Start: 2025-08-21 | End: 2025-08-21

## 2025-08-21 RX ORDER — OXYCODONE HCL 5 MG/5 ML
5 SOLUTION, ORAL ORAL EVERY 6 HOURS PRN
Qty: 15 EACH | Refills: 0 | Status: SHIPPED | OUTPATIENT
Start: 2025-08-21 | End: 2025-08-21

## 2025-08-21 RX ORDER — OXYCODONE HCL 5 MG/5 ML
5 SOLUTION, ORAL ORAL EVERY 6 HOURS PRN
Qty: 75 ML | Refills: 0 | Status: SHIPPED | OUTPATIENT
Start: 2025-08-21 | End: 2025-08-26 | Stop reason: SDUPTHER

## 2025-08-21 RX ORDER — MAGNESIUM SULFATE HEPTAHYDRATE 40 MG/ML
2 INJECTION, SOLUTION INTRAVENOUS ONCE
Status: COMPLETED | OUTPATIENT
Start: 2025-08-21 | End: 2025-08-21

## 2025-08-21 RX ORDER — ONDANSETRON 8 MG/1
8 TABLET, ORALLY DISINTEGRATING ORAL EVERY 6 HOURS PRN
Qty: 20 TABLET | Refills: 0 | Status: SHIPPED | OUTPATIENT
Start: 2025-08-21 | End: 2025-08-22 | Stop reason: HOSPADM

## 2025-08-21 RX ORDER — FAMOTIDINE 40 MG/5ML
20 POWDER, FOR SUSPENSION ORAL 2 TIMES DAILY
Status: DISCONTINUED | OUTPATIENT
Start: 2025-08-21 | End: 2025-08-22 | Stop reason: HOSPADM

## 2025-08-21 RX ORDER — FLUCONAZOLE 40 MG/ML
400 POWDER, FOR SUSPENSION ORAL DAILY
Status: DISCONTINUED | OUTPATIENT
Start: 2025-08-22 | End: 2025-08-22 | Stop reason: HOSPADM

## 2025-08-21 RX ORDER — FAMOTIDINE 40 MG/5ML
20 POWDER, FOR SUSPENSION ORAL 2 TIMES DAILY
Qty: 150 ML | Refills: 0 | Status: SHIPPED | OUTPATIENT
Start: 2025-08-21 | End: 2025-09-21

## 2025-08-21 RX ADMIN — ENOXAPARIN SODIUM 40 MG: 40 INJECTION SUBCUTANEOUS at 05:08

## 2025-08-21 RX ADMIN — OXYCODONE HYDROCHLORIDE 5 MG: 5 SOLUTION ORAL at 05:08

## 2025-08-21 RX ADMIN — OXYCODONE HYDROCHLORIDE 5 MG: 5 SOLUTION ORAL at 01:08

## 2025-08-21 RX ADMIN — INSULIN ASPART 4 UNITS: 100 INJECTION, SOLUTION INTRAVENOUS; SUBCUTANEOUS at 04:08

## 2025-08-21 RX ADMIN — FLUCONAZOLE 400 MG: 2 INJECTION, SOLUTION INTRAVENOUS at 09:08

## 2025-08-21 RX ADMIN — INSULIN ASPART 4 UNITS: 100 INJECTION, SOLUTION INTRAVENOUS; SUBCUTANEOUS at 12:08

## 2025-08-21 RX ADMIN — INSULIN ASPART 2 UNITS: 100 INJECTION, SOLUTION INTRAVENOUS; SUBCUTANEOUS at 12:08

## 2025-08-21 RX ADMIN — LEVOFLOXACIN 750 MG: 25 SOLUTION ORAL at 08:08

## 2025-08-21 RX ADMIN — FAMOTIDINE 20 MG: 40 POWDER, FOR SUSPENSION ORAL at 01:08

## 2025-08-21 RX ADMIN — METRONIDAZOLE 500 MG: 500 INJECTION, SOLUTION INTRAVENOUS at 12:08

## 2025-08-21 RX ADMIN — INSULIN ASPART 5 UNITS: 100 INJECTION, SOLUTION INTRAVENOUS; SUBCUTANEOUS at 05:08

## 2025-08-21 RX ADMIN — SODIUM PHOSPHATE, MONOBASIC, MONOHYDRATE AND SODIUM PHOSPHATE, DIBASIC, ANHYDROUS 20.1 MMOL: 142; 276 INJECTION, SOLUTION INTRAVENOUS at 06:08

## 2025-08-21 RX ADMIN — INSULIN ASPART 5 UNITS: 100 INJECTION, SOLUTION INTRAVENOUS; SUBCUTANEOUS at 12:08

## 2025-08-21 RX ADMIN — INSULIN ASPART 4 UNITS: 100 INJECTION, SOLUTION INTRAVENOUS; SUBCUTANEOUS at 08:08

## 2025-08-21 RX ADMIN — MAGNESIUM SULFATE HEPTAHYDRATE 2 G: 40 INJECTION, SOLUTION INTRAVENOUS at 05:08

## 2025-08-21 RX ADMIN — INSULIN ASPART 5 UNITS: 100 INJECTION, SOLUTION INTRAVENOUS; SUBCUTANEOUS at 08:08

## 2025-08-21 RX ADMIN — OXYCODONE HYDROCHLORIDE 5 MG: 5 SOLUTION ORAL at 08:08

## 2025-08-21 RX ADMIN — INSULIN ASPART 1 UNITS: 100 INJECTION, SOLUTION INTRAVENOUS; SUBCUTANEOUS at 08:08

## 2025-08-21 RX ADMIN — INSULIN ASPART 2 UNITS: 100 INJECTION, SOLUTION INTRAVENOUS; SUBCUTANEOUS at 04:08

## 2025-08-21 RX ADMIN — FAMOTIDINE 20 MG: 40 POWDER, FOR SUSPENSION ORAL at 08:08

## 2025-08-21 RX ADMIN — LEVOTHYROXINE SODIUM 25 MCG: 20 INJECTION, SOLUTION INTRAVENOUS at 08:08

## 2025-08-22 ENCOUNTER — NURSE TRIAGE (OUTPATIENT)
Dept: ADMINISTRATIVE | Facility: CLINIC | Age: 65
End: 2025-08-22
Payer: MEDICARE

## 2025-08-22 VITALS
WEIGHT: 121.94 LBS | SYSTOLIC BLOOD PRESSURE: 136 MMHG | TEMPERATURE: 98 F | DIASTOLIC BLOOD PRESSURE: 63 MMHG | HEIGHT: 64 IN | BODY MASS INDEX: 20.82 KG/M2 | HEART RATE: 96 BPM | OXYGEN SATURATION: 98 % | RESPIRATION RATE: 18 BRPM

## 2025-08-22 DIAGNOSIS — C25.9 MALIGNANT NEOPLASM OF PANCREAS, UNSPECIFIED LOCATION OF MALIGNANCY: Primary | ICD-10-CM

## 2025-08-22 PROBLEM — J90 PLEURAL EFFUSION, LEFT: Status: ACTIVE | Noted: 2025-08-22

## 2025-08-22 PROBLEM — D64.9 ANEMIA: Status: ACTIVE | Noted: 2025-08-22

## 2025-08-22 PROBLEM — E87.1 HYPONATREMIA: Status: ACTIVE | Noted: 2025-08-22

## 2025-08-22 LAB
ABSOLUTE EOSINOPHIL (OHS): 0.1 K/UL
ABSOLUTE MONOCYTE (OHS): 1.35 K/UL (ref 0.3–1)
ABSOLUTE NEUTROPHIL COUNT (OHS): 7.21 K/UL (ref 1.8–7.7)
ALBUMIN SERPL BCP-MCNC: 1.9 G/DL (ref 3.5–5.2)
ALP SERPL-CCNC: 791 UNIT/L (ref 40–150)
ALT SERPL W/O P-5'-P-CCNC: 17 UNIT/L (ref 0–55)
ANION GAP (OHS): 9 MMOL/L (ref 8–16)
AST SERPL-CCNC: 31 UNIT/L (ref 0–50)
BASOPHILS # BLD AUTO: 0.07 K/UL
BASOPHILS NFR BLD AUTO: 0.7 %
BILIRUB SERPL-MCNC: 0.3 MG/DL (ref 0.1–1)
BUN SERPL-MCNC: 10 MG/DL (ref 8–23)
CALCIUM SERPL-MCNC: 8.5 MG/DL (ref 8.7–10.5)
CHLORIDE SERPL-SCNC: 99 MMOL/L (ref 95–110)
CO2 SERPL-SCNC: 27 MMOL/L (ref 23–29)
CREAT SERPL-MCNC: 0.5 MG/DL (ref 0.5–1.4)
ERYTHROCYTE [DISTWIDTH] IN BLOOD BY AUTOMATED COUNT: 17.5 % (ref 11.5–14.5)
GFR SERPLBLD CREATININE-BSD FMLA CKD-EPI: >60 ML/MIN/1.73/M2
GLUCOSE SERPL-MCNC: 198 MG/DL (ref 70–110)
HCT VFR BLD AUTO: 27 % (ref 37–48.5)
HGB BLD-MCNC: 8.8 GM/DL (ref 12–16)
IMM GRANULOCYTES # BLD AUTO: 0.09 K/UL (ref 0–0.04)
IMM GRANULOCYTES NFR BLD AUTO: 1 % (ref 0–0.5)
LYMPHOCYTES # BLD AUTO: 0.64 K/UL (ref 1–4.8)
MAGNESIUM SERPL-MCNC: 1.5 MG/DL (ref 1.6–2.6)
MCH RBC QN AUTO: 30.4 PG (ref 27–31)
MCHC RBC AUTO-ENTMCNC: 32.6 G/DL (ref 32–36)
MCV RBC AUTO: 93 FL (ref 82–98)
NUCLEATED RBC (/100WBC) (OHS): 0 /100 WBC
PHOSPHATE SERPL-MCNC: 2.4 MG/DL (ref 2.7–4.5)
PLATELET # BLD AUTO: 500 K/UL (ref 150–450)
PMV BLD AUTO: 10.3 FL (ref 9.2–12.9)
POCT GLUCOSE: 118 MG/DL (ref 70–110)
POCT GLUCOSE: 125 MG/DL (ref 70–110)
POCT GLUCOSE: 215 MG/DL (ref 70–110)
POTASSIUM SERPL-SCNC: 4 MMOL/L (ref 3.5–5.1)
PROT SERPL-MCNC: 6.3 GM/DL (ref 6–8.4)
RBC # BLD AUTO: 2.89 M/UL (ref 4–5.4)
RELATIVE EOSINOPHIL (OHS): 1.1 %
RELATIVE LYMPHOCYTE (OHS): 6.8 % (ref 18–48)
RELATIVE MONOCYTE (OHS): 14.3 % (ref 4–15)
RELATIVE NEUTROPHIL (OHS): 76.1 % (ref 38–73)
SODIUM SERPL-SCNC: 135 MMOL/L (ref 136–145)
WBC # BLD AUTO: 9.46 K/UL (ref 3.9–12.7)

## 2025-08-22 PROCEDURE — 25000003 PHARM REV CODE 250

## 2025-08-22 PROCEDURE — 63600175 PHARM REV CODE 636 W HCPCS

## 2025-08-22 PROCEDURE — 25000003 PHARM REV CODE 250: Performed by: NURSE PRACTITIONER

## 2025-08-22 PROCEDURE — 84100 ASSAY OF PHOSPHORUS: CPT

## 2025-08-22 PROCEDURE — 85025 COMPLETE CBC W/AUTO DIFF WBC: CPT

## 2025-08-22 PROCEDURE — 80053 COMPREHEN METABOLIC PANEL: CPT

## 2025-08-22 PROCEDURE — 25000242 PHARM REV CODE 250 ALT 637 W/ HCPCS

## 2025-08-22 PROCEDURE — 99232 SBSQ HOSP IP/OBS MODERATE 35: CPT | Mod: ,,, | Performed by: NURSE PRACTITIONER

## 2025-08-22 PROCEDURE — 25000003 PHARM REV CODE 250: Performed by: SURGERY

## 2025-08-22 PROCEDURE — 83735 ASSAY OF MAGNESIUM: CPT

## 2025-08-22 PROCEDURE — 25000003 PHARM REV CODE 250: Performed by: INTERNAL MEDICINE

## 2025-08-22 RX ORDER — SCOPOLAMINE 1 MG/3D
1 PATCH, EXTENDED RELEASE TRANSDERMAL
Qty: 5 PATCH | Refills: 0 | Status: SHIPPED | OUTPATIENT
Start: 2025-08-22 | End: 2025-09-06

## 2025-08-22 RX ORDER — INSULIN ASPART 100 [IU]/ML
6 INJECTION, SOLUTION INTRAVENOUS; SUBCUTANEOUS EVERY 6 HOURS
Qty: 7.2 ML | Refills: 11 | OUTPATIENT
Start: 2025-08-22 | End: 2026-08-22

## 2025-08-22 RX ORDER — INSULIN LISPRO 100 [IU]/ML
INJECTION, SOLUTION INTRAVENOUS; SUBCUTANEOUS
Qty: 15 ML | Refills: 6 | Status: SHIPPED | OUTPATIENT
Start: 2025-08-22

## 2025-08-22 RX ORDER — ONDANSETRON 4 MG/1
4 TABLET, ORALLY DISINTEGRATING ORAL EVERY 8 HOURS PRN
Qty: 30 TABLET | Refills: 0 | Status: SHIPPED | OUTPATIENT
Start: 2025-08-22

## 2025-08-22 RX ORDER — SCOPOLAMINE 1 MG/3D
1 PATCH, EXTENDED RELEASE TRANSDERMAL
Status: DISCONTINUED | OUTPATIENT
Start: 2025-08-22 | End: 2025-08-22 | Stop reason: HOSPADM

## 2025-08-22 RX ORDER — ONDANSETRON 4 MG/1
4 TABLET, FILM COATED ORAL EVERY 8 HOURS PRN
Qty: 30 TABLET | Refills: 0 | Status: SHIPPED | OUTPATIENT
Start: 2025-08-22

## 2025-08-22 RX ORDER — ONDANSETRON 4 MG/1
4 TABLET, ORALLY DISINTEGRATING ORAL EVERY 8 HOURS PRN
Status: DISCONTINUED | OUTPATIENT
Start: 2025-08-22 | End: 2025-08-22 | Stop reason: HOSPADM

## 2025-08-22 RX ORDER — GLUCAGON 3 MG/1
1 POWDER NASAL
Qty: 2 EACH | Refills: 3 | Status: SHIPPED | OUTPATIENT
Start: 2025-08-22

## 2025-08-22 RX ORDER — PEN NEEDLE, DIABETIC 30 GX3/16"
NEEDLE, DISPOSABLE MISCELLANEOUS
Qty: 100 EACH | Refills: 3 | Status: SHIPPED | OUTPATIENT
Start: 2025-08-22

## 2025-08-22 RX ADMIN — INSULIN ASPART 4 UNITS: 100 INJECTION, SOLUTION INTRAVENOUS; SUBCUTANEOUS at 05:08

## 2025-08-22 RX ADMIN — INSULIN ASPART 5 UNITS: 100 INJECTION, SOLUTION INTRAVENOUS; SUBCUTANEOUS at 12:08

## 2025-08-22 RX ADMIN — FLUCONAZOLE 400 MG: 40 POWDER, FOR SUSPENSION ORAL at 09:08

## 2025-08-22 RX ADMIN — INSULIN ASPART 5 UNITS: 100 INJECTION, SOLUTION INTRAVENOUS; SUBCUTANEOUS at 04:08

## 2025-08-22 RX ADMIN — ONDANSETRON 8 MG: 8 TABLET, ORALLY DISINTEGRATING ORAL at 03:08

## 2025-08-22 RX ADMIN — FAMOTIDINE 20 MG: 40 POWDER, FOR SUSPENSION ORAL at 09:08

## 2025-08-22 RX ADMIN — INSULIN ASPART 5 UNITS: 100 INJECTION, SOLUTION INTRAVENOUS; SUBCUTANEOUS at 09:08

## 2025-08-22 RX ADMIN — LEVOFLOXACIN 750 MG: 25 SOLUTION ORAL at 09:08

## 2025-08-22 RX ADMIN — OXYCODONE HYDROCHLORIDE 5 MG: 5 SOLUTION ORAL at 01:08

## 2025-08-22 RX ADMIN — LEVOTHYROXINE SODIUM 25 MCG: 20 INJECTION, SOLUTION INTRAVENOUS at 09:08

## 2025-08-22 RX ADMIN — INSULIN ASPART 1 UNITS: 100 INJECTION, SOLUTION INTRAVENOUS; SUBCUTANEOUS at 12:08

## 2025-08-22 RX ADMIN — OXYCODONE HYDROCHLORIDE 5 MG: 5 SOLUTION ORAL at 09:08

## 2025-08-22 RX ADMIN — OXYCODONE HYDROCHLORIDE 5 MG: 5 SOLUTION ORAL at 12:08

## 2025-08-23 ENCOUNTER — PATIENT MESSAGE (OUTPATIENT)
Dept: ENDOCRINOLOGY | Facility: HOSPITAL | Age: 65
End: 2025-08-23
Payer: MEDICARE

## 2025-08-25 ENCOUNTER — DOCUMENTATION ONLY (OUTPATIENT)
Dept: INFUSION THERAPY | Facility: HOSPITAL | Age: 65
End: 2025-08-25
Payer: MEDICARE

## 2025-08-25 ENCOUNTER — CLINICAL SUPPORT (OUTPATIENT)
Dept: HEMATOLOGY/ONCOLOGY | Facility: CLINIC | Age: 65
End: 2025-08-25
Payer: MEDICARE

## 2025-08-25 ENCOUNTER — LAB VISIT (OUTPATIENT)
Dept: LAB | Facility: HOSPITAL | Age: 65
End: 2025-08-25
Attending: SURGERY
Payer: MEDICARE

## 2025-08-25 ENCOUNTER — PATIENT MESSAGE (OUTPATIENT)
Dept: ADMINISTRATIVE | Facility: OTHER | Age: 65
End: 2025-08-25
Payer: MEDICARE

## 2025-08-25 DIAGNOSIS — C25.1 MALIGNANT NEOPLASM OF BODY OF PANCREAS: ICD-10-CM

## 2025-08-25 DIAGNOSIS — C25.9 MALIGNANT NEOPLASM OF PANCREAS, UNSPECIFIED LOCATION OF MALIGNANCY: ICD-10-CM

## 2025-08-25 DIAGNOSIS — Z71.3 NUTRITIONAL COUNSELING: Primary | ICD-10-CM

## 2025-08-25 LAB
ABSOLUTE EOSINOPHIL (OHS): 0.04 K/UL
ABSOLUTE MONOCYTE (OHS): 1.18 K/UL (ref 0.3–1)
ABSOLUTE NEUTROPHIL COUNT (OHS): 8.49 K/UL (ref 1.8–7.7)
ALBUMIN SERPL BCP-MCNC: 2.2 G/DL (ref 3.5–5.2)
ALP SERPL-CCNC: 473 UNIT/L (ref 40–150)
ALT SERPL W/O P-5'-P-CCNC: 18 UNIT/L (ref 10–44)
ANION GAP (OHS): 9 MMOL/L (ref 8–16)
AST SERPL-CCNC: 22 UNIT/L (ref 11–45)
BASOPHILS # BLD AUTO: 0.05 K/UL
BASOPHILS NFR BLD AUTO: 0.5 %
BILIRUB SERPL-MCNC: 0.3 MG/DL (ref 0.1–1)
BUN SERPL-MCNC: 13 MG/DL (ref 8–23)
CALCIUM SERPL-MCNC: 8.8 MG/DL (ref 8.7–10.5)
CHLORIDE SERPL-SCNC: 97 MMOL/L (ref 95–110)
CO2 SERPL-SCNC: 29 MMOL/L (ref 23–29)
CREAT SERPL-MCNC: 0.6 MG/DL (ref 0.5–1.4)
ERYTHROCYTE [DISTWIDTH] IN BLOOD BY AUTOMATED COUNT: 16.9 % (ref 11.5–14.5)
GFR SERPLBLD CREATININE-BSD FMLA CKD-EPI: >60 ML/MIN/1.73/M2
GLUCOSE SERPL-MCNC: 155 MG/DL (ref 70–110)
HCT VFR BLD AUTO: 28.2 % (ref 37–48.5)
HGB BLD-MCNC: 9.3 GM/DL (ref 12–16)
IMM GRANULOCYTES # BLD AUTO: 0.05 K/UL (ref 0–0.04)
IMM GRANULOCYTES NFR BLD AUTO: 0.5 % (ref 0–0.5)
LYMPHOCYTES # BLD AUTO: 0.5 K/UL (ref 1–4.8)
MAGNESIUM SERPL-MCNC: 1.4 MG/DL (ref 1.6–2.6)
MCH RBC QN AUTO: 30.1 PG (ref 27–31)
MCHC RBC AUTO-ENTMCNC: 33 G/DL (ref 32–36)
MCV RBC AUTO: 91 FL (ref 82–98)
MYCOBACTERIUM SPEC QL CULT: NORMAL
NUCLEATED RBC (/100WBC) (OHS): 0 /100 WBC
PHOSPHATE SERPL-MCNC: 3.3 MG/DL (ref 2.7–4.5)
PLATELET # BLD AUTO: 530 K/UL (ref 150–450)
PMV BLD AUTO: 9.5 FL (ref 9.2–12.9)
POTASSIUM SERPL-SCNC: 3.9 MMOL/L (ref 3.5–5.1)
PROT SERPL-MCNC: 7.2 GM/DL (ref 6–8.4)
RBC # BLD AUTO: 3.09 M/UL (ref 4–5.4)
RELATIVE EOSINOPHIL (OHS): 0.4 %
RELATIVE LYMPHOCYTE (OHS): 4.8 % (ref 18–48)
RELATIVE MONOCYTE (OHS): 11.4 % (ref 4–15)
RELATIVE NEUTROPHIL (OHS): 82.4 % (ref 38–73)
SODIUM SERPL-SCNC: 135 MMOL/L (ref 136–145)
WBC # BLD AUTO: 10.31 K/UL (ref 3.9–12.7)

## 2025-08-25 PROCEDURE — 82040 ASSAY OF SERUM ALBUMIN: CPT | Mod: PN

## 2025-08-25 PROCEDURE — 84100 ASSAY OF PHOSPHORUS: CPT | Mod: PN

## 2025-08-25 PROCEDURE — 85025 COMPLETE CBC W/AUTO DIFF WBC: CPT | Mod: PN

## 2025-08-25 PROCEDURE — 83735 ASSAY OF MAGNESIUM: CPT | Mod: PN

## 2025-08-25 PROCEDURE — 36415 COLL VENOUS BLD VENIPUNCTURE: CPT | Mod: PN

## 2025-08-26 ENCOUNTER — PATIENT MESSAGE (OUTPATIENT)
Dept: HEMATOLOGY/ONCOLOGY | Facility: CLINIC | Age: 65
End: 2025-08-26
Payer: MEDICARE

## 2025-08-26 ENCOUNTER — PATIENT OUTREACH (OUTPATIENT)
Dept: ADMINISTRATIVE | Facility: CLINIC | Age: 65
End: 2025-08-26
Payer: MEDICARE

## 2025-08-26 DIAGNOSIS — N17.9 AKI (ACUTE KIDNEY INJURY): ICD-10-CM

## 2025-08-26 LAB — FUNGUS SPEC CULT: NORMAL

## 2025-08-26 RX ORDER — OXYCODONE HCL 5 MG/5 ML
5 SOLUTION, ORAL ORAL EVERY 6 HOURS PRN
Qty: 150 ML | Refills: 0 | Status: SHIPPED | OUTPATIENT
Start: 2025-08-26

## 2025-08-27 ENCOUNTER — OFFICE VISIT (OUTPATIENT)
Dept: SURGERY | Facility: CLINIC | Age: 65
End: 2025-08-27
Payer: MEDICARE

## 2025-08-27 ENCOUNTER — TELEPHONE (OUTPATIENT)
Dept: SURGERY | Facility: CLINIC | Age: 65
End: 2025-08-27

## 2025-08-27 ENCOUNTER — CLINICAL SUPPORT (OUTPATIENT)
Dept: INFECTIOUS DISEASES | Facility: CLINIC | Age: 65
End: 2025-08-27
Payer: MEDICARE

## 2025-08-27 VITALS
OXYGEN SATURATION: 94 % | SYSTOLIC BLOOD PRESSURE: 131 MMHG | HEART RATE: 82 BPM | DIASTOLIC BLOOD PRESSURE: 63 MMHG | BODY MASS INDEX: 19.91 KG/M2 | WEIGHT: 114.19 LBS

## 2025-08-27 DIAGNOSIS — N17.9 AKI (ACUTE KIDNEY INJURY): Primary | ICD-10-CM

## 2025-08-27 DIAGNOSIS — C25.9 MALIGNANT NEOPLASM OF PANCREAS, UNSPECIFIED LOCATION OF MALIGNANCY: ICD-10-CM

## 2025-08-27 DIAGNOSIS — Z00.00 HEALTHCARE MAINTENANCE: Primary | ICD-10-CM

## 2025-08-27 PROCEDURE — 99024 POSTOP FOLLOW-UP VISIT: CPT | Mod: S$GLB,,,

## 2025-08-27 PROCEDURE — 1159F MED LIST DOCD IN RCRD: CPT | Mod: CPTII,S$GLB,,

## 2025-08-27 PROCEDURE — 90471 IMMUNIZATION ADMIN: CPT | Mod: S$GLB,,, | Performed by: INTERNAL MEDICINE

## 2025-08-27 PROCEDURE — 3078F DIAST BP <80 MM HG: CPT | Mod: CPTII,S$GLB,,

## 2025-08-27 PROCEDURE — 3044F HG A1C LEVEL LT 7.0%: CPT | Mod: CPTII,S$GLB,,

## 2025-08-27 PROCEDURE — 3075F SYST BP GE 130 - 139MM HG: CPT | Mod: CPTII,S$GLB,,

## 2025-08-27 PROCEDURE — 90472 IMMUNIZATION ADMIN EACH ADD: CPT | Mod: S$GLB,,, | Performed by: INTERNAL MEDICINE

## 2025-08-27 PROCEDURE — 90620 MENB-4C VACCINE IM: CPT | Mod: S$GLB,,, | Performed by: INTERNAL MEDICINE

## 2025-08-27 PROCEDURE — 99999 PR PBB SHADOW E&M-EST. PATIENT-LVL IV: CPT | Mod: PBBFAC,,,

## 2025-08-27 PROCEDURE — 90734 MENACWYD/MENACWYCRM VACC IM: CPT | Mod: S$GLB,,, | Performed by: INTERNAL MEDICINE

## 2025-08-27 PROCEDURE — 1126F AMNT PAIN NOTED NONE PRSNT: CPT | Mod: CPTII,S$GLB,,

## 2025-08-28 ENCOUNTER — TELEPHONE (OUTPATIENT)
Dept: SURGERY | Facility: CLINIC | Age: 65
End: 2025-08-28
Payer: MEDICARE

## 2025-08-28 ENCOUNTER — TELEPHONE (OUTPATIENT)
Dept: GASTROENTEROLOGY | Facility: CLINIC | Age: 65
End: 2025-08-28
Payer: MEDICARE

## 2025-08-28 DIAGNOSIS — Z46.89 ENCOUNTER FOR REPLACEMENT OF BILIARY STENT: Primary | ICD-10-CM

## 2025-08-28 DIAGNOSIS — R93.2 ABNORMAL FINDINGS ON DIAGNOSTIC IMAGING OF LIVER AND BILIARY TRACT: ICD-10-CM

## 2025-09-01 DIAGNOSIS — N17.9 AKI (ACUTE KIDNEY INJURY): ICD-10-CM

## 2025-09-01 PROBLEM — Z98.890 S/P ABDOMINAL PARACENTESIS: Status: RESOLVED | Noted: 2025-07-18 | Resolved: 2025-09-01

## 2025-09-02 RX ORDER — OXYCODONE HCL 5 MG/5 ML
5 SOLUTION, ORAL ORAL EVERY 6 HOURS PRN
Qty: 150 ML | Refills: 0 | Status: SHIPPED | OUTPATIENT
Start: 2025-09-02

## 2025-09-02 RX ORDER — SCOPOLAMINE 1 MG/3D
1 PATCH, EXTENDED RELEASE TRANSDERMAL
Qty: 5 PATCH | Refills: 0 | Status: SHIPPED | OUTPATIENT
Start: 2025-09-02 | End: 2025-09-17

## 2025-09-03 ENCOUNTER — OFFICE VISIT (OUTPATIENT)
Dept: SURGERY | Facility: CLINIC | Age: 65
End: 2025-09-03
Payer: MEDICARE

## 2025-09-03 DIAGNOSIS — C25.9 MALIGNANT NEOPLASM OF PANCREAS, UNSPECIFIED LOCATION OF MALIGNANCY: Primary | ICD-10-CM

## 2025-09-03 PROCEDURE — 99024 POSTOP FOLLOW-UP VISIT: CPT | Mod: 95,,,

## 2025-09-03 PROCEDURE — 3044F HG A1C LEVEL LT 7.0%: CPT | Mod: CPTII,95,,

## (undated) DEVICE — FORCEP RESCUE 8X2.4MM 230CM

## (undated) DEVICE — SOL WATER STRL IRR 1000ML

## (undated) DEVICE — Device

## (undated) DEVICE — SUT VICRYL PLUS 3-0 SH 27IN

## (undated) DEVICE — SPONGE COTTON TRAY 4X4IN

## (undated) DEVICE — HEMOSTAT SURGICEL 2X14IN

## (undated) DEVICE — DRAIN PENROSE XRAY 12 X 1/4 ST

## (undated) DEVICE — SUT VICRYL PLUS 3-0 SH 18IN

## (undated) DEVICE — TRAY CATH 1-LYR URIMTR 16FR

## (undated) DEVICE — DRESSING ADH ISLAND 3.6 X 14

## (undated) DEVICE — SUT 2-0 12-18IN SILK

## (undated) DEVICE — STAPLE TRI-STAPLE 2.0 CRV TIP

## (undated) DEVICE — KIT IRR SUCTION HND PIECE

## (undated) DEVICE — SUT PROLENE 3-0 SH DA 36 BL

## (undated) DEVICE — SYR SLIP TIP 20CC

## (undated) DEVICE — RELOAD SIGNIA SH CRV GRAY 30MM

## (undated) DEVICE — SUT PROLENE 4-0 RB-1 BL MO

## (undated) DEVICE — SUT ETHILON 2-0 PSLX 30IN

## (undated) DEVICE — ELECTRODE MEGADYNE RETURN DUAL

## (undated) DEVICE — DRAIN CHANNEL ROUND 19FR

## (undated) DEVICE — CONTAINER SPECIMEN OR STER 4OZ

## (undated) DEVICE — SUT SILK 2-0 SH 18IN BLACK

## (undated) DEVICE — APPLICATOR CHLORAPREP ORN 26ML

## (undated) DEVICE — ELECTRODE EXTENDED BLADE

## (undated) DEVICE — STAPLER GIA HANDLE STD

## (undated) DEVICE — KIT SAHARA DRAPE DRAW/LIFT

## (undated) DEVICE — SYR SLIP TIP 1CC

## (undated) DEVICE — SUT VICRYL BR 1 GEN 27 CT-1

## (undated) DEVICE — SNARE CAPTIVATOR COLD RND 10MM

## (undated) DEVICE — BLANKET HYPER ADULT 24X60IN

## (undated) DEVICE — SYR 10CC LUER LOCK

## (undated) DEVICE — FILTER STRAW

## (undated) DEVICE — TRAY GENERAL SURGERY OMC

## (undated) DEVICE — SYR 30CC LUER LOCK

## (undated) DEVICE — TUBE PENROSE DRAIN 18INX5/8IN

## (undated) DEVICE — SEALER LIGASURE MARYLAND 23CM

## (undated) DEVICE — ELECTRODE REM PLYHSV RETURN 9

## (undated) DEVICE — SPONGE LAP 18X18 PREWASHED

## (undated) DEVICE — DRAPE THREE-QTR REINF 53X77IN

## (undated) DEVICE — NDL HYPO STD REG BVL 22GX1.5IN

## (undated) DEVICE — JAGWIRE .035 STIFF

## (undated) DEVICE — EVACUATOR WOUND BULB 100CC

## (undated) DEVICE — GOWN SMART IMP BREATHABLE XXLG

## (undated) DEVICE — SOL 9P NACL IRR PIC IL

## (undated) DEVICE — PENCIL ROCKER SWITCH 10FT CORD

## (undated) DEVICE — PACK ECLIPSE SET-UP W/O DRAPE

## (undated) DEVICE — SUT 1 48IN PDS II VIO MONO

## (undated) DEVICE — SUT 3-0 12-18IN SILK

## (undated) DEVICE — LUBRICANT SURGILUBE 2 OZ

## (undated) DEVICE — DRAPE HALF SURGICAL 40X58IN

## (undated) DEVICE — STAPLER SKIN PROXIMATE WIDE

## (undated) DEVICE — SYR 60CC W/GAUGE

## (undated) DEVICE — BELLOW CANN HEMOBLAST 1.65GR

## (undated) DEVICE — SUT SILK 3-0 SH 18IN BLACK

## (undated) DEVICE — TUBE GASTRO PEG MIC 20F

## (undated) DEVICE — DILATOR CRE 12-15MM

## (undated) DEVICE — DRAPE ABDOMINAL TIBURON 14X11

## (undated) DEVICE — DILATOR CRE 10-12MM

## (undated) DEVICE — TUBING SUC UNIV W/CONN 12FT

## (undated) DEVICE — SUT PROLENE 5-0 36 V-5 V-5

## (undated) DEVICE — GOWN SURGICAL X-LARGE

## (undated) DEVICE — SUT 2-0 SILK 30IN BLK BRAID